# Patient Record
Sex: MALE | Race: WHITE | Employment: OTHER | URBAN - NONMETROPOLITAN AREA
[De-identification: names, ages, dates, MRNs, and addresses within clinical notes are randomized per-mention and may not be internally consistent; named-entity substitution may affect disease eponyms.]

---

## 2019-03-15 ENCOUNTER — HOSPITAL ENCOUNTER (EMERGENCY)
Age: 74
Discharge: HOME | DRG: 280 | End: 2019-03-15
Attending: EMERGENCY MEDICINE
Payer: MEDICARE

## 2019-03-15 ENCOUNTER — APPOINTMENT (OUTPATIENT)
Dept: GENERAL RADIOLOGY | Age: 74
DRG: 280 | End: 2019-03-15
Payer: MEDICARE

## 2019-03-15 VITALS
HEART RATE: 186 BPM | OXYGEN SATURATION: 81 % | TEMPERATURE: 97.9 F | SYSTOLIC BLOOD PRESSURE: 142 MMHG | RESPIRATION RATE: 28 BRPM | DIASTOLIC BLOOD PRESSURE: 94 MMHG

## 2019-03-15 DIAGNOSIS — J10.1 INFLUENZA A: ICD-10-CM

## 2019-03-15 DIAGNOSIS — I48.92 ATRIAL FLUTTER WITH RAPID VENTRICULAR RESPONSE (HCC): ICD-10-CM

## 2019-03-15 DIAGNOSIS — R06.02 SHORTNESS OF BREATH: Primary | ICD-10-CM

## 2019-03-15 DIAGNOSIS — J40 BRONCHITIS: ICD-10-CM

## 2019-03-15 DIAGNOSIS — I21.4 NSTEMI (NON-ST ELEVATED MYOCARDIAL INFARCTION) (HCC): ICD-10-CM

## 2019-03-15 LAB
-: ABNORMAL
ABSOLUTE EOS #: 0 K/UL (ref 0–0.44)
ABSOLUTE IMMATURE GRANULOCYTE: 0 K/UL (ref 0–0.3)
ABSOLUTE LYMPH #: 0.54 K/UL (ref 1.1–3.7)
ABSOLUTE MONO #: 1.73 K/UL (ref 0.1–1.2)
ALBUMIN SERPL-MCNC: 3.9 G/DL (ref 3.5–5.2)
ALBUMIN/GLOBULIN RATIO: 1.1 (ref 1–2.5)
ALLEN TEST: ABNORMAL
ALP BLD-CCNC: 125 U/L (ref 40–129)
ALT SERPL-CCNC: 17 U/L (ref 5–41)
AMORPHOUS: ABNORMAL
AMYLASE: 47 U/L (ref 28–100)
ANION GAP SERPL CALCULATED.3IONS-SCNC: 13 MMOL/L (ref 9–17)
AST SERPL-CCNC: 31 U/L
BACTERIA: ABNORMAL
BASOPHILS # BLD: 1 % (ref 0–2)
BASOPHILS ABSOLUTE: 0.11 K/UL (ref 0–0.2)
BILIRUB SERPL-MCNC: 0.38 MG/DL (ref 0.3–1.2)
BILIRUBIN URINE: NEGATIVE
BNP INTERPRETATION: ABNORMAL
BUN BLDV-MCNC: 13 MG/DL (ref 8–23)
BUN/CREAT BLD: 13 (ref 9–20)
CALCIUM SERPL-MCNC: 9.3 MG/DL (ref 8.6–10.4)
CARBOXYHEMOGLOBIN: ABNORMAL % (ref 0–5)
CASTS UA: ABNORMAL /LPF
CHLORIDE BLD-SCNC: 94 MMOL/L (ref 98–107)
CO2: 27 MMOL/L (ref 20–31)
COLOR: YELLOW
COMMENT UA: ABNORMAL
CREAT SERPL-MCNC: 0.99 MG/DL (ref 0.7–1.2)
CRYSTALS, UA: ABNORMAL /HPF
DIFFERENTIAL TYPE: ABNORMAL
DIRECT EXAM: ABNORMAL
DIRECT EXAM: ABNORMAL
EKG ATRIAL RATE: 264 BPM
EKG P AXIS: -89 DEGREES
EKG Q-T INTERVAL: 302 MS
EKG QRS DURATION: 88 MS
EKG QTC CALCULATION (BAZETT): 447 MS
EKG R AXIS: -141 DEGREES
EKG T AXIS: 168 DEGREES
EKG VENTRICULAR RATE: 132 BPM
EOSINOPHILS RELATIVE PERCENT: 0 % (ref 1–4)
EPITHELIAL CELLS UA: ABNORMAL /HPF (ref 0–5)
FIO2: ABNORMAL
GFR AFRICAN AMERICAN: >60 ML/MIN
GFR NON-AFRICAN AMERICAN: >60 ML/MIN
GFR SERPL CREATININE-BSD FRML MDRD: ABNORMAL ML/MIN/{1.73_M2}
GFR SERPL CREATININE-BSD FRML MDRD: ABNORMAL ML/MIN/{1.73_M2}
GLUCOSE BLD-MCNC: 201 MG/DL (ref 70–99)
GLUCOSE URINE: NEGATIVE
HCO3 VENOUS: 30.1 MMOL/L (ref 24–30)
HCT VFR BLD CALC: 42.6 % (ref 40.7–50.3)
HEMOGLOBIN: 13.4 G/DL (ref 13–17)
IMMATURE GRANULOCYTES: 0 %
INR BLD: 1.2 (ref 0.9–1.2)
KETONES, URINE: NEGATIVE
LACTIC ACID, SEPSIS WHOLE BLOOD: ABNORMAL MMOL/L (ref 0.5–1.9)
LACTIC ACID, SEPSIS WHOLE BLOOD: NORMAL MMOL/L (ref 0.5–1.9)
LACTIC ACID, SEPSIS: 1.5 MMOL/L (ref 0.5–1.9)
LACTIC ACID, SEPSIS: 2.1 MMOL/L (ref 0.5–1.9)
LEUKOCYTE ESTERASE, URINE: NEGATIVE
LIPASE: 47 U/L (ref 13–60)
LYMPHOCYTES # BLD: 5 % (ref 24–43)
Lab: ABNORMAL
MCH RBC QN AUTO: 30.7 PG (ref 25.2–33.5)
MCHC RBC AUTO-ENTMCNC: 31.5 G/DL (ref 28.4–34.8)
MCV RBC AUTO: 97.5 FL (ref 82.6–102.9)
METHEMOGLOBIN: ABNORMAL % (ref 0–1.9)
MODE: ABNORMAL
MONOCYTES # BLD: 16 % (ref 3–12)
MORPHOLOGY: NORMAL
MUCUS: ABNORMAL
NEGATIVE BASE EXCESS, VEN: 2.7 MMOL/L (ref 0–2)
NITRITE, URINE: NEGATIVE
NOTIFICATION TIME: ABNORMAL
NOTIFICATION: ABNORMAL
NRBC AUTOMATED: 0 PER 100 WBC
O2 DEVICE/FLOW/%: ABNORMAL
O2 SAT, VEN: 59.5 % (ref 60–85)
OTHER OBSERVATIONS UA: ABNORMAL
OXYHEMOGLOBIN: ABNORMAL % (ref 95–98)
PARTIAL THROMBOPLASTIN TIME: 37.3 SEC (ref 23.2–34.4)
PATIENT TEMP: 37
PCO2, VEN, TEMP ADJ: ABNORMAL MMHG (ref 39–55)
PCO2, VEN: 57.4 (ref 39–55)
PDW BLD-RTO: 13.2 % (ref 11.8–14.4)
PEEP/CPAP: ABNORMAL
PH UA: 5.5 (ref 5–9)
PH VENOUS: 7.34 (ref 7.32–7.42)
PH, VEN, TEMP ADJ: ABNORMAL (ref 7.32–7.42)
PLATELET # BLD: 186 K/UL (ref 138–453)
PLATELET ESTIMATE: ABNORMAL
PMV BLD AUTO: 9.6 FL (ref 8.1–13.5)
PO2, VEN, TEMP ADJ: ABNORMAL MMHG (ref 30–50)
PO2, VEN: 33.6 (ref 30–50)
POSITIVE BASE EXCESS, VEN: ABNORMAL MMOL/L (ref 0–2)
POTASSIUM SERPL-SCNC: 4.8 MMOL/L (ref 3.7–5.3)
PRO-BNP: 1756 PG/ML
PROTEIN UA: ABNORMAL
PROTHROMBIN TIME: 12 SEC (ref 9.7–12.2)
PSV: ABNORMAL
PT. POSITION: ABNORMAL
RBC # BLD: 4.37 M/UL (ref 4.21–5.77)
RBC # BLD: ABNORMAL 10*6/UL
RBC UA: ABNORMAL /HPF (ref 0–2)
RENAL EPITHELIAL, UA: ABNORMAL /HPF
RESPIRATORY RATE: ABNORMAL
SAMPLE SITE: ABNORMAL
SEG NEUTROPHILS: 78 % (ref 36–65)
SEGMENTED NEUTROPHILS ABSOLUTE COUNT: 8.42 K/UL (ref 1.5–8.1)
SET RATE: ABNORMAL
SODIUM BLD-SCNC: 134 MMOL/L (ref 135–144)
SPECIFIC GRAVITY UA: >1.03 (ref 1.01–1.02)
SPECIMEN DESCRIPTION: ABNORMAL
TEXT FOR RESPIRATORY: ABNORMAL
TOTAL HB: ABNORMAL G/DL (ref 12–16)
TOTAL PROTEIN: 7.6 G/DL (ref 6.4–8.3)
TOTAL RATE: ABNORMAL
TRICHOMONAS: ABNORMAL
TROPONIN INTERP: ABNORMAL
TROPONIN INTERP: ABNORMAL
TROPONIN T: 0.1 NG/ML
TROPONIN T: 0.12 NG/ML
TROPONIN, HIGH SENSITIVITY: ABNORMAL NG/L (ref 0–22)
TROPONIN, HIGH SENSITIVITY: ABNORMAL NG/L (ref 0–22)
TURBIDITY: CLEAR
URINE HGB: NEGATIVE
UROBILINOGEN, URINE: NORMAL
VT: ABNORMAL
WBC # BLD: 10.8 K/UL (ref 3.5–11.3)
WBC # BLD: ABNORMAL 10*3/UL
WBC UA: ABNORMAL /HPF (ref 0–5)
YEAST: ABNORMAL

## 2019-03-15 PROCEDURE — 85610 PROTHROMBIN TIME: CPT

## 2019-03-15 PROCEDURE — 6370000000 HC RX 637 (ALT 250 FOR IP): Performed by: EMERGENCY MEDICINE

## 2019-03-15 PROCEDURE — 83690 ASSAY OF LIPASE: CPT

## 2019-03-15 PROCEDURE — 36415 COLL VENOUS BLD VENIPUNCTURE: CPT

## 2019-03-15 PROCEDURE — 87040 BLOOD CULTURE FOR BACTERIA: CPT

## 2019-03-15 PROCEDURE — 99285 EMERGENCY DEPT VISIT HI MDM: CPT

## 2019-03-15 PROCEDURE — 93005 ELECTROCARDIOGRAM TRACING: CPT

## 2019-03-15 PROCEDURE — 82805 BLOOD GASES W/O2 SATURATION: CPT

## 2019-03-15 PROCEDURE — 85730 THROMBOPLASTIN TIME PARTIAL: CPT

## 2019-03-15 PROCEDURE — 2580000003 HC RX 258: Performed by: EMERGENCY MEDICINE

## 2019-03-15 PROCEDURE — 71045 X-RAY EXAM CHEST 1 VIEW: CPT

## 2019-03-15 PROCEDURE — 83605 ASSAY OF LACTIC ACID: CPT

## 2019-03-15 PROCEDURE — 83880 ASSAY OF NATRIURETIC PEPTIDE: CPT

## 2019-03-15 PROCEDURE — 87804 INFLUENZA ASSAY W/OPTIC: CPT

## 2019-03-15 PROCEDURE — 87086 URINE CULTURE/COLONY COUNT: CPT

## 2019-03-15 PROCEDURE — 84484 ASSAY OF TROPONIN QUANT: CPT

## 2019-03-15 PROCEDURE — 82150 ASSAY OF AMYLASE: CPT

## 2019-03-15 PROCEDURE — 81001 URINALYSIS AUTO W/SCOPE: CPT

## 2019-03-15 PROCEDURE — 85025 COMPLETE CBC W/AUTO DIFF WBC: CPT

## 2019-03-15 PROCEDURE — 2500000003 HC RX 250 WO HCPCS: Performed by: EMERGENCY MEDICINE

## 2019-03-15 PROCEDURE — 80053 COMPREHEN METABOLIC PANEL: CPT

## 2019-03-15 PROCEDURE — 94664 DEMO&/EVAL PT USE INHALER: CPT

## 2019-03-15 PROCEDURE — 96374 THER/PROPH/DIAG INJ IV PUSH: CPT

## 2019-03-15 RX ORDER — 0.9 % SODIUM CHLORIDE 0.9 %
1000 INTRAVENOUS SOLUTION INTRAVENOUS ONCE
Status: COMPLETED | OUTPATIENT
Start: 2019-03-15 | End: 2019-03-15

## 2019-03-15 RX ORDER — IPRATROPIUM BROMIDE AND ALBUTEROL SULFATE 2.5; .5 MG/3ML; MG/3ML
1 SOLUTION RESPIRATORY (INHALATION) ONCE
Status: COMPLETED | OUTPATIENT
Start: 2019-03-15 | End: 2019-03-15

## 2019-03-15 RX ORDER — OSELTAMIVIR PHOSPHATE 75 MG/1
75 CAPSULE ORAL ONCE
Status: COMPLETED | OUTPATIENT
Start: 2019-03-15 | End: 2019-03-15

## 2019-03-15 RX ORDER — METOPROLOL TARTRATE 5 MG/5ML
5 INJECTION INTRAVENOUS ONCE
Status: COMPLETED | OUTPATIENT
Start: 2019-03-15 | End: 2019-03-15

## 2019-03-15 RX ADMIN — OSELTAMIVIR PHOSPHATE 75 MG: 75 CAPSULE ORAL at 07:10

## 2019-03-15 RX ADMIN — SODIUM CHLORIDE 1000 ML: 9 INJECTION, SOLUTION INTRAVENOUS at 06:14

## 2019-03-15 RX ADMIN — IPRATROPIUM BROMIDE AND ALBUTEROL SULFATE 1 AMPULE: .5; 3 SOLUTION RESPIRATORY (INHALATION) at 05:52

## 2019-03-15 RX ADMIN — IPRATROPIUM BROMIDE AND ALBUTEROL SULFATE 1 AMPULE: .5; 3 SOLUTION RESPIRATORY (INHALATION) at 10:27

## 2019-03-15 RX ADMIN — METOPROLOL TARTRATE 5 MG: 1 INJECTION, SOLUTION INTRAVENOUS at 06:06

## 2019-03-15 ASSESSMENT — ENCOUNTER SYMPTOMS
DIARRHEA: 0
RECTAL PAIN: 0
SHORTNESS OF BREATH: 1
STRIDOR: 0
BLOOD IN STOOL: 0
TROUBLE SWALLOWING: 0
EYE REDNESS: 0
BACK PAIN: 0
VOICE CHANGE: 0
WHEEZING: 1
VOMITING: 0
ABDOMINAL PAIN: 0
EYE PAIN: 0
CHEST TIGHTNESS: 1
CONSTIPATION: 0
COUGH: 0
FACIAL SWELLING: 0
SINUS PAIN: 0
RHINORRHEA: 1
NAUSEA: 0
EYE ITCHING: 0
SINUS PRESSURE: 0
ABDOMINAL DISTENTION: 0
PHOTOPHOBIA: 0
SORE THROAT: 0
COLOR CHANGE: 0
EYE DISCHARGE: 0

## 2019-03-15 NOTE — ED NOTES
[place], spoke with Arturo Laureano. She will call both [facility] and [facility] to start to see if there is bed availability and get back with us.       Tabitha Damon  03/15/19 1025

## 2019-03-15 NOTE — ED PROVIDER NOTES
[facility] ED  eMERGENCY dEPARTMENT eNCOUnter      Pt Name: Travis Roberson  MRN: [id number]  [place] 1945  Date of evaluation: 3/15/2019  Provider: Joshua Rodriguez MD    [facility]     Chief Complaint   Patient presents with    Shortness of Breath     onset yesterday         HISTORY OF PRESENT ILLNESS   (Location/Symptom, Timing/Onset, Context/Setting,Quality, Duration, Modifying Factors, Severity)  Note limiting factors. Travis Roberson is a72 y.o. male who presents to the emergency department with dyspnea     HPI  Patient comes into the emergency department complaining of a one day duration of dyspnea. Patient began on 3/14/2019 with rhinorrhea and postnasal drainage with some cough and chest congestion. The chest congestion became progressively worse through the day into the evening into the early hours of 3/15/2019. Patient was not able to sleep well in the evening of 3/14 through 3/15/2/019. Patient did not try anything to help with his symptoms. Patient has not been evaluated prior to coming into the emergency room. Patient denies any chest pain, any fever, pleuritic chest pain, any productive cough, any sinus pain, any ear pain, any sore throat, abdominal pain, any flank pain, any hematuria, any diarrhea, any nausea or vomiting. Nursing Notes were reviewed. REVIEW OF SYSTEMS    (2-9 systems for level 4, 10 or more for level 5)     Review of Systems   Constitutional: Negative for activity change, chills, diaphoresis, fatigue, fever and unexpected weight change. HENT: Positive for postnasal drip and rhinorrhea. Negative for congestion, dental problem, drooling, ear discharge, ear pain, facial swelling, mouth sores, sinus pressure, sinus pain, sore throat, trouble swallowing and voice change. Eyes: Negative for photophobia, pain, discharge, redness, itching and visual disturbance. Respiratory: Positive for chest tightness, shortness of breath and wheezing.  Negative for History:   Procedure Laterality Date    CARDIOVERSION  2007    Successful-Dr. Leggett-My [place]    CARDIOVERSION      CORONARY ANGIOPLASTY           CURRENT MEDICATIONS       Previous Medications    ATORVASTATIN (LIPITOR) 80 MG TABLET    TAKE 1 TABLET DAILY    CITALOPRAM (CELEXA) 10 MG TABLET    TAKE 1 TABLET DAILY    DILTIAZEM (TIAZAC) 120 MG EXTENDED RELEASE CAPSULE    TAKE 1 CAPSULE DAILY    FLUAD 0.5 ML OLIVIA    inject 0.5 milliliter intramuscularly    GLYBURIDE (DIABETA) 5 MG TABLET    TAKE 2 TABLETS TWICE A DAY    METFORMIN (GLUCOPHAGE) 1000 MG TABLET    TAKE 1 TABLET TWICE A DAY    METOPROLOL (LOPRESSOR) 25 MG TABLET    TAKE 1 TABLET TWICE A DAY    PRADAXA 150 MG CAPSULE    TAKE 1 CAPSULE TWICE DAILY    PROPRANOLOL (INDERAL) 10 MG TABLET    1 tablet daily as needed for tremor    SAXAGLIPTIN (ONGLYZA) 5 MG TABS TABLET    Take 5 mg by mouth daily       ALLERGIES     Patient has no known allergies. FAMILY HISTORY     History reviewed. No pertinent family history.        SOCIAL HISTORY       Social History     Socioeconomic History    Marital status:      Spouse name: None    Number of children: None    Years of education: None    Highest education level: None   Occupational History    None   Social Needs    Financial resource strain: None    Food insecurity:     Worry: None     Inability: None    Transportation needs:     Medical: None     Non-medical: None   Tobacco Use    Smoking status: Former Smoker     Packs/day: 1.50     Years: 30.00     Pack years: 45.00     Types: Cigarettes     Last attempt to quit: 1996     Years since quittin.7    Smokeless tobacco: Never Used   Substance and Sexual Activity    Alcohol use: No    Drug use: No    Sexual activity: None   Lifestyle    Physical activity:     Days per week: None     Minutes per session: None    Stress: None   Relationships    Social connections:     Talks on phone: None     Gets together: None Normal range of motion. Lymphadenopathy:     He has no cervical adenopathy. Neurological: He is alert and oriented to person, place, and time. He has normal strength. No cranial nerve deficit or sensory deficit. GCS eye subscore is 4. GCS verbal subscore is 5. GCS motor subscore is 6. Skin: Skin is warm and dry. Capillary refill takes less than 2 seconds. No rash noted. No pallor. Psychiatric: He has a normal mood and affect. His behavior is normal.   Nursing note and vitals reviewed. DIAGNOSTIC RESULTS     EKG: All EKG's are interpreted by the Emergency Department Physician who either signs orCo-signs this chart in the absence of a cardiologist.    EKG Interpretation    Interpreted by emergency department physician on 3/15/2019 at 05:45    Rhythm: atrial flutter  Rate: tachycardia  Axis: right  Ectopy: none  Conduction: Atrial flutter with 2:1 AV conduction  ST Segments: no acute change  T Waves: no acute change  Q Waves: none    Clinical Impression: atrial flutter (new onset) with 2:1 conduction most likely with no previous EKG for comparison    Lena Pitts      RADIOLOGY:   Non-plain film images such as CT, Ultrasound and MRI are read by the radiologist. Plain radiographic images are visualized and preliminarily interpreted by the emergency physician with the below findings:      Interpretation per the Radiologist below, ifavailable at the time of this note:    XR CHEST PORTABLE   Final Result   No acute cardiopulmonary abnormality.                ED BEDSIDE ULTRASOUND:   Performed by ED Physician - none    LABS:  Labs Reviewed   RAPID INFLUENZA A/B ANTIGENS - Abnormal; Notable for the following components:       Result Value    Direct Exam POSITIVE for Influenza A Antigen (*)     All other components within normal limits   CBC WITH AUTO DIFFERENTIAL - Abnormal; Notable for the following components:    Seg Neutrophils 78 (*)     Lymphocytes 5 (*)     Monocytes 16 (*)     Eosinophils % 0 (*) Segs Absolute 8.42 (*)     Absolute Lymph # 0.54 (*)     Absolute Mono # 1.73 (*)     All other components within normal limits   COMPREHENSIVE METABOLIC PANEL W/ REFLEX TO MG FOR LOW K - Abnormal; Notable for the following components:    Glucose 201 (*)     Sodium 134 (*)     Chloride 94 (*)     All other components within normal limits   URINALYSIS - Abnormal; Notable for the following components:    Specific Gravity, UA >1.030 (*)     Protein, UA 1+ (*)     All other components within normal limits   LACTATE, SEPSIS - Abnormal; Notable for the following components:    Lactic Acid, Sepsis 2.1 (*)     All other components within normal limits   APTT - Abnormal; Notable for the following components:    PTT 37.3 (*)     All other components within normal limits   BLOOD GAS, VENOUS - Abnormal; Notable for the following components:    pCO2, Chuy 57.4 (*)     HCO3, Venous 30.1 (*)     Negative Base Excess, Chuy 2.7 (*)     O2 Sat, Chuy 59.5 (*)     All other components within normal limits   BRAIN NATRIURETIC PEPTIDE - Abnormal; Notable for the following components:    Pro-BNP 1,756 (*)     All other components within normal limits   TROPONIN - Abnormal; Notable for the following components:    Troponin T 0.10 (*)     All other components within normal limits   MICROSCOPIC URINALYSIS - Abnormal; Notable for the following components:    Mucus, UA 2+ (*)     All other components within normal limits   URINE CULTURE   CULTURE BLOOD #1   CULTURE BLOOD #2   AMYLASE   PROTIME-INR   LIPASE   LACTATE, SEPSIS       All other labs were within normal range ornot returned as of this dictation.     EMERGENCY DEPARTMENT COURSE and DIFFERENTIAL DIAGNOSIS/MDM:   Vitals:    Vitals:    03/15/19 0615 03/15/19 0631 03/15/19 0647 03/15/19 0701   BP: 97/77 (!) 120/90 117/80 128/86   Pulse: 130 110 102 100   Resp: (!) 42 17 26 9   Temp:       TempSrc:       SpO2: 92% 95% 98% 95%       Re-Evaluation: 06:32  Patient feels much better and his heart rate is down from 130s down to the low 100s to the low 110s on the cardiac monitor. Patient denies any chest pain and states he has not had any chest pain throughout his time in the emergency department or last evening. Re-Evaluation: 06:50  BP:  117/80, Pulse: 90s with atrial fibrillation on the cardiac monitor  Patient is asymptomatic and denies any chest pain, shortness of breath or palpitations    MDM  Patient is a 82-year-old male who comes in emergently plan of a one-day history of dyspnea but denies any chest pain or palpitations. Patient was found to be in atrial flutter with rapid ventricular response in the 130s when he came to the emergency department. Patient evaluation including blood cultures, lab, cardiac monitoring, chest x-ray and influenza swab. Once all came back positive for influenza A and patient had a positive troponin at 0.1 with an elevated proBNP also. Other laboratory was within normal limits otherwise or not clinically relevant. I discussed the case over with cardiology who states if the troponin does not change on repeat, patient may be able to be managed here in the hospital to the hospitalist service and does not need transferred if his no beds available at [facility] in [place], [place] or [facility]. CRITICAL CARE TIME   Total CriticalCare time was 35 minutes, excluding separately reportable procedures. There was a high probability of clinically significant/life threatening deterioration in the patient's condition which required my urgent intervention. CONSULTS:  @07:35, discussed the case with Dr Lynwood Denver, Cardiologist.  Dr Lynwood Denver feels patient can stay and be evaluated and managed at this hospital if the second troponin does not change as he agrees with the NSTEMI type II picture.   Patient does not need anticoagulation with Lovenox or Heparin drip since patient is asymptomatic and on anticoagulation with Pradaxa    @08:00, @08:00, patient discussed with Dr Sajan Da Silva, [facility] emergency department attending. Final disposition will be determined by Dr Sajan Da Silva after evaluation of labs, imaging and re-evaluation of the patient    PROCEDURES:  Unless otherwise noted below, none     Procedures    FINAL IMPRESSION      1. Shortness of breath    2. Influenza A    3. Atrial flutter with rapid ventricular response ([facility] [address]    4. NSTEMI (non-ST elevated myocardial infarction) ([facility] [address]    5. Bronchitis          DISPOSITION/PLAN   DISPOSITION    Pending on Dr Sajan Da Silva evaluation    PATIENT REFERRED TO:  No follow-up provider specified.     DISCHARGE MEDICATIONS:  New Prescriptions    No medications on file              (Please note that portions of this note were completed with a voice recognition program.  Efforts were made to edit the dictations but occasionally words are mis-transcribed.)      Tracy Bach MD (electronically signed)  Attending Emergency Physician          Tracy Bach MD  03/30/19 8909

## 2019-03-15 NOTE — ED NOTES
[facility] called spoke with Noelle Vasquez to initiate transfer process to [facility].  She will page the hospitalist     Juan Diego Mendoza  03/15/19 5393

## 2019-03-15 NOTE — ED NOTES
Patient requesting to speak with Dr. Lali Boyer. Patient refusing transport at this time.       Gibran Contreras RN  03/15/19 5682

## 2019-03-15 NOTE — ED NOTES
Amauri Rangel from [employer], [facility] and [facility] are both at capacity. She spoke with [facility]. They do not have any step down beds but they do have ICU beds. Irais from [facility] requested patient's chart with labs and EKG faxed over.       Marco A Lainez  03/15/19 3692

## 2019-03-15 NOTE — ED NOTES
Naima from [place] [id number] called back. [facility] is still discharge dependent with several waiting in the ER for beds. [facility] is discharge dependent as well with 3 waiting in the ER for beds. She will call [facility] first to attempt to get patient transferred.      [facility]  03/15/19 0936

## 2019-03-15 NOTE — ED NOTES
Dr Criselda Gómez called at office. Angie will have him call us back.      Deepali Varags  03/15/19 0953

## 2019-03-16 LAB
CULTURE: NO GROWTH
Lab: NORMAL
SPECIMEN DESCRIPTION: NORMAL

## 2019-03-18 ENCOUNTER — HOSPITAL ENCOUNTER (INPATIENT)
Age: 74
LOS: 1 days | Discharge: ANOTHER HOSPITAL | DRG: 280 | End: 2019-03-19
Attending: INTERNAL MEDICINE | Admitting: INTERNAL MEDICINE
Payer: MEDICARE

## 2019-03-18 ENCOUNTER — APPOINTMENT (OUTPATIENT)
Dept: GENERAL RADIOLOGY | Age: 74
DRG: 280 | End: 2019-03-18
Attending: INTERNAL MEDICINE
Payer: MEDICARE

## 2019-03-18 PROBLEM — J10.1 INFLUENZA A: Status: ACTIVE | Noted: 2019-03-18

## 2019-03-18 PROBLEM — J10.1 INFLUENZA A WITH RESPIRATORY MANIFESTATIONS: Status: ACTIVE | Noted: 2019-03-18

## 2019-03-18 PROBLEM — J96.01 ACUTE RESPIRATORY FAILURE WITH HYPOXIA (HCC): Status: ACTIVE | Noted: 2019-03-18

## 2019-03-18 PROBLEM — J96.02 ACUTE HYPERCAPNIC RESPIRATORY FAILURE (HCC): Status: ACTIVE | Noted: 2019-03-18

## 2019-03-18 LAB
-: ABNORMAL
ABSOLUTE EOS #: 0.08 K/UL (ref 0–0.44)
ABSOLUTE IMMATURE GRANULOCYTE: 0 K/UL (ref 0–0.3)
ABSOLUTE LYMPH #: 1.08 K/UL (ref 1.1–3.7)
ABSOLUTE MONO #: 0.08 K/UL (ref 0.1–1.2)
ALLEN TEST: ABNORMAL
AMORPHOUS: ABNORMAL
ANION GAP SERPL CALCULATED.3IONS-SCNC: 13 MMOL/L (ref 9–17)
BACTERIA: ABNORMAL
BASOPHILS # BLD: 0 % (ref 0–2)
BASOPHILS ABSOLUTE: 0 K/UL (ref 0–0.2)
BILIRUBIN URINE: NEGATIVE
BUN BLDV-MCNC: 31 MG/DL (ref 8–23)
BUN/CREAT BLD: 25 (ref 9–20)
CALCIUM SERPL-MCNC: 9.2 MG/DL (ref 8.6–10.4)
CARBOXYHEMOGLOBIN: ABNORMAL % (ref 0–5)
CASTS UA: ABNORMAL /LPF
CHLORIDE BLD-SCNC: 92 MMOL/L (ref 98–107)
CHOLESTEROL/HDL RATIO: 2.4
CHOLESTEROL: 114 MG/DL
CO2: 27 MMOL/L (ref 20–31)
COLOR: YELLOW
COMMENT UA: ABNORMAL
CREAT SERPL-MCNC: 1.24 MG/DL (ref 0.7–1.2)
CRYSTALS, UA: ABNORMAL /HPF
DIFFERENTIAL TYPE: ABNORMAL
EKG ATRIAL RATE: 264 BPM
EKG P AXIS: 96 DEGREES
EKG Q-T INTERVAL: 332 MS
EKG QRS DURATION: 84 MS
EKG QTC CALCULATION (BAZETT): 426 MS
EKG R AXIS: 19 DEGREES
EKG T AXIS: 74 DEGREES
EKG VENTRICULAR RATE: 99 BPM
EOSINOPHILS RELATIVE PERCENT: 1 % (ref 1–4)
EPITHELIAL CELLS UA: ABNORMAL /HPF (ref 0–5)
ESTIMATED AVERAGE GLUCOSE: 200 MG/DL
FIO2: 40
GFR AFRICAN AMERICAN: >60 ML/MIN
GFR NON-AFRICAN AMERICAN: 57 ML/MIN
GFR SERPL CREATININE-BSD FRML MDRD: ABNORMAL ML/MIN/{1.73_M2}
GFR SERPL CREATININE-BSD FRML MDRD: ABNORMAL ML/MIN/{1.73_M2}
GLUCOSE BLD-MCNC: 302 MG/DL (ref 74–100)
GLUCOSE BLD-MCNC: 309 MG/DL (ref 74–100)
GLUCOSE BLD-MCNC: 356 MG/DL (ref 74–100)
GLUCOSE BLD-MCNC: 373 MG/DL (ref 70–99)
GLUCOSE URINE: ABNORMAL
HBA1C MFR BLD: 8.6 % (ref 4.8–5.9)
HCO3 ARTERIAL: 25.4 MMOL/L (ref 22–26)
HCT VFR BLD CALC: 40.1 % (ref 40.7–50.3)
HDLC SERPL-MCNC: 47 MG/DL
HEMOGLOBIN: 12.7 G/DL (ref 13–17)
IMMATURE GRANULOCYTES: 0 %
KETONES, URINE: NEGATIVE
LACTIC ACID, WHOLE BLOOD: ABNORMAL MMOL/L (ref 0.7–2.1)
LACTIC ACID, WHOLE BLOOD: NORMAL MMOL/L (ref 0.7–2.1)
LACTIC ACID: 2.2 MMOL/L (ref 0.5–2.2)
LACTIC ACID: 2.6 MMOL/L (ref 0.5–2.2)
LDL CHOLESTEROL: 49 MG/DL (ref 0–130)
LEUKOCYTE ESTERASE, URINE: NEGATIVE
LV EF: 40 %
LVEF MODALITY: NORMAL
LYMPHOCYTES # BLD: 13 % (ref 24–43)
MCH RBC QN AUTO: 30.5 PG (ref 25.2–33.5)
MCHC RBC AUTO-ENTMCNC: 31.7 G/DL (ref 28.4–34.8)
MCV RBC AUTO: 96.4 FL (ref 82.6–102.9)
METHEMOGLOBIN: ABNORMAL % (ref 0–1.9)
MODE: ABNORMAL
MONOCYTES # BLD: 1 % (ref 3–12)
MORPHOLOGY: NORMAL
MUCUS: ABNORMAL
NEGATIVE BASE EXCESS, ART: 1.6 MMOL/L (ref 0–2)
NITRITE, URINE: NEGATIVE
NOTIFICATION TIME: ABNORMAL
NOTIFICATION: ABNORMAL
NRBC AUTOMATED: 0 PER 100 WBC
O2 DEVICE/FLOW/%: ABNORMAL
O2 SAT, ARTERIAL: 91.7 % (ref 95–98)
OTHER OBSERVATIONS UA: ABNORMAL
OXYHEMOGLOBIN: ABNORMAL % (ref 95–98)
PATIENT TEMP: 37
PCO2 ARTERIAL: 51.7 MMHG (ref 35–45)
PCO2, ART, TEMP ADJ: ABNORMAL (ref 35–45)
PDW BLD-RTO: 13.2 % (ref 11.8–14.4)
PEEP/CPAP: ABNORMAL
PH ARTERIAL: 7.31 (ref 7.35–7.45)
PH UA: 5.5 (ref 5–9)
PH, ART, TEMP ADJ: ABNORMAL (ref 7.35–7.45)
PLATELET # BLD: 176 K/UL (ref 138–453)
PLATELET ESTIMATE: ABNORMAL
PMV BLD AUTO: 9.6 FL (ref 8.1–13.5)
PO2 ARTERIAL: 67.8 MMHG (ref 80–100)
PO2, ART, TEMP ADJ: ABNORMAL MMHG (ref 80–100)
POSITIVE BASE EXCESS, ART: ABNORMAL MMOL/L (ref 0–2)
POTASSIUM SERPL-SCNC: 5.5 MMOL/L (ref 3.7–5.3)
PROTEIN UA: NEGATIVE
PSV: ABNORMAL
PT. POSITION: ABNORMAL
RBC # BLD: 4.16 M/UL (ref 4.21–5.77)
RBC # BLD: ABNORMAL 10*6/UL
RBC UA: ABNORMAL /HPF (ref 0–2)
RENAL EPITHELIAL, UA: ABNORMAL /HPF
RESPIRATORY RATE: 36
SAMPLE SITE: ABNORMAL
SEG NEUTROPHILS: 85 % (ref 36–65)
SEGMENTED NEUTROPHILS ABSOLUTE COUNT: 7.06 K/UL (ref 1.5–8.1)
SET RATE: ABNORMAL
SODIUM BLD-SCNC: 132 MMOL/L (ref 135–144)
SPECIFIC GRAVITY UA: 1.01 (ref 1.01–1.02)
TEXT FOR RESPIRATORY: ABNORMAL
TOTAL HB: ABNORMAL G/DL (ref 12–16)
TOTAL RATE: ABNORMAL
TRICHOMONAS: ABNORMAL
TRIGL SERPL-MCNC: 90 MG/DL
TROPONIN INTERP: ABNORMAL
TROPONIN T: 0.58 NG/ML
TROPONIN T: 0.81 NG/ML
TROPONIN T: 0.81 NG/ML
TROPONIN, HIGH SENSITIVITY: ABNORMAL NG/L (ref 0–22)
TURBIDITY: CLEAR
URINE HGB: NEGATIVE
UROBILINOGEN, URINE: NORMAL
VLDLC SERPL CALC-MCNC: NORMAL MG/DL (ref 1–30)
VT: ABNORMAL
WBC # BLD: 8.3 K/UL (ref 3.5–11.3)
WBC # BLD: ABNORMAL 10*3/UL
WBC UA: ABNORMAL /HPF (ref 0–5)
YEAST: ABNORMAL

## 2019-03-18 PROCEDURE — 6370000000 HC RX 637 (ALT 250 FOR IP): Performed by: PHYSICIAN ASSISTANT

## 2019-03-18 PROCEDURE — 80061 LIPID PANEL: CPT

## 2019-03-18 PROCEDURE — 94640 AIRWAY INHALATION TREATMENT: CPT

## 2019-03-18 PROCEDURE — 80048 BASIC METABOLIC PNL TOTAL CA: CPT

## 2019-03-18 PROCEDURE — 84484 ASSAY OF TROPONIN QUANT: CPT

## 2019-03-18 PROCEDURE — 82947 ASSAY GLUCOSE BLOOD QUANT: CPT

## 2019-03-18 PROCEDURE — 6370000000 HC RX 637 (ALT 250 FOR IP): Performed by: INTERNAL MEDICINE

## 2019-03-18 PROCEDURE — 81001 URINALYSIS AUTO W/SCOPE: CPT

## 2019-03-18 PROCEDURE — 2580000003 HC RX 258: Performed by: PHYSICIAN ASSISTANT

## 2019-03-18 PROCEDURE — 93005 ELECTROCARDIOGRAM TRACING: CPT

## 2019-03-18 PROCEDURE — 71045 X-RAY EXAM CHEST 1 VIEW: CPT

## 2019-03-18 PROCEDURE — 82805 BLOOD GASES W/O2 SATURATION: CPT

## 2019-03-18 PROCEDURE — 93306 TTE W/DOPPLER COMPLETE: CPT

## 2019-03-18 PROCEDURE — 85025 COMPLETE CBC W/AUTO DIFF WBC: CPT

## 2019-03-18 PROCEDURE — 83036 HEMOGLOBIN GLYCOSYLATED A1C: CPT

## 2019-03-18 PROCEDURE — 94762 N-INVAS EAR/PLS OXIMTRY CONT: CPT

## 2019-03-18 PROCEDURE — 6360000002 HC RX W HCPCS: Performed by: PHYSICIAN ASSISTANT

## 2019-03-18 PROCEDURE — 2700000000 HC OXYGEN THERAPY PER DAY

## 2019-03-18 PROCEDURE — 36415 COLL VENOUS BLD VENIPUNCTURE: CPT

## 2019-03-18 PROCEDURE — 99222 1ST HOSP IP/OBS MODERATE 55: CPT | Performed by: INTERNAL MEDICINE

## 2019-03-18 PROCEDURE — 87040 BLOOD CULTURE FOR BACTERIA: CPT

## 2019-03-18 PROCEDURE — 83605 ASSAY OF LACTIC ACID: CPT

## 2019-03-18 PROCEDURE — 6360000002 HC RX W HCPCS: Performed by: INTERNAL MEDICINE

## 2019-03-18 PROCEDURE — 71046 X-RAY EXAM CHEST 2 VIEWS: CPT

## 2019-03-18 PROCEDURE — 94664 DEMO&/EVAL PT USE INHALER: CPT

## 2019-03-18 PROCEDURE — 2000000000 HC ICU R&B

## 2019-03-18 RX ORDER — SODIUM CHLORIDE 0.9 % (FLUSH) 0.9 %
10 SYRINGE (ML) INJECTION PRN
Status: DISCONTINUED | OUTPATIENT
Start: 2019-03-18 | End: 2019-03-19 | Stop reason: HOSPADM

## 2019-03-18 RX ORDER — ONDANSETRON 2 MG/ML
4 INJECTION INTRAMUSCULAR; INTRAVENOUS EVERY 6 HOURS PRN
Status: DISCONTINUED | OUTPATIENT
Start: 2019-03-18 | End: 2019-03-19 | Stop reason: HOSPADM

## 2019-03-18 RX ORDER — IPRATROPIUM BROMIDE AND ALBUTEROL SULFATE 2.5; .5 MG/3ML; MG/3ML
1 SOLUTION RESPIRATORY (INHALATION) 4 TIMES DAILY
Status: DISCONTINUED | OUTPATIENT
Start: 2019-03-18 | End: 2019-03-19 | Stop reason: HOSPADM

## 2019-03-18 RX ORDER — INSULIN GLARGINE 100 [IU]/ML
15 INJECTION, SOLUTION SUBCUTANEOUS NIGHTLY
Status: ON HOLD | COMMUNITY
End: 2019-04-10 | Stop reason: SDUPTHER

## 2019-03-18 RX ORDER — DEXTROSE MONOHYDRATE 25 G/50ML
12.5 INJECTION, SOLUTION INTRAVENOUS PRN
Status: DISCONTINUED | OUTPATIENT
Start: 2019-03-18 | End: 2019-03-19 | Stop reason: HOSPADM

## 2019-03-18 RX ORDER — METHYLPREDNISOLONE SODIUM SUCCINATE 125 MG/2ML
80 INJECTION, POWDER, LYOPHILIZED, FOR SOLUTION INTRAMUSCULAR; INTRAVENOUS ONCE
Status: COMPLETED | OUTPATIENT
Start: 2019-03-18 | End: 2019-03-18

## 2019-03-18 RX ORDER — DEXTROSE MONOHYDRATE 50 MG/ML
100 INJECTION, SOLUTION INTRAVENOUS PRN
Status: DISCONTINUED | OUTPATIENT
Start: 2019-03-18 | End: 2019-03-19 | Stop reason: HOSPADM

## 2019-03-18 RX ORDER — IPRATROPIUM BROMIDE AND ALBUTEROL SULFATE 2.5; .5 MG/3ML; MG/3ML
1 SOLUTION RESPIRATORY (INHALATION)
Status: DISCONTINUED | OUTPATIENT
Start: 2019-03-18 | End: 2019-03-18

## 2019-03-18 RX ORDER — CITALOPRAM 10 MG/1
10 TABLET ORAL DAILY
Status: DISCONTINUED | OUTPATIENT
Start: 2019-03-19 | End: 2019-03-19 | Stop reason: HOSPADM

## 2019-03-18 RX ORDER — PREDNISONE 20 MG/1
20 TABLET ORAL 2 TIMES DAILY
Status: DISCONTINUED | OUTPATIENT
Start: 2019-03-18 | End: 2019-03-18

## 2019-03-18 RX ORDER — POTASSIUM CHLORIDE 20 MEQ/1
40 TABLET, EXTENDED RELEASE ORAL PRN
Status: DISCONTINUED | OUTPATIENT
Start: 2019-03-18 | End: 2019-03-19 | Stop reason: HOSPADM

## 2019-03-18 RX ORDER — ACETAMINOPHEN 325 MG/1
650 TABLET ORAL EVERY 4 HOURS PRN
Status: DISCONTINUED | OUTPATIENT
Start: 2019-03-18 | End: 2019-03-19 | Stop reason: HOSPADM

## 2019-03-18 RX ORDER — POTASSIUM CHLORIDE 7.45 MG/ML
10 INJECTION INTRAVENOUS PRN
Status: DISCONTINUED | OUTPATIENT
Start: 2019-03-18 | End: 2019-03-19 | Stop reason: HOSPADM

## 2019-03-18 RX ORDER — DABIGATRAN ETEXILATE 75 MG/1
150 CAPSULE, COATED PELLETS ORAL 2 TIMES DAILY
Status: DISCONTINUED | OUTPATIENT
Start: 2019-03-18 | End: 2019-03-19 | Stop reason: HOSPADM

## 2019-03-18 RX ORDER — MAGNESIUM SULFATE 1 G/100ML
1 INJECTION INTRAVENOUS PRN
Status: DISCONTINUED | OUTPATIENT
Start: 2019-03-18 | End: 2019-03-19 | Stop reason: HOSPADM

## 2019-03-18 RX ORDER — ATORVASTATIN CALCIUM 40 MG/1
80 TABLET, FILM COATED ORAL DAILY
Status: DISCONTINUED | OUTPATIENT
Start: 2019-03-18 | End: 2019-03-19 | Stop reason: HOSPADM

## 2019-03-18 RX ORDER — NICOTINE POLACRILEX 4 MG
15 LOZENGE BUCCAL PRN
Status: DISCONTINUED | OUTPATIENT
Start: 2019-03-18 | End: 2019-03-19 | Stop reason: HOSPADM

## 2019-03-18 RX ORDER — SODIUM CHLORIDE 9 MG/ML
INJECTION, SOLUTION INTRAVENOUS CONTINUOUS
Status: DISCONTINUED | OUTPATIENT
Start: 2019-03-18 | End: 2019-03-18

## 2019-03-18 RX ORDER — 0.9 % SODIUM CHLORIDE 0.9 %
500 INTRAVENOUS SOLUTION INTRAVENOUS ONCE
Status: DISCONTINUED | OUTPATIENT
Start: 2019-03-18 | End: 2019-03-18

## 2019-03-18 RX ORDER — DILTIAZEM HYDROCHLORIDE 120 MG/1
120 CAPSULE, COATED, EXTENDED RELEASE ORAL DAILY
Status: DISCONTINUED | OUTPATIENT
Start: 2019-03-19 | End: 2019-03-19 | Stop reason: HOSPADM

## 2019-03-18 RX ORDER — OSELTAMIVIR PHOSPHATE 75 MG/1
75 CAPSULE ORAL 2 TIMES DAILY
Status: DISCONTINUED | OUTPATIENT
Start: 2019-03-18 | End: 2019-03-19 | Stop reason: HOSPADM

## 2019-03-18 RX ORDER — ALBUTEROL SULFATE 2.5 MG/3ML
2.5 SOLUTION RESPIRATORY (INHALATION) EVERY 4 HOURS PRN
Status: DISCONTINUED | OUTPATIENT
Start: 2019-03-18 | End: 2019-03-19 | Stop reason: HOSPADM

## 2019-03-18 RX ORDER — GLYBURIDE 5 MG/1
10 TABLET ORAL
Status: DISCONTINUED | OUTPATIENT
Start: 2019-03-19 | End: 2019-03-19 | Stop reason: HOSPADM

## 2019-03-18 RX ORDER — SODIUM CHLORIDE 0.9 % (FLUSH) 0.9 %
10 SYRINGE (ML) INJECTION EVERY 12 HOURS SCHEDULED
Status: DISCONTINUED | OUTPATIENT
Start: 2019-03-18 | End: 2019-03-19 | Stop reason: HOSPADM

## 2019-03-18 RX ORDER — PREDNISONE 20 MG/1
20 TABLET ORAL 2 TIMES DAILY
Status: DISCONTINUED | OUTPATIENT
Start: 2019-03-18 | End: 2019-03-19 | Stop reason: HOSPADM

## 2019-03-18 RX ORDER — FUROSEMIDE 10 MG/ML
40 INJECTION INTRAMUSCULAR; INTRAVENOUS ONCE
Status: COMPLETED | OUTPATIENT
Start: 2019-03-18 | End: 2019-03-18

## 2019-03-18 RX ORDER — FAMOTIDINE 20 MG/1
20 TABLET, FILM COATED ORAL 2 TIMES DAILY
Status: DISCONTINUED | OUTPATIENT
Start: 2019-03-18 | End: 2019-03-19 | Stop reason: HOSPADM

## 2019-03-18 RX ADMIN — SODIUM CHLORIDE: 9 INJECTION, SOLUTION INTRAVENOUS at 13:31

## 2019-03-18 RX ADMIN — FAMOTIDINE 20 MG: 20 TABLET ORAL at 22:39

## 2019-03-18 RX ADMIN — INSULIN LISPRO 8 UNITS: 100 INJECTION, SOLUTION INTRAVENOUS; SUBCUTANEOUS at 13:42

## 2019-03-18 RX ADMIN — FAMOTIDINE 20 MG: 20 TABLET ORAL at 13:39

## 2019-03-18 RX ADMIN — FUROSEMIDE 40 MG: 10 INJECTION, SOLUTION INTRAMUSCULAR; INTRAVENOUS at 16:43

## 2019-03-18 RX ADMIN — Medication 10 ML: at 16:44

## 2019-03-18 RX ADMIN — METFORMIN HYDROCHLORIDE 1000 MG: 500 TABLET ORAL at 22:39

## 2019-03-18 RX ADMIN — Medication 10 ML: at 22:43

## 2019-03-18 RX ADMIN — IPRATROPIUM BROMIDE AND ALBUTEROL SULFATE 1 AMPULE: .5; 3 SOLUTION RESPIRATORY (INHALATION) at 19:50

## 2019-03-18 RX ADMIN — OSELTAMIVIR PHOSPHATE 75 MG: 75 CAPSULE ORAL at 22:38

## 2019-03-18 RX ADMIN — PREDNISONE 20 MG: 20 TABLET ORAL at 22:44

## 2019-03-18 RX ADMIN — PREDNISONE 20 MG: 20 TABLET ORAL at 13:39

## 2019-03-18 RX ADMIN — METOPROLOL TARTRATE 25 MG: 25 TABLET ORAL at 22:38

## 2019-03-18 RX ADMIN — INSULIN LISPRO 8 UNITS: 100 INJECTION, SOLUTION INTRAVENOUS; SUBCUTANEOUS at 17:17

## 2019-03-18 RX ADMIN — METHYLPREDNISOLONE SODIUM SUCCINATE 80 MG: 125 INJECTION, POWDER, FOR SOLUTION INTRAMUSCULAR; INTRAVENOUS at 13:39

## 2019-03-18 RX ADMIN — IPRATROPIUM BROMIDE AND ALBUTEROL SULFATE 1 AMPULE: .5; 3 SOLUTION RESPIRATORY (INHALATION) at 14:30

## 2019-03-18 RX ADMIN — OSELTAMIVIR PHOSPHATE 75 MG: 75 CAPSULE ORAL at 13:39

## 2019-03-18 RX ADMIN — DABIGATRAN ETEXILATE MESYLATE 150 MG: 75 CAPSULE ORAL at 22:38

## 2019-03-18 RX ADMIN — INSULIN LISPRO 5 UNITS: 100 INJECTION, SOLUTION INTRAVENOUS; SUBCUTANEOUS at 22:39

## 2019-03-18 RX ADMIN — ATORVASTATIN CALCIUM 80 MG: 40 TABLET, FILM COATED ORAL at 22:38

## 2019-03-18 ASSESSMENT — PAIN SCALES - GENERAL: PAINLEVEL_OUTOF10: 0

## 2019-03-18 NOTE — PROGRESS NOTES
While taking vitals, patient's SPO2 was as low as 76% on room air. Pt started on 2L per N/C at that time.  Pt is 88% on 2L so patient's oxygen turned up to 4L per N/C.

## 2019-03-18 NOTE — PROGRESS NOTES
[facility]  Inpatient/Observation/Outpatient Rehabilitation    Date: 3/18/2019  Patient Name: Xu Vazquez       [x] Inpatient Acute/Observation       []  Outpatient  : 1945     PT eval attempted; per RN, pt not appropriate for therapy at this time. Will attempt evaluation at our earliest opportunity.        Elias Potter, PT, DPT  Date: 3/18/2019

## 2019-03-18 NOTE — PROGRESS NOTES
[]  SOB w/ exertion or RR greater than 24 bpm []  Access- ory muscle use at rest. Abn.  resp. [x]  SOB at rest.   4   Bilateral Breath Sounds (BBS) []  Clear []  Diminish-ed bases  []  Diminish-ed t/o, or rales   []  Sporadic, scattered wheezes or rhonchi [x]  Persistentwheezes and, or absent BBS 4   Cough []  Strong, effective, & non-prod. [x]  Effective & prod. Less than 25 ml (2 TBSP) over past 24 hrs []  Ineffective & non-prod to less than 25 ML over past 24 hrs []  Ineffective and, or greater than 25 ml sputum prod. past 24 hrs. []  Nonspon- taneous; Requires suctioning 1   Pulmonary History  (PULM HX) []  No smoking and no chronic pulmonaryhistory []  Former smoker. Quit over 12 mos. ago []  Current smoker or quit w/ in 12 mos []  Pulm. History and, or 20 pk/yr smoking hx [x]  Admitted w/ acute pulm. dx and, or has been admitted w/ pulm. dx 2 or more times over past 12 mos 4   Surgical History this Admit  (SURG HX) [x]  No surgery []  General surgery []  Lower abdominal []  Thoracic or upper abdominal   []  Thoracic w/ pulm. disease 0   Chest X-Ray (CXR)/CT Scan [x]  Clear or not applicable []  Not available []  Atelect- asis or pleural effusions []  Localized infiltrate or pulm. edema []  Con-solidated Infiltrates, bilateral, or in more than 1 lobe 0   Slow or Forced VC, FEV1 OR PEFR (PULM FXN)  []  80% or greater, or not indicated []  Pt. unable to perform []  FEV1 or PEFR or VC 51-79%.   []  FEV1 or PEFR or VC  30-49%   []  FEV1 or PEFR or VC less than 30%   0   TOTAL ACUITY: 13       CARE PLAN    If Acuity Level is 2, 3, or 4 in any of the following:    [] BILATERAL BREATH SOUNDS (BBS)     [] PULMONARY HISTORY (PULM HX)  [] PULMONARY FUNCTION (PULM FX)    Goal: Improve respiratory functions in patients with airway disease and decrease WOB    [x] AEROSOL PROTOCOL    Total Acuity:   16-32  []  Secondary Assessment in 24 hrs Total Acuity:  9-15  [x]  Secondary Assessment in 24 hrs Total Acuity:  4-8  []  Secondary Assessment in 48 hrs Total Acuity:  0-3  []  Secondary Assessment in 72 hrs   [facility] AEROSOL THERAPY with  [physician-ordered bronchodilator(s)] q 4 & Albuterol PRN q2 hrs. Breath-Actuated Neb if BBS Acuity = 4, and pt. can use MP. Notify physician if condition deteriorates. [facility] AEROSOL THERAPY with  [physician-ordered bronchodilator(s)]  QID and Albuterol PRN q4 hrs. Breath-Actuated Neb if BBS Acuity = 4, and pt. can use MP. Notify physician if condition deteriorates. MDI THERAPY with  2 actuations of [physician-ordered bronchodilator(s)] via spacer TID Albuterol and PRNq4 hrs. If unable to utilize MDI: HHN [physician-ordered bronchodilator(s)] TID and Albuterol PRN q4 hrs. Notify physician if condition deteriorates. MDI THERAPY with  [physician-ordered bronchodilator(s)] via spacer TID PRN. If unable to utilize MDI: HHN [physician-ordered bronchodilator(s)] TID PRN. Notify physician if condition deteriorates. If Acuity Level is 2, 3, or 4 in any of the following:    [] COUGH     [] SURGICAL HISTORY (SURG HX)  [] CHEST XRAY (CXR)    Goal: Improvement in sputum mobilization in patients with ineffective airway clearance. Reverse atelectasis.     [] Bronchopulmonary Hygiene Protocol    Total Acuity:   16-32  []  Secondary Assessment in 24 hrs Total Acuity:  9-15  []  Secondary Assessment in 24 hrs Total Acuity:  4-8  []  Secondary Assessment in 48 hrs Total Acuity:  0-3  []  Secondary Assessment in 72 hrs   METANEB QID with [physician-ordered bronchodilator(s)] if CXR Acuity = 4; otherwise:  PD&P, PEP, or Vest QID & PRN  NT Sxn PRN for ineffective cough  METANEB QID with [physician-ordered bronchodilator(s)] if CXR Acuity = 4; otherwise:  PD&P, PEP, or Vest TID & PRN  NT Sxn PRN for ineffective cough  Instruct patient to self-perform IS q1hr WA   Directed Cough self-performed q1hr WA     If Acuity Level is 2 or above in the following:    [] PULMONARY HISTORY (PULM HX)    Goal: Assist patient in quitting smoking to slow or stop the progression of lung disease.     [] Smoking Cessation Protocol    SMOKING CESSATION EDUCATION provided according to policy [id number]: (vadim with an X)  ____Yes    ____ No     ____ NA    Smoking Cessation Booklet given:  ____Yes  ____No ____Patient Joanna Thornton

## 2019-03-18 NOTE — PLAN OF CARE
[facility] to [facility]. Pt has consult to Dr. Bony Duarte. And he has seen pt  Consult calls made per [facility]. And [facility] Dr. Tanya Ramirez.

## 2019-03-18 NOTE — PROGRESS NOTES
Patient up to [facility] room 331 for admission at this time. Page WATSON notified for orders. Patient instructed to change into a gown and gripper socks.

## 2019-03-18 NOTE — H&P
Hospital Medicine  History and Physical    Patient:  Greg Ohara  MRN: [id number]    Chief Complaint:  SOB, wheezing, recently dx with flu A    History Obtained From:  patient, electronic medical record    PCP: Frances Stock MD    History of Present Illness: The patient is a 69 y.o. male who presents as direct admit from Dr. Murphy Amor office. He was dx with flu A the end prior week and was started on tamiflu. ER visit was 3/15/19. He had aflutter with RVR at the time. H/o cardioversions in the past. He had elevated Troponin of 0.10 and BNP. He was hypoxic as well. Case apparently was discussed with Dr. Doe Ferrell who felt patient had NSTEMI Type II: recommended admission vs transfer if Troponin continued to rise. Troponin did increase to 0. 12. Arrangements were made for transfer to tertiary care. He refused transfer. He was discharged. He continued to worsen despite tamiflu. He developed increased SOB, wheezing, hypoxia, and was in respiratory distress in Dr. Murphy Amor office. NO chest pain. H/o DM, CAD, afib, former smoker. He failed outpatient treatment of flu. H/o angioplasty in 1997 or 1998. Admitted for flu A with respiratory manifestations and acute respiratory failure with hypoxia, recent NSTEMI. Past Medical History:        Diagnosis Date    Abnormal nuclear stress test 03/12/2010    Stress and resting cardiolite images showed inferior wall hypoperfusion suggestive of previous myocardial infarction. Left ventricular wall motion showed inferior wall hypokinesia. EF 58%.  Arrhythmia 2007    A single episode    Atrial fibrillation ([address] 2007    Single Episode    CAD (coronary artery disease) 1996    MI    DM type 2 (diabetes mellitus, type 2) ([address] 2005    Hx of echocardiogram 04/05/2007    EF 62%, Increased left atrial and right ventricular diametes. Increased septal and posterior wall thickness suggest left ventricular hypertrophy.  Valves were normal.        Past Surgical History:        Procedure Laterality Date    CARDIOVERSION  05/18/2007    Successful-Dr. Leggett-My Arroyo    CARDIOVERSION  03/13    CORONARY ANGIOPLASTY  1996       Family History:   No family history on file. Social History:   TOBACCO:   reports that he quit smoking about 22 years ago. His smoking use included cigarettes. He has a 45.00 pack-year smoking history. He has never used smokeless tobacco.  ETOH:   reports that he does not drink alcohol. Allergies:  Patient has no known allergies. Medications Prior to Admission:    Prior to Admission medications    Medication Sig Start Date End Date Taking? Authorizing Provider   insulin glargine (LANTUS) 100 UNIT/ML injection vial Inject 15 Units into the skin nightly   Yes Historical Provider, MD   PRADAXA 150 MG capsule TAKE 1 CAPSULE TWICE DAILY 9/22/17  Yes Lori Arreola MD   saxagliptin (ONGLYZA) 5 MG TABS tablet Take 5 mg by mouth daily   Yes Historical Provider, MD   metFORMIN (GLUCOPHAGE) 1000 MG tablet TAKE 1 TABLET TWICE A DAY 9/15/17  Yes Lori Arreola MD   glyBURIDE (DIABETA) 5 MG tablet TAKE 2 TABLETS TWICE A DAY 8/28/17  Yes Lori Arreola MD   atorvastatin (LIPITOR) 80 MG tablet TAKE 1 TABLET DAILY 8/14/17  Yes Lori Arreola MD   citalopram (CELEXA) 10 MG tablet TAKE 1 TABLET DAILY 8/7/17  Yes Lori Arreola MD   diltiazem [facility]) 120 MG extended release capsule TAKE 1 CAPSULE DAILY 2/28/17  Yes Lori Arreola MD   propranolol (INDERAL) 10 MG tablet 1 tablet daily as needed for tremor 4/25/16  Yes Lori Arreola MD   metoprolol (LOPRESSOR) 25 MG tablet TAKE 1 TABLET TWICE A DAY 3/14/16  Yes Lori Arreola MD   FLUAD 0.5 ML OLIVIA inject 0.5 milliliter intramuscularly 10/11/18   Historical Provider, MD       Review of Systems:  Constitutional:negative  for fevers, and negative for chills.   Eyes: negative for visual disturbance   ENT: negative for sore throat, negative nasal congestion, and negative for earache  Respiratory: positive for shortness of breath, positive for cough, and positive for wheezing  Cardiovascular: negative for chest pain, negative for palpitations, and negative for syncope  Gastrointestinal: negative for abdominal pain, negative for nausea,negative for vomiting, negative for diarrhea, negative for constipation, and negative for hematochezia or melena  Genitourinary: negative for dysuria, negative for urinary urgency, negative for urinary frequency, and negative for hematuria  Skin: negative for skin rash, and negative for skin lesions  Neurological: negative for unilateral weakness, numbness or tingling.     Physical Exam:    Vitals:   Temp: 98 °F (36.7 °C)  BP: 109/67  Resp: 24  Pulse: 88  SpO2: 92 %  24HR INTAKE/OUTPUT:      Intake/Output Summary (Last 24 hours) at 3/18/2019 1413  Last data filed at 3/18/2019 1231  Gross per 24 hour   Intake 120 ml   Output --   Net 120 ml       Exam:  GEN:  alert and oriented to person, place and time, well-developed and well-nourished, mild acute distress  EYES: PERRL  NECK: normal, supple  PULM: distant bilaterally - bilateral wheezes, faint rales, no rhonchi, mild respiratory distress, + use of accessory muscles  COR: irregularly irregular and systolic murmur  ABD:  Obese, soft, non-tender, non-distended, normal bowel sounds, no masses or organomegaly  EXT:   edema: 1+ affecting bilateral foot, bilateral ankle, bilateral calf  NEURO: follows commands, MULLIGAN, no deficits  SKIN:  no rashes or significant lesions  -----------------------------------------------------------------  Diagnostic Data:   Lab Results   Component Value Date    WBC 10.8 03/15/2019    HGB 13.4 03/15/2019     03/15/2019       Lab Results   Component Value Date    BUN 13 03/15/2019    CREATININE 0.99 03/15/2019     (L) 03/15/2019    K 4.8 03/15/2019    CALCIUM 9.3 03/15/2019    CL 94 (L) 03/15/2019    CO2 27 03/15/2019    LABGLOM >60 03/15/2019       Lab Results   Component Value Date    WBCUA 0 TO 2 03/15/2019    RBCUA 0 TO 2 03/15/2019    EPITHUA 0 TO 2 03/15/2019    LEUKOCYTESUR NEGATIVE 03/15/2019    SPECGRAV >1.030 (H) 03/15/2019    GLUCOSEU NEGATIVE 03/15/2019    KETUA NEGATIVE 03/15/2019    PROTEINU 1+ (A) 03/15/2019    HGBUR NEGATIVE 03/15/2019    CASTUA 2 TO 5 HYALINE 03/15/2019    CRYSTUA NOT REPORTED 03/15/2019    BACTERIA NOT REPORTED 03/15/2019    YEAST NOT REPORTED 03/15/2019       Lab Results   Component Value Date    TROPONINT 0.81 ([place]) 03/18/2019    PROBNP 1,756 (H) 03/15/2019       No results found. Assessment:    Principal Problem:    Influenza A with respiratory manifestations  Active Problems:    CAD (coronary artery disease)    Type 2 diabetes mellitus without complication, without long-term current use of insulin (HCC)    Acute respiratory failure with hypoxia (HCC)    Atrial fibrillation ([facility]  Resolved Problems:    * No resolved hospital problems.  *      Patient Active Problem List    Diagnosis Date Noted    Influenza A with respiratory manifestations 03/18/2019    Acute respiratory failure with hypoxia ([facility] 03/18/2019    Influenza A 03/18/2019    Type 2 diabetes mellitus without complication, without long-term current use of insulin ([facility] 09/06/2016    Adult BMI > 30 05/06/2016    CAD (coronary artery disease)     DM type 2 (diabetes mellitus, type 2) ([facility]     Atrial fibrillation ([facility] 01/01/2007       Plan:     · This patient requires inpatient admission because of flu A with respiratory manifestations failed outpt treatment, acute respiratory failure with hypoxia, NSTEMI  · Factors affecting the medical complexity of this patient include DM, afib, CAD, former smoker  · Estimated length of stay is 3 days  · Cardiology consult  · EKG  · ECHO  · Trend troponins  · tamiflu  · CXR  · IVF  · SSI  · Home afib/BP meds  · IVF  · CMP  · CBC  · Steroids  · Lactic acid  · Blood cxs  · U/A  · PT/OT  · May need transfer to tertiary care  · High risk medication monitoring: none    CORE MEASURES  DVT prophylaxis: already on chronic anticoagulation  Decubitus ulcer present on admission: No  CODE STATUS: FULL CODE  Nutrition Status: fair   Physical therapy: Yes   Old [place]arts reviewed: Yes  EKG Reviewed: pending  Advance Directive Addressed:  Yes    Creig Spurling, PA-C  3/18/2019, 2:13 PM

## 2019-03-18 NOTE — PROGRESS NOTES
Spoke with Dr. Sandra Friend. Discussed elevated troponin and ABG. Transferring to ICU. Patient aware.      Jos Moeller PA-C  3/18/2019, 2:17 PM

## 2019-03-18 NOTE — PROGRESS NOTES
[facility]  OCCUPATIONAL THERAPY  No Visit Note    [x] ICU    [] Acute   Patient: Charlotte Hernandez  [id number]      Charlotte Hernandez not seen on 3/18/2019 at 3:43 PM due to patient not appropriate at this time, per nurse. Will attempt evaluation  When appropriate. Signature:  Tamica Suarez, BRAN, OTR/L

## 2019-03-18 NOTE — CONSULTS
resting cardiolite images showed inferior wall hypoperfusion suggestive of previous myocardial infarction. Left ventricular wall motion showed inferior wall hypokinesia. EF 58%.  Arrhythmia     A single episode    Atrial fibrillation ([facility]     Single Episode    CAD (coronary artery disease)     MI    DM type 2 (diabetes mellitus, type 2) ([facility]     Hx of echocardiogram 2007    EF 62%, Increased left atrial and right ventricular diametes. Increased septal and posterior wall thickness suggest left ventricular hypertrophy. Valves were normal.        CURRENT ALLERGIES: Patient has no known allergies. REVIEW OF SYSTEMS: 14 systems were reviewed. Pertinent positives and negatives as above, all else negative.      Past Surgical History:   Procedure Laterality Date    CARDIOVERSION  2007    Successful-Dr. Tera Arroyo    CARDIOVERSION      CORONARY ANGIOPLASTY      Social History:  Social History     Tobacco Use    Smoking status: Former Smoker     Packs/day: 1.50     Years: 30.00     Pack years: 45.00     Types: Cigarettes     Last attempt to quit: 1996     Years since quittin.7    Smokeless tobacco: Never Used   Substance Use Topics    Alcohol use: No    Drug use: No        MEDICATIONS:    sodium chloride flush 0.9 % injection 10 mL 2 times per day   sodium chloride flush 0.9 % injection 10 mL PRN   magnesium hydroxide (MILK OF MAGNESIA) 400 MG/5ML suspension 30 mL Daily PRN   ondansetron (ZOFRAN) injection 4 mg Q6H PRN   atorvastatin (LIPITOR) tablet 80 mg Daily   [START ON 3/19/2019] citalopram (CELEXA) tablet 10 mg Daily   [START ON 3/19/2019] diltiazem (CARDIZEM CD) extended release capsule 120 mg Daily   [START ON 3/19/2019] glyBURIDE (DIABETA) tablet 10 mg Daily with breakfast   metFORMIN (GLUCOPHAGE) tablet 1,000 mg BID   metoprolol tartrate (LOPRESSOR) tablet 25 mg BID   dabigatran (PRADAXA) capsule 150 mg BID   [START ON 3/19/2019] linagliptin (TRADJENTA) tablet 5 mg Daily   0.9 % sodium chloride infusion Continuous   potassium chloride (KLOR-CON M) extended release tablet 40 mEq PRN   Or    potassium bicarb-citric acid (EFFER-K) effervescent tablet 40 mEq PRN   Or    potassium chloride 10 mEq/100 mL IVPB (Peripheral Line) PRN   magnesium sulfate 1 g in dextrose 5% 100 mL IVPB PRN   famotidine (PEPCID) tablet 20 mg BID   acetaminophen (TYLENOL) tablet 650 mg Q4H PRN   insulin lispro (HUMALOG) injection pen 0-12 Units TID WC   insulin lispro (HUMALOG) injection pen 0-6 Units Nightly   glucose (GLUTOSE) 40 % oral gel 15 g PRN   dextrose 50 % solution 12.5 g PRN   glucagon (rDNA) injection 1 mg PRN   dextrose 5 % solution PRN   oseltamivir (TAMIFLU) capsule 75 mg BID   predniSONE (DELTASONE) tablet 20 mg BID   0.9 % sodium chloride bolus Once   ipratropium-albuterol (DUONEB) nebulizer solution 1 ampule 4x daily   albuterol (PROVENTIL) nebulizer solution 2.5 mg Q4H PRN       CURRENT INPATIENT MEDICATIONS:  Prior to Admission medications    Medication Sig Start Date End Date Taking?  Authorizing Provider   insulin glargine (LANTUS) 100 UNIT/ML injection vial Inject 15 Units into the skin nightly   Yes Historical Provider, MD   PRADAXA 150 MG capsule TAKE 1 CAPSULE TWICE DAILY 9/22/17  Yes Gregory Gomez MD   saxagliptin (ONGLYZA) 5 MG TABS tablet Take 5 mg by mouth daily   Yes Historical Provider, MD   metFORMIN (GLUCOPHAGE) 1000 MG tablet TAKE 1 TABLET TWICE A DAY 9/15/17  Yes Gregory Gomez MD   glyBURIDE (DIABETA) 5 MG tablet TAKE 2 TABLETS TWICE A DAY 8/28/17  Yes Gregory Gomez MD   atorvastatin (LIPITOR) 80 MG tablet TAKE 1 TABLET DAILY 8/14/17  Yes Gregory Gomez MD   citalopram (CELEXA) 10 MG tablet TAKE 1 TABLET DAILY 8/7/17  Yes Gregory Gomez MD   diltiazem [facility]) 120 MG extended release capsule TAKE 1 CAPSULE DAILY 2/28/17  Yes Gregory Gomez MD   propranolol (INDERAL) 10 MG tablet 1 tablet daily as needed for tremor 4/25/16  Yes Alysa Drivers, MD   metoprolol (LOPRESSOR) 25 MG tablet TAKE 1 TABLET TWICE A DAY 3/14/16  Yes Alysa Drivers, MD   FLUAD 0.5 ML OLIVIA inject 0.5 milliliter intramuscularly 10/11/18   Historical Provider, MD       FAMILY HISTORY: See HPI. PHYSICAL EXAM:   /84   Pulse 98   Temp 97.6 °F (36.4 °C) (Temporal)   Resp (!) 32   Ht 5' 9\" (1.753 m)   Wt 298 lb 8 oz (135.4 kg)   SpO2 94%   BMI 44.08 kg/m²  Body mass index is 44.08 kg/m². Constitutional: He is oriented to person, place, and time. He appears well-developed and well-nourished. In moderate respiratory distress. HEENT: Normocephalic and atraumatic. No JVD present. Carotid bruit is not present. No mass and no thyromegaly present. No lymphadenopathy present. Cardiovascular: Normal rate, regularly irregular rhythm, normal heart sounds. Exam reveals no gallop and no friction rubs. A I/VI systolic murmur was heard at the base of the heart without significant radiation. Pulmonary/Chest: Moderate respiratory distress. Diffuse wheezing. No crackles. Abdominal: Soft, non-tender. Bowel sounds and aorta are normal. He exhibits no organomegaly, mass or bruit. Extremities: 2+ lower extremity pitting pedal edema. 1/2 way up to the knees bilaterally No cyanosis and no clubbing. Pulses are 2+ radial and carotid pulses. 2+ dorsalis pedis and posterior tibial pulses bilaterally. Neurological: He is alert and oriented to person, place, and time. No evidence of gross cranial nerve deficit. Coordination appeared normal.   Skin: Skin is warm and dry. There is no rash or diaphoresis. Psychiatric: He has a normal mood and affect.  His speech is normal and behavior is normal.      MOST RECENT LABS ON RECORD:   Lab Results   Component Value Date    WBC 10.8 03/15/2019    HGB 13.4 03/15/2019    HCT 42.6 03/15/2019     03/15/2019    CHOL 135 11/26/2018    TRIG 147 11/26/2018    HDL 41.6 11/26/2018    ALT 17 03/15/2019    AST 31 03/15/2019     (L) 03/15/2019    K 4.8 03/15/2019    CL 94 (L) 03/15/2019    CREATININE 0.99 03/15/2019    BUN 13 03/15/2019    CO2 27 03/15/2019    INR 1.2 03/15/2019    LABA1C 8.6 (H) 11/26/2018       ASSESSMENT:  Patient Active Problem List    Diagnosis Date Noted    Influenza A with respiratory manifestations 03/18/2019    Acute respiratory failure with hypoxia ([facility] 03/18/2019    Influenza A 03/18/2019    Type 2 diabetes mellitus without complication, without long-term current use of insulin ([facility] 09/06/2016    Adult BMI > 30 05/06/2016    CAD (coronary artery disease)     DM type 2 (diabetes mellitus, type 2) (HCC)     Atrial fibrillation ([facility] 01/01/2007        PLAN:  Picture of non-ST segment elevation myocardial infarction and heart failure complicating influenza type A infection. Paroxysmal atrial flutter, rate controlled and on anticoagulation with Pradaxa 150 mg twice a day  Discussed with the patient and his wife the need to transfer him to a tertiary care facility. They are now agreeable to transfer. [facility] kindly accepted the transfer. Echo reviewed, ejection fraction is 40% with thinning and hypokinesis of the distal half of the interventricular septum. Discontinue intravenous fluids and give 1 dose of Lasix 40 mg now. Start aspirin 81 mg daily. Continue Lipitor 80 mg daily, recheck lipid profile. Continue diltiazem. Not a great candidate for beta blocker therapy now giving active wheezing. Continue nasal CPAP. Follow-up repeat CBC, BMP, hemoglobin A1c and lipids as above. Follow-up repeat lactate. Transfer via multiple ICU to [address] [place] [address] as above. Patient will need cardiac catheterization after control of his heart failure/influenza pneumonia symptoms. Once again, thank you for allowing me to participate in this patients care. Please do not hesitate to contact me if I could be of any further assistance.      Sincerely,  Haroon Noel MD, F. A.C.C. [facility] Specialist    [address], [place], [address] [place] [address]  Phone: [phone], Fax: [phone]     I believe that the risk of significant morbidity and mortality related to the patient's current medical conditions are: high. The documentation recorded by the scribe, accurately and completely reflects the services I personally performed and the decisions made by me. Jesus Andrew MD, F.A.C.C. 3/18/2019

## 2019-03-19 ENCOUNTER — HOSPITAL ENCOUNTER (INPATIENT)
Age: 74
LOS: 22 days | Discharge: NURSING, REHAB OR LONG-TERM CARE | DRG: 207 | End: 2019-04-10
Attending: INTERNAL MEDICINE | Admitting: INTERNAL MEDICINE
Payer: MEDICARE

## 2019-03-19 VITALS
SYSTOLIC BLOOD PRESSURE: 124 MMHG | RESPIRATION RATE: 25 BRPM | DIASTOLIC BLOOD PRESSURE: 84 MMHG | WEIGHT: 294.7 LBS | OXYGEN SATURATION: 93 % | TEMPERATURE: 96.5 F | BODY MASS INDEX: 43.65 KG/M2 | HEART RATE: 126 BPM | HEIGHT: 69 IN

## 2019-03-19 LAB
ABSOLUTE EOS #: 0.05 K/UL (ref 0–0.44)
ABSOLUTE IMMATURE GRANULOCYTE: 0.1 K/UL (ref 0–0.3)
ABSOLUTE LYMPH #: 0.92 K/UL (ref 1.1–3.7)
ABSOLUTE MONO #: 0.92 K/UL (ref 0.1–1.2)
ALBUMIN SERPL-MCNC: 3.8 G/DL (ref 3.5–5.2)
ALBUMIN/GLOBULIN RATIO: 1.1 (ref 1–2.5)
ALLEN TEST: ABNORMAL
ALLEN TEST: ABNORMAL
ALLEN TEST: POSITIVE
ALP BLD-CCNC: 132 U/L (ref 40–129)
ALT SERPL-CCNC: 40 U/L (ref 5–41)
ANION GAP SERPL CALCULATED.3IONS-SCNC: 11 MMOL/L (ref 9–17)
AST SERPL-CCNC: 37 U/L
BASOPHILS # BLD: 0 % (ref 0–2)
BASOPHILS ABSOLUTE: <0.03 K/UL (ref 0–0.2)
BILIRUB SERPL-MCNC: 0.56 MG/DL (ref 0.3–1.2)
BUN BLDV-MCNC: 31 MG/DL (ref 8–23)
BUN/CREAT BLD: 30 (ref 9–20)
CALCIUM SERPL-MCNC: 9.1 MG/DL (ref 8.6–10.4)
CHLORIDE BLD-SCNC: 94 MMOL/L (ref 98–107)
CO2: 29 MMOL/L (ref 20–31)
CREAT SERPL-MCNC: 1.03 MG/DL (ref 0.7–1.2)
DIFFERENTIAL TYPE: ABNORMAL
EOSINOPHILS RELATIVE PERCENT: 1 % (ref 1–4)
FIO2: 40
FIO2: 70
FIO2: ABNORMAL
GFR AFRICAN AMERICAN: >60 ML/MIN
GFR NON-AFRICAN AMERICAN: >60 ML/MIN
GFR SERPL CREATININE-BSD FRML MDRD: ABNORMAL ML/MIN/{1.73_M2}
GFR SERPL CREATININE-BSD FRML MDRD: ABNORMAL ML/MIN/{1.73_M2}
GLUCOSE BLD-MCNC: 100 MG/DL (ref 75–110)
GLUCOSE BLD-MCNC: 106 MG/DL (ref 75–110)
GLUCOSE BLD-MCNC: 115 MG/DL (ref 75–110)
GLUCOSE BLD-MCNC: 119 MG/DL (ref 75–110)
GLUCOSE BLD-MCNC: 123 MG/DL (ref 75–110)
GLUCOSE BLD-MCNC: 127 MG/DL (ref 75–110)
GLUCOSE BLD-MCNC: 162 MG/DL (ref 75–110)
GLUCOSE BLD-MCNC: 168 MG/DL (ref 75–110)
GLUCOSE BLD-MCNC: 224 MG/DL (ref 75–110)
GLUCOSE BLD-MCNC: 279 MG/DL (ref 75–110)
GLUCOSE BLD-MCNC: 312 MG/DL (ref 75–110)
GLUCOSE BLD-MCNC: 314 MG/DL (ref 70–99)
GLUCOSE BLD-MCNC: 356 MG/DL (ref 75–110)
GLUCOSE BLD-MCNC: 369 MG/DL (ref 75–110)
HCT VFR BLD CALC: 39.2 % (ref 40.7–50.3)
HCT VFR BLD CALC: 41.4 % (ref 40.7–50.3)
HEMOGLOBIN: 12.5 G/DL (ref 13–17)
HEMOGLOBIN: 12.7 G/DL (ref 13–17)
IMMATURE GRANULOCYTES: 1 %
LYMPHOCYTES # BLD: 9 % (ref 24–43)
MCH RBC QN AUTO: 30.8 PG (ref 25.2–33.5)
MCH RBC QN AUTO: 30.8 PG (ref 25.2–33.5)
MCHC RBC AUTO-ENTMCNC: 30.7 G/DL (ref 28.4–34.8)
MCHC RBC AUTO-ENTMCNC: 31.9 G/DL (ref 28.4–34.8)
MCV RBC AUTO: 100.5 FL (ref 82.6–102.9)
MCV RBC AUTO: 96.6 FL (ref 82.6–102.9)
MODE: ABNORMAL
MONOCYTES # BLD: 9 % (ref 3–12)
NEGATIVE BASE EXCESS, ART: ABNORMAL (ref 0–2)
NRBC AUTOMATED: 0 PER 100 WBC
NRBC AUTOMATED: 0 PER 100 WBC
O2 DEVICE/FLOW/%: ABNORMAL
PARTIAL THROMBOPLASTIN TIME: 33.5 SEC (ref 20.5–30.5)
PARTIAL THROMBOPLASTIN TIME: >120 SEC (ref 20.5–30.5)
PATIENT TEMP: ABNORMAL
PDW BLD-RTO: 13.2 % (ref 11.8–14.4)
PDW BLD-RTO: 13.3 % (ref 11.8–14.4)
PLATELET # BLD: 187 K/UL (ref 138–453)
PLATELET # BLD: 206 K/UL (ref 138–453)
PLATELET ESTIMATE: ABNORMAL
PMV BLD AUTO: 9.8 FL (ref 8.1–13.5)
PMV BLD AUTO: 9.9 FL (ref 8.1–13.5)
POC HCO3: 34.3 MMOL/L (ref 21–28)
POC HCO3: 34.3 MMOL/L (ref 21–28)
POC HCO3: 35.8 MMOL/L (ref 21–28)
POC O2 SATURATION: 90 % (ref 94–98)
POC O2 SATURATION: 95 % (ref 94–98)
POC O2 SATURATION: 97 % (ref 94–98)
POC PCO2 TEMP: ABNORMAL MM HG
POC PCO2: 70.9 MM HG (ref 35–48)
POC PCO2: 80.9 MM HG (ref 35–48)
POC PCO2: 97.2 MM HG (ref 35–48)
POC PH TEMP: ABNORMAL
POC PH: 7.16 (ref 7.35–7.45)
POC PH: 7.24 (ref 7.35–7.45)
POC PH: 7.31 (ref 7.35–7.45)
POC PO2 TEMP: ABNORMAL MM HG
POC PO2: 103 MM HG (ref 83–108)
POC PO2: 111.6 MM HG (ref 83–108)
POC PO2: 65.8 MM HG (ref 83–108)
POSITIVE BASE EXCESS, ART: 2 (ref 0–3)
POSITIVE BASE EXCESS, ART: 4 (ref 0–3)
POSITIVE BASE EXCESS, ART: 7 (ref 0–3)
POTASSIUM SERPL-SCNC: 5.3 MMOL/L (ref 3.7–5.3)
RBC # BLD: 4.06 M/UL (ref 4.21–5.77)
RBC # BLD: 4.12 M/UL (ref 4.21–5.77)
RBC # BLD: ABNORMAL 10*6/UL
SAMPLE SITE: ABNORMAL
SEG NEUTROPHILS: 81 % (ref 36–65)
SEGMENTED NEUTROPHILS ABSOLUTE COUNT: 8.66 K/UL (ref 1.5–8.1)
SODIUM BLD-SCNC: 134 MMOL/L (ref 135–144)
TCO2 (CALC), ART: 37 MMOL/L (ref 22–29)
TCO2 (CALC), ART: 37 MMOL/L (ref 22–29)
TCO2 (CALC), ART: 38 MMOL/L (ref 22–29)
TOTAL PROTEIN: 7.4 G/DL (ref 6.4–8.3)
TROPONIN INTERP: ABNORMAL
TROPONIN INTERP: ABNORMAL
TROPONIN T: ABNORMAL NG/ML
TROPONIN T: ABNORMAL NG/ML
TROPONIN, HIGH SENSITIVITY: 437 NG/L (ref 0–22)
TROPONIN, HIGH SENSITIVITY: 463 NG/L (ref 0–22)
WBC # BLD: 10.7 K/UL (ref 3.5–11.3)
WBC # BLD: 12.7 K/UL (ref 3.5–11.3)
WBC # BLD: ABNORMAL 10*3/UL

## 2019-03-19 PROCEDURE — 2000000000 HC ICU R&B

## 2019-03-19 PROCEDURE — 6360000002 HC RX W HCPCS: Performed by: STUDENT IN AN ORGANIZED HEALTH CARE EDUCATION/TRAINING PROGRAM

## 2019-03-19 PROCEDURE — 2580000003 HC RX 258: Performed by: STUDENT IN AN ORGANIZED HEALTH CARE EDUCATION/TRAINING PROGRAM

## 2019-03-19 PROCEDURE — 2500000003 HC RX 250 WO HCPCS: Performed by: STUDENT IN AN ORGANIZED HEALTH CARE EDUCATION/TRAINING PROGRAM

## 2019-03-19 PROCEDURE — 94640 AIRWAY INHALATION TREATMENT: CPT

## 2019-03-19 PROCEDURE — 94660 CPAP INITIATION&MGMT: CPT

## 2019-03-19 PROCEDURE — 6370000000 HC RX 637 (ALT 250 FOR IP): Performed by: INTERNAL MEDICINE

## 2019-03-19 PROCEDURE — 36415 COLL VENOUS BLD VENIPUNCTURE: CPT

## 2019-03-19 PROCEDURE — 36600 WITHDRAWAL OF ARTERIAL BLOOD: CPT

## 2019-03-19 PROCEDURE — 2580000003 HC RX 258: Performed by: EMERGENCY MEDICINE

## 2019-03-19 PROCEDURE — 99291 CRITICAL CARE FIRST HOUR: CPT | Performed by: INTERNAL MEDICINE

## 2019-03-19 PROCEDURE — 94664 DEMO&/EVAL PT USE INHALER: CPT

## 2019-03-19 PROCEDURE — 85027 COMPLETE CBC AUTOMATED: CPT

## 2019-03-19 PROCEDURE — 6360000002 HC RX W HCPCS

## 2019-03-19 PROCEDURE — 82803 BLOOD GASES ANY COMBINATION: CPT

## 2019-03-19 PROCEDURE — 84484 ASSAY OF TROPONIN QUANT: CPT

## 2019-03-19 PROCEDURE — 6360000002 HC RX W HCPCS: Performed by: INTERNAL MEDICINE

## 2019-03-19 PROCEDURE — 94002 VENT MGMT INPAT INIT DAY: CPT

## 2019-03-19 PROCEDURE — 51798 US URINE CAPACITY MEASURE: CPT | Performed by: NURSE PRACTITIONER

## 2019-03-19 PROCEDURE — 80053 COMPREHEN METABOLIC PANEL: CPT

## 2019-03-19 PROCEDURE — 6370000000 HC RX 637 (ALT 250 FOR IP): Performed by: EMERGENCY MEDICINE

## 2019-03-19 PROCEDURE — 85730 THROMBOPLASTIN TIME PARTIAL: CPT

## 2019-03-19 PROCEDURE — 85025 COMPLETE CBC W/AUTO DIFF WBC: CPT

## 2019-03-19 PROCEDURE — 51701 INSERT BLADDER CATHETER: CPT | Performed by: NURSE PRACTITIONER

## 2019-03-19 PROCEDURE — 82947 ASSAY GLUCOSE BLOOD QUANT: CPT

## 2019-03-19 RX ORDER — ALBUTEROL SULFATE 2.5 MG/3ML
2.5 SOLUTION RESPIRATORY (INHALATION)
Status: DISCONTINUED | OUTPATIENT
Start: 2019-03-19 | End: 2019-03-24

## 2019-03-19 RX ORDER — HEPARIN SODIUM 1000 [USP'U]/ML
10000 INJECTION, SOLUTION INTRAVENOUS; SUBCUTANEOUS PRN
Status: DISCONTINUED | OUTPATIENT
Start: 2019-03-19 | End: 2019-03-28 | Stop reason: ALTCHOICE

## 2019-03-19 RX ORDER — LORAZEPAM 2 MG/ML
0.5 INJECTION INTRAMUSCULAR ONCE
Status: COMPLETED | OUTPATIENT
Start: 2019-03-19 | End: 2019-03-19

## 2019-03-19 RX ORDER — LORAZEPAM 2 MG/ML
0.5 INJECTION INTRAMUSCULAR EVERY 6 HOURS
Status: DISCONTINUED | OUTPATIENT
Start: 2019-03-19 | End: 2019-03-20

## 2019-03-19 RX ORDER — ONDANSETRON 2 MG/ML
4 INJECTION INTRAMUSCULAR; INTRAVENOUS EVERY 6 HOURS PRN
Status: DISCONTINUED | OUTPATIENT
Start: 2019-03-19 | End: 2019-04-10 | Stop reason: HOSPADM

## 2019-03-19 RX ORDER — HEPARIN SODIUM 1000 [USP'U]/ML
10000 INJECTION, SOLUTION INTRAVENOUS; SUBCUTANEOUS ONCE
Status: COMPLETED | OUTPATIENT
Start: 2019-03-19 | End: 2019-03-19

## 2019-03-19 RX ORDER — DEXTROSE MONOHYDRATE 50 MG/ML
100 INJECTION, SOLUTION INTRAVENOUS PRN
Status: DISCONTINUED | OUTPATIENT
Start: 2019-03-19 | End: 2019-04-10 | Stop reason: HOSPADM

## 2019-03-19 RX ORDER — SODIUM CHLORIDE 0.9 % (FLUSH) 0.9 %
10 SYRINGE (ML) INJECTION PRN
Status: DISCONTINUED | OUTPATIENT
Start: 2019-03-19 | End: 2019-04-10 | Stop reason: HOSPADM

## 2019-03-19 RX ORDER — SODIUM CHLORIDE 0.9 % (FLUSH) 0.9 %
10 SYRINGE (ML) INJECTION EVERY 12 HOURS SCHEDULED
Status: DISCONTINUED | OUTPATIENT
Start: 2019-03-19 | End: 2019-04-10 | Stop reason: HOSPADM

## 2019-03-19 RX ORDER — METOPROLOL TARTRATE 5 MG/5ML
2.5 INJECTION INTRAVENOUS EVERY 4 HOURS PRN
Status: DISCONTINUED | OUTPATIENT
Start: 2019-03-19 | End: 2019-03-28

## 2019-03-19 RX ORDER — FUROSEMIDE 10 MG/ML
40 INJECTION INTRAMUSCULAR; INTRAVENOUS ONCE
Status: COMPLETED | OUTPATIENT
Start: 2019-03-19 | End: 2019-03-19

## 2019-03-19 RX ORDER — OSELTAMIVIR PHOSPHATE 75 MG/1
75 CAPSULE ORAL 2 TIMES DAILY
Status: DISCONTINUED | OUTPATIENT
Start: 2019-03-19 | End: 2019-03-21

## 2019-03-19 RX ORDER — LORAZEPAM 1 MG/1
0.5 TABLET ORAL ONCE
Status: DISCONTINUED | OUTPATIENT
Start: 2019-03-19 | End: 2019-03-19

## 2019-03-19 RX ORDER — FUROSEMIDE 10 MG/ML
40 INJECTION INTRAMUSCULAR; INTRAVENOUS DAILY
Status: DISCONTINUED | OUTPATIENT
Start: 2019-03-19 | End: 2019-03-21

## 2019-03-19 RX ORDER — INSULIN GLARGINE 100 [IU]/ML
15 INJECTION, SOLUTION SUBCUTANEOUS NIGHTLY
Status: DISCONTINUED | OUTPATIENT
Start: 2019-03-19 | End: 2019-03-19

## 2019-03-19 RX ORDER — NICOTINE POLACRILEX 4 MG
15 LOZENGE BUCCAL PRN
Status: DISCONTINUED | OUTPATIENT
Start: 2019-03-19 | End: 2019-04-10 | Stop reason: HOSPADM

## 2019-03-19 RX ORDER — DEXTROSE MONOHYDRATE 25 G/50ML
12.5 INJECTION, SOLUTION INTRAVENOUS PRN
Status: DISCONTINUED | OUTPATIENT
Start: 2019-03-19 | End: 2019-04-10 | Stop reason: HOSPADM

## 2019-03-19 RX ORDER — IPRATROPIUM BROMIDE AND ALBUTEROL SULFATE 2.5; .5 MG/3ML; MG/3ML
1 SOLUTION RESPIRATORY (INHALATION)
Status: DISCONTINUED | OUTPATIENT
Start: 2019-03-20 | End: 2019-03-24

## 2019-03-19 RX ORDER — HEPARIN SODIUM 1000 [USP'U]/ML
5000 INJECTION, SOLUTION INTRAVENOUS; SUBCUTANEOUS PRN
Status: DISCONTINUED | OUTPATIENT
Start: 2019-03-19 | End: 2019-03-28 | Stop reason: ALTCHOICE

## 2019-03-19 RX ORDER — HEPARIN SODIUM 10000 [USP'U]/100ML
16.59 INJECTION, SOLUTION INTRAVENOUS CONTINUOUS
Status: DISCONTINUED | OUTPATIENT
Start: 2019-03-19 | End: 2019-03-24

## 2019-03-19 RX ORDER — METHYLPREDNISOLONE SODIUM SUCCINATE 40 MG/ML
40 INJECTION, POWDER, LYOPHILIZED, FOR SOLUTION INTRAMUSCULAR; INTRAVENOUS EVERY 8 HOURS
Status: DISCONTINUED | OUTPATIENT
Start: 2019-03-19 | End: 2019-03-20

## 2019-03-19 RX ADMIN — LORAZEPAM 0.5 MG: 2 INJECTION INTRAMUSCULAR; INTRAVENOUS at 18:54

## 2019-03-19 RX ADMIN — DEXTROSE MONOHYDRATE 500 MG: 50 INJECTION, SOLUTION INTRAVENOUS at 09:38

## 2019-03-19 RX ADMIN — FAMOTIDINE 20 MG: 10 INJECTION, SOLUTION INTRAVENOUS at 20:57

## 2019-03-19 RX ADMIN — FUROSEMIDE 40 MG: 10 INJECTION, SOLUTION INTRAMUSCULAR; INTRAVENOUS at 09:38

## 2019-03-19 RX ADMIN — HEPARIN SODIUM 10000 UNITS: 1000 INJECTION INTRAVENOUS; SUBCUTANEOUS at 10:59

## 2019-03-19 RX ADMIN — CEFTRIAXONE SODIUM 1 G: 1 INJECTION, POWDER, FOR SOLUTION INTRAMUSCULAR; INTRAVENOUS at 09:38

## 2019-03-19 RX ADMIN — DILTIAZEM HYDROCHLORIDE 5 MG/HR: 5 INJECTION INTRAVENOUS at 16:06

## 2019-03-19 RX ADMIN — HEPARIN SODIUM AND DEXTROSE 12.59 UNITS/KG/HR: 10000; 5 INJECTION INTRAVENOUS at 22:33

## 2019-03-19 RX ADMIN — Medication 10 ML: at 09:38

## 2019-03-19 RX ADMIN — Medication 10 ML: at 20:58

## 2019-03-19 RX ADMIN — HEPARIN SODIUM AND DEXTROSE 16.59 UNITS/KG/HR: 10000; 5 INJECTION INTRAVENOUS at 10:59

## 2019-03-19 RX ADMIN — FAMOTIDINE 20 MG: 10 INJECTION, SOLUTION INTRAVENOUS at 11:08

## 2019-03-19 RX ADMIN — IPRATROPIUM BROMIDE AND ALBUTEROL SULFATE 1 AMPULE: .5; 3 SOLUTION RESPIRATORY (INHALATION) at 21:45

## 2019-03-19 RX ADMIN — SODIUM CHLORIDE 3.54 UNITS/HR: 9 INJECTION, SOLUTION INTRAVENOUS at 17:09

## 2019-03-19 RX ADMIN — SODIUM CHLORIDE 8.88 UNITS/HR: 9 INJECTION, SOLUTION INTRAVENOUS at 11:28

## 2019-03-19 RX ADMIN — FUROSEMIDE 40 MG: 10 INJECTION, SOLUTION INTRAMUSCULAR; INTRAVENOUS at 20:57

## 2019-03-19 RX ADMIN — IPRATROPIUM BROMIDE AND ALBUTEROL SULFATE 1 AMPULE: .5; 3 SOLUTION RESPIRATORY (INHALATION) at 04:49

## 2019-03-19 RX ADMIN — LORAZEPAM 0.5 MG: 2 INJECTION INTRAMUSCULAR; INTRAVENOUS at 14:20

## 2019-03-19 ASSESSMENT — PULMONARY FUNCTION TESTS
PIF_VALUE: 13
PIF_VALUE: 19
PIF_VALUE: 15
PIF_VALUE: 16
PIF_VALUE: 13

## 2019-03-19 ASSESSMENT — PAIN SCALES - GENERAL
PAINLEVEL_OUTOF10: 4
PAINLEVEL_OUTOF10: 4
PAINLEVEL_OUTOF10: 0

## 2019-03-19 NOTE — CONSULTS
Attestation signed by      Attending Physician Statement:    I have discussed the care of  Kelly Cox , including pertinent history and exam findings, with the Cardiology fellow/resident. I have seen and examined the patient and the key elements of all parts of the encounter have been performed by me. I agree with the assessment, plan and orders as documented by the fellow/resident, after I modified exam findings and plan of treatments, and the final version is my approved version of the assessment. Additional Comments:   Patient admitted from [facility] with acute hypoxic hypercapnic reps failure with severe resp acidosis, requiring NIPPV, is encephalopathic on exam, recent Influenza A infection followed by worsening resp status, noted to be in Atrial flutter with RVR on admission, current -140, BP stable. Echo yesterday showed LVEF 40% but could be underestimated due to tachycardia. Start cardizem drip for rate control. Cont heparin drip. CXR shows diffuse bilateral interstitial infiltrates. Interstitial PNA vs Pulmonary edema (more likely edema). Discussed with Dr Sanjay Gallardo ([address] [place] [address]). Recommend aggressive diuresis with lasix 40 mg bid. On anti-microbial therapy for flu and PNA. Will consider GUSTAVO/CV when resp status stable. Will repeat limited echo once rate better controlled. [facility] Cardiology    Consult / H&P               Today's Date: 3/19/2019  Patient Name: Kelly Cox  Date of admission: 3/19/2019  7:58 AM  Patient's age: 69 y.o., 1945  Ad[facility] Dx: [facility] [J96.02]    Reason for Consult:  Cardiac evaluation    Requesting Physician: Tin Dominique MD    CHIEF COMPLAINT:  Elevated troponins    History Obtained From:  spouse, electronic medical record    HISTORY OF PRESENT ILLNESS:      The patient is a 69 y.o.  male who is admitted to the hospital for acute respiratory failure 2/2 Influenza A.     The patient was saxagliptin (ONGLYZA) 5 MG TABS tablet Take 5 mg by mouth daily    Historical Provider, MD   metFORMIN (GLUCOPHAGE) 1000 MG tablet TAKE 1 TABLET TWICE A DAY 9/15/17   Tamia Chance MD   glyBURIDE (DIABETA) 5 MG tablet TAKE 2 TABLETS TWICE A DAY 8/28/17   Tamia Chance MD   atorvastatin (LIPITOR) 80 MG tablet TAKE 1 TABLET DAILY 8/14/17   Tamia Chance MD   citalopram (CELEXA) 10 MG tablet TAKE 1 TABLET DAILY 8/7/17   Tamia Chance MD   diltiazem GREEN [facility]) 120 MG extended release capsule TAKE 1 CAPSULE DAILY 2/28/17   Tamia Chance MD   propranolol (INDERAL) 10 MG tablet 1 tablet daily as needed for tremor 4/25/16   Tamia Chance MD   metoprolol (LOPRESSOR) 25 MG tablet TAKE 1 TABLET TWICE A DAY 3/14/16   Tamia Chance MD      Current Facility-Administered Medications: sodium chloride flush 0.9 % injection 10 mL, 10 mL, Intravenous, 2 times per day  sodium chloride flush 0.9 % injection 10 mL, 10 mL, Intravenous, PRN  magnesium hydroxide (MILK OF MAGNESIA) 400 MG/5ML suspension 30 mL, 30 mL, Oral, Daily PRN  ondansetron (ZOFRAN) injection 4 mg, 4 mg, Intravenous, Q6H PRN  famotidine (PEPCID) injection 20 mg, 20 mg, Intravenous, BID  heparin (porcine) injection 10,000 Units, 10,000 Units, Intravenous, PRN  heparin (porcine) injection 5,000 Units, 5,000 Units, Intravenous, PRN  heparin 25,000 units in dextrose 5% 250 mL infusion, 16.59 Units/kg/hr, Intravenous, Continuous  cefTRIAXone (ROCEPHIN) 1 g IVPB in 50 mL D5W minibag, 1 g, Intravenous, Q24H  azithromycin (ZITHROMAX) 500 mg in dextrose 5 % 250 mL IVPB, 500 mg, Intravenous, Q24H  insulin regular (HUMULIN R;NOVOLIN R) 100 Units in sodium chloride 0.9 % 100 mL infusion, 0.5 Units/hr, Intravenous, Continuous  glucose (GLUTOSE) 40 % oral gel 15 g, 15 g, Oral, PRN  dextrose 50 % solution 12.5 g, 12.5 g, Intravenous, PRN  glucagon (rDNA) injection 1 mg, 1 mg, Intramuscular, PRN  dextrose 5 % solution, 100 mL/hr, Intravenous, PRN  oseltamivir (TAMIFLU) capsule 75 mg, 75 mg, Oral, BID    Allergies:  Patient has no known allergies. Social History:   reports that he quit smoking about 22 years ago. His smoking use included cigarettes. He has a 45.00 pack-year smoking history. He has never used smokeless tobacco. He reports that he does not drink alcohol or use drugs. Family History: family history is not on file. No h/o sudden cardiac death. No for premature CAD    REVIEW OF SYSTEMS:    · Constitutional: myalgia and cold/flu symptoms x 1 week  · Eyes: No visual changes or diplopia. No scleral icterus. · ENT: No Headaches  · Cardiovascular: SEE HPI  · Respiratory: No previous pulmonary problems, No cough  · Gastrointestinal: No abdominal pain. No change in bowel or bladder habits. · Genitourinary: No dysuria, trouble voiding, or hematuria. · Musculoskeletal:  No gait disturbance, No weakness or joint complaints. · Integumentary: No rash or pruritis. · Neurological: No headache, diplopia, change in muscle strength, numbness or tingling. No change in gait, balance, coordination, mood, affect, memory, mentation, behavior. · Psychiatric: No anxiety, or depression. · Endocrine: No temperature intolerance. No excessive thirst, fluid intake, or urination. No tremor. · Hematologic/Lymphatic: No abnormal bruising or bleeding, blood clots or swollen lymph nodes. · Allergic/Immunologic: No nasal congestion or hives. PHYSICAL EXAM:      /89   Pulse 126   Temp 97.9 °F (36.6 °C) (Oral)   Resp 10   Ht 5' 8\" (1.727 m)   Wt 279 lb 1.6 oz (126.6 kg)   SpO2 90%   BMI 42.44 kg/m²    Constitutional and General Appearance: alert, cooperative, no distress and appears stated age  HEENT: PERRL, no cervical lymphadenopathy. No masses palpable. Normal oral mucosa  Respiratory:  · Normal excursion and expansion without use of accessory muscles  · Resp Auscultation: Good respiratory effort. No for increased work of breathing.  On auscultation: clear to auscultation bilaterally  Cardiovascular:  · The apical impulse is not displaced  · Heart tones are crisp and normal. regular S1 and S2.  · Peripheral pulses are symmetrical and full   Abdomen:   · No masses or tenderness  · Bowel sounds present  Extremities:  ·  No Cyanosis or Clubbing  ·  Lower extremity edema: No  ·  Skin: Warm and dry  Neurological:  · Alert and oriented. · Moves all extremities well  · No abnormalities of mood, affect, memory, mentation, or behavior are noted    DATA:    Diagnostics:          Labs:     CBC:   Recent Labs     03/19/19  0530 03/19/19  0937   WBC 10.7 12.7*   HGB 12.5* 12.7*   HCT 39.2* 41.4    206     BMP:   Recent Labs     03/18/19 2040 03/19/19  0530   * 134*   K 5.5* 5.3   CO2 27 29   BUN 31* 31*   CREATININE 1.24* 1.03   LABGLOM 57* >60   GLUCOSE 373* 314*     BNP: No results for input(s): BNP in the last 72 hours. PT/INR: No results for input(s): PROTIME, INR in the last 72 hours. APTT:  Recent Labs     03/19/19  0937   APTT 33.5*     CARDIAC ENZYMES:No results for input(s): CKTOTAL, CKMB, CKMBINDEX, TROPONINI in the last 72 hours. FASTING LIPID PANEL:  Lab Results   Component Value Date    HDL 47 03/18/2019    LDLCALC 64 11/26/2018    TRIG 90 03/18/2019     LIVER PROFILE:  Recent Labs     03/19/19  0530   AST 37   ALT 40   LABALBU 3.8       IMPRESSION:    Patient Active Problem List   Diagnosis    CAD (coronary artery disease)    DM type 2 (diabetes mellitus, type 2) ([facility]    Adult BMI > 30    Type 2 diabetes mellitus without complication, without long-term current use of insulin (HCC)    Influenza A with respiratory manifestations    Acute respiratory failure with hypoxia (HCC)    Influenza A    Atrial fibrillation (HCC)    Acute hypercapnic respiratory failure (HCC)    NSTEMI (non-ST elevated myocardial infarction) ([facility]       RECOMMENDATIONS:  1. Agree with high dose heparin  2.  Will optimize meds, consider lasix 40 mg IV second dose today if needed, otherwise 40 mg IV from tomorrow is okay  3. Discuss with attending      Discussed with patient and Nurse.     Electronically signed by Cornel Acosta MD on 3/19/2019 at 11:54 [address] [place] [address] [facility]      [phone]

## 2019-03-19 NOTE — PROGRESS NOTES
[x]  Confused;follows directions []  Confused & uncooper-ative []  Obtunded []  Comatose 1   Respiratory Rate  (RR) []  Reg. rate & pattern. 12 - 20 bpm  []  Increased RR. Greater than 20 bpm   []  SOB w/ exertion or RR greater than 24 bpm []  Access- ory muscle use at rest. Abn.  resp. [x]  SOB at rest.   4   Bilateral Breath Sounds (BBS) []  Clear []  Diminish-ed bases  []  Diminish-ed t/o, or rales   [x]  Sporadic, scattered wheezes or rhonchi []  Persistentwheezes and, or absent BBS 3   Cough [x]  Strong, effective, & non-prod. []  Effective & prod. Less than 25 ml (2 TBSP) over past 24 hrs []  Ineffective & non-prod to less than 25 ML over past 24 hrs []  Ineffective and, or greater than 25 ml sputum prod. past 24 hrs. []  Nonspon- taneous; Requires suctioning 0   Pulmonary History  (PULM HX) []  No smoking and no chronic pulmonaryhistory []  Former smoker. Quit over 12 mos. ago []  Current smoker or quit w/ in 12 mos []  Pulm. History and, or 20 pk/yr smoking hx [x]  Admitted w/ acute pulm. dx and, or has been admitted w/ pulm. dx 2 or more times over past 12 mos 4   Surgical History this Admit  (SURG HX) [x]  No surgery []  General surgery []  Lower abdominal []  Thoracic or upper abdominal   []  Thoracic w/ pulm. disease 0   Chest X-Ray (CXR)/CT Scan [x]  Clear or not applicable []  Not available []  Atelect- asis or pleural effusions []  Localized infiltrate or pulm. edema []  Con-solidated Infiltrates, bilateral, or in more than 1 lobe 0   Slow or Forced VC, FEV1 OR PEFR (PULM FXN)  [x]  80% or greater, or not indicated []  Pt. unable to perform []  FEV1 or PEFR or VC 51-79%.   []  FEV1 or PEFR or VC  30-49%   []  FEV1 or PEFR or VC less than 30%   0   TOTAL ACUITY: 12       CARE PLAN    If Acuity Level is 2, 3, or 4 in any of the following:    [x] BILATERAL BREATH SOUNDS (BBS)     [x] PULMONARY HISTORY (PULM HX)  [] PULMONARY FUNCTION (PULM FX)    Goal: Improve respiratory functions in patients with airway disease and decrease WOB    [x] AEROSOL PROTOCOL    Total Acuity:   16-32  []  Secondary Assessment in 24 hrs Total Acuity:  9-15  [x]  Secondary Assessment in 24 hrs Total Acuity:  4-8  []  Secondary Assessment in 48 hrs Total Acuity:  0-3  []  Secondary Assessment in 72 hrs   HHN AEROSOL THERAPY with  [physician-ordered bronchodilator(s)] q 4 & Albuterol PRN q2 hrs. Breath-Actuated Neb if BBS Acuity = 4, and pt. can use MP. Notify physician if condition deteriorates. HHN AEROSOL THERAPY with  [physician-ordered bronchodilator(s)]  QID and Albuterol PRN q4 hrs. Breath-Actuated Neb if BBS Acuity = 4, and pt. can use MP. Notify physician if condition deteriorates. MDI THERAPY with  2 actuations of [physician-ordered bronchodilator(s)] via spacer TID Albuterol and PRNq4 hrs. If unable to utilize MDI: HHN [physician-ordered bronchodilator(s)] TID and Albuterol PRN q4 hrs. Notify physician if condition deteriorates. MDI THERAPY with  [physician-ordered bronchodilator(s)] via spacer TID PRN. If unable to utilize MDI: HHN [physician-ordered bronchodilator(s)] TID PRN. Notify physician if condition deteriorates. If Acuity Level is 2, 3, or 4 in any of the following:    [] COUGH     [] SURGICAL HISTORY (SURG HX)  [] CHEST XRAY (CXR)    Goal: Improvement in sputum mobilization in patients with ineffective airway clearance. Reverse atelectasis.     [] Bronchopulmonary Hygiene Protocol    Total Acuity:   16-32  []  Secondary Assessment in 24 hrs Total Acuity:  9-15  []  Secondary Assessment in 24 hrs Total Acuity:  4-8  []  Secondary Assessment in 48 hrs Total Acuity:  0-3  []  Secondary Assessment in 72 hrs   METANEB QID with [physician-ordered bronchodilator(s)] if CXR Acuity = 4; otherwise:  PD&P, PEP, or Vest QID & PRN  NT Sxn PRN for ineffective cough  METANEB QID with [physician-ordered bronchodilator(s)] if CXR Acuity = 4; otherwise:  PD&P, PEP, or Vest TID & PRN  NT Sxn PRN for ineffective cough Instruct patient to self-perform IS q1hr WA   Directed Cough self-performed q1hr WA     If Acuity Level is 2 or above in the following:    [] PULMONARY HISTORY (PULM HX)    Goal: Assist patient in quitting smoking to slow or stop the progression of lung disease.     [] Smoking Cessation Protocol    SMOKING CESSATION EDUCATION provided according to policy [id number]_871: (vadim with an X)  ____Yes    ____ No     ____ NA    Smoking Cessation Booklet given:  ____Yes  ____No ____Patient Sharie Angelucci

## 2019-03-19 NOTE — PROGRESS NOTES
Smoking Cessation - topics covered   []  Health Risks  []  Benefits of Quitting   []  Smoking Cessation  []  Patient has no history of tobacco use  [x]  Patient is former smoker. Patient quit in 1996. [x]  No need for tobacco cessation education. []  Booklet given  []  Patient verbalizes understanding. []  Patient denies need for tobacco cessation education. []  Unable to meet with patient today. Will follow up as able.   Cody Mckenna  9:36 AM

## 2019-03-19 NOTE — PROGRESS NOTES
Patient constantly pulling monitor, oxygen and gown off. Patient restless and at times confused. Will continue to monitor.

## 2019-03-19 NOTE — PLAN OF CARE
BRONCHOSPASM/BRONCHOCONSTRICTION     [x]         IMPROVE AERATION/BREATH SOUNDS  [x]   ADMINISTER BRONCHODILATOR THERAPY AS APPROPRIATE  [x]   ASSESS BREATH SOUNDS  []   IMPLEMENT AEROSOL/MDI PROTOCOL  [x]   PATIENT EDUCATION AS NEEDED    NONINVASIVE VENTILATION    PROVIDE OPTIMAL VENTILATION/ACCEPTABLE SPO2   IMPLEMENT NONINVASIVE VENTILATION PROTOCOL   MAINTAIN ACCEPTABLE SPO2   ASSESS SKIN INTEGRITY/BREAKDOWN SCORE   PATIENT EDUCATION AS NEEDED   BIPAP AS NEEDED

## 2019-03-19 NOTE — PLAN OF CARE
Problem: Risk for Impaired Skin Integrity  Goal: Tissue integrity - skin and mucous membranes  Description  Structural intactness and normal physiological function of skin and  mucous membranes.   Outcome: Met This Shift     Problem: Confusion - Acute:  Goal: Absence of continued neurological deterioration signs and symptoms  Description  Absence of continued neurological deterioration signs and symptoms  Outcome: Met This Shift     Problem: Injury - Risk of, Physical Injury:  Goal: Absence of physical injury  Description  Absence of physical injury  Outcome: Met This Shift  Goal: Will remain free from falls  Description  Will remain free from falls  Outcome: Met This Shift     Problem: Mood - Altered:  Goal: Absence of abusive behavior  Description  Absence of abusive behavior  Outcome: Met This Shift

## 2019-03-19 NOTE — LETTER
Facsimile Transmission      Cover Sheet    Information contained in this transmission is for the sole use of the intended recipient(s) and may contain confidential and privileged information. Any unauthorized review, use, disclosure or distribution is prohibited. If you are not the intended recipient, please contact the sender and destroy all copies of the original message. To: [facility] Specialist    Sender:  Roland Green [address] [place] [address] [place]            Voice number:  [phone]                  Disclosure made for:     Please see the attached nHpredict Tool for the following patient.      Gus Kange  1945  [address] [place] [address],[address] [place] [id number]      Thank you,    Roland Green, [facility] Coordination

## 2019-03-19 NOTE — H&P
Critical Care - History and Physical Examination    Patient's name:  Muna Parikh  Medical Record Number: [id number]  Patient's account/billing number: [de-identified]  Patient's YOB: 1945  Age: 69 y.o. Date of Admission: 3/19/2019  7:58 AM  Reason of ICU admission:   Date of History and Physical Examination: 3/19/2019      Primary Care Physician: Mary Ridley MD  Attending Physician:    Code Status: Prior    Chief complaint: SOB     Reason for ICU admission: respiratory failure with hypoxia      History Of Present Illness:   History was obtained from chart review and the patient. Muna Parikh is a 69 y.o. presenting after rising troponin in the setting of worsening respiratory status and influenza A infection. Patient presented to ED on 03/15 being treated for influenza in the outpatient setting with tamiflu. He had SOB but refused admission at that time, despite elevation in troponin. He went to his PCP and was found to be in respiratory distress and admitted to [place]. Upon admission, troponin dylan and subsequent transfer to tertiary center was initiated. Has a history of atrial fibrillation with prior cardioversion, rate controlled on diltiazem, metoprolol and anticoagulated with pradaxa. DMII on glyburide, metformin, and lantus. On arrival, patient is saturating 92% on 15L non rebreather. On transport, trial of BiPAP was aborted, as patient was not tolerating it. He received 2.5mg versed with depression of respiratory effort and required support breaths via BVM. He intermittently follows commands, moves all extremities. Past Medical History:        Diagnosis Date    Abnormal nuclear stress test 03/12/2010    Stress and resting cardiolite images showed inferior wall hypoperfusion suggestive of previous myocardial infarction. Left ventricular wall motion showed inferior wall hypokinesia. EF 58%.     Arrhythmia 2007    A single episode    Atrial fibrillation ([facility] 2007    Single Episode    CAD (coronary artery disease) 1996    MI    DM type 2 (diabetes mellitus, type 2) ([facility] 2005    Hx of echocardiogram 04/05/2007    EF 62%, Increased left atrial and right ventricular diametes. Increased septal and posterior wall thickness suggest left ventricular hypertrophy. Valves were normal.        Past Surgical History:        Procedure Laterality Date    CARDIOVERSION  05/18/2007    Successful-Dr. Leggett-[place]    CARDIOVERSION  03/13    CORONARY ANGIOPLASTY  1996       Allergies:    No Known Allergies      Home Medications:   Prior to Admission medications    Medication Sig Start Date End Date Taking? Authorizing Provider   insulin glargine (LANTUS) 100 UNIT/ML injection vial Inject 15 Units into the skin nightly    Historical Provider, MD   FLUAD 0.5 ML OLIVIA inject 0.5 milliliter intramuscularly 10/11/18   Historical Provider, MD   PRADAXA 150 MG capsule TAKE 1 CAPSULE TWICE DAILY 9/22/17   Christine Galarza MD   saxagliptin (ONGLYZA) 5 MG TABS tablet Take 5 mg by mouth daily    Historical Provider, MD   metFORMIN (GLUCOPHAGE) 1000 MG tablet TAKE 1 TABLET TWICE A DAY 9/15/17   Christine Galarza MD   glyBURIDE (DIABETA) 5 MG tablet TAKE 2 TABLETS TWICE A DAY 8/28/17   Christine Galarza MD   atorvastatin (LIPITOR) 80 MG tablet TAKE 1 TABLET DAILY 8/14/17   Christine Galarza MD   citalopram (CELEXA) 10 MG tablet TAKE 1 TABLET DAILY 8/7/17   Christine Galarza MD   diltiazem GREEN [facility]) 120 MG extended release capsule TAKE 1 CAPSULE DAILY 2/28/17   Christine Galarza MD   propranolol (INDERAL) 10 MG tablet 1 tablet daily as needed for tremor 4/25/16   Christine Galarza MD   metoprolol (LOPRESSOR) 25 MG tablet TAKE 1 TABLET TWICE A DAY 3/14/16   Christine Galarza MD       Social History:   TOBACCO:   reports that he quit smoking about 22 years ago. His smoking use included cigarettes. He has a 45.00 pack-year smoking history.  He has never used smokeless tobacco.  ETOH:   reports that he does not drink alcohol. DRUGS:  reports that he does not use drugs. OCCUPATION:      Family History:   No family history on file. REVIEW OF SYSTEMS (ROS):  Unable to obtain due to respiratory status and mental status      Physical Exam:    Vitals: /89   Pulse 126   Temp 97.9 °F (36.6 °C) (Oral)   Resp 10   SpO2 90%     Body weight:   Wt Readings from Last 3 Encounters:   03/19/19 294 lb 11.2 oz (133.7 kg)   03/18/19 300 lb (136.1 kg)   12/05/18 298 lb (135.2 kg)       Body Mass Index : There is no height or weight on file to calculate BMI. PHYSICAL EXAMINATION :  Constitutional: obese, mild respiratory distress  EENT: PERRLA, EOMI, sclera clear, anicteric, oropharynx clear, no lesions, neck supple with midline trachea. Neck: Supple, symmetrical, trachea midline, no adenopathy, thyroid symmetric, no jvd skin normal  Respiratory: rales in bilateral bases. Slight expiratory wheeze in apices. Cardiovascular: tachycardia with normal rhythm, normal S1, S2, no murmur noted and 2+ pulses throughout  Abdomen: soft, nontender, nondistended, no masses or organomegaly  Neurological: intermittent following of commands. Does not answer orientation questions. Moves all extremities, no sensory deficit. Extremities:  peripheral pulses normal, no pedal edema, no clubbing or cyanosis      Laboratory findings:-    CBC:   Recent Labs     03/19/19  0530   WBC 10.7   HGB 12.5*        BMP:    Recent Labs     03/18/19  2040 03/19/19  0530   * 134*   K 5.5* 5.3   CL 92* 94*   CO2 27 29   BUN 31* 31*   CREATININE 1.24* 1.03   GLUCOSE 373* 314*     S. Calcium:  Recent Labs     03/19/19  0530   CALCIUM 9.1     S. Ionized Calcium:No results for input(s): IONCA in the last 72 hours. S. Magnesium:No results for input(s): MG in the last 72 hours. S. Phosphorus:No results for input(s): PHOS in the last 72 hours.   S. Glucose:  Recent Labs     03/18/19  1329 03/18/19  1710 03/18/19  2238   POCGLU 309* 302* (non-ST elevated myocardial infarction) ([facility]         Plan:    NEURO:  - intermittently follows commands  - neuro checks    CV:  - sinus tachycardia  - troponin elevation at OSH, trend troponins  - rales in bases  - EF 40%  - lasix 40mg IV x1    PULM:  - initiate BiPAP  - ABG: pending  - Encourage frequent pulmonary toilet with IS, deep breathing/coughing exercises  - CXR with reticular opacities, atypical infection, no consolidation  - likely influenza pneumonia    GI:  - Diet: NPO for now  - GI Prophylaxis: pepcid    RENAL:  - hold fluid given lasix therapy  - daily BMP    ID/HEME:  - afebrile  - continue tamiflu  - DVT prophylaxis: heparin gtt  - blood cultures x2  - ceftriaxone, azithromycin     Endo  - DMII  - glucose this am 314  - lantus 15 units at home  - start insulin gtt    PT/OT/SLP:  - Evaluation and treatment when capable    DISPO:  - admit ICU      Oz Horne MD  Internal Medicine PGY2   3/19/2019, 8:24 AM

## 2019-03-19 NOTE — PLAN OF CARE
Problem: Falls - Risk of:  Goal: Will remain free from falls  Description  Will remain free from falls  3/19/2019 0123 by Deonte Warner RN  Outcome: Ongoing  Note:   Bed alarm on. Bed in low position and wheels locked. Call light in reach. Falling star posted on door. Yellow bracelet on. Non skid socks on. Side rails up. Will continue to assess. 3/18/2019 1851 by Wilian Yusuf RN  Outcome: Met This Shift  Goal: Absence of physical injury  Description  Absence of physical injury  3/19/2019 0123 by Deonte Warner RN  Outcome: Ongoing  3/18/2019 1851 by Wilian Yusuf RN  Outcome: Met This Shift     Problem: Infection:  Goal: Will remain free from infection  Description  Will remain free from infection  3/19/2019 0123 by Deonte Warner RN  Outcome: Ongoing  Note:   No sign or symptoms of infection. Vitals WNL   3/18/2019 1851 by Wilian Yusuf RN  Outcome: Met This Shift     Problem: Safety:  Goal: Free from accidental physical injury  Description  Free from accidental physical injury  3/19/2019 0123 by Deonte Warner RN  Outcome: Ongoing  3/18/2019 1851 by Wilian Yusuf RN  Outcome: Met This Shift  Goal: Free from intentional harm  Description  Free from intentional harm  3/19/2019 0123 by Deonte Warner RN  Outcome: Ongoing  3/18/2019 1851 by Wilian Yusuf RN  Outcome: Met This Shift     Problem: Daily Care:  Goal: Daily care needs are met  Description  Daily care needs are met  3/19/2019 0123 by Deonte Warner RN  Outcome: Ongoing  3/18/2019 1851 by Wilian Yusuf RN  Outcome: Met This Shift     Problem: Pain:  Goal: Patient's pain/discomfort is manageable  Description  Patient's pain/discomfort is manageable  3/19/2019 0123 by Deonte Warner RN  Outcome: Ongoing  Note:   Assess pain every 4 hours and prn using a 0-10 scale. Teach patient adverse complication of uncontrolled pain. Plan patients day so that aggravating activities coincide with peak of analgesic.  Patients should be medicated before procedures and activities that incite pain to prevent spikes in pain. Provide patient with distractions of choice such as TV, music or reading. Discuss with patient what a tolerable pain rating would be and work towards that and not just eliminating all pain. 3/18/2019 1851 by Beena Roca RN  Outcome: Met This Shift     Problem: Skin Integrity:  Goal: Skin integrity will stabilize  Description  Skin integrity will stabilize  3/19/2019 0123 by Deidra Gomez RN  Outcome: Ongoing  Note:   Skin assessment performed at regular intervals. No redness or open areas noted. Patient reminded to turn and relieve areas known to breakdown. Continue to monitor.    3/18/2019 1851 by Beena Roca RN  Outcome: Met This Shift     Problem: Discharge Planning:  Goal: Patients continuum of care needs are met  Description  Patients continuum of care needs are met  Outcome: Ongoing

## 2019-03-20 LAB
ABSOLUTE EOS #: 0 K/UL (ref 0–0.4)
ABSOLUTE IMMATURE GRANULOCYTE: 0 K/UL (ref 0–0.3)
ABSOLUTE LYMPH #: 2.68 K/UL (ref 1–4.8)
ABSOLUTE MONO #: 0.85 K/UL (ref 0.1–0.8)
ALLEN TEST: POSITIVE
ALLEN TEST: POSITIVE
ANION GAP SERPL CALCULATED.3IONS-SCNC: 11 MMOL/L (ref 9–17)
BASOPHILS # BLD: 0 % (ref 0–2)
BASOPHILS ABSOLUTE: 0 K/UL (ref 0–0.2)
BUN BLDV-MCNC: 40 MG/DL (ref 8–23)
BUN/CREAT BLD: ABNORMAL (ref 9–20)
CALCIUM IONIZED: 1.14 MMOL/L (ref 1.13–1.33)
CALCIUM SERPL-MCNC: 8.8 MG/DL (ref 8.6–10.4)
CHLORIDE BLD-SCNC: 98 MMOL/L (ref 98–107)
CO2: 31 MMOL/L (ref 20–31)
CREAT SERPL-MCNC: 1.12 MG/DL (ref 0.7–1.2)
CULTURE: NORMAL
CULTURE: NORMAL
DIFFERENTIAL TYPE: ABNORMAL
EOSINOPHILS RELATIVE PERCENT: 0 % (ref 1–4)
FIO2: ABNORMAL
FIO2: ABNORMAL
GFR AFRICAN AMERICAN: >60 ML/MIN
GFR NON-AFRICAN AMERICAN: >60 ML/MIN
GFR SERPL CREATININE-BSD FRML MDRD: ABNORMAL ML/MIN/{1.73_M2}
GFR SERPL CREATININE-BSD FRML MDRD: ABNORMAL ML/MIN/{1.73_M2}
GLUCOSE BLD-MCNC: 149 MG/DL (ref 75–110)
GLUCOSE BLD-MCNC: 153 MG/DL (ref 75–110)
GLUCOSE BLD-MCNC: 155 MG/DL (ref 75–110)
GLUCOSE BLD-MCNC: 160 MG/DL (ref 75–110)
GLUCOSE BLD-MCNC: 162 MG/DL (ref 70–99)
GLUCOSE BLD-MCNC: 164 MG/DL (ref 75–110)
GLUCOSE BLD-MCNC: 168 MG/DL (ref 75–110)
GLUCOSE BLD-MCNC: 181 MG/DL (ref 74–100)
GLUCOSE BLD-MCNC: 263 MG/DL (ref 75–110)
GLUCOSE BLD-MCNC: 276 MG/DL (ref 75–110)
GLUCOSE BLD-MCNC: 286 MG/DL (ref 75–110)
HCT VFR BLD CALC: 35.8 % (ref 40.7–50.3)
HEMOGLOBIN: 11.2 G/DL (ref 13–17)
IMMATURE GRANULOCYTES: 0 %
LACTIC ACID, SEPSIS WHOLE BLOOD: 2 MMOL/L (ref 0.5–1.9)
LACTIC ACID, SEPSIS: ABNORMAL MMOL/L (ref 0.5–1.9)
LYMPHOCYTES # BLD: 19 % (ref 24–44)
Lab: NORMAL
Lab: NORMAL
MAGNESIUM: 2.1 MG/DL (ref 1.6–2.6)
MCH RBC QN AUTO: 30.4 PG (ref 25.2–33.5)
MCHC RBC AUTO-ENTMCNC: 31.3 G/DL (ref 28.4–34.8)
MCV RBC AUTO: 97 FL (ref 82.6–102.9)
MODE: ABNORMAL
MODE: ABNORMAL
MONOCYTES # BLD: 6 % (ref 1–7)
MORPHOLOGY: NORMAL
NEGATIVE BASE EXCESS, ART: ABNORMAL (ref 0–2)
NEGATIVE BASE EXCESS, ART: ABNORMAL (ref 0–2)
NRBC AUTOMATED: 0 PER 100 WBC
O2 DEVICE/FLOW/%: ABNORMAL
O2 DEVICE/FLOW/%: ABNORMAL
PARTIAL THROMBOPLASTIN TIME: 46.8 SEC (ref 20.5–30.5)
PARTIAL THROMBOPLASTIN TIME: 47.6 SEC (ref 20.5–30.5)
PARTIAL THROMBOPLASTIN TIME: 85.3 SEC (ref 20.5–30.5)
PARTIAL THROMBOPLASTIN TIME: 90.6 SEC (ref 20.5–30.5)
PATIENT TEMP: 35.7
PATIENT TEMP: ABNORMAL
PDW BLD-RTO: 13.4 % (ref 11.8–14.4)
PHOSPHORUS: 3.2 MG/DL (ref 2.5–4.5)
PLATELET # BLD: 189 K/UL (ref 138–453)
PLATELET ESTIMATE: ABNORMAL
PMV BLD AUTO: 10.2 FL (ref 8.1–13.5)
POC HCO3: 35.6 MMOL/L (ref 21–28)
POC HCO3: 36.7 MMOL/L (ref 21–28)
POC O2 SATURATION: 85 % (ref 94–98)
POC O2 SATURATION: 90 % (ref 94–98)
POC PCO2 TEMP: ABNORMAL MM HG
POC PCO2 TEMP: ABNORMAL MM HG
POC PCO2: 61.8 MM HG (ref 35–48)
POC PCO2: 69.4 MM HG (ref 35–48)
POC PH TEMP: ABNORMAL
POC PH TEMP: ABNORMAL
POC PH: 7.33 (ref 7.35–7.45)
POC PH: 7.37 (ref 7.35–7.45)
POC PO2 TEMP: ABNORMAL MM HG
POC PO2 TEMP: ABNORMAL MM HG
POC PO2: 56.5 MM HG (ref 83–108)
POC PO2: 64 MM HG (ref 83–108)
POSITIVE BASE EXCESS, ART: 8 (ref 0–3)
POSITIVE BASE EXCESS, ART: 8 (ref 0–3)
POTASSIUM SERPL-SCNC: 4.4 MMOL/L (ref 3.7–5.3)
RBC # BLD: 3.69 M/UL (ref 4.21–5.77)
RBC # BLD: ABNORMAL 10*6/UL
SAMPLE SITE: ABNORMAL
SAMPLE SITE: ABNORMAL
SEG NEUTROPHILS: 75 % (ref 36–66)
SEGMENTED NEUTROPHILS ABSOLUTE COUNT: 10.57 K/UL (ref 1.8–7.7)
SODIUM BLD-SCNC: 140 MMOL/L (ref 135–144)
SPECIMEN DESCRIPTION: NORMAL
SPECIMEN DESCRIPTION: NORMAL
TCO2 (CALC), ART: 38 MMOL/L (ref 22–29)
TCO2 (CALC), ART: 39 MMOL/L (ref 22–29)
WBC # BLD: 14.1 K/UL (ref 3.5–11.3)
WBC # BLD: ABNORMAL 10*3/UL

## 2019-03-20 PROCEDURE — 2500000003 HC RX 250 WO HCPCS: Performed by: STUDENT IN AN ORGANIZED HEALTH CARE EDUCATION/TRAINING PROGRAM

## 2019-03-20 PROCEDURE — 6370000000 HC RX 637 (ALT 250 FOR IP): Performed by: EMERGENCY MEDICINE

## 2019-03-20 PROCEDURE — 36600 WITHDRAWAL OF ARTERIAL BLOOD: CPT

## 2019-03-20 PROCEDURE — 6360000002 HC RX W HCPCS: Performed by: INTERNAL MEDICINE

## 2019-03-20 PROCEDURE — 2700000000 HC OXYGEN THERAPY PER DAY

## 2019-03-20 PROCEDURE — 6370000000 HC RX 637 (ALT 250 FOR IP): Performed by: STUDENT IN AN ORGANIZED HEALTH CARE EDUCATION/TRAINING PROGRAM

## 2019-03-20 PROCEDURE — 6360000002 HC RX W HCPCS: Performed by: STUDENT IN AN ORGANIZED HEALTH CARE EDUCATION/TRAINING PROGRAM

## 2019-03-20 PROCEDURE — 6370000000 HC RX 637 (ALT 250 FOR IP): Performed by: INTERNAL MEDICINE

## 2019-03-20 PROCEDURE — 83735 ASSAY OF MAGNESIUM: CPT

## 2019-03-20 PROCEDURE — 80048 BASIC METABOLIC PNL TOTAL CA: CPT

## 2019-03-20 PROCEDURE — 94660 CPAP INITIATION&MGMT: CPT

## 2019-03-20 PROCEDURE — 2000000000 HC ICU R&B

## 2019-03-20 PROCEDURE — 2580000003 HC RX 258: Performed by: STUDENT IN AN ORGANIZED HEALTH CARE EDUCATION/TRAINING PROGRAM

## 2019-03-20 PROCEDURE — 2500000003 HC RX 250 WO HCPCS: Performed by: INTERNAL MEDICINE

## 2019-03-20 PROCEDURE — 36415 COLL VENOUS BLD VENIPUNCTURE: CPT

## 2019-03-20 PROCEDURE — 51798 US URINE CAPACITY MEASURE: CPT

## 2019-03-20 PROCEDURE — 82803 BLOOD GASES ANY COMBINATION: CPT

## 2019-03-20 PROCEDURE — 84100 ASSAY OF PHOSPHORUS: CPT

## 2019-03-20 PROCEDURE — 94640 AIRWAY INHALATION TREATMENT: CPT

## 2019-03-20 PROCEDURE — 82947 ASSAY GLUCOSE BLOOD QUANT: CPT

## 2019-03-20 PROCEDURE — 85025 COMPLETE CBC W/AUTO DIFF WBC: CPT

## 2019-03-20 PROCEDURE — 51701 INSERT BLADDER CATHETER: CPT

## 2019-03-20 PROCEDURE — 82330 ASSAY OF CALCIUM: CPT

## 2019-03-20 PROCEDURE — 94003 VENT MGMT INPAT SUBQ DAY: CPT

## 2019-03-20 PROCEDURE — 85730 THROMBOPLASTIN TIME PARTIAL: CPT

## 2019-03-20 PROCEDURE — 99291 CRITICAL CARE FIRST HOUR: CPT | Performed by: INTERNAL MEDICINE

## 2019-03-20 PROCEDURE — 83605 ASSAY OF LACTIC ACID: CPT

## 2019-03-20 RX ORDER — INSULIN GLARGINE 100 [IU]/ML
10 INJECTION, SOLUTION SUBCUTANEOUS DAILY
Status: DISCONTINUED | OUTPATIENT
Start: 2019-03-20 | End: 2019-03-20

## 2019-03-20 RX ORDER — FUROSEMIDE 10 MG/ML
40 INJECTION INTRAMUSCULAR; INTRAVENOUS ONCE
Status: COMPLETED | OUTPATIENT
Start: 2019-03-20 | End: 2019-03-20

## 2019-03-20 RX ORDER — INSULIN GLARGINE 100 [IU]/ML
20 INJECTION, SOLUTION SUBCUTANEOUS NIGHTLY
Status: DISCONTINUED | OUTPATIENT
Start: 2019-03-20 | End: 2019-03-21

## 2019-03-20 RX ORDER — DIGOXIN 0.25 MG/ML
250 INJECTION INTRAMUSCULAR; INTRAVENOUS ONCE
Status: COMPLETED | OUTPATIENT
Start: 2019-03-20 | End: 2019-03-20

## 2019-03-20 RX ORDER — LORAZEPAM 2 MG/ML
0.5 INJECTION INTRAMUSCULAR EVERY 8 HOURS PRN
Status: DISCONTINUED | OUTPATIENT
Start: 2019-03-20 | End: 2019-04-10 | Stop reason: HOSPADM

## 2019-03-20 RX ORDER — DIGOXIN 0.25 MG/ML
125 INJECTION INTRAMUSCULAR; INTRAVENOUS DAILY
Status: DISCONTINUED | OUTPATIENT
Start: 2019-03-21 | End: 2019-03-21

## 2019-03-20 RX ORDER — METHYLPREDNISOLONE SODIUM SUCCINATE 40 MG/ML
40 INJECTION, POWDER, LYOPHILIZED, FOR SOLUTION INTRAMUSCULAR; INTRAVENOUS EVERY 12 HOURS
Status: DISCONTINUED | OUTPATIENT
Start: 2019-03-21 | End: 2019-03-23

## 2019-03-20 RX ADMIN — METHYLPREDNISOLONE SODIUM SUCCINATE 40 MG: 40 INJECTION, POWDER, FOR SOLUTION INTRAMUSCULAR; INTRAVENOUS at 04:15

## 2019-03-20 RX ADMIN — ALBUTEROL SULFATE 2.5 MG: 2.5 SOLUTION RESPIRATORY (INHALATION) at 17:42

## 2019-03-20 RX ADMIN — FAMOTIDINE 20 MG: 10 INJECTION, SOLUTION INTRAVENOUS at 20:57

## 2019-03-20 RX ADMIN — OSELTAMIVIR PHOSPHATE 75 MG: 75 CAPSULE ORAL at 09:26

## 2019-03-20 RX ADMIN — FUROSEMIDE 40 MG: 10 INJECTION, SOLUTION INTRAMUSCULAR; INTRAVENOUS at 17:12

## 2019-03-20 RX ADMIN — INSULIN LISPRO 9 UNITS: 100 INJECTION, SOLUTION INTRAVENOUS; SUBCUTANEOUS at 16:44

## 2019-03-20 RX ADMIN — DEXTROSE MONOHYDRATE 500 MG: 50 INJECTION, SOLUTION INTRAVENOUS at 10:03

## 2019-03-20 RX ADMIN — INSULIN GLARGINE 20 UNITS: 100 INJECTION, SOLUTION SUBCUTANEOUS at 20:55

## 2019-03-20 RX ADMIN — INSULIN LISPRO 5 UNITS: 100 INJECTION, SOLUTION INTRAVENOUS; SUBCUTANEOUS at 20:55

## 2019-03-20 RX ADMIN — IPRATROPIUM BROMIDE AND ALBUTEROL SULFATE 1 AMPULE: .5; 3 SOLUTION RESPIRATORY (INHALATION) at 08:26

## 2019-03-20 RX ADMIN — IPRATROPIUM BROMIDE AND ALBUTEROL SULFATE 1 AMPULE: .5; 3 SOLUTION RESPIRATORY (INHALATION) at 20:57

## 2019-03-20 RX ADMIN — IPRATROPIUM BROMIDE AND ALBUTEROL SULFATE 1 AMPULE: .5; 3 SOLUTION RESPIRATORY (INHALATION) at 11:09

## 2019-03-20 RX ADMIN — INSULIN LISPRO 2 UNITS: 100 INJECTION, SOLUTION INTRAVENOUS; SUBCUTANEOUS at 09:07

## 2019-03-20 RX ADMIN — OSELTAMIVIR PHOSPHATE 75 MG: 75 CAPSULE ORAL at 21:05

## 2019-03-20 RX ADMIN — CEFTRIAXONE SODIUM 1 G: 1 INJECTION, POWDER, FOR SOLUTION INTRAMUSCULAR; INTRAVENOUS at 09:12

## 2019-03-20 RX ADMIN — LORAZEPAM 0.5 MG: 2 INJECTION INTRAMUSCULAR; INTRAVENOUS at 10:03

## 2019-03-20 RX ADMIN — DIGOXIN 250 MCG: 0.25 INJECTION INTRAMUSCULAR; INTRAVENOUS at 14:23

## 2019-03-20 RX ADMIN — FUROSEMIDE 40 MG: 10 INJECTION, SOLUTION INTRAMUSCULAR; INTRAVENOUS at 09:12

## 2019-03-20 RX ADMIN — Medication 10 ML: at 20:56

## 2019-03-20 RX ADMIN — IPRATROPIUM BROMIDE AND ALBUTEROL SULFATE 1 AMPULE: .5; 3 SOLUTION RESPIRATORY (INHALATION) at 15:30

## 2019-03-20 RX ADMIN — INSULIN LISPRO 6 UNITS: 100 INJECTION, SOLUTION INTRAVENOUS; SUBCUTANEOUS at 12:32

## 2019-03-20 RX ADMIN — FAMOTIDINE 20 MG: 10 INJECTION, SOLUTION INTRAVENOUS at 09:12

## 2019-03-20 RX ADMIN — Medication 10 ML: at 09:13

## 2019-03-20 RX ADMIN — METHYLPREDNISOLONE SODIUM SUCCINATE 40 MG: 40 INJECTION, POWDER, FOR SOLUTION INTRAMUSCULAR; INTRAVENOUS at 09:12

## 2019-03-20 RX ADMIN — METOPROLOL TARTRATE 2.5 MG: 5 INJECTION, SOLUTION INTRAVENOUS at 11:56

## 2019-03-20 RX ADMIN — ALBUTEROL SULFATE 2.5 MG: 2.5 SOLUTION RESPIRATORY (INHALATION) at 00:33

## 2019-03-20 RX ADMIN — HEPARIN SODIUM 5000 UNITS: 1000 INJECTION INTRAVENOUS; SUBCUTANEOUS at 09:07

## 2019-03-20 RX ADMIN — INSULIN GLARGINE 10 UNITS: 100 INJECTION, SOLUTION SUBCUTANEOUS at 09:07

## 2019-03-20 RX ADMIN — HEPARIN SODIUM AND DEXTROSE 10.59 UNITS/KG/HR: 10000; 5 INJECTION INTRAVENOUS at 15:42

## 2019-03-20 RX ADMIN — LORAZEPAM 0.5 MG: 2 INJECTION INTRAMUSCULAR; INTRAVENOUS at 01:08

## 2019-03-20 ASSESSMENT — PULMONARY FUNCTION TESTS
PIF_VALUE: 19
PIF_VALUE: 17
PIF_VALUE: 21
PIF_VALUE: 19
PIF_VALUE: 17
PIF_VALUE: 28

## 2019-03-20 ASSESSMENT — PAIN SCALES - GENERAL
PAINLEVEL_OUTOF10: 0

## 2019-03-20 NOTE — PROGRESS NOTES
[facility]   Progress Note                   Date:   3/20/2019  Patient name: Sandra Raymond  Date of admission:  3/19/2019  7:58 AM  MRN:   [id number]  YOB: 1945  PCP: Yane Elkins MD    Reason for Admission:      Subjective:       cardizem discontinued overnight for hypotension  Current heart rate to the 110-120. Normal hemodynamics  Denies chest pain, dyspnea, orthopnea or palpitations. No ambulating yet. On BIPAP    Medications:   Scheduled Meds:   insulin glargine  10 Units Subcutaneous Daily    insulin lispro  0-12 Units Subcutaneous TID WC    insulin lispro  0-6 Units Subcutaneous Nightly    sodium chloride flush  10 mL Intravenous 2 times per day    famotidine (PEPCID) injection  20 mg Intravenous BID    cefTRIAXone (ROCEPHIN) IV  1 g Intravenous Q24H    azithromycin  500 mg Intravenous Q24H    oseltamivir  75 mg Oral BID    furosemide  40 mg Intravenous Daily    ipratropium-albuterol  1 ampule Inhalation Q4H WA    methylPREDNISolone  40 mg Intravenous Q8H       Continuous Infusions:   heparin (porcine) 12.59 Units/kg/hr (03/20/19 0906)    dextrose      diltiazem (CARDIZEM) 125 mg in dextrose 5% 125 mL infusion Stopped (03/19/19 2347)       CBC:   Recent Labs     03/19/19  0530 03/19/19  0937 03/20/19  0457   WBC 10.7 12.7* 14.1*   HGB 12.5* 12.7* 11.2*    206 189     BMP:    Recent Labs     03/18/19 2040 03/19/19  0530 03/20/19  0457   * 134* 140   K 5.5* 5.3 4.4   CL 92* 94* 98   CO2 27 29 31   BUN 31* 31* 40*   CREATININE 1.24* 1.03 1.12   GLUCOSE 373* 314* 162*     Hepatic:   Recent Labs     03/19/19  0530   AST 37   ALT 40   BILITOT 0.56   ALKPHOS 132*     Troponin: No results for input(s): TROPONINI in the last 72 hours. BNP: No results for input(s): BNP in the last 72 hours. Lipids:   Recent Labs     03/18/19 2040   CHOL 114   HDL 47     INR: No results for input(s): INR in the last 72 hours.     Objective:   Vitals: /74   Pulse 127   Temp 97.7 °F (36.5 °C) (Axillary)   Resp 25   Ht 5' 8\" (1.727 m)   Wt 290 lb 9.1 oz (131.8 kg)   SpO2 96%   BMI 44.18 kg/m²     General appearance: awake, alert, in no apparent respiratory distress   HEENT: Head: Normocephalic, no lesions, without obvious abnormality  Neck: no JVD  Lungs: clear to auscultation bilaterally, no basilar rales, no wheezing   Heart: regular rate and rhythm, S1, S2 normal, no murmur, click, rub or gallop  Abdomen: soft, non-tender; bowel sounds normal  Extremities: No LE edema  Neurologic: Mental status: Alert, oriented. Motor and sensory not done. EKG: 3/18/2019  Atrial flutter with variable A-V block with premature ventricular or aberrantly conducted complexes  Inferior infarct , age undetermined  Abnormal ECG  When compared with ECG of 15-MAR-2019 05:44,  QRS axis Shifted right  Inferior infarct is now Present  ST no longer depressed in Anterior leads  T wave inversion no longer evident in Inferior leads  Confirmed by Micheal Gonzalez MD, Cabrera Freitas ([id number]) on 3/18/2019 11:06:21 PM    Echocardiogram:3/18/2019  Poor image quality, likely due to patient body habitus and/or lung disease. Global left ventricular systolic function appears moderately reduced with an  estimated ejection fraction of 40%. The left ventricular cavity size is mildly enlarged and the left ventricular  wall thickness is mildly increased. Thinning and hypokinesis of the distal half on the interventricular septum. Mild mitral and tricuspid regurgitation. Aortic valve sclerosis with no significant aortic stenosis. Can not rule out  bicuspid aortic valve. Diastolic function cannot be assessed due to atrial fibrillation. Assessment / Acute Cardiac Problems:     Atrial fluter with rapid ventricular response.   [id number] Score ~3   Acute hypercapnic respiratory failure   Community acquired pneumonia of left lower lobe of lung   Metabolic encephalopathy resolving ronary DM tyI (non-ST elevated myocardial infarction) (H   Community acquired pn  Plan of Treatment:     Lopressor 2.5mg Q6 prn  Continue cardizem drip for rate control as BP tolerates   Cont heparin drip  Lasix IV 40mg BID  Will repeat limited echo once rate better controlled. Possible GUSTAVO/CV when resp status stable. Andi Ballard MD PGY 3  Internal Medicine Resident    [facility]   3/20/2019 at 11:34 AM    I performed a history and physical examination of the patient and discussed management with the resident. I reviewed the residents note and agree with the documented findings and plan of care. Any areas of disagreement are noted on the chart. I was personally present for the key portions of any procedures. I have documented in the chart those procedures where I was not present during the key portions. I have personally evaluated this patient and have completed at least one if not all key elements of the E/M (history, physical exam, and MDM). Additional findings are as noted. Rate control strategy, continue IV heparin. GUSTAVO/CV when respiratory status improved. Currently on BIPAP. Trop elevation likely demand ischemia. After GUSTAVO/CV, repeat echo to eval for LVEF to determine further ischemic evaluation plan. IV digoxin 0.25mg x1 followed by 0.125mg IV qday.     Brittani Velazquez MD  ic, no tenderness/mass/nodules  Lungs: clear to ausno tenderness/mass/nodules  Lungs: clear to [employer]

## 2019-03-20 NOTE — PLAN OF CARE
Problem: Psychomotor Activity - Altered:  Goal: Absence of psychomotor disturbance signs and symptoms  Description  Absence of psychomotor disturbance signs and symptoms  3/20/2019 0232 by Richard Garvey RN  Outcome: Ongoing  3/19/2019 1727 by Kristopher Graham RN  Outcome: Ongoing     Problem: Risk for Impaired Skin Integrity  Goal: Tissue integrity - skin and mucous membranes  Description  Structural intactness and normal physiological function of skin and  mucous membranes.   3/20/2019 0232 by Richard Garvey RN  Outcome: Ongoing  3/19/2019 1727 by Kristopher Graham RN  Outcome: Met This Shift     Problem: Confusion - Acute:  Goal: Absence of continued neurological deterioration signs and symptoms  Description  Absence of continued neurological deterioration signs and symptoms  3/20/2019 0232 by Richard Garvey RN  Outcome: Ongoing  3/19/2019 1727 by Kristopher Graham RN  Outcome: Met This Shift  Goal: Mental status will be restored to baseline  Description  Mental status will be restored to baseline  3/20/2019 0232 by Richard Garvey RN  Outcome: Ongoing  3/19/2019 1727 by Kristopher Graham RN  Outcome: Ongoing     Problem: Discharge Planning:  Goal: Ability to perform activities of daily living will improve  Description  Ability to perform activities of daily living will improve  3/20/2019 0232 by Richard Garvey RN  Outcome: Ongoing  3/19/2019 1727 by Kristopher Graham RN  Outcome: Ongoing  Goal: Participates in care planning  Description  Participates in care planning  3/20/2019 0232 by Richard Garvey RN  Outcome: Ongoing  3/19/2019 1727 by Kristopher Graham RN  Outcome: Ongoing     Problem: Injury - Risk of, Physical Injury:  Goal: Absence of physical injury  Description  Absence of physical injury  3/20/2019 0232 by Richard Garvey RN  Outcome: Ongoing  3/19/2019 1727 by Kristopher Graham RN  Outcome: Met This Shift  Goal: Will remain free from falls  Description  Will remain free from falls  3/20/2019 0232 by Richard Garvey RN  Outcome: Ongoing  3/19/2019 1727 by Kathleen Alonso RN  Outcome: Met This Shift     Problem: Mood - Altered:  Goal: Mood stable  Description  Mood stable  3/20/2019 0232 by Sena Alford RN  Outcome: Ongoing  3/19/2019 1727 by Kathleen Alonso RN  Outcome: Ongoing  Goal: Absence of abusive behavior  Description  Absence of abusive behavior  3/20/2019 0232 by Sena Alford RN  Outcome: Ongoing  3/19/2019 1727 by Kathleen Alonso RN  Outcome: Met This Shift  Goal: Verbalizations of feeling emotionally comfortable while being cared for will increase  Description  Verbalizations of feeling emotionally comfortable while being cared for will increase  Outcome: Ongoing     Problem: Psychomotor Activity - Altered:  Goal: Absence of psychomotor disturbance signs and symptoms  Description  Absence of psychomotor disturbance signs and symptoms  3/20/2019 0232 by Sena Alford RN  Outcome: Ongoing  3/19/2019 1727 by Kathleen Alonso RN  Outcome: Ongoing     Problem: Sensory Perception - Impaired:  Goal: Demonstrations of improved sensory functioning will increase  Description  Demonstrations of improved sensory functioning will increase  Outcome: Ongoing  Goal: Decrease in sensory misperception frequency  Description  Decrease in sensory misperception frequency  Outcome: Ongoing  Goal: Able to refrain from responding to false sensory perceptions  Description  Able to refrain from responding to false sensory perceptions  Outcome: Ongoing  Goal: Demonstrates accurate environmental perceptions  Description  Demonstrates accurate environmental perceptions  Outcome: Ongoing  Goal: Able to distinguish between reality-based and nonreality-based thinking  Description  Able to distinguish between reality-based and nonreality-based thinking  Outcome: Ongoing  Goal: Able to interrupt nonreality-based thinking  Description  Able to interrupt nonreality-based thinking  Outcome: Ongoing     Problem: Sleep Pattern Disturbance:  Goal: Appears well-rested  Description  Appears well-rested  Outcome: Ongoing

## 2019-03-20 NOTE — PROGRESS NOTES
Apolonia Carter, RCPPatient Assessment complete. Acute hypercapnic respiratory failure ([facility] [address] [J96.02] . Vitals:    03/19/19 1941   BP:    Pulse: 108   Resp: 23   Temp:    SpO2: 94%     Pt came in with the Flu and respiratory failure with hypoxia. Does not have any charted resp hx, however is a former smoker and has dim bases.     RR 23  Breath Sounds: clear, dim      · Bronchodilator assessment at level  3  · [x]    Bronchodilator Assessment  BRONCHODILATOR ASSESSMENT SCORE  Score 0 1 2 3 4 5   Breath Sounds   []  Patient Baseline []  No Wheeze good aeration []  Faint, scattered wheezing, good aeration [x]  Expiratory Wheezing and or moderately diminished []  Insp/Exp wheeze and/or very diminished []  Insp/Exp and/ or marked distress   Respiratory Rate   []  Patient Baseline []  Less than 20 []  Less than 20 [x]  20-25 []  Greater than 25 []  Greater than 25   Peak flow % of Pred or PB []  NA   []  Greater than 90%  []  81-90% []  71-80% []  Less than or equal to 70%  or unable to perform []  Unable due to Respiratory Distress   Dyspnea re []  Patient Baseline []  No SOB []  No SOB [x]  SOB on exertion []  SOB min activity []  At rest/acute   e FEV% Predicted       []  NA []  Above 69%  []  Unable []  Above 60-69%  []  Unable []  Above 50-59%  []  Unable []  Above 35-49%  []  Unable []  Less than 35%  []  Unable

## 2019-03-20 NOTE — PLAN OF CARE
Problem: Risk for Impaired Skin Integrity  Goal: Tissue integrity - skin and mucous membranes  Description  Structural intactness and normal physiological function of skin and  mucous membranes.   Outcome: Ongoing     Problem: Confusion - Acute:  Goal: Absence of continued neurological deterioration signs and symptoms  Description  Absence of continued neurological deterioration signs and symptoms  Outcome: Ongoing  Goal: Mental status will be restored to baseline  Description  Mental status will be restored to baseline  Outcome: Ongoing     Problem: Injury - Risk of, Physical Injury:  Goal: Absence of physical injury  Description  Absence of physical injury  Outcome: Ongoing  Goal: Will remain free from falls  Description  Will remain free from falls  Outcome: Ongoing     Problem: Sleep Pattern Disturbance:  Goal: Appears well-rested  Description  Appears well-rested  Outcome: Ongoing

## 2019-03-20 NOTE — PROGRESS NOTES
INTENSIVE CARE UNIT  Resident Physician Progress Note    Patient - Kelly Cox  Date of Admission -  3/19/2019  7:58 AM  Date of Evaluation -  3/20/2019  Room and Bed Number -  0105/0105-01   Hospital Day - 1    Chief complaint influenza, pneumonia, A. fib with RVR  SUBJECTIVE:     OVERNIGHT EVENTS:     Patient has been persistently tachycardic with A. fib flutter  Overnight was hypotensive, so Cardizem was discontinued  At present, patient is on high flow oxygen. He did with the noninvasive ventilator throughout the night and in the morning. He gets short of breath with minimal movement. No orthopnea. Cough is there, but no sputum production. No chest pain. Has wheezing      AWAKE & FOLLOWING COMMANDS:  [] No   [x] Yes    SECRETIONS Amount:  [x] Small [] Moderate  [] Large  [] None  Color:     [x] White [] Colored  [] Bloody    SEDATION:  RAAS Score:  [] Propofol gtt  [] Versed gtt  [] Ativan gtt   [x] No Sedation    PARALYZED:  [x] No    [] Yes    VASOPRESSORS:  [x] No    [] Yes  [] Levophed [] Dopamine [] Vasopressin  [] Dobutamine [] Phenylephrine [] Epinephrine    Review of Systems -  General ROS: fatigue, fever   ENT ROS: negative for - headaches, oral lesions or sore throat  Cardiovascular ROS: no chest pain , orthopnea or pnd   Gastrointestinal ROS: no abdominal pain, change in bowel habits, or black or bloody stools  Skin - no rash   Neuro - no blurry vision , no loc .  No focal weakness   msk - no jt tenderness or swelling    Vascular - no claudication , rest completed and negative   Lymphatic - complete and negative   Hematology - oncology - complete and negative   Allergy immunology - complete and negative    no burning or hematuria    OBJECTIVE:     VITAL SIGNS:  BP 90/71   Pulse 105   Temp 96.8 °F (36 °C) (Axillary)   Resp 22   Ht 5' 8\" (1.727 m)   Wt 279 lb 1.6 oz (126.6 kg)   SpO2 95%   BMI 42.44 kg/m²   Tmax over 24 hours:  Temp (24hrs), Av.4 °F (36.3 °C), Min:96.8 °F (36 °C), Max:97.9 °F (36.6 °C)      Patient Vitals for the past 8 hrs:   BP Temp Temp src Pulse Resp SpO2   03/20/19 0814 -- -- -- 105 22 95 %   03/20/19 0630 90/71 -- -- 101 25 96 %   03/20/19 0615 (!) 133/115 -- -- 109 20 97 %   03/20/19 0600 115/72 -- -- 109 -- 95 %   03/20/19 0545 (!) 110/59 -- -- 107 -- 90 %   03/20/19 0530 118/60 -- -- 105 25 95 %   03/20/19 0515 107/62 -- -- 104 -- (!) 84 %   03/20/19 0500 108/72 -- -- 107 -- 98 %   03/20/19 0445 107/60 -- -- 116 23 97 %   03/20/19 0430 (!) 91/44 -- -- 102 21 95 %   03/20/19 0415 (!) 109/48 -- -- 110 27 95 %   03/20/19 0400 125/75 96.8 °F (36 °C) Axillary 109 22 93 %   03/20/19 0345 120/82 -- -- 108 23 92 %   03/20/19 0330 133/69 -- -- 114 29 97 %   03/20/19 0315 115/70 -- -- 112 24 95 %   03/20/19 0300 (!) 115/57 -- -- 125 26 94 %   03/20/19 0245 123/70 -- -- 111 18 92 %   03/20/19 0230 112/61 -- -- 114 22 93 %   03/20/19 0215 128/66 -- -- 115 19 97 %   03/20/19 0200 105/66 -- -- 109 21 (!) 85 %   03/20/19 0145 (!) 96/52 -- -- 101 17 91 %   03/20/19 0130 (!) 105/41 -- -- 109 27 93 %   03/20/19 0115 109/71 -- -- 106 25 92 %   03/20/19 0100 (!) 99/52 -- -- 107 21 94 %         Intake/Output Summary (Last 24 hours) at 3/20/2019 0857  Last data filed at 3/20/2019 0600  Gross per 24 hour   Intake 947.1 ml   Output 2075 ml   Net -1127.9 ml     Date 03/20/19 0000 - 03/20/19 2359   Shift 5523-3558 9517-2984 1375-4127 24 Hour Total   INTAKE   I.V.(mL/kg) 210.5(1.7)   210.5(1.7)   Shift Total(mL/kg) 210.5(1.7)   210.5(1.7)   OUTPUT   Urine(mL/kg/hr) 525(0.5)   525   Shift Total(mL/kg) 525(4.1)   525(4.1)   Weight (kg) 126.6 126.6 126.6 126.6     Wt Readings from Last 3 Encounters:   03/19/19 279 lb 1.6 oz (126.6 kg)   03/19/19 294 lb 11.2 oz (133.7 kg)   03/18/19 300 lb (136.1 kg)     Body mass index is 42.44 kg/m².         PHYSICAL EXAM:  · General appearance: awake, alert, cooperative, and respiratory distress on BiPAP  · HEENT: Atraumatic, normocephalic, neck supple, normal EOM, thyroid normal, no lymphadenopathy   · Lungs: Scattered mild to moderate wheezing bilateral lung fieldsAnd has fine crackles posteriorly infrascapular bilateral   · Heart: Regular heart rate, tachycardic, S1, S2 normal, no murmur   · Abdomen: soft, non-tender; bowel sounds normal; no masses,  no organomegaly  · Extremities: No cyanosis or edema  · Neurological:  Awake, alert, oriented to name, place and time. Cranial nerves II-XII are grossly intact. Reflexes normal and symmetric. Sensation grossly normal  Psych-normal affect       MEDICATIONS:  Scheduled Meds:   insulin glargine  10 Units Subcutaneous Daily    insulin lispro  0-12 Units Subcutaneous TID WC    insulin lispro  0-6 Units Subcutaneous Nightly    sodium chloride flush  10 mL Intravenous 2 times per day    famotidine (PEPCID) injection  20 mg Intravenous BID    cefTRIAXone (ROCEPHIN) IV  1 g Intravenous Q24H    azithromycin  500 mg Intravenous Q24H    oseltamivir  75 mg Oral BID    furosemide  40 mg Intravenous Daily    ipratropium-albuterol  1 ampule Inhalation Q4H WA    methylPREDNISolone  40 mg Intravenous Q8H     Continuous Infusions:   heparin (porcine) 10.59 Units/kg/hr (03/20/19 0049)    dextrose      diltiazem (CARDIZEM) 125 mg in dextrose 5% 125 mL infusion Stopped (03/19/19 6437)     PRN Meds:     LORazepam 0.5 mg Q8H PRN   sodium chloride flush 10 mL PRN   magnesium hydroxide 30 mL Daily PRN   ondansetron 4 mg Q6H PRN   heparin (porcine) 10,000 Units PRN   heparin (porcine) 5,000 Units PRN   glucose 15 g PRN   dextrose 12.5 g PRN   glucagon (rDNA) 1 mg PRN   dextrose 100 mL/hr PRN   albuterol 2.5 mg As Directed RT PRN   metoprolol 2.5 mg Q4H PRN       SUPPORT DEVICES: [] Ventilator [x] BIPAP  [] Nasal Cannula [] Room Air  .   Lab Results   Component Value Date    PHART 7.310 03/18/2019    JDW3OJL 51.7 03/18/2019    PO2ART 67.8 03/18/2019    NAK6AGV 25.4 03/18/2019    OVL2TVR 38 03/20/2019    S1MFGEJN 91.7 03/18/2019    FIO2 titrate up on Lantus    Continue  Lopressor   Continue on digoxin 125 µg daily as per cardiology  Continue on heparin for now  Plan for GUSTAVO cardioversion once breathing is better  Pepcid for GI Px  Keep nothing by mouth for now, as patient is continuously on Hazel Carrion MD  PGY-2, Internal Medicine resident  [facility]. 3/20/2019 8:57 AM   Attending Physician Statement  I have discussed the care of Lino Squires, including pertinent history and exam findings,  with the resident. I have seen and examined the patient and the key elements of all parts of the encounter have been performed by me. I agree with the assessment, plan and orders as documented by the resident with additions . Patient continues to need high FiO2. He was switched to high flow oxygen. When I saw him, but had to be put back on noninvasive ventilator due to increased work of breathing on my exam.  He does have wheezing bilaterally but fine crackles in the posterior infrascapular region. Luci January He continues to have A. fib with RVR. Digoxin has been given  I have increased his Lantus to 20 units from tonight because of hyperglycemia. Decrease his IV steroids  Give 1 dose of Lasix in the evening today and monitor output. .  Continue antibiotics for now along with Tamiflu  Bronchodilators         Total critical care time caring for this patient with life threatening, unstable organ failure, including direct patient contact, management of life support systems, review of data including imaging and labs, discussions with other team members and physicians at least 27   Min so far today, excluding procedures. Treatment plan Discussed with nursing staff in detail , all questions answered . Electronically signed by Maritza Newman MD on   3/20/19 at 5:32 PM    Please note that this chart was generated using voice recognition Dragon dictation software.   Although every effort was made to ensure the accuracy of this automated transcription, some errors in transcription may have occurred.

## 2019-03-21 ENCOUNTER — APPOINTMENT (OUTPATIENT)
Dept: CT IMAGING | Age: 74
DRG: 207 | End: 2019-03-21
Attending: INTERNAL MEDICINE
Payer: MEDICARE

## 2019-03-21 ENCOUNTER — APPOINTMENT (OUTPATIENT)
Dept: GENERAL RADIOLOGY | Age: 74
DRG: 207 | End: 2019-03-21
Attending: INTERNAL MEDICINE
Payer: MEDICARE

## 2019-03-21 LAB
ABSOLUTE EOS #: 0 K/UL (ref 0–0.4)
ABSOLUTE IMMATURE GRANULOCYTE: 0 K/UL (ref 0–0.3)
ABSOLUTE LYMPH #: 1.06 K/UL (ref 1–4.8)
ABSOLUTE MONO #: 0.32 K/UL (ref 0.1–0.8)
ALLEN TEST: POSITIVE
ALLEN TEST: POSITIVE
ANION GAP SERPL CALCULATED.3IONS-SCNC: 12 MMOL/L (ref 9–17)
BASOPHILS # BLD: 0 % (ref 0–2)
BASOPHILS ABSOLUTE: 0 K/UL (ref 0–0.2)
BUN BLDV-MCNC: 45 MG/DL (ref 8–23)
BUN/CREAT BLD: ABNORMAL (ref 9–20)
CALCIUM IONIZED: 1.1 MMOL/L (ref 1.13–1.33)
CALCIUM SERPL-MCNC: 8.7 MG/DL (ref 8.6–10.4)
CHLORIDE BLD-SCNC: 96 MMOL/L (ref 98–107)
CO2: 32 MMOL/L (ref 20–31)
CREAT SERPL-MCNC: 1.07 MG/DL (ref 0.7–1.2)
DIFFERENTIAL TYPE: ABNORMAL
DIRECT EXAM: NORMAL
DIRECT EXAM: NORMAL
EOSINOPHILS RELATIVE PERCENT: 0 % (ref 1–4)
FIO2: 70
FIO2: 80
GFR AFRICAN AMERICAN: >60 ML/MIN
GFR NON-AFRICAN AMERICAN: >60 ML/MIN
GFR SERPL CREATININE-BSD FRML MDRD: ABNORMAL ML/MIN/{1.73_M2}
GFR SERPL CREATININE-BSD FRML MDRD: ABNORMAL ML/MIN/{1.73_M2}
GLUCOSE BLD-MCNC: 137 MG/DL (ref 75–110)
GLUCOSE BLD-MCNC: 144 MG/DL (ref 75–110)
GLUCOSE BLD-MCNC: 150 MG/DL (ref 75–110)
GLUCOSE BLD-MCNC: 158 MG/DL (ref 75–110)
GLUCOSE BLD-MCNC: 161 MG/DL (ref 75–110)
GLUCOSE BLD-MCNC: 181 MG/DL (ref 75–110)
GLUCOSE BLD-MCNC: 203 MG/DL (ref 75–110)
GLUCOSE BLD-MCNC: 224 MG/DL (ref 75–110)
GLUCOSE BLD-MCNC: 288 MG/DL (ref 70–99)
GLUCOSE BLD-MCNC: 307 MG/DL (ref 75–110)
GLUCOSE BLD-MCNC: 307 MG/DL (ref 75–110)
HCT VFR BLD CALC: 34.9 % (ref 40.7–50.3)
HEMOGLOBIN: 11.1 G/DL (ref 13–17)
IMMATURE GRANULOCYTES: 0 %
LYMPHOCYTES # BLD: 10 % (ref 24–44)
Lab: NORMAL
Lab: NORMAL
MAGNESIUM: 2.1 MG/DL (ref 1.6–2.6)
MCH RBC QN AUTO: 30.7 PG (ref 25.2–33.5)
MCHC RBC AUTO-ENTMCNC: 31.8 G/DL (ref 28.4–34.8)
MCV RBC AUTO: 96.7 FL (ref 82.6–102.9)
MODE: ABNORMAL
MODE: ABNORMAL
MONOCYTES # BLD: 3 % (ref 1–7)
MORPHOLOGY: NORMAL
NEGATIVE BASE EXCESS, ART: ABNORMAL (ref 0–2)
NEGATIVE BASE EXCESS, ART: ABNORMAL (ref 0–2)
NRBC AUTOMATED: 0 PER 100 WBC
NUCLEATED RED BLOOD CELLS: 1 PER 100 WBC
O2 DEVICE/FLOW/%: ABNORMAL
O2 DEVICE/FLOW/%: ABNORMAL
PARTIAL THROMBOPLASTIN TIME: 39.4 SEC (ref 20.5–30.5)
PARTIAL THROMBOPLASTIN TIME: 52.9 SEC (ref 20.5–30.5)
PARTIAL THROMBOPLASTIN TIME: 54.2 SEC (ref 20.5–30.5)
PARTIAL THROMBOPLASTIN TIME: 63.7 SEC (ref 20.5–30.5)
PATIENT TEMP: ABNORMAL
PATIENT TEMP: ABNORMAL
PDW BLD-RTO: 13.4 % (ref 11.8–14.4)
PHOSPHORUS: 3.2 MG/DL (ref 2.5–4.5)
PLATELET # BLD: 189 K/UL (ref 138–453)
PLATELET ESTIMATE: ABNORMAL
PMV BLD AUTO: 9.8 FL (ref 8.1–13.5)
POC HCO3: 34.8 MMOL/L (ref 21–28)
POC HCO3: 37.2 MMOL/L (ref 21–28)
POC O2 SATURATION: 95 % (ref 94–98)
POC O2 SATURATION: 98 % (ref 94–98)
POC PCO2 TEMP: ABNORMAL MM HG
POC PCO2 TEMP: ABNORMAL MM HG
POC PCO2: 59.6 MM HG (ref 35–48)
POC PCO2: 74.3 MM HG (ref 35–48)
POC PH TEMP: ABNORMAL
POC PH TEMP: ABNORMAL
POC PH: 7.31 (ref 7.35–7.45)
POC PH: 7.37 (ref 7.35–7.45)
POC PO2 TEMP: ABNORMAL MM HG
POC PO2 TEMP: ABNORMAL MM HG
POC PO2: 115.6 MM HG (ref 83–108)
POC PO2: 78.5 MM HG (ref 83–108)
POSITIVE BASE EXCESS, ART: 8 (ref 0–3)
POSITIVE BASE EXCESS, ART: 8 (ref 0–3)
POTASSIUM SERPL-SCNC: 4.5 MMOL/L (ref 3.7–5.3)
RBC # BLD: 3.61 M/UL (ref 4.21–5.77)
RBC # BLD: ABNORMAL 10*6/UL
SAMPLE SITE: ABNORMAL
SAMPLE SITE: ABNORMAL
SEG NEUTROPHILS: 87 % (ref 36–66)
SEGMENTED NEUTROPHILS ABSOLUTE COUNT: 9.22 K/UL (ref 1.8–7.7)
SODIUM BLD-SCNC: 140 MMOL/L (ref 135–144)
SPECIMEN DESCRIPTION: NORMAL
SPECIMEN DESCRIPTION: NORMAL
TCO2 (CALC), ART: 37 MMOL/L (ref 22–29)
TCO2 (CALC), ART: 40 MMOL/L (ref 22–29)
WBC # BLD: 10.6 K/UL (ref 3.5–11.3)
WBC # BLD: ABNORMAL 10*3/UL

## 2019-03-21 PROCEDURE — 2700000000 HC OXYGEN THERAPY PER DAY

## 2019-03-21 PROCEDURE — 6360000002 HC RX W HCPCS: Performed by: INTERNAL MEDICINE

## 2019-03-21 PROCEDURE — 36415 COLL VENOUS BLD VENIPUNCTURE: CPT

## 2019-03-21 PROCEDURE — 2500000003 HC RX 250 WO HCPCS: Performed by: STUDENT IN AN ORGANIZED HEALTH CARE EDUCATION/TRAINING PROGRAM

## 2019-03-21 PROCEDURE — 87449 NOS EACH ORGANISM AG IA: CPT

## 2019-03-21 PROCEDURE — 2580000003 HC RX 258: Performed by: STUDENT IN AN ORGANIZED HEALTH CARE EDUCATION/TRAINING PROGRAM

## 2019-03-21 PROCEDURE — 86738 MYCOPLASMA ANTIBODY: CPT

## 2019-03-21 PROCEDURE — 6370000000 HC RX 637 (ALT 250 FOR IP): Performed by: STUDENT IN AN ORGANIZED HEALTH CARE EDUCATION/TRAINING PROGRAM

## 2019-03-21 PROCEDURE — 71250 CT THORAX DX C-: CPT

## 2019-03-21 PROCEDURE — 6370000000 HC RX 637 (ALT 250 FOR IP): Performed by: INTERNAL MEDICINE

## 2019-03-21 PROCEDURE — 6360000002 HC RX W HCPCS: Performed by: STUDENT IN AN ORGANIZED HEALTH CARE EDUCATION/TRAINING PROGRAM

## 2019-03-21 PROCEDURE — 80048 BASIC METABOLIC PNL TOTAL CA: CPT

## 2019-03-21 PROCEDURE — 2000000000 HC ICU R&B

## 2019-03-21 PROCEDURE — 94640 AIRWAY INHALATION TREATMENT: CPT

## 2019-03-21 PROCEDURE — 31500 INSERT EMERGENCY AIRWAY: CPT | Performed by: INTERNAL MEDICINE

## 2019-03-21 PROCEDURE — 82947 ASSAY GLUCOSE BLOOD QUANT: CPT

## 2019-03-21 PROCEDURE — 82330 ASSAY OF CALCIUM: CPT

## 2019-03-21 PROCEDURE — 0BH18EZ INSERTION OF ENDOTRACHEAL AIRWAY INTO TRACHEA, VIA NATURAL OR ARTIFICIAL OPENING ENDOSCOPIC: ICD-10-PCS | Performed by: INTERNAL MEDICINE

## 2019-03-21 PROCEDURE — 36600 WITHDRAWAL OF ARTERIAL BLOOD: CPT

## 2019-03-21 PROCEDURE — 83735 ASSAY OF MAGNESIUM: CPT

## 2019-03-21 PROCEDURE — 6360000002 HC RX W HCPCS

## 2019-03-21 PROCEDURE — 85730 THROMBOPLASTIN TIME PARTIAL: CPT

## 2019-03-21 PROCEDURE — 82803 BLOOD GASES ANY COMBINATION: CPT

## 2019-03-21 PROCEDURE — 94660 CPAP INITIATION&MGMT: CPT

## 2019-03-21 PROCEDURE — 71045 X-RAY EXAM CHEST 1 VIEW: CPT

## 2019-03-21 PROCEDURE — 94770 HC ETCO2 MONITOR DAILY: CPT

## 2019-03-21 PROCEDURE — 87899 AGENT NOS ASSAY W/OPTIC: CPT

## 2019-03-21 PROCEDURE — 51798 US URINE CAPACITY MEASURE: CPT

## 2019-03-21 PROCEDURE — 94002 VENT MGMT INPAT INIT DAY: CPT

## 2019-03-21 PROCEDURE — 84100 ASSAY OF PHOSPHORUS: CPT

## 2019-03-21 PROCEDURE — 5A1955Z RESPIRATORY VENTILATION, GREATER THAN 96 CONSECUTIVE HOURS: ICD-10-PCS | Performed by: INTERNAL MEDICINE

## 2019-03-21 PROCEDURE — 99291 CRITICAL CARE FIRST HOUR: CPT | Performed by: INTERNAL MEDICINE

## 2019-03-21 PROCEDURE — 85025 COMPLETE CBC W/AUTO DIFF WBC: CPT

## 2019-03-21 PROCEDURE — 94762 N-INVAS EAR/PLS OXIMTRY CONT: CPT

## 2019-03-21 PROCEDURE — 51701 INSERT BLADDER CATHETER: CPT

## 2019-03-21 RX ORDER — OSELTAMIVIR PHOSPHATE 6 MG/ML
75 FOR SUSPENSION ORAL 2 TIMES DAILY
Status: COMPLETED | OUTPATIENT
Start: 2019-03-21 | End: 2019-03-25

## 2019-03-21 RX ORDER — FUROSEMIDE 10 MG/ML
INJECTION INTRAMUSCULAR; INTRAVENOUS
Status: COMPLETED
Start: 2019-03-21 | End: 2019-03-21

## 2019-03-21 RX ORDER — PROPOFOL 10 MG/ML
10 INJECTION, EMULSION INTRAVENOUS
Status: DISCONTINUED | OUTPATIENT
Start: 2019-03-21 | End: 2019-03-28

## 2019-03-21 RX ORDER — FUROSEMIDE 10 MG/ML
40 INJECTION INTRAMUSCULAR; INTRAVENOUS 2 TIMES DAILY
Status: DISCONTINUED | OUTPATIENT
Start: 2019-03-21 | End: 2019-03-22

## 2019-03-21 RX ORDER — DIGOXIN 0.25 MG/ML
250 INJECTION INTRAMUSCULAR; INTRAVENOUS DAILY
Status: DISCONTINUED | OUTPATIENT
Start: 2019-03-22 | End: 2019-03-23

## 2019-03-21 RX ORDER — FENTANYL CITRATE 50 UG/ML
INJECTION, SOLUTION INTRAMUSCULAR; INTRAVENOUS
Status: COMPLETED
Start: 2019-03-21 | End: 2019-03-21

## 2019-03-21 RX ORDER — DIGOXIN 0.25 MG/ML
125 INJECTION INTRAMUSCULAR; INTRAVENOUS ONCE
Status: COMPLETED | OUTPATIENT
Start: 2019-03-21 | End: 2019-03-21

## 2019-03-21 RX ORDER — NALOXONE HYDROCHLORIDE 0.4 MG/ML
0.4 INJECTION, SOLUTION INTRAMUSCULAR; INTRAVENOUS; SUBCUTANEOUS PRN
Status: DISCONTINUED | OUTPATIENT
Start: 2019-03-21 | End: 2019-03-25

## 2019-03-21 RX ORDER — FENTANYL CITRATE 50 UG/ML
100 INJECTION, SOLUTION INTRAMUSCULAR; INTRAVENOUS ONCE
Status: COMPLETED | OUTPATIENT
Start: 2019-03-21 | End: 2019-03-21

## 2019-03-21 RX ORDER — LORAZEPAM 2 MG/ML
3 INJECTION INTRAMUSCULAR ONCE
Status: COMPLETED | OUTPATIENT
Start: 2019-03-21 | End: 2019-03-21

## 2019-03-21 RX ORDER — LORAZEPAM 2 MG/ML
2 INJECTION INTRAMUSCULAR ONCE
Status: COMPLETED | OUTPATIENT
Start: 2019-03-21 | End: 2019-03-21

## 2019-03-21 RX ADMIN — Medication 100 MCG/HR: at 12:04

## 2019-03-21 RX ADMIN — LORAZEPAM 3 MG: 2 INJECTION INTRAMUSCULAR; INTRAVENOUS at 10:39

## 2019-03-21 RX ADMIN — FAMOTIDINE 20 MG: 10 INJECTION, SOLUTION INTRAVENOUS at 09:54

## 2019-03-21 RX ADMIN — FENTANYL CITRATE 100 MCG: 50 INJECTION, SOLUTION INTRAMUSCULAR; INTRAVENOUS at 10:54

## 2019-03-21 RX ADMIN — IPRATROPIUM BROMIDE AND ALBUTEROL SULFATE 1 AMPULE: .5; 3 SOLUTION RESPIRATORY (INHALATION) at 19:57

## 2019-03-21 RX ADMIN — Medication 10 ML: at 21:20

## 2019-03-21 RX ADMIN — METHYLPREDNISOLONE SODIUM SUCCINATE 40 MG: 40 INJECTION, POWDER, FOR SOLUTION INTRAMUSCULAR; INTRAVENOUS at 02:33

## 2019-03-21 RX ADMIN — FAMOTIDINE 20 MG: 10 INJECTION, SOLUTION INTRAVENOUS at 21:18

## 2019-03-21 RX ADMIN — DIGOXIN 125 MCG: 0.25 INJECTION INTRAMUSCULAR; INTRAVENOUS at 16:24

## 2019-03-21 RX ADMIN — FUROSEMIDE 40 MG: 10 INJECTION, SOLUTION INTRAMUSCULAR; INTRAVENOUS at 16:12

## 2019-03-21 RX ADMIN — FUROSEMIDE 40 MG: 10 INJECTION, SOLUTION INTRAMUSCULAR; INTRAVENOUS at 09:53

## 2019-03-21 RX ADMIN — DEXMEDETOMIDINE HYDROCHLORIDE 0.2 MCG/KG/HR: 100 INJECTION, SOLUTION INTRAVENOUS at 10:42

## 2019-03-21 RX ADMIN — DEXTROSE MONOHYDRATE 500 MG: 50 INJECTION, SOLUTION INTRAVENOUS at 12:08

## 2019-03-21 RX ADMIN — CEFTRIAXONE SODIUM 1 G: 1 INJECTION, POWDER, FOR SOLUTION INTRAMUSCULAR; INTRAVENOUS at 10:10

## 2019-03-21 RX ADMIN — INSULIN LISPRO 12 UNITS: 100 INJECTION, SOLUTION INTRAVENOUS; SUBCUTANEOUS at 09:46

## 2019-03-21 RX ADMIN — METHYLPREDNISOLONE SODIUM SUCCINATE 40 MG: 40 INJECTION, POWDER, FOR SOLUTION INTRAMUSCULAR; INTRAVENOUS at 16:19

## 2019-03-21 RX ADMIN — DIGOXIN 125 MCG: 0.25 INJECTION INTRAMUSCULAR; INTRAVENOUS at 09:49

## 2019-03-21 RX ADMIN — HEPARIN SODIUM AND DEXTROSE 12.59 UNITS/KG/HR: 10000; 5 INJECTION INTRAVENOUS at 09:51

## 2019-03-21 RX ADMIN — PROPOFOL 10 MCG/KG/MIN: 10 INJECTION, EMULSION INTRAVENOUS at 21:13

## 2019-03-21 RX ADMIN — Medication 50 MCG/HR: at 23:48

## 2019-03-21 RX ADMIN — Medication 75 MG: at 21:19

## 2019-03-21 RX ADMIN — HEPARIN SODIUM 5000 UNITS: 1000 INJECTION INTRAVENOUS; SUBCUTANEOUS at 02:05

## 2019-03-21 RX ADMIN — IPRATROPIUM BROMIDE AND ALBUTEROL SULFATE 1 AMPULE: .5; 3 SOLUTION RESPIRATORY (INHALATION) at 11:55

## 2019-03-21 RX ADMIN — LORAZEPAM 0.5 MG: 2 INJECTION INTRAMUSCULAR; INTRAVENOUS at 02:33

## 2019-03-21 RX ADMIN — PROPOFOL 20 MCG/KG/MIN: 10 INJECTION, EMULSION INTRAVENOUS at 12:55

## 2019-03-21 RX ADMIN — SODIUM CHLORIDE 7.41 UNITS/HR: 9 INJECTION, SOLUTION INTRAVENOUS at 10:20

## 2019-03-21 RX ADMIN — IPRATROPIUM BROMIDE AND ALBUTEROL SULFATE 1 AMPULE: .5; 3 SOLUTION RESPIRATORY (INHALATION) at 08:48

## 2019-03-21 RX ADMIN — LORAZEPAM 2 MG: 2 INJECTION INTRAMUSCULAR; INTRAVENOUS at 09:23

## 2019-03-21 ASSESSMENT — PULMONARY FUNCTION TESTS
PIF_VALUE: 27
PIF_VALUE: 26
PIF_VALUE: 27
PIF_VALUE: 4
PIF_VALUE: 19
PIF_VALUE: 21
PIF_VALUE: 26
PIF_VALUE: 2

## 2019-03-21 ASSESSMENT — PAIN SCALES - GENERAL
PAINLEVEL_OUTOF10: 0
PAINLEVEL_OUTOF10: 0
PAINLEVEL_OUTOF10: 8

## 2019-03-21 NOTE — PROGRESS NOTES
hours.    Objective:   Vitals: /78   Pulse 136   Temp 98.8 °F (37.1 °C) (Oral)   Resp 16   Ht 5' 8\" (1.727 m)   Wt 292 lb 5.3 oz (132.6 kg)   SpO2 98%   BMI 44.45 kg/m²   General appearance: intubated and sedated  HEENT: Head: Normocephalic, no lesions, without obvious abnormality. Neck: no JVD  Lungs: Coarse sounds  Heart: tachy s1+s2, no murmurs  Abdomen: soft, non-tender  Extremities: no edema  Neurologic: intubated and sedated        Assessment / Acute Cardiac Problems:   1. Influenza A  2. PNA  3. Acute respiratory failure  4. Atrial flutter with RVR  5. Mild cardiomyopathy  6. Acute systolic CHF  7. Type II MI    Patient Active Problem List:     CAD (coronary artery disease)     DM type 2 (diabetes mellitus, type 2) ([facility])     Adult BMI > 30     Type 2 diabetes mellitus without complication, without long-term current use of insulin ([facility])     Influenza A with respiratory manifestations     Acute respiratory failure with hypoxia ([facility])     Influenza A     Atrial fibrillation with rapid ventricular response ([facility])     Acute hypercapnic respiratory failure ([facility])     NSTEMI (non-ST elevated myocardial infarction) ([facility] [address]     Community acquired pneumonia of left lower lobe of lung ([facility] [address]      Plan of Treatment:   1. Continue IV heparin  2. Increase digoxin to 0.25mg IV qday  3. IV lopressor 2.5mg q6H  4.  Continue IV lasix    Mae Decker [address] Cardiology  [phone]

## 2019-03-21 NOTE — PROGRESS NOTES
INTENSIVE CARE UNIT  Resident Physician Progress Note    Patient - Yamile Leal  Date of Admission -  3/19/2019  7:58 AM  Date of Evaluation -  3/21/2019  Room and Bed Number -  0105/0105-01   Hospital Day - 2    Chief complaint influenza, pneumonia, A. fib with RVR  SUBJECTIVE:     OVERNIGHT EVENTS:     1. Patient seen and examined at bedside, still in A. Fib with heart rate in the 100s  2. Monitoring Gases showing pH 7.37, P CO2 59.6, PO2 78.5, HCO3 34.8, remain on high flow nasal cannula at 30 mL per min. On chest auscultation he has prolonged expiration with mild scattered wheeze bilateral  3. Blood glucose remained in 250s to 270s, Lantus was increased to 20 units nightly with high-dose sliding scale  4. He has urine output of 2 L, he was given Lasix 40 mg twice daily yesterday, total 0.6 L negative since admission  5. Currently on digoxin 125 µg and Lopressor 2.5 mg U4 hours when necessary. He remained on heparin GTT  6. On ceftriaxone and azithromycin and Tamiflu      AWAKE & FOLLOWING COMMANDS:  [] No   [x] Yes    SECRETIONS Amount:  [x] Small [] Moderate  [] Large  [] None  Color:     [x] White [] Colored  [] Bloody    SEDATION:  RAAS Score:  [] Propofol gtt  [] Versed gtt  [] Ativan gtt   [x] No Sedation    PARALYZED:  [x] No    [] Yes    VASOPRESSORS:  [x] No    [] Yes  [] Levophed [] Dopamine [] Vasopressin  [] Dobutamine [] Phenylephrine [] Epinephrine    Review of Systems -  General ROS: fatigue, fever   ENT ROS: negative for - headaches, oral lesions or sore throat  Cardiovascular ROS: no chest pain , orthopnea or pnd   Gastrointestinal ROS: no abdominal pain, change in bowel habits, or black or bloody stools  Skin - no rash   Neuro - no blurry vision , no loc .  No focal weakness   msk - no jt tenderness or swelling    Vascular - no claudication , rest completed and negative   Lymphatic - complete and negative   Hematology - oncology - complete and negative   Allergy immunology - complete and negative    no burning or hematuria    OBJECTIVE:     VITAL SIGNS:  /62   Pulse 96   Temp 98.8 °F (37.1 °C) (Oral)   Resp 30   Ht 5' 8\" (1.727 m)   Wt 292 lb 5.3 oz (132.6 kg)   SpO2 97%   BMI 44.45 kg/m²   Tmax over 24 hours:  Temp (24hrs), Av.7 °F (37.1 °C), Min:97.7 °F (36.5 °C), Max:99 °F (37.2 °C)      Patient Vitals for the past 8 hrs:   BP Temp Temp src Pulse Resp SpO2 Weight   19 0600 103/62 -- -- 96 30 97 % --   19 0500 -- -- -- 102 27 97 % --   19 0406 -- -- -- -- 23 96 % --   19 0400 118/72 98.8 °F (37.1 °C) Oral 108 23 91 % 292 lb 5.3 oz (132.6 kg)   19 0354 -- -- -- 99 28 96 % --   19 0331 -- -- -- -- 19 95 % --   19 0300 129/73 -- -- 95 28 95 % --   19 0200 (!) 109/48 -- -- 94 30 91 % --   19 0100 84/70 -- -- 94 17 97 % --   19 0034 -- -- -- -- 20 95 % --         Intake/Output Summary (Last 24 hours) at 3/21/2019 0830  Last data filed at 3/21/2019 0600  Gross per 24 hour   Intake 963 ml   Output 1995 ml   Net -1032 ml     Date 19 0000 - 19 2359   Shift 5592-6100 1200-3672 8637-6355 24 Hour Total   INTAKE   I.V.(mL/kg) 184(1.4)   184(1.4)   Shift Total(mL/kg) 184(1.4)   184(1.4)   OUTPUT   Urine(mL/kg/hr) 725(0.7)   725   Shift Total(mL/kg) 725(5.5)   725(5.5)   Weight (kg) 132.6 132.6 132.6 132.6     Wt Readings from Last 3 Encounters:   19 292 lb 5.3 oz (132.6 kg)   19 294 lb 11.2 oz (133.7 kg)   19 300 lb (136.1 kg)     Body mass index is 44.45 kg/m².         PHYSICAL EXAM:  · General appearance: awake, alert, cooperative, and respiratory distress on BiPAP  · HEENT: Atraumatic, normocephalic, neck supple, normal EOM, thyroid normal, no lymphadenopathy   · Lungs: Scattered mild to moderate wheezing bilateral lung fieldsAnd has fine crackles posteriorly infrascapular bilateral   · Heart: Regular heart rate, tachycardic, S1, S2 normal, no murmur   · Abdomen: soft, non-tender; bowel sounds normal; no masses,  no organomegaly  · Extremities: No cyanosis or edema  · Neurological:  Awake, alert, oriented to name, place and time. Cranial nerves II-XII are grossly intact. Reflexes normal and symmetric. Sensation grossly normal  Psych-normal affect       MEDICATIONS:  Scheduled Meds:   digoxin  125 mcg Intravenous Daily    insulin lispro  0-18 Units Subcutaneous TID WC    insulin lispro  0-9 Units Subcutaneous Nightly    methylPREDNISolone  40 mg Intravenous Q12H    insulin glargine  20 Units Subcutaneous Nightly    sodium chloride flush  10 mL Intravenous 2 times per day    famotidine (PEPCID) injection  20 mg Intravenous BID    cefTRIAXone (ROCEPHIN) IV  1 g Intravenous Q24H    azithromycin  500 mg Intravenous Q24H    oseltamivir  75 mg Oral BID    furosemide  40 mg Intravenous Daily    ipratropium-albuterol  1 ampule Inhalation Q4H WA     Continuous Infusions:   heparin (porcine) 12.59 Units/kg/hr (03/21/19 0202)    dextrose      diltiazem (CARDIZEM) 125 mg in dextrose 5% 125 mL infusion Stopped (03/19/19 2347)     PRN Meds:     LORazepam 0.5 mg Q8H PRN   sodium chloride flush 10 mL PRN   magnesium hydroxide 30 mL Daily PRN   ondansetron 4 mg Q6H PRN   heparin (porcine) 10,000 Units PRN   heparin (porcine) 5,000 Units PRN   glucose 15 g PRN   dextrose 12.5 g PRN   glucagon (rDNA) 1 mg PRN   dextrose 100 mL/hr PRN   albuterol 2.5 mg As Directed RT PRN   metoprolol 2.5 mg Q4H PRN       SUPPORT DEVICES: [] Ventilator [x] BIPAP  [] Nasal Cannula [] Room Air  .   Lab Results   Component Value Date    PHART 7.310 03/18/2019    PRN5GRI 51.7 03/18/2019    PO2ART 67.8 03/18/2019    KBZ2FJI 25.4 03/18/2019    CZO6QGD 37 03/21/2019    R7KMNGEW 91.7 03/18/2019    FIO2 70.0 03/21/2019         DATA:  Complete Blood Count:   Recent Labs     03/19/19  0937 03/20/19  0457 03/21/19  0805   WBC 12.7* 14.1* 10.6   RBC 4.12* 3.69* 3.61*   HGB 12.7* 11.2* 11.1*   HCT 41.4 35.8* 34.9*   .5 97.0 96.7   MCH 30.8 30.4 30.7   MCHC 30.7 31.3 31.8   RDW 13.3 13.4 13.4    189 189   MPV 9.9 10.2 9.8        Last 3 Blood Glucose:   Recent Labs     03/18/19 2040 03/19/19  0530 03/20/19  0457   GLUCOSE 373* 314* 162*        PT/INR:    Lab Results   Component Value Date    PROTIME 12.0 03/15/2019    INR 1.2 03/15/2019     PTT:    Lab Results   Component Value Date    APTT 39.4 03/21/2019       Comprehensive Metabolic Profile:   Recent Labs     03/18/19 2040 03/19/19  0530 03/20/19  0457   * 134* 140   K 5.5* 5.3 4.4   CL 92* 94* 98   CO2 27 29 31   BUN 31* 31* 40*   CREATININE 1.24* 1.03 1.12   GLUCOSE 373* 314* 162*   CALCIUM 9.2 9.1 8.8   PROT  --  7.4  --    LABALBU  --  3.8  --    BILITOT  --  0.56  --    ALKPHOS  --  132*  --    AST  --  37  --    ALT  --  40  --       Magnesium:   Lab Results   Component Value Date    MG 2.1 03/20/2019     Phosphorus:   Lab Results   Component Value Date    PHOS 3.2 03/20/2019     Ionized Calcium:   Lab Results   Component Value Date    CAION 1.10 03/21/2019    CAION 1.14 03/20/2019        Urinalysis:   Lab Results   Component Value Date    NITRU NEGATIVE 03/18/2019    COLORU YELLOW 03/18/2019    PHUR 5.5 03/18/2019    WBCUA 0 TO 2 03/18/2019    RBCUA None 03/18/2019    MUCUS NOT REPORTED 03/18/2019    TRICHOMONAS NOT REPORTED 03/18/2019    YEAST NOT REPORTED 03/18/2019    BACTERIA NOT REPORTED 03/18/2019    SPECGRAV 1.010 03/18/2019    LEUKOCYTESUR NEGATIVE 03/18/2019    UROBILINOGEN Normal 03/18/2019    BILIRUBINUR NEGATIVE 03/18/2019    GLUCOSEU 2+ 03/18/2019    KETUA NEGATIVE 03/18/2019    AMORPHOUS NOT REPORTED 03/18/2019       HgBA1c:    Lab Results   Component Value Date    LABA1C 8.6 03/18/2019     TSH:  No results found for: TSH  Lactic Acid:   Lab Results   Component Value Date    LACTA 2.2 03/18/2019    LACTA 2.6 03/18/2019      Troponin: No results for input(s): TROPONINI in the last 72 hours.     ASSESSMENT:     Patient Active Problem List    Diagnosis Date Noted    Community acquired pneumonia of left lower lobe of lung ([facility]     Influenza A with respiratory manifestations 03/18/2019    Acute respiratory failure with hypoxia ([facility] 03/18/2019    Influenza A 03/18/2019    Acute hypercapnic respiratory failure ([facility] 03/18/2019    NSTEMI (non-ST elevated myocardial infarction) ([facility])     Type 2 diabetes mellitus without complication, without long-term current use of insulin ([facility] 09/06/2016    Adult BMI > 30 05/06/2016    CAD (coronary artery disease)     DM type 2 (diabetes mellitus, type 2) ([facility])     Atrial fibrillation with rapid ventricular response ([facility] 01/01/2007          PLAN:     WEAN PER PROTOCOL:  [] No   [x] Yes  [] N/A    ICU PROPHYLAXIS:  Stress ulcer:  [] PPI Agent  [x] [id number] [] Sucralfate  [] Other:  VTE:   [] Enoxaparin  [x] he is on heparin GTT  [] EPC Cuffs    NUTRITION:  [x] NPO [] Tube Feeding (Specify: ) [] TPN  [] PO    HOME MEDS RECONCILED: [x] No  [] Yes    CONSULTATION NEEDED:  [x] No  [] Yes    FAMILY UPDATED:    [] No  [x] Yes    TRANSFER OUT OF ICU:   [x] No  [] Yes        Additional Assessment:  Influenza A  Community acquired pneumonia with likely strep pneumoniae  Atrial fibrillation with RVRPersistent   Acute exacerbation of CHF sec due to atrial fibrillationPersistent   Acute  hypoxic hypercapnic respiratory failureDue to pulmonary edema, persistent without any significant change   Diabetes mellitusType II with hyperglycemia   Obstructive sleep apnea    Plan:  Keep on BiPAP vs HFNC, keep oxygen saturation 88-92%  Continue on ceftriaxone and azithromycin IV for now day 2 today   Continue Tamiflu for total 5 days  Continue  solu Medrol to 40 mg IV BID  Breathing treatment with DuoNeb  Repeat chest x-ray today  Continue Lasix 40 mg IV twice a day  Monitor urine output and total intake  Replace electrolytes as appropriate.     Continue  Lantus 20 units subcutaneously with high-dose sliding scale  Monitor glucose and titrate up on Lantus    Continue on digoxin 125 µg daily as per cardiology  Lopressor 2.5 mg PRN q4 hr   heparin gtt for now  Plan for GUSTAVO cardioversion once breathing is better    Pepcid for GI Px  Keep nothing by mouth for now, as patient is continuously on Last Cameron MD  PGY-2, Internal Medicine resident  [facility].    3/21/2019 8:30 AM

## 2019-03-21 NOTE — PROGRESS NOTES
JOE Lopezatient Assessment complete. Acute hypercapnic respiratory failure ([facility] [J96.02] . Vitals:    03/21/19 0854   BP:    Pulse:    Resp: 26   Temp:    SpO2: 95%   . Patients home meds are   Prior to Admission medications    Medication Sig Start Date End Date Taking?  Authorizing Provider   insulin glargine (LANTUS) 100 UNIT/ML injection vial Inject 15 Units into the skin nightly    Historical Provider, MD   FLUAD 0.5 ML OLIVIA inject 0.5 milliliter intramuscularly 10/11/18   Historical Provider, MD   PRADAXA 150 MG capsule TAKE 1 CAPSULE TWICE DAILY 9/22/17   Lori Arreola MD   saxagliptin (ONGLYZA) 5 MG TABS tablet Take 5 mg by mouth daily    Historical Provider, MD   metFORMIN (GLUCOPHAGE) 1000 MG tablet TAKE 1 TABLET TWICE A DAY 9/15/17   Lori Arreola MD   glyBURIDE (DIABETA) 5 MG tablet TAKE 2 TABLETS TWICE A DAY 8/28/17   Lori Arreola MD   atorvastatin (LIPITOR) 80 MG tablet TAKE 1 TABLET DAILY 8/14/17   Lori Arreola MD   citalopram (CELEXA) 10 MG tablet TAKE 1 TABLET DAILY 8/7/17   Lori Arreola MD   diltiazem [facility]) 120 MG extended release capsule TAKE 1 CAPSULE DAILY 2/28/17   Lori Arreola MD   propranolol (INDERAL) 10 MG tablet 1 tablet daily as needed for tremor 4/25/16   Lori Arreola MD   metoprolol (LOPRESSOR) 25 MG tablet TAKE 1 TABLET TWICE A DAY 3/14/16   Lori Arreola MD        RR 22  Breath Sounds: diminished      · Bronchodilator assessment at level  3  · Hyperinflation assessment at level   · Secretion Management assessment at level    ·   · []    Bronchodilator Assessment  BRONCHODILATOR ASSESSMENT SCORE  Score 0 1 2 3 4 5   Breath Sounds   []  Patient Baseline []  No Wheeze good aeration []  Faint, scattered wheezing, good aeration [x]  Expiratory Wheezing and or moderately diminished []  Insp/Exp wheeze and/or very diminished []  Insp/Exp and/ or marked distress   Respiratory Rate   []  Patient Baseline []  Less than 20 []  Less than 296 312 327 342 356 80 335 362 390 [phone]

## 2019-03-21 NOTE — PLAN OF CARE
Problem: Risk for Impaired Skin Integrity  Goal: Tissue integrity - skin and mucous membranes  Description  Structural intactness and normal physiological function of skin and  mucous membranes.   3/20/2019 2023 by Garcia Chamberlain RN  Outcome: Ongoing  3/20/2019 9147 by Jese Cueto RN  Outcome: Ongoing     Problem: Confusion - Acute:  Goal: Absence of continued neurological deterioration signs and symptoms  Description  Absence of continued neurological deterioration signs and symptoms  3/20/2019 5512 by Jese Cueto RN  Outcome: Ongoing  Goal: Mental status will be restored to baseline  Description  Mental status will be restored to baseline  3/20/2019 2023 by Garcia Chamberlain RN  Outcome: Ongoing  3/20/2019 7757 by Jese Cueto RN  Outcome: Ongoing     Problem: Discharge Planning:  Goal: Ability to perform activities of daily living will improve  Description  Ability to perform activities of daily living will improve  Outcome: Ongoing     Problem: Injury - Risk of, Physical Injury:  Goal: Absence of physical injury  Description  Absence of physical injury  3/20/2019 1814 by Jese Cueto RN  Outcome: Ongoing  Goal: Will remain free from falls  Description  Will remain free from falls  3/20/2019 4031 by Jese Cueto RN  Outcome: Ongoing     Problem: Sleep Pattern Disturbance:  Goal: Appears well-rested  Description  Appears well-rested  3/20/2019 2819 by Jese Cueto RN  Outcome: Ongoing

## 2019-03-21 NOTE — DISCHARGE SUMMARY
Discharge Summary    Cahrley Byrd  :  1945  MRN:  [id number]    Admit date:  3/18/2019      Discharge date:  3/19/2019     Admitting Physician:  Lennon Dancer, MD    Discharge Diagnoses:      Principal Problem:    Influenza A with respiratory manifestations  Active Problems:    CAD (coronary artery disease)    Type 2 diabetes mellitus without complication, without long-term current use of insulin (HCC)    Acute respiratory failure with hypoxia (HCC)    Atrial fibrillation with rapid ventricular response (HCC)    NSTEMI (non-ST elevated myocardial infarction) ([facility]  Resolved Problems:    * No resolved hospital problems.  *      Active Hospital Problems    Diagnosis Date Noted    Influenza A with respiratory manifestations [J10.1] 2019    Acute respiratory failure with hypoxia (HCC) [J96.01] 2019    NSTEMI (non-ST elevated myocardial infarction) ([address] [I21.4]     Type 2 diabetes mellitus without complication, without long-term current use of insulin ([facility] [facility] [E11.9] 2016    CAD (coronary artery disease) [I25.10]     Atrial fibrillation with rapid ventricular response ([facility] [address] [I48.91] 2007       Discharge Medications:       Power Bueno   Home Medication Instructions EAK:[id number]    Printed on:19 1044   Medication Information                      atorvastatin (LIPITOR) 80 MG tablet  TAKE 1 TABLET DAILY             citalopram (CELEXA) 10 MG tablet  TAKE 1 TABLET DAILY             diltiazem (TIAZAC) 120 MG extended release capsule  TAKE 1 CAPSULE DAILY             FLUAD 0.5 ML OLIVIA  inject 0.5 milliliter intramuscularly             glyBURIDE (DIABETA) 5 MG tablet  TAKE 2 TABLETS TWICE A DAY             insulin glargine (LANTUS) 100 UNIT/ML injection vial  Inject 15 Units into the skin nightly             metFORMIN (GLUCOPHAGE) 1000 MG tablet  TAKE 1 TABLET TWICE A DAY             metoprolol (LOPRESSOR) 25 MG tablet  TAKE 1 TABLET TWICE A DAY             PRADAXA 150 MG capsule  TAKE 1 CAPSULE TWICE DAILY             propranolol (INDERAL) 10 MG tablet  1 tablet daily as needed for tremor             saxagliptin (ONGLYZA) 5 MG TABS tablet  Take 5 mg by mouth daily                 Consultants:  cardiology     Hospital Course:   Sandra Raymond is a 69 y.o. male admitted with  Influenza and hypoxemia. Found to have acute myocardial injury. Evidence of congestive heart failure. Transferred to tertiary care facility for more definitive treatment. Exam:   Regular. Diminished breath sounds with wheezes. 1+ leg edema.     Disposition:     Transferred to tertiary care facility      Signed:  Yane Elkins  3/21/2019, 10:44 AM

## 2019-03-21 NOTE — PROGRESS NOTES
INTENSIVE CARE UNIT  Resident Physician Progress Note    Patient - Sravanthi Flowers  Date of Admission -  3/19/2019  7:58 AM  Date of Evaluation -  3/21/2019  Room and Bed Number -  0105/0105-01   Hospital Day - 2    Chief complaint influenza, pneumonia, A. fib with RVR  SUBJECTIVE:     OVERNIGHT EVENTS:     1. Patient seen and examined at bedside, still in A. Fib with heart rate in the 100s  2. Monitoring Gases showing pH 7.37, P CO2 59.6, PO2 78.5, HCO3 34.8, remain on high flow nasal cannula at 30 mL per min. On chest auscultation he has prolonged expiration with mild scattered wheeze bilateral  3. Blood glucose remained in 250s to 270s, Lantus was increased to 20 units nightly with high-dose sliding scale  4. He has urine output of 2 L, he was given Lasix 40 mg twice daily yesterday, total 0.6 L negative since admission  5. Currently on digoxin 125 µg and Lopressor 2.5 mg U4 hours when necessary. He remained on heparin GTT  6. On ceftriaxone and azithromycin and Tamiflu      AWAKE & FOLLOWING COMMANDS:  [] No   [x] Yes    SECRETIONS Amount:  [x] Small [] Moderate  [] Large  [] None  Color:     [x] White [] Colored  [] Bloody    SEDATION:  RAAS Score:  [] Propofol gtt  [] Versed gtt  [] Ativan gtt   [x] No Sedation    PARALYZED:  [x] No    [] Yes    VASOPRESSORS:  [x] No    [] Yes  [] Levophed [] Dopamine [] Vasopressin  [] Dobutamine [] Phenylephrine [] Epinephrine    Review of Systems -  General ROS: fatigue, fever   ENT ROS: negative for - headaches, oral lesions or sore throat  Cardiovascular ROS: no chest pain , orthopnea or pnd   Gastrointestinal ROS: no abdominal pain, change in bowel habits, or black or bloody stools  Skin - no rash   Neuro - no blurry vision , no loc .  No focal weakness   msk - no jt tenderness or swelling    Vascular - no claudication , rest completed and negative   Lymphatic - complete and negative   Hematology - oncology - complete and negative   Allergy immunology - complete and negative    no burning or hematuria    OBJECTIVE:     VITAL SIGNS:  /78   Pulse 136   Temp 98.8 °F (37.1 °C) (Oral)   Resp 16   Ht 5' 8\" (1.727 m)   Wt 292 lb 5.3 oz (132.6 kg)   SpO2 98%   BMI 44.45 kg/m²   Tmax over 24 hours:  Temp (24hrs), Av.9 °F (37.2 °C), Min:98.6 °F (37 °C), Max:99 °F (37.2 °C)      Patient Vitals for the past 8 hrs:   BP Pulse Resp SpO2   19 1215 104/78 136 16 --   19 1200 138/71 137 26 --   19 1145 123/67 136 19 --   19 1130 99/63 136 20 --   19 1123 -- 138 22 98 %   19 1115 (!) 79/62 138 28 --   19 1100 128/85 138 29 --   19 1000 114/70 109 28 --   19 0933 -- 123 (!) 32 (!) 82 %   19 0904 -- 117 27 --   19 0900 109/74 114 25 --   19 0854 -- -- 26 95 %   19 0800 117/67 91 28 --   19 0700 119/67 88 29 --   19 0600 103/62 96 30 97 %         Intake/Output Summary (Last 24 hours) at 3/21/2019 1309  Last data filed at 3/21/2019 0600  Gross per 24 hour   Intake 470 ml   Output 1145 ml   Net -675 ml     Date 19 0000 - 19 2359   Shift 1688-4499 4449-4241 7072-8941 24 Hour Total   INTAKE   I.V.(mL/kg) 184(1.4)   184(1.4)   Shift Total(mL/kg) 184(1.4)   184(1.4)   OUTPUT   Urine(mL/kg/hr) 725(0.7)   725   Shift Total(mL/kg) 725(5.5)   725(5.5)   Weight (kg) 132.6 132.6 132.6 132.6     Wt Readings from Last 3 Encounters:   19 292 lb 5.3 oz (132.6 kg)   19 294 lb 11.2 oz (133.7 kg)   19 300 lb (136.1 kg)     Body mass index is 44.45 kg/m².         PHYSICAL EXAM:  · General appearance: awake, alert, cooperative, and respiratory distress on BiPAP  · HEENT: Atraumatic, normocephalic, neck supple, normal EOM, thyroid normal, no lymphadenopathy   · Lungs: Scattered mild to moderate wheezing bilateral lung fieldsAnd has fine crackles posteriorly infrascapular bilateral   · Heart: Regular heart rate, tachycardic, S1, S2 normal, no murmur   · Abdomen: soft, non-tender; bowel sounds normal; no masses,  no organomegaly  · Extremities: No cyanosis or edema  · Neurological:  Awake, alert, oriented to name, place and time. Cranial nerves II-XII are grossly intact. Reflexes normal and symmetric. Sensation grossly normal  Psych-normal affect       MEDICATIONS:  Scheduled Meds:   furosemide  40 mg Intravenous BID    azithromycin  500 mg Intravenous Q24H    [START ON 3/22/2019] digoxin  250 mcg Intravenous Daily    digoxin  125 mcg Intravenous Once    methylPREDNISolone  40 mg Intravenous Q12H    sodium chloride flush  10 mL Intravenous 2 times per day    famotidine (PEPCID) injection  20 mg Intravenous BID    cefTRIAXone (ROCEPHIN) IV  1 g Intravenous Q24H    oseltamivir  75 mg Oral BID    ipratropium-albuterol  1 ampule Inhalation Q4H WA     Continuous Infusions:   insulin (HUMAN R) non-weight based infusion 6.56 Units/hr (03/21/19 1205)    dexmedetomidine (PRECEDEX) IV infusion Stopped (03/21/19 1157)    fentaNYL 100 mcg/hr (03/21/19 1204)    propofol 20 mcg/kg/min (03/21/19 1255)    heparin (porcine) 14.59 Units/kg/hr (03/21/19 1238)    dextrose      diltiazem (CARDIZEM) 125 mg in dextrose 5% 125 mL infusion Stopped (03/19/19 2347)     PRN Meds:     naloxone 0.4 mg PRN   LORazepam 0.5 mg Q8H PRN   sodium chloride flush 10 mL PRN   magnesium hydroxide 30 mL Daily PRN   ondansetron 4 mg Q6H PRN   heparin (porcine) 10,000 Units PRN   heparin (porcine) 5,000 Units PRN   glucose 15 g PRN   dextrose 12.5 g PRN   glucagon (rDNA) 1 mg PRN   dextrose 100 mL/hr PRN   albuterol 2.5 mg As Directed RT PRN   metoprolol 2.5 mg Q4H PRN       SUPPORT DEVICES: [] Ventilator [x] BIPAP  [] Nasal Cannula [] Room Air  .   Lab Results   Component Value Date    PHART 7.310 03/18/2019    TRZ7DXF 51.7 03/18/2019    PO2ART 67.8 03/18/2019    QDP9CUR 25.4 03/18/2019    RPS3VYT 40 03/21/2019    [id number] 91.7 03/18/2019    FIO2 80.0 03/21/2019         DATA:  Complete Blood Count:   Recent Labs 03/19/19 0937 03/20/19 0457 03/21/19  0805   WBC 12.7* 14.1* 10.6   RBC 4.12* 3.69* 3.61*   HGB 12.7* 11.2* 11.1*   HCT 41.4 35.8* 34.9*   .5 97.0 96.7   MCH 30.8 30.4 30.7   MCHC 30.7 31.3 31.8   RDW 13.3 13.4 13.4    189 189   MPV 9.9 10.2 9.8        Last 3 Blood Glucose:   Recent Labs     03/18/19 2040 03/19/19  0530 03/20/19  0457 03/21/19  0805   GLUCOSE 373* 314* 162* 288*        PT/INR:    Lab Results   Component Value Date    PROTIME 12.0 03/15/2019    INR 1.2 03/15/2019     PTT:    Lab Results   Component Value Date    APTT 54.2 03/21/2019       Comprehensive Metabolic Profile:   Recent Labs     03/19/19 0530 03/20/19 0457 03/21/19  0805   * 140 140   K 5.3 4.4 4.5   CL 94* 98 96*   CO2 29 31 32*   BUN 31* 40* 45*   CREATININE 1.03 1.12 1.07   GLUCOSE 314* 162* 288*   CALCIUM 9.1 8.8 8.7   PROT 7.4  --   --    LABALBU 3.8  --   --    BILITOT 0.56  --   --    ALKPHOS 132*  --   --    AST 37  --   --    ALT 40  --   --       Magnesium:   Lab Results   Component Value Date    MG 2.1 03/21/2019    MG 2.1 03/20/2019     Phosphorus:   Lab Results   Component Value Date    PHOS 3.2 03/21/2019    PHOS 3.2 03/20/2019     Ionized Calcium:   Lab Results   Component Value Date    CAION 1.10 03/21/2019    CAION 1.14 03/20/2019        Urinalysis:   Lab Results   Component Value Date    NITRU NEGATIVE 03/18/2019    COLORU YELLOW 03/18/2019    PHUR 5.5 03/18/2019    WBCUA 0 TO 2 03/18/2019    RBCUA None 03/18/2019    MUCUS NOT REPORTED 03/18/2019    TRICHOMONAS NOT REPORTED 03/18/2019    YEAST NOT REPORTED 03/18/2019    BACTERIA NOT REPORTED 03/18/2019    SPECGRAV 1.010 03/18/2019    LEUKOCYTESUR NEGATIVE 03/18/2019    UROBILINOGEN Normal 03/18/2019    BILIRUBINUR NEGATIVE 03/18/2019    GLUCOSEU 2+ 03/18/2019    KETUA NEGATIVE 03/18/2019    AMORPHOUS NOT REPORTED 03/18/2019       HgBA1c:    Lab Results   Component Value Date    LABA1C 8.6 03/18/2019     TSH:  No results found for: TSH  Lactic Acid: Lab Results   Component Value Date    LACTA 2.2 03/18/2019    LACTA 2.6 03/18/2019      Troponin: No results for input(s): TROPONINI in the last 72 hours.     ASSESSMENT:     Patient Active Problem List    Diagnosis Date Noted    Community acquired pneumonia of left lower lobe of lung ([facility]     Influenza A with respiratory manifestations 03/18/2019    [facility] with [facility] 03/18/2019    Influenza A 03/18/2019    Acute hypercapnic respiratory failure ([facility] 03/18/2019    NSTEMI (non-ST elevated myocardial infarction) ([facility]     Type 2 diabetes mellitus without complication, without long-term current use of insulin ([facility] 09/06/2016    Adult BMI > 30 05/06/2016    CAD (coronary artery disease)     DM type [facility]di[facility] 2) [facility]     Atrial fibrillation with rapid ventricular response ([facility] 01/01/2007          PLAN:     WEAN PER PROTOCOL:  [] No   [x] Yes  [] N/A    ICU PROPHYLAXIS:  Stress ulcer:  [] PPI Agent  [x] [id number] [] Sucralfate  [] Other:  VTE:   [] Enoxaparin  [x] he is on heparin GTT  [] EPC Cuffs    NUTRITION:  [x] NPO [] Tube Feeding (Specify: ) [] TPN  [] PO    HOME MEDS RECONCILED: [x] No  [] Yes    CONSULTATION NEEDED:  [x] No  [] Yes    FAMILY UPDATED:    [] No  [x] Yes    TRANSFER OUT OF ICU:   [x] No  [] Yes        Additional Assessment:  Influenza A  Community acquired pneumonia with likely strep pneumoniae  Atrial fibrillation with RVRPersistent   Acute exacerbation of CHF sec due to atrial fibrillationPersistent   Acute  hypoxic hypercapnic respiratory failureDue to pulmonary edema, persistent without any significant change   Diabetes mellitusType II with hyperglycemia   Obstructive sleep apnea    Plan:  Keep on BiPAP vs HFNC, keep oxygen saturation 88-92%  Continue on ceftriaxone and azithromycin IV for now day 2 today   Continue Tamiflu for total 5 days  Continue  solu Medrol to 40 mg IV BID  Breathing treatment with DuoNeb  Repeat chest x-ray today  Continue Lasix 40 mg IV twice a day  Monitor urine output and total intake  Replace electrolytes as appropriate. Continue  Lantus 20 units subcutaneously with high-dose sliding scale  Monitor glucose and titrate up on Lantus    Continue on digoxin 125 µg daily as per cardiology  Lopressor 2.5 mg PRN q4 hr   heparin gtt for now  Plan for GUSTAVO cardioversion once breathing is better    Pepcid for GI Px  Keep nothing by mouth for now, as patient is continuously on Lloyd Mauro MD  PGY-2, Internal Medicine resident  [facility].    3/21/2019 1:09 PM

## 2019-03-21 NOTE — PROGRESS NOTES
Nutrition Assessment    Type and Reason for Visit: Initial    Nutrition Recommendations: Vital AF (semielemental) goal of 50 ml per hour will provide 1440 kcal, 90 g protein with current rate of diprivan. Nutrition Assessment: Pt is now vented. Diprivan at 11.9 ml per hour (314 kcal)    Malnutrition Assessment:  · Malnutrition Status: At risk for malnutrition  · Context: Acute illness or injury  · Findings of the 6 clinical characteristics of malnutrition (Minimum of 2 out of 6 clinical characteristics is required to make the diagnosis of moderate or severe Protein Calorie Malnutrition based on AND/ASPEN Guidelines):  1. Energy Intake-Less than or equal to 50% of estimated energy requirement, Greater than or equal to 5 days    2. Weight Loss-No significant weight loss,    3. Fat Loss-No significant subcutaneous fat loss,    4. Muscle Loss-No significant muscle mass loss,    5. Fluid Accumulation-Moderate to severe fluid accumulation, Extremities  6.  Strength-Not measured    Nutrition Risk Level:  Moderate    Nutrient Needs:  · Estimated Daily Total Kcal: 22-25 kcal/zi=7335-1600 kcal  · Estimated Daily Protein (g): 1.2-1.4 g/kg= g  Nutrition Diagnosis:   · Problem: Inadequate oral intake  · Etiology: related to Impaired respiratory function-inability to consume food     Signs and symptoms:  as evidenced by NPO status due to medical condition    Objective Information:  · Nutrition-Focused Physical Findings: obese  · Current Nutrition Therapies:  · Oral Diet Orders: NPO   · Anthropometric Measures:  · Ht: 5' 8\" (172.7 cm)   · Current Body Wt: 292 lb (132.5 kg)  · Ideal Body Wt: 154 lb (69.9 kg), % Ideal Body    · BMI Classification: BMI > or equal to 40.0 Obese Class III    Nutrition Interventions:   Start Tube Feeding  Education Not Indicated    Nutrition Evaluation:   · Evaluation: Goals set   · Goals: Meet more than 75% of nutrition needs    · Monitoring: Nutrition Progression      Electronically signed by Chema Benitez RD, LIZ on 3/21/19 at 2:37 PM    Contact Number: [phone]

## 2019-03-22 LAB
ABSOLUTE EOS #: <0.03 K/UL (ref 0–0.44)
ABSOLUTE IMMATURE GRANULOCYTE: 0.08 K/UL (ref 0–0.3)
ABSOLUTE LYMPH #: 0.96 K/UL (ref 1.1–3.7)
ABSOLUTE MONO #: 1.16 K/UL (ref 0.1–1.2)
ALLEN TEST: POSITIVE
ANION GAP SERPL CALCULATED.3IONS-SCNC: 20 MMOL/L (ref 9–17)
BASOPHILS # BLD: 0 % (ref 0–2)
BASOPHILS ABSOLUTE: <0.03 K/UL (ref 0–0.2)
BUN BLDV-MCNC: 46 MG/DL (ref 8–23)
BUN/CREAT BLD: ABNORMAL (ref 9–20)
CALCIUM IONIZED: 1.11 MMOL/L (ref 1.13–1.33)
CALCIUM SERPL-MCNC: 8.8 MG/DL (ref 8.6–10.4)
CHLORIDE BLD-SCNC: 99 MMOL/L (ref 98–107)
CO2: 27 MMOL/L (ref 20–31)
CREAT SERPL-MCNC: 1.01 MG/DL (ref 0.7–1.2)
DIFFERENTIAL TYPE: ABNORMAL
EOSINOPHILS RELATIVE PERCENT: 0 % (ref 1–4)
FIO2: 60
GFR AFRICAN AMERICAN: >60 ML/MIN
GFR NON-AFRICAN AMERICAN: >60 ML/MIN
GFR SERPL CREATININE-BSD FRML MDRD: ABNORMAL ML/MIN/{1.73_M2}
GFR SERPL CREATININE-BSD FRML MDRD: ABNORMAL ML/MIN/{1.73_M2}
GLUCOSE BLD-MCNC: 133 MG/DL (ref 75–110)
GLUCOSE BLD-MCNC: 142 MG/DL (ref 75–110)
GLUCOSE BLD-MCNC: 145 MG/DL (ref 75–110)
GLUCOSE BLD-MCNC: 148 MG/DL (ref 70–99)
GLUCOSE BLD-MCNC: 151 MG/DL (ref 75–110)
GLUCOSE BLD-MCNC: 154 MG/DL (ref 75–110)
GLUCOSE BLD-MCNC: 155 MG/DL (ref 75–110)
GLUCOSE BLD-MCNC: 166 MG/DL (ref 75–110)
GLUCOSE BLD-MCNC: 167 MG/DL (ref 75–110)
GLUCOSE BLD-MCNC: 169 MG/DL (ref 75–110)
GLUCOSE BLD-MCNC: 175 MG/DL (ref 75–110)
GLUCOSE BLD-MCNC: 185 MG/DL (ref 75–110)
GLUCOSE BLD-MCNC: 191 MG/DL (ref 75–110)
GLUCOSE BLD-MCNC: 191 MG/DL (ref 75–110)
HCT VFR BLD CALC: 35.7 % (ref 40.7–50.3)
HEMOGLOBIN: 11 G/DL (ref 13–17)
IMMATURE GRANULOCYTES: 1 %
LYMPHOCYTES # BLD: 9 % (ref 24–43)
MAGNESIUM: 2.3 MG/DL (ref 1.6–2.6)
MCH RBC QN AUTO: 30.1 PG (ref 25.2–33.5)
MCHC RBC AUTO-ENTMCNC: 30.8 G/DL (ref 28.4–34.8)
MCV RBC AUTO: 97.8 FL (ref 82.6–102.9)
MODE: ABNORMAL
MONOCYTES # BLD: 10 % (ref 3–12)
MYCOPLASMA PNEUMONIAE IGM: 0.12
NEGATIVE BASE EXCESS, ART: ABNORMAL (ref 0–2)
NRBC AUTOMATED: 0 PER 100 WBC
O2 DEVICE/FLOW/%: ABNORMAL
PARTIAL THROMBOPLASTIN TIME: 62.7 SEC (ref 20.5–30.5)
PARTIAL THROMBOPLASTIN TIME: 66.6 SEC (ref 20.5–30.5)
PATIENT TEMP: ABNORMAL
PDW BLD-RTO: 13.3 % (ref 11.8–14.4)
PHOSPHORUS: 3.2 MG/DL (ref 2.5–4.5)
PLATELET # BLD: 190 K/UL (ref 138–453)
PLATELET ESTIMATE: ABNORMAL
PMV BLD AUTO: 9.8 FL (ref 8.1–13.5)
POC HCO3: 37.7 MMOL/L (ref 21–28)
POC O2 SATURATION: 94 % (ref 94–98)
POC PCO2 TEMP: ABNORMAL MM HG
POC PCO2: 46 MM HG (ref 35–48)
POC PH TEMP: ABNORMAL
POC PH: 7.52 (ref 7.35–7.45)
POC PO2 TEMP: ABNORMAL MM HG
POC PO2: 64.6 MM HG (ref 83–108)
POSITIVE BASE EXCESS, ART: 13 (ref 0–3)
POTASSIUM SERPL-SCNC: 4.1 MMOL/L (ref 3.7–5.3)
PROCALCITONIN: 0.07 NG/ML
RBC # BLD: 3.65 M/UL (ref 4.21–5.77)
RBC # BLD: ABNORMAL 10*6/UL
SAMPLE SITE: ABNORMAL
SEG NEUTROPHILS: 80 % (ref 36–65)
SEGMENTED NEUTROPHILS ABSOLUTE COUNT: 9.09 K/UL (ref 1.5–8.1)
SODIUM BLD-SCNC: 146 MMOL/L (ref 135–144)
TCO2 (CALC), ART: 39 MMOL/L (ref 22–29)
WBC # BLD: 11.3 K/UL (ref 3.5–11.3)
WBC # BLD: ABNORMAL 10*3/UL

## 2019-03-22 PROCEDURE — 84100 ASSAY OF PHOSPHORUS: CPT

## 2019-03-22 PROCEDURE — 94762 N-INVAS EAR/PLS OXIMTRY CONT: CPT

## 2019-03-22 PROCEDURE — 2580000003 HC RX 258: Performed by: STUDENT IN AN ORGANIZED HEALTH CARE EDUCATION/TRAINING PROGRAM

## 2019-03-22 PROCEDURE — 85025 COMPLETE CBC W/AUTO DIFF WBC: CPT

## 2019-03-22 PROCEDURE — 94640 AIRWAY INHALATION TREATMENT: CPT

## 2019-03-22 PROCEDURE — 83735 ASSAY OF MAGNESIUM: CPT

## 2019-03-22 PROCEDURE — 2700000000 HC OXYGEN THERAPY PER DAY

## 2019-03-22 PROCEDURE — 36600 WITHDRAWAL OF ARTERIAL BLOOD: CPT

## 2019-03-22 PROCEDURE — 6370000000 HC RX 637 (ALT 250 FOR IP): Performed by: STUDENT IN AN ORGANIZED HEALTH CARE EDUCATION/TRAINING PROGRAM

## 2019-03-22 PROCEDURE — 80048 BASIC METABOLIC PNL TOTAL CA: CPT

## 2019-03-22 PROCEDURE — 51702 INSERT TEMP BLADDER CATH: CPT

## 2019-03-22 PROCEDURE — 87641 MR-STAPH DNA AMP PROBE: CPT

## 2019-03-22 PROCEDURE — 85730 THROMBOPLASTIN TIME PARTIAL: CPT

## 2019-03-22 PROCEDURE — 84145 PROCALCITONIN (PCT): CPT

## 2019-03-22 PROCEDURE — 94003 VENT MGMT INPAT SUBQ DAY: CPT

## 2019-03-22 PROCEDURE — 6360000002 HC RX W HCPCS: Performed by: STUDENT IN AN ORGANIZED HEALTH CARE EDUCATION/TRAINING PROGRAM

## 2019-03-22 PROCEDURE — 6370000000 HC RX 637 (ALT 250 FOR IP): Performed by: INTERNAL MEDICINE

## 2019-03-22 PROCEDURE — 6360000002 HC RX W HCPCS: Performed by: INTERNAL MEDICINE

## 2019-03-22 PROCEDURE — 2500000003 HC RX 250 WO HCPCS: Performed by: STUDENT IN AN ORGANIZED HEALTH CARE EDUCATION/TRAINING PROGRAM

## 2019-03-22 PROCEDURE — 36415 COLL VENOUS BLD VENIPUNCTURE: CPT

## 2019-03-22 PROCEDURE — 94681 O2 UPTK CO2 OUTP % O2 XTRC: CPT

## 2019-03-22 PROCEDURE — 6360000002 HC RX W HCPCS: Performed by: EMERGENCY MEDICINE

## 2019-03-22 PROCEDURE — 87070 CULTURE OTHR SPECIMN AEROBIC: CPT

## 2019-03-22 PROCEDURE — 82803 BLOOD GASES ANY COMBINATION: CPT

## 2019-03-22 PROCEDURE — 2580000003 HC RX 258: Performed by: EMERGENCY MEDICINE

## 2019-03-22 PROCEDURE — 82947 ASSAY GLUCOSE BLOOD QUANT: CPT

## 2019-03-22 PROCEDURE — 87205 SMEAR GRAM STAIN: CPT

## 2019-03-22 PROCEDURE — 2500000003 HC RX 250 WO HCPCS: Performed by: INTERNAL MEDICINE

## 2019-03-22 PROCEDURE — 2000000000 HC ICU R&B

## 2019-03-22 PROCEDURE — 82330 ASSAY OF CALCIUM: CPT

## 2019-03-22 PROCEDURE — 99291 CRITICAL CARE FIRST HOUR: CPT | Performed by: INTERNAL MEDICINE

## 2019-03-22 RX ORDER — FUROSEMIDE 10 MG/ML
60 INJECTION INTRAMUSCULAR; INTRAVENOUS 2 TIMES DAILY
Status: DISCONTINUED | OUTPATIENT
Start: 2019-03-22 | End: 2019-03-24

## 2019-03-22 RX ADMIN — DEXTROSE MONOHYDRATE 500 MG: 50 INJECTION, SOLUTION INTRAVENOUS at 12:16

## 2019-03-22 RX ADMIN — FAMOTIDINE 20 MG: 10 INJECTION, SOLUTION INTRAVENOUS at 09:22

## 2019-03-22 RX ADMIN — IPRATROPIUM BROMIDE AND ALBUTEROL SULFATE 1 AMPULE: .5; 3 SOLUTION RESPIRATORY (INHALATION) at 20:21

## 2019-03-22 RX ADMIN — IPRATROPIUM BROMIDE AND ALBUTEROL SULFATE 1 AMPULE: .5; 3 SOLUTION RESPIRATORY (INHALATION) at 11:40

## 2019-03-22 RX ADMIN — METHYLPREDNISOLONE SODIUM SUCCINATE 40 MG: 40 INJECTION, POWDER, FOR SOLUTION INTRAMUSCULAR; INTRAVENOUS at 03:50

## 2019-03-22 RX ADMIN — Medication 75 MG: at 12:17

## 2019-03-22 RX ADMIN — Medication 75 MCG/HR: at 12:24

## 2019-03-22 RX ADMIN — METOPROLOL TARTRATE 2.5 MG: 5 INJECTION, SOLUTION INTRAVENOUS at 13:29

## 2019-03-22 RX ADMIN — IPRATROPIUM BROMIDE AND ALBUTEROL SULFATE 1 AMPULE: .5; 3 SOLUTION RESPIRATORY (INHALATION) at 16:47

## 2019-03-22 RX ADMIN — Medication 10 ML: at 12:35

## 2019-03-22 RX ADMIN — HEPARIN SODIUM AND DEXTROSE 14.59 UNITS/KG/HR: 10000; 5 INJECTION INTRAVENOUS at 15:28

## 2019-03-22 RX ADMIN — PROPOFOL 20 MCG/KG/MIN: 10 INJECTION, EMULSION INTRAVENOUS at 03:49

## 2019-03-22 RX ADMIN — METOPROLOL TARTRATE 2.5 MG: 5 INJECTION, SOLUTION INTRAVENOUS at 20:10

## 2019-03-22 RX ADMIN — CEFTRIAXONE SODIUM 1 G: 1 INJECTION, POWDER, FOR SOLUTION INTRAMUSCULAR; INTRAVENOUS at 09:22

## 2019-03-22 RX ADMIN — FUROSEMIDE 40 MG: 10 INJECTION, SOLUTION INTRAMUSCULAR; INTRAVENOUS at 09:22

## 2019-03-22 RX ADMIN — CALCIUM GLUCONATE 1 G: 98 INJECTION, SOLUTION INTRAVENOUS at 06:29

## 2019-03-22 RX ADMIN — IPRATROPIUM BROMIDE AND ALBUTEROL SULFATE 1 AMPULE: .5; 3 SOLUTION RESPIRATORY (INHALATION) at 08:09

## 2019-03-22 RX ADMIN — FAMOTIDINE 20 MG: 10 INJECTION, SOLUTION INTRAVENOUS at 20:14

## 2019-03-22 RX ADMIN — Medication 10 ML: at 20:01

## 2019-03-22 RX ADMIN — Medication 75 MG: at 20:59

## 2019-03-22 RX ADMIN — DIGOXIN 250 MCG: 0.25 INJECTION INTRAMUSCULAR; INTRAVENOUS at 09:22

## 2019-03-22 RX ADMIN — PROPOFOL 20 MCG/KG/MIN: 10 INJECTION, EMULSION INTRAVENOUS at 15:07

## 2019-03-22 RX ADMIN — PROPOFOL 15 MCG/KG/MIN: 10 INJECTION, EMULSION INTRAVENOUS at 21:04

## 2019-03-22 RX ADMIN — MAGNESIUM HYDROXIDE 30 ML: 400 SUSPENSION ORAL at 09:23

## 2019-03-22 RX ADMIN — HEPARIN SODIUM AND DEXTROSE 14.59 UNITS/KG/HR: 10000; 5 INJECTION INTRAVENOUS at 01:46

## 2019-03-22 RX ADMIN — METHYLPREDNISOLONE SODIUM SUCCINATE 40 MG: 40 INJECTION, POWDER, FOR SOLUTION INTRAMUSCULAR; INTRAVENOUS at 14:12

## 2019-03-22 RX ADMIN — FUROSEMIDE 60 MG: 10 INJECTION, SOLUTION INTRAMUSCULAR; INTRAVENOUS at 18:12

## 2019-03-22 ASSESSMENT — PULMONARY FUNCTION TESTS
PIF_VALUE: 26
PIF_VALUE: 30
PIF_VALUE: 25
PIF_VALUE: 26
PIF_VALUE: 30
PIF_VALUE: 25
PIF_VALUE: 22
PIF_VALUE: 25
PIF_VALUE: 18
PIF_VALUE: 29
PIF_VALUE: 27
PIF_VALUE: 24
PIF_VALUE: 29
PIF_VALUE: 25
PIF_VALUE: 27
PIF_VALUE: 20
PIF_VALUE: 26
PIF_VALUE: 24

## 2019-03-22 NOTE — PROGRESS NOTES
-- -- 69 18 95 % --   03/22/19 0600 119/68 -- -- 69 18 95 % --   03/22/19 0500 113/66 -- -- 70 18 95 % --   03/22/19 0400 111/63 98.4 °F (36.9 °C) Oral 80 19 94 % 291 lb 0.1 oz (132 kg)   03/22/19 0306 -- -- -- 70 20 94 % --   03/22/19 0300 110/72 -- -- 70 20 94 % --   03/22/19 0200 112/62 -- -- 70 20 93 % --         Intake/Output Summary (Last 24 hours) at 3/22/2019 0956  Last data filed at 3/22/2019 0600  Gross per 24 hour   Intake 1420 ml   Output 1950 ml   Net -530 ml     Date 03/22/19 0000 - 03/22/19 2359   Shift 0438-0466 2615-8208 8744-9693 24 Hour Total   INTAKE   I.V.(mL/kg) 405(3.1)   405(3.1)   Shift Total(mL/kg) 405(3.1)   405(3.1)   OUTPUT   Urine(mL/kg/hr) 1400(1.3)   1400   Emesis/NG output(mL/kg) 0(0)   0(0)   Shift Total(mL/kg) 1400(10.6)   1400(10.6)   Weight (kg) 132 132 132 132     Wt Readings from Last 3 Encounters:   03/22/19 291 lb 0.1 oz (132 kg)   03/19/19 294 lb 11.2 oz (133.7 kg)   03/18/19 300 lb (136.1 kg)     Body mass index is 44.25 kg/m².         PHYSICAL EXAM:  · General appearance: intubated sedated   · HEENT: Atraumatic, normocephalic, neck supple, normal EOM, thyroid normal, no lymphadenopathy   · Lungs: Scattered mild to moderate wheezing bilateral lung fieldsAnd has fine crackles posteriorly infrascapular bilateral   · Heart: Regular heart rate, tachycardic, S1, S2 normal, no murmur   · Abdomen: soft, non-tender; bowel sounds normal; no masses,  no organomegaly  · Extremities: No cyanosis or edema  · Neurological:  Intubated sedated , opens eyes on pain stimuli, moves all extremities    MEDICATIONS:  Scheduled Meds:   furosemide  40 mg Intravenous BID    azithromycin  500 mg Intravenous Q24H    digoxin  250 mcg Intravenous Daily    oseltamivir 6mg/ml  75 mg Oral BID    methylPREDNISolone  40 mg Intravenous Q12H    sodium chloride flush  10 mL Intravenous 2 times per day    famotidine (PEPCID) injection  20 mg Intravenous BID    cefTRIAXone (ROCEPHIN) IV  1 g Intravenous Q24H    ipratropium-albuterol  1 ampule Inhalation Q4H WA     Continuous Infusions:   insulin (HUMAN R) non-weight based infusion 3.28 Units/hr (03/22/19 0640)    dexmedetomidine (PRECEDEX) IV infusion Stopped (03/21/19 1157)    fentaNYL 75 mcg/hr (03/22/19 0900)    propofol 20 mcg/kg/min (03/22/19 0932)    heparin (porcine) 14.59 Units/kg/hr (03/22/19 0146)    dextrose      diltiazem (CARDIZEM) 125 mg in dextrose 5% 125 mL infusion Stopped (03/19/19 2347)     PRN Meds:     naloxone 0.4 mg PRN   LORazepam 0.5 mg Q8H PRN   sodium chloride flush 10 mL PRN   magnesium hydroxide 30 mL Daily PRN   ondansetron 4 mg Q6H PRN   heparin (porcine) 10,000 Units PRN   heparin (porcine) 5,000 Units PRN   glucose 15 g PRN   dextrose 12.5 g PRN   glucagon (rDNA) 1 mg PRN   dextrose 100 mL/hr PRN   albuterol 2.5 mg As Directed RT PRN   metoprolol 2.5 mg Q4H PRN       SUPPORT DEVICES: [] Ventilator [x] BIPAP  [] Nasal Cannula [] Room Air  .   Lab Results   Component Value Date    PHART 7.310 03/18/2019    VZV9ERZ 51.7 03/18/2019    PO2ART 67.8 03/18/2019    AWZ3NWU 25.4 03/18/2019    JEE4ZVJ 39 03/22/2019    I3QAYLFW 91.7 03/18/2019    FIO2 60.0 03/22/2019         DATA:  Complete Blood Count:   Recent Labs     03/20/19  0457 03/21/19  0805 03/22/19  0459   WBC 14.1* 10.6 11.3   RBC 3.69* 3.61* 3.65*   HGB 11.2* 11.1* 11.0*   HCT 35.8* 34.9* 35.7*   MCV 97.0 96.7 97.8   MCH 30.4 30.7 30.1   MCHC 31.3 31.8 30.8   RDW 13.4 13.4 13.3    189 190   MPV 10.2 9.8 9.8        Last 3 Blood Glucose:   Recent Labs     03/20/19 0457 03/21/19  0805   GLUCOSE 162* 288*        PT/INR:    Lab Results   Component Value Date    PROTIME 12.0 03/15/2019    INR 1.2 03/15/2019     PTT:    Lab Results   Component Value Date    APTT 62.7 03/22/2019       Comprehensive Metabolic Profile:   Recent Labs     03/20/19  0457 03/21/19  0805    140   K 4.4 4.5   CL 98 96*   CO2 31 32*   BUN 40* 45*   CREATININE 1.12 1.07   GLUCOSE 162* 288* CALCIUM 8.8 8.7      Magnesium:   Lab Results   Component Value Date    MG 2.3 03/22/2019    MG 2.1 03/21/2019    MG 2.1 03/20/2019     Phosphorus:   Lab Results   Component Value Date    PHOS 3.2 03/22/2019    PHOS 3.2 03/21/2019    PHOS 3.2 03/20/2019     Ionized Calcium:   Lab Results   Component Value Date    CAION 1.11 03/22/2019    CAION 1.10 03/21/2019    CAION 1.14 03/20/2019        Urinalysis:   Lab Results   Component Value Date    NITRU NEGATIVE 03/18/2019    COLORU YELLOW 03/18/2019    PHUR 5.5 03/18/2019    WBCUA 0 TO 2 03/18/2019    RBCUA None 03/18/2019    MUCUS NOT REPORTED 03/18/2019    TRICHOMONAS NOT REPORTED 03/18/2019    YEAST NOT REPORTED 03/18/2019    BACTERIA NOT REPORTED 03/18/2019    SPECGRAV 1.010 03/18/2019    LEUKOCYTESUR NEGATIVE 03/18/2019    UROBILINOGEN Normal 03/18/2019    BILIRUBINUR NEGATIVE 03/18/2019    GLUCOSEU 2+ 03/18/2019    KETUA NEGATIVE 03/18/2019    AMORPHOUS NOT REPORTED 03/18/2019       HgBA1c:    Lab Results   Component Value Date    LABA1C 8.6 03/18/2019     TSH:  No results found for: TSH  Lactic Acid:   Lab Results   Component Value Date    LACTA 2.2 03/18/2019    LACTA 2.6 03/18/2019      Troponin: No results for input(s): TROPONINI in the last 72 hours.     ASSESSMENT:     Patient Active Problem List    Diagnosis Date Noted    [facility]     Influenza A with respiratory manifestations 03/18/2019    Acute [facility] failure with hypoxia [facility] 03/18/2019    Influenza A 03/18/2019    Acute hypercapnic respiratory failure ([facility] 03/18/2019    NSTEMI (non-ST elevated myocardial infarction) ([facility]     Type 2 diabetes mellitus without complication, without long-term current use of insulin ([facility] 09/06/2016    Adult BMI > 30 05/06/2016    CAD (coronary artery disease)     DM type 2 (diabetes mellitus, type 2) ([facility]     Atrial fibrillation with rapid ventricular response ([address] 01/01/2007          PLAN:     Wade Gilliam PER PROTOCOL:  [] No   [x] Yes  [] N/A    ICU PROPHYLAXIS:  Stress ulcer:  [] PPI Agent  [x] [id number] [] Sucralfate  [] Other:  VTE:   [] Enoxaparin  [x] he is on heparin GTT  [] EPC Cuffs    NUTRITION:  [x] NPO [] Tube Feeding (Specify: ) [] TPN  [] PO    HOME MEDS RECONCILED: [x] No  [] Yes    CONSULTATION NEEDED:  [x] No  [] Yes    FAMILY UPDATED:    [] No  [x] Yes    TRANSFER OUT OF ICU:   [x] No  [] Yes        Additional Assessment:  Influenza A  Community acquired pneumonia with likely strep pneumoniae  Atrial fibrillation with RVRPersistent   Acute exacerbation of CHF sec due to atrial fibrillationPersistent   Acute  hypoxic hypercapnic respiratory failureDue to pulmonary edema, persistent without any significant change   Diabetes mellitusType II with hyperglycemia   Obstructive sleep apnea    Plan:  Continue mechanical ventilation, keep SPO2 more than 92%  Vent  adjustment according to ABGs and repeat after 2 hours  Continue on ceftriaxone and azithromycin IV for now day 3 today  Sputum culture   Continue Tamiflu for total 5 days  Continue  solu Medrol to 40 mg IV BID  Breathing treatment with DuoNeb  Continue Lasix 40 mg IV twice a day  Monitor urine output and total intake  Replace electrolytes as appropriate. Continue  Lantus 20 units subcutaneously with high-dose sliding scale  Monitor glucose and titrate up on Lantus    Continue on digoxin 250 µg daily as per cardiology  Lopressor 2.5 mg PRN q4 hr  heparin gtt for now  Plan for GUSTAVO cardioversion once breathing is better    Pepcid for GI Px  Keep nothing by mouth for now      51 Ash Burgos MD  PGY-2, Internal Medicine resident  [facility].    3/22/2019 9:56 AM

## 2019-03-22 NOTE — PLAN OF CARE
Problem: Risk for Impaired Skin Integrity  Goal: Tissue integrity - skin and mucous membranes  Description  Structural intactness and normal physiological function of skin and  mucous membranes.   Outcome: Ongoing     Problem: Confusion - Acute:  Goal: Absence of continued neurological deterioration signs and symptoms  Description  Absence of continued neurological deterioration signs and symptoms  3/22/2019 1011 by Sidney Hathaway RN  Outcome: Ongoing  3/21/2019 2037 by Jaya Yanez RN  Outcome: Ongoing  Goal: Mental status will be restored to baseline  Description  Mental status will be restored to baseline  Outcome: Ongoing     Problem: Discharge Planning:  Goal: Ability to perform activities of daily living will improve  Description  Ability to perform activities of daily living will improve  Outcome: Ongoing  Goal: Participates in care planning  Description  Participates in care planning  Outcome: Ongoing     Problem: Injury - Risk of, Physical Injury:  Goal: Absence of physical injury  Description  Absence of physical injury  3/22/2019 1011 by Sidney Hathaway RN  Outcome: Ongoing  3/21/2019 2037 by Jaya Yanez RN  Outcome: Ongoing  Goal: Will remain free from falls  Description  Will remain free from falls  Outcome: Ongoing     Problem: Mood - Altered:  Goal: Mood stable  Description  Mood stable  3/22/2019 1011 by Sidney Hathaway RN  Outcome: Ongoing  3/21/2019 2037 by Jaya Yanez RN  Outcome: Ongoing  Goal: Absence of abusive behavior  Description  Absence of abusive behavior  Outcome: Ongoing  Goal: Verbalizations of feeling emotionally comfortable while being cared for will increase  Description  Verbalizations of feeling emotionally comfortable while being cared for will increase  Outcome: Ongoing     Problem: Psychomotor Activity - Altered:  Goal: Absence of psychomotor disturbance signs and symptoms  Description  Absence of psychomotor disturbance signs and symptoms  Outcome: Ongoing Problem: Sensory Perception - Impaired:  Goal: Demonstrations of improved sensory functioning will increase  Description  Demonstrations of improved sensory functioning will increase  Outcome: Ongoing  Goal: Decrease in sensory misperception frequency  Description  Decrease in sensory misperception frequency  Outcome: Ongoing  Goal: Able to refrain from responding to false sensory perceptions  Description  Able to refrain from responding to false sensory perceptions  Outcome: Ongoing  Goal: Demonstrates accurate environmental perceptions  Description  Demonstrates accurate environmental perceptions  Outcome: Ongoing  Goal: Able to distinguish between reality-based and nonreality-based thinking  Description  Able to distinguish between reality-based and nonreality-based thinking  Outcome: Ongoing  Goal: Able to interrupt nonreality-based thinking  Description  Able to interrupt nonreality-based thinking  Outcome: Ongoing     Problem: Sleep Pattern Disturbance:  Goal: Appears well-rested  Description  Appears well-rested  Outcome: Ongoing     Problem: Restraint Use - Nonviolent/Non-Self-Destructive Behavior:  Goal: Absence of restraint-related injury  Description  Absence of restraint-related injury  3/22/2019 1011 by Arthur Benson RN  Outcome: Ongoing  3/21/2019 2037 by Liv Sutherland RN  Outcome: Met This Shift     Problem: Nutrition  Goal: Optimal nutrition therapy  Outcome: Ongoing     Problem: Risk for Impaired Skin Integrity  Goal: Tissue integrity - skin and mucous membranes  Description  Structural intactness and normal physiological function of skin and  mucous membranes.   Outcome: Ongoing     Problem: Restraint Use - Nonviolent/Non-Self-Destructive Behavior:  Goal: Absence of restraint indications  Description  Absence of restraint indications  3/22/2019 1011 by Arthur Benson RN  Outcome: Not Met This Shift  3/21/2019 2037 by Liv Sutherland RN  Outcome: Not Met This Shift

## 2019-03-22 NOTE — PROGRESS NOTES
[facility]   Progress Note                   Date:   3/22/2019  Patient name: Travis Roberson  Date of admission:  3/19/2019  7:58 AM  MRN:   [id number]  YOB: 1945  PCP: Cele Link MD    Reason for Admission: [facility] [J96.02]    Subjective:   Patient intubated due to respiratory distress and non-compliance with bipap. He flips in and out of Aflutter. Currently in NSR and on Heparin infusion. Medications:   Scheduled Meds:   furosemide  40 mg Intravenous BID    azithromycin  500 mg Intravenous Q24H    digoxin  250 mcg Intravenous Daily    oseltamivir 6mg/ml  75 mg Oral BID    methylPREDNISolone  40 mg Intravenous Q12H    sodium chloride flush  10 mL Intravenous 2 times per day    famotidine (PEPCID) injection  20 mg Intravenous BID    cefTRIAXone (ROCEPHIN) IV  1 g Intravenous Q24H    ipratropium-albuterol  1 ampule Inhalation Q4H WA     Continuous Infusions:   insulin (HUMAN R) non-weight based infusion 3.28 Units/hr (03/22/19 0640)    dexmedetomidine (PRECEDEX) IV infusion Stopped (03/21/19 1157)    fentaNYL 75 mcg/hr (03/22/19 0900)    propofol 20 mcg/kg/min (03/22/19 0932)    heparin (porcine) 14.59 Units/kg/hr (03/22/19 0146)    dextrose      diltiazem (CARDIZEM) 125 mg in dextrose 5% 125 mL infusion Stopped (03/19/19 2347)     CBC:   Recent Labs     03/20/19  0457 03/21/19  0805 03/22/19  0459   WBC 14.1* 10.6 11.3   HGB 11.2* 11.1* 11.0*    189 190     BMP:    Recent Labs     03/20/19  0457 03/21/19  0805    140   K 4.4 4.5   CL 98 96*   CO2 31 32*   BUN 40* 45*   CREATININE 1.12 1.07   GLUCOSE 162* 288*     Hepatic:   No results for input(s): AST, ALT, ALB, BILITOT, ALKPHOS in the last 72 hours. Troponin: No results for input(s): TROPONINI in the last 72 hours. BNP: No results for input(s): BNP in the last 72 hours. Lipids:   No results for input(s): CHOL, HDL in the last 72 hours.     Invalid input(s): LDLCALCU  INR: No results for input(s): INR in the last 72 hours. Objective:   Vitals: /68   Pulse 117   Temp 98.4 °F (36.9 °C) (Oral)   Resp 18   Ht 5' 8\" (1.727 m)   Wt 291 lb 0.1 oz (132 kg)   SpO2 97%   BMI 44.25 kg/m²   General appearance: intubated and sedated  HEENT: Head: Normocephalic, no lesions, without obvious abnormality. Neck: no JVD  Lungs: Coarse sounds  Heart: tachy s1+s2, no murmurs  Abdomen: soft, non-tender  Extremities: no edema  Neurologic: intubated and sedated    Assessment / Acute Cardiac Problems:   1. Influenza A  2. PNA  3. Acute respiratory failure, intubated on 3/21/19  4. Atrial flutter with RVR  5. Mild cardiomyopathy  6. Acute systolic CHF  7. Type II MI    Patient Active Problem List:     CAD (coronary artery disease)     DM type 2 (diabetes mellitus, type 2) ([facility])     Adult BMI > 30     Type 2 diabetes mellitus without complication, without long-term current use of insulin ([facility])     Influenza A with respiratory manifestations     Acute respiratory failure with hypoxia ([facility])     Influenza A     Atrial fibrillation with rapid ventricular response ([facility])     Acute hypercapnic respiratory failure ([facility])     NSTEMI (non-ST elevated myocardial infarction) ([facility]     Community acquired pneumonia of left lower lobe of lung ([facility]      Plan of Treatment:   1. Now intubated and sedated due to respiratory distress  2. Continue IV heparin  3. Continue digoxin 0.25mg IV qday, IV PRN lopressor 2.5mg q6H  4. CT chest with pulmonary edema, on IV lasix with inadequate output. Ok to increase dose. Will discuss with rounding attending Dr. Shira Paredes for final recommendations. Kelly Hoffman MD  Cardiology Fellow    I performed a history and physical examination of the patient and discussed management with the resident. I reviewed the residents note and agree with the documented findings and plan of care. Any areas of disagreement are noted on the chart.  I was personally present for the key portions of any procedures. I have documented in the chart those procedures where I was not present during the key portions. I have personally evaluated this patient and have completed at least one if not all key elements of the E/M (history, physical exam, and MDM). Additional findings are as noted.     Karol Wilkinson MD

## 2019-03-22 NOTE — PLAN OF CARE
Problem: Restraint Use - Nonviolent/Non-Self-Destructive Behavior:  Goal: Absence of restraint-related injury  Description  Absence of restraint-related injury  3/21/2019 2037 by Lynne Richards RN  Outcome: Met This Shift  3/21/2019 2007 by Teresa Spicer RN  Outcome: Ongoing     Problem: Risk for Impaired Skin Integrity  Goal: Tissue integrity - skin and mucous membranes  Description  Structural intactness and normal physiological function of skin and  mucous membranes.   3/21/2019 2007 by Teresa Spicer RN  Outcome: Ongoing     Problem: Confusion - Acute:  Goal: Absence of continued neurological deterioration signs and symptoms  Description  Absence of continued neurological deterioration signs and symptoms  3/21/2019 2037 by Lynne Richards RN  Outcome: Ongoing  3/21/2019 2007 by Teresa Spicer RN  Outcome: Ongoing  Goal: Mental status will be restored to baseline  Description  Mental status will be restored to baseline  3/21/2019 2007 by Teresa Spicer RN  Outcome: Ongoing     Problem: Discharge Planning:  Goal: Ability to perform activities of daily living will improve  Description  Ability to perform activities of daily living will improve  3/21/2019 2007 by Teresa Spicer RN  Outcome: Ongoing  Goal: Participates in care planning  Description  Participates in care planning  3/21/2019 2007 by Teresa Spicer RN  Outcome: Ongoing     Problem: Injury - Risk of, Physical Injury:  Goal: Absence of physical injury  Description  Absence of physical injury  3/21/2019 2037 by Lynne Richards RN  Outcome: Ongoing  3/21/2019 2007 by Teresa Spicer RN  Outcome: Ongoing  Goal: Will remain free from falls  Description  Will remain free from falls  3/21/2019 2007 by Teresa Spicer RN  Outcome: Ongoing     Problem: Mood - Altered:  Goal: Mood stable  Description  Mood stable  3/21/2019 2037 by Lynne Richards RN  Outcome: Ongoing  3/21/2019 2007 by Teresa Spicer RN  Outcome: Ongoing  Goal: Absence of abusive behavior  Description  Absence of abusive behavior  3/21/2019 2007 by Andrea Crespo RN  Outcome: Ongoing  Goal: Verbalizations of feeling emotionally comfortable while being cared for will increase  Description  Verbalizations of feeling emotionally comfortable while being cared for will increase  3/21/2019 2007 by Andrea Crespo RN  Outcome: Ongoing     Problem: Psychomotor Activity - Altered:  Goal: Absence of psychomotor disturbance signs and symptoms  Description  Absence of psychomotor disturbance signs and symptoms  3/21/2019 2007 by Andrea Crespo RN  Outcome: Ongoing     Problem: Sensory Perception - Impaired:  Goal: Demonstrations of improved sensory functioning will increase  Description  Demonstrations of improved sensory functioning will increase  3/21/2019 2007 by Andrea Crespo RN  Outcome: Ongoing  Goal: Decrease in sensory misperception frequency  Description  Decrease in sensory misperception frequency  3/21/2019 2007 by Andrea Crespo RN  Outcome: Ongoing  Goal: Able to refrain from responding to false sensory perceptions  Description  Able to refrain from responding to false sensory perceptions  3/21/2019 2007 by Andrea Crespo RN  Outcome: Ongoing  Goal: Demonstrates accurate environmental perceptions  Description  Demonstrates accurate environmental perceptions  3/21/2019 2007 by Andrea Crespo RN  Outcome: Ongoing  Goal: Able to distinguish between reality-based and nonreality-based thinking  Description  Able to distinguish between reality-based and nonreality-based thinking  3/21/2019 2007 by Andrea Crespo RN  Outcome: Ongoing  Goal: Able to interrupt nonreality-based thinking  Description  Able to interrupt nonreality-based thinking  3/21/2019 2007 by Andrea Crespo RN  Outcome: Ongoing     Problem: Sleep Pattern Disturbance:  Goal: Appears well-rested  Description  Appears well-rested  3/21/2019 2007 by Andrea Crespo RN  Outcome: Ongoing     Problem: Nutrition  Goal: Optimal nutrition therapy  3/21/2019 2007 by Soraya Rainey, RN  Outcome: Ongoing  3/21/2019 1442 by Aida Habermann, RD, LD  Note:   Nutrition Problem: Inadequate oral intake  Intervention: Food and/or Nutrient Delivery: Start Tube Feeding  Nutritional Goals: Meet more than 75% of nutrition needs      Problem: Restraint Use - Nonviolent/Non-Self-Destructive Behavior:  Goal: Absence of restraint indications  Description  Absence of restraint indications  3/21/2019 2037 by Garcia Chamberlain RN  Outcome: Not Met This Shift  3/21/2019 2007 by Soraya Rainey RN  Outcome: Ongoing

## 2019-03-22 NOTE — PROGRESS NOTES
Nutrition Assessment (Enteral Nutrition)    Type and Reason for Visit: Reassess, Consult(TF ordering/management )    Nutrition Recommendations: Start Tube Feeding-Suggest Vital 1.2 goal 50 ml/hr while on propofol at current rate (*this formula is also low in carb). Will monitor TF tolerance/adequacy- modify TF as needed. Nutrition Assessment: Pt remains on vent. TF to start today- consulted for management. Labs/meds reviewed. Nutrition Risk Level: High    Nutrition Needs:  · Estimated Daily Total Kcal: 9671-8692 kcal  · Estimated Daily Protein (g):  g    Nutrition Diagnosis:   · Problem: Inadequate oral intake  · Etiology: related to Impaired respiratory function-inability to consume food     Signs and symptoms:  as evidenced by NPO status due to medical condition    Objective Information:  · Current Nutrition Therapies:  · Oral Diet Orders: NPO   · Tube Feeding (TF) Orders: None at present- plan to start TF today  · Additional Calories: Propofol at 15.9 ml/hr= 420 kcal/day  · Anthropometric Measures:  · Ht: 5' 8\" (172.7 cm)   · Current Body Wt: 291 lb 0.1 oz (132 kg)  · Admission Body Wt: 292 lb (132.5 kg)  · Ideal Body Wt: 154 lb (69.9 kg), % Ideal Body 189%  · BMI Classification: BMI > or equal to 40.0 Obese Class III    Nutrition Interventions:   Start Tube Feeding-Suggest Vital 1.2 goal 50 ml/hr while on propofol at current rate.    Continued Inpatient Monitoring, Education Not Indicated    Nutrition Evaluation:   · Evaluation: Progressing toward goals   · Goals: Meet more than 75% of nutrition needs   · Monitoring: TF Intake, TF Tolerance, Weight, Pertinent Labs      Electronically signed by Tammy Pizano RD, LIZ on 3/22/19 at 11:37 AM    Contact Number: [phone]

## 2019-03-23 LAB
ABSOLUTE EOS #: 0 K/UL (ref 0–0.44)
ABSOLUTE IMMATURE GRANULOCYTE: 0.32 K/UL (ref 0–0.3)
ABSOLUTE LYMPH #: 1.11 K/UL (ref 1.1–3.7)
ABSOLUTE MONO #: 1.91 K/UL (ref 0.1–1.2)
ALLEN TEST: POSITIVE
ANION GAP SERPL CALCULATED.3IONS-SCNC: 12 MMOL/L (ref 9–17)
BASOPHILS # BLD: 0 % (ref 0–2)
BASOPHILS ABSOLUTE: 0 K/UL (ref 0–0.2)
BNP INTERPRETATION: ABNORMAL
BUN BLDV-MCNC: 46 MG/DL (ref 8–23)
BUN/CREAT BLD: ABNORMAL (ref 9–20)
CALCIUM IONIZED: 1.08 MMOL/L (ref 1.13–1.33)
CALCIUM SERPL-MCNC: 8.7 MG/DL (ref 8.6–10.4)
CHLORIDE BLD-SCNC: 92 MMOL/L (ref 98–107)
CO2: 34 MMOL/L (ref 20–31)
CREAT SERPL-MCNC: 1.02 MG/DL (ref 0.7–1.2)
CULTURE: ABNORMAL
CULTURE: NORMAL
CULTURE: NORMAL
DIFFERENTIAL TYPE: ABNORMAL
DIRECT EXAM: ABNORMAL
EOSINOPHILS RELATIVE PERCENT: 0 % (ref 1–4)
FIO2: ABNORMAL
GFR AFRICAN AMERICAN: >60 ML/MIN
GFR NON-AFRICAN AMERICAN: >60 ML/MIN
GFR SERPL CREATININE-BSD FRML MDRD: ABNORMAL ML/MIN/{1.73_M2}
GFR SERPL CREATININE-BSD FRML MDRD: ABNORMAL ML/MIN/{1.73_M2}
GLUCOSE BLD-MCNC: 119 MG/DL (ref 75–110)
GLUCOSE BLD-MCNC: 119 MG/DL (ref 75–110)
GLUCOSE BLD-MCNC: 125 MG/DL (ref 75–110)
GLUCOSE BLD-MCNC: 143 MG/DL (ref 75–110)
GLUCOSE BLD-MCNC: 143 MG/DL (ref 75–110)
GLUCOSE BLD-MCNC: 159 MG/DL (ref 75–110)
GLUCOSE BLD-MCNC: 160 MG/DL (ref 75–110)
GLUCOSE BLD-MCNC: 170 MG/DL (ref 75–110)
GLUCOSE BLD-MCNC: 175 MG/DL (ref 75–110)
GLUCOSE BLD-MCNC: 189 MG/DL (ref 75–110)
GLUCOSE BLD-MCNC: 192 MG/DL (ref 75–110)
GLUCOSE BLD-MCNC: 192 MG/DL (ref 75–110)
GLUCOSE BLD-MCNC: 201 MG/DL (ref 75–110)
GLUCOSE BLD-MCNC: 203 MG/DL (ref 70–99)
GLUCOSE BLD-MCNC: 205 MG/DL (ref 75–110)
GLUCOSE BLD-MCNC: 228 MG/DL (ref 75–110)
GLUCOSE BLD-MCNC: 257 MG/DL (ref 75–110)
HCT VFR BLD CALC: 40 % (ref 40.7–50.3)
HEMOGLOBIN: 12.5 G/DL (ref 13–17)
IMMATURE GRANULOCYTES: 2 %
LYMPHOCYTES # BLD: 7 % (ref 24–43)
Lab: ABNORMAL
Lab: NORMAL
Lab: NORMAL
MAGNESIUM: 2.5 MG/DL (ref 1.6–2.6)
MCH RBC QN AUTO: 30.3 PG (ref 25.2–33.5)
MCHC RBC AUTO-ENTMCNC: 31.3 G/DL (ref 28.4–34.8)
MCV RBC AUTO: 97.1 FL (ref 82.6–102.9)
MODE: ABNORMAL
MONOCYTES # BLD: 12 % (ref 3–12)
MORPHOLOGY: NORMAL
MRSA, DNA, NASAL: NORMAL
NEGATIVE BASE EXCESS, ART: ABNORMAL (ref 0–2)
NRBC AUTOMATED: 0.1 PER 100 WBC
O2 DEVICE/FLOW/%: ABNORMAL
PARTIAL THROMBOPLASTIN TIME: 104.4 SEC (ref 20.5–30.5)
PARTIAL THROMBOPLASTIN TIME: 51.5 SEC (ref 20.5–30.5)
PARTIAL THROMBOPLASTIN TIME: 95 SEC (ref 20.5–30.5)
PATIENT TEMP: ABNORMAL
PDW BLD-RTO: 13.4 % (ref 11.8–14.4)
PHOSPHORUS: 4.6 MG/DL (ref 2.5–4.5)
PLATELET # BLD: 205 K/UL (ref 138–453)
PLATELET ESTIMATE: ABNORMAL
PMV BLD AUTO: 10 FL (ref 8.1–13.5)
POC HCO3: 42.9 MMOL/L (ref 21–28)
POC O2 SATURATION: 92 % (ref 94–98)
POC PCO2 TEMP: ABNORMAL MM HG
POC PCO2: 63.7 MM HG (ref 35–48)
POC PH TEMP: ABNORMAL
POC PH: 7.44 (ref 7.35–7.45)
POC PO2 TEMP: ABNORMAL MM HG
POC PO2: 65.1 MM HG (ref 83–108)
POSITIVE BASE EXCESS, ART: 16 (ref 0–3)
POTASSIUM SERPL-SCNC: 4.1 MMOL/L (ref 3.7–5.3)
PRO-BNP: 3527 PG/ML
RBC # BLD: 4.12 M/UL (ref 4.21–5.77)
RBC # BLD: ABNORMAL 10*6/UL
SAMPLE SITE: ABNORMAL
SEG NEUTROPHILS: 79 % (ref 36–65)
SEGMENTED NEUTROPHILS ABSOLUTE COUNT: 12.56 K/UL (ref 1.5–8.1)
SODIUM BLD-SCNC: 138 MMOL/L (ref 135–144)
SPECIMEN DESCRIPTION: ABNORMAL
SPECIMEN DESCRIPTION: NORMAL
TCO2 (CALC), ART: 45 MMOL/L (ref 22–29)
WBC # BLD: 15.9 K/UL (ref 3.5–11.3)
WBC # BLD: ABNORMAL 10*3/UL

## 2019-03-23 PROCEDURE — 84100 ASSAY OF PHOSPHORUS: CPT

## 2019-03-23 PROCEDURE — 85730 THROMBOPLASTIN TIME PARTIAL: CPT

## 2019-03-23 PROCEDURE — 2580000003 HC RX 258: Performed by: STUDENT IN AN ORGANIZED HEALTH CARE EDUCATION/TRAINING PROGRAM

## 2019-03-23 PROCEDURE — 83735 ASSAY OF MAGNESIUM: CPT

## 2019-03-23 PROCEDURE — 6360000002 HC RX W HCPCS: Performed by: STUDENT IN AN ORGANIZED HEALTH CARE EDUCATION/TRAINING PROGRAM

## 2019-03-23 PROCEDURE — 6360000002 HC RX W HCPCS: Performed by: INTERNAL MEDICINE

## 2019-03-23 PROCEDURE — 6370000000 HC RX 637 (ALT 250 FOR IP): Performed by: INTERNAL MEDICINE

## 2019-03-23 PROCEDURE — 2500000003 HC RX 250 WO HCPCS: Performed by: INTERNAL MEDICINE

## 2019-03-23 PROCEDURE — 94003 VENT MGMT INPAT SUBQ DAY: CPT

## 2019-03-23 PROCEDURE — 80048 BASIC METABOLIC PNL TOTAL CA: CPT

## 2019-03-23 PROCEDURE — 82330 ASSAY OF CALCIUM: CPT

## 2019-03-23 PROCEDURE — 6370000000 HC RX 637 (ALT 250 FOR IP): Performed by: STUDENT IN AN ORGANIZED HEALTH CARE EDUCATION/TRAINING PROGRAM

## 2019-03-23 PROCEDURE — 85025 COMPLETE CBC W/AUTO DIFF WBC: CPT

## 2019-03-23 PROCEDURE — 94640 AIRWAY INHALATION TREATMENT: CPT

## 2019-03-23 PROCEDURE — 99291 CRITICAL CARE FIRST HOUR: CPT | Performed by: INTERNAL MEDICINE

## 2019-03-23 PROCEDURE — 2700000000 HC OXYGEN THERAPY PER DAY

## 2019-03-23 PROCEDURE — 2500000003 HC RX 250 WO HCPCS: Performed by: STUDENT IN AN ORGANIZED HEALTH CARE EDUCATION/TRAINING PROGRAM

## 2019-03-23 PROCEDURE — 94770 HC ETCO2 MONITOR DAILY: CPT

## 2019-03-23 PROCEDURE — 83880 ASSAY OF NATRIURETIC PEPTIDE: CPT

## 2019-03-23 PROCEDURE — 2580000003 HC RX 258: Performed by: INTERNAL MEDICINE

## 2019-03-23 PROCEDURE — 36415 COLL VENOUS BLD VENIPUNCTURE: CPT

## 2019-03-23 PROCEDURE — 94762 N-INVAS EAR/PLS OXIMTRY CONT: CPT

## 2019-03-23 PROCEDURE — 36600 WITHDRAWAL OF ARTERIAL BLOOD: CPT

## 2019-03-23 PROCEDURE — 82803 BLOOD GASES ANY COMBINATION: CPT

## 2019-03-23 PROCEDURE — 2000000000 HC ICU R&B

## 2019-03-23 RX ORDER — INSULIN GLARGINE 100 [IU]/ML
15 INJECTION, SOLUTION SUBCUTANEOUS NIGHTLY
Status: DISCONTINUED | OUTPATIENT
Start: 2019-03-23 | End: 2019-03-23

## 2019-03-23 RX ORDER — ACETAZOLAMIDE 500 MG/5ML
250 INJECTION, POWDER, LYOPHILIZED, FOR SOLUTION INTRAVENOUS ONCE
Status: DISCONTINUED | OUTPATIENT
Start: 2019-03-23 | End: 2019-03-23

## 2019-03-23 RX ORDER — PREDNISONE 20 MG/1
40 TABLET ORAL ONCE
Status: COMPLETED | OUTPATIENT
Start: 2019-03-23 | End: 2019-03-23

## 2019-03-23 RX ORDER — INSULIN GLARGINE 100 [IU]/ML
15 INJECTION, SOLUTION SUBCUTANEOUS NIGHTLY
Status: DISCONTINUED | OUTPATIENT
Start: 2019-03-23 | End: 2019-03-24

## 2019-03-23 RX ORDER — ACETAZOLAMIDE 500 MG/5ML
500 INJECTION, POWDER, LYOPHILIZED, FOR SOLUTION INTRAVENOUS ONCE
Status: COMPLETED | OUTPATIENT
Start: 2019-03-23 | End: 2019-03-23

## 2019-03-23 RX ADMIN — PROPOFOL 20 MCG/KG/MIN: 10 INJECTION, EMULSION INTRAVENOUS at 07:11

## 2019-03-23 RX ADMIN — PROPOFOL 20 MCG/KG/MIN: 10 INJECTION, EMULSION INTRAVENOUS at 01:54

## 2019-03-23 RX ADMIN — INSULIN LISPRO 9 UNITS: 100 INJECTION, SOLUTION INTRAVENOUS; SUBCUTANEOUS at 20:34

## 2019-03-23 RX ADMIN — AMIODARONE HYDROCHLORIDE 0.5 MG/MIN: 50 INJECTION, SOLUTION INTRAVENOUS at 21:31

## 2019-03-23 RX ADMIN — METHYLPREDNISOLONE SODIUM SUCCINATE 40 MG: 40 INJECTION, POWDER, FOR SOLUTION INTRAMUSCULAR; INTRAVENOUS at 03:12

## 2019-03-23 RX ADMIN — Medication 75 MCG/HR: at 00:40

## 2019-03-23 RX ADMIN — FUROSEMIDE 60 MG: 10 INJECTION, SOLUTION INTRAMUSCULAR; INTRAVENOUS at 08:59

## 2019-03-23 RX ADMIN — CEFTRIAXONE SODIUM 1 G: 1 INJECTION, POWDER, FOR SOLUTION INTRAMUSCULAR; INTRAVENOUS at 08:59

## 2019-03-23 RX ADMIN — METOPROLOL TARTRATE 25 MG: 25 TABLET ORAL at 20:48

## 2019-03-23 RX ADMIN — Medication 75 MG: at 11:14

## 2019-03-23 RX ADMIN — FAMOTIDINE 20 MG: 10 INJECTION, SOLUTION INTRAVENOUS at 20:48

## 2019-03-23 RX ADMIN — METOPROLOL TARTRATE 2.5 MG: 5 INJECTION, SOLUTION INTRAVENOUS at 04:29

## 2019-03-23 RX ADMIN — PROPOFOL 30 MCG/KG/MIN: 10 INJECTION, EMULSION INTRAVENOUS at 15:05

## 2019-03-23 RX ADMIN — Medication 50 MCG/HR: at 12:19

## 2019-03-23 RX ADMIN — ACETAZOLAMIDE 500 MG: 500 INJECTION, POWDER, LYOPHILIZED, FOR SOLUTION INTRAVENOUS at 09:16

## 2019-03-23 RX ADMIN — PROPOFOL 30 MCG/KG/MIN: 10 INJECTION, EMULSION INTRAVENOUS at 11:47

## 2019-03-23 RX ADMIN — HEPARIN SODIUM 5000 UNITS: 1000 INJECTION INTRAVENOUS; SUBCUTANEOUS at 13:25

## 2019-03-23 RX ADMIN — IPRATROPIUM BROMIDE AND ALBUTEROL SULFATE 1 AMPULE: .5; 3 SOLUTION RESPIRATORY (INHALATION) at 12:10

## 2019-03-23 RX ADMIN — INSULIN GLARGINE 15 UNITS: 100 INJECTION, SOLUTION SUBCUTANEOUS at 14:22

## 2019-03-23 RX ADMIN — AMIODARONE HYDROCHLORIDE 1 MG/MIN: 50 INJECTION, SOLUTION INTRAVENOUS at 10:17

## 2019-03-23 RX ADMIN — AMIODARONE HYDROCHLORIDE 150 MG: 50 INJECTION, SOLUTION INTRAVENOUS at 10:05

## 2019-03-23 RX ADMIN — IPRATROPIUM BROMIDE AND ALBUTEROL SULFATE 1 AMPULE: .5; 3 SOLUTION RESPIRATORY (INHALATION) at 09:20

## 2019-03-23 RX ADMIN — SODIUM CHLORIDE 7.05 UNITS/HR: 9 INJECTION, SOLUTION INTRAVENOUS at 04:44

## 2019-03-23 RX ADMIN — IPRATROPIUM BROMIDE AND ALBUTEROL SULFATE 1 AMPULE: .5; 3 SOLUTION RESPIRATORY (INHALATION) at 16:12

## 2019-03-23 RX ADMIN — METOPROLOL TARTRATE 25 MG: 25 TABLET ORAL at 10:06

## 2019-03-23 RX ADMIN — HEPARIN SODIUM AND DEXTROSE 14.59 UNITS/KG/HR: 10000; 5 INJECTION INTRAVENOUS at 04:44

## 2019-03-23 RX ADMIN — Medication 100 MG: at 14:17

## 2019-03-23 RX ADMIN — IPRATROPIUM BROMIDE AND ALBUTEROL SULFATE 1 AMPULE: .5; 3 SOLUTION RESPIRATORY (INHALATION) at 19:36

## 2019-03-23 RX ADMIN — HEPARIN SODIUM AND DEXTROSE 11.59 UNITS/KG/HR: 10000; 5 INJECTION INTRAVENOUS at 22:06

## 2019-03-23 RX ADMIN — FUROSEMIDE 60 MG: 10 INJECTION, SOLUTION INTRAMUSCULAR; INTRAVENOUS at 18:26

## 2019-03-23 RX ADMIN — FAMOTIDINE 20 MG: 10 INJECTION, SOLUTION INTRAVENOUS at 08:59

## 2019-03-23 RX ADMIN — CALCIUM CHLORIDE 1 G: 100 INJECTION INTRAVENOUS; INTRAVENTRICULAR at 07:01

## 2019-03-23 RX ADMIN — Medication 75 MG: at 21:28

## 2019-03-23 RX ADMIN — PROPOFOL 40 MCG/KG/MIN: 10 INJECTION, EMULSION INTRAVENOUS at 19:16

## 2019-03-23 RX ADMIN — PREDNISONE 40 MG: 20 TABLET ORAL at 14:17

## 2019-03-23 ASSESSMENT — PULMONARY FUNCTION TESTS
PIF_VALUE: 14
PIF_VALUE: 15
PIF_VALUE: 15
PIF_VALUE: 23
PIF_VALUE: 15
PIF_VALUE: 12
PIF_VALUE: 15
PIF_VALUE: 16
PIF_VALUE: 14
PIF_VALUE: 15

## 2019-03-23 NOTE — PROGRESS NOTES
03/23/19 1837   Vent Information   Vent Mode PRVC   Vt Ordered 440 mL   Rate Set 14 bmp   FiO2  40 %   PEEP/CPAP 8       Wean completed.

## 2019-03-23 NOTE — PLAN OF CARE
Problem: MECHANICAL VENTILATION  Goal: Patient will maintain patent airway  Outcome: Ongoing     Problem: MECHANICAL VENTILATION  Goal: Oral health is maintained or improved  Outcome: Ongoing     Problem: MECHANICAL VENTILATION  Goal: ET tube will be managed safely  Outcome: Ongoing     Problem: MECHANICAL VENTILATION  Goal: Ability to express needs and understand communication  Outcome: Ongoing     Problem: MECHANICAL VENTILATION  Goal: Mobility/activity is maintained at optimum level for patient  Outcome: Ongoing     Problem: SKIN INTEGRITY  Goal: Skin integrity is maintained or improved  Outcome: Ongoing   BRONCHOSPASM/BRONCHOCONSTRICTION     ? IMPROVE AERATION/BREATH SOUNDS  ? ADMINISTER BRONCHODILATOR THERAPY AS APPROPRIATE  ? ASSESS BREATH SOUNDS  ?    PATIENT EDUCATION AS NEEDED

## 2019-03-23 NOTE — PROGRESS NOTES
[facility]   Progress Note                   Date:   3/23/2019  Patient name: Tika Santana  Date of admission:  3/19/2019  7:58 AM  MRN:   [id number]  YOB: 1945  PCP: Yessica Reynolds MD    Reason for Admission: Acute hypercapnic respiratory failure ([facility] [J96.02]    Subjective: Intubated, sedated, in aflutter rate 140s. Medications:   Scheduled Meds:   acetaZOLAMIDE  500 mg Intravenous Once    furosemide  60 mg Intravenous BID    digoxin  250 mcg Intravenous Daily    oseltamivir 6mg/ml  75 mg Oral BID    methylPREDNISolone  40 mg Intravenous Q12H    sodium chloride flush  10 mL Intravenous 2 times per day    famotidine (PEPCID) injection  20 mg Intravenous BID    cefTRIAXone (ROCEPHIN) IV  1 g Intravenous Q24H    ipratropium-albuterol  1 ampule Inhalation Q4H WA     Continuous Infusions:   insulin (HUMAN R) non-weight based infusion 2.95 Units/hr (03/23/19 0822)    dexmedetomidine (PRECEDEX) IV infusion Stopped (03/21/19 1157)    fentaNYL 75 mcg/hr (03/23/19 0040)    propofol 30 mcg/kg/min (03/23/19 0827)    heparin (porcine) 11.59 Units/kg/hr (03/23/19 0615)    dextrose      diltiazem (CARDIZEM) 125 mg in dextrose 5% 125 mL infusion Stopped (03/19/19 2347)     CBC:   Recent Labs     03/21/19  0805 03/22/19  0459 03/23/19  0442   WBC 10.6 11.3 15.9*   HGB 11.1* 11.0* 12.5*    190 205     BMP:    Recent Labs     03/21/19  0805 03/22/19  0459 03/23/19  0442    146* 138   K 4.5 4.1 4.1   CL 96* 99 92*   CO2 32* 27 34*   BUN 45* 46* 46*   CREATININE 1.07 1.01 1.02   GLUCOSE 288* 148* 203*     Hepatic:   No results for input(s): AST, ALT, ALB, BILITOT, ALKPHOS in the last 72 hours. Troponin: No results for input(s): TROPONINI in the last 72 hours. BNP: No results for input(s): BNP in the last 72 hours. Lipids:   No results for input(s): CHOL, HDL in the last 72 hours.     Invalid input(s): LDLCALCU  INR: No results for input(s): INR in the last 72

## 2019-03-23 NOTE — PLAN OF CARE
Patient remained free of falls and injury through the shift. Patient still requires bilat soft wrist restraints due to agitation and anxiety. No signs of injury from the restraints.

## 2019-03-23 NOTE — PLAN OF CARE
BRONCHOSPASM/BRONCHOCONSTRICTION     [x]         IMPROVE AERATION/BREATH SOUNDS  [x]   ADMINISTER BRONCHODILATOR THERAPY AS APPROPRIATE  [x]   ASSESS BREATH SOUNDS  [x]   IMPLEMENT AEROSOL/MDI PROTOCOL  [x]   PATIENT EDUCATION AS NEEDED    Problem: OXYGENATION/RESPIRATORY FUNCTION  Goal: Patient will maintain patent airway  Outcome: Ongoing  Goal: Patient will achieve/maintain normal respiratory rate/effort  Respiratory rate and effort will be within normal limits for the patient  Outcome: Ongoing    Problem: MECHANICAL VENTILATION  Goal: Patient will maintain patent airway  Outcome: Ongoing  Goal: Oral health is maintained or improved  Outcome: Ongoing  Goal: ET tube will be managed safely  Outcome: Ongoing  Goal: Ability to express needs and understand communication  Outcome: Ongoing  Goal: Mobility/activity is maintained at optimum level for patient  Outcome: Ongoing    Problem: ASPIRATION PRECAUTIONS  Goal: Patients risk of aspiration is minimized  Outcome: Ongoing    Problem: SKIN INTEGRITY  Goal: Skin integrity is maintained or improved  Outcome: Ongoing

## 2019-03-23 NOTE — PROGRESS NOTES
INTENSIVE CARE UNIT  Resident Physician Progress Note    [place] - Charley Byrd  Date of Admission -  3/19/2019  7:58 AM  Date of Evaluation -  3/23/2019  Room and Bed Number -  0105/0105-01   Hospital Day - 4    Chief complaint influenza, pneumonia, A. fib with RVR  SUBJECTIVE:     OVERNIGHT EVENTS:    1. Patient seen and examined at bedside, patient is intubated and sedated  2. Azithromycin was discontinued, patient still on ceftriaxone, Legionella antigen, strep antigen and Mycoplasma antibodies are negative, respiratory cultures are still pending, direct examination is showing few gram positive cocci, pro calcitonin level 0.07.  3. He is on 40 mg of Lasix twice a day, urine output was 3.8 L last 24 hours,  4. On ABGs showed significant metabolic alkalosis likely due to diuresis with bicarb 42.9, pH 7.4, PaCO2 63.7, PO2 65.1. Current vent settings include FiO2 40%, RR 18, tidal volume 440, PEEP, in the morning he was given 1 dose of acetazolamide for metabolic alkalosis.   5.   6. He remained in atrial fibrillation, digoxin was discontinued this morning and he was started on amiodarone GTT per cardiology and remained on heparin gtt        AWAKE & FOLLOWING COMMANDS:  [] No   [x] Yes, gets agitated    SECRETIONS Amount:  [x] Small [] Moderate  [] Large  [] None  Color:     [x] White [] Colored  [] Bloody    SEDATION:  RAAS Score:  [] Propofol gtt  [] Versed gtt  [] Ativan gtt   [x] No Sedation    PARALYZED:  [x] No    [] Yes    VASOPRESSORS:  [x] No    [] Yes  [] Levophed [] Dopamine [] Vasopressin  [] Dobutamine [] Phenylephrine [] Epinephrine    Review of Systems -  Intubated , sedated  OBJECTIVE:     VITAL SIGNS:  BP (!) 94/48   Pulse 97   Temp 98.8 °F (37.1 °C) (Oral)   Resp 18   Ht 5' 8\" (1.727 m)   Wt 280 lb 13.9 oz (127.4 kg)   SpO2 93%   BMI 42.71 kg/m²   Tmax over 24 hours:  Temp (24hrs), Av.6 °F (37 °C), Min:97.3 °F (36.3 °C), Max:99 °F (37.2 °C)      Patient Vitals for the past 8 hrs:   BP Temp Temp src Pulse Resp SpO2   03/23/19 1300 (!) 94/48 -- -- 97 18 93 %   03/23/19 1203 -- -- -- 92 19 93 %   03/23/19 1200 (!) 93/53 98.8 °F (37.1 °C) Oral 96 20 93 %   03/23/19 1100 (!) 93/59 -- -- 95 18 96 %   03/23/19 1000 110/67 -- -- 123 20 --   03/23/19 0915 -- -- -- 141 26 99 %   03/23/19 0908 -- -- -- 106 15 92 %   03/23/19 0900 105/64 -- -- 97 19 --   03/23/19 0800 122/66 98.8 °F (37.1 °C) Oral 114 20 --         Intake/Output Summary (Last 24 hours) at 3/23/2019 1538  Last data filed at 3/23/2019 1501  Gross per 24 hour   Intake 1617 ml   Output 3017 ml   Net -1400 ml     Date 03/23/19 0000 - 03/23/19 2359   Shift 6399-2297 7065-5572 3018-7073 24 Hour Total   INTAKE   I.V.(mL/kg) 447(3.5) 230(1.8)  677(5.3)   NG/GT(mL/kg) 248(1.9) 411(3.2)  659(5.2)   Shift Total(mL/kg) 695(5.5) 641(5)  1336(10.5)   OUTPUT   Urine(mL/kg/hr) 232(0.2) 1135  1367   Shift Total(mL/kg) 232(1.8) 1135(8.9)  1367(10.7)   Weight (kg) 127.4 127.4 127.4 127.4     Wt Readings from Last 3 Encounters:   03/23/19 280 lb 13.9 oz (127.4 kg)   03/19/19 294 lb 11.2 oz (133.7 kg)   03/18/19 300 lb (136.1 kg)     Body mass index is 42.71 kg/m². PHYSICAL EXAM:  · General appearance: intubated sedated   · HEENT: Atraumatic, normocephalic, neck supple, normal EOM, thyroid normal, no lymphadenopathy   · Lungs: Ventilating all lobes. Prolonged expiration is still wheezing is better.   Posterior basilar rales and infrascapular rales are still present   · Heart: Regular heart rate, tachycardic, S1, S2 normal, no murmur   · Abdomen: soft, non-tender; bowel sounds normal; no masses,  no organomegaly  · Extremities: No cyanosis or edema  · Neurological:  Intubated sedated , opens eyes on pain stimuli, moves all extremities but gets agitated    MEDICATIONS:  Scheduled Meds:   metoprolol tartrate  25 mg Per NG tube BID    [START ON 3/24/2019] predniSONE  30 mg Oral Daily    insulin lispro  0-18 Units Subcutaneous Q6H    docusate  100 mg Oral Daily    insulin glargine  15 Units Subcutaneous Nightly    furosemide  60 mg Intravenous BID    oseltamivir 6mg/ml  75 mg Oral BID    sodium chloride flush  10 mL Intravenous 2 times per day    famotidine (PEPCID) injection  20 mg Intravenous BID    cefTRIAXone (ROCEPHIN) IV  1 g Intravenous Q24H    ipratropium-albuterol  1 ampule Inhalation Q4H WA     Continuous Infusions:   amiodarone 0.5 mg/min (03/23/19 1504)    insulin (HUMAN R) non-weight based infusion 13.2 Units/hr (03/23/19 1501)    fentaNYL 50 mcg/hr (03/23/19 1219)    propofol 30 mcg/kg/min (03/23/19 1505)    heparin (porcine) 13.59 Units/kg/hr (03/23/19 1326)    dextrose       PRN Meds:     magnesium hydroxide 30 mL Daily PRN   naloxone 0.4 mg PRN   LORazepam 0.5 mg Q8H PRN   sodium chloride flush 10 mL PRN   ondansetron 4 mg Q6H PRN   heparin (porcine) 10,000 Units PRN   heparin (porcine) 5,000 Units PRN   glucose 15 g PRN   dextrose 12.5 g PRN   glucagon (rDNA) 1 mg PRN   dextrose 100 mL/hr PRN   albuterol 2.5 mg As Directed RT PRN   metoprolol 2.5 mg Q4H PRN       SUPPORT DEVICES: [] Ventilator [x] BIPAP  [] Nasal Cannula [] Room Air  .   Lab Results   Component Value Date    PHART 7.310 03/18/2019    TXM0ISM 51.7 03/18/2019    PO2ART 67.8 03/18/2019    QLY4NIL 25.4 03/18/2019    TGL1VVR 45 03/23/2019    L4CBOYAT 91.7 03/18/2019    FIO2 NOT REPORTED 03/23/2019         DATA:  Complete Blood Count:   Recent Labs     03/21/19  0805 03/22/19  0459 03/23/19  0442   WBC 10.6 11.3 15.9*   RBC 3.61* 3.65* 4.12*   HGB 11.1* 11.0* 12.5*   HCT 34.9* 35.7* 40.0*   MCV 96.7 97.8 97.1   MCH 30.7 30.1 30.3   MCHC 31.8 30.8 31.3   RDW 13.4 13.3 13.4    190 205   MPV 9.8 9.8 10.0        Last 3 Blood Glucose:   Recent Labs     03/21/19  0805 03/22/19  0459 03/23/19  0442   GLUCOSE 288* 148* 203*        PT/INR:    Lab Results   Component Value Date    PROTIME 12.0 03/15/2019    INR 1.2 03/15/2019     PTT:    Lab Results   Component Value Date    APTT 51.5 03/23/2019       Comprehensive Metabolic Profile:   Recent Labs     03/21/19  0805 03/22/19  0459 03/23/19  0442    146* 138   K 4.5 4.1 4.1   CL 96* 99 92*   CO2 32* 27 34*   BUN 45* 46* 46*   CREATININE 1.07 1.01 1.02   GLUCOSE 288* 148* 203*   CALCIUM 8.7 8.8 8.7      Magnesium:   Lab Results   Component Value Date    MG 2.5 03/23/2019    MG 2.3 03/22/2019    MG 2.1 03/21/2019     Phosphorus:   Lab Results   Component Value Date    PHOS 4.6 03/23/2019    PHOS 3.2 03/22/2019    PHOS 3.2 03/21/2019     Ionized Calcium:   Lab Results   Component Value Date    CAION 1.08 03/23/2019    CAION 1.11 03/22/2019    CAION 1.10 03/21/2019        Urinalysis:   Lab Results   Component Value Date    NITRU NEGATIVE 03/18/2019    COLORU YELLOW 03/18/2019    PHUR 5.5 03/18/2019    WBCUA 0 TO 2 03/18/2019    RBCUA None 03/18/2019    MUCUS NOT REPORTED 03/18/2019    TRICHOMONAS NOT REPORTED 03/18/2019    YEAST NOT REPORTED 03/18/2019    BACTERIA NOT REPORTED 03/18/2019    SPECGRAV 1.010 03/18/2019    LEUKOCYTESUR NEGATIVE 03/18/2019    UROBILINOGEN Normal 03/18/2019    BILIRUBINUR NEGATIVE 03/18/2019    GLUCOSEU 2+ 03/18/2019    KETUA NEGATIVE 03/18/2019    AMORPHOUS NOT REPORTED 03/18/2019       HgBA1c:    Lab Results   Component Value Date    LABA1C 8.6 03/18/2019     TSH:  No results found for: TSH  Lactic Acid:   Lab Results   Component Value Date    LACTA 2.2 03/18/2019    LACTA 2.6 03/18/2019      Troponin: No results for input(s): TROPONINI in the last 72 hours. Results for Deloris Alonso (MRN [id number]) as of 3/23/2019 18:06   Ref.  Range 3/23/2019 04:07   POC pH Latest Ref Range: 7.350 - 7.450  7.437   POC pH Temp Unknown NOT REPORTED   POC pCO2 Latest Ref Range: 35.0 - 48.0 mm Hg 63.7 (H)   POC pCO2 Temp Latest Units: mm Hg NOT REPORTED   POC PO2 Latest Ref Range: 83.0 - 108.0 mm Hg 65.1 (L)   POC pO2 Temp Latest Units: mm Hg NOT REPORTED   POC HCO3 Latest Ref Range: 21.0 - 28.0 mmol/L 42.9 (HH)   POC O2 SAT Latest Ref Range: 94.0 - 98.0 % 92 (L)     ASSESSMENT:     Patient Active Problem List    Diagnosis Date Noted    Community acquired pneumonia of left lower lobe of lung ([facility]     Influenza A with respiratory manifestations 03/18/2019    Acute respiratory failure with hypoxia ([facility] 03/18/2019    Influenza A 03/18/2019    Acute hypercapnic respiratory failure ([facility] 03/18/2019    [facility]STEMI [facility]non-ST elevated [facility] infarction) [facility]     Type 2 diabetes mellitus without complication, without long-term current use of insulin ([facility] 09/06/2016    Adult BMI > 30 05/06/2016    CAD (coronary artery disease)     DM type 2 (diabetes mellitus, type 2) ([facility])     Atrial fibrillation with rapid ventricular response ([facility] 01/01/2007          PLAN:     WEAN PER PROTOCOL:  [] No   [x] Yes  [] N/A    ICU PROPHYLAXIS:  Stress ulcer:  [] PPI Agent  [x] W2Biqxh [] Sucralfate  [] Other:  VTE:   [] Enoxaparin  [x] he is on heparin GTT  [] EPC Cuffs    NUTRITION:  [x] NPO [] Tube Feeding (Specify: ) [] TPN  [] PO    HOME MEDS RECONCILED: [x] No  [] Yes    CONSULTATION NEEDED:  [x] No  [] Yes    FAMILY UPDATED:    [] No  [x] Yes    TRANSFER OUT OF ICU:   [x] No  [] Yes        Additional Assessment:  Influenza A  Community acquired pneumonia with likely strep pneumoniae  Atrial fibrillation with RVRPersistent   Acute exacerbation of CHF sec due to atrial fibrillationPersistent   Acute  hypoxic hypercapnic respiratory failureDue to pulmonary edema, persistent without any significant change   Diabetes mellitusType II with hyperglycemia   Obstructive sleep apnea    Plan:  Continue mechanical ventilation, keep SPO2 more than 92%  Continue on ceftriaxone for 7 days   Follow Sputum culture   Continue Tamiflu for total 5 days  Switch Solu Medrol to prednisone 40 mg today and 30 mg from tomorrow with a rapid taper  Breathing treatment with DuoNeb  Continue Lasix 40 mg IV twice a day  Monitor urine output and total intake  Replace electrolytes as appropriate. Start on Lantus 15 units subcutaneous with high-dose sliding scale and breech with insulin drip for 2 hours and then stop insulin drip  Monitor glucose and titrate up on Lantus    Digoxin discontinued, continue on amiodarone GTT as per cardiology  Lopressor 2.5 mg PRN q4 hr  heparin gtt for now    Pepcid for GI Px  Keep nothing by mouth for now      [facility], MD  PGY-2, Internal Medicine resident  Param 13. 3/23/2019 3:38 PM   Attending Physician Statement  I have discussed the care of Loree Dee, including pertinent history and exam findings,  with the resident. I have seen and examined the patient and the key elements of all parts of the encounter have been performed by me. I agree with the assessment, plan and orders as documented by the resident with additions . Discussed with house staff and nursing staff  Patient's wheezing is improved. We will change to p.o. prednisone. This will help with blood sugar control. .  Start Lantus to bridge off insulin drip  Continue to feeds  . Start Colace and give milk of magnesia since patient has not had a bowel movement  . Decrease fentanyl drip and will attempt to wean off fentanyl drip  Patient continues to have elevated AA gradient and hypercapnia. Continue with mechanical ventilation. Continue diuretics. Total critical care time caring for this patient with life threatening, unstable organ failure, including direct patient contact, management of life support systems, review of data including imaging and labs, discussions with other team members and physicians at least 27   Min so far today, excluding procedures. Treatment plan Discussed with nursing staff in detail , all questions answered . Electronically signed by Amita Knowles MD on   3/23/19 at 6:04 PM    Please note that this chart was generated using voice recognition Dragon dictation software.   Although every effort was

## 2019-03-24 ENCOUNTER — APPOINTMENT (OUTPATIENT)
Dept: GENERAL RADIOLOGY | Age: 74
DRG: 207 | End: 2019-03-24
Attending: INTERNAL MEDICINE
Payer: MEDICARE

## 2019-03-24 LAB
ABSOLUTE EOS #: 0 K/UL (ref 0–0.4)
ABSOLUTE IMMATURE GRANULOCYTE: 0.25 K/UL (ref 0–0.3)
ABSOLUTE LYMPH #: 1.52 K/UL (ref 1–4.8)
ABSOLUTE MONO #: 0.89 K/UL (ref 0.1–0.8)
ACTION: NORMAL
ALLEN TEST: POSITIVE
ANION GAP SERPL CALCULATED.3IONS-SCNC: 11 MMOL/L (ref 9–17)
BASOPHILS # BLD: 0 % (ref 0–2)
BASOPHILS ABSOLUTE: 0 K/UL (ref 0–0.2)
BUN BLDV-MCNC: 52 MG/DL (ref 8–23)
BUN/CREAT BLD: ABNORMAL (ref 9–20)
CALCIUM IONIZED: 1.1 MMOL/L (ref 1.13–1.33)
CALCIUM SERPL-MCNC: 8.5 MG/DL (ref 8.6–10.4)
CHLORIDE BLD-SCNC: 89 MMOL/L (ref 98–107)
CO2: 33 MMOL/L (ref 20–31)
CREAT SERPL-MCNC: 1.23 MG/DL (ref 0.7–1.2)
DATE AND TIME: NORMAL
DIFFERENTIAL TYPE: ABNORMAL
EOSINOPHILS RELATIVE PERCENT: 0 % (ref 1–4)
FIO2: 30
GFR AFRICAN AMERICAN: >60 ML/MIN
GFR NON-AFRICAN AMERICAN: 58 ML/MIN
GFR SERPL CREATININE-BSD FRML MDRD: ABNORMAL ML/MIN/{1.73_M2}
GFR SERPL CREATININE-BSD FRML MDRD: ABNORMAL ML/MIN/{1.73_M2}
GLUCOSE BLD-MCNC: 104 MG/DL (ref 75–110)
GLUCOSE BLD-MCNC: 118 MG/DL (ref 75–110)
GLUCOSE BLD-MCNC: 119 MG/DL (ref 75–110)
GLUCOSE BLD-MCNC: 131 MG/DL (ref 75–110)
GLUCOSE BLD-MCNC: 142 MG/DL (ref 75–110)
GLUCOSE BLD-MCNC: 186 MG/DL (ref 75–110)
GLUCOSE BLD-MCNC: 197 MG/DL (ref 75–110)
GLUCOSE BLD-MCNC: 198 MG/DL (ref 75–110)
GLUCOSE BLD-MCNC: 206 MG/DL (ref 75–110)
GLUCOSE BLD-MCNC: 213 MG/DL (ref 75–110)
GLUCOSE BLD-MCNC: 225 MG/DL (ref 75–110)
GLUCOSE BLD-MCNC: 280 MG/DL (ref 75–110)
GLUCOSE BLD-MCNC: 342 MG/DL (ref 75–110)
GLUCOSE BLD-MCNC: 347 MG/DL (ref 75–110)
GLUCOSE BLD-MCNC: 353 MG/DL (ref 75–110)
GLUCOSE BLD-MCNC: 378 MG/DL (ref 70–99)
GLUCOSE BLD-MCNC: 403 MG/DL (ref 74–100)
HCT VFR BLD CALC: 36.8 % (ref 40.7–50.3)
HEMOGLOBIN: 11.3 G/DL (ref 13–17)
IMMATURE GRANULOCYTES: 2 %
LYMPHOCYTES # BLD: 12 % (ref 24–44)
MAGNESIUM: 2.4 MG/DL (ref 1.6–2.6)
MCH RBC QN AUTO: 30.4 PG (ref 25.2–33.5)
MCHC RBC AUTO-ENTMCNC: 30.7 G/DL (ref 28.4–34.8)
MCV RBC AUTO: 98.9 FL (ref 82.6–102.9)
MODE: ABNORMAL
MONOCYTES # BLD: 7 % (ref 1–7)
MORPHOLOGY: NORMAL
NEGATIVE BASE EXCESS, ART: ABNORMAL (ref 0–2)
NOTIFY: NORMAL
NRBC AUTOMATED: 0.2 PER 100 WBC
O2 DEVICE/FLOW/%: ABNORMAL
PARTIAL THROMBOPLASTIN TIME: 36.2 SEC (ref 20.5–30.5)
PARTIAL THROMBOPLASTIN TIME: 57.1 SEC (ref 20.5–30.5)
PARTIAL THROMBOPLASTIN TIME: 67.7 SEC (ref 20.5–30.5)
PATIENT TEMP: ABNORMAL
PDW BLD-RTO: 13.4 % (ref 11.8–14.4)
PHOSPHORUS: 4.8 MG/DL (ref 2.5–4.5)
PLATELET # BLD: 185 K/UL (ref 138–453)
PLATELET ESTIMATE: ABNORMAL
PMV BLD AUTO: 10.2 FL (ref 8.1–13.5)
POC HCO3: 39.3 MMOL/L (ref 21–28)
POC O2 SATURATION: 90 % (ref 94–98)
POC PCO2 TEMP: ABNORMAL MM HG
POC PCO2: 63.1 MM HG (ref 35–48)
POC PH TEMP: ABNORMAL
POC PH: 7.4 (ref 7.35–7.45)
POC PO2 TEMP: ABNORMAL MM HG
POC PO2: 60.6 MM HG (ref 83–108)
POSITIVE BASE EXCESS, ART: 12 (ref 0–3)
POTASSIUM SERPL-SCNC: 3.9 MMOL/L (ref 3.7–5.3)
RBC # BLD: 3.72 M/UL (ref 4.21–5.77)
RBC # BLD: ABNORMAL 10*6/UL
READ BACK: YES
SAMPLE SITE: ABNORMAL
SEG NEUTROPHILS: 79 % (ref 36–66)
SEGMENTED NEUTROPHILS ABSOLUTE COUNT: 10.04 K/UL (ref 1.8–7.7)
SODIUM BLD-SCNC: 133 MMOL/L (ref 135–144)
TCO2 (CALC), ART: 41 MMOL/L (ref 22–29)
WBC # BLD: 12.7 K/UL (ref 3.5–11.3)
WBC # BLD: ABNORMAL 10*3/UL

## 2019-03-24 PROCEDURE — 6370000000 HC RX 637 (ALT 250 FOR IP): Performed by: STUDENT IN AN ORGANIZED HEALTH CARE EDUCATION/TRAINING PROGRAM

## 2019-03-24 PROCEDURE — 82803 BLOOD GASES ANY COMBINATION: CPT

## 2019-03-24 PROCEDURE — 6370000000 HC RX 637 (ALT 250 FOR IP): Performed by: INTERNAL MEDICINE

## 2019-03-24 PROCEDURE — 2700000000 HC OXYGEN THERAPY PER DAY

## 2019-03-24 PROCEDURE — 84100 ASSAY OF PHOSPHORUS: CPT

## 2019-03-24 PROCEDURE — 82330 ASSAY OF CALCIUM: CPT

## 2019-03-24 PROCEDURE — 6360000002 HC RX W HCPCS: Performed by: INTERNAL MEDICINE

## 2019-03-24 PROCEDURE — 6360000002 HC RX W HCPCS: Performed by: STUDENT IN AN ORGANIZED HEALTH CARE EDUCATION/TRAINING PROGRAM

## 2019-03-24 PROCEDURE — 2580000003 HC RX 258: Performed by: INTERNAL MEDICINE

## 2019-03-24 PROCEDURE — 2500000003 HC RX 250 WO HCPCS: Performed by: STUDENT IN AN ORGANIZED HEALTH CARE EDUCATION/TRAINING PROGRAM

## 2019-03-24 PROCEDURE — 83735 ASSAY OF MAGNESIUM: CPT

## 2019-03-24 PROCEDURE — 94762 N-INVAS EAR/PLS OXIMTRY CONT: CPT

## 2019-03-24 PROCEDURE — 85025 COMPLETE CBC W/AUTO DIFF WBC: CPT

## 2019-03-24 PROCEDURE — 82947 ASSAY GLUCOSE BLOOD QUANT: CPT

## 2019-03-24 PROCEDURE — 94003 VENT MGMT INPAT SUBQ DAY: CPT

## 2019-03-24 PROCEDURE — 94640 AIRWAY INHALATION TREATMENT: CPT

## 2019-03-24 PROCEDURE — 36600 WITHDRAWAL OF ARTERIAL BLOOD: CPT

## 2019-03-24 PROCEDURE — 2580000003 HC RX 258: Performed by: STUDENT IN AN ORGANIZED HEALTH CARE EDUCATION/TRAINING PROGRAM

## 2019-03-24 PROCEDURE — 80048 BASIC METABOLIC PNL TOTAL CA: CPT

## 2019-03-24 PROCEDURE — 71045 X-RAY EXAM CHEST 1 VIEW: CPT

## 2019-03-24 PROCEDURE — 36415 COLL VENOUS BLD VENIPUNCTURE: CPT

## 2019-03-24 PROCEDURE — 94770 HC ETCO2 MONITOR DAILY: CPT

## 2019-03-24 PROCEDURE — 99291 CRITICAL CARE FIRST HOUR: CPT | Performed by: INTERNAL MEDICINE

## 2019-03-24 PROCEDURE — 85730 THROMBOPLASTIN TIME PARTIAL: CPT

## 2019-03-24 PROCEDURE — 2000000000 HC ICU R&B

## 2019-03-24 RX ORDER — INSULIN GLARGINE 100 [IU]/ML
10 INJECTION, SOLUTION SUBCUTANEOUS ONCE
Status: DISCONTINUED | OUTPATIENT
Start: 2019-03-24 | End: 2019-03-24

## 2019-03-24 RX ORDER — PREDNISONE 20 MG/1
20 TABLET ORAL ONCE
Status: COMPLETED | OUTPATIENT
Start: 2019-03-25 | End: 2019-03-25

## 2019-03-24 RX ORDER — PREDNISONE 10 MG/1
10 TABLET ORAL ONCE
Status: COMPLETED | OUTPATIENT
Start: 2019-03-26 | End: 2019-03-26

## 2019-03-24 RX ORDER — INSULIN GLARGINE 100 [IU]/ML
25 INJECTION, SOLUTION SUBCUTANEOUS NIGHTLY
Status: DISCONTINUED | OUTPATIENT
Start: 2019-03-24 | End: 2019-03-24

## 2019-03-24 RX ORDER — METOPROLOL TARTRATE 50 MG/1
50 TABLET, FILM COATED ORAL 2 TIMES DAILY
Status: DISCONTINUED | OUTPATIENT
Start: 2019-03-24 | End: 2019-03-28

## 2019-03-24 RX ORDER — ALBUTEROL SULFATE 2.5 MG/3ML
2.5 SOLUTION RESPIRATORY (INHALATION) EVERY 6 HOURS PRN
Status: DISCONTINUED | OUTPATIENT
Start: 2019-03-24 | End: 2019-04-10 | Stop reason: HOSPADM

## 2019-03-24 RX ORDER — INSULIN GLARGINE 100 [IU]/ML
35 INJECTION, SOLUTION SUBCUTANEOUS NIGHTLY
Status: DISCONTINUED | OUTPATIENT
Start: 2019-03-24 | End: 2019-03-25

## 2019-03-24 RX ORDER — FUROSEMIDE 10 MG/ML
40 INJECTION INTRAMUSCULAR; INTRAVENOUS DAILY
Status: DISCONTINUED | OUTPATIENT
Start: 2019-03-24 | End: 2019-03-26

## 2019-03-24 RX ADMIN — PROPOFOL 25 MCG/KG/MIN: 10 INJECTION, EMULSION INTRAVENOUS at 17:05

## 2019-03-24 RX ADMIN — FAMOTIDINE 20 MG: 10 INJECTION, SOLUTION INTRAVENOUS at 08:20

## 2019-03-24 RX ADMIN — INSULIN LISPRO 12 UNITS: 100 INJECTION, SOLUTION INTRAVENOUS; SUBCUTANEOUS at 02:07

## 2019-03-24 RX ADMIN — PREDNISONE 30 MG: 20 TABLET ORAL at 08:19

## 2019-03-24 RX ADMIN — Medication 75 MG: at 10:15

## 2019-03-24 RX ADMIN — Medication 100 MG: at 08:20

## 2019-03-24 RX ADMIN — PROPOFOL 30 MCG/KG/MIN: 10 INJECTION, EMULSION INTRAVENOUS at 00:03

## 2019-03-24 RX ADMIN — INSULIN GLARGINE 35 UNITS: 100 INJECTION, SOLUTION SUBCUTANEOUS at 20:35

## 2019-03-24 RX ADMIN — HEPARIN SODIUM 18 UNITS/KG/HR: 10000 INJECTION INTRAVENOUS; SUBCUTANEOUS at 13:09

## 2019-03-24 RX ADMIN — AMIODARONE HYDROCHLORIDE 150 MG: 50 INJECTION, SOLUTION INTRAVENOUS at 10:28

## 2019-03-24 RX ADMIN — FAMOTIDINE 20 MG: 10 INJECTION, SOLUTION INTRAVENOUS at 20:31

## 2019-03-24 RX ADMIN — ALBUTEROL SULFATE 2.5 MG: 2.5 SOLUTION RESPIRATORY (INHALATION) at 03:15

## 2019-03-24 RX ADMIN — METOPROLOL TARTRATE 50 MG: 50 TABLET, FILM COATED ORAL at 20:31

## 2019-03-24 RX ADMIN — CEFTRIAXONE SODIUM 1 G: 1 INJECTION, POWDER, FOR SOLUTION INTRAMUSCULAR; INTRAVENOUS at 08:17

## 2019-03-24 RX ADMIN — AMIODARONE HYDROCHLORIDE 0.5 MG/MIN: 50 INJECTION, SOLUTION INTRAVENOUS at 15:30

## 2019-03-24 RX ADMIN — PROPOFOL 30 MCG/KG/MIN: 10 INJECTION, EMULSION INTRAVENOUS at 03:33

## 2019-03-24 RX ADMIN — Medication 75 MG: at 21:25

## 2019-03-24 RX ADMIN — METOPROLOL TARTRATE 25 MG: 25 TABLET ORAL at 08:20

## 2019-03-24 RX ADMIN — HEPARIN SODIUM 5000 UNITS: 1000 INJECTION INTRAVENOUS; SUBCUTANEOUS at 19:28

## 2019-03-24 RX ADMIN — PROPOFOL 30 MCG/KG/MIN: 10 INJECTION, EMULSION INTRAVENOUS at 06:30

## 2019-03-24 RX ADMIN — PROPOFOL 30 MCG/KG/MIN: 10 INJECTION, EMULSION INTRAVENOUS at 11:55

## 2019-03-24 RX ADMIN — SODIUM CHLORIDE 8.79 UNITS/HR: 9 INJECTION, SOLUTION INTRAVENOUS at 06:34

## 2019-03-24 RX ADMIN — MAGNESIUM HYDROXIDE 30 ML: 400 SUSPENSION ORAL at 15:39

## 2019-03-24 RX ADMIN — PROPOFOL 20 MCG/KG/MIN: 10 INJECTION, EMULSION INTRAVENOUS at 23:12

## 2019-03-24 RX ADMIN — FUROSEMIDE 40 MG: 10 INJECTION, SOLUTION INTRAMUSCULAR; INTRAVENOUS at 08:20

## 2019-03-24 RX ADMIN — CALCIUM CHLORIDE 2 G: 100 INJECTION, SOLUTION INTRAVENOUS; INTRAVENTRICULAR at 09:16

## 2019-03-24 RX ADMIN — SODIUM CHLORIDE 3.08 UNITS/HR: 9 INJECTION, SOLUTION INTRAVENOUS at 23:12

## 2019-03-24 RX ADMIN — SODIUM CHLORIDE 9.18 UNITS/HR: 9 INJECTION, SOLUTION INTRAVENOUS at 12:24

## 2019-03-24 RX ADMIN — IPRATROPIUM BROMIDE AND ALBUTEROL SULFATE 1 AMPULE: .5; 3 SOLUTION RESPIRATORY (INHALATION) at 08:02

## 2019-03-24 ASSESSMENT — PULMONARY FUNCTION TESTS
PIF_VALUE: 11
PIF_VALUE: 13
PIF_VALUE: 15
PIF_VALUE: 15
PIF_VALUE: 18
PIF_VALUE: 14
PIF_VALUE: 16
PIF_VALUE: 14
PIF_VALUE: 15
PIF_VALUE: 17
PIF_VALUE: 13
PIF_VALUE: 15

## 2019-03-24 NOTE — PLAN OF CARE
Problem: OXYGENATION/RESPIRATORY FUNCTION  Goal: Patient will maintain patent airway  Outcome: Ongoing  Goal: Patient will achieve/maintain normal respiratory rate/effort  Respiratory rate and effort will be within normal limits for the patient  Outcome: Ongoing    Problem: MECHANICAL VENTILATION  Goal: Patient will maintain patent airway  Outcome: Ongoing  Goal: Oral health is maintained or improved  Outcome: Ongoing  Goal: ET tube will be managed safely  Outcome: Ongoing  Goal: Ability to express needs and understand communication  Outcome: Ongoing  Goal: Mobility/activity is maintained at optimum level for patient  Outcome: Ongoing    Problem: ASPIRATION PRECAUTIONS  Goal: Patients risk of aspiration is minimized  Outcome: Ongoing    Problem: SKIN INTEGRITY  Goal: Skin integrity is maintained or improved  Outcome: Ongoing                    BRONCHOSPASM/BRONCHOCONSTRICTION     [x]         IMPROVE AERATION/BREATH SOUNDS  [x]   ADMINISTER BRONCHODILATOR THERAPY AS APPROPRIATE  [x]   ASSESS BREATH SOUNDS  [x]   IMPLEMENT AEROSOL/MDI PROTOCOL  [x]   PATIENT EDUCATION AS NEEDED

## 2019-03-24 NOTE — PROGRESS NOTES
response. Serum creatinine today: 1.23, up from  1.02 yesterday. Thank you for the consult. Will continue to follow.   Stephanie Babb, Pharm D.  3/24/2019  11:36 AM

## 2019-03-24 NOTE — PLAN OF CARE
Pt remained free from injury through the shift. Pt remained intubated and was repositioned q2h to prevent skin breakdown. Restraints are still indicated due to pt agitation and going for the ETT when restraints are off for turns.

## 2019-03-24 NOTE — PROGRESS NOTES
03/24/19 1815   Vent Information   Vent Mode PRVC   Vt Ordered 440 mL   Rate Set 14 bmp   FiO2  40 %   PEEP/CPAP 5     Wean completed.

## 2019-03-24 NOTE — PROGRESS NOTES
hours.    Invalid input(s): LDLCALCU  INR: No results for input(s): INR in the last 72 hours. Objective:   Vitals: /74   Pulse 75   Temp 98.6 °F (37 °C)   Resp 18   Ht 5' 8\" (1.727 m)   Wt 276 lb 14.4 oz (125.6 kg)   SpO2 98%   BMI 42.10 kg/m²   General appearance: intubated and sedated  HEENT: Head: Normocephalic, no lesions, without obvious abnormality. Neck: no JVD  Lungs: Coarse sounds  Heart: tachy s1+s2, no murmurs  Abdomen: soft, non-tender  Extremities: no edema  Neurologic: intubated and sedated    Assessment / Acute Cardiac Problems:   1. Influenza A  2. PNA  3. Acute respiratory failure, intubated on 3/21/19  4. Atrial flutter with RVR  5. Mild cardiomyopathy  6. Acute systolic CHF  7. Type II MI    Patient Active Problem List:     CAD (coronary artery disease)     DM type 2 (diabetes mellitus, type 2) ([facility])     Adult BMI > 30     Type 2 diabetes mellitus without complication, without long-term current use of insulin ([facility])     Influenza A with respiratory manifestations     Acute respiratory failure with hypoxia ([facility])     Influenza A     Atrial fibrillation with rapid ventricular response ([facility])     Acute hypercapnic respiratory failure ([facility])     NSTEMI (non-ST elevated myocardial infarction) [facility]     Community acquired pneumonia of left lower lobe of lung ([facility]      Plan of Treatment:   1. Will continue with IV Amio. Will bolus him again with 150 mg IV amio. 2. Increase the lopressor to 50 mg Q 6  3. Continue with IV heparin  4. Will recommend K>4, calcium and magnesium in normal range      Attending Cardiologist Addendum: I have reviewed and performed the history, physical, subjective, objective, assessment, and plan with the resident/fellow and agree with the note. I performed the history and physical personally. I have made changes to the note above as needed.     New parox AFib/Flutter- back on fib  - plan to rebolus with IV amio  - increase BB  - continue IV heparin  Remainder of therapy per pulm/cc team    Thank you for allowing me to participate in the care of this patient, please do not hesitate to call if you have any questions. Phylicia Kimbrough DO, [facility]  [facility]  [phone]

## 2019-03-24 NOTE — PROGRESS NOTES
INTENSIVE CARE UNIT  Resident Physician Progress Note    Patient - Chavez Delaney  Date of Admission -  3/19/2019  7:58 AM  Date of Evaluation -  3/24/2019  Room and Bed Number -  0105/0105-01   Hospital Day - 5    Chief complaint influenza, pneumonia, A. fib with RVR  SUBJECTIVE:     OVERNIGHT EVENTS:    1. Patient seen and examined at bedside, patient is intubated and sedated   2. Morning ABG showed pH 7.4, PCO2 63.1, PO2 60.4, bicarb 39,  vent settings are FiO2 35% PEEP 8, tidal volume 440, respiratory rate 14  3. A. fib is rate controlled, currently on amiodarone GT and heparin  4. Remained on Lasix 60 mg twice a day yesterday, urine output was 2.6 L last 24 hours, he is -4.5 L since admission, creatinine not trending up 1.2 from 1 yesterday, bicarb 33.  5.  Insulin gtt discontinued and bridged with Lantus 15 units, the blood sugars remain in 300s, he got 21 units of lispro overnight       AWAKE & FOLLOWING COMMANDS:  [] No   [x] Yes, gets agitated     SECRETIONS Amount:  [x] Small [] Moderate  [] Large  [] None  Color:     [x] White [] Colored  [] Bloody    SEDATION:  RAAS Score:  [x] Propofol gtt  [] Versed gtt  [] Ativan gtt   [] No Sedation    PARALYZED:  [x] No    [] Yes    VASOPRESSORS:  [x] No    [] Yes  [] Levophed [] Dopamine [] Vasopressin  [] Dobutamine [] Phenylephrine [] Epinephrine    Review of Systems -  Intubated , sedated  OBJECTIVE:     VITAL SIGNS:  /67   Pulse 92   Temp 98.6 °F (37 °C)   Resp 20   Ht 5' 8\" (1.727 m)   Wt 276 lb 14.4 oz (125.6 kg)   SpO2 99%   BMI 42.10 kg/m²   Tmax over 24 hours:  Temp (24hrs), Av.8 °F (37.1 °C), Min:98.4 °F (36.9 °C), Max:99.1 °F (37.3 °C)      Patient Vitals for the past 8 hrs:   BP Temp Temp src Pulse Resp SpO2 Weight   19 0801 -- -- -- 92 20 99 % --   19 0752 -- 98.6 °F (37 °C) -- 82 18 97 % --   19 0751 -- -- -- 81 19 97 % --   19 0700 117/67 -- -- 87 19 98 % --   19 0603 115/62 -- -- 83 19 95 % --   19 0600 -- -- -- -- -- -- 276 lb 14.4 oz (125.6 kg)   03/24/19 0500 125/84 -- -- 94 21 97 % --   03/24/19 0400 (!) 97/52 98.4 °F (36.9 °C) Oral 89 19 93 % --   03/24/19 0317 -- -- -- 71 17 96 % --   03/24/19 0300 (!) 94/52 -- -- 76 19 96 % --   03/24/19 0200 98/62 -- -- 71 20 95 % --         Intake/Output Summary (Last 24 hours) at 3/24/2019 0907  Last data filed at 3/24/2019 0750  Gross per 24 hour   Intake 2816 ml   Output 2632 ml   Net 184 ml     Date 03/24/19 0000 - 03/24/19 2359   Shift 7532-5551 2058-6998 3229-4529 24 Hour Total   INTAKE   I.V.(mL/kg) 525(4.2)   525(4.2)   NG/GT(mL/kg) 621(4.9)   621(4.9)   Shift Total(mL/kg) 1146(9.1)   1146(9.1)   OUTPUT   Urine(mL/kg/hr) 527(0.5)   527   Shift Total(mL/kg) 527(4.2)   527(4.2)   Weight (kg) 125.6 125.6 125.6 125.6     Wt Readings from Last 3 Encounters:   03/24/19 276 lb 14.4 oz (125.6 kg)   03/19/19 294 lb 11.2 oz (133.7 kg)   03/18/19 300 lb (136.1 kg)     Body mass index is 42.1 kg/m². PHYSICAL EXAM:  · General appearance: intubated sedated   · HEENT: Atraumatic, normocephalic, neck supple, normal EOM, thyroid normal, no lymphadenopathy   · Lungs: Ventilating all lobes. Prolonged expiration is still wheezing is better.   Posterior basilar rales and infrascapular rales are still present   · Heart: Regular heart rate, tachycardic, S1, S2 normal, no murmur   · Abdomen: soft, non-tender; bowel sounds normal; no masses,  no organomegaly  · Extremities: No cyanosis or edema  · Neurological:  Intubated sedated , opens eyes on pain stimuli, moves all extremities but gets agitated    MEDICATIONS:  Scheduled Meds:   calcium chloride IVPB  2 g Intravenous Once    insulin glargine  25 Units Subcutaneous Nightly    furosemide  40 mg Intravenous Daily    metoprolol tartrate  25 mg Per NG tube BID    predniSONE  30 mg Oral Daily    insulin lispro  0-18 Units Subcutaneous Q6H    docusate  100 mg Oral Daily    oseltamivir 6mg/ml  75 mg Oral BID    sodium chloride flush  10 mL Intravenous 2 times per day    famotidine (PEPCID) injection  20 mg Intravenous BID    cefTRIAXone (ROCEPHIN) IV  1 g Intravenous Q24H    ipratropium-albuterol  1 ampule Inhalation Q4H WA     Continuous Infusions:   insulin (HUMAN R) non-weight based infusion 11.28 Units/hr (03/24/19 0756)    amiodarone 0.5 mg/min (03/23/19 2131)    fentaNYL Stopped (03/24/19 0808)    propofol 20 mcg/kg/min (03/24/19 0755)    heparin (porcine) 11.59 Units/kg/hr (03/23/19 2206)    dextrose       PRN Meds:     magnesium hydroxide 30 mL Daily PRN   naloxone 0.4 mg PRN   LORazepam 0.5 mg Q8H PRN   sodium chloride flush 10 mL PRN   ondansetron 4 mg Q6H PRN   heparin (porcine) 10,000 Units PRN   heparin (porcine) 5,000 Units PRN   glucose 15 g PRN   dextrose 12.5 g PRN   glucagon (rDNA) 1 mg PRN   dextrose 100 mL/hr PRN   albuterol 2.5 mg As Directed RT PRN   metoprolol 2.5 mg Q4H PRN       SUPPORT DEVICES: [] Ventilator [x] BIPAP  [] Nasal Cannula [] Room Air  .   Lab Results   Component Value Date    PHART 7.310 03/18/2019    LKB6ODG 51.7 03/18/2019    PO2ART 67.8 03/18/2019    NTB0JAN 25.4 03/18/2019    FXW7AMC 41 03/24/2019    P0RRKVGS 91.7 03/18/2019    FIO2 30.0 03/24/2019         DATA:  Complete Blood Count:   Recent Labs     03/22/19  0459 03/23/19  0442 03/24/19  0340   WBC 11.3 15.9* 12.7*   RBC 3.65* 4.12* 3.72*   HGB 11.0* 12.5* 11.3*   HCT 35.7* 40.0* 36.8*   MCV 97.8 97.1 98.9   MCH 30.1 30.3 30.4   MCHC 30.8 31.3 30.7   RDW 13.3 13.4 13.4    205 185   MPV 9.8 10.0 10.2        Last 3 Blood Glucose:   Recent Labs     03/22/19  0459 03/23/19  0442 03/24/19  0340   GLUCOSE 148* 203* 378*        PT/INR:    Lab Results   Component Value Date    PROTIME 12.0 03/15/2019    INR 1.2 03/15/2019     PTT:    Lab Results   Component Value Date    APTT 67.7 03/24/2019       Comprehensive Metabolic Profile:   Recent Labs     03/22/19  0459 03/23/19  0442 03/24/19  0340   * 138 133*   K 4.1 4.1 3.9 CL 99 92* 89*   CO2 27 34* 33*   BUN 46* 46* 52*   CREATININE 1.01 1.02 1.23*   GLUCOSE 148* 203* 378*   CALCIUM 8.8 8.7 8.5*      Magnesium:   Lab Results   Component Value Date    MG 2.4 03/24/2019    MG 2.5 03/23/2019    MG 2.3 03/22/2019     Phosphorus:   Lab Results   Component Value Date    PHOS 4.8 03/24/2019    PHOS 4.6 03/23/2019    PHOS 3.2 03/22/2019     Ionized Calcium:   Lab Results   Component Value Date    CAION 1.10 03/24/2019    CAION 1.08 03/23/2019    CAION 1.11 03/22/2019        Urinalysis:   Lab Results   Component Value Date    NITRU NEGATIVE 03/18/2019    COLORU YELLOW 03/18/2019    PHUR 5.5 03/18/2019    WBCUA 0 TO 2 03/18/2019    RBCUA None 03/18/2019    MUCUS NOT REPORTED 03/18/2019    TRICHOMONAS NOT REPORTED 03/18/2019    YEAST NOT REPORTED 03/18/2019    BACTERIA NOT REPORTED 03/18/2019    SPECGRAV 1.010 03/18/2019    LEUKOCYTESUR NEGATIVE 03/18/2019    UROBILINOGEN Normal 03/18/2019    BILIRUBINUR NEGATIVE 03/18/2019    GLUCOSEU 2+ 03/18/2019    KETUA NEGATIVE 03/18/2019    AMORPHOUS NOT REPORTED 03/18/2019       HgBA1c:    Lab Results   Component Value Date    LABA1C 8.6 03/18/2019     TSH:  No results found for: TSH  Lactic Acid:   Lab Results   Component Value Date    LACTA 2.2 03/18/2019    LACTA 2.6 03/18/2019      Troponin: No results for input(s): TROPONINI in the last 72 hours. Results for Brie Chaudhari (MRN [id number]) as of 3/23/2019 18:06   Ref.  Range 3/23/2019 04:07   POC pH Latest Ref Range: 7.350 - 7.450  7.437   POC pH Temp Unknown NOT REPORTED   POC pCO2 Latest Ref Range: 35.0 - 48.0 mm Hg 63.7 (H)   POC pCO2 Temp Latest Units: mm Hg NOT REPORTED   POC PO2 Latest Ref Range: 83.0 - 108.0 mm Hg 65.1 (L)   POC pO2 Temp Latest Units: mm Hg NOT REPORTED   POC HCO3 Latest Ref Range: 21.0 - 28.0 mmol/L 42.9 (HH)   POC O2 SAT Latest Ref Range: 94.0 - 98.0 % 92 (L)     ASSESSMENT:     Patient Active Problem List    Diagnosis Date Noted    Community acquired pneumonia of left lower lobe of lung ([facility]     Influenza A with respiratory manifestations 03/18/2019    Acute respiratory failure with hypoxia ([facility] 03/18/2019    Influenza A 03/18/2019    Acute hypercapnic respiratory failure ([facility] 03/18/2019    NSTEMI (non-ST elevated myocardial infarction) ([facility])     Type 2 diabetes mellitus without complication, without long-term current use of insulin ([facility] 09/06/2016    Adult BMI > 30 05/06/2016    CAD (coronary artery disease)     DM type 2 (diabetes mellitus, type 2) ([facility])     Atrial fibrillation with rapid ventricular response ([facility] 01/01/2007          PLAN:     WEAN PER PROTOCOL:  [] No   [x] Yes  [] N/A    ICU PROPHYLAXIS:  Stress ulcer:  [] PPI Agent  [x] [id number] [] Sucralfate  [] Other:  VTE:   [] Enoxaparin  [x] he is on heparin GTT  [] EPC Cuffs    NUTRITION:  [x] NPO [] Tube Feeding (Specify: ) [] TPN  [] PO    HOME MEDS RECONCILED: [x] No  [] Yes    CONSULTATION NEEDED:  [x] No  [] Yes    FAMILY UPDATED:    [] No  [x] Yes    TRANSFER OUT OF ICU:   [x] No  [] Yes        Additional Assessment:  Influenza A  Community acquired pneumonia with likely strep pneumoniae  Atrial fibrillation with RVRPersistent   Acute exacerbation of CHF sec due to atrial fibrillationPersistent   Acute  hypoxic hypercapnic respiratory failureDue to pulmonary edema, persistent without any significant change   Diabetes mellitusType II with hyperglycemia   Obstructive sleep apnea    Plan:  Continue mechanical ventilation, keep SPO2 more than 92%  On propofol, decrease fentanyl as appropriate  Continue on ceftriaxone for 7 days    Continue Tamiflu for total 5 days  Taper prednisone, today 30 mg , tomorrow 20 , then 10 and then stop   Breathing treatment with DuoNeb  Decrease Lasix to 40 mg once daily, as due to increasing creatinine  Monitor urine output and total intake  Replace electrolytes as appropriate.     We will increase Lantus 35 units now, continue with insulin GTT for now until requirement decreased

## 2019-03-25 ENCOUNTER — APPOINTMENT (OUTPATIENT)
Dept: GENERAL RADIOLOGY | Age: 74
DRG: 207 | End: 2019-03-25
Attending: INTERNAL MEDICINE
Payer: MEDICARE

## 2019-03-25 LAB
ABSOLUTE EOS #: 0.14 K/UL (ref 0–0.4)
ABSOLUTE IMMATURE GRANULOCYTE: 0 K/UL (ref 0–0.3)
ABSOLUTE LYMPH #: 2.16 K/UL (ref 1–4.8)
ABSOLUTE MONO #: 2.03 K/UL (ref 0.1–0.8)
ALLEN TEST: ABNORMAL
ANION GAP SERPL CALCULATED.3IONS-SCNC: 8 MMOL/L (ref 9–17)
BASOPHILS # BLD: 0 % (ref 0–2)
BASOPHILS ABSOLUTE: 0 K/UL (ref 0–0.2)
BUN BLDV-MCNC: 53 MG/DL (ref 8–23)
BUN/CREAT BLD: ABNORMAL (ref 9–20)
CALCIUM IONIZED: 1.25 MMOL/L (ref 1.13–1.33)
CALCIUM SERPL-MCNC: 9.3 MG/DL (ref 8.6–10.4)
CHLORIDE BLD-SCNC: 98 MMOL/L (ref 98–107)
CO2: 35 MMOL/L (ref 20–31)
CREAT SERPL-MCNC: 1.02 MG/DL (ref 0.7–1.2)
DIFFERENTIAL TYPE: ABNORMAL
EOSINOPHILS RELATIVE PERCENT: 1 % (ref 1–4)
FIO2: 30
GFR AFRICAN AMERICAN: >60 ML/MIN
GFR NON-AFRICAN AMERICAN: >60 ML/MIN
GFR SERPL CREATININE-BSD FRML MDRD: ABNORMAL ML/MIN/{1.73_M2}
GFR SERPL CREATININE-BSD FRML MDRD: ABNORMAL ML/MIN/{1.73_M2}
GLUCOSE BLD-MCNC: 124 MG/DL (ref 75–110)
GLUCOSE BLD-MCNC: 125 MG/DL (ref 75–110)
GLUCOSE BLD-MCNC: 130 MG/DL (ref 75–110)
GLUCOSE BLD-MCNC: 131 MG/DL (ref 75–110)
GLUCOSE BLD-MCNC: 135 MG/DL (ref 75–110)
GLUCOSE BLD-MCNC: 135 MG/DL (ref 75–110)
GLUCOSE BLD-MCNC: 151 MG/DL (ref 70–99)
GLUCOSE BLD-MCNC: 154 MG/DL (ref 75–110)
GLUCOSE BLD-MCNC: 155 MG/DL (ref 75–110)
GLUCOSE BLD-MCNC: 170 MG/DL (ref 75–110)
GLUCOSE BLD-MCNC: 173 MG/DL (ref 75–110)
GLUCOSE BLD-MCNC: 238 MG/DL (ref 75–110)
GLUCOSE BLD-MCNC: 253 MG/DL (ref 75–110)
GLUCOSE BLD-MCNC: 289 MG/DL (ref 75–110)
HCT VFR BLD CALC: 35 % (ref 40.7–50.3)
HEMOGLOBIN: 11 G/DL (ref 13–17)
IMMATURE GRANULOCYTES: 0 %
LYMPHOCYTES # BLD: 16 % (ref 24–44)
MAGNESIUM: 2.2 MG/DL (ref 1.6–2.6)
MCH RBC QN AUTO: 30.4 PG (ref 25.2–33.5)
MCHC RBC AUTO-ENTMCNC: 31.4 G/DL (ref 28.4–34.8)
MCV RBC AUTO: 96.7 FL (ref 82.6–102.9)
MODE: ABNORMAL
MONOCYTES # BLD: 15 % (ref 1–7)
MORPHOLOGY: NORMAL
NEGATIVE BASE EXCESS, ART: ABNORMAL (ref 0–2)
NRBC AUTOMATED: 0 PER 100 WBC
O2 DEVICE/FLOW/%: ABNORMAL
PARTIAL THROMBOPLASTIN TIME: 115.8 SEC (ref 20.5–30.5)
PARTIAL THROMBOPLASTIN TIME: 69.9 SEC (ref 20.5–30.5)
PARTIAL THROMBOPLASTIN TIME: 79.8 SEC (ref 20.5–30.5)
PATIENT TEMP: ABNORMAL
PDW BLD-RTO: 13.3 % (ref 11.8–14.4)
PHOSPHORUS: 3.7 MG/DL (ref 2.5–4.5)
PLATELET # BLD: 182 K/UL (ref 138–453)
PLATELET ESTIMATE: ABNORMAL
PMV BLD AUTO: 10.5 FL (ref 8.1–13.5)
POC HCO3: 39.1 MMOL/L (ref 21–28)
POC O2 SATURATION: 90 % (ref 94–98)
POC PCO2 TEMP: ABNORMAL MM HG
POC PCO2: 56.2 MM HG (ref 35–48)
POC PH TEMP: ABNORMAL
POC PH: 7.45 (ref 7.35–7.45)
POC PO2 TEMP: ABNORMAL MM HG
POC PO2: 57.4 MM HG (ref 83–108)
POSITIVE BASE EXCESS, ART: 12 (ref 0–3)
POTASSIUM SERPL-SCNC: 3.8 MMOL/L (ref 3.7–5.3)
RBC # BLD: 3.62 M/UL (ref 4.21–5.77)
RBC # BLD: ABNORMAL 10*6/UL
SAMPLE SITE: ABNORMAL
SEG NEUTROPHILS: 68 % (ref 36–66)
SEGMENTED NEUTROPHILS ABSOLUTE COUNT: 9.17 K/UL (ref 1.8–7.7)
SODIUM BLD-SCNC: 141 MMOL/L (ref 135–144)
TCO2 (CALC), ART: 41 MMOL/L (ref 22–29)
WBC # BLD: 13.5 K/UL (ref 3.5–11.3)
WBC # BLD: ABNORMAL 10*3/UL

## 2019-03-25 PROCEDURE — 6360000002 HC RX W HCPCS: Performed by: STUDENT IN AN ORGANIZED HEALTH CARE EDUCATION/TRAINING PROGRAM

## 2019-03-25 PROCEDURE — 97161 PT EVAL LOW COMPLEX 20 MIN: CPT

## 2019-03-25 PROCEDURE — 2700000000 HC OXYGEN THERAPY PER DAY

## 2019-03-25 PROCEDURE — 6370000000 HC RX 637 (ALT 250 FOR IP): Performed by: STUDENT IN AN ORGANIZED HEALTH CARE EDUCATION/TRAINING PROGRAM

## 2019-03-25 PROCEDURE — 2580000003 HC RX 258: Performed by: INTERNAL MEDICINE

## 2019-03-25 PROCEDURE — 94762 N-INVAS EAR/PLS OXIMTRY CONT: CPT

## 2019-03-25 PROCEDURE — 6360000002 HC RX W HCPCS: Performed by: INTERNAL MEDICINE

## 2019-03-25 PROCEDURE — 2500000003 HC RX 250 WO HCPCS: Performed by: STUDENT IN AN ORGANIZED HEALTH CARE EDUCATION/TRAINING PROGRAM

## 2019-03-25 PROCEDURE — 6370000000 HC RX 637 (ALT 250 FOR IP): Performed by: INTERNAL MEDICINE

## 2019-03-25 PROCEDURE — 82803 BLOOD GASES ANY COMBINATION: CPT

## 2019-03-25 PROCEDURE — 82330 ASSAY OF CALCIUM: CPT

## 2019-03-25 PROCEDURE — 94003 VENT MGMT INPAT SUBQ DAY: CPT

## 2019-03-25 PROCEDURE — 85730 THROMBOPLASTIN TIME PARTIAL: CPT

## 2019-03-25 PROCEDURE — 76937 US GUIDE VASCULAR ACCESS: CPT

## 2019-03-25 PROCEDURE — 36415 COLL VENOUS BLD VENIPUNCTURE: CPT

## 2019-03-25 PROCEDURE — 82947 ASSAY GLUCOSE BLOOD QUANT: CPT

## 2019-03-25 PROCEDURE — 80048 BASIC METABOLIC PNL TOTAL CA: CPT

## 2019-03-25 PROCEDURE — 36600 WITHDRAWAL OF ARTERIAL BLOOD: CPT

## 2019-03-25 PROCEDURE — 2000000000 HC ICU R&B

## 2019-03-25 PROCEDURE — 84100 ASSAY OF PHOSPHORUS: CPT

## 2019-03-25 PROCEDURE — 83735 ASSAY OF MAGNESIUM: CPT

## 2019-03-25 PROCEDURE — 51798 US URINE CAPACITY MEASURE: CPT | Performed by: NURSE PRACTITIONER

## 2019-03-25 PROCEDURE — 99291 CRITICAL CARE FIRST HOUR: CPT | Performed by: INTERNAL MEDICINE

## 2019-03-25 PROCEDURE — 71045 X-RAY EXAM CHEST 1 VIEW: CPT

## 2019-03-25 PROCEDURE — 94770 HC ETCO2 MONITOR DAILY: CPT

## 2019-03-25 PROCEDURE — 97110 THERAPEUTIC EXERCISES: CPT

## 2019-03-25 PROCEDURE — 85025 COMPLETE CBC W/AUTO DIFF WBC: CPT

## 2019-03-25 RX ORDER — INSULIN GLARGINE 100 [IU]/ML
35 INJECTION, SOLUTION SUBCUTANEOUS DAILY
Status: DISCONTINUED | OUTPATIENT
Start: 2019-03-25 | End: 2019-03-28

## 2019-03-25 RX ORDER — FAMOTIDINE 20 MG/1
20 TABLET, FILM COATED ORAL 2 TIMES DAILY
Status: DISCONTINUED | OUTPATIENT
Start: 2019-03-25 | End: 2019-03-28

## 2019-03-25 RX ADMIN — HEPARIN SODIUM 17 UNITS/KG/HR: 10000 INJECTION INTRAVENOUS; SUBCUTANEOUS at 13:39

## 2019-03-25 RX ADMIN — INSULIN LISPRO 6 UNITS: 100 INJECTION, SOLUTION INTRAVENOUS; SUBCUTANEOUS at 23:23

## 2019-03-25 RX ADMIN — PROPOFOL 25 MCG/KG/MIN: 10 INJECTION, EMULSION INTRAVENOUS at 03:03

## 2019-03-25 RX ADMIN — PROPOFOL 25 MCG/KG/MIN: 10 INJECTION, EMULSION INTRAVENOUS at 08:13

## 2019-03-25 RX ADMIN — FAMOTIDINE 20 MG: 20 TABLET, FILM COATED ORAL at 21:15

## 2019-03-25 RX ADMIN — METOPROLOL TARTRATE 50 MG: 50 TABLET, FILM COATED ORAL at 21:15

## 2019-03-25 RX ADMIN — AMIODARONE HYDROCHLORIDE 0.5 MG/MIN: 50 INJECTION, SOLUTION INTRAVENOUS at 08:12

## 2019-03-25 RX ADMIN — CEFTRIAXONE SODIUM 1 G: 1 INJECTION, POWDER, FOR SOLUTION INTRAMUSCULAR; INTRAVENOUS at 08:16

## 2019-03-25 RX ADMIN — FUROSEMIDE 40 MG: 10 INJECTION, SOLUTION INTRAMUSCULAR; INTRAVENOUS at 08:18

## 2019-03-25 RX ADMIN — PROPOFOL 20 MCG/KG/MIN: 10 INJECTION, EMULSION INTRAVENOUS at 09:42

## 2019-03-25 RX ADMIN — INSULIN LISPRO 9 UNITS: 100 INJECTION, SOLUTION INTRAVENOUS; SUBCUTANEOUS at 11:51

## 2019-03-25 RX ADMIN — METOPROLOL TARTRATE 50 MG: 50 TABLET, FILM COATED ORAL at 08:22

## 2019-03-25 RX ADMIN — PREDNISONE 20 MG: 20 TABLET ORAL at 08:42

## 2019-03-25 RX ADMIN — FAMOTIDINE 20 MG: 10 INJECTION, SOLUTION INTRAVENOUS at 08:18

## 2019-03-25 RX ADMIN — Medication 100 MG: at 08:22

## 2019-03-25 RX ADMIN — AMIODARONE HYDROCHLORIDE 0.5 MG/MIN: 50 INJECTION, SOLUTION INTRAVENOUS at 20:00

## 2019-03-25 RX ADMIN — PROPOFOL 25 MCG/KG/MIN: 10 INJECTION, EMULSION INTRAVENOUS at 15:14

## 2019-03-25 RX ADMIN — MAGNESIUM HYDROXIDE 30 ML: 400 SUSPENSION ORAL at 08:41

## 2019-03-25 RX ADMIN — HEPARIN SODIUM 20.14 UNITS/KG/HR: 10000 INJECTION INTRAVENOUS; SUBCUTANEOUS at 01:01

## 2019-03-25 RX ADMIN — INSULIN LISPRO 9 UNITS: 100 INJECTION, SOLUTION INTRAVENOUS; SUBCUTANEOUS at 16:35

## 2019-03-25 RX ADMIN — PROPOFOL 20 MCG/KG/MIN: 10 INJECTION, EMULSION INTRAVENOUS at 21:15

## 2019-03-25 RX ADMIN — INSULIN GLARGINE 35 UNITS: 100 INJECTION, SOLUTION SUBCUTANEOUS at 09:39

## 2019-03-25 RX ADMIN — Medication 75 MG: at 08:53

## 2019-03-25 ASSESSMENT — PULMONARY FUNCTION TESTS
PIF_VALUE: 21
PIF_VALUE: 21
PIF_VALUE: 19
PIF_VALUE: 22
PIF_VALUE: 20
PIF_VALUE: 17
PIF_VALUE: 19
PIF_VALUE: 16
PIF_VALUE: 18
PIF_VALUE: 12
PIF_VALUE: 22
PIF_VALUE: 19
PIF_VALUE: 12
PIF_VALUE: 17
PIF_VALUE: 11
PIF_VALUE: 18
PIF_VALUE: 18
PIF_VALUE: 22
PIF_VALUE: 19

## 2019-03-25 NOTE — PROGRESS NOTES
Nutrition Assessment (Enteral Nutrition)    Type and Reason for Visit: Reassess    Nutrition Recommendations: Recommend continue Vital AF (semielemental) tube feed at 50 ml per hour (1440 kcal, 90 g protein) which adequately meets nutrition needs with present rate of diprivan (1860 kcal)    Nutrition Assessment: Tolerating tube feed at goal    Malnutrition Assessment:  · Malnutrition Status: At risk for malnutrition  · Context: Acute illness or injury  · Findings of the 6 clinical characteristics of malnutrition (Minimum of 2 out of 6 clinical characteristics is required to make the diagnosis of moderate or severe Protein Calorie Malnutrition based on AND/ASPEN Guidelines):  1. Energy Intake-Less than or equal to 50% of estimated energy requirement, Greater than or equal to 5 days    2. Weight Loss-No significant weight loss,    3. Fat Loss-No significant subcutaneous fat loss,    4. Muscle Loss-No significant muscle mass loss,    5. Fluid Accumulation-Moderate to severe fluid accumulation, Extremities  6.  Strength-Not measured    Nutrition Risk Level:  Moderate    Nutrition Needs:  · Estimated Daily Total Kcal: 22-25 kcal/hu=5603-1414 kcal  · Estimated Daily Protein (g): 1.2-1.4 g/kg= g    Nutrition Diagnosis:   · Problem: Inadequate oral intake  · Etiology: related to Impaired respiratory function-inability to consume food     Signs and symptoms:  as evidenced by Nutrition support - EN    Objective Information:  · Nutrition-Focused Physical Findings: Last documented BM 3/18  · Current Nutrition Therapies:  · Oral Diet Orders: NPO   · Tube Feeding (TF) Orders:   · Formula: Semi-elemental  · Rate (ml/hr):50 ml per hour    · Volume (ml/day): 1200 ml  · Duration: Continuous  · Goal TF & Flush Orders Provides: 1440 kcal, 90 g protein  · Additional Calories: Propofol at 15.9 ml/hr= 420 kcal/day  · Anthropometric Measures:  · Ht: 5' 8\" (172.7 cm)   · Current Body Wt: 281 lb (127.5 kg)  · Admission Body Wt: 292 lb (132.5 kg)  · Ideal Body Wt: 154 lb (69.9 kg), % Ideal Body 189%  · BMI Classification: BMI > or equal to 40.0 Obese Class III    Nutrition Interventions:   Continue current Tube Feeding  Continued Inpatient Monitoring, Education Not Indicated    Nutrition Evaluation:   · Evaluation: Goal achieved   · Goals: Meet more than 75% of nutrition needs   · Monitoring: TF Intake, TF Tolerance, Monitor Bowel Function      Electronically signed by Mayte Chamberlain RD, LIZ on 3/25/19 at 1:45 PM    Contact Number: [phone]

## 2019-03-25 NOTE — PROGRESS NOTES
03/25/19 1050   Vent Information   Vent Type Servo i   Vent Mode PRVC   Vt Ordered 550 mL   Rate Set 15 bmp   FiO2  40 %   Sensitivity 5   PEEP/CPAP 5   I Time/ I Time % 0.9 s   Pt with RR 40's, pt back to [facility] to rest.

## 2019-03-25 NOTE — PLAN OF CARE
Pt remained free of injury during shift. Restraints still needed due to patient's reaction to go for ETT tube when restraints are taken off for repositioning. Tube feeds are tolerated and at goal. Pt repositioned q2h to prevent skin breakdown.

## 2019-03-25 NOTE — PROGRESS NOTES
INTENSIVE CARE UNIT  Resident Physician Progress Note    Patient - Katelynn Ramirez  Date of Admission -  3/19/2019  7:58 AM  Date of Evaluation -  3/25/2019  Room and Bed Number -  0105/0105-01   Hospital Day - 6    Chief complaint influenza, pneumonia, A. fib with RVR  SUBJECTIVE:     OVERNIGHT EVENTS:    1. Patient seen and examined at bedside, patient is intubated and sedated   2. Morning ABG showed pH 7.4, PCO2 56., PO2 57.4, bicarb 39,  vent settings are FiO2 35% PEEP 8, tidal volume 440, respiratory rate 14, he was started on weaning trail , on PS 8 /5, he was tachypnea with RR 29 and inhaling Tidal volume 300's  3.   given once dose of 150 mg bolus of amiodarone yesterday , HR is controlled well but still in Afib   4.  CXR is looking better yesterday, lasix dose was decreased to 40 once daily , UOP 1.8 L , creatinine is better now   Given 35 units lantus yesterday he remained on lantus over night , currently  on  Insuline gtt 1 unts/hr, Lat     AWAKE & FOLLOWING COMMANDS:  [] No   [x] Yes, gets agitated     SECRETIONS Amount:  [x] Small [] Moderate  [] Large  [] None  Color:     [x] White [] Colored  [x] Bloody    SEDATION:  RAAS Score:  [x] Propofol gtt  [] Versed gtt  [] Ativan gtt   [] No Sedation    PARALYZED:  [x] No    [] Yes    VASOPRESSORS:  [x] No    [] Yes  [] Levophed [] Dopamine [] Vasopressin  [] Dobutamine [] Phenylephrine [] Epinephrine    Review of Systems -  Intubated , sedated  OBJECTIVE:     VITAL SIGNS:  BP (!) 106/47   Pulse 70   Temp 98.8 °F (37.1 °C) (Oral)   Resp 23   Ht 5' 8\" (1.727 m)   Wt 281 lb 4.9 oz (127.6 kg)   SpO2 94%   BMI 42.77 kg/m²   Tmax over 24 hours:  Temp (24hrs), Av.9 °F (37.2 °C), Min:98.4 °F (36.9 °C), Max:99.1 °F (37.3 °C)      Patient Vitals for the past 8 hrs:   BP Temp Temp src Pulse Resp SpO2 Weight   19 1100 (!) 106/47 -- -- 70 23 94 % --   19 1000 112/61 -- -- 66 29 93 % --   19 0940 -- -- -- 67 27 93 % --   19 0938 -- -- -- 70 24 93 % --   03/25/19 0932 -- -- -- 68 20 94 % --   03/25/19 0900 (!) 115/56 -- -- 72 23 91 % --   03/25/19 0800 (!) 96/49 98.8 °F (37.1 °C) Oral 66 23 91 % --   03/25/19 0700 (!) 101/56 -- -- 73 20 95 % --   03/25/19 0600 (!) 91/53 -- -- 68 20 94 % 281 lb 4.9 oz (127.6 kg)   03/25/19 0500 98/63 -- -- 68 20 94 % --   03/25/19 0400 (!) 104/53 99.1 °F (37.3 °C) Oral 66 19 93 % --   03/25/19 0331 -- -- -- 67 20 95 % --         Intake/Output Summary (Last 24 hours) at 3/25/2019 1129  Last data filed at 3/25/2019 0855  Gross per 24 hour   Intake 2515 ml   Output 1381 ml   Net 1134 ml     Date 03/25/19 0000 - 03/25/19 2359   Shift 3188-2152 9638-9227 4890-9560 24 Hour Total   INTAKE   I.V.(mL/kg) 562(4.4) 78(0.6)  640(5)   NG/GT(mL/kg) 436(3.4) 167(1.3)  603(4.7)   Shift Total(mL/kg) 998(7.8) 245(1.9)  1243(9.7)   OUTPUT   Urine(mL/kg/hr) 202(0.2) 260  462   Shift Total(mL/kg) 202(1.6) 260(2)  462(3.6)   Weight (kg) 127.6 127.6 127.6 127.6     Wt Readings from Last 3 Encounters:   03/25/19 281 lb 4.9 oz (127.6 kg)   03/19/19 294 lb 11.2 oz (133.7 kg)   03/18/19 300 lb (136.1 kg)     Body mass index is 42.77 kg/m². PHYSICAL EXAM:  · General appearance: intubated sedated   · HEENT: Atraumatic, normocephalic, neck supple, normal EOM, thyroid normal, no lymphadenopathy   · Lungs: Ventilating all lobes. Prolonged expiration is still wheezing is better.   Posterior basilar rales and infrascapular rales are still present   · Heart: Regular heart rate, tachycardic, S1, S2 normal, no murmur   · Abdomen: soft, non-tender; bowel sounds normal; no masses,  no organomegaly  · Extremities: No cyanosis or edema  · Neurological:  Intubated sedated , opens eyes on pain stimuli, moves all extremities but gets agitated    MEDICATIONS:  Scheduled Meds:   insulin glargine  35 Units Subcutaneous Daily    famotidine  20 mg Per NG tube BID    insulin lispro  0-18 Units Subcutaneous Q6H    furosemide  40 mg Intravenous Daily    metoprolol tartrate  50 mg Per NG tube BID    [START ON 3/26/2019] predniSONE  10 mg Oral Once    docusate  100 mg Oral Daily    sodium chloride flush  10 mL Intravenous 2 times per day     Continuous Infusions:   heparin 25,000 units in 0.9% sodium chloride 250 mL infusion 17 Units/kg/hr (03/25/19 0308)    amiodarone 0.5 mg/min (03/25/19 0812)    propofol 20 mcg/kg/min (03/25/19 0942)    dextrose       PRN Meds:     magnesium hydroxide 30 mL Daily PRN   albuterol 2.5 mg Q6H PRN   magnesium hydroxide 30 mL Daily PRN   naloxone 0.4 mg PRN   LORazepam 0.5 mg Q8H PRN   sodium chloride flush 10 mL PRN   ondansetron 4 mg Q6H PRN   heparin (porcine) 10,000 Units PRN   heparin (porcine) 5,000 Units PRN   glucose 15 g PRN   dextrose 12.5 g PRN   glucagon (rDNA) 1 mg PRN   dextrose 100 mL/hr PRN   metoprolol 2.5 mg Q4H PRN       SUPPORT DEVICES: [] Ventilator [x] BIPAP  [] Nasal Cannula [] Room Air  .   Lab Results   Component Value Date    PHART 7.310 03/18/2019    IYM0OKR 51.7 03/18/2019    PO2ART 67.8 03/18/2019    SKE5LJR 25.4 03/18/2019    CIZ8MTQ 41 03/25/2019    D2WKTVUV 91.7 03/18/2019    FIO2 30.0 03/25/2019         DATA:  Complete Blood Count:   Recent Labs     03/23/19 0442 03/24/19 0340 03/25/19  0421   WBC 15.9* 12.7* 13.5*   RBC 4.12* 3.72* 3.62*   HGB 12.5* 11.3* 11.0*   HCT 40.0* 36.8* 35.0*   MCV 97.1 98.9 96.7   MCH 30.3 30.4 30.4   MCHC 31.3 30.7 31.4   RDW 13.4 13.4 13.3    185 182   MPV 10.0 10.2 10.5        Last 3 Blood Glucose:   Recent Labs     03/23/19  0442 03/24/19  0340 03/25/19  0421   GLUCOSE 203* 378* 151*        PT/INR:    Lab Results   Component Value Date    PROTIME 12.0 03/15/2019    INR 1.2 03/15/2019     PTT:    Lab Results   Component Value Date    APTT 79.8 03/25/2019       Comprehensive Metabolic Profile:   Recent Labs     03/23/19  0442 03/24/19  0340 03/25/19  0421    133* 141   K 4.1 3.9 3.8   CL 92* 89* 98   CO2 34* 33* 35*   BUN 46* 52* 53*   CREATININE 1.02 1.23* 1.02   GLUCOSE 203* 378* 151*   CALCIUM 8.7 8.5* 9.3      Magnesium:   Lab Results   Component Value Date    MG 2.2 03/25/2019    MG 2.4 03/24/2019    MG 2.5 03/23/2019     Phosphorus:   Lab Results   Component Value Date    PHOS 3.7 03/25/2019    PHOS 4.8 03/24/2019    PHOS 4.6 03/23/2019     Ionized Calcium:   Lab Results   Component Value Date    CAION 1.25 03/25/2019    CAION 1.10 03/24/2019    CAION 1.08 03/23/2019        Urinalysis:   Lab Results   Component Value Date    NITRU NEGATIVE 03/18/2019    COLORU YELLOW 03/18/2019    PHUR 5.5 03/18/2019    WBCUA 0 TO 2 03/18/2019    RBCUA None 03/18/2019    MUCUS NOT REPORTED 03/18/2019    TRICHOMONAS NOT REPORTED 03/18/2019    YEAST NOT REPORTED 03/18/2019    BACTERIA NOT REPORTED 03/18/2019    SPECGRAV 1.010 03/18/2019    LEUKOCYTESUR NEGATIVE 03/18/2019    UROBILINOGEN Normal 03/18/2019    BILIRUBINUR NEGATIVE 03/18/2019    GLUCOSEU 2+ 03/18/2019    KETUA NEGATIVE 03/18/2019    AMORPHOUS NOT REPORTED 03/18/2019       HgBA1c:    Lab Results   Component Value Date    LABA1C 8.6 03/18/2019     TSH:  No results found for: TSH  Lactic Acid:   Lab Results   Component Value Date    LACTA 2.2 03/18/2019    LACTA 2.6 03/18/2019      Troponin: No results for input(s): TROPONINI in the last 72 hours. Results for Lalitha Coles (MRN [id number]) as of 3/23/2019 18:06   Ref.  Range 3/23/2019 04:07   POC pH Latest Ref Range: 7.350 - 7.450  7.437   POC pH Temp Unknown NOT REPORTED   POC pCO2 Latest Ref Range: 35.0 - 48.0 mm Hg 63.7 (H)   POC pCO2 Temp Latest Units: mm Hg NOT REPORTED   POC PO2 Latest Ref Range: 83.0 - 108.0 mm Hg 65.1 (L)   POC pO2 Temp Latest Units: mm Hg NOT REPORTED   POC HCO3 Latest Ref Range: 21.0 - 28.0 mmol/L 42.9 (HH)   POC O2 SAT Latest Ref Range: 94.0 - 98.0 % 92 (L)     ASSESSMENT:     Patient Active Problem List    Diagnosis Date Noted    Community acquired pneumonia of left lower lobe of lung (HCC)     Influenza A with respiratory manifestations 03/18/2019    Acute respiratory failure with hypoxia (HCC) 03/18/2019    Influenza A 03/18/2019    Acute hypercapnic respiratory failure ([facility] 03/18/2019    NSTEMI (non-ST elevated [facility] infarction) [facility]     Type 2 diabetes mellitus without complication, without long-term current use of insulin ([facility] 09/06/2016    Adult BMI > 30 05/06/2016    CAD (coronary artery disease)     DM type 2 (diabetes mellitus, type 2) ([facility])     Atrial fibrillation with rapid ventricular response ([facility] 01/01/2007          PLAN:     WEAN PER PROTOCOL:  [] No   [x] Yes  [] N/A    ICU PROPHYLAXIS:  Stress ulcer:  [] PPI Agent  [x] [id number] [] Sucralfate  [] Other:  VTE:   [] Enoxaparin  [x] he is on heparin GTT  [] EPC Cuffs    NUTRITION:  [x] NPO [] Tube Feeding (Specify: ) [] TPN  [] PO    HOME MEDS RECONCILED: [x] No  [] Yes    CONSULTATION NEEDED:  [x] No  [] Yes    FAMILY UPDATED:    [] No  [x] Yes    TRANSFER OUT OF ICU:   [x] No  [] Yes        Additional Assessment:  Influenza A  Community acquired pneumonia with likely strep pneumoniae  Atrial fibrillation with RVRPersistent   Acute exacerbation of CHF sec due to atrial fibrillationPersistent   Acute  hypoxic hypercapnic respiratory failureDue to pulmonary edema, persistent without any significant change   Diabetes mellitusType II with hyperglycemia   Obstructive sleep apnea    Plan:  Continue mechanical ventilation, keep SPO2 more than 92%  Weaning daily  CXR tomorrow   On propofol, decrease fentanyl as appropriate  Continue on ceftriaxone for 7 days    Continue Tamiflu for total 5 days  Taper prednisone, 20 mg today , then 10 and then stop   Breathing treatment with DuoNeb  Continue  Lasix to 40 mg daily , will give second dose in evening after reassessment   Monitor urine output and total intake  Replace electrolytes as appropriate.     Continue  Lantus 35 units now, D/C insuline gtt and start on ISS, still getting amiodaron in DW     Continue on amiodarone GTT as per

## 2019-03-25 NOTE — PROGRESS NOTES
Physical Therapy    Facility/Department: [address] [facility]  Initial Assessment    NAME: Micah Gosselin  : 1945  MRN: [id number]    History was obtained from chart review and the patient.       Micah Gosselin is a 69 y.o. presenting after rising troponin in the setting of worsening respiratory status and influenza A infection. Patient presented to ED on 03/15 being treated for influenza in the outpatient setting with tamiflu. He had SOB but refused admission at that time, despite elevation in troponin. He went to his PCP and was found to be in respiratory distress and admitted to [facility]. Upon admission, troponin dylan and subsequent transfer to tertiary center was initiated.     Has a history of atrial fibrillation with prior cardioversion, rate controlled on diltiazem, metoprolol and anticoagulated with pradaxa. DMII on glyburide, metformin, and lantus.      On arrival, patient is saturating 92% on 15L non rebreather. On transport, trial of BiPAP was aborted, as patient was not tolerating it. He received 2.5mg versed with depression of respiratory effort and required support breaths via BVM. He intermittently follows commands, moves all extremities. Date of Service: 3/25/2019    Discharge Recommendations:  Further therapy recommended at discharge. Assessment   Body structures, Functions, Activity limitations: Decreased functional mobility ; Decreased endurance;Decreased ROM; Decreased strength  Prognosis: Good  Decision Making: Low Complexity  Activity Tolerance  Activity Tolerance: Other  Activity Tolerance: Pt intubated and sedated. Patient Diagnosis(es): There were no encounter diagnoses. has a past medical history of Abnormal nuclear stress test, Arrhythmia, Atrial fibrillation ([facility] [address] CAD (coronary artery disease), DM type 2 (diabetes mellitus, type 2) ([facility] [address] and Hx of echocardiogram.   has a past surgical history that includes Coronary angioplasty ();  Cardioversion (2007); and Cardioversion Climbing T-Scale Score : 23.59  Mobility Inpatient CMS 0-100% Score: 100  Mobility Inpatient without Stair CMS G-Code Modifier : CN       Goals  Short term goals  Time Frame for Short term goals: 15  Short term goal 1: Prevent contractures through ROM and stretching. Short term goal 2: Pt able to tolerate 30 min activity  Short term goal 3: Pt able to complete AROM of extrimites in all planes. Short term goal 4: Progress with mobility as appropriate.         Therapy Time   Individual Concurrent Group Co-treatment   Time In 9969         Time Out 1510         Minutes 22         Timed Code Treatment Minutes: FABRIZIO Blanco  The above documentation was reviewed and accepted by Marcos Acevedo, Physical Therapist.

## 2019-03-25 NOTE — PROGRESS NOTES
Attending Physician Statement  I have discussed the care of Micah Gosselin, including pertinent history and exam findings with the resident. I have reviewed the key elements of all parts of the encounter with the resident. I have seen and examined the patient with the resident. I agree with the assessment and plan and status of the problem list as documented. I have seen the events since admission, I have reviewed the labs, chest x-rays and arterial blood gases seen in ventilator setting reviewed. He was initially on BiPAP and later was intubated because of worsening respiratory status increasing PCO2 agitation and not able to tolerate BiPAP, chest x-ray was consistent with pulmonary edema yesterday he had received Lasix 40 mg his urine output is 2 L his renal function is improving and BUN is 53 and creatinine is 1.02. He was having wheezing also and that's why he was started on steroids taper initially and currently on steroids taper. Next and he was also on insulin drip which is currently low dose and he was started on Lantus. He is currently on Tamiflu and Rocephin. He was on propofol when I examined him he was not arousable to follow commands. Continue with amiodarone drip. Continue with Lasix daily and monitor intake and output. We will get chest x-ray tomorrow. Follow-up renal function BUN and creatinine. Continue with tube feeding. Discontinue insulin drip and start insulin sliding scale. Continue with Lantus and will adjust Lantus. Continue daily spontaneous breathing trial, when I saw him he was on CPAP/pressure support and he was breathing 30s with low lung volumes. Bronchodilators. On the steroids taper. Finished Tamiflu the course. Continue with Rocephin. Discussed with nursing staff, treatment and plan discussed.     Total critical care time caring for this patient with life threatening, unstable organ failure, including direct patient contact, management of life support systems, review of data including imaging and labs, discussions with other team members and physicians at least 27  Min so far today, excluding procedures. Jacinto Oconnell MD  3/25/2019 10:12 AM    Please note that this chart was generated using voice recognition Dragon dictation software. Although every effort was made to ensure the accuracy of this automated transcription, some errors in transcription may have occurred.

## 2019-03-25 NOTE — PROGRESS NOTES
A  2. PNA  3. Acute respiratory failure, intubated on 3/21/19  4. Atrial flutter with RVR  5. Mild cardiomyopathy  6. Acute systolic CHF  7. Type II MI  8. DIABETES MELLITUS 2    Patient Active Problem List:     CAD (coronary artery disease)     DM type 2 (diabetes mellitus, type 2) ([facility] [address]     Adult BMI > 30     Type 2 diabetes mellitus without complication, without long-term current use of insulin (HCC)     Influenza A with respiratory manifestations     Acute respiratory failure with hypoxia (HCC)     Influenza A     Atrial fibrillation with rapid ventricular response (HCC)     Acute hypercapnic respiratory failure (HCC)     NSTEMI (non-ST elevated myocardial infarction) ([facility] [address]     Community acquired pneumonia of left lower lobe of lung ([facility]      Plan of Treatment:   1. Will continue with IV Amio. S/p second bolus with 150 mg IV amio. 2. on lopressor to 50 mg bid  3. Continue with IV heparin  4. Will recommend K>4, calcium and magnesium in normal range      Sam Aparicio MD  IM PGY-1  [id number] [place] [address]  3/25/2019,10:17 AM     Attending Physician Statement  I have discussed the case of Shasha Logan including pertinent history and exam findings with the resident. I have seen and examined the patient and the key elements of the encounter have been performed by me. I agree with the assessment, plan and orders as documented by the resident With changes made to the note.      Electronically signed by Larissa Subramanian MD on 3/25/2019 at 2:50 PM.    [facility]      [phone]

## 2019-03-26 ENCOUNTER — APPOINTMENT (OUTPATIENT)
Dept: GENERAL RADIOLOGY | Age: 74
DRG: 207 | End: 2019-03-26
Attending: INTERNAL MEDICINE
Payer: MEDICARE

## 2019-03-26 LAB
ABSOLUTE EOS #: 0.14 K/UL (ref 0–0.4)
ABSOLUTE IMMATURE GRANULOCYTE: 0.14 K/UL (ref 0–0.3)
ABSOLUTE LYMPH #: 2.66 K/UL (ref 1–4.8)
ABSOLUTE MONO #: 0.98 K/UL (ref 0.1–0.8)
ALLEN TEST: POSITIVE
ANION GAP SERPL CALCULATED.3IONS-SCNC: 8 MMOL/L (ref 9–17)
BASOPHILS # BLD: 0 % (ref 0–2)
BASOPHILS ABSOLUTE: 0 K/UL (ref 0–0.2)
BUN BLDV-MCNC: 59 MG/DL (ref 8–23)
BUN/CREAT BLD: ABNORMAL (ref 9–20)
CALCIUM IONIZED: 1.24 MMOL/L (ref 1.13–1.33)
CALCIUM SERPL-MCNC: 8.9 MG/DL (ref 8.6–10.4)
CHLORIDE BLD-SCNC: 95 MMOL/L (ref 98–107)
CO2: 33 MMOL/L (ref 20–31)
CREAT SERPL-MCNC: 1.03 MG/DL (ref 0.7–1.2)
DIFFERENTIAL TYPE: ABNORMAL
EOSINOPHILS RELATIVE PERCENT: 1 % (ref 1–4)
FIO2: 45
GFR AFRICAN AMERICAN: >60 ML/MIN
GFR NON-AFRICAN AMERICAN: >60 ML/MIN
GFR SERPL CREATININE-BSD FRML MDRD: ABNORMAL ML/MIN/{1.73_M2}
GFR SERPL CREATININE-BSD FRML MDRD: ABNORMAL ML/MIN/{1.73_M2}
GLUCOSE BLD-MCNC: 218 MG/DL (ref 75–110)
GLUCOSE BLD-MCNC: 226 MG/DL (ref 75–110)
GLUCOSE BLD-MCNC: 236 MG/DL (ref 70–99)
GLUCOSE BLD-MCNC: 246 MG/DL (ref 75–110)
GLUCOSE BLD-MCNC: 257 MG/DL (ref 74–100)
HCT VFR BLD CALC: 34.4 % (ref 40.7–50.3)
HEMOGLOBIN: 10.6 G/DL (ref 13–17)
IMMATURE GRANULOCYTES: 1 %
LYMPHOCYTES # BLD: 19 % (ref 24–44)
MAGNESIUM: 2.3 MG/DL (ref 1.6–2.6)
MCH RBC QN AUTO: 30.2 PG (ref 25.2–33.5)
MCHC RBC AUTO-ENTMCNC: 30.8 G/DL (ref 28.4–34.8)
MCV RBC AUTO: 98 FL (ref 82.6–102.9)
MODE: ABNORMAL
MONOCYTES # BLD: 7 % (ref 1–7)
MORPHOLOGY: NORMAL
NEGATIVE BASE EXCESS, ART: ABNORMAL (ref 0–2)
NRBC AUTOMATED: 0 PER 100 WBC
O2 DEVICE/FLOW/%: ABNORMAL
PARTIAL THROMBOPLASTIN TIME: 60.8 SEC (ref 20.5–30.5)
PATIENT TEMP: ABNORMAL
PDW BLD-RTO: 13.6 % (ref 11.8–14.4)
PHOSPHORUS: 3.3 MG/DL (ref 2.5–4.5)
PLATELET # BLD: 169 K/UL (ref 138–453)
PLATELET ESTIMATE: ABNORMAL
PMV BLD AUTO: 10.4 FL (ref 8.1–13.5)
POC HCO3: 40.4 MMOL/L (ref 21–28)
POC O2 SATURATION: 88 % (ref 94–98)
POC PCO2 TEMP: ABNORMAL MM HG
POC PCO2: 63.4 MM HG (ref 35–48)
POC PH TEMP: ABNORMAL
POC PH: 7.41 (ref 7.35–7.45)
POC PO2 TEMP: ABNORMAL MM HG
POC PO2: 57.5 MM HG (ref 83–108)
POSITIVE BASE EXCESS, ART: 13 (ref 0–3)
POTASSIUM SERPL-SCNC: 3.5 MMOL/L (ref 3.7–5.3)
RBC # BLD: 3.51 M/UL (ref 4.21–5.77)
RBC # BLD: ABNORMAL 10*6/UL
SAMPLE SITE: ABNORMAL
SEG NEUTROPHILS: 72 % (ref 36–66)
SEGMENTED NEUTROPHILS ABSOLUTE COUNT: 10.08 K/UL (ref 1.8–7.7)
SODIUM BLD-SCNC: 136 MMOL/L (ref 135–144)
TCO2 (CALC), ART: 42 MMOL/L (ref 22–29)
WBC # BLD: 14 K/UL (ref 3.5–11.3)
WBC # BLD: ABNORMAL 10*3/UL

## 2019-03-26 PROCEDURE — 51798 US URINE CAPACITY MEASURE: CPT

## 2019-03-26 PROCEDURE — 2580000003 HC RX 258: Performed by: STUDENT IN AN ORGANIZED HEALTH CARE EDUCATION/TRAINING PROGRAM

## 2019-03-26 PROCEDURE — 94003 VENT MGMT INPAT SUBQ DAY: CPT

## 2019-03-26 PROCEDURE — 80048 BASIC METABOLIC PNL TOTAL CA: CPT

## 2019-03-26 PROCEDURE — 82947 ASSAY GLUCOSE BLOOD QUANT: CPT

## 2019-03-26 PROCEDURE — 2000000000 HC ICU R&B

## 2019-03-26 PROCEDURE — 2580000003 HC RX 258: Performed by: INTERNAL MEDICINE

## 2019-03-26 PROCEDURE — 82330 ASSAY OF CALCIUM: CPT

## 2019-03-26 PROCEDURE — 6360000002 HC RX W HCPCS: Performed by: INTERNAL MEDICINE

## 2019-03-26 PROCEDURE — 6370000000 HC RX 637 (ALT 250 FOR IP): Performed by: INTERNAL MEDICINE

## 2019-03-26 PROCEDURE — 6370000000 HC RX 637 (ALT 250 FOR IP): Performed by: STUDENT IN AN ORGANIZED HEALTH CARE EDUCATION/TRAINING PROGRAM

## 2019-03-26 PROCEDURE — 6360000002 HC RX W HCPCS: Performed by: STUDENT IN AN ORGANIZED HEALTH CARE EDUCATION/TRAINING PROGRAM

## 2019-03-26 PROCEDURE — 82803 BLOOD GASES ANY COMBINATION: CPT

## 2019-03-26 PROCEDURE — 94762 N-INVAS EAR/PLS OXIMTRY CONT: CPT

## 2019-03-26 PROCEDURE — 85025 COMPLETE CBC W/AUTO DIFF WBC: CPT

## 2019-03-26 PROCEDURE — 51701 INSERT BLADDER CATHETER: CPT

## 2019-03-26 PROCEDURE — 36415 COLL VENOUS BLD VENIPUNCTURE: CPT

## 2019-03-26 PROCEDURE — 51702 INSERT TEMP BLADDER CATH: CPT

## 2019-03-26 PROCEDURE — 36600 WITHDRAWAL OF ARTERIAL BLOOD: CPT

## 2019-03-26 PROCEDURE — 99291 CRITICAL CARE FIRST HOUR: CPT | Performed by: INTERNAL MEDICINE

## 2019-03-26 PROCEDURE — 85730 THROMBOPLASTIN TIME PARTIAL: CPT

## 2019-03-26 PROCEDURE — 2700000000 HC OXYGEN THERAPY PER DAY

## 2019-03-26 PROCEDURE — 94770 HC ETCO2 MONITOR DAILY: CPT

## 2019-03-26 PROCEDURE — 71045 X-RAY EXAM CHEST 1 VIEW: CPT

## 2019-03-26 PROCEDURE — 84100 ASSAY OF PHOSPHORUS: CPT

## 2019-03-26 PROCEDURE — 83735 ASSAY OF MAGNESIUM: CPT

## 2019-03-26 RX ORDER — FUROSEMIDE 10 MG/ML
40 INJECTION INTRAMUSCULAR; INTRAVENOUS 2 TIMES DAILY
Status: DISCONTINUED | OUTPATIENT
Start: 2019-03-26 | End: 2019-03-26

## 2019-03-26 RX ORDER — AMIODARONE HYDROCHLORIDE 200 MG/1
200 TABLET ORAL 2 TIMES DAILY
Status: DISCONTINUED | OUTPATIENT
Start: 2019-03-26 | End: 2019-03-28

## 2019-03-26 RX ORDER — FUROSEMIDE 10 MG/ML
40 INJECTION INTRAMUSCULAR; INTRAVENOUS 2 TIMES DAILY
Status: DISCONTINUED | OUTPATIENT
Start: 2019-03-26 | End: 2019-03-28

## 2019-03-26 RX ORDER — AMIODARONE HYDROCHLORIDE 200 MG/1
200 TABLET ORAL DAILY
Status: DISCONTINUED | OUTPATIENT
Start: 2019-03-26 | End: 2019-03-26

## 2019-03-26 RX ADMIN — Medication 10 ML: at 20:07

## 2019-03-26 RX ADMIN — AMIODARONE HYDROCHLORIDE 200 MG: 200 TABLET ORAL at 20:04

## 2019-03-26 RX ADMIN — Medication 10 ML: at 09:07

## 2019-03-26 RX ADMIN — FUROSEMIDE 40 MG: 10 INJECTION, SOLUTION INTRAMUSCULAR; INTRAVENOUS at 18:35

## 2019-03-26 RX ADMIN — INSULIN GLARGINE 35 UNITS: 100 INJECTION, SOLUTION SUBCUTANEOUS at 09:08

## 2019-03-26 RX ADMIN — PREDNISONE 10 MG: 10 TABLET ORAL at 09:07

## 2019-03-26 RX ADMIN — PROPOFOL 15 MCG/KG/MIN: 10 INJECTION, EMULSION INTRAVENOUS at 22:31

## 2019-03-26 RX ADMIN — INSULIN LISPRO 6 UNITS: 100 INJECTION, SOLUTION INTRAVENOUS; SUBCUTANEOUS at 13:46

## 2019-03-26 RX ADMIN — FUROSEMIDE 40 MG: 10 INJECTION, SOLUTION INTRAMUSCULAR; INTRAVENOUS at 09:07

## 2019-03-26 RX ADMIN — AMIODARONE HYDROCHLORIDE 200 MG: 200 TABLET ORAL at 11:12

## 2019-03-26 RX ADMIN — PROPOFOL 20 MCG/KG/MIN: 10 INJECTION, EMULSION INTRAVENOUS at 08:50

## 2019-03-26 RX ADMIN — FAMOTIDINE 20 MG: 20 TABLET, FILM COATED ORAL at 09:07

## 2019-03-26 RX ADMIN — INSULIN LISPRO 6 UNITS: 100 INJECTION, SOLUTION INTRAVENOUS; SUBCUTANEOUS at 06:36

## 2019-03-26 RX ADMIN — FAMOTIDINE 20 MG: 20 TABLET, FILM COATED ORAL at 20:04

## 2019-03-26 RX ADMIN — Medication 100 MG: at 09:07

## 2019-03-26 RX ADMIN — INSULIN LISPRO 6 UNITS: 100 INJECTION, SOLUTION INTRAVENOUS; SUBCUTANEOUS at 18:39

## 2019-03-26 RX ADMIN — HEPARIN SODIUM 17 UNITS/KG/HR: 10000 INJECTION INTRAVENOUS; SUBCUTANEOUS at 14:37

## 2019-03-26 ASSESSMENT — PULMONARY FUNCTION TESTS
PIF_VALUE: 20
PIF_VALUE: 22
PIF_VALUE: 20
PIF_VALUE: 22
PIF_VALUE: 25
PIF_VALUE: 20
PIF_VALUE: 23
PIF_VALUE: 22
PIF_VALUE: 21
PIF_VALUE: 20
PIF_VALUE: 18
PIF_VALUE: 20
PIF_VALUE: 11
PIF_VALUE: 24
PIF_VALUE: 21
PIF_VALUE: 20
PIF_VALUE: 24

## 2019-03-26 NOTE — PROGRESS NOTES
Occupational Therapy    Occupational Therapy Not Seen Note    DATE: 3/26/2019  Name: Soraya Almaguer  : 1945  MRN: [id number]    Patient not available for Occupational Therapy due to:    Sedation: Int/Sed    Next Scheduled Treatment: Attempt on 3/28 as appropriate.     Electronically signed by Sofía Campos OT on 3/26/2019 at 3:21 PM

## 2019-03-26 NOTE — PLAN OF CARE
increase  Outcome: Ongoing  Goal: Decrease in sensory misperception frequency  Description  Decrease in sensory misperception frequency  Outcome: Ongoing  Goal: Able to refrain from responding to false sensory perceptions  Description  Able to refrain from responding to false sensory perceptions  Outcome: Ongoing  Goal: Demonstrates accurate environmental perceptions  Description  Demonstrates accurate environmental perceptions  Outcome: Ongoing  Goal: Able to distinguish between reality-based and nonreality-based thinking  Description  Able to distinguish between reality-based and nonreality-based thinking  Outcome: Ongoing  Goal: Able to interrupt nonreality-based thinking  Description  Able to interrupt nonreality-based thinking  Outcome: Ongoing     Problem: Sleep Pattern Disturbance:  Goal: Appears well-rested  Description  Appears well-rested  Outcome: Ongoing     Problem: Restraint Use - Nonviolent/Non-Self-Destructive Behavior:  Goal: Absence of restraint-related injury  Description  Absence of restraint-related injury  3/26/2019 0432 by Miguel Taveras RN  Outcome: Not met this shift  3/25/2019 1817 by Cyn Nash RN  Outcome: Met This Shift     Problem: Nutrition  Goal: Optimal nutrition therapy  Outcome: Ongoing     Problem: OXYGENATION/RESPIRATORY FUNCTION  Goal: Patient will maintain patent airway  Outcome: Ongoing  Goal: Patient will achieve/maintain normal respiratory rate/effort  Description  Respiratory rate and effort will be within normal limits for the patient  Outcome: Ongoing     Problem: MECHANICAL VENTILATION  Goal: Patient will maintain patent airway  Outcome: Ongoing  Goal: Oral health is maintained or improved  Outcome: Ongoing  Goal: ET tube will be managed safely  Outcome: Ongoing  Goal: Ability to express needs and understand communication  Outcome: Ongoing  Goal: Mobility/activity is maintained at optimum level for patient  Outcome: Ongoing     Problem: SKIN INTEGRITY  Goal:

## 2019-03-26 NOTE — PROGRESS NOTES
[facility]   Progress Note                   Date:   3/26/2019  Patient name: Sonido Eng  Date of admission:  3/19/2019  7:58 AM  MRN:   [id number]  YOB: 1945  PCP: Irais Henry MD    Reason for Admission: acute resp failure      Subjective: There were no acute events overnight, remained hemodynamically stable, intubated and sedated. Daily weaning trials. Still in a-fib on amio and lopressor. Rate well controlled. Medications:   Scheduled Meds:   furosemide  40 mg Intravenous BID    insulin glargine  35 Units Subcutaneous Daily    famotidine  20 mg Per NG tube BID    insulin lispro  0-18 Units Subcutaneous Q6H    metoprolol tartrate  50 mg Per NG tube BID    docusate  100 mg Oral Daily    sodium chloride flush  10 mL Intravenous 2 times per day       Continuous Infusions:   heparin 25,000 units in 0.9% sodium chloride 250 mL infusion 17 Units/kg/hr (03/25/19 1339)    amiodarone 0.5 mg/min (03/25/19 2000)    propofol 20 mcg/kg/min (03/26/19 0850)    dextrose         CBC:   Recent Labs     03/24/19 0340 03/25/19 0421 03/26/19 0427   WBC 12.7* 13.5* 14.0*   HGB 11.3* 11.0* 10.6*    182 169     BMP:    Recent Labs     03/24/19 0340 03/25/19  0421 03/26/19 0427   * 141 136   K 3.9 3.8 3.5*   CL 89* 98 95*   CO2 33* 35* 33*   BUN 52* 53* 59*   CREATININE 1.23* 1.02 1.03   GLUCOSE 378* 151* 236*     Hepatic: No results for input(s): AST, ALT, ALB, BILITOT, ALKPHOS in the last 72 hours. Troponin: No results for input(s): TROPONINI in the last 72 hours. BNP: No results for input(s): BNP in the last 72 hours. Lipids: No results for input(s): CHOL, HDL in the last 72 hours. Invalid input(s): LDLCALCU  INR: No results for input(s): INR in the last 72 hours.     Objective:   Vitals: /73   Pulse 73   Temp 99 °F (37.2 °C) (Oral)   Resp 21   Ht 5' 8\" (1.727 m)   Wt 282 lb 6.6 oz (128.1 kg)   SpO2 97%   BMI 42.94 kg/m²     General appearance: intubated and sedated  HEENT: Head: Normocephalic, no lesions, without obvious abnormality  Neck: no JVD  Lungs: clear to auscultation bilaterally, no basilar rales, no wheezing   Heart: atrial fibrillation, S1, S2 normal, no murmur, click, rub or gallop  Abdomen: soft, non-tender; bowel sounds normal  Extremities: No LE edema  Neurologic: Intubated/sedated          Assessment:     1. Influenza A with PNA  2. Acute respiratory failure, intubated on 3/21/19  3. Atrial flutter/fib with RVR  4. Mild cardiomyopathy  5. Acute systolic CHF  6. Type II MI  7. DIABETES MELLITUS 2    Patient Active Problem List:     CAD (coronary artery disease)     DM type 2 (diabetes mellitus, type 2) ([facility] [address]     Adult BMI > 30     Type 2 diabetes mellitus without complication, without long-term current use of insulin (HCC)     Influenza A with respiratory manifestations     Acute respiratory failure with hypoxia (HCC)     Influenza A     Atrial fibrillation with rapid ventricular response (HCC)     Acute hypercapnic respiratory failure (HCC)     NSTEMI (non-ST elevated myocardial infarction) ([facility] [address]     Community acquired pneumonia of left lower lobe of lung ([facility] [address]      Treatment Plan:   1. Afib  2. Continue with lopressor 50 mg BID  3. Amiodarone IV switch to oral  4. Continue with heparin angela Anderson MD  [address] [facility]   [address] [facility]       Attending Physician Statement  I have discussed the care of Kimberly Jacobo, including pertinent history and exam findings,  with the cardiology fellow/resident. I have seen and examined the patient and the key elements of all parts of the encounter have been performed by me.   I agree with the assessment, plan and orders as documented by the resident with additional recommendations as below:     Patient remains intubated and sedated, in atrial fibrillation with controlled vent response, HR well controlled, will switch IV amio to po 200 mg bid. Cont lopressor and heparin. IV Lasix 40 mg bid.  Will consider GUSTAVO/CV prior to discharge.      Heminder meet Vikki Valladares [facility]  [address] [facility],  [address]   [phone]

## 2019-03-26 NOTE — PROGRESS NOTES
INTENSIVE CARE UNIT  Resident Physician Progress Note    Patient - Khadijah Avelar  Date of Admission -  3/19/2019  7:58 AM  Date of Evaluation -  3/26/2019  Room and Bed Number -  0105/0105-01   Hospital Day - 7    Chief complaint influenza, pneumonia, A. fib with RVR  SUBJECTIVE:     OVERNIGHT EVENTS:    1. Patient seen and examined at bedside, patient is intubated and sedated   2. Morning ABG showed pH 7.4, PCO2 63., PO2 57.4, bicarb 40,  vent settings are FiO2 45% PEEP 5, tidal volume 440, respiratory rate 14, breathing 24,   3. Hemodynamically stable , HR controlled well , still on amio gtt  4. Creatinine normal , UOP remained 800 in last 24 hourse , with 40 lasix yesterday  5.  CXR today looks better  With more aeration as compare to last 2 days back   6. completed Abx and tamiflue, last dose of steroids 10 mg today  7. on 35 units lantus, received 30 units of lispro over ngiht, BG in 200's    AWAKE & FOLLOWING COMMANDS:  [] No   [x] Yes, gets agitated     SECRETIONS Amount:  [x] Small [] Moderate  [] Large  [] None  Color:     [x] White [] Colored  [x] Bloody    SEDATION:  RAAS Score:  [x] Propofol gtt  [] Versed gtt  [] Ativan gtt   [] No Sedation    PARALYZED:  [x] No    [] Yes    VASOPRESSORS:  [x] No    [] Yes  [] Levophed [] Dopamine [] Vasopressin  [] Dobutamine [] Phenylephrine [] Epinephrine    Review of Systems -  Intubated , sedated  OBJECTIVE:     VITAL SIGNS:  BP (!) 108/58   Pulse 71   Temp 99 °F (37.2 °C) (Oral)   Resp 23   Ht 5' 8\" (1.727 m)   Wt 282 lb 6.6 oz (128.1 kg)   SpO2 96%   BMI 42.94 kg/m²   Tmax over 24 hours:  Temp (24hrs), Av.9 °F (37.2 °C), Min:98.6 °F (37 °C), Max:99.3 °F (37.4 °C)      Patient Vitals for the past 8 hrs:   BP Temp Temp src Pulse Resp SpO2 Weight   19 0600 (!) 108/58 -- -- 71 23 96 % --   19 0500 (!) 113/54 -- -- 70 18 98 % --   19 0400 (!) 101/51 99 °F (37.2 °C) Oral 64 23 92 % 282 lb 6.6 oz (128.1 kg)   19 0300 (!) 99/52 -- -- 61 22 94 % --   03/26/19 0200 (!) 107/54 -- -- 61 24 96 % --   03/26/19 0100 104/60 -- -- 58 21 95 % --   03/26/19 0000 (!) 91/48 -- -- 60 24 92 % --         Intake/Output Summary (Last 24 hours) at 3/26/2019 0756  Last data filed at 3/26/2019 0400  Gross per 24 hour   Intake 2850 ml   Output 820 ml   Net 2030 ml     Date 03/26/19 0000 - 03/26/19 2359   Shift 1256-3621 3499-8226 5857-4080 24 Hour Total   INTAKE   I.V.(mL/kg) 473(3.7)   473(3.7)   NG/GT(mL/kg) 463(3.6)   463(3.6)   Shift Total(mL/kg) 936(7.3)   936(7.3)   OUTPUT   Urine(mL/kg/hr) 485   485   Shift Total(mL/kg) 485(3.8)   485(3.8)   Weight (kg) 128.1 128.1 128.1 128. 1     Wt Readings from Last 3 Encounters:   03/26/19 282 lb 6.6 oz (128.1 kg)   03/19/19 294 lb 11.2 oz (133.7 kg)   03/18/19 300 lb (136.1 kg)     Body mass index is 42.94 kg/m². PHYSICAL EXAM:  · General appearance: intubated sedated   · HEENT: Atraumatic, normocephalic, neck supple, normal EOM, thyroid normal, no lymphadenopathy   · Lungs: Ventilating all lobes. Prolonged expiration is still wheezing is better.   Posterior basilar rales and infrascapular rales are still present   · Heart: Regular heart rate, tachycardic, S1, S2 normal, no murmur   · Abdomen: soft, non-tender; bowel sounds normal; no masses,  no organomegaly  · Extremities: No cyanosis or edema  · Neurological:  Intubated sedated , opens eyes on pain stimuli, moves all extremities but gets agitated    MEDICATIONS:  Scheduled Meds:   furosemide  40 mg Intravenous BID    insulin glargine  35 Units Subcutaneous Daily    famotidine  20 mg Per NG tube BID    insulin lispro  0-18 Units Subcutaneous Q6H    metoprolol tartrate  50 mg Per NG tube BID    predniSONE  10 mg Oral Once    docusate  100 mg Oral Daily    sodium chloride flush  10 mL Intravenous 2 times per day     Continuous Infusions:   heparin 25,000 units in 0.9% sodium chloride 250 mL infusion 17 Units/kg/hr (03/25/19 1481)    amiodarone 0.5 mg/min (03/25/19 2000)    propofol 20 mcg/kg/min (03/25/19 2115)    dextrose       PRN Meds:     magnesium hydroxide 30 mL Daily PRN   albuterol 2.5 mg Q6H PRN   magnesium hydroxide 30 mL Daily PRN   LORazepam 0.5 mg Q8H PRN   sodium chloride flush 10 mL PRN   ondansetron 4 mg Q6H PRN   heparin (porcine) 10,000 Units PRN   heparin (porcine) 5,000 Units PRN   glucose 15 g PRN   dextrose 12.5 g PRN   glucagon (rDNA) 1 mg PRN   dextrose 100 mL/hr PRN   metoprolol 2.5 mg Q4H PRN       SUPPORT DEVICES: [] Ventilator [x] BIPAP  [] Nasal Cannula [] Room Air  .   Lab Results   Component Value Date    PHART 7.310 03/18/2019    WIC8SOZ 51.7 03/18/2019    PO2ART 67.8 03/18/2019    GGG2JQG 25.4 03/18/2019    MIT7VZG 42 03/26/2019    K5MFNDFW 91.7 03/18/2019    FIO2 45.0 03/26/2019         DATA:  Complete Blood Count:   Recent Labs     03/24/19 0340 03/25/19 0421 03/26/19 0427   WBC 12.7* 13.5* 14.0*   RBC 3.72* 3.62* 3.51*   HGB 11.3* 11.0* 10.6*   HCT 36.8* 35.0* 34.4*   MCV 98.9 96.7 98.0   MCH 30.4 30.4 30.2   MCHC 30.7 31.4 30.8   RDW 13.4 13.3 13.6    182 169   MPV 10.2 10.5 10.4        Last 3 Blood Glucose:   Recent Labs     03/24/19 0340 03/25/19 0421 03/26/19 0427   GLUCOSE 378* 151* 236*        PT/INR:    Lab Results   Component Value Date    PROTIME 12.0 03/15/2019    INR 1.2 03/15/2019     PTT:    Lab Results   Component Value Date    APTT 60.8 03/26/2019       Comprehensive Metabolic Profile:   Recent Labs     03/24/19 0340 03/25/19 0421 03/26/19 0427   * 141 136   K 3.9 3.8 3.5*   CL 89* 98 95*   CO2 33* 35* 33*   BUN 52* 53* 59*   CREATININE 1.23* 1.02 1.03   GLUCOSE 378* 151* 236*   CALCIUM 8.5* 9.3 8.9      Magnesium:   Lab Results   Component Value Date    MG 2.3 03/26/2019    MG 2.2 03/25/2019    MG 2.4 03/24/2019     Phosphorus:   Lab Results   Component Value Date    PHOS 3.3 03/26/2019    PHOS 3.7 03/25/2019    PHOS 4.8 03/24/2019     Ionized Calcium:   Lab Results   Component Value Date CAION 1.24 03/26/2019    CAION 1.25 03/25/2019    CAION 1.10 03/24/2019        Urinalysis:   Lab Results   Component Value Date    NITRU NEGATIVE 03/18/2019    COLORU YELLOW 03/18/2019    PHUR 5.5 03/18/2019    WBCUA 0 TO 2 03/18/2019    RBCUA None 03/18/2019    MUCUS NOT REPORTED 03/18/2019    TRICHOMONAS NOT REPORTED 03/18/2019    YEAST NOT REPORTED 03/18/2019    BACTERIA NOT REPORTED 03/18/2019    SPECGRAV 1.010 03/18/2019    LEUKOCYTESUR NEGATIVE 03/18/2019    UROBILINOGEN Normal 03/18/2019    BILIRUBINUR NEGATIVE 03/18/2019    GLUCOSEU 2+ 03/18/2019    KETUA NEGATIVE 03/18/2019    AMORPHOUS NOT REPORTED 03/18/2019       HgBA1c:    Lab Results   Component Value Date    LABA1C 8.6 03/18/2019     TSH:  No results found for: TSH  Lactic Acid:   Lab Results   Component Value Date    LACTA 2.2 03/18/2019    LACTA 2.6 03/18/2019      Troponin: No results for input(s): TROPONINI in the last 72 hours. Results for Jackson Schumacher (MRN [id number]) as of 3/23/2019 18:06   Ref.  Range 3/23/2019 04:07   POC pH Latest Ref Range: 7.350 - 7.450  7.437   POC pH Temp Unknown NOT REPORTED   POC pCO2 Latest Ref Range: 35.0 - 48.0 mm Hg 63.7 (H)   POC pCO2 Temp Latest Units: mm Hg NOT REPORTED   POC PO2 Latest Ref Range: 83.0 - 108.0 mm Hg 65.1 (L)   POC pO2 Temp Latest Units: mm Hg NOT REPORTED   POC HCO3 Latest Ref Range: 21.0 - 28.0 mmol/L 42.9 (HH)   POC O2 SAT Latest Ref Range: 94.0 - 98.0 % 92 (L)     ASSESSMENT:     Patient Active Problem List    Diagnosis Date Noted    Community acquired pneumonia of left lower lobe of lung ([facility]     Influenza A with respiratory manifestations 03/18/2019    Acute respiratory failure with hypoxia (HCC) 03/18/2019    Influenza A 03/18/2019    Acute hypercapnic respiratory failure (HCC) 03/18/2019    NSTEMI (non-ST elevated myocardial infarction) (HCC)     Type 2 diabetes mellitus without complication, without long-term current use of insulin ([facility] [address] 09/06/2016    Adult BMI > 30 05/06/2016    CAD (coronary artery disease)     DM type 2 (diabetes mellitus, type 2) [facility] [address]     Atrial fibrillation with rapid ventricular response ([facility] [address] 01/01/2007          PLAN:     WEAN PER PROTOCOL:  [] No   [x] Yes  [] N/A    ICU PROPHYLAXIS:  Stress ulcer:  [] PPI Agent  [x] [id number] [] Sucralfate  [] Other:  VTE:   [] Enoxaparin  [x] he is on heparin GTT  [] EPC Cuffs    NUTRITION:  [x] NPO [] Tube Feeding (Specify: ) [] TPN  [] PO    HOME MEDS RECONCILED: [x] No  [] Yes    CONSULTATION NEEDED:  [x] No  [] Yes    FAMILY UPDATED:    [] No  [x] Yes    TRANSFER OUT OF ICU:   [x] No  [] Yes        Additional Assessment:  Influenza A  Community acquired pneumonia with likely strep pneumoniae  Atrial fibrillation with RVRPersistent   Acute exacerbation of CHF sec due to atrial fibrillationPersistent   Acute  hypoxic hypercapnic respiratory failureDue to pulmonary edema, persistent without any significant change   Diabetes mellitusType II with hyperglycemia   Obstructive sleep apnea    Plan:  Continue mechanical ventilation, keep SPO2 more than 92%  Weaning trial  daily  On propofol , daily sedation holiday   Prednisone 10 mg today then stop  Completed ceftriaxone and tamiflue  Breathing treatment with DuoNeb  Increase Lasix to 40 mg BID daily   CXR today   Monitor urine output and total intake  Replace electrolytes as appropriate. Continue  Lantus 35 units now, continue on HSS, off amio gtt in DW now  Monitor BG q6 hr  Continue on TF       Amiodarone switched to oral 200 mg daily as per cardiology  Lopressor 2.5 mg PRN q4 hr  heparin gtt for now    Pepcid for GI Px  Milk of mag and Colace as a bowel regimen       Mesfin Gusman MD  PGY-2, Internal Medicine resident   [facility]. 3/26/2019 7:56 AM       Attending Physician Statement  I have discussed the care of Honora Lombard, including pertinent history and exam findings with the resident.  I have reviewed the key elements of all parts of the encounter with the resident. I have seen and examined the patient with the resident. I agree with the assessment and plan and status of the problem list as documented. I have seen the patient during my round today, I have reviewed the labs, events noted and chart seen. He remained on ventilator with before meals/PRVC/14/440/5/45%  Reviewed the labs shows BUN is 59 and creatinine is 1.03 and potassium is 3.5 yesterday urine output was low but today his urine output is better after he had received 40 mg of IV Lasix. WBC count is stable hemoglobin is stable, blood sugar remains in 200. His chest x-ray today shows better aeration of bilateral lower lungs  He is finishing up taper dose of steroids. He had a spontaneous breathing trial today and he tolerated for almost an hour and then after that he had increasing and End tidal CO2 and increased work of breathing and was placed back on ventilator. He is currently on heparin drip, amiodarone drip was switched to oral amiodarone. We will continue with Lasix and follow-up urine output, BUN and creatinine. Continue daily spontaneous breathing trial.  Continue with Lantus and monitor blood sugar and may need to increase Lantus once he is off his steroid will see if his blood sugar remains high. Continue with heparin drip. Discussed with nursing staff. Discussed with respiratory therapist.    Total critical care time caring for this patient with life threatening, unstable organ failure, including direct patient contact, management of life support systems, review of data including imaging and labs, discussions with other team members and physicians at least 27  Min so far today, excluding procedures. Jessee Ashford MD  3/26/2019 2:57 PM     Please note that this chart was generated using voice recognition Dragon dictation software.  Although every effort was made to ensure the accuracy of this automated transcription, some errors in transcription may have occurred.

## 2019-03-26 NOTE — PLAN OF CARE
Problem: OXYGENATION/RESPIRATORY FUNCTION  Goal: Patient will maintain patent airway  3/26/2019 1816 by Jass Santos RCP  Outcome: Ongoing  3/26/2019 0432 by Fernanda Carrion RN  Outcome: Ongoing     Problem: OXYGENATION/RESPIRATORY FUNCTION  Goal: Patient will achieve/maintain normal respiratory rate/effort  Description  Respiratory rate and effort will be within normal limits for the patient  3/26/2019 1816 by Jass Santos RCP  Outcome: Ongoing  3/26/2019 0432 by Fernanda Carrion RN  Outcome: Ongoing     Problem: MECHANICAL VENTILATION  Goal: Patient will maintain patent airway  3/26/2019 1816 by Jass Santos RCP  Outcome: Ongoing  3/26/2019 0432 by Fernanda Carrion RN  Outcome: Ongoing     Problem: MECHANICAL VENTILATION  Goal: Oral health is maintained or improved  3/26/2019 1816 by Jass Santos RCP  Outcome: Ongoing  3/26/2019 0432 by Fernanda Carrion RN  Outcome: Ongoing     Problem: MECHANICAL VENTILATION  Goal: ET tube will be managed safely  3/26/2019 1816 by Jass Santos RCP  Outcome: Ongoing  3/26/2019 0432 by Fernanda Carrion RN  Outcome: Ongoing     Problem: MECHANICAL VENTILATION  Goal: Ability to express needs and understand communication  3/26/2019 1816 by Jass Santos RCP  Outcome: Ongoing  3/26/2019 0432 by Fernanda Carrion RN  Outcome: Ongoing     Problem: MECHANICAL VENTILATION  Goal: Mobility/activity is maintained at optimum level for patient  3/26/2019 1816 by Jass Santos RCP  Outcome: Ongoing  3/26/2019 0432 by Fernanda Carrion RN  Outcome: Ongoing

## 2019-03-26 NOTE — PLAN OF CARE
Problem: Restraint Use - Nonviolent/Non-Self-Destructive Behavior:  Goal: Absence of restraint-related injury  Description  Absence of restraint-related injury  3/26/2019 1701 by Mckayla Gomez RN  Outcome: Met This Shift    Problem: Restraint Use - Nonviolent/Non-Self-Destructive Behavior:  Goal: Absence of restraint indications  Description  Absence of restraint indications  3/26/2019 1701 by Mckayla Gomez RN  Outcome: Not Met This Shift    Problem: Mood - Altered:  Goal: Verbalizations of feeling emotionally comfortable while being cared for will increase  Description  Verbalizations of feeling emotionally comfortable while being cared for will increase  3/26/2019 0432 by Garcia Graham, RN  Outcome: Ongoing

## 2019-03-26 NOTE — PROGRESS NOTES
8\" (1.727 m)   Wt 282 lb 6.6 oz (128.1 kg)   SpO2 97%   BMI 42.94 kg/m²   General appearance: intubated and sedated  HEENT: Head: Normocephalic, no lesions, without obvious abnormality. Neck: no JVD  Lungs: Coarse sounds  Heart: tachy s1+s2, no murmurs  Abdomen: soft, non-tender  Extremities: no edema  Neurologic: intubated and sedated        Assessment / Acute Cardiac Problems:   1. Influenza A  2. PNA  3. Acute respiratory failure, intubated on 03/21/2019  4. Aflutter/Afib with RVR  5. Mild cardiomyopathy  6. Acute systolic CHF  7. DM II  8. Type II MI    Patient Active Problem List:     CAD (coronary artery disease)     DM type 2 (diabetes mellitus, type 2) (HCC)     Adult BMI > 30     Type 2 diabetes mellitus without complication, without long-term current use of insulin (HCC)     Influenza A with respiratory manifestations     Acute respiratory failure with hypoxia (HCC)     Influenza A     Atrial fibrillation with rapid ventricular response (HCC)     Acute hypercapnic respiratory failure (HCC)     NSTEMI (non-ST elevated myocardial infarction) ([facility] [address]     Community acquired pneumonia of left lower lobe of lung ([facility] [address]      Plan of Treatment:   1. Will continue with IV Amiodarone  2. Lopressor 50mg IV BID PRN  3. Continue IV heparin  4. Recommend K>4, calcium and magnesium in normal range    Will discuss with rounding attending  for final recommendations.     Rafaela Dugan MS4

## 2019-03-27 ENCOUNTER — APPOINTMENT (OUTPATIENT)
Dept: GENERAL RADIOLOGY | Age: 74
DRG: 207 | End: 2019-03-27
Attending: INTERNAL MEDICINE
Payer: MEDICARE

## 2019-03-27 LAB
ABSOLUTE EOS #: 0 K/UL (ref 0–0.4)
ABSOLUTE IMMATURE GRANULOCYTE: 0.4 K/UL (ref 0–0.3)
ABSOLUTE LYMPH #: 2.81 K/UL (ref 1–4.8)
ABSOLUTE MONO #: 1.61 K/UL (ref 0.1–0.8)
ALLEN TEST: POSITIVE
ANION GAP SERPL CALCULATED.3IONS-SCNC: 10 MMOL/L (ref 9–17)
BASOPHILS # BLD: 0 % (ref 0–2)
BASOPHILS ABSOLUTE: 0 K/UL (ref 0–0.2)
BUN BLDV-MCNC: 50 MG/DL (ref 8–23)
BUN/CREAT BLD: ABNORMAL (ref 9–20)
CALCIUM IONIZED: 1.2 MMOL/L (ref 1.13–1.33)
CALCIUM SERPL-MCNC: 8.8 MG/DL (ref 8.6–10.4)
CHLORIDE BLD-SCNC: 97 MMOL/L (ref 98–107)
CO2: 34 MMOL/L (ref 20–31)
CREAT SERPL-MCNC: 0.83 MG/DL (ref 0.7–1.2)
DIFFERENTIAL TYPE: ABNORMAL
EOSINOPHILS RELATIVE PERCENT: 0 % (ref 1–4)
FIO2: 45
GFR AFRICAN AMERICAN: >60 ML/MIN
GFR NON-AFRICAN AMERICAN: >60 ML/MIN
GFR SERPL CREATININE-BSD FRML MDRD: ABNORMAL ML/MIN/{1.73_M2}
GFR SERPL CREATININE-BSD FRML MDRD: ABNORMAL ML/MIN/{1.73_M2}
GLUCOSE BLD-MCNC: 197 MG/DL (ref 75–110)
GLUCOSE BLD-MCNC: 250 MG/DL (ref 75–110)
GLUCOSE BLD-MCNC: 251 MG/DL (ref 75–110)
GLUCOSE BLD-MCNC: 259 MG/DL (ref 75–110)
GLUCOSE BLD-MCNC: 264 MG/DL (ref 75–110)
GLUCOSE BLD-MCNC: 270 MG/DL (ref 70–99)
GLUCOSE BLD-MCNC: 298 MG/DL (ref 75–110)
HCT VFR BLD CALC: 31 % (ref 40.7–50.3)
HEMOGLOBIN: 10 G/DL (ref 13–17)
IMMATURE GRANULOCYTES: 2 %
LYMPHOCYTES # BLD: 14 % (ref 24–44)
MAGNESIUM: 2 MG/DL (ref 1.6–2.6)
MCH RBC QN AUTO: 30.9 PG (ref 25.2–33.5)
MCHC RBC AUTO-ENTMCNC: 32.3 G/DL (ref 28.4–34.8)
MCV RBC AUTO: 95.7 FL (ref 82.6–102.9)
MODE: ABNORMAL
MONOCYTES # BLD: 8 % (ref 1–7)
MORPHOLOGY: NORMAL
NEGATIVE BASE EXCESS, ART: ABNORMAL (ref 0–2)
NRBC AUTOMATED: 0 PER 100 WBC
O2 DEVICE/FLOW/%: ABNORMAL
PARTIAL THROMBOPLASTIN TIME: 64.2 SEC (ref 20.5–30.5)
PATIENT TEMP: ABNORMAL
PDW BLD-RTO: 13.4 % (ref 11.8–14.4)
PHOSPHORUS: 3.2 MG/DL (ref 2.5–4.5)
PLATELET # BLD: 194 K/UL (ref 138–453)
PLATELET ESTIMATE: ABNORMAL
PMV BLD AUTO: 10.7 FL (ref 8.1–13.5)
POC HCO3: 41.4 MMOL/L (ref 21–28)
POC O2 SATURATION: 94 % (ref 94–98)
POC PCO2 TEMP: ABNORMAL MM HG
POC PCO2: 58.3 MM HG (ref 35–48)
POC PH TEMP: ABNORMAL
POC PH: 7.46 (ref 7.35–7.45)
POC PO2 TEMP: ABNORMAL MM HG
POC PO2: 69.5 MM HG (ref 83–108)
POSITIVE BASE EXCESS, ART: 15 (ref 0–3)
POTASSIUM SERPL-SCNC: 3.7 MMOL/L (ref 3.7–5.3)
RBC # BLD: 3.24 M/UL (ref 4.21–5.77)
RBC # BLD: ABNORMAL 10*6/UL
SAMPLE SITE: ABNORMAL
SEG NEUTROPHILS: 76 % (ref 36–66)
SEGMENTED NEUTROPHILS ABSOLUTE COUNT: 15.28 K/UL (ref 1.8–7.7)
SODIUM BLD-SCNC: 141 MMOL/L (ref 135–144)
TCO2 (CALC), ART: 43 MMOL/L (ref 22–29)
TRIGL SERPL-MCNC: 95 MG/DL
WBC # BLD: 20.1 K/UL (ref 3.5–11.3)
WBC # BLD: ABNORMAL 10*3/UL

## 2019-03-27 PROCEDURE — 82330 ASSAY OF CALCIUM: CPT

## 2019-03-27 PROCEDURE — 83735 ASSAY OF MAGNESIUM: CPT

## 2019-03-27 PROCEDURE — 6370000000 HC RX 637 (ALT 250 FOR IP): Performed by: STUDENT IN AN ORGANIZED HEALTH CARE EDUCATION/TRAINING PROGRAM

## 2019-03-27 PROCEDURE — 6360000002 HC RX W HCPCS: Performed by: INTERNAL MEDICINE

## 2019-03-27 PROCEDURE — 6370000000 HC RX 637 (ALT 250 FOR IP)

## 2019-03-27 PROCEDURE — 36600 WITHDRAWAL OF ARTERIAL BLOOD: CPT

## 2019-03-27 PROCEDURE — 82803 BLOOD GASES ANY COMBINATION: CPT

## 2019-03-27 PROCEDURE — 94003 VENT MGMT INPAT SUBQ DAY: CPT

## 2019-03-27 PROCEDURE — 2580000003 HC RX 258: Performed by: INTERNAL MEDICINE

## 2019-03-27 PROCEDURE — 2700000000 HC OXYGEN THERAPY PER DAY

## 2019-03-27 PROCEDURE — 80048 BASIC METABOLIC PNL TOTAL CA: CPT

## 2019-03-27 PROCEDURE — 6360000002 HC RX W HCPCS: Performed by: STUDENT IN AN ORGANIZED HEALTH CARE EDUCATION/TRAINING PROGRAM

## 2019-03-27 PROCEDURE — 6370000000 HC RX 637 (ALT 250 FOR IP): Performed by: INTERNAL MEDICINE

## 2019-03-27 PROCEDURE — 85730 THROMBOPLASTIN TIME PARTIAL: CPT

## 2019-03-27 PROCEDURE — 2580000003 HC RX 258: Performed by: STUDENT IN AN ORGANIZED HEALTH CARE EDUCATION/TRAINING PROGRAM

## 2019-03-27 PROCEDURE — 82947 ASSAY GLUCOSE BLOOD QUANT: CPT

## 2019-03-27 PROCEDURE — 94762 N-INVAS EAR/PLS OXIMTRY CONT: CPT

## 2019-03-27 PROCEDURE — 99291 CRITICAL CARE FIRST HOUR: CPT | Performed by: INTERNAL MEDICINE

## 2019-03-27 PROCEDURE — 85025 COMPLETE CBC W/AUTO DIFF WBC: CPT

## 2019-03-27 PROCEDURE — 97110 THERAPEUTIC EXERCISES: CPT

## 2019-03-27 PROCEDURE — 71045 X-RAY EXAM CHEST 1 VIEW: CPT

## 2019-03-27 PROCEDURE — 84100 ASSAY OF PHOSPHORUS: CPT

## 2019-03-27 PROCEDURE — 2000000000 HC ICU R&B

## 2019-03-27 PROCEDURE — 36415 COLL VENOUS BLD VENIPUNCTURE: CPT

## 2019-03-27 PROCEDURE — 94770 HC ETCO2 MONITOR DAILY: CPT

## 2019-03-27 PROCEDURE — 84478 ASSAY OF TRIGLYCERIDES: CPT

## 2019-03-27 RX ORDER — SODIUM CHLORIDE 9 MG/ML
INJECTION, SOLUTION INTRAVENOUS CONTINUOUS
Status: DISCONTINUED | OUTPATIENT
Start: 2019-03-27 | End: 2019-04-10

## 2019-03-27 RX ORDER — ACETAMINOPHEN 325 MG/1
TABLET ORAL
Status: COMPLETED
Start: 2019-03-27 | End: 2019-03-27

## 2019-03-27 RX ORDER — OXYCODONE HYDROCHLORIDE 5 MG/1
5 TABLET ORAL EVERY 4 HOURS PRN
Status: DISCONTINUED | OUTPATIENT
Start: 2019-03-27 | End: 2019-04-05

## 2019-03-27 RX ORDER — ACETAMINOPHEN 325 MG/1
650 TABLET ORAL EVERY 4 HOURS PRN
Status: DISCONTINUED | OUTPATIENT
Start: 2019-03-27 | End: 2019-04-10 | Stop reason: HOSPADM

## 2019-03-27 RX ADMIN — FAMOTIDINE 20 MG: 20 TABLET, FILM COATED ORAL at 20:17

## 2019-03-27 RX ADMIN — HEPARIN SODIUM 17 UNITS/KG/HR: 10000 INJECTION INTRAVENOUS; SUBCUTANEOUS at 16:28

## 2019-03-27 RX ADMIN — INSULIN LISPRO 9 UNITS: 100 INJECTION, SOLUTION INTRAVENOUS; SUBCUTANEOUS at 05:35

## 2019-03-27 RX ADMIN — OXYCODONE HYDROCHLORIDE 5 MG: 5 TABLET ORAL at 14:44

## 2019-03-27 RX ADMIN — SODIUM CHLORIDE: 9 INJECTION, SOLUTION INTRAVENOUS at 20:02

## 2019-03-27 RX ADMIN — Medication 1 MG/HR: at 21:46

## 2019-03-27 RX ADMIN — OXYCODONE HYDROCHLORIDE 5 MG: 5 TABLET ORAL at 09:00

## 2019-03-27 RX ADMIN — Medication 10 ML: at 08:57

## 2019-03-27 RX ADMIN — INSULIN LISPRO 3 UNITS: 100 INJECTION, SOLUTION INTRAVENOUS; SUBCUTANEOUS at 00:03

## 2019-03-27 RX ADMIN — PROPOFOL 25 MCG/KG/MIN: 10 INJECTION, EMULSION INTRAVENOUS at 06:48

## 2019-03-27 RX ADMIN — PROPOFOL 20 MCG/KG/MIN: 10 INJECTION, EMULSION INTRAVENOUS at 19:59

## 2019-03-27 RX ADMIN — FAMOTIDINE 20 MG: 20 TABLET, FILM COATED ORAL at 08:36

## 2019-03-27 RX ADMIN — AMIODARONE HYDROCHLORIDE 200 MG: 200 TABLET ORAL at 08:36

## 2019-03-27 RX ADMIN — PROPOFOL 15 MCG/KG/MIN: 10 INJECTION, EMULSION INTRAVENOUS at 12:45

## 2019-03-27 RX ADMIN — Medication 100 MG: at 08:36

## 2019-03-27 RX ADMIN — HEPARIN SODIUM 17 UNITS/KG/HR: 10000 INJECTION INTRAVENOUS; SUBCUTANEOUS at 04:41

## 2019-03-27 RX ADMIN — ACETAMINOPHEN 650 MG: 325 TABLET ORAL at 20:26

## 2019-03-27 RX ADMIN — Medication 10 ML: at 20:17

## 2019-03-27 RX ADMIN — INSULIN LISPRO 9 UNITS: 100 INJECTION, SOLUTION INTRAVENOUS; SUBCUTANEOUS at 23:45

## 2019-03-27 RX ADMIN — FUROSEMIDE 40 MG: 10 INJECTION, SOLUTION INTRAMUSCULAR; INTRAVENOUS at 08:36

## 2019-03-27 RX ADMIN — INSULIN LISPRO 9 UNITS: 100 INJECTION, SOLUTION INTRAVENOUS; SUBCUTANEOUS at 18:23

## 2019-03-27 RX ADMIN — AMIODARONE HYDROCHLORIDE 200 MG: 200 TABLET ORAL at 20:17

## 2019-03-27 RX ADMIN — OXYCODONE HYDROCHLORIDE 5 MG: 5 TABLET ORAL at 20:26

## 2019-03-27 RX ADMIN — INSULIN LISPRO 9 UNITS: 100 INJECTION, SOLUTION INTRAVENOUS; SUBCUTANEOUS at 12:15

## 2019-03-27 RX ADMIN — INSULIN GLARGINE 35 UNITS: 100 INJECTION, SOLUTION SUBCUTANEOUS at 08:37

## 2019-03-27 ASSESSMENT — PAIN SCALES - GENERAL: PAINLEVEL_OUTOF10: 4

## 2019-03-27 ASSESSMENT — PULMONARY FUNCTION TESTS
PIF_VALUE: 19
PIF_VALUE: 15
PIF_VALUE: 18
PIF_VALUE: 19
PIF_VALUE: 16
PIF_VALUE: 22
PIF_VALUE: 19
PIF_VALUE: 18
PIF_VALUE: 19
PIF_VALUE: 19
PIF_VALUE: 16
PIF_VALUE: 18
PIF_VALUE: 16
PIF_VALUE: 15
PIF_VALUE: 16

## 2019-03-27 NOTE — PLAN OF CARE
PROVIDE ADEQUATE OXYGENATION WITH ACCEPTABLE SP02/ABG'S    ? IDENTIFY APPROPRIATE OXYGEN THERAPY  ? MONITOR SP02/ABG'S AS NEEDED   ? PATIENT EDUCATION AS NEEDED   MECHANICAL VENTILATION     ? PROVIDE OPTIMAL VENTILATION  ? ASSESS FOR EXTUBATION READINESS  ? ASSESS FOR WEANING READINESS    ? IMPLEMENT ADULT MECHANICAL VENTILATION PROTOCOL  ? MAINTAIN ADEQUATE OXYGENATION  ? PERFORM SPONTANEOUS WEANING TRIAL AS TOLERATED       Ventilator Bronchodilator assessment    Breath sounds: rhonchi/decreased bases  Inspiratory Pressure: 19  Plateau Pressure: 17    Patient assessed at level 1          ?x    Bronchodilator Assessment    BRONCHODILATOR ASSESSMENT SCORE  Score 0 (Home) 1 2 3 4   Breath Sounds   ? Chronic Ventilator: Patient at baseline ? Mild Wheezes/ Clear ? Intermittent wheezes with good air entry ?x  Bilateral/unilateral wheezing with diminished air entry ? Insp/Exp wheeze and/or poor aeration   Ventilator Pressures   ? Chronic Ventilator ?x  Insp. Pressure less than 25 cm H20 ? Insp. Pressure less than 25 cm H20 ? Insp. Pressure exceeds 25 cm H20 ? Insp. Pressure exceeds 30 cm H20   Plateau Pressure ? NA   ?x  Plateau Pressure less than 4  ? Plateau Pressure less than or equal to 5 ? Plateau Pressure greater than or equal to 6 ?   Plateau Pressure greater than or equal to 8       arcelia horan  9:31 AM

## 2019-03-27 NOTE — PLAN OF CARE
Problem: Risk for Impaired Skin Integrity  Goal: Tissue integrity - skin and mucous membranes  Description  Structural intactness and normal physiological function of skin and  mucous membranes.   3/27/2019 1845 by Chel Garay RN  Outcome: Ongoing     Problem: Discharge Planning:  Goal: Ability to perform activities of daily living will improve  Description  Ability to perform activities of daily living will improve  3/27/2019 1845 by Chel Garay RN  Outcome: Not Met This Shift     Problem: Discharge Planning:  Goal: Participates in care planning  Description  Participates in care planning  3/27/2019 1845 by Chel Garay RN  Outcome: Not Met This Shift     Problem: Injury - Risk of, Physical Injury:  Goal: Absence of physical injury  Description  Absence of physical injury  3/27/2019 1845 by Chel Garay RN  Outcome: Met This Shift     Problem: Injury - Risk of, Physical Injury:  Goal: Will remain free from falls  Description  Will remain free from falls  3/27/2019 1845 by Chel Garay RN  Outcome: Met This Shift     Problem: Mood - Altered:  Goal: Mood stable  Description  Mood stable  3/27/2019 1845 by Chel Garay RN  Outcome: Ongoing     Problem: Mood - Altered:  Goal: Absence of abusive behavior  Description  Absence of abusive behavior  3/27/2019 1845 by Chel Garay RN  Outcome: Ongoing     Problem: Mood - Altered:  Goal: Verbalizations of feeling emotionally comfortable while being cared for will increase  Description  Verbalizations of feeling emotionally comfortable while being cared for will increase  3/27/2019 1845 by Chle Garay RN  Outcome: Ongoing     Problem: Psychomotor Activity - Altered:  Goal: Absence of psychomotor disturbance signs and symptoms  Description  Absence of psychomotor disturbance signs and symptoms  3/27/2019 1845 by Chel Garay RN  Outcome: Ongoing     Problem: Sensory Perception - Impaired:  Goal: Demonstrations of improved sensory functioning will increase  Description  Demonstrations of improved sensory functioning will increase  3/27/2019 1845 by Aruna Eason RN  Outcome: Ongoing     Problem: Sensory Perception - Impaired:  Goal: Decrease in sensory misperception frequency  Description  Decrease in sensory misperception frequency  3/27/2019 1845 by Aruna Eason RN  Outcome: Ongoing     Problem: Sensory Perception - Impaired:  Goal: Able to refrain from responding to false sensory perceptions  Description  Able to refrain from responding to false sensory perceptions  3/27/2019 1845 by Aruna Eason RN  Outcome: Ongoing     Problem: Sensory Perception - Impaired:  Goal: Demonstrates accurate environmental perceptions  Description  Demonstrates accurate environmental perceptions  3/27/2019 1845 by Aruna Eason RN  Outcome: Ongoing     Problem: Sensory Perception - Impaired:  Goal: Able to distinguish between reality-based and nonreality-based thinking  Description  Able to distinguish between reality-based and nonreality-based thinking  3/27/2019 1845 by Aruna Eason RN  Outcome: Ongoing     Problem: Sensory Perception - Impaired:  Goal: Able to interrupt nonreality-based thinking  Description  Able to interrupt nonreality-based thinking  3/27/2019 1845 by Aruna Eason RN  Outcome: Ongoing     Problem: Sleep Pattern Disturbance:  Goal: Appears well-rested  Description  Appears well-rested  3/27/2019 1845 by Aruna Eason RN  Outcome: Ongoing     Problem: Restraint Use - Nonviolent/Non-Self-Destructive Behavior:  Goal: Absence of restraint indications  Description  Absence of restraint indications  3/27/2019 1845 by Aruna Eason RN  Outcome: Not Met This Shift     Problem: Restraint Use - Nonviolent/Non-Self-Destructive Behavior:  Goal: Absence of restraint-related injury  Description  Absence of restraint-related injury  3/27/2019 1845 by Aruna Eason RN  Outcome: Met This Shift     Continue with bilateral soft wrist restraints due to patient immediately reaches up towards airway when restraints released. Will continue to monitor.

## 2019-03-27 NOTE — ED PROVIDER NOTES
Sign out receiving  ADDENDUM  This patient was signed out to me at shift change  The pending studies are: some further lab work to see if he was ok for admission to this hospital given it's more modest resources in cardiology intervention. I have reviewed the ED record and personally evaluated this patient  BP (!) 142/94   Pulse 186   Temp 97.9 °F (36.6 °C) (Tympanic)   Resp 28   SpO2 (!) 81%   ED course: Pt noted to have flu and a fib and a + increasing trop. I attempted to admit him to the hospital but it was more appropriate for him to go to a bigger hospital where NSTEMI could best be dealt with if it became a stemi, so I made every possible offer of accommodation to get the patient admitted wherever he wanted but he wanted to go home and left AMA in spite of my telling him that he could certainly die or experience extreme morbidity from his choice. FINAL IMPRESSION   1. Shortness of breath    2. Influenza A    3. Atrial flutter with rapid ventricular response ([address]    4. NSTEMI (non-ST elevated myocardial infarction) ([address]    5.  Bronchitis                    Rosalina Harvey MD  03/27/19 3904

## 2019-03-27 NOTE — PROGRESS NOTES
Occupational Therapy Not Seen Note    DATE: 3/27/2019  Name: Fiona Vidales  : 1945  MRN: [id number]    Patient not available for Occupational Therapy due to:    Sedation: pt intubated and sedated at this time     Next Scheduled Treatment: check back 3/29/18    Electronically signed by CHANTAL Welch on 3/27/2019 at 11:13 AM

## 2019-03-27 NOTE — PLAN OF CARE
Problem: Risk for Impaired Skin Integrity  Goal: Tissue integrity - skin and mucous membranes  Description  Structural intactness and normal physiological function of skin and  mucous membranes.   Outcome: Ongoing     Problem: Confusion - Acute:  Goal: Absence of continued neurological deterioration signs and symptoms  Description  Absence of continued neurological deterioration signs and symptoms  Outcome: Ongoing  Goal: Mental status will be restored to baseline  Description  Mental status will be restored to baseline  Outcome: Ongoing     Problem: Discharge Planning:  Goal: Ability to perform activities of daily living will improve  Description  Ability to perform activities of daily living will improve  Outcome: Ongoing  Goal: Participates in care planning  Description  Participates in care planning  Outcome: Ongoing     Problem: Injury - Risk of, Physical Injury:  Goal: Absence of physical injury  Description  Absence of physical injury  Outcome: Ongoing  Goal: Will remain free from falls  Description  Will remain free from falls  Outcome: Ongoing     Problem: Mood - Altered:  Goal: Mood stable  Description  Mood stable  Outcome: Ongoing  Goal: Absence of abusive behavior  Description  Absence of abusive behavior  Outcome: Ongoing  Goal: Verbalizations of feeling emotionally comfortable while being cared for will increase  Description  Verbalizations of feeling emotionally comfortable while being cared for will increase  Outcome: Ongoing     Problem: Psychomotor Activity - Altered:  Goal: Absence of psychomotor disturbance signs and symptoms  Description  Absence of psychomotor disturbance signs and symptoms  Outcome: Ongoing     Problem: Sensory Perception - Impaired:  Goal: Demonstrations of improved sensory functioning will increase  Description  Demonstrations of improved sensory functioning will increase  Outcome: Ongoing  Goal: Decrease in sensory misperception frequency  Description  Decrease in sensory Ongoing  3/26/2019 1816 by Ashley Velasco RCP  Outcome: Ongoing  Goal: ET tube will be managed safely  3/27/2019 0604 by Michelle Spicer RN  Outcome: Ongoing  3/26/2019 1816 by Ashley Velasco RCP  Outcome: Ongoing  Goal: Ability to express needs and understand communication  3/27/2019 0604 by Michelle Spicer RN  Outcome: Ongoing  3/26/2019 1816 by Ashley Velasco RCP  Outcome: Ongoing  Goal: Mobility/activity is maintained at optimum level for patient  3/27/2019 0604 by Michelle Spicer RN  Outcome: Ongoing  3/26/2019 1816 by Ashley Velasco RCP  Outcome: Ongoing     Problem: SKIN INTEGRITY  Goal: Skin integrity is maintained or improved  3/27/2019 0604 by Michelle Spicer RN  Outcome: Ongoing  3/26/2019 1816 by Ashley Velasco RCP  Outcome: Ongoing     Problem: Musculor/Skeletal Functional Status  Goal: Highest potential functional level  Outcome: Ongoing

## 2019-03-27 NOTE — PROGRESS NOTES
[facility]   Progress Note                   Date:   3/27/2019  Patient name: Loree Dee  Date of admission:  3/19/2019  7:58 AM  MRN:   [id number]  YOB: 1945  PCP: Arianne Mchugh MD    Reason for Admission: acute resp failure      Subjective: There were no acute events overnight, remained hemodynamically stable, intubated and sedated. Daily weaning trials. Still in a-fib on amio and lopressor. Rate well controlled.     Medications:   Scheduled Meds:   furosemide  40 mg Intravenous BID    amiodarone  200 mg Oral BID    insulin glargine  35 Units Subcutaneous Daily    famotidine  20 mg Per NG tube BID    insulin lispro  0-18 Units Subcutaneous Q6H    metoprolol tartrate  50 mg Per NG tube BID    docusate  100 mg Oral Daily    sodium chloride flush  10 mL Intravenous 2 times per day       Continuous Infusions:   heparin 25,000 units in 0.9% sodium chloride 250 mL infusion 17 Units/kg/hr (03/27/19 0900)    propofol 15 mcg/kg/min (03/27/19 0831)    dextrose         CBC:   Recent Labs     03/25/19 0421 03/26/19 0427 03/27/19 0447   WBC 13.5* 14.0* 20.1*   HGB 11.0* 10.6* 10.0*    169 194     BMP:    Recent Labs     03/25/19 0421 03/26/19 0427 03/27/19 0447    136 141   K 3.8 3.5* 3.7   CL 98 95* 97*   CO2 35* 33* 34*   BUN 53* 59* 50*   CREATININE 1.02 1.03 0.83   GLUCOSE 151* 236* 270*       Objective:   Vitals: /64   Pulse 99   Temp 100 °F (37.8 °C) (Oral)   Resp 24   Ht 5' 8\" (1.727 m)   Wt 280 lb 10.3 oz (127.3 kg)   SpO2 96%   BMI 42.67 kg/m²     General appearance: intubated and sedated  HEENT: Head: Normocephalic, no lesions, without obvious abnormality  Neck: no JVD  Lungs: clear to auscultation bilaterally, no basilar rales, no wheezing   Heart: atrial fibrillation, S1, S2 normal, no murmur, click, rub or gallop  Abdomen: soft, non-tender; bowel sounds normal  Extremities: No LE edema  Neurologic: Intubated/sedated    Patient Active Problem List:     CAD (coronary artery disease)     DM type 2 (diabetes mellitus, type 2) (HCC)     Adult BMI > 30     Type 2 diabetes mellitus without complication, without long-term current use of insulin (HCC)     Influenza A with respiratory manifestations     Acute respiratory failure with hypoxia (HCC)     Influenza A     Atrial fibrillation with rapid ventricular response (HCC)     Acute hypercapnic respiratory failure (HCC)     NSTEMI (non-ST elevated myocardial infarction) ([facility]     Community acquired pneumonia of left lower lobe of lung ([facility]    Assessment:     1. Influenza A with PNA  2. Acute respiratory failure, intubated on 3/21/19  3. Atrial flutter/fib with RVR  4. Mild cardiomyopathy  5. Acute systolic CHF  6. Type II MI      Treatment Plan:   1. Afib- on PO Amio   2. Continue with lopressor 50 mg BID  3. Continue with heparin drip  4. Continue to diurese.    5. Will consider GUSTAVO/CV prior to DC      Electronically signed by Wilfrido Argueta MD on 3/27/2019 at [facility]  [phone]

## 2019-03-27 NOTE — PROGRESS NOTES
INTENSIVE CARE UNIT  Resident Physician Progress Note    Patient - Shasha Logan  Date of Admission -  3/19/2019  7:58 AM  Date of Evaluation -  3/27/2019  Room and Bed Number -  0105/0105-01   Hospital Day - 8    Chief complaint influenza, pneumonia, A. fib with RVR  SUBJECTIVE:     OVERNIGHT EVENTS:    1. Patient seen and examined at bedside, patient is intubated and sedated   2. Transitioned to PO amio  3. Repeat CXR this AM looks unchanged, cont IV lasix 40mg BID  4. completed Abx and tamiflue and steroid taper  5. lantus 35u.  this AM  6.  Tolerating TF    AWAKE & FOLLOWING COMMANDS:  [] No   [x] Yes, gets agitated     SECRETIONS Amount:  [x] Small [] Moderate  [] Large  [] None  Color:     [x] White [] Colored  [x] Bloody    SEDATION:  RAAS Score:  [x] Propofol gtt  [] Versed gtt  [] Ativan gtt   [] No Sedation    PARALYZED:  [x] No    [] Yes    VASOPRESSORS:  [x] No    [] Yes  [] Levophed [] Dopamine [] Vasopressin  [] Dobutamine [] Phenylephrine [] Epinephrine    Review of Systems -  Intubated , sedated  OBJECTIVE:     VITAL SIGNS:  /61   Pulse 89   Temp 99 °F (37.2 °C) (Oral)   Resp 26   Ht 5' 8\" (1.727 m)   Wt 280 lb 10.3 oz (127.3 kg)   SpO2 95%   BMI 42.67 kg/m²   Tmax over 24 hours:  Temp (24hrs), Av °F (37.2 °C), Min:97.7 °F (36.5 °C), Max:99.7 °F (37.6 °C)      Patient Vitals for the past 8 hrs:   BP Temp Temp src Pulse Resp SpO2 Weight   19 0700 -- -- -- 89 26 95 % --   19 0600 111/61 -- -- 92 20 98 % 280 lb 10.3 oz (127.3 kg)   19 0500 (!) 140/85 -- -- 94 21 98 % --   19 0400 122/62 99 °F (37.2 °C) Oral 82 18 96 % --   19 0334 -- -- -- 92 29 97 % --   19 0300 124/61 -- -- 88 13 97 % --   19 0200 117/85 -- -- 94 26 97 % --   19 0100 110/85 -- -- 92 20 97 % --         Intake/Output Summary (Last 24 hours) at 3/27/2019 0835  Last data filed at 3/27/2019 0500  Gross per 24 hour   Intake 1987 ml   Output 2560 ml   Net -573 ml     Date 03/27/19 0000 - 03/27/19 2359   Shift 2668-6222 2033-4504 2309-5805 24 Hour Total   INTAKE   I.V.(mL/kg) 331(2.6)   331(2.6)   NG/GT(mL/kg) 447(3.5)   447(3.5)   Shift Total(mL/kg) 778(6.1)   778(6.1)   OUTPUT   Urine(mL/kg/hr) 280(0.3)   280   Shift Total(mL/kg) 280(2.2)   280(2.2)   Weight (kg) 127.3 127.3 127.3 127. 3     Wt Readings from Last 3 Encounters:   03/27/19 280 lb 10.3 oz (127.3 kg)   03/19/19 294 lb 11.2 oz (133.7 kg)   03/18/19 300 lb (136.1 kg)     Body mass index is 42.67 kg/m². PHYSICAL EXAM:  · General appearance: intubated sedated   · HEENT: Atraumatic, normocephalic, neck supple, normal EOM, thyroid normal, no lymphadenopathy   · Lungs: Ventilating all lobes. Prolonged expiration is still wheezing is better.   Posterior basilar rales and infrascapular rales are still present   · Heart: Regular heart rate, tachycardic, S1, S2 normal, no murmur   · Abdomen: soft, non-tender; bowel sounds normal; no masses,  no organomegaly  · Extremities: No cyanosis or edema  · Neurological:  Intubated sedated , opens eyes on pain stimuli, moves all extremities but gets agitated    MEDICATIONS:  Scheduled Meds:   furosemide  40 mg Intravenous BID    amiodarone  200 mg Oral BID    insulin glargine  35 Units Subcutaneous Daily    famotidine  20 mg Per NG tube BID    insulin lispro  0-18 Units Subcutaneous Q6H    metoprolol tartrate  50 mg Per NG tube BID    docusate  100 mg Oral Daily    sodium chloride flush  10 mL Intravenous 2 times per day     Continuous Infusions:   heparin 25,000 units in 0.9% sodium chloride 250 mL infusion 17 Units/kg/hr (03/27/19 0371)    propofol 15 mcg/kg/min (03/27/19 0831)    dextrose       PRN Meds:     magnesium hydroxide 30 mL Daily PRN   albuterol 2.5 mg Q6H PRN   magnesium hydroxide 30 mL Daily PRN   LORazepam 0.5 mg Q8H PRN   sodium chloride flush 10 mL PRN   ondansetron 4 mg Q6H PRN   heparin (porcine) 10,000 Units PRN   heparin (porcine) 5,000 Units PRN glucose 15 g PRN   dextrose 12.5 g PRN   glucagon (rDNA) 1 mg PRN   dextrose 100 mL/hr PRN   metoprolol 2.5 mg Q4H PRN       SUPPORT DEVICES: [] Ventilator [x] BIPAP  [] Nasal Cannula [] Room Air  .   Lab Results   Component Value Date    PHART 7.310 03/18/2019    NHW3NKX 51.7 03/18/2019    PO2ART 67.8 03/18/2019    RDI6VQP 25.4 03/18/2019    KQI9PSG 43 03/27/2019    Y5RLTXQP 91.7 03/18/2019    FIO2 45.0 03/27/2019         DATA:  Complete Blood Count:   Recent Labs     03/25/19 0421 03/26/19 0427 03/27/19 0447   WBC 13.5* 14.0* 20.1*   RBC 3.62* 3.51* 3.24*   HGB 11.0* 10.6* 10.0*   HCT 35.0* 34.4* 31.0*   MCV 96.7 98.0 95.7   MCH 30.4 30.2 30.9   MCHC 31.4 30.8 32.3   RDW 13.3 13.6 13.4    169 194   MPV 10.5 10.4 10.7        Last 3 Blood Glucose:   Recent Labs     03/25/19 0421 03/26/19 0427 03/27/19  0447   GLUCOSE 151* 236* 270*        PT/INR:    Lab Results   Component Value Date    PROTIME 12.0 03/15/2019    INR 1.2 03/15/2019     PTT:    Lab Results   Component Value Date    APTT 64.2 03/27/2019       Comprehensive Metabolic Profile:   Recent Labs     03/25/19 0421 03/26/19 0427 03/27/19 0447    136 141   K 3.8 3.5* 3.7   CL 98 95* 97*   CO2 35* 33* 34*   BUN 53* 59* 50*   CREATININE 1.02 1.03 0.83   GLUCOSE 151* 236* 270*   CALCIUM 9.3 8.9 8.8      Magnesium:   Lab Results   Component Value Date    MG 2.0 03/27/2019    MG 2.3 03/26/2019    MG 2.2 03/25/2019     Phosphorus:   Lab Results   Component Value Date    PHOS 3.2 03/27/2019    PHOS 3.3 03/26/2019    PHOS 3.7 03/25/2019     Ionized Calcium:   Lab Results   Component Value Date    CAION 1.20 03/27/2019    CAION 1.24 03/26/2019    CAION 1.25 03/25/2019        Urinalysis:   Lab Results   Component Value Date    NITRU NEGATIVE 03/18/2019    COLORU YELLOW 03/18/2019    PHUR 5.5 03/18/2019    WBCUA 0 TO 2 03/18/2019    RBCUA None 03/18/2019    MUCUS NOT REPORTED 03/18/2019    TRICHOMONAS NOT REPORTED 03/18/2019    YEAST NOT REPORTED 03/18/2019    BACTERIA NOT REPORTED 03/18/2019    SPECGRAV 1.010 03/18/2019    LEUKOCYTESUR NEGATIVE 03/18/2019    UROBILINOGEN Normal 03/18/2019    BILIRUBINUR NEGATIVE 03/18/2019    GLUCOSEU 2+ 03/18/2019    KETUA NEGATIVE 03/18/2019    AMORPHOUS NOT REPORTED 03/18/2019       HgBA1c:    Lab Results   Component Value Date    LABA1C 8.6 03/18/2019     TSH:  No results found for: TSH  Lactic Acid:   Lab Results   Component Value Date    LACTA 2.2 03/18/2019    LACTA 2.6 03/18/2019      Troponin: No results for input(s): TROPONINI in the last 72 hours. Results for Judith Belcher (MRN [id number]) as of 3/23/2019 18:06   Ref.  Range 3/23/2019 04:07   POC pH Latest Ref Range: 7.350 - 7.450  7.437   POC pH Temp Unknown NOT REPORTED   POC pCO2 Latest Ref Range: 35.0 - 48.0 mm Hg 63.7 (H)   POC pCO2 Temp Latest Units: mm Hg NOT REPORTED   POC PO2 Latest Ref Range: 83.0 - 108.0 mm Hg 65.1 (L)   POC pO2 Temp Latest Units: mm Hg NOT REPORTED   POC HCO3 Latest Ref Range: 21.0 - 28.0 mmol/L 42.9 (HH)   POC O2 SAT Latest Ref Range: 94.0 - 98.0 % 92 (L)     ASSESSMENT:     Patient Active Problem List    Diagnosis Date Noted    [facility]     Influenza A with respiratory manifestations 03/18/2019    Acute respiratory failure with hypoxia (HCC) 03/18/2019    Influenza A 03/18/2019    Acute hypercapnic respiratory failure (HCC) 03/18/2019    [facility]STEM[facility]non-ST elevated [facility] infarction) ([facility])     Type 2 diabetes mellitus without complication, without long-term current use of insulin ([facility] [address] 09/06/2016    Adult BMI > 30 05/06/2016    CAD (coronary artery disease)     DM type 2 (diabetes mellitus, type 2) ([facility])     Atrial fibrillation with rapid ventricular response ([facility] [address] 01/01/2007          PLAN:     WEAN PER PROTOCOL:  [] No   [x] Yes  [] N/A    ICU PROPHYLAXIS:  Stress ulcer:  [] PPI Agent  [x] [id number] [] Sucralfate  [] Other:  VTE:   [] Enoxaparin  [x] he is on heparin GTT  [] EPC Cuffs    NUTRITION:  [x] NPO [] Tube Feeding (Specify: ) [] TPN  [] PO    HOME MEDS RECONCILED: [x] No  [] Yes    CONSULTATION NEEDED:  [x] No  [] Yes    FAMILY UPDATED:    [] No  [x] Yes    TRANSFER OUT OF ICU:   [x] No  [] Yes        Additional Assessment:  Influenza A  Community acquired pneumonia with likely strep pneumoniae  Atrial fibrillation with RVRPersistent   Acute exacerbation of CHF sec due to atrial fibrillationPersistent   Acute  hypoxic hypercapnic respiratory failureDue to pulmonary edema, persistent without any significant change   Diabetes mellitusType II with hyperglycemia   Obstructive sleep apnea    Plan:  Continue mechanical ventilation, keep SPO2 more than 92%  Weaning trial  daily  On propofol , daily sedation holiday   Breathing treatment with DuoNeb  Lasix 40mg BID  CXR today   Monitor urine output and total intake  Replace electrolytes as appropriate. Continue on TF  heparin gtt for now    Pepcid for GI Px  Milk of mag and Colace as a bowel regimen       Robb Calixto MD  PGY-2, Internal Medicine resident   [facility]. 3/27/2019 8:35 AM         .Attending Physician Statement  I have discussed the care of Valente Garsia, including pertinent history and exam findings with the resident. I have reviewed the key elements of all parts of the encounter with the resident. I have seen and examined the patient with the resident. I agree with the assessment and plan and status of the problem list as documented. I seen the patient during my round this morning, I have reviewed the events, lab seen medications reviewed, discussed with respiratory therapist and nursing staff. His blood sugar is still over 200 and he is on 35 units of Lantus monitor he was just taken off prednisone yesterday and will wait for 24 more hours before increasing the dose of Lantus.   She continued to be on ventilator he was tried on a spontaneous breathing trial this morning and his volumes were too low on pressure support of 6 and then he was placed on pressure support of 10, on pressure support of 10 his tidal volumes are around 300 respiratory rate is still 30 and so far did not tolerate spontaneous breathing trial.  We'll continue with Lasix. Continue daily spontaneous breathing trial.  Continue with bronchodilators. We will continue to monitor BUN currently 50 and creatinine currently 0.83 on diuretics. There is increasing WBC count from yesterday and WBC count is 20 today Will monitor he is not febrile. Discussed with family and updated. Discussed with nursing staff, treatment and plan discussed. Discussed with the respiratory therapist.    Total critical care time caring for this patient with life threatening, unstable organ failure, including direct patient contact, management of life support systems, review of data including imaging and labs, discussions with other team members and physicians at least 27  Min so far today, excluding procedures. Galen Carlos MD  3/27/2019 12:06 PM    Please note that this chart was generated using voice recognition Dragon dictation software. Although every effort was made to ensure the accuracy of this automated transcription, some errors in transcription may have occurred.

## 2019-03-28 ENCOUNTER — APPOINTMENT (OUTPATIENT)
Dept: GENERAL RADIOLOGY | Age: 74
DRG: 207 | End: 2019-03-28
Attending: INTERNAL MEDICINE
Payer: MEDICARE

## 2019-03-28 ENCOUNTER — APPOINTMENT (OUTPATIENT)
Dept: CT IMAGING | Age: 74
DRG: 207 | End: 2019-03-28
Attending: INTERNAL MEDICINE
Payer: MEDICARE

## 2019-03-28 LAB
-: ABNORMAL
ABSOLUTE EOS #: 0.27 K/UL (ref 0–0.4)
ABSOLUTE IMMATURE GRANULOCYTE: 0.81 K/UL (ref 0–0.3)
ABSOLUTE LYMPH #: 2.44 K/UL (ref 1–4.8)
ABSOLUTE MONO #: 2.17 K/UL (ref 0.1–0.8)
ALBUMIN SERPL-MCNC: 2.9 G/DL (ref 3.5–5.2)
ALBUMIN/GLOBULIN RATIO: 1.2 (ref 1–2.5)
ALLEN TEST: ABNORMAL
ALLEN TEST: POSITIVE
ALLEN TEST: POSITIVE
ALP BLD-CCNC: 68 U/L (ref 40–129)
ALT SERPL-CCNC: 60 U/L (ref 5–41)
AMORPHOUS: ABNORMAL
ANION GAP SERPL CALCULATED.3IONS-SCNC: 11 MMOL/L (ref 9–17)
ANION GAP SERPL CALCULATED.3IONS-SCNC: 18 MMOL/L (ref 9–17)
AST SERPL-CCNC: 63 U/L
BACTERIA: ABNORMAL
BASOPHILS # BLD: 0 % (ref 0–2)
BASOPHILS ABSOLUTE: 0 K/UL (ref 0–0.2)
BILIRUB SERPL-MCNC: 0.46 MG/DL (ref 0.3–1.2)
BILIRUBIN DIRECT: 0.21 MG/DL
BILIRUBIN URINE: ABNORMAL
BILIRUBIN, INDIRECT: 0.25 MG/DL (ref 0–1)
BLOOD BANK SPECIMEN: NORMAL
BUN BLDV-MCNC: 68 MG/DL (ref 8–23)
BUN BLDV-MCNC: 84 MG/DL (ref 8–23)
BUN/CREAT BLD: ABNORMAL (ref 9–20)
BUN/CREAT BLD: ABNORMAL (ref 9–20)
CALCIUM IONIZED: 1.15 MMOL/L (ref 1.13–1.33)
CALCIUM SERPL-MCNC: 7.7 MG/DL (ref 8.6–10.4)
CALCIUM SERPL-MCNC: 8.7 MG/DL (ref 8.6–10.4)
CASTS UA: ABNORMAL /LPF (ref 0–8)
CHLORIDE BLD-SCNC: 94 MMOL/L (ref 98–107)
CHLORIDE BLD-SCNC: 96 MMOL/L (ref 98–107)
CO2: 21 MMOL/L (ref 20–31)
CO2: 33 MMOL/L (ref 20–31)
COLOR: ABNORMAL
CREAT SERPL-MCNC: 1.83 MG/DL (ref 0.7–1.2)
CREAT SERPL-MCNC: 3.21 MG/DL (ref 0.7–1.2)
CRYSTALS, UA: ABNORMAL /HPF
DIFFERENTIAL TYPE: ABNORMAL
EOSINOPHILS RELATIVE PERCENT: 1 % (ref 1–4)
EPITHELIAL CELLS UA: ABNORMAL /HPF (ref 0–5)
FIO2: 35
FIO2: 35
FIO2: ABNORMAL
GFR AFRICAN AMERICAN: 23 ML/MIN
GFR AFRICAN AMERICAN: 44 ML/MIN
GFR NON-AFRICAN AMERICAN: 19 ML/MIN
GFR NON-AFRICAN AMERICAN: 37 ML/MIN
GFR SERPL CREATININE-BSD FRML MDRD: ABNORMAL ML/MIN/{1.73_M2}
GLOBULIN: ABNORMAL G/DL (ref 1.5–3.8)
GLUCOSE BLD-MCNC: 238 MG/DL (ref 75–110)
GLUCOSE BLD-MCNC: 307 MG/DL (ref 75–110)
GLUCOSE BLD-MCNC: 320 MG/DL (ref 70–99)
GLUCOSE BLD-MCNC: 335 MG/DL (ref 75–110)
GLUCOSE BLD-MCNC: 383 MG/DL (ref 70–99)
GLUCOSE BLD-MCNC: 393 MG/DL (ref 75–110)
GLUCOSE BLD-MCNC: 437 MG/DL (ref 75–110)
GLUCOSE BLD-MCNC: 459 MG/DL (ref 75–110)
GLUCOSE BLD-MCNC: 462 MG/DL (ref 75–110)
GLUCOSE BLD-MCNC: 479 MG/DL (ref 75–110)
GLUCOSE URINE: ABNORMAL
HCT VFR BLD CALC: 23.9 % (ref 40.7–50.3)
HCT VFR BLD CALC: 25.6 % (ref 40.7–50.3)
HCT VFR BLD CALC: 27.2 % (ref 40.7–50.3)
HCT VFR BLD CALC: 27.2 % (ref 40.7–50.3)
HEMOGLOBIN: 7.6 G/DL (ref 13–17)
HEMOGLOBIN: 9 G/DL (ref 13–17)
HEMOGLOBIN: 9 G/DL (ref 13–17)
HEMOGLOBIN: 9.1 G/DL (ref 13–17)
IMMATURE GRANULOCYTES: 3 %
INR BLD: 1.2
KETONES, URINE: ABNORMAL
LACTIC ACID, WHOLE BLOOD: 5.5 MMOL/L (ref 0.7–2.1)
LACTIC ACID, WHOLE BLOOD: 7.4 MMOL/L (ref 0.7–2.1)
LACTIC ACID, WHOLE BLOOD: 9.9 MMOL/L (ref 0.7–2.1)
LEUKOCYTE ESTERASE, URINE: ABNORMAL
LYMPHOCYTES # BLD: 9 % (ref 24–44)
MAGNESIUM: 2.5 MG/DL (ref 1.6–2.6)
MCH RBC QN AUTO: 30.9 PG (ref 25.2–33.5)
MCHC RBC AUTO-ENTMCNC: 31.8 G/DL (ref 28.4–34.8)
MCV RBC AUTO: 97.2 FL (ref 82.6–102.9)
MODE: ABNORMAL
MODE: ABNORMAL
MODE: NORMAL
MONOCYTES # BLD: 8 % (ref 1–7)
MORPHOLOGY: NORMAL
MUCUS: ABNORMAL
NEGATIVE BASE EXCESS, ART: 4 (ref 0–2)
NEGATIVE BASE EXCESS, ART: ABNORMAL (ref 0–2)
NEGATIVE BASE EXCESS, ART: NORMAL (ref 0–2)
NITRITE, URINE: NEGATIVE
NRBC AUTOMATED: 0.4 PER 100 WBC
O2 DEVICE/FLOW/%: ABNORMAL
O2 DEVICE/FLOW/%: ABNORMAL
O2 DEVICE/FLOW/%: NORMAL
OTHER OBSERVATIONS UA: ABNORMAL
PARTIAL THROMBOPLASTIN TIME: 106.9 SEC (ref 20.5–30.5)
PARTIAL THROMBOPLASTIN TIME: 39.4 SEC (ref 20.5–30.5)
PATIENT TEMP: 38
PATIENT TEMP: 38.4
PATIENT TEMP: ABNORMAL
PDW BLD-RTO: 13.8 % (ref 11.8–14.4)
PH UA: 5 (ref 5–8)
PHOSPHORUS: 5.1 MG/DL (ref 2.5–4.5)
PLATELET # BLD: 228 K/UL (ref 138–453)
PLATELET # BLD: 274 K/UL (ref 138–453)
PLATELET ESTIMATE: ABNORMAL
PMV BLD AUTO: 10.9 FL (ref 8.1–13.5)
POC HCO3: 20.9 MMOL/L (ref 21–28)
POC HCO3: 24.8 MMOL/L (ref 21–28)
POC HCO3: 35.9 MMOL/L (ref 21–28)
POC O2 SATURATION: 94 % (ref 94–98)
POC O2 SATURATION: 96 % (ref 94–98)
POC O2 SATURATION: 98 % (ref 94–98)
POC PCO2 TEMP: 37 MM HG
POC PCO2 TEMP: 41 MM HG
POC PCO2 TEMP: ABNORMAL MM HG
POC PCO2: 34.9 MM HG (ref 35–48)
POC PCO2: 38.9 MM HG (ref 35–48)
POC PCO2: 46.1 MM HG (ref 35–48)
POC PH TEMP: 7.36
POC PH TEMP: 7.4
POC PH TEMP: ABNORMAL
POC PH: 7.38 (ref 7.35–7.45)
POC PH: 7.41 (ref 7.35–7.45)
POC PH: 7.5 (ref 7.35–7.45)
POC PO2 TEMP: 101 MM HG
POC PO2 TEMP: 91 MM HG
POC PO2 TEMP: ABNORMAL MM HG
POC PO2: 64.6 MM HG (ref 83–108)
POC PO2: 83.1 MM HG (ref 83–108)
POC PO2: 94.9 MM HG (ref 83–108)
POSITIVE BASE EXCESS, ART: 0 (ref 0–3)
POSITIVE BASE EXCESS, ART: 12 (ref 0–3)
POSITIVE BASE EXCESS, ART: ABNORMAL (ref 0–3)
POTASSIUM SERPL-SCNC: 4.4 MMOL/L (ref 3.7–5.3)
POTASSIUM SERPL-SCNC: 4.8 MMOL/L (ref 3.7–5.3)
PROTEIN UA: ABNORMAL
PROTHROMBIN TIME: 12.7 SEC (ref 9–12)
RBC # BLD: 2.46 M/UL (ref 4.21–5.77)
RBC # BLD: ABNORMAL 10*6/UL
RBC UA: ABNORMAL /HPF (ref 0–4)
RENAL EPITHELIAL, UA: ABNORMAL /HPF
SAMPLE SITE: ABNORMAL
SAMPLE SITE: ABNORMAL
SAMPLE SITE: NORMAL
SEG NEUTROPHILS: 79 % (ref 36–66)
SEGMENTED NEUTROPHILS ABSOLUTE COUNT: 21.41 K/UL (ref 1.8–7.7)
SODIUM BLD-SCNC: 135 MMOL/L (ref 135–144)
SODIUM BLD-SCNC: 138 MMOL/L (ref 135–144)
SPECIFIC GRAVITY UA: 1.02 (ref 1–1.03)
TCO2 (CALC), ART: 22 MMOL/L (ref 22–29)
TCO2 (CALC), ART: 26 MMOL/L (ref 22–29)
TCO2 (CALC), ART: 37 MMOL/L (ref 22–29)
TOTAL PROTEIN: 5.3 G/DL (ref 6.4–8.3)
TRICHOMONAS: ABNORMAL
TURBIDITY: ABNORMAL
URINE HGB: ABNORMAL
UROBILINOGEN, URINE: NORMAL
WBC # BLD: 27.1 K/UL (ref 3.5–11.3)
WBC # BLD: ABNORMAL 10*3/UL
WBC UA: ABNORMAL /HPF (ref 0–5)
YEAST: ABNORMAL

## 2019-03-28 PROCEDURE — 36415 COLL VENOUS BLD VENIPUNCTURE: CPT

## 2019-03-28 PROCEDURE — 2500000003 HC RX 250 WO HCPCS: Performed by: EMERGENCY MEDICINE

## 2019-03-28 PROCEDURE — 2000000000 HC ICU R&B

## 2019-03-28 PROCEDURE — 6360000002 HC RX W HCPCS: Performed by: STUDENT IN AN ORGANIZED HEALTH CARE EDUCATION/TRAINING PROGRAM

## 2019-03-28 PROCEDURE — 85014 HEMATOCRIT: CPT

## 2019-03-28 PROCEDURE — 86927 PLASMA FRESH FROZEN: CPT

## 2019-03-28 PROCEDURE — 2500000003 HC RX 250 WO HCPCS: Performed by: INTERNAL MEDICINE

## 2019-03-28 PROCEDURE — 6370000000 HC RX 637 (ALT 250 FOR IP): Performed by: INTERNAL MEDICINE

## 2019-03-28 PROCEDURE — 6370000000 HC RX 637 (ALT 250 FOR IP): Performed by: STUDENT IN AN ORGANIZED HEALTH CARE EDUCATION/TRAINING PROGRAM

## 2019-03-28 PROCEDURE — 2580000003 HC RX 258: Performed by: EMERGENCY MEDICINE

## 2019-03-28 PROCEDURE — 36556 INSERT NON-TUNNEL CV CATH: CPT | Performed by: INTERNAL MEDICINE

## 2019-03-28 PROCEDURE — 85025 COMPLETE CBC W/AUTO DIFF WBC: CPT

## 2019-03-28 PROCEDURE — 85018 HEMOGLOBIN: CPT

## 2019-03-28 PROCEDURE — 82947 ASSAY GLUCOSE BLOOD QUANT: CPT

## 2019-03-28 PROCEDURE — 36600 WITHDRAWAL OF ARTERIAL BLOOD: CPT

## 2019-03-28 PROCEDURE — 82803 BLOOD GASES ANY COMBINATION: CPT

## 2019-03-28 PROCEDURE — 2580000003 HC RX 258: Performed by: STUDENT IN AN ORGANIZED HEALTH CARE EDUCATION/TRAINING PROGRAM

## 2019-03-28 PROCEDURE — 86920 COMPATIBILITY TEST SPIN: CPT

## 2019-03-28 PROCEDURE — 2500000003 HC RX 250 WO HCPCS: Performed by: STUDENT IN AN ORGANIZED HEALTH CARE EDUCATION/TRAINING PROGRAM

## 2019-03-28 PROCEDURE — 2580000003 HC RX 258: Performed by: INTERNAL MEDICINE

## 2019-03-28 PROCEDURE — 99291 CRITICAL CARE FIRST HOUR: CPT | Performed by: INTERNAL MEDICINE

## 2019-03-28 PROCEDURE — 85049 AUTOMATED PLATELET COUNT: CPT

## 2019-03-28 PROCEDURE — 87205 SMEAR GRAM STAIN: CPT

## 2019-03-28 PROCEDURE — 94762 N-INVAS EAR/PLS OXIMTRY CONT: CPT

## 2019-03-28 PROCEDURE — 86900 BLOOD TYPING SEROLOGIC ABO: CPT

## 2019-03-28 PROCEDURE — 83605 ASSAY OF LACTIC ACID: CPT

## 2019-03-28 PROCEDURE — 87040 BLOOD CULTURE FOR BACTERIA: CPT

## 2019-03-28 PROCEDURE — 99292 CRITICAL CARE ADDL 30 MIN: CPT | Performed by: INTERNAL MEDICINE

## 2019-03-28 PROCEDURE — 83735 ASSAY OF MAGNESIUM: CPT

## 2019-03-28 PROCEDURE — 87070 CULTURE OTHR SPECIMN AEROBIC: CPT

## 2019-03-28 PROCEDURE — 86901 BLOOD TYPING SEROLOGIC RH(D): CPT

## 2019-03-28 PROCEDURE — 81001 URINALYSIS AUTO W/SCOPE: CPT

## 2019-03-28 PROCEDURE — C9113 INJ PANTOPRAZOLE SODIUM, VIA: HCPCS | Performed by: EMERGENCY MEDICINE

## 2019-03-28 PROCEDURE — 86850 RBC ANTIBODY SCREEN: CPT

## 2019-03-28 PROCEDURE — P9017 PLASMA 1 DONOR FRZ W/IN 8 HR: HCPCS

## 2019-03-28 PROCEDURE — 71045 X-RAY EXAM CHEST 1 VIEW: CPT

## 2019-03-28 PROCEDURE — 02HV33Z INSERTION OF INFUSION DEVICE INTO SUPERIOR VENA CAVA, PERCUTANEOUS APPROACH: ICD-10-PCS | Performed by: INTERNAL MEDICINE

## 2019-03-28 PROCEDURE — 36620 INSERTION CATHETER ARTERY: CPT

## 2019-03-28 PROCEDURE — 94770 HC ETCO2 MONITOR DAILY: CPT

## 2019-03-28 PROCEDURE — 6360000002 HC RX W HCPCS: Performed by: EMERGENCY MEDICINE

## 2019-03-28 PROCEDURE — 82330 ASSAY OF CALCIUM: CPT

## 2019-03-28 PROCEDURE — 2700000000 HC OXYGEN THERAPY PER DAY

## 2019-03-28 PROCEDURE — 80076 HEPATIC FUNCTION PANEL: CPT

## 2019-03-28 PROCEDURE — 93005 ELECTROCARDIOGRAM TRACING: CPT

## 2019-03-28 PROCEDURE — 85610 PROTHROMBIN TIME: CPT

## 2019-03-28 PROCEDURE — 6360000002 HC RX W HCPCS: Performed by: INTERNAL MEDICINE

## 2019-03-28 PROCEDURE — 6370000000 HC RX 637 (ALT 250 FOR IP): Performed by: EMERGENCY MEDICINE

## 2019-03-28 PROCEDURE — 74176 CT ABD & PELVIS W/O CONTRAST: CPT

## 2019-03-28 PROCEDURE — 94003 VENT MGMT INPAT SUBQ DAY: CPT

## 2019-03-28 PROCEDURE — 84100 ASSAY OF PHOSPHORUS: CPT

## 2019-03-28 PROCEDURE — C9113 INJ PANTOPRAZOLE SODIUM, VIA: HCPCS | Performed by: STUDENT IN AN ORGANIZED HEALTH CARE EDUCATION/TRAINING PROGRAM

## 2019-03-28 PROCEDURE — P9016 RBC LEUKOCYTES REDUCED: HCPCS

## 2019-03-28 PROCEDURE — 6360000002 HC RX W HCPCS

## 2019-03-28 PROCEDURE — 87086 URINE CULTURE/COLONY COUNT: CPT

## 2019-03-28 PROCEDURE — P9040 RBC LEUKOREDUCED IRRADIATED: HCPCS

## 2019-03-28 PROCEDURE — 80048 BASIC METABOLIC PNL TOTAL CA: CPT

## 2019-03-28 PROCEDURE — 85730 THROMBOPLASTIN TIME PARTIAL: CPT

## 2019-03-28 RX ORDER — SODIUM CHLORIDE, SODIUM LACTATE, POTASSIUM CHLORIDE, AND CALCIUM CHLORIDE .6; .31; .03; .02 G/100ML; G/100ML; G/100ML; G/100ML
1000 INJECTION, SOLUTION INTRAVENOUS ONCE
Status: DISCONTINUED | OUTPATIENT
Start: 2019-03-28 | End: 2019-03-28

## 2019-03-28 RX ORDER — SODIUM CHLORIDE, SODIUM LACTATE, POTASSIUM CHLORIDE, AND CALCIUM CHLORIDE .6; .31; .03; .02 G/100ML; G/100ML; G/100ML; G/100ML
1000 INJECTION, SOLUTION INTRAVENOUS ONCE
Status: COMPLETED | OUTPATIENT
Start: 2019-03-28 | End: 2019-03-28

## 2019-03-28 RX ORDER — 0.9 % SODIUM CHLORIDE 0.9 %
10 VIAL (ML) INJECTION DAILY
Status: DISCONTINUED | OUTPATIENT
Start: 2019-03-28 | End: 2019-03-31

## 2019-03-28 RX ORDER — 0.9 % SODIUM CHLORIDE 0.9 %
250 INTRAVENOUS SOLUTION INTRAVENOUS ONCE
Status: COMPLETED | OUTPATIENT
Start: 2019-03-28 | End: 2019-03-29

## 2019-03-28 RX ORDER — 0.9 % SODIUM CHLORIDE 0.9 %
500 INTRAVENOUS SOLUTION INTRAVENOUS ONCE
Status: COMPLETED | OUTPATIENT
Start: 2019-03-28 | End: 2019-03-28

## 2019-03-28 RX ORDER — PANTOPRAZOLE SODIUM 40 MG/10ML
40 INJECTION, POWDER, LYOPHILIZED, FOR SOLUTION INTRAVENOUS DAILY
Status: DISCONTINUED | OUTPATIENT
Start: 2019-03-28 | End: 2019-03-31

## 2019-03-28 RX ORDER — 0.9 % SODIUM CHLORIDE 0.9 %
1000 INTRAVENOUS SOLUTION INTRAVENOUS ONCE
Status: COMPLETED | OUTPATIENT
Start: 2019-03-28 | End: 2019-03-28

## 2019-03-28 RX ORDER — 0.9 % SODIUM CHLORIDE 0.9 %
250 INTRAVENOUS SOLUTION INTRAVENOUS ONCE
Status: COMPLETED | OUTPATIENT
Start: 2019-03-28 | End: 2019-03-28

## 2019-03-28 RX ADMIN — SODIUM CHLORIDE 19.25 UNITS/HR: 9 INJECTION, SOLUTION INTRAVENOUS at 20:00

## 2019-03-28 RX ADMIN — VANCOMYCIN HYDROCHLORIDE 1750 MG: 100 INJECTION, POWDER, LYOPHILIZED, FOR SOLUTION INTRAVENOUS at 12:43

## 2019-03-28 RX ADMIN — SODIUM CHLORIDE 250 ML: 9 INJECTION, SOLUTION INTRAVENOUS at 17:15

## 2019-03-28 RX ADMIN — SODIUM CHLORIDE 250 ML: 9 INJECTION, SOLUTION INTRAVENOUS at 08:08

## 2019-03-28 RX ADMIN — INSULIN LISPRO 18 UNITS: 100 INJECTION, SOLUTION INTRAVENOUS; SUBCUTANEOUS at 12:52

## 2019-03-28 RX ADMIN — SODIUM CHLORIDE 500 ML: 9 INJECTION, SOLUTION INTRAVENOUS at 02:27

## 2019-03-28 RX ADMIN — SODIUM CHLORIDE 1000 ML: 9 INJECTION, SOLUTION INTRAVENOUS at 10:05

## 2019-03-28 RX ADMIN — AMIODARONE HYDROCHLORIDE 200 MG: 200 TABLET ORAL at 08:05

## 2019-03-28 RX ADMIN — PHENYLEPHRINE HYDROCHLORIDE 300 MCG/MIN: 10 INJECTION INTRAVENOUS at 23:59

## 2019-03-28 RX ADMIN — INSULIN GLARGINE 35 UNITS: 100 INJECTION, SOLUTION SUBCUTANEOUS at 08:09

## 2019-03-28 RX ADMIN — Medication 30 MCG/MIN: at 21:14

## 2019-03-28 RX ADMIN — LORAZEPAM 0.5 MG: 2 INJECTION INTRAMUSCULAR; INTRAVENOUS at 01:28

## 2019-03-28 RX ADMIN — SODIUM CHLORIDE, POTASSIUM CHLORIDE, SODIUM LACTATE AND CALCIUM CHLORIDE 1000 ML: 600; 310; 30; 20 INJECTION, SOLUTION INTRAVENOUS at 17:53

## 2019-03-28 RX ADMIN — HYDROCORTISONE SODIUM SUCCINATE 100 MG: 100 INJECTION, POWDER, FOR SOLUTION INTRAMUSCULAR; INTRAVENOUS at 15:06

## 2019-03-28 RX ADMIN — Medication 10 ML: at 19:55

## 2019-03-28 RX ADMIN — PHENYLEPHRINE HYDROCHLORIDE 225 MCG/MIN: 10 INJECTION INTRAVENOUS at 18:28

## 2019-03-28 RX ADMIN — PIPERACILLIN AND TAZOBACTAM 3.38 G: 3; .375 INJECTION, POWDER, LYOPHILIZED, FOR SOLUTION INTRAVENOUS; PARENTERAL at 12:28

## 2019-03-28 RX ADMIN — METOPROLOL TARTRATE 2.5 MG: 5 INJECTION, SOLUTION INTRAVENOUS at 01:14

## 2019-03-28 RX ADMIN — SODIUM CHLORIDE 12.06 UNITS/HR: 9 INJECTION, SOLUTION INTRAVENOUS at 14:24

## 2019-03-28 RX ADMIN — VASOPRESSIN 0.02 UNITS/MIN: 20 INJECTION INTRAVENOUS at 10:04

## 2019-03-28 RX ADMIN — PHENYLEPHRINE HYDROCHLORIDE 150 MCG/MIN: 10 INJECTION INTRAVENOUS at 15:09

## 2019-03-28 RX ADMIN — Medication 100 MG: at 08:05

## 2019-03-28 RX ADMIN — ACETAMINOPHEN 650 MG: 325 TABLET ORAL at 22:32

## 2019-03-28 RX ADMIN — INSULIN LISPRO 15 UNITS: 100 INJECTION, SOLUTION INTRAVENOUS; SUBCUTANEOUS at 07:36

## 2019-03-28 RX ADMIN — PIPERACILLIN AND TAZOBACTAM 3.38 G: 3; .375 INJECTION, POWDER, LYOPHILIZED, FOR SOLUTION INTRAVENOUS; PARENTERAL at 19:55

## 2019-03-28 RX ADMIN — SODIUM CHLORIDE: 9 INJECTION, SOLUTION INTRAVENOUS at 12:43

## 2019-03-28 RX ADMIN — SODIUM CHLORIDE, POTASSIUM CHLORIDE, SODIUM LACTATE AND CALCIUM CHLORIDE 1000 ML: 600; 310; 30; 20 INJECTION, SOLUTION INTRAVENOUS at 23:09

## 2019-03-28 RX ADMIN — PANTOPRAZOLE SODIUM 8 MG/HR: 40 INJECTION, POWDER, FOR SOLUTION INTRAVENOUS at 10:47

## 2019-03-28 RX ADMIN — SODIUM CHLORIDE 250 ML: 9 INJECTION, SOLUTION INTRAVENOUS at 17:38

## 2019-03-28 RX ADMIN — PHENYLEPHRINE HYDROCHLORIDE 200 MCG/MIN: 10 INJECTION INTRAVENOUS at 19:00

## 2019-03-28 RX ADMIN — Medication 3 MG/HR: at 12:36

## 2019-03-28 RX ADMIN — Medication 30 MCG/MIN: at 12:00

## 2019-03-28 RX ADMIN — OXYCODONE HYDROCHLORIDE 5 MG: 5 TABLET ORAL at 20:35

## 2019-03-28 RX ADMIN — EPINEPHRINE 1 MCG/MIN: 1 INJECTION INTRAMUSCULAR; INTRAVENOUS; SUBCUTANEOUS at 22:30

## 2019-03-28 RX ADMIN — PANTOPRAZOLE SODIUM 80 MG: 40 INJECTION, POWDER, FOR SOLUTION INTRAVENOUS at 10:15

## 2019-03-28 RX ADMIN — PANTOPRAZOLE SODIUM 40 MG: 40 INJECTION, POWDER, FOR SOLUTION INTRAVENOUS at 22:04

## 2019-03-28 RX ADMIN — VASOPRESSIN 0.04 UNITS/MIN: 20 INJECTION INTRAVENOUS at 18:37

## 2019-03-28 RX ADMIN — Medication 2 MCG/MIN: at 06:02

## 2019-03-28 RX ADMIN — OXYCODONE HYDROCHLORIDE 5 MG: 5 TABLET ORAL at 01:18

## 2019-03-28 RX ADMIN — EPINEPHRINE 4 MCG/MIN: 1 INJECTION INTRAMUSCULAR; INTRAVENOUS; SUBCUTANEOUS at 12:28

## 2019-03-28 RX ADMIN — FAMOTIDINE 20 MG: 20 TABLET, FILM COATED ORAL at 08:05

## 2019-03-28 RX ADMIN — DEXTROSE MONOHYDRATE 150 MG: 50 INJECTION, SOLUTION INTRAVENOUS at 16:30

## 2019-03-28 RX ADMIN — DEXTROSE 1 MG/MIN: 5 SOLUTION INTRAVENOUS at 16:46

## 2019-03-28 RX ADMIN — ACETAMINOPHEN 650 MG: 325 TABLET ORAL at 17:53

## 2019-03-28 RX ADMIN — Medication 10 ML: at 21:00

## 2019-03-28 RX ADMIN — EPINEPHRINE 2 MCG/MIN: 1 INJECTION INTRAMUSCULAR; INTRAVENOUS; SUBCUTANEOUS at 10:46

## 2019-03-28 RX ADMIN — HEPARIN SODIUM 17 UNITS/KG/HR: 10000 INJECTION INTRAVENOUS; SUBCUTANEOUS at 06:02

## 2019-03-28 RX ADMIN — Medication 10 ML: at 08:05

## 2019-03-28 ASSESSMENT — PULMONARY FUNCTION TESTS
PIF_VALUE: 32
PIF_VALUE: 21
PIF_VALUE: 29
PIF_VALUE: 20
PIF_VALUE: 21
PIF_VALUE: 19
PIF_VALUE: 26
PIF_VALUE: 29
PIF_VALUE: 27
PIF_VALUE: 21
PIF_VALUE: 23
PIF_VALUE: 27
PIF_VALUE: 27
PIF_VALUE: 29
PIF_VALUE: 23
PIF_VALUE: 20
PIF_VALUE: 29
PIF_VALUE: 27
PIF_VALUE: 30
PIF_VALUE: 26
PIF_VALUE: 29
PIF_VALUE: 21
PIF_VALUE: 25
PIF_VALUE: 25
PIF_VALUE: 27
PIF_VALUE: 27
PIF_VALUE: 28
PIF_VALUE: 28
PIF_VALUE: 21

## 2019-03-28 ASSESSMENT — PAIN SCALES - GENERAL
PAINLEVEL_OUTOF10: 4
PAINLEVEL_OUTOF10: 5
PAINLEVEL_OUTOF10: 0

## 2019-03-28 NOTE — PROGRESS NOTES
Physical Therapy  DATE: 3/28/2019    NAME: Sandra Raymond  MRN: [id number]   : 1945    Patient not seen this date for Physical Therapy due to:  [] Blood transfusion in progress  [] Hemodialysis  []  Patient Declined  [] Spine Precautions   [] Strict Bedrest  [] Surgery/ Procedure  [] Testing      [x] Other (per RN, pt not medically appropriate for PT at this time. Will check back tomorrow)       [] PT being discontinued at this time. Patient independent. No further needs. [] PT being discontinued at this time as the patient has been transferred to palliative care. No further needs.     Bevely Rise, PTA

## 2019-03-28 NOTE — PLAN OF CARE
Michael Yang RN  Outcome: Ongoing  Goal: Verbalizations of feeling emotionally comfortable while being cared for will increase  Description  Verbalizations of feeling emotionally comfortable while being cared for will increase  3/28/2019 0014 by Mary Morales RN  Outcome: Ongoing  3/27/2019 1845 by Artemio Sanchez RN  Outcome: Ongoing     Problem: Psychomotor Activity - Altered:  Goal: Absence of psychomotor disturbance signs and symptoms  Description  Absence of psychomotor disturbance signs and symptoms  3/28/2019 0014 by Mary Morales RN  Outcome: Ongoing  3/27/2019 1845 by Artemio Sanchez RN  Outcome: Ongoing     Problem: Sensory Perception - Impaired:  Goal: Demonstrations of improved sensory functioning will increase  Description  Demonstrations of improved sensory functioning will increase  3/28/2019 0014 by Mary Morales RN  Outcome: Ongoing  3/27/2019 1845 by Artemio Sanchez RN  Outcome: Ongoing  Goal: Decrease in sensory misperception frequency  Description  Decrease in sensory misperception frequency  3/28/2019 0014 by Mary Morales RN  Outcome: Ongoing  3/27/2019 1845 by Artemio Sanchez RN  Outcome: Ongoing  Goal: Able to refrain from responding to false sensory perceptions  Description  Able to refrain from responding to false sensory perceptions  3/28/2019 0014 by Mary Morales RN  Outcome: Ongoing  3/27/2019 1845 by Artemio Sanchez RN  Outcome: Ongoing  Goal: Demonstrates accurate environmental perceptions  Description  Demonstrates accurate environmental perceptions  3/28/2019 0014 by Mary Morales RN  Outcome: Ongoing  3/27/2019 1845 by Artemio Sanchez RN  Outcome: Ongoing  Goal: Able to distinguish between reality-based and nonreality-based thinking  Description  Able to distinguish between reality-based and nonreality-based thinking  3/28/2019 0014 by Mary Morales RN  Outcome: Ongoing  3/27/2019 1845 by Artemio Sanchez RN  Outcome: Ongoing  Goal: Able to interrupt nonreality-based thinking  Description  Able to interrupt nonreality-based thinking  3/28/2019 0014 by Joslyn Yuan RN  Outcome: Ongoing  3/27/2019 1845 by Paty Contreras RN  Outcome: Ongoing     Problem: Sleep Pattern Disturbance:  Goal: Appears well-rested  Description  Appears well-rested  3/28/2019 0014 by Joslyn Yuan RN  Outcome: Ongoing  3/27/2019 1845 by Paty Contreras RN  Outcome: Ongoing     Problem: Nutrition  Goal: Optimal nutrition therapy  Outcome: Ongoing     Problem: OXYGENATION/RESPIRATORY FUNCTION  Goal: Patient will maintain patent airway  Outcome: Ongoing  Goal: Patient will achieve/maintain normal respiratory rate/effort  Description  Respiratory rate and effort will be within normal limits for the patient  Outcome: Ongoing     Problem: MECHANICAL VENTILATION  Goal: Patient will maintain patent airway  Outcome: Ongoing  Goal: Oral health is maintained or improved  Outcome: Ongoing  Goal: ET tube will be managed safely  Outcome: Ongoing  Goal: Ability to express needs and understand communication  Outcome: Ongoing  Goal: Mobility/activity is maintained at optimum level for patient  Outcome: Ongoing     Problem: SKIN INTEGRITY  Goal: Skin integrity is maintained or improved  Outcome: Ongoing     Problem: Musculor/Skeletal Functional Status  Goal: Highest potential functional level  Outcome: Ongoing     Problem: Restraint Use - Nonviolent/Non-Self-Destructive Behavior:  Goal: Absence of restraint indications  Description  Absence of restraint indications  3/28/2019 0014 by Joslyn Yuan RN  Outcome: Not Met This Shift  3/27/2019 1845 by Paty Contreras RN  Outcome: Not Met This Shift  Goal: Absence of restraint-related injury  Description  Absence of restraint-related injury  3/28/2019 0014 by Joslyn Yuan RN  Outcome: Not Met This Shift  3/27/2019 1845 by Paty Contreras RN  Outcome: Met This Shift

## 2019-03-28 NOTE — PROGRESS NOTES
Patient responding well to blood products. Receiving 3rd unit PRBC, also received 1u FFP. MAP in mid 70s. Maxed on levophed at 30, phenylephrine at 275, vasopressin 0.04. Epinephrine weaning off. Glucose continues to be elevated, on multiple gtt based in D5, also started hydrocortisone. Insulin gtt running. Will switch drips to NS as able. Last ABG without acidosis. LA this AM 7, will repeat ABG and lactic acid now. Hb recheck at 2000, will add on BMP for that time. Trend LA q4h. Maintain MAP near 65. Hb appears stable at 4h, repeat 9.0. Repeat blood cultures pending. Urine culture to be collected.     Fidel Bar MD

## 2019-03-28 NOTE — PLAN OF CARE
Problem: OXYGENATION/RESPIRATORY FUNCTION  Goal: Patient will maintain patent airway  3/28/2019 1030 by Jennifer Plasencia RCP  Outcome: Ongoing  3/28/2019 0014 by Marisol Lopez RN  Outcome: Ongoing     Problem: OXYGENATION/RESPIRATORY FUNCTION  Goal: Patient will achieve/maintain normal respiratory rate/effort  Description  Respiratory rate and effort will be within normal limits for the patient  3/28/2019 1030 by Jennifer Plasencia RCP  Outcome: Ongoing  3/28/2019 0014 by Marisol Lopez RN  Outcome: Ongoing     Problem: MECHANICAL VENTILATION  Goal: Patient will maintain patent airway  3/28/2019 1030 by Jennifer Plasencia RCP  Outcome: Ongoing  3/28/2019 0014 by Marisol Lopez RN  Outcome: Ongoing     Problem: MECHANICAL VENTILATION  Goal: Oral health is maintained or improved  3/28/2019 1030 by Jennifer Plasencia RCP  Outcome: Ongoing  3/28/2019 0014 by Marisol Lopez RN  Outcome: Ongoing     Problem: MECHANICAL VENTILATION  Goal: ET tube will be managed safely  3/28/2019 1030 by Jennifer Plasencia RCP  Outcome: Ongoing  3/28/2019 0014 by Marisol Lopez RN  Outcome: Ongoing     Problem: MECHANICAL VENTILATION  Goal: Ability to express needs and understand communication  3/28/2019 1030 by Jennifer Plasencia RCP  Outcome: Ongoing  3/28/2019 0014 by Marisol Lopez RN  Outcome: Ongoing     Problem: MECHANICAL VENTILATION  Goal: Mobility/activity is maintained at optimum level for patient  3/28/2019 1030 by Jennifer Plasencia RCP  Outcome: Ongoing  3/28/2019 0014 by Marisol Lopez RN  Outcome: Ongoing     Problem: SKIN INTEGRITY  Goal: Skin integrity is maintained or improved  3/28/2019 1030 by Jennifer Plasencia RCP  Outcome: Ongoing  3/28/2019 0014 by Marisol Lopez RN  Outcome: Ongoing

## 2019-03-28 NOTE — PROGRESS NOTES
Nutrition Assessment (Enteral Nutrition)    Type and Reason for Visit: Reassess    Nutrition Recommendations: Continue current TF at this time (this formula is low in carb). Will continue to monitor TF tolerance/adequacy and labs- modify TF as needed. Nutrition Assessment: Pt remains on vent. Semi-elemental TF at goal rate of 50 ml/hr and tolerating well per RN. No BM noted since admission- discussed with RN. Pt is on scheduled Colace. Nutrition Risk Level: High    Nutrition Needs:  · Estimated Daily Total Kcal: 8638-1813 kcal  · Estimated Daily Protein (g):  g    Nutrition Diagnosis:   · Problem: Inadequate oral intake  · Etiology: related to Impaired respiratory function-inability to consume food     Signs and symptoms:  as evidenced by NPO status due to medical condition, Nutrition support - EN    Objective Information:  · Nutrition-Focused Physical Findings: Last documented BM 3/18  · Current Nutrition Therapies:  · Oral Diet Orders: NPO   · Tube Feeding (TF) Orders:   · Formula: Semi-elemental  · Rate (ml/hr):50 mL/hr     · Volume (ml/day): 1200 ml/day  · Current TF & Flush Orders Provides: 1440 kcal, 90 g pro/day  · Additional Calories: none  · Anthropometric Measures:  · Ht: 5' 8\" (172.7 cm)   · Current Body Wt: 280 lb 3.3 oz (127.1 kg)  · Admission Body Wt: 292 lb (132.5 kg)  · Ideal Body Wt: 154 lb (69.9 kg), % Ideal Body 182%  · BMI Classification: BMI > or equal to 40.0 Obese Class III    Nutrition Interventions:   Continue current TF at this time (this formula is low in carb). Will continue to monitor TF tolerance/adequacy and labs- modify TF as needed.    Continued Inpatient Monitoring, Education Not Indicated    Nutrition Evaluation:   · Evaluation: Goal achieved   · Goals: Meet more than 75% of nutrition needs   · Monitoring: TF Intake, TF Tolerance, Constipation, Weight, Pertinent Labs      Electronically signed by Monica Reina RD, LD on 3/28/19 at 11:30 AM    Contact Number: [phone]

## 2019-03-28 NOTE — PROGRESS NOTES
Pharmacy Note  Vancomycin Consult    Xu Vazquez is a 69 y.o. male started on Vancomycin for sepsis; consult received from Dr. Nikia Trinidad to manage therapy. Also receiving the following antibiotics: Zosyn. Patient Active Problem List   Diagnosis    CAD (coronary artery disease)    DM type 2 (diabetes mellitus, type 2) ([facility])    Adult BMI > 30    Type 2 diabetes mellitus without complication, without long-term current use of insulin ([facility])    Influenza A with respiratory manifestations    Acute respiratory failure with hypoxia ([facility])    Influenza A    Atrial fibrillation with rapid ventricular response ([facility])    Acute hypercapnic respiratory failure ([facility])    NSTEMI (non-ST elevated myocardial infarction) ([facility] [address]    Community acquired pneumonia of left lower lobe of lung ([facility]       Allergies:  Patient has no known allergies. Temp max: 100.4 F    Recent Labs     03/27/19  0447 03/28/19  0432   BUN 50* 68*       Recent Labs     03/27/19  0447 03/28/19  0432   CREATININE 0.83 1.83*       Recent Labs     03/27/19  0447 03/28/19  0432   WBC 20.1* 27.1*         Intake/Output Summary (Last 24 hours) at 3/28/2019 1007  Last data filed at 3/28/2019 0900  Gross per 24 hour   Intake 2312.15 ml   Output 470 ml   Net 1842.15 ml       Culture Date      Source                       Results  3/22/19                  Sputum                   Gram + cocci in clusters  3/28/19                  Blood x 2                 Sent  3/28/19                  Urine                        Sent   3/28/19                  Sputum                    Sent    Ht Readings from Last 1 Encounters:   03/28/19 5' 8\" (1.727 m)        Wt Readings from Last 1 Encounters:   03/28/19 280 lb 3.3 oz (127.1 kg)         Body mass index is 42.6 kg/m². Estimated Creatinine Clearance: 47 mL/min (A) (based on SCr of 1.83 mg/dL (H)).     Goal Trough Level: 15-20 mcg/mL    Assessment/Plan:  Due to patients ERNESTO, Will initiate vancomycin 1750 mg as a one time dose and obtain a random level tomorrow with AM labs. Thank you for the consult. Will continue to follow.     Jere Diaz, PharmD   PGY-1 Pharmacy Resident  3/28/2019 10:13 AM

## 2019-03-28 NOTE — PROGRESS NOTES
INTENSIVE CARE UNIT  Resident Physician Progress Note    Patient - Christine Bahena  Date of Admission -  3/19/2019  7:58 AM  Date of Evaluation -  3/28/2019  Room and Bed Number -  0105/0105-01   Hospital Day - 9    Chief complaint influenza, pneumonia, A. fib with RVR  SUBJECTIVE:     OVERNIGHT EVENTS: Patient became hypotensive overnight requiring pressors. Patient also spiked fever 100.4 which improved with Tylenol. Patient with low urine output as well.      AWAKE & FOLLOWING COMMANDS:  [] No   [x] Yes, gets agitated     SECRETIONS Amount:  [x] Small [] Moderate  [] Large  [] None  Color:     [x] White [] Colored  [x] Bloody    SEDATION:  RAAS Score:  [] Propofol gtt  [x] Versed gtt  [] Ativan gtt   [] No Sedation    PARALYZED:  [x] No    [] Yes    VASOPRESSORS:  [] No    [x] Yes  [x] Levophed [] Dopamine [] Vasopressin  [] Dobutamine [] Phenylephrine [] Epinephrine    Review of Systems -  Intubated , sedated  OBJECTIVE:     VITAL SIGNS:  BP 82/64   Pulse 123   Temp 99 °F (37.2 °C) (Oral)   Resp (!) 31   Ht 5' 8\" (1.727 m)   Wt 280 lb 3.3 oz (127.1 kg)   SpO2 99%   BMI 42.60 kg/m²   Tmax over 24 hours:  Temp (24hrs), Av.7 °F (37.6 °C), Min:99 °F (37.2 °C), Max:100.4 °F (38 °C)      Patient Vitals for the past 8 hrs:   BP Temp Temp src Pulse Resp SpO2 Height Weight   19 0900 -- -- -- 123 (!) 31 99 % -- --   19 0815 -- -- -- 121 25 -- -- --   19 0804 82/64 -- -- 109 -- -- -- --   19 0800 82/64 99 °F (37.2 °C) Oral 118 (!) 31 99 % -- --   19 0759 -- -- -- 116 19 100 % -- --   19 0745 (!) 81/59 -- -- 122 30 99 % -- --   19 0730 (!) 78/44 -- -- 127 24 -- -- --   19 0715 (!) 92/57 -- -- 123 24 -- -- --   19 0700 (!) 101/57 -- -- 117 11 99 % -- --   19 0645 (!) 84/55 -- -- 112 24 -- -- --   19 0600 (!) 77/62 -- -- 114 28 99 % -- --   19 0500 (!) 59/49 -- -- 111 25 97 % -- --   19 0400 (!) 89/59 99.9 °F (37.7 °C) Oral 112 13 96 % 5' 8\"  sodium chloride 10 mL/hr at 03/27/19 2002    midazolam Stopped (03/28/19 0730)    heparin 25,000 units in 0.9% sodium chloride 250 mL infusion 14 Units/kg/hr (03/28/19 0825)    propofol Stopped (03/28/19 0004)    dextrose       PRN Meds:     oxyCODONE 5 mg Q4H PRN   acetaminophen 650 mg Q4H PRN   magnesium hydroxide 30 mL Daily PRN   albuterol 2.5 mg Q6H PRN   LORazepam 0.5 mg Q8H PRN   sodium chloride flush 10 mL PRN   ondansetron 4 mg Q6H PRN   heparin (porcine) 10,000 Units PRN   heparin (porcine) 5,000 Units PRN   glucose 15 g PRN   dextrose 12.5 g PRN   glucagon (rDNA) 1 mg PRN   dextrose 100 mL/hr PRN   metoprolol 2.5 mg Q4H PRN       SUPPORT DEVICES: [x] Ventilator [] BIPAP  [] Nasal Cannula [] Room Air  .   Lab Results   Component Value Date    PHART 7.310 03/18/2019    YJF9XBW 51.7 03/18/2019    PO2ART 67.8 03/18/2019    IVJ5RWL 25.4 03/18/2019    RUH6GNC 37 03/28/2019    L6QAVIWA 91.7 03/18/2019    FIO2 NOT REPORTED 03/28/2019     DATA:  Complete Blood Count:   Recent Labs     03/26/19 0427 03/27/19 0447 03/28/19 0432   WBC 14.0* 20.1* 27.1*   RBC 3.51* 3.24* 2.46*   HGB 10.6* 10.0* 7.6*   HCT 34.4* 31.0* 23.9*   MCV 98.0 95.7 97.2   MCH 30.2 30.9 30.9   MCHC 30.8 32.3 31.8   RDW 13.6 13.4 13.8    194 274   MPV 10.4 10.7 10.9       Last 3 Blood Glucose:   Recent Labs     03/26/19 0427 03/27/19 0447 03/28/19 0432   GLUCOSE 236* 270* 383*        PT/INR:    Lab Results   Component Value Date    PROTIME 12.0 03/15/2019    INR 1.2 03/15/2019     PTT:    Lab Results   Component Value Date    APTT 106.9 03/28/2019       Comprehensive Metabolic Profile:   Recent Labs     03/26/19  0427 03/27/19  0447 03/28/19  0432    141 138   K 3.5* 3.7 4.8   CL 95* 97* 94*   CO2 33* 34* 33*   BUN 59* 50* 68*   CREATININE 1.03 0.83 1.83*   GLUCOSE 236* 270* 383*   CALCIUM 8.9 8.8 8.7      Magnesium:   Lab Results   Component Value Date    MG 2.5 03/28/2019    MG 2.0 03/27/2019    MG 2.3 03/26/2019 Phosphorus:   Lab Results   Component Value Date    PHOS 5.1 03/28/2019    PHOS 3.2 03/27/2019    PHOS 3.3 03/26/2019     Ionized Calcium:   Lab Results   Component Value Date    CAION 1.15 03/28/2019    CAION 1.20 03/27/2019    CAION 1.24 03/26/2019        Urinalysis:   Lab Results   Component Value Date    NITRU NEGATIVE 03/18/2019    COLORU YELLOW 03/18/2019    PHUR 5.5 03/18/2019    WBCUA 0 TO 2 03/18/2019    RBCUA None 03/18/2019    MUCUS NOT REPORTED 03/18/2019    TRICHOMONAS NOT REPORTED 03/18/2019    YEAST NOT REPORTED 03/18/2019    BACTERIA NOT REPORTED 03/18/2019    SPECGRAV 1.010 03/18/2019    LEUKOCYTESUR NEGATIVE 03/18/2019    UROBILINOGEN Normal 03/18/2019    BILIRUBINUR NEGATIVE 03/18/2019    GLUCOSEU 2+ 03/18/2019    KETUA NEGATIVE 03/18/2019    AMORPHOUS NOT REPORTED 03/18/2019       HgBA1c:    Lab Results   Component Value Date    LABA1C 8.6 03/18/2019     TSH:  No results found for: TSH  Lactic Acid:   Lab Results   Component Value Date    LACTA 2.2 03/18/2019    LACTA 2.6 03/18/2019      Troponin: No results for input(s): TROPONINI in the last 72 hours. Results for Tika Eckert (MRN [id number]) as of 3/23/2019 18:06   Ref.  Range 3/23/2019 04:07   POC pH Latest Ref Range: 7.350 - 7.450  7.437   POC pH Temp Unknown NOT REPORTED   POC pCO2 Latest Ref Range: 35.0 - 48.0 mm Hg 63.7 (H)   POC pCO2 Temp Latest Units: mm Hg NOT REPORTED   POC PO2 Latest Ref Range: 83.0 - 108.0 mm Hg 65.1 (L)   POC pO2 Temp Latest Units: mm Hg NOT REPORTED   POC HCO3 Latest Ref Range: 21.0 - 28.0 mmol/L 42.9 (HH)   POC O2 SAT Latest Ref Range: 94.0 - 98.0 % 92 (L)     ASSESSMENT:     Patient Active Problem List    Diagnosis Date Noted    Community acquired pneumonia of left lower lobe of lung ([facility] [address]     Influenza A with respiratory manifestations 03/18/2019    Acute respiratory failure with hypoxia (HCC) 03/18/2019    Influenza A 03/18/2019    Acute hypercapnic respiratory failure ([facility] 03/18/2019    NSTEMI (non-ST elevated myocardial infarction) ([facility]     Type 2 diabetes mellitus without complication, without long-term current use of insulin ([facility] 09/06/2016    Adult BMI > 30 05/06/2016    CAD (coronary artery disease)     DM type 2 (diabetes mellitus, type 2) (HCC)     Atrial fibrillation with rapid ventricular response ([facility] 01/01/2007        PLAN:     WEAN PER PROTOCOL:  [] No   [x] Yes  [] N/A    ICU PROPHYLAXIS:  Stress ulcer:  [] PPI Agent  [x] [id number] [] Sucralfate  [] Other:  VTE:   [] Enoxaparin  [x] he is on heparin GTT  [] EPC Cuffs    NUTRITION:  [] NPO [x] Tube Feeding (Specify: ) [] TPN  [] PO    HOME MEDS RECONCILED: [x] No  [] Yes    CONSULTATION NEEDED:  [x] No  [] Yes    FAMILY UPDATED:    [] No  [x] Yes    TRANSFER OUT OF ICU:   [x] No  [] Yes    Additional Assessment:  Influenza A  Community acquired pneumonia with likely strep pneumoniae  Atrial fibrillation with RVRPersistent   Acute exacerbation of CHF sec due to atrial fibrillationPersistent   Acute  hypoxic hypercapnic respiratory failureDue to pulmonary edema, persistent without any significant change   Diabetes mellitusType II with hyperglycemia   Obstructive sleep apnea    Plan:  Continue mechanical ventilation, keep SPO2 more than 92%  Weaning trial daily  On Versed, daily sedation holiday   Breathing treatment with DuoNeb  Lasix 40mg BID  CXR today   Monitor urine output and total intake  Replace electrolytes as appropriate. Continue on TF  heparin gtt for now  Pepcid for GI Px  Milk of mag and Colace as a bowel regimen       - Repeat cultures for elevated temp, hypotension  - Levophed required overnight  - Will place central line for pressors  - Consider increasing ernst Feliz DO  PGY-3, Medicine resident   [facility]. 3/28/2019 9:08 AM       Attending Physician Statement  I have discussed the care of Liam Dobbins, including pertinent history and exam findings with the resident.  I have reviewed note that this chart was generated using voice recognition Dragon dictation software. Although every effort was made to ensure the accuracy of this automated transcription, some errors in transcription may have occurred.

## 2019-03-28 NOTE — PROCEDURES
Central Line Placement Procedure Note    Indication: hypovolemia and severe bleeding    Consent: Unable to be obtained due to patient's condition. Procedure: The patient was positioned appropriately and the skin over the right internal jugular vein was prepped with betadine and draped in a sterile fashion. Local anesthesia was obtained by infiltration using 1% Lidocaine without epinephrine. A large bore needle was used to identify the vein. A guide wire was then inserted into the vein through the needle. A triple lumen catheter was then inserted into the vessel over the guide wire using the Seldinger technique. All ports showed good, free flowing blood return and were flushed with saline solution. The catheter was then securely fastened to the skin with sutures and covered with a sterile dressing. A post procedure X-ray was ordered and showed good line position. The patient tolerated the procedure well. Complications: None      I was present and supervise the procedure. No immediate complications.     Migel Escobar MD3/28/20196:01 PM

## 2019-03-28 NOTE — PROGRESS NOTES
Insert Arterial Line  Date/Time:  03/28/19, 0845  Performed by: Stephanie Hayward    Patient identity confirmed: arm band and provided demographic data   Time out: Immediately prior to procedure a \"time out\" was called to verify the correct patient, procedure, equipment, support staff. Preparation: Patient was prepped and draped in the usual sterile fashion.     Location:left radial    Trent's test normal: yes  Needle gauge: 20     Number of attempts: 2  Post-procedure: transparent dressing applied and line secured    Patient tolerance: well

## 2019-03-28 NOTE — PROGRESS NOTES
[facility] Radiology informed me of the patients retroperitoneal bleed. Vascular surgery consulted. Hemoglobin 9.1 after transfused 2 units. Continue weaning pressors as tolerated.     Claudio Nayana, DO

## 2019-03-28 NOTE — CONSULTS
[address]      Patient's Name/ Date of Birth/ Gender: Christine Bahena / 1945 (69 y.o.) / male     Referring Physician: ePdro Luis Coburn MD    Consulting Physician: Dr. Citlaly Benitez MD    History of present Illness: Pt is a 69 y.o. male who was admitted 9 days ago for chest pain and cardiac issues, subsequently underwent intubation for respiratory failure, was found to have left sided spontaneous retroperitoneal hematoma. Patient was relatively stable the last several days, but over night started to become tachycardic with a sudden drop in his stable hemoglobin from 10.6--> 7 this morning. Patient was started on vasopressor support this morning and after two units of pRBC, underwent a dry CT scan abdomen/pelvis, and was found to have large retroperitoneal hematoma. Patient progressively became more tachycardic throughout the day, and vascular surgery was consulted. On evaluation, patient was in atrial fibrillation with RVR, hypotensive requiring 4 vasopressors. He had a distended abdomen with Peak pressures within normal limits and minimal urine output since last night. Past Medical History:  has a past medical history of Abnormal nuclear stress test, Arrhythmia, Atrial fibrillation ([address] CAD (coronary artery disease), DM type 2 (diabetes mellitus, type 2) ([facility] [address] and Hx of echocardiogram.    Past Surgical History:   Past Surgical History:   Procedure Laterality Date    CARDIOVERSION  05/18/2007    Successful-Dr. Tiffanie Arroyo    CARDIOVERSION  03/13    CORONARY ANGIOPLASTY  1996       Social History:  reports that he quit smoking about 22 years ago. His smoking use included cigarettes. He has a 45.00 pack-year smoking history. He has never used smokeless tobacco. He reports that he does not drink alcohol or use drugs. Family History: family history is not on file.     Review of Systems:   General: + fevers  HEENT: Denies sore throat, sinus problems, MD Shahid, Last Rate: 12 mL/hr at 03/28/19 1837, 0.04 Units/min at 03/28/19 1837    amiodarone (CORDARONE) 450 mg in sodium chloride 0.9 % 250 mL infusion, 0.5 mg/min, Intravenous, Continuous, Stacey Peterson MD, Stopped at 03/28/19 1826    [START ON 3/29/2019] pantoprazole (PROTONIX) 40 mg in sodium chloride 0.9 % 50 mL bolus, 40 mg, Intravenous, Daily, Jermain Mendoza MD    oxyCODONE (ROXICODONE) immediate release tablet 5 mg, 5 mg, Oral, Q4H PRN, Conrado Pepe MD, 5 mg at 03/28/19 0118    0.9 % sodium chloride infusion, , Intravenous, Continuous, Conrado Pepe MD, Last Rate: 10 mL/hr at 03/28/19 1243    acetaminophen (TYLENOL) tablet 650 mg, 650 mg, Oral, Q4H PRN, Conrado Pepe MD, 650 mg at 03/28/19 1753    midazolam (VERSED) 100 mg in dextrose 5% 100 mL infusion, 1 mg/hr, Intravenous, Continuous, Brendan Calzada MD, Last Rate: 6 mL/hr at 03/28/19 1550, 6 mg/hr at 03/28/19 1550    magnesium hydroxide (MILK OF MAGNESIA) 400 MG/5ML suspension 30 mL, 30 mL, Per G Tube, Daily PRN, Brendan Calzada MD, 30 mL at 03/24/19 1539    albuterol (PROVENTIL) nebulizer solution 2.5 mg, 2.5 mg, Nebulization, Q6H PRN, José Richardson MD    docusate (COLACE) 50 MG/5ML liquid 100 mg, 100 mg, Oral, Daily, Brendan Calzada MD, 100 mg at 03/28/19 0805    LORazepam (ATIVAN) injection 0.5 mg, 0.5 mg, Intravenous, Q8H PRN, Brendan Calzada MD, 0.5 mg at 03/28/19 0128    sodium chloride flush 0.9 % injection 10 mL, 10 mL, Intravenous, 2 times per day, Satcey Peterson MD, 10 mL at 03/28/19 0805    sodium chloride flush 0.9 % injection 10 mL, 10 mL, Intravenous, PRN, Stacey Peterson MD    ondansetron [facility]) injection 4 mg, 4 mg, Intravenous, Q6H PRN, Stacey Peterson MD    glucose (GLUTOSE) 40 % oral gel 15 g, 15 g, Oral, PRN, Vivek Bejarano DO    dextrose 50 % solution 12.5 g, 12.5 g, Intravenous, PRN, Vivek Bejarano DO    glucagon (rDNA) injection 1 mg, 1 mg, Intramuscular, PRN, Nadia Cesar, DO    dextrose 5 % solution, 100 mL/hr, Intravenous, PRN, Nadia Cesar, DO    Vital Signs:  Vitals:    03/28/19 1915   BP:    Pulse: 114   Resp: 16   Temp:    SpO2:        Physical Exam:  Vital signs and Nurse's note reviewed  Gen: Intubated and sedated  HEENT: PERRLA, EOMI, no scleral icterus, oral mucosa moist  Neck: Supple  Chest: Symmetric rise with inhalation  CVS: Tachycardic and irregularly, irregular. Resp: Good bilateral air entry, normal peak pressures, on mechanical ventilation. Clear to ausculation. Abd:Soft, distended, non tender to palpation. No guarding. Ext: No clubbing, cyanosis, edema,  CNS: Sedated  Skin: No erythema or ulcerations     Labs:   Lab Results   Component Value Date    WBC 27.1 03/28/2019    HGB 9.0 03/28/2019    HCT 27.2 03/28/2019    MCV 97.2 03/28/2019     03/28/2019     Lab Results   Component Value Date     03/28/2019    K 4.8 03/28/2019    CL 94 03/28/2019    CO2 33 03/28/2019    BUN 68 03/28/2019    CREATININE 1.83 03/28/2019    GLUCOSE 383 03/28/2019    GLUCOSE 104 11/26/2018    CALCIUM 8.7 03/28/2019     Lab Results   Component Value Date    INR 1.2 03/28/2019       Imaging:  CT abdomen/pelvis  FINDINGS:   Lower Chest: Bilateral pleural effusions with dependent lower lobe   atelectasis.  The heart is enlarged.  Small pericardial effusion.       Organs: The unenhanced liver, spleen, pancreas and adrenal glands are   unremarkable.  No acute biliary findings.  There is anterior displacement of   the left kidney by the retroperitoneal hematoma.  Punctate nonobstructive   lower pole right renal calculus.  No ureteral calculi or significant   hydronephrosis.       GI/Bowel: Scattered colonic gas and stool with no pericolonic inflammatory   changes.  Diverticulosis with no acute features.  No small bowel distension.    The stomach is decompressed with enteric catheter in the gastric body.  The   duodenal C-loop is unremarkable.       Pelvis: There is diffuse infiltration of the fat planes throughout the   pelvis.  No well-formed pelvic hematoma or free pelvic fluid.  The bladder is   decompressed with a Pitt catheter in place.  The prostate is not enlarged.       Peritoneum/Retroperitoneum: Large retroperitoneal hematoma along the left   psoas muscle with hematocrit effect.  The hematoma measures 9.2 x 9.3 x 14 cm   in length.  There is hemorrhage tracking to the left hemidiaphragm   posteriorly.  There is displacement of the left kidney anteriorly.  There is   some hemorrhagic stranding extending in the retroperitoneum around the right   kidney.  Small quantity of upper abdominal ascites.  No free intraperitoneal   air.  There is diffuse ectasia of the abdominal aorta with severe   atherosclerotic plaque.  There is internal calcification within the aortic   lumen suggesting a dissection.       Bones/Soft Tissues: No acute osseous or soft tissue abnormality.  Fat   containing inguinal hernias bilaterally.           Impression   1. Large left retroperitoneal hematoma extending from the posterior   hemidiaphragm to the upper pelvis.  Hematocrit effect with a hematoma   measuring maximally 9.3 cm in diameter and about 14 cm in length.  There is   also a small amount of upper abdominal ascites.  No free air. 2. Bilateral pleural effusions with dependent lower lobe atelectasis. 3. No evidence of bowel obstruction.  Diverticulosis with no acute features. 4. Diffuse ectasia of the abdominal aorta with severe atherosclerotic plaque. No follow-up imaging based on comparison of the aneurysmal segment to the   nonaneurysmal segment.  Intraluminal calcifications suggesting a chronic   dissection.    Findings were discussed with Kristel Yung at 2:13 pm on 3/28/2019.       RECOMMENDATIONS:   Managing Abdominal Aortic Aneurysms       2.6-2.9 cm: Every 5 years*       *For abdominal aortas with maximum diameter of 2.6-2.9 cm meeting criteria   for AAA (>50% of proximal normal segment).       Reference:       J Vasc Surg. 2009 Oct;50(4 Suppl):S2-49           Impression:  Patient Active Problem List   Diagnosis    CAD (coronary artery disease)    DM type 2 (diabetes mellitus, type 2) [facility]    Adult BMI > 30    Type 2 diabetes mellitus without complication, without long-term current use [facility] insulin [facility]    Influenza A with respiratory manifestations    Acute respiratory failure with hypoxia (HCC)    Influenza A    Atrial fibrillation with rapid ventricular response (HCC)    [facility]    NSTEMI (non-ST elevated myocardial infarction) [facility]    Community acquired pneumonia of left lower lobe of lung (HCC)    IVIS (obstructive sleep apnea)    Leukocytosis    Hemorrhagic shock (HCC)    Retroperitoneal hematoma    Lactic acidosis    Hyperglycemia    Acute blood loss anemia       Recommendation:    1. Recommend transfusing patient and trending hemoglobin. So far, hemoglobin has responded appropriately to transfusion. 2. Recommend rehydration with increasing lactic acid and ABG with normalizing bicarb (when patient's baseline bicarb in 30-40s). 3. Will follow up H/h. If patient continues to need transfusions, would recommend IR consult for angiogram and possible embolization  4. Medical management per primary team      Waleska Jameson D.O.   Surgery Department  3/28/2019

## 2019-03-28 NOTE — PROGRESS NOTES
management of life support systems, review of data including imaging and labs, discussions with other team members and physicians at least 28  Min so far today, excluding procedures.

## 2019-03-29 ENCOUNTER — APPOINTMENT (OUTPATIENT)
Dept: GENERAL RADIOLOGY | Age: 74
DRG: 207 | End: 2019-03-29
Attending: INTERNAL MEDICINE
Payer: MEDICARE

## 2019-03-29 LAB
ABSOLUTE EOS #: 0 K/UL (ref 0–0.4)
ABSOLUTE IMMATURE GRANULOCYTE: 2.27 K/UL (ref 0–0.3)
ABSOLUTE LYMPH #: 9.97 K/UL (ref 1–4.8)
ABSOLUTE MONO #: 1.36 K/UL (ref 0.1–0.8)
ALBUMIN SERPL-MCNC: 2.8 G/DL (ref 3.5–5.2)
ALBUMIN/GLOBULIN RATIO: 1.3 (ref 1–2.5)
ALLEN TEST: ABNORMAL
ALP BLD-CCNC: 62 U/L (ref 40–129)
ALT SERPL-CCNC: 563 U/L (ref 5–41)
ANION GAP SERPL CALCULATED.3IONS-SCNC: 16 MMOL/L (ref 9–17)
ANION GAP SERPL CALCULATED.3IONS-SCNC: 18 MMOL/L (ref 9–17)
ANION GAP SERPL CALCULATED.3IONS-SCNC: 18 MMOL/L (ref 9–17)
AST SERPL-CCNC: 627 U/L
BASOPHILS # BLD: 0 % (ref 0–2)
BASOPHILS ABSOLUTE: 0 K/UL (ref 0–0.2)
BILIRUB SERPL-MCNC: 0.65 MG/DL (ref 0.3–1.2)
BILIRUBIN DIRECT: 0.35 MG/DL
BILIRUBIN, INDIRECT: 0.3 MG/DL (ref 0–1)
BLD PROD TYP BPU: NORMAL
BUN BLDV-MCNC: 87 MG/DL (ref 8–23)
BUN/CREAT BLD: ABNORMAL (ref 9–20)
CALCIUM IONIZED: 1 MMOL/L (ref 1.13–1.33)
CALCIUM SERPL-MCNC: 7.5 MG/DL (ref 8.6–10.4)
CHLORIDE BLD-SCNC: 94 MMOL/L (ref 98–107)
CHLORIDE BLD-SCNC: 97 MMOL/L (ref 98–107)
CHLORIDE BLD-SCNC: 98 MMOL/L (ref 98–107)
CO2: 22 MMOL/L (ref 20–31)
CO2: 23 MMOL/L (ref 20–31)
CO2: 23 MMOL/L (ref 20–31)
CREAT SERPL-MCNC: 3.74 MG/DL (ref 0.7–1.2)
CREATININE URINE: 111.9 MG/DL (ref 39–259)
CULTURE: NO GROWTH
CULTURE: NORMAL
DIFFERENTIAL TYPE: ABNORMAL
DIRECT EXAM: NORMAL
DISPENSE STATUS BLOOD BANK: NORMAL
EKG ATRIAL RATE: 123 BPM
EKG ATRIAL RATE: 234 BPM
EKG P AXIS: 55 DEGREES
EKG P-R INTERVAL: 180 MS
EKG Q-T INTERVAL: 292 MS
EKG Q-T INTERVAL: 296 MS
EKG QRS DURATION: 126 MS
EKG QRS DURATION: 92 MS
EKG QTC CALCULATION (BAZETT): 418 MS
EKG QTC CALCULATION (BAZETT): 466 MS
EKG R AXIS: -26 DEGREES
EKG R AXIS: -64 DEGREES
EKG T AXIS: 71 DEGREES
EKG T AXIS: 85 DEGREES
EKG VENTRICULAR RATE: 123 BPM
EKG VENTRICULAR RATE: 149 BPM
EOSINOPHIL,URINE: NORMAL
EOSINOPHILS RELATIVE PERCENT: 0 % (ref 1–4)
FIO2: 35
FREE KAPPA/LAMBDA RATIO: 1.97 (ref 0.26–1.65)
GFR AFRICAN AMERICAN: 19 ML/MIN
GFR NON-AFRICAN AMERICAN: 16 ML/MIN
GFR SERPL CREATININE-BSD FRML MDRD: ABNORMAL ML/MIN/{1.73_M2}
GFR SERPL CREATININE-BSD FRML MDRD: ABNORMAL ML/MIN/{1.73_M2}
GLOBULIN: ABNORMAL G/DL (ref 1.5–3.8)
GLUCOSE BLD-MCNC: 108 MG/DL (ref 75–110)
GLUCOSE BLD-MCNC: 115 MG/DL (ref 75–110)
GLUCOSE BLD-MCNC: 118 MG/DL (ref 75–110)
GLUCOSE BLD-MCNC: 118 MG/DL (ref 75–110)
GLUCOSE BLD-MCNC: 119 MG/DL (ref 75–110)
GLUCOSE BLD-MCNC: 129 MG/DL (ref 75–110)
GLUCOSE BLD-MCNC: 133 MG/DL (ref 75–110)
GLUCOSE BLD-MCNC: 136 MG/DL (ref 75–110)
GLUCOSE BLD-MCNC: 138 MG/DL (ref 75–110)
GLUCOSE BLD-MCNC: 146 MG/DL (ref 70–99)
GLUCOSE BLD-MCNC: 152 MG/DL (ref 75–110)
GLUCOSE BLD-MCNC: 153 MG/DL (ref 75–110)
GLUCOSE BLD-MCNC: 155 MG/DL (ref 75–110)
GLUCOSE BLD-MCNC: 167 MG/DL (ref 75–110)
GLUCOSE BLD-MCNC: 169 MG/DL (ref 75–110)
GLUCOSE BLD-MCNC: 169 MG/DL (ref 75–110)
GLUCOSE BLD-MCNC: 186 MG/DL (ref 75–110)
GLUCOSE BLD-MCNC: 188 MG/DL (ref 75–110)
GLUCOSE BLD-MCNC: 193 MG/DL (ref 75–110)
GLUCOSE BLD-MCNC: 198 MG/DL (ref 75–110)
GLUCOSE BLD-MCNC: 198 MG/DL (ref 75–110)
GLUCOSE BLD-MCNC: 213 MG/DL (ref 75–110)
GLUCOSE BLD-MCNC: 215 MG/DL (ref 75–110)
GLUCOSE BLD-MCNC: 219 MG/DL (ref 75–110)
HCT VFR BLD CALC: 21.1 % (ref 40.7–50.3)
HCT VFR BLD CALC: 22 % (ref 40.7–50.3)
HCT VFR BLD CALC: 23.7 % (ref 40.7–50.3)
HCT VFR BLD CALC: 23.7 % (ref 40.7–50.3)
HCT VFR BLD CALC: 24.1 % (ref 40.7–50.3)
HCT VFR BLD CALC: 24.7 % (ref 40.7–50.3)
HEMOGLOBIN: 7.4 G/DL (ref 13–17)
HEMOGLOBIN: 7.6 G/DL (ref 13–17)
HEMOGLOBIN: 8.1 G/DL (ref 13–17)
HEMOGLOBIN: 8.2 G/DL (ref 13–17)
HEMOGLOBIN: 8.4 G/DL (ref 13–17)
HEMOGLOBIN: 8.6 G/DL (ref 13–17)
IMMATURE GRANULOCYTES: 5 %
INR BLD: 1.2
KAPPA FREE LIGHT CHAINS QNT: 3.73 MG/DL (ref 0.37–1.94)
LACTIC ACID, WHOLE BLOOD: 2.4 MMOL/L (ref 0.7–2.1)
LACTIC ACID, WHOLE BLOOD: 2.4 MMOL/L (ref 0.7–2.1)
LACTIC ACID, WHOLE BLOOD: 2.6 MMOL/L (ref 0.7–2.1)
LACTIC ACID, WHOLE BLOOD: 2.9 MMOL/L (ref 0.7–2.1)
LACTIC ACID, WHOLE BLOOD: 4.1 MMOL/L (ref 0.7–2.1)
LAMBDA FREE LIGHT CHAINS QNT: 1.89 MG/DL (ref 0.57–2.63)
LYMPHOCYTES # BLD: 22 % (ref 24–44)
Lab: NORMAL
Lab: NORMAL
MAGNESIUM: 2.8 MG/DL (ref 1.6–2.6)
MCH RBC QN AUTO: 30.8 PG (ref 25.2–33.5)
MCHC RBC AUTO-ENTMCNC: 34.6 G/DL (ref 28.4–34.8)
MCV RBC AUTO: 89.1 FL (ref 82.6–102.9)
MODE: ABNORMAL
MONOCYTES # BLD: 3 % (ref 1–7)
MORPHOLOGY: ABNORMAL
MORPHOLOGY: ABNORMAL
NEGATIVE BASE EXCESS, ART: ABNORMAL (ref 0–2)
NRBC AUTOMATED: 4.6 PER 100 WBC
NUCLEATED RED BLOOD CELLS: 4 PER 100 WBC
O2 DEVICE/FLOW/%: ABNORMAL
PATIENT TEMP: ABNORMAL
PDW BLD-RTO: 14.7 % (ref 11.8–14.4)
PHOSPHORUS: 5.4 MG/DL (ref 2.5–4.5)
PLATELET # BLD: 217 K/UL (ref 138–453)
PLATELET ESTIMATE: ABNORMAL
PMV BLD AUTO: 11.3 FL (ref 8.1–13.5)
POC HCO3: 27.2 MMOL/L (ref 21–28)
POC O2 SATURATION: 95 % (ref 94–98)
POC PCO2 TEMP: ABNORMAL MM HG
POC PCO2: 37.2 MM HG (ref 35–48)
POC PH TEMP: ABNORMAL
POC PH: 7.47 (ref 7.35–7.45)
POC PO2 TEMP: ABNORMAL MM HG
POC PO2: 71.9 MM HG (ref 83–108)
POSITIVE BASE EXCESS, ART: 3 (ref 0–3)
POTASSIUM SERPL-SCNC: 5 MMOL/L (ref 3.7–5.3)
POTASSIUM SERPL-SCNC: 5.2 MMOL/L (ref 3.7–5.3)
POTASSIUM SERPL-SCNC: 5.2 MMOL/L (ref 3.7–5.3)
PROTHROMBIN TIME: 12.5 SEC (ref 9–12)
RBC # BLD: 2.66 M/UL (ref 4.21–5.77)
RBC # BLD: ABNORMAL 10*6/UL
SAMPLE SITE: ABNORMAL
SEG NEUTROPHILS: 70 % (ref 36–66)
SEGMENTED NEUTROPHILS ABSOLUTE COUNT: 31.7 K/UL (ref 1.8–7.7)
SODIUM BLD-SCNC: 135 MMOL/L (ref 135–144)
SODIUM BLD-SCNC: 136 MMOL/L (ref 135–144)
SODIUM BLD-SCNC: 138 MMOL/L (ref 135–144)
SODIUM,UR: 23 MMOL/L
SPECIMEN DESCRIPTION: NORMAL
SPECIMEN DESCRIPTION: NORMAL
TCO2 (CALC), ART: 28 MMOL/L (ref 22–29)
TOTAL PROTEIN: 5 G/DL (ref 6.4–8.3)
TRANSFUSION STATUS: NORMAL
UNIT DIVISION: 0
UNIT NUMBER: NORMAL
VANCOMYCIN RANDOM DATE LAST DOSE: NORMAL
VANCOMYCIN RANDOM DOSE AMOUNT: NORMAL
VANCOMYCIN RANDOM TIME LAST DOSE: NORMAL
VANCOMYCIN RANDOM: 18.5 UG/ML
WBC # BLD: 45.3 K/UL (ref 3.5–11.3)
WBC # BLD: ABNORMAL 10*3/UL

## 2019-03-29 PROCEDURE — 6360000002 HC RX W HCPCS: Performed by: EMERGENCY MEDICINE

## 2019-03-29 PROCEDURE — 84165 PROTEIN E-PHORESIS SERUM: CPT

## 2019-03-29 PROCEDURE — 2000000000 HC ICU R&B

## 2019-03-29 PROCEDURE — 94003 VENT MGMT INPAT SUBQ DAY: CPT

## 2019-03-29 PROCEDURE — 99291 CRITICAL CARE FIRST HOUR: CPT | Performed by: INTERNAL MEDICINE

## 2019-03-29 PROCEDURE — C9113 INJ PANTOPRAZOLE SODIUM, VIA: HCPCS | Performed by: STUDENT IN AN ORGANIZED HEALTH CARE EDUCATION/TRAINING PROGRAM

## 2019-03-29 PROCEDURE — 80051 ELECTROLYTE PANEL: CPT

## 2019-03-29 PROCEDURE — 82947 ASSAY GLUCOSE BLOOD QUANT: CPT

## 2019-03-29 PROCEDURE — 82570 ASSAY OF URINE CREATININE: CPT

## 2019-03-29 PROCEDURE — 93005 ELECTROCARDIOGRAM TRACING: CPT

## 2019-03-29 PROCEDURE — 71045 X-RAY EXAM CHEST 1 VIEW: CPT

## 2019-03-29 PROCEDURE — 2580000003 HC RX 258: Performed by: STUDENT IN AN ORGANIZED HEALTH CARE EDUCATION/TRAINING PROGRAM

## 2019-03-29 PROCEDURE — 2700000000 HC OXYGEN THERAPY PER DAY

## 2019-03-29 PROCEDURE — 84300 ASSAY OF URINE SODIUM: CPT

## 2019-03-29 PROCEDURE — 80048 BASIC METABOLIC PNL TOTAL CA: CPT

## 2019-03-29 PROCEDURE — 2500000003 HC RX 250 WO HCPCS: Performed by: STUDENT IN AN ORGANIZED HEALTH CARE EDUCATION/TRAINING PROGRAM

## 2019-03-29 PROCEDURE — 6370000000 HC RX 637 (ALT 250 FOR IP): Performed by: EMERGENCY MEDICINE

## 2019-03-29 PROCEDURE — 83605 ASSAY OF LACTIC ACID: CPT

## 2019-03-29 PROCEDURE — 94770 HC ETCO2 MONITOR DAILY: CPT

## 2019-03-29 PROCEDURE — 99222 1ST HOSP IP/OBS MODERATE 55: CPT | Performed by: INTERNAL MEDICINE

## 2019-03-29 PROCEDURE — 76937 US GUIDE VASCULAR ACCESS: CPT

## 2019-03-29 PROCEDURE — 85014 HEMATOCRIT: CPT

## 2019-03-29 PROCEDURE — 6370000000 HC RX 637 (ALT 250 FOR IP): Performed by: STUDENT IN AN ORGANIZED HEALTH CARE EDUCATION/TRAINING PROGRAM

## 2019-03-29 PROCEDURE — 2580000003 HC RX 258: Performed by: EMERGENCY MEDICINE

## 2019-03-29 PROCEDURE — 83883 ASSAY NEPHELOMETRY NOT SPEC: CPT

## 2019-03-29 PROCEDURE — 87205 SMEAR GRAM STAIN: CPT

## 2019-03-29 PROCEDURE — 83735 ASSAY OF MAGNESIUM: CPT

## 2019-03-29 PROCEDURE — 85018 HEMOGLOBIN: CPT

## 2019-03-29 PROCEDURE — 6360000002 HC RX W HCPCS: Performed by: STUDENT IN AN ORGANIZED HEALTH CARE EDUCATION/TRAINING PROGRAM

## 2019-03-29 PROCEDURE — 2580000003 HC RX 258: Performed by: INTERNAL MEDICINE

## 2019-03-29 PROCEDURE — 80202 ASSAY OF VANCOMYCIN: CPT

## 2019-03-29 PROCEDURE — 84155 ASSAY OF PROTEIN SERUM: CPT

## 2019-03-29 PROCEDURE — 36620 INSERTION CATHETER ARTERY: CPT

## 2019-03-29 PROCEDURE — 82803 BLOOD GASES ANY COMBINATION: CPT

## 2019-03-29 PROCEDURE — 84100 ASSAY OF PHOSPHORUS: CPT

## 2019-03-29 PROCEDURE — 82330 ASSAY OF CALCIUM: CPT

## 2019-03-29 PROCEDURE — 85025 COMPLETE CBC W/AUTO DIFF WBC: CPT

## 2019-03-29 PROCEDURE — 85610 PROTHROMBIN TIME: CPT

## 2019-03-29 PROCEDURE — 94762 N-INVAS EAR/PLS OXIMTRY CONT: CPT

## 2019-03-29 PROCEDURE — 6360000002 HC RX W HCPCS: Performed by: INTERNAL MEDICINE

## 2019-03-29 PROCEDURE — 80076 HEPATIC FUNCTION PANEL: CPT

## 2019-03-29 RX ORDER — CALCIUM GLUCONATE 94 MG/ML
2 INJECTION, SOLUTION INTRAVENOUS ONCE
Status: DISCONTINUED | OUTPATIENT
Start: 2019-03-29 | End: 2019-03-29

## 2019-03-29 RX ADMIN — Medication 10 ML: at 08:28

## 2019-03-29 RX ADMIN — PIPERACILLIN AND TAZOBACTAM 3.38 G: 3; .375 INJECTION, POWDER, LYOPHILIZED, FOR SOLUTION INTRAVENOUS; PARENTERAL at 05:00

## 2019-03-29 RX ADMIN — SODIUM CHLORIDE 3.54 UNITS/HR: 9 INJECTION, SOLUTION INTRAVENOUS at 04:24

## 2019-03-29 RX ADMIN — HYDROCORTISONE SODIUM SUCCINATE 100 MG: 100 INJECTION, POWDER, FOR SOLUTION INTRAMUSCULAR; INTRAVENOUS at 08:14

## 2019-03-29 RX ADMIN — VASOPRESSIN 0.04 UNITS/MIN: 20 INJECTION INTRAVENOUS at 13:27

## 2019-03-29 RX ADMIN — ACETAMINOPHEN 650 MG: 325 TABLET ORAL at 04:04

## 2019-03-29 RX ADMIN — PIPERACILLIN AND TAZOBACTAM 3.38 G: 3; .375 INJECTION, POWDER, LYOPHILIZED, FOR SOLUTION INTRAVENOUS; PARENTERAL at 12:17

## 2019-03-29 RX ADMIN — HYDROCORTISONE SODIUM SUCCINATE 100 MG: 100 INJECTION, POWDER, FOR SOLUTION INTRAMUSCULAR; INTRAVENOUS at 16:15

## 2019-03-29 RX ADMIN — PHENYLEPHRINE HYDROCHLORIDE 50 MCG/MIN: 10 INJECTION INTRAVENOUS at 10:05

## 2019-03-29 RX ADMIN — Medication 4 MG/HR: at 04:05

## 2019-03-29 RX ADMIN — PHENYLEPHRINE HYDROCHLORIDE 200 MCG/MIN: 10 INJECTION INTRAVENOUS at 02:23

## 2019-03-29 RX ADMIN — HYDROCORTISONE SODIUM SUCCINATE 100 MG: 100 INJECTION, POWDER, FOR SOLUTION INTRAMUSCULAR; INTRAVENOUS at 00:03

## 2019-03-29 RX ADMIN — Medication 30 MCG/MIN: at 08:00

## 2019-03-29 RX ADMIN — VASOPRESSIN 0.04 UNITS/MIN: 20 INJECTION INTRAVENOUS at 01:05

## 2019-03-29 RX ADMIN — CEFTRIAXONE SODIUM 2 G: 2 INJECTION, POWDER, FOR SOLUTION INTRAMUSCULAR; INTRAVENOUS at 20:15

## 2019-03-29 RX ADMIN — PHENYLEPHRINE HYDROCHLORIDE 250 MCG/MIN: 10 INJECTION INTRAVENOUS at 20:17

## 2019-03-29 RX ADMIN — AMIODARONE HYDROCHLORIDE 0.5 MG/MIN: 50 INJECTION, SOLUTION INTRAVENOUS at 12:23

## 2019-03-29 RX ADMIN — Medication 10 ML: at 20:15

## 2019-03-29 RX ADMIN — CALCIUM GLUCONATE 2 G: 98 INJECTION, SOLUTION INTRAVENOUS at 07:04

## 2019-03-29 RX ADMIN — Medication 30 MCG/MIN: at 18:33

## 2019-03-29 RX ADMIN — Medication 100 MG: at 08:50

## 2019-03-29 RX ADMIN — PANTOPRAZOLE SODIUM 40 MG: 40 INJECTION, POWDER, FOR SOLUTION INTRAVENOUS at 08:27

## 2019-03-29 ASSESSMENT — PULMONARY FUNCTION TESTS
PIF_VALUE: 27
PIF_VALUE: 28
PIF_VALUE: 26
PIF_VALUE: 26
PIF_VALUE: 30
PIF_VALUE: 23
PIF_VALUE: 30
PIF_VALUE: 30
PIF_VALUE: 29
PIF_VALUE: 27
PIF_VALUE: 26
PIF_VALUE: 28
PIF_VALUE: 29
PIF_VALUE: 25
PIF_VALUE: 29
PIF_VALUE: 30
PIF_VALUE: 29
PIF_VALUE: 30
PIF_VALUE: 26
PIF_VALUE: 27
PIF_VALUE: 29
PIF_VALUE: 26
PIF_VALUE: 29
PIF_VALUE: 0
PIF_VALUE: 25
PIF_VALUE: 27
PIF_VALUE: 29
PIF_VALUE: 27

## 2019-03-29 ASSESSMENT — PAIN SCALES - GENERAL
PAINLEVEL_OUTOF10: 7
PAINLEVEL_OUTOF10: 0
PAINLEVEL_OUTOF10: 0

## 2019-03-29 NOTE — CONSULTS
Unique-flu as an out patient. He developed worsening sob and hypoxia leading to his admission at the outlying facility on 3/18/19 where he was started on Ceftriaxone, Azithromycin and Tamiflu. He was transferred to [facility] on 3/19/19 because of hypoxic respiratory distress and elevated troponin. He was admitted to ICU and started on BiPAP. CXR on 3-19-19 showed reticular infiltrate and LLL pneumonia. Respiratory culture was obtained on 3/22/19 and showed few gram positive cocci in clusters. Legionella antigen, Strep pneumoniae anitgen and Mycoplasma Ab negative. MRSA swab negative    The patient was intubated on 3/21/19 because of deteriorating respiratory status. The patient completed a course of Unique-flu on 3-25-19. He completed a course of abx and steroids on 3-26-19. The patient spiked a fever (t max 100.4) and became hypotensive requiring pressure support on 3-29-19. He was started on Vancomycin and Zosyn empirically. Blood, urine and sputum cultures were obtained and currently pending. The patient additionally was found to have a Lt sided spontaneous retroperitoneal bleed on 3-29-19 and was hypovolemic requiring multiple transfusions. H&H has been stable today at 9.0. The patient continues to require pressor support and is tachycardic (120). Presently the patient is intubated, sedated and on 3 pressors. He has been febrile (t max 101.5), tachycardic (110-120), tachypneic (30-40's). Lactic acid was elevated at 7.4 yesterday. WBC count has been elevated at 45.3 today. Hgb stable. His kidney function is worsening Cr 3.74    He is on zosyn. Vancomycin held because trough level 18.5 (3/29/19)  Cultures negative so far.     Labs Reviewed: 3/29/2019  WBC: 20.1 > 27.1 > 45.3  Hgb: 9.0 > 8.2 > 8.6  Lactic acid: 7.4 > 5.5 > 2.6 > 2.4    BUN: 68 > 84 > 87  Cr: 1.83 > 3.21 > 3.74      Cultures:  Urine:   · Negative so far 3/28/19  · Negative 3/15/19  Blood: Negative so far 3/28/19    Sputum :  · Negative so far 3/28/19  · Gram positive cocci in clusters 3/22/19. Normal mansoor    Rapid flu positive for influenza A (3/15/19)    Discussed with patient, RN. I have personally reviewed the past medical history, past surgical history, medications, social history, and family history, and I have updated the database accordingly. Past Medical History:     Past Medical History:   Diagnosis Date    Abnormal nuclear stress test 03/12/2010    Stress and resting cardiolite images showed inferior wall hypoperfusion suggestive of previous myocardial infarction. Left ventricular wall motion showed inferior wall hypokinesia. EF 58%.  Arrhythmia 2007    A single episode    Atrial fibrillation ([facility] [address] 2007    Single Episode    CAD (coronary artery disease) 1996    MI    DM type 2 (diabetes mellitus, type 2) ([facility] [address] 2005    Hx of echocardiogram 04/05/2007    EF 62%, Increased left atrial and right ventricular diametes. Increased septal and posterior wall thickness suggest left ventricular hypertrophy.  Valves were normal.        Past Surgical  History:     Past Surgical History:   Procedure Laterality Date    CARDIOVERSION  05/18/2007    Successful-Dr. Kika Canofin    CARDIOVERSION  03/13    CORONARY ANGIOPLASTY  1996       Medications:      piperacillin-tazobactam  3.375 g Intravenous Q8H    hydrocortisone sodium succinate PF  100 mg Intravenous Q8H    pantoprazole  40 mg Intravenous Daily    And    sodium chloride (PF)  10 mL Intravenous Daily    docusate  100 mg Oral Daily    sodium chloride flush  10 mL Intravenous 2 times per day       Social History:     Social History     Socioeconomic History    Marital status:      Spouse name: Not on file    Number of children: Not on file    Years of education: Not on file    Highest education level: Not on file   Occupational History    Not on file   Social Needs    Financial resource strain: Not on file    Food insecurity:     Worry: Not on --   03/29/19 0730 -- -- -- 107 11 97 % -- --   03/29/19 0715 -- -- -- 109 (!) 4 97 % -- --   03/29/19 0700 114/87 -- -- 108 12 96 % -- --   03/29/19 0600 (!) 112/58 -- -- 106 27 96 % 5' 8\" (1.727 m) 299 lb 13.2 oz (136 kg)   03/29/19 0500 117/70 -- -- 115 -- 100 % -- --   03/29/19 0400 (!) 79/27 100.9 °F (38.3 °C) CORE 110 22 98 % -- --     General Appearance: Intubated and sedated. Head:  Normocephalic, no trauma  ENT: Intubated. Mouth/throat: mucosa pink and moist.  Neck: Supple, without lymphadenopathy. Pulmonary/Chest: Coarse sounds  Cardiovascular: Irregular rate and rhythm without murmurs, rubs, or gallops. Abdomen: Soft, distended. Bowel sounds normal.  All four Extremities: No cyanosis, clubbing, edema, or effusions. Neurologic: Sedated  Skin: Warm and dry with good turgor. No signs of peripheral arterial or venous insufficiency. No ulcerations. No open wounds. Medical Decision Making -Laboratory:   I have independently reviewed/ordered the following labs:    CBC with Differential:   Recent Labs     03/28/19 0432 03/28/19 2029 03/29/19 0612 03/29/19  0922   WBC 27.1*  --   --   --  45.3*  --    HGB 7.6*   < > 9.0*   < > 8.2* 8.6*   HCT 23.9*   < > 25.6*   < > 23.7* 24.7*     --  228  --  217  --    LYMPHOPCT 9*  --   --   --  22*  --    MONOPCT 8*  --   --   --  3  --     < > = values in this interval not displayed. BMP:   Recent Labs     03/28/19 0432 03/28/19 2029 03/29/19 0612    135 136   K 4.8 4.4 5.0   CL 94* 96* 97*   CO2 33* 21 23   BUN 68* 84* 87*   CREATININE 1.83* 3.21* 3.74*   MG 2.5  --  2.8*     Hepatic Function Panel:   Recent Labs     03/28/19  1025   PROT 5.3*   LABALBU 2.9*   BILIDIR 0.21   IBILI 0.25   BILITOT 0.46   ALKPHOS 68   ALT 60*   AST 63*     No results for input(s): RPR in the last 72 hours. No results for input(s): HIV in the last 72 hours. No results for input(s): BC in the last 72 hours.   Lab Results   Component Value Date    MUCUS NOT REPORTED 03/28/2019    RBC 2.66 03/29/2019    TRICHOMONAS NOT REPORTED 03/28/2019    WBC 45.3 03/29/2019    YEAST NOT REPORTED 03/28/2019    TURBIDITY TURBID 03/28/2019     Lab Results   Component Value Date    CREATININE 3.74 03/29/2019    GLUCOSE 146 03/29/2019    GLUCOSE 104 11/26/2018       Medical Decision Making-Imaging:     Narrative   EXAMINATION:   SINGLE XRAY VIEW OF THE CHEST       3/29/2019 5:54 am       COMPARISON:   March 28, 2019, March 27, 2019       HISTORY:   ORDERING SYSTEM PROVIDED HISTORY: intubated   TECHNOLOGIST PROVIDED HISTORY:   intubated       FINDINGS:   Endotracheal tube terminates at the level of the clavicular heads.  The   enteric tube courses off the field of view in the upper abdomen.  The right   internal jugular line terminates at the distal SVC.  Shallow inflation. Cardiac silhouette enlargement again noted.  Perihilar and basilar opacities   are again noted without significant change.  No new airspace disease or   pneumothorax identified.           Impression   Unchanged appearance of support tubes and lines.       Perihilar and basilar opacities are without appreciable change which may   represent subsegmental atelectasis versus developing consolidation.             Medical Decision Making-Other: Thank you for allowing us to participate in the care of this patient. Please call with questions. Julee Perez    ATTESTATION:    I have discussed the case, including pertinent history and exam findings with the residents. I have seen and examined the patient and the key elements of the encounter have been performed by me. I have reviewed the laboratory data, other diagnostic studies and discussed them with the residents. I have updated the medical record where necessary. I agree with the assessment, plan and orders as documented by the resident.     Bianca Gage MD.

## 2019-03-29 NOTE — PLAN OF CARE
Problem: Risk for Impaired Skin Integrity  Goal: Tissue integrity - skin and mucous membranes  Description  Structural intactness and normal physiological function of skin and  mucous membranes.   Outcome: Ongoing     Problem: Confusion - Acute:  Goal: Absence of continued neurological deterioration signs and symptoms  Description  Absence of continued neurological deterioration signs and symptoms  Outcome: Ongoing  Goal: Mental status will be restored to baseline  Description  Mental status will be restored to baseline  Outcome: Ongoing     Problem: Discharge Planning:  Goal: Ability to perform activities of daily living will improve  Description  Ability to perform activities of daily living will improve  Outcome: Ongoing  Goal: Participates in care planning  Description  Participates in care planning  Outcome: Ongoing     Problem: Injury - Risk of, Physical Injury:  Goal: Absence of physical injury  Description  Absence of physical injury  Outcome: Ongoing  Goal: Will remain free from falls  Description  Will remain free from falls  Outcome: Ongoing     Problem: Mood - Altered:  Goal: Mood stable  Description  Mood stable  Outcome: Ongoing  Goal: Absence of abusive behavior  Description  Absence of abusive behavior  Outcome: Ongoing  Goal: Verbalizations of feeling emotionally comfortable while being cared for will increase  Description  Verbalizations of feeling emotionally comfortable while being cared for will increase  Outcome: Ongoing     Problem: Psychomotor Activity - Altered:  Goal: Absence of psychomotor disturbance signs and symptoms  Description  Absence of psychomotor disturbance signs and symptoms  Outcome: Ongoing     Problem: Sensory Perception - Impaired:  Goal: Demonstrations of improved sensory functioning will increase  Description  Demonstrations of improved sensory functioning will increase  Outcome: Ongoing  Goal: Decrease in sensory misperception frequency  Description  Decrease in sensory misperception frequency  Outcome: Ongoing  Goal: Able to refrain from responding to false sensory perceptions  Description  Able to refrain from responding to false sensory perceptions  Outcome: Ongoing  Goal: Demonstrates accurate environmental perceptions  Description  Demonstrates accurate environmental perceptions  Outcome: Ongoing  Goal: Able to distinguish between reality-based and nonreality-based thinking  Description  Able to distinguish between reality-based and nonreality-based thinking  Outcome: Ongoing  Goal: Able to interrupt nonreality-based thinking  Description  Able to interrupt nonreality-based thinking  Outcome: Ongoing     Problem: Sleep Pattern Disturbance:  Goal: Appears well-rested  Description  Appears well-rested  Outcome: Ongoing     Problem: Nutrition  Goal: Optimal nutrition therapy  Outcome: Ongoing     Problem: OXYGENATION/RESPIRATORY FUNCTION  Goal: Patient will maintain patent airway  Outcome: Ongoing  Goal: Patient will achieve/maintain normal respiratory rate/effort  Description  Respiratory rate and effort will be within normal limits for the patient  Outcome: Ongoing     Problem: MECHANICAL VENTILATION  Goal: Patient will maintain patent airway  Outcome: Ongoing  Goal: Oral health is maintained or improved  Outcome: Ongoing  Goal: ET tube will be managed safely  Outcome: Ongoing  Goal: Ability to express needs and understand communication  Outcome: Ongoing  Goal: Mobility/activity is maintained at optimum level for patient  Outcome: Ongoing     Problem: SKIN INTEGRITY  Goal: Skin integrity is maintained or improved  Outcome: Ongoing     Problem: Musculor/Skeletal Functional Status  Goal: Highest potential functional level  Outcome: Ongoing     Problem: Restraint Use - Nonviolent/Non-Self-Destructive Behavior:  Goal: Absence of restraint indications  Description  Absence of restraint indications  Outcome: Not Met This Shift  Goal: Absence of restraint-related injury  Description  Absence of restraint-related injury  Outcome: Not Met This Shift

## 2019-03-29 NOTE — PROGRESS NOTES
Physical Therapy  DATE: 3/29/2019  NAME: Sravanthi Flowers  MRN: [id number]   : 1945      Discharge Recommendations       Subjective: RN agreeable to PROM  Pain: MERLY  Patient follows: No commands  Is patient on ventilator: Yes  Is patient on sedation: Yes  Precautions: Droplet. Therapeutic exercises:  PROM all planes x 15 reps BUE and BLE. Bilateral gastrocnemius stretching 30' x3  Doff FDS prior to treatment and donned after treatment. Goals  Short Term Goals  Short term goal 1: Prevent contractures through ROM and stretching. Short term goal 2: Pt able to tolerate 30 min activity  Short term goal 3: Pt able to complete AROM of extrimites in all planes. Short term goal 4: Progress with mobility as appropriate. Plan: Progress functional mobility as medically appropriate.    Time In:1:03  me Out:1:15  Time Coded Minutes (treatment minutes): 12mins  Rehab Potential: fair  Treatments/week: 2-3wk    Makr Mccoy, PTA

## 2019-03-29 NOTE — PROGRESS NOTES
Repeat H+H stable at 9.0. Patient is now requiring 6 of epinephrine. No UOP, Cr on PM labs was increased to 3.2. Tachycardia to 120's, likely 2/2 hypovolemia and iatrogenic adrenergics. Bedside echocardiogram with inspiratory variation in IVC. Provided 2nd 1L bolus of LR. Lactic acid downtrended from 9 to 5.5. Continue q4h checks. Nephrology consulted for oliguria and Cr elevation. K= 4.4, Bicarb 21, BUN 84.      Anya Carson MD

## 2019-03-29 NOTE — PROGRESS NOTES
[facility]   Progress Note                   Date:   3/29/2019  Patient name: Katelynn Ramirez  Date of admission:  3/19/2019  7:58 AM  MRN:   [id number]  YOB: 1945  PCP: Gab Benavides MD    Reason for Admission: acute resp failure      Subjective:   Had a retroperitoneal bleed causing hemorrhagic shock on 3/28/19. Now has been relatively stabilized. Hb has plateaued. On 3 pressors for BP support. In rate controlled Afib on Amio infusion. Medications:   Scheduled Meds:   piperacillin-tazobactam  3.375 g Intravenous Q8H    hydrocortisone sodium succinate PF  100 mg Intravenous Q8H    pantoprazole  40 mg Intravenous Daily    And    sodium chloride (PF)  10 mL Intravenous Daily    docusate  100 mg Oral Daily    sodium chloride flush  10 mL Intravenous 2 times per day       Continuous Infusions:   norepinephrine 30 mcg/min (03/29/19 0800)    insulin (HUMAN R) non-weight based infusion 2.14 Units/hr (03/29/19 1001)    EPINEPHrine infusion Stopped (03/29/19 0005)    phenylephrine (KHADRA-SYNEPHRINE) 50mg/250mL infusion 50 mcg/min (03/29/19 1005)    vasopressin (Septic Shock) infusion 0.04 Units/min (03/29/19 0105)    amiodarone 450mg/250ml D5W infusion Stopped (03/28/19 1826)    sodium chloride 10 mL/hr at 03/28/19 1243    midazolam 3 mg/hr (03/29/19 1016)    dextrose         CBC:   Recent Labs     03/27/19  0447 03/28/19  0432  03/28/19 2029 03/29/19  0416 03/29/19  0612 03/29/19  0922   WBC 20.1* 27.1*  --   --   --   --  45.3*  --    HGB 10.0* 7.6*   < > 9.0*   < > 8.4* 8.2* 8.6*    274  --  228  --   --  217  --     < > = values in this interval not displayed.      BMP:    Recent Labs     03/28/19  0432 03/28/19 2029 03/29/19  0612    135 136   K 4.8 4.4 5.0   CL 94* 96* 97*   CO2 33* 21 23   BUN 68* 84* 87*   CREATININE 1.83* 3.21* 3.74*   GLUCOSE 383* 320* 146*       Objective:   Vitals: /69   Pulse 104   Temp 99.9 °F (37.7 °C) (Core)   Resp 22   Ht 5' 8\" (1.727 m)   Wt 299 lb 13.2 oz (136 kg)   SpO2 93%   BMI 45.59 kg/m²     General appearance: intubated and sedated  HEENT: Head: Normocephalic, no lesions, without obvious abnormality  Neck: no JVD  Lungs: clear to auscultation bilaterally, no basilar rales, no wheezing   Heart: atrial fibrillation, S1, S2 normal, no murmur, click, rub or gallop  Abdomen: soft, non-tender; bowel sounds normal  Extremities: No LE edema  Neurologic: Intubated/sedated    ECHO (3/18/19):  Poor image quality, likely due to patient body habitus and/or lung disease. Global left ventricular systolic function appears moderately reduced with an  estimated ejection fraction of 40%. The left ventricular cavity size is mildly enlarged and the left ventricular  wall thickness is mildly increased. Thinning and hypokinesis of the distal half on the interventricular septum. Mild mitral and tricuspid regurgitation. Aortic valve sclerosis with no significant aortic stenosis. Can not rule out  bicuspid aortic valve. Diastolic function cannot be assessed due to atrial fibrillation. Assessment:   1. Influenza A with PNA  2. Retroperitoneal bleed on 3/28/19  3. Hemorrhagic shock requiring multiple pressors  4. Acute respiratory failure, intubated on 3/21/19  5. Atrial flutter/fib with RVR  6. ARF likely secondary to ATN   7. Mild systolic cardiomyopathy (LVEF ~ 40%)  8. Acute systolic CHF  9. Type II MI    Treatment Plan:   1. Patient is NPO secondary to retroperitoneal bleed  2. Hb is now stable post transfusion  3. Requiring pressors (Norepi, Vasopressin and Epi)  4. Now in rate controlled Afib, on Amio infusion  5. Will consider GUSTAVO/CV prior to DC    Will discuss with rounding attending Dr. Kathy Kathleen for final recommendations. Sana Aden MD  Cardiology Fellow    Attending Physician Statement  I have discussed the case of Gabo Pore including pertinent history and exam findings with the resident.  I have seen and examined

## 2019-03-29 NOTE — PROGRESS NOTES
Nutrition Assessment (Enteral Nutrition)    Type and Reason for Visit: Reassess    Nutrition Recommendations: Restart TF as able. Will continue to follow/monitor plans. Nutrition Assessment: Pt remains on vent. Noted retroperitoneal hematoma. TF currently on hold. Nutrition Risk Level: High    Nutrition Needs:  · Estimated Daily Total Kcal: 5281-5486 kcal/day  · Estimated Daily Protein (g):  g/day    Nutrition Diagnosis:   · Problem: Inadequate oral intake  · Etiology: related to Impaired respiratory function-inability to consume food     Signs and symptoms:  as evidenced by NPO status due to medical condition, Nutrition support - EN    Objective Information:  · Nutrition-Focused Physical Findings: Last documented BM 3/18  · Wound Type: None  · Current Nutrition Therapies:  · Oral Diet Orders: NPO   · Tube Feeding (TF) Orders:   · Formula: Semi-elemental  · Rate (ml/hr):Goal 50 ml/hr ---currently on hold     · Volume (ml/day): 1200 ml/day  · Duration: Continuous  · Current TF & Flush Orders Provides: 0  · Goal TF & Flush Orders Provides: 1440 kcal, 90 g protein  · Additional Calories: none  · Anthropometric Measures:  · Ht: 5' 8\" (172.7 cm)   · Current Body Wt: 299 lb 13.2 oz (136 kg)  · Admission Body Wt: 292 lb (132.5 kg)  · Ideal Body Wt: 154 lb (69.9 kg), % Ideal Body 190% (adm/ideal)  · BMI Classification: BMI > or equal to 40.0 Obese Class III    Nutrition Interventions:   Restart TF as able.    Continued Inpatient Monitoring, Education Not Indicated    Nutrition Evaluation:   · Evaluation: Progress towards goals declining   · Goals: Meet more than 75% of nutrition needs   · Monitoring: Weight, Pertinent Labs, for restart of TF      Electronically signed by Melissa Lee RD, LD on 3/29/19 at 4:16 PM    Contact Number: [phone]

## 2019-03-29 NOTE — PROGRESS NOTES
Vascular Surgery Progress Note            PATIENT NAME: Charley Byrd     TODAY'S DATE: 3/29/2019, 7:27 AM    SUBJECTIVE:    Pt seen and examined. Remains intubated/sedated. Still on pressor support with Levophed, benjamín, and vasopressin. Hgb 8.2 (9.0)    OBJECTIVE:   Vitals:  /87   Pulse 108   Temp 100.9 °F (38.3 °C) (Core)   Resp 12   Ht 5' 8\" (1.727 m)   Wt 299 lb 13.2 oz (136 kg)   SpO2 96%   BMI 45.59 kg/m²        General: intubated, sedated, NAD  Lungs: mechanically vented, no respiratory distress, no audible wheezing  Heart: +S1/S2  Abdomen: Softly distended, no grimacing to abdominal exam; no external signs of hemorrhage to abdominal wall or flanks  Extremity: moves all extremities x4, No edema    Data:  CBC:   Lab Results   Component Value Date    WBC 45.3 03/29/2019    RBC 2.66 03/29/2019    HGB 8.2 03/29/2019    HCT 23.7 03/29/2019    MCV 89.1 03/29/2019    MCH 30.8 03/29/2019    MCHC 34.6 03/29/2019    RDW 14.7 03/29/2019     03/29/2019    MPV 11.3 03/29/2019     BMP:    Lab Results   Component Value Date     03/29/2019    K 5.0 03/29/2019    CL 97 03/29/2019    CO2 23 03/29/2019    BUN 87 03/29/2019    LABALBU 2.9 03/28/2019    CREATININE 3.74 03/29/2019    CALCIUM 7.5 03/29/2019    GFRAA 19 03/29/2019    LABGLOM 16 03/29/2019    GLUCOSE 146 03/29/2019    GLUCOSE 104 11/26/2018         ASSESSMENT     69year old male presents with large retroperitoneal hematoma ([id number] along left retroperitoneum)    PLAN    1. Transfuse pt as needed and trend Hgb. At this time pt responded appropriately to transfusions. Decrease trending frequency to 6-8 hrs given past several stable. Continue to monitor closely. Would transfuse in a 1:1:1 ratio in terms of RBC/FFP/Plt. 2. Trend lactate which is improving; however remains on pressor support  3. If continued need for transfusion develops, would recommend IR for attempted embolization if possible  4.  Medical management per Critical Care team.        Electronically signed by Rafael Terrazas DO  on 3/29/2019 at 7:27 AM

## 2019-03-29 NOTE — PLAN OF CARE
Problem: Risk for Impaired Skin Integrity  Goal: Tissue integrity - skin and mucous membranes  Description  Structural intactness and normal physiological function of skin and  mucous membranes.   Outcome: Ongoing     Problem: Confusion - Acute:  Goal: Absence of continued neurological deterioration signs and symptoms  Description  Absence of continued neurological deterioration signs and symptoms  Outcome: Ongoing  Goal: Mental status will be restored to baseline  Description  Mental status will be restored to baseline  Outcome: Ongoing     Problem: Discharge Planning:  Goal: Ability to perform activities of daily living will improve  Description  Ability to perform activities of daily living will improve  Outcome: Ongoing  Goal: Participates in care planning  Description  Participates in care planning  Outcome: Ongoing     Problem: Injury - Risk of, Physical Injury:  Goal: Absence of physical injury  Description  Absence of physical injury  Outcome: Ongoing  Goal: Will remain free from falls  Description  Will remain free from falls  Outcome: Ongoing     Problem: Mood - Altered:  Goal: Mood stable  Description  Mood stable  Outcome: Ongoing  Goal: Absence of abusive behavior  Description  Absence of abusive behavior  Outcome: Ongoing  Goal: Verbalizations of feeling emotionally comfortable while being cared for will increase  Description  Verbalizations of feeling emotionally comfortable while being cared for will increase  Outcome: Ongoing     Problem: Psychomotor Activity - Altered:  Goal: Absence of psychomotor disturbance signs and symptoms  Description  Absence of psychomotor disturbance signs and symptoms  Outcome: Ongoing     Problem: Sensory Perception - Impaired:  Goal: Demonstrations of improved sensory functioning will increase  Description  Demonstrations of improved sensory functioning will increase  Outcome: Ongoing  Goal: Decrease in sensory misperception frequency  Description  Decrease in sensory misperception frequency  Outcome: Ongoing  Goal: Able to refrain from responding to false sensory perceptions  Description  Able to refrain from responding to false sensory perceptions  Outcome: Ongoing  Goal: Demonstrates accurate environmental perceptions  Description  Demonstrates accurate environmental perceptions  Outcome: Ongoing  Goal: Able to distinguish between reality-based and nonreality-based thinking  Description  Able to distinguish between reality-based and nonreality-based thinking  Outcome: Ongoing  Goal: Able to interrupt nonreality-based thinking  Description  Able to interrupt nonreality-based thinking  Outcome: Ongoing     Problem: Sleep Pattern Disturbance:  Goal: Appears well-rested  Description  Appears well-rested  Outcome: Ongoing     Problem: Restraint Use - Nonviolent/Non-Self-Destructive Behavior:  Goal: Absence of restraint indications  Description  Absence of restraint indications  Outcome: Ongoing  Goal: Absence of restraint-related injury  Description  Absence of restraint-related injury  Outcome: Ongoing     Problem: Nutrition  Goal: Optimal nutrition therapy  Outcome: Ongoing     Problem: OXYGENATION/RESPIRATORY FUNCTION  Goal: Patient will maintain patent airway  3/29/2019 1604 by Grecia Osborne RN  Outcome: Ongoing  3/29/2019 1101 by Esther Haider RCP  Outcome: Ongoing  Goal: Patient will achieve/maintain normal respiratory rate/effort  Description  Respiratory rate and effort will be within normal limits for the patient  3/29/2019 1604 by Grecia Osborne RN  Outcome: Ongoing  3/29/2019 1101 by Esther Haider RCP  Outcome: Ongoing     Problem: MECHANICAL VENTILATION  Goal: Patient will maintain patent airway  3/29/2019 1604 by Grecia Osborne RN  Outcome: Ongoing  3/29/2019 1101 by Esther Haider RCP  Outcome: Ongoing  Goal: Oral health is maintained or improved  3/29/2019 1604 by Grecia Osborne RN  Outcome: Ongoing  3/29/2019 1101 by Esther Haider KENTONP  Outcome: Ongoing  Goal: ET tube will be managed safely  3/29/2019 1604 by Chris Ulloa RN  Outcome: Ongoing  3/29/2019 1101 by Coreen Yu RCP  Outcome: Ongoing  Goal: Ability to express needs and understand communication  3/29/2019 1604 by Chris Ulloa RN  Outcome: Ongoing  3/29/2019 1101 by Coreen Yu RCP  Outcome: Ongoing  Goal: Mobility/activity is maintained at optimum level for patient  3/29/2019 1604 by Chris Ulloa RN  Outcome: Ongoing  3/29/2019 1101 by Coreen Yu RCP  Outcome: Ongoing     Problem: SKIN INTEGRITY  Goal: Skin integrity is maintained or improved  3/29/2019 1604 by Chris Ulloa RN  Outcome: Ongoing  3/29/2019 1101 by Coreen Yu RCP  Outcome: Ongoing     Problem: Musculor/Skeletal Functional Status  Goal: Highest potential functional level  Outcome: Ongoing

## 2019-03-29 NOTE — PLAN OF CARE
Problem: OXYGENATION/RESPIRATORY FUNCTION  Goal: Patient will maintain patent airway  Outcome: Ongoing  Goal: Patient will achieve/maintain normal respiratory rate/effort  Respiratory rate and effort will be within normal limits for the patient  Outcome: Ongoing    Problem: MECHANICAL VENTILATION  Goal: Patient will maintain patent airway  Outcome: Ongoing  Goal: Oral health is maintained or improved  Outcome: Ongoing  Goal: ET tube will be managed safely  Outcome: Ongoing  Goal: Ability to express needs and understand communication  Outcome: Ongoing  Goal: Mobility/activity is maintained at optimum level for patient  Outcome: Ongoing    Problem: ASPIRATION PRECAUTIONS  Goal: Patient?s risk of aspiration is minimized  Outcome: Ongoing    Problem: SKIN INTEGRITY  Goal: Skin integrity is maintained or improved  Outcome: Ongoing

## 2019-03-29 NOTE — PROGRESS NOTES
Occupational Therapy Not Seen Note    DATE: 3/29/2019  Name: Charlotte Hernandez  : 1945  MRN: [id number]    Patient not available for Occupational Therapy due to:    Sedation: pt intubated and sedated at this time     Next Scheduled Treatment: check back 19    Electronically signed by CHANTAL Abdalla on 3/29/2019 at 11:00 AM

## 2019-03-29 NOTE — PROGRESS NOTES
Pt seen and examined with Dr. Iglesia Stone, vascular attending earlier. Appears Hgb checks have been stable. Recommended to D/C the q4h labs. Would just consider q12h checks for 24 hrs and then just resume daily CBC. If patient ends up requiring multiple transfusions again recommend IR intervention to shoot an angiogram and attempt embolization but suspect this will not be necessary. Medical workup per critical care. Vascular surgery to sign off at this time. We are available if needed, please call. Thank you.       Electronically signed by Shahriar Zepeda DO on 3/29/2019 at 5:24 PM

## 2019-03-30 ENCOUNTER — APPOINTMENT (OUTPATIENT)
Dept: GENERAL RADIOLOGY | Age: 74
DRG: 207 | End: 2019-03-30
Attending: INTERNAL MEDICINE
Payer: MEDICARE

## 2019-03-30 LAB
ABSOLUTE EOS #: 0 K/UL (ref 0–0.4)
ABSOLUTE IMMATURE GRANULOCYTE: 1.21 K/UL (ref 0–0.3)
ABSOLUTE LYMPH #: 2.02 K/UL (ref 1–4.8)
ABSOLUTE MONO #: 4.03 K/UL (ref 0.1–0.8)
ALBUMIN SERPL-MCNC: 2.8 G/DL (ref 3.5–5.2)
ALBUMIN/GLOBULIN RATIO: 1.2 (ref 1–2.5)
ALLEN TEST: ABNORMAL
ALP BLD-CCNC: 75 U/L (ref 40–129)
ALT SERPL-CCNC: 571 U/L (ref 5–41)
ANION GAP SERPL CALCULATED.3IONS-SCNC: 17 MMOL/L (ref 9–17)
ANION GAP SERPL CALCULATED.3IONS-SCNC: 19 MMOL/L (ref 9–17)
AST SERPL-CCNC: 318 U/L
BASOPHILS # BLD: 0 % (ref 0–2)
BASOPHILS ABSOLUTE: 0 K/UL (ref 0–0.2)
BILIRUB SERPL-MCNC: 0.67 MG/DL (ref 0.3–1.2)
BILIRUBIN DIRECT: 0.44 MG/DL
BILIRUBIN, INDIRECT: 0.23 MG/DL (ref 0–1)
BUN BLDV-MCNC: 106 MG/DL (ref 8–23)
BUN/CREAT BLD: ABNORMAL (ref 9–20)
CALCIUM IONIZED: 0.98 MMOL/L (ref 1.13–1.33)
CALCIUM SERPL-MCNC: 7.8 MG/DL (ref 8.6–10.4)
CHLORIDE BLD-SCNC: 94 MMOL/L (ref 98–107)
CHLORIDE BLD-SCNC: 97 MMOL/L (ref 98–107)
CO2: 22 MMOL/L (ref 20–31)
CO2: 23 MMOL/L (ref 20–31)
CREAT SERPL-MCNC: 4.58 MG/DL (ref 0.7–1.2)
DIFFERENTIAL TYPE: ABNORMAL
EKG ATRIAL RATE: 141 BPM
EKG Q-T INTERVAL: 286 MS
EKG QRS DURATION: 90 MS
EKG QTC CALCULATION (BAZETT): 379 MS
EKG R AXIS: -5 DEGREES
EKG T AXIS: 64 DEGREES
EKG VENTRICULAR RATE: 106 BPM
EOSINOPHILS RELATIVE PERCENT: 0 % (ref 1–4)
FIO2: 65
GFR AFRICAN AMERICAN: 15 ML/MIN
GFR NON-AFRICAN AMERICAN: 13 ML/MIN
GFR SERPL CREATININE-BSD FRML MDRD: ABNORMAL ML/MIN/{1.73_M2}
GFR SERPL CREATININE-BSD FRML MDRD: ABNORMAL ML/MIN/{1.73_M2}
GLOBULIN: ABNORMAL G/DL (ref 1.5–3.8)
GLUCOSE BLD-MCNC: 136 MG/DL (ref 75–110)
GLUCOSE BLD-MCNC: 143 MG/DL (ref 75–110)
GLUCOSE BLD-MCNC: 143 MG/DL (ref 75–110)
GLUCOSE BLD-MCNC: 144 MG/DL (ref 75–110)
GLUCOSE BLD-MCNC: 144 MG/DL (ref 75–110)
GLUCOSE BLD-MCNC: 147 MG/DL (ref 75–110)
GLUCOSE BLD-MCNC: 147 MG/DL (ref 75–110)
GLUCOSE BLD-MCNC: 154 MG/DL (ref 75–110)
GLUCOSE BLD-MCNC: 155 MG/DL (ref 75–110)
GLUCOSE BLD-MCNC: 156 MG/DL (ref 75–110)
GLUCOSE BLD-MCNC: 157 MG/DL (ref 75–110)
GLUCOSE BLD-MCNC: 161 MG/DL (ref 75–110)
GLUCOSE BLD-MCNC: 162 MG/DL (ref 75–110)
GLUCOSE BLD-MCNC: 164 MG/DL (ref 75–110)
GLUCOSE BLD-MCNC: 164 MG/DL (ref 75–110)
GLUCOSE BLD-MCNC: 167 MG/DL (ref 75–110)
GLUCOSE BLD-MCNC: 170 MG/DL (ref 75–110)
GLUCOSE BLD-MCNC: 171 MG/DL (ref 75–110)
GLUCOSE BLD-MCNC: 172 MG/DL (ref 75–110)
GLUCOSE BLD-MCNC: 179 MG/DL (ref 70–99)
HCT VFR BLD CALC: 20 % (ref 40.7–50.3)
HCT VFR BLD CALC: 21.3 % (ref 40.7–50.3)
HCT VFR BLD CALC: 22.3 % (ref 40.7–50.3)
HEMOGLOBIN: 6.6 G/DL (ref 13–17)
HEMOGLOBIN: 7.3 G/DL (ref 13–17)
HEMOGLOBIN: 7.6 G/DL (ref 13–17)
IMMATURE GRANULOCYTES: 3 %
LACTIC ACID, WHOLE BLOOD: 2.4 MMOL/L (ref 0.7–2.1)
LYMPHOCYTES # BLD: 5 % (ref 24–44)
MAGNESIUM: 2.7 MG/DL (ref 1.6–2.6)
MCH RBC QN AUTO: 32 PG (ref 25.2–33.5)
MCHC RBC AUTO-ENTMCNC: 34.3 G/DL (ref 28.4–34.8)
MCV RBC AUTO: 93.4 FL (ref 82.6–102.9)
MODE: ABNORMAL
MONOCYTES # BLD: 10 % (ref 1–7)
MORPHOLOGY: ABNORMAL
MORPHOLOGY: ABNORMAL
NEGATIVE BASE EXCESS, ART: ABNORMAL (ref 0–2)
NRBC AUTOMATED: 3.6 PER 100 WBC
NUCLEATED RED BLOOD CELLS: 6 PER 100 WBC
O2 DEVICE/FLOW/%: ABNORMAL
PATIENT TEMP: ABNORMAL
PDW BLD-RTO: 15 % (ref 11.8–14.4)
PHOSPHORUS: 6.4 MG/DL (ref 2.5–4.5)
PLATELET # BLD: 201 K/UL (ref 138–453)
PLATELET ESTIMATE: ABNORMAL
PMV BLD AUTO: 11.1 FL (ref 8.1–13.5)
POC HCO3: 27.1 MMOL/L (ref 21–28)
POC O2 SATURATION: 98 % (ref 94–98)
POC PCO2 TEMP: ABNORMAL MM HG
POC PCO2: 34.4 MM HG (ref 35–48)
POC PH TEMP: ABNORMAL
POC PH: 7.5 (ref 7.35–7.45)
POC PO2 TEMP: ABNORMAL MM HG
POC PO2: 90.2 MM HG (ref 83–108)
POSITIVE BASE EXCESS, ART: 4 (ref 0–3)
POTASSIUM SERPL-SCNC: 4.7 MMOL/L (ref 3.7–5.3)
POTASSIUM SERPL-SCNC: 4.9 MMOL/L (ref 3.7–5.3)
RBC # BLD: 2.28 M/UL (ref 4.21–5.77)
RBC # BLD: ABNORMAL 10*6/UL
SAMPLE SITE: ABNORMAL
SEG NEUTROPHILS: 82 % (ref 36–66)
SEGMENTED NEUTROPHILS ABSOLUTE COUNT: 33.04 K/UL (ref 1.8–7.7)
SODIUM BLD-SCNC: 135 MMOL/L (ref 135–144)
SODIUM BLD-SCNC: 137 MMOL/L (ref 135–144)
TCO2 (CALC), ART: 28 MMOL/L (ref 22–29)
TOTAL PROTEIN: 5.1 G/DL (ref 6.4–8.3)
WBC # BLD: 40.3 K/UL (ref 3.5–11.3)
WBC # BLD: ABNORMAL 10*3/UL

## 2019-03-30 PROCEDURE — 82330 ASSAY OF CALCIUM: CPT

## 2019-03-30 PROCEDURE — 6360000002 HC RX W HCPCS: Performed by: STUDENT IN AN ORGANIZED HEALTH CARE EDUCATION/TRAINING PROGRAM

## 2019-03-30 PROCEDURE — 80051 ELECTROLYTE PANEL: CPT

## 2019-03-30 PROCEDURE — 80048 BASIC METABOLIC PNL TOTAL CA: CPT

## 2019-03-30 PROCEDURE — 6360000002 HC RX W HCPCS: Performed by: INTERNAL MEDICINE

## 2019-03-30 PROCEDURE — 82803 BLOOD GASES ANY COMBINATION: CPT

## 2019-03-30 PROCEDURE — 80076 HEPATIC FUNCTION PANEL: CPT

## 2019-03-30 PROCEDURE — 71045 X-RAY EXAM CHEST 1 VIEW: CPT

## 2019-03-30 PROCEDURE — 99233 SBSQ HOSP IP/OBS HIGH 50: CPT | Performed by: INTERNAL MEDICINE

## 2019-03-30 PROCEDURE — 6370000000 HC RX 637 (ALT 250 FOR IP): Performed by: EMERGENCY MEDICINE

## 2019-03-30 PROCEDURE — 99291 CRITICAL CARE FIRST HOUR: CPT | Performed by: INTERNAL MEDICINE

## 2019-03-30 PROCEDURE — 86900 BLOOD TYPING SEROLOGIC ABO: CPT

## 2019-03-30 PROCEDURE — 93970 EXTREMITY STUDY: CPT

## 2019-03-30 PROCEDURE — 2580000003 HC RX 258: Performed by: STUDENT IN AN ORGANIZED HEALTH CARE EDUCATION/TRAINING PROGRAM

## 2019-03-30 PROCEDURE — P9016 RBC LEUKOCYTES REDUCED: HCPCS

## 2019-03-30 PROCEDURE — 2580000003 HC RX 258: Performed by: INTERNAL MEDICINE

## 2019-03-30 PROCEDURE — 84100 ASSAY OF PHOSPHORUS: CPT

## 2019-03-30 PROCEDURE — 2700000000 HC OXYGEN THERAPY PER DAY

## 2019-03-30 PROCEDURE — 2500000003 HC RX 250 WO HCPCS: Performed by: STUDENT IN AN ORGANIZED HEALTH CARE EDUCATION/TRAINING PROGRAM

## 2019-03-30 PROCEDURE — 2000000000 HC ICU R&B

## 2019-03-30 PROCEDURE — C9113 INJ PANTOPRAZOLE SODIUM, VIA: HCPCS | Performed by: STUDENT IN AN ORGANIZED HEALTH CARE EDUCATION/TRAINING PROGRAM

## 2019-03-30 PROCEDURE — 85018 HEMOGLOBIN: CPT

## 2019-03-30 PROCEDURE — 94770 HC ETCO2 MONITOR DAILY: CPT

## 2019-03-30 PROCEDURE — 94003 VENT MGMT INPAT SUBQ DAY: CPT

## 2019-03-30 PROCEDURE — 83735 ASSAY OF MAGNESIUM: CPT

## 2019-03-30 PROCEDURE — 85014 HEMATOCRIT: CPT

## 2019-03-30 PROCEDURE — 6370000000 HC RX 637 (ALT 250 FOR IP): Performed by: STUDENT IN AN ORGANIZED HEALTH CARE EDUCATION/TRAINING PROGRAM

## 2019-03-30 PROCEDURE — 82947 ASSAY GLUCOSE BLOOD QUANT: CPT

## 2019-03-30 PROCEDURE — 83605 ASSAY OF LACTIC ACID: CPT

## 2019-03-30 PROCEDURE — 2580000003 HC RX 258: Performed by: EMERGENCY MEDICINE

## 2019-03-30 PROCEDURE — 6360000002 HC RX W HCPCS: Performed by: EMERGENCY MEDICINE

## 2019-03-30 PROCEDURE — 36430 TRANSFUSION BLD/BLD COMPNT: CPT

## 2019-03-30 PROCEDURE — 85025 COMPLETE CBC W/AUTO DIFF WBC: CPT

## 2019-03-30 RX ORDER — FUROSEMIDE 10 MG/ML
60 INJECTION INTRAMUSCULAR; INTRAVENOUS ONCE
Status: COMPLETED | OUTPATIENT
Start: 2019-03-30 | End: 2019-03-30

## 2019-03-30 RX ORDER — 0.9 % SODIUM CHLORIDE 0.9 %
250 INTRAVENOUS SOLUTION INTRAVENOUS ONCE
Status: COMPLETED | OUTPATIENT
Start: 2019-03-30 | End: 2019-03-31

## 2019-03-30 RX ADMIN — HYDROCORTISONE SODIUM SUCCINATE 100 MG: 100 INJECTION, POWDER, FOR SOLUTION INTRAMUSCULAR; INTRAVENOUS at 15:59

## 2019-03-30 RX ADMIN — CALCIUM GLUCONATE 2 G: 98 INJECTION, SOLUTION INTRAVENOUS at 08:22

## 2019-03-30 RX ADMIN — Medication 4 MG/HR: at 05:08

## 2019-03-30 RX ADMIN — PANTOPRAZOLE SODIUM 40 MG: 40 INJECTION, POWDER, FOR SOLUTION INTRAVENOUS at 08:23

## 2019-03-30 RX ADMIN — Medication 100 MG: at 08:22

## 2019-03-30 RX ADMIN — Medication 10 ML: at 08:54

## 2019-03-30 RX ADMIN — VASOPRESSIN 0.04 UNITS/MIN: 20 INJECTION INTRAVENOUS at 00:04

## 2019-03-30 RX ADMIN — HYDROCORTISONE SODIUM SUCCINATE 100 MG: 100 INJECTION, POWDER, FOR SOLUTION INTRAMUSCULAR; INTRAVENOUS at 23:51

## 2019-03-30 RX ADMIN — HYDROCORTISONE SODIUM SUCCINATE 100 MG: 100 INJECTION, POWDER, FOR SOLUTION INTRAMUSCULAR; INTRAVENOUS at 08:22

## 2019-03-30 RX ADMIN — AMIODARONE HYDROCHLORIDE 0.5 MG/MIN: 50 INJECTION, SOLUTION INTRAVENOUS at 02:57

## 2019-03-30 RX ADMIN — CEFTRIAXONE SODIUM 2 G: 2 INJECTION, POWDER, FOR SOLUTION INTRAMUSCULAR; INTRAVENOUS at 19:52

## 2019-03-30 RX ADMIN — Medication 10 ML: at 08:23

## 2019-03-30 RX ADMIN — HYDROCORTISONE SODIUM SUCCINATE 100 MG: 100 INJECTION, POWDER, FOR SOLUTION INTRAMUSCULAR; INTRAVENOUS at 00:13

## 2019-03-30 RX ADMIN — SODIUM CHLORIDE: 9 INJECTION, SOLUTION INTRAVENOUS at 11:00

## 2019-03-30 RX ADMIN — OXYCODONE HYDROCHLORIDE 5 MG: 5 TABLET ORAL at 12:34

## 2019-03-30 RX ADMIN — SODIUM CHLORIDE 250 ML: 9 INJECTION, SOLUTION INTRAVENOUS at 17:53

## 2019-03-30 RX ADMIN — AMIODARONE HYDROCHLORIDE 0.5 MG/MIN: 50 INJECTION, SOLUTION INTRAVENOUS at 17:48

## 2019-03-30 RX ADMIN — Medication 20 MCG/MIN: at 16:06

## 2019-03-30 RX ADMIN — SODIUM CHLORIDE: 9 INJECTION, SOLUTION INTRAVENOUS at 11:03

## 2019-03-30 RX ADMIN — FUROSEMIDE 60 MG: 10 INJECTION, SOLUTION INTRAMUSCULAR; INTRAVENOUS at 16:47

## 2019-03-30 RX ADMIN — Medication 30 MCG/MIN: at 05:12

## 2019-03-30 RX ADMIN — SODIUM CHLORIDE 5.05 UNITS/HR: 9 INJECTION, SOLUTION INTRAVENOUS at 05:07

## 2019-03-30 ASSESSMENT — PULMONARY FUNCTION TESTS
PIF_VALUE: 29
PIF_VALUE: 32
PIF_VALUE: 32
PIF_VALUE: 30
PIF_VALUE: 28
PIF_VALUE: 33
PIF_VALUE: 29
PIF_VALUE: 26
PIF_VALUE: 29
PIF_VALUE: 27
PIF_VALUE: 26
PIF_VALUE: 29
PIF_VALUE: 28
PIF_VALUE: 33
PIF_VALUE: 31
PIF_VALUE: 29
PIF_VALUE: 26
PIF_VALUE: 27
PIF_VALUE: 32
PIF_VALUE: 26
PIF_VALUE: 33
PIF_VALUE: 29
PIF_VALUE: 28
PIF_VALUE: 26
PIF_VALUE: 29
PIF_VALUE: 33
PIF_VALUE: 32
PIF_VALUE: 28
PIF_VALUE: 32
PIF_VALUE: 29
PIF_VALUE: 27
PIF_VALUE: 32
PIF_VALUE: 29
PIF_VALUE: 26
PIF_VALUE: 26
PIF_VALUE: 28
PIF_VALUE: 29
PIF_VALUE: 32
PIF_VALUE: 31
PIF_VALUE: 28
PIF_VALUE: 28
PIF_VALUE: 33
PIF_VALUE: 32
PIF_VALUE: 27
PIF_VALUE: 27
PIF_VALUE: 32
PIF_VALUE: 24
PIF_VALUE: 28
PIF_VALUE: 26
PIF_VALUE: 29
PIF_VALUE: 28
PIF_VALUE: 28
PIF_VALUE: 29
PIF_VALUE: 29
PIF_VALUE: 32
PIF_VALUE: 33
PIF_VALUE: 32
PIF_VALUE: 24
PIF_VALUE: 32
PIF_VALUE: 29
PIF_VALUE: 26
PIF_VALUE: 28
PIF_VALUE: 32
PIF_VALUE: 28
PIF_VALUE: 29
PIF_VALUE: 29
PIF_VALUE: 30
PIF_VALUE: 33
PIF_VALUE: 29
PIF_VALUE: 29
PIF_VALUE: 28
PIF_VALUE: 29
PIF_VALUE: 29
PIF_VALUE: 30
PIF_VALUE: 28
PIF_VALUE: 28
PIF_VALUE: 32
PIF_VALUE: 27
PIF_VALUE: 29
PIF_VALUE: 28
PIF_VALUE: 33
PIF_VALUE: 32
PIF_VALUE: 28

## 2019-03-30 ASSESSMENT — PAIN SCALES - GENERAL
PAINLEVEL_OUTOF10: 8
PAINLEVEL_OUTOF10: 0

## 2019-03-30 NOTE — PROGRESS NOTES
CO2 21 23   < > 22 22 23   BUN 84* 87*  --   --   --  106*   CREATININE 3.21* 3.74*  --   --   --  4.58*   GLUCOSE 320* 146*  --   --   --  179*    < > = values in this interval not displayed. Hepatic:   Recent Labs     03/28/19  1025 03/29/19  0612 03/30/19  0603   AST 63* 627* 318*   ALT 60* 563* 571*   BILITOT 0.46 0.65 0.67   ALKPHOS 68 62 75     Troponin: No results for input(s): TROPONINI in the last 72 hours. BNP: No results for input(s): BNP in the last 72 hours. Lipids: No results for input(s): CHOL, HDL in the last 72 hours. Invalid input(s): LDLCALCU  INR:   Recent Labs     03/28/19  1025 03/29/19  1049   INR 1.2 1.2       Objective:   Vitals: /67   Pulse 97   Temp 99 °F (37.2 °C) (Oral)   Resp 30   Ht 5' 8\" (1.727 m)   Wt (!) 304 lb 0.2 oz (137.9 kg)   SpO2 100%   BMI 46.23 kg/m²     General appearance: intubated and sedared  HEENT: Head: Normocephalic, no lesions, without obvious abnormality  Neck: no JVD  Lungs: clear to auscultation bilaterally, no basilar rales, no wheezing   Heart: atrial fibrillation S1, S2 normal, no murmur, click, rub or gallop  Abdomen: soft, non-tender; bowel sounds normal  Extremities: No LE edema  Neurologic: Mental status: Alert, oriented. Motor and sensory not done. ECHO (3/18/19):  Poor image quality, likely due to patient body habitus and/or lung disease. Global left ventricular systolic function appears moderately reduced with an  estimated ejection fraction of 40%. The left ventricular cavity size is mildly enlarged and the left ventricular  wall thickness is mildly increased. Thinning and hypokinesis of the distal half on the interventricular septum. Mild mitral and tricuspid regurgitation. Aortic valve sclerosis with no significant aortic stenosis. Can not rule out  bicuspid aortic valve. Diastolic function cannot be assessed due to atrial fibrillation. Assessment & Plan   1. Afib- increase amio back to 1mg/min.  May need GUSTAVO/CV prior to discharge  2. Influenza A with PNA  3. Retroperitoneal bleed on 3/28/19  4. Hemorrhagic shock requiring multiple pressors  5. Acute respiratory failure, intubated on 3/21/19  6. Atrial flutter/fib with RVR  7. ARF likely secondary to ATN   8. Mild systolic cardiomyopathy (LVEF ~ 40%)  9. Acute systolic CHF  10. Type II MI      Discussed with patient and nursing. [address] [place] [address] [facility]   [address] [place] [address]       Attending Cardiologist Addendum: I have reviewed and performed the history, physical, subjective, objective, assessment, and plan with the resident/fellow and agree with the note. I performed the history and physical personally. I have made changes to the note above as needed. Thank you for allowing me to participate in the care of this patient, please do not hesitate to call if you have any questions. Elvin Roland, [id number] [facility]  [facility]  [phone] declines

## 2019-03-30 NOTE — PLAN OF CARE
Problem: Risk for Impaired Skin Integrity  Goal: Tissue integrity - skin and mucous membranes  Description  Structural intactness and normal physiological function of skin and  mucous membranes.   Outcome: Met This Shift     Problem: Injury - Risk of, Physical Injury:  Goal: Absence of physical injury  Description  Absence of physical injury  Outcome: Met This Shift  Goal: Will remain free from falls  Description  Will remain free from falls  Outcome: Met This Shift     Problem: Mood - Altered:  Goal: Mood stable  Description  Mood stable  Outcome: Met This Shift  Goal: Absence of abusive behavior  Description  Absence of abusive behavior  Outcome: Met This Shift     Problem: Psychomotor Activity - Altered:  Goal: Absence of psychomotor disturbance signs and symptoms  Description  Absence of psychomotor disturbance signs and symptoms  Outcome: Met This Shift     Problem: Sleep Pattern Disturbance:  Goal: Appears well-rested  Description  Appears well-rested  Outcome: Met This Shift     Problem: Restraint Use - Nonviolent/Non-Self-Destructive Behavior:  Goal: Absence of restraint indications  Description  Absence of restraint indications  Outcome: Met This Shift  Goal: Absence of restraint-related injury  Description  Absence of restraint-related injury  Outcome: Met This Shift     Problem: MECHANICAL VENTILATION  Goal: Oral health is maintained or improved  Outcome: Met This Shift  Goal: ET tube will be managed safely  Outcome: Met This Shift

## 2019-03-30 NOTE — PROGRESS NOTES
[facility] - Initial Consult Note  Today's Date and Time: 3/30/2019, 3:28 PM    Impression :   1. Acute Hypoxic Respiratory failure  2. Influenza A  3. Possible secondary pneumonia with normal mansoor  4. Atrial Fibrillation  5. DM II  6. CHF  7. ERNESTO  8. Leukocytosis secondary to hemorrhage, steroids  9. Retroperitoneal hematoma  10. Hemorrhagic shock on vasopressors. Recommendations:   · Continue  IV Ceftriaxone 2 gm IV q 24 hr  · F/U cultures    Medical Decision Making/Summary/Discussion:   · Patient with Hx chronic smoking  · Admitted with acute hypoxic respiratory failure associated with Influenza   · Has pulmonary vascular congestion and pleural effusions  · Has CHF and renal insufficiency  · Has completed oseltamivir  · Will continue treatment for possible secondary pneumonia with ceftriaxone  Infection Control Recommendations     · Droplet Isolation    Antimicrobial Stewardship Recommendations     · Targeted therapy  · PK dosing  Coordination of Outpatient Care:   · Estimated Length of IV antimicrobials:TBD  · Patient will need Midline Catheter Insertionno:   · Patient will need PICC line Insertion:no   · Patient will need: Home IV , [place],  SNF,  LTAC: TBD  · Patient will need outpatient wound care:No    Chief complaint/reason for consultation:   · Leukocytosis      History of Present Illness:   Honora Lombard is a 69y.o.-year-old  male who was initially admitted on 3/19/2019. Patient seen at the request of Jess Perkins. INITIAL HISTORY:    ID has been consulted for evaluation of leukocytosis. The patient was transferred to [facility] on 3/19/19 from an outlying facility for worsening respiratory status, Influenza A and elevated troponin. PMH significant for CAD,DM 2, A fib and chronic smoking. The patient presented to [facility] on 3/15 for sob where his Rapid flu was positive for Influenza A.  He refused admission and was treated with Unique-flu as an out patient. He developed worsening sob and hypoxia leading to his admission at the outlying facility on 3/18/19 where he was started on Ceftriaxone, Azithromycin and Tamiflu. He was transferred to [facility] on 3/19/19 because of hypoxic respiratory distress and elevated troponin. He was admitted to ICU and started on BiPAP. CXR on 3-19-19 showed reticular infiltrate and LLL pneumonia. Respiratory culture was obtained on 3/22/19 and showed few gram positive cocci in clusters. Legionella antigen, Strep pneumoniae anitgen and Mycoplasma Ab negative. MRSA swab negative    The patient was intubated on 3/21/19 because of deteriorating respiratory status. The patient completed a course of Unique-flu on 3-25-19. He completed a course of abx and steroids on 3-26-19. The patient spiked a fever (t max 100.4) and became hypotensive requiring pressure support on 3-29-19. He was started on Vancomycin and Zosyn empirically. Blood, urine and sputum cultures were obtained and currently pending. The patient additionally was found to have a Lt sided spontaneous retroperitoneal bleed on 3-29-19 and was hypovolemic requiring multiple transfusions. H&H has been stable today at 9.0. The patient continues to require pressor support and is tachycardic (120). Presently the patient is intubated, sedated and on 3 pressors. He has been febrile (t max 101.5), tachycardic (110-120), tachypneic (30-40's). Lactic acid was elevated at 7.4 yesterday. WBC count has been elevated at 45.3 today. Hgb stable. His kidney function is worsening Cr 3.74    Current evaluation : 3/30/19    Patient intubated ( PRVC 55 %/12/20/480) and sedated on versed  Afebrile with T max 96.8  BP with systolic's in 969'D and diastolic's in 17'H ( MAP> 65), on levophed vasopressor support  On amio infusion for A. Fib.      ABG 7.505/34.4/90%/27      Repeat tracheal aspirate grew normal mansoor, scant growth  Kidney function is worsening   ML over 24 hours  Hb stable    We will continue rocephin      Labs Reviewed: 3/30/2019  WBC: 20.1 > 27.1 > 45.3 - 40.3  Hgb: 9.0 > 8.2 > 8.6- 7.3  Lactic acid: 7.4 > 5.5 > 2.6 > 2.4- 2.9--2.4    BUN: 68 > 84 > 87  Cr: 1.83 > 3.21 > 3.74-- 4.58    AST 60--563--571  ALT- 63--627--318    Cultures:  Urine:   · Negative 3/28/19  · Negative 3/15/19  Blood: Negative so far 3/28/19    Sputum :  ·  3/28/19- normal respiratory mansoor  · Gram positive cocci in clusters 3/22/19. Normal mansoor    Rapid flu positive for influenza A (3/15/19)    Discussed with patient, RN. I have personally reviewed the past medical history, past surgical history, medications, social history, and family history, and I have updated the database accordingly. Past Medical History:     Past Medical History:   Diagnosis Date    Abnormal nuclear stress test 03/12/2010    Stress and resting cardiolite images showed inferior wall hypoperfusion suggestive of previous myocardial infarction. Left ventricular wall motion showed inferior wall hypokinesia. EF 58%.  Arrhythmia 2007    A single episode    Atrial fibrillation ([facility] [address] 2007    Single Episode    CAD (coronary artery disease) 1996    MI    DM type 2 (diabetes mellitus, type 2) ([facility] [address] 2005    Hx of echocardiogram 04/05/2007    EF 62%, Increased left atrial and right ventricular diametes. Increased septal and posterior wall thickness suggest left ventricular hypertrophy.  Valves were normal.        Past Surgical  History:     Past Surgical History:   Procedure Laterality Date    CARDIOVERSION  05/18/2007    Successful-Dr. Leggett-[place]    CARDIOVERSION  03/13    CORONARY ANGIOPLASTY  1996       Medications:      cefTRIAXone (ROCEPHIN) IV  2 g Intravenous Q24H    hydrocortisone sodium succinate PF  100 mg Intravenous Q8H    pantoprazole  40 mg Intravenous Daily    And    sodium chloride (PF)  10 mL Intravenous Daily    docusate  100 mg Oral Daily    sodium chloride flush  10 mL Intravenous 2 times per day       Social History:     Social History     Socioeconomic History    Marital status:      Spouse name: Not on file    Number of children: Not on file    Years of education: Not on file    Highest education level: Not on file   Occupational History    Not on file   Social Needs    Financial resource strain: Not on file    Food insecurity:     Worry: Not on file     Inability: Not on file    Transportation needs:     Medical: Not on file     Non-medical: Not on file   Tobacco Use    Smoking status: Former Smoker     Packs/day: 1.50     Years: 30.00     Pack years: 45.00     Types: Cigarettes     Last attempt to quit: 1996     Years since quittin.8    Smokeless tobacco: Never Used   Substance and Sexual Activity    Alcohol use: No    Drug use: No    Sexual activity: Not on file   Lifestyle    Physical activity:     Days per week: Not on file     Minutes per session: Not on file    Stress: Not on file   Relationships    Social connections:     Talks on phone: Not on file     Gets together: Not on file     Attends Faith service: Not on file     Active member of club or organization: Not on file     Attends meetings of clubs or organizations: Not on file     Relationship status: Not on file    Intimate partner violence:     Fear of current or ex partner: Not on file     Emotionally abused: Not on file     Physically abused: Not on file     Forced sexual activity: Not on file   Other Topics Concern    Not on file   Social History Narrative    Not on file       Family History:   No family history on file. Allergies:   Patient has no known allergies. Review of Systems:   Unable to perform ROS. Pt intubated and sedated.     Physical Examination :     Patient Vitals for the past 8 hrs:   BP Temp Temp src Pulse Resp SpO2   19 1500 (!) 99/52 -- -- 87 29 98 %   19 1445 -- -- -- 87 28 98 %   19 1430 -- -- -- 90 30 98 %   19 1415 -- -- -- 89 30 98 8.2*   < > 7.4* 7.3*   HCT 23.7*   < > 21.1* 21.3*     --   --  201   LYMPHOPCT 22*  --   --  5*   MONOPCT 3  --   --  10*    < > = values in this interval not displayed. BMP:   Recent Labs     03/29/19  0612  03/30/19  0007 03/30/19  0603      < > 135 137   K 5.0   < > 4.7 4.9   CL 97*   < > 94* 97*   CO2 23   < > 22 23   BUN 87*  --   --  106*   CREATININE 3.74*  --   --  4.58*   MG 2.8*  --   --  2.7*    < > = values in this interval not displayed. Hepatic Function Panel:   Recent Labs     03/29/19  0612 03/29/19  1308 03/30/19  0603   PROT 5.0* 4.6* 5.1*   LABALBU 2.8*  --  2.8*   BILIDIR 0.35*  --  0.44*   IBILI 0.30  --  0.23   BILITOT 0.65  --  0.67   ALKPHOS 62  --  75   *  --  571*   *  --  318*     No results for input(s): RPR in the last 72 hours. No results for input(s): HIV in the last 72 hours. No results for input(s): BC in the last 72 hours. Lab Results   Component Value Date    MUCUS NOT REPORTED 03/28/2019    RBC 2.28 03/30/2019    TRICHOMONAS NOT REPORTED 03/28/2019    WBC 40.3 03/30/2019    YEAST NOT REPORTED 03/28/2019    TURBIDITY TURBID 03/28/2019     Lab Results   Component Value Date    CREATININE 4.58 03/30/2019    GLUCOSE 179 03/30/2019    GLUCOSE 104 11/26/2018       Medical Decision Making-Imaging:     Narrative   EXAMINATION:   SINGLE XRAY VIEW OF THE CHEST       3/29/2019 5:54 am       COMPARISON:   March 28, 2019, March 27, 2019       HISTORY:   ORDERING SYSTEM PROVIDED HISTORY: intubated   TECHNOLOGIST PROVIDED HISTORY:   intubated       FINDINGS:   Endotracheal tube terminates at the level of the clavicular heads.  The   enteric tube courses off the field of view in the upper abdomen.  The right   internal jugular line terminates at the distal SVC.  Shallow inflation. Cardiac silhouette enlargement again noted.  Perihilar and basilar opacities   are again noted without significant change.  No new airspace disease or   pneumothorax identified.         Impression   Unchanged appearance of support tubes and lines.       Perihilar and basilar opacities are without appreciable change which may   represent subsegmental atelectasis versus developing consolidation.             Medical Decision Making-Other: Thank you for allowing us to participate in the care of this patient. Please call with questions. Lori Kahn MD    ATTESTATION:    I have discussed the case, including pertinent history and exam findings with the residents. I have seen and examined the patient and the key elements of the encounter have been performed by me. I have reviewed the laboratory data, other diagnostic studies and discussed them with the residents. I have updated the medical record where necessary. I agree with the assessment, plan and orders as documented by the resident.     Maame Manning MD.

## 2019-03-30 NOTE — PROGRESS NOTES
Insert Arterial Line  Date/Time:  03/29/19, 8:43 PM  Performed by: Rula Villasenor    Patient identity confirmed: arm band and provided demographic data   Time out: Immediately prior to procedure a \"time out\" was called to verify the correct patient, procedure, equipment, support staff. Preparation: Patient was prepped and draped in the usual sterile fashion.     Location:right radial    Trent's test normal: yes  Needle gauge: 20     Number of attempts: 2  Post-procedure: transparent dressing applied and line secured    Patient tolerance: well

## 2019-03-30 NOTE — PLAN OF CARE
Problem: Risk for Impaired Skin Integrity  Goal: Tissue integrity - skin and mucous membranes  Description  Structural intactness and normal physiological function of skin and  mucous membranes.   3/30/2019 0121 by Antonio Crystal RN  Outcome: Ongoing  3/29/2019 1604 by Jcarlos Harper RN  Outcome: Ongoing     Problem: Confusion - Acute:  Goal: Absence of continued neurological deterioration signs and symptoms  Description  Absence of continued neurological deterioration signs and symptoms  3/30/2019 0121 by Antonio Crystal RN  Outcome: Ongoing  3/29/2019 1604 by Jcarlos Harper RN  Outcome: Ongoing  Goal: Mental status will be restored to baseline  Description  Mental status will be restored to baseline  3/30/2019 0121 by Antonio Crystal RN  Outcome: Ongoing  3/29/2019 1604 by Jcarlos Harper RN  Outcome: Ongoing     Problem: Discharge Planning:  Goal: Ability to perform activities of daily living will improve  Description  Ability to perform activities of daily living will improve  3/30/2019 0121 by Antonio Crystal RN  Outcome: Ongoing  3/29/2019 1604 by Jcarlos Harper RN  Outcome: Ongoing  Goal: Participates in care planning  Description  Participates in care planning  3/30/2019 0121 by Antonio Crystal RN  Outcome: Ongoing  3/29/2019 1604 by Jcarlos Harper RN  Outcome: Ongoing     Problem: Injury - Risk of, Physical Injury:  Goal: Absence of physical injury  Description  Absence of physical injury  3/30/2019 0121 by Antonio Crystal RN  Outcome: Ongoing  3/29/2019 1604 by Jcarlos Harper RN  Outcome: Ongoing  Goal: Will remain free from falls  Description  Will remain free from falls  3/30/2019 0121 by Antonio Crystal RN  Outcome: Ongoing  3/29/2019 1604 by Jcarlos Harper RN  Outcome: Ongoing     Problem: Mood - Altered:  Goal: Mood stable  Description  Mood stable  3/30/2019 0121 by Antonio Crystal RN  Outcome: Ongoing  3/29/2019 1604 by Jcarlos Harper RN  Outcome: Ongoing  Goal: Absence of abusive behavior  Description  Absence of abusive behavior  3/30/2019 0121 by Narinder Joseph RN  Outcome: Ongoing  3/29/2019 1604 by Carmella Gómez RN  Outcome: Ongoing  Goal: Verbalizations of feeling emotionally comfortable while being cared for will increase  Description  Verbalizations of feeling emotionally comfortable while being cared for will increase  3/30/2019 0121 by Narinder Joseph RN  Outcome: Ongoing  3/29/2019 1604 by Carmella Gómez RN  Outcome: Ongoing     Problem: Psychomotor Activity - Altered:  Goal: Absence of psychomotor disturbance signs and symptoms  Description  Absence of psychomotor disturbance signs and symptoms  3/30/2019 0121 by Narinder Joseph RN  Outcome: Ongoing  3/29/2019 1604 by Carmella Gómez RN  Outcome: Ongoing     Problem: Sensory Perception - Impaired:  Goal: Demonstrations of improved sensory functioning will increase  Description  Demonstrations of improved sensory functioning will increase  3/30/2019 0121 by Narinder Joseph RN  Outcome: Ongoing  3/29/2019 1604 by Carmella Gómez RN  Outcome: Ongoing  Goal: Decrease in sensory misperception frequency  Description  Decrease in sensory misperception frequency  3/30/2019 0121 by Narinder Joseph RN  Outcome: Ongoing  3/29/2019 1604 by Carmella Gómez RN  Outcome: Ongoing  Goal: Able to refrain from responding to false sensory perceptions  Description  Able to refrain from responding to false sensory perceptions  3/30/2019 0121 by Narinder Joseph RN  Outcome: Ongoing  3/29/2019 1604 by Carmella Góemz RN  Outcome: Ongoing  Goal: Demonstrates accurate environmental perceptions  Description  Demonstrates accurate environmental perceptions  3/30/2019 0121 by Narinder Joseph RN  Outcome: Ongoing  3/29/2019 1604 by Carmella Gómez RN  Outcome: Ongoing  Goal: Able to distinguish between reality-based and nonreality-based thinking  Description  Able to distinguish between reality-based and nonreality-based thinking  3/30/2019 0121 by Lane Arce RN  Outcome: Ongoing  3/29/2019 1604 by Katie Wright RN  Outcome: Ongoing  Goal: Able to interrupt nonreality-based thinking  Description  Able to interrupt nonreality-based thinking  3/30/2019 0121 by Lane Arce RN  Outcome: Ongoing  3/29/2019 1604 by Katie Wright RN  Outcome: Ongoing     Problem: Sleep Pattern Disturbance:  Goal: Appears well-rested  Description  Appears well-rested  3/30/2019 0121 by Lane Arce RN  Outcome: Ongoing  3/29/2019 1604 by Katie Wright RN  Outcome: Ongoing     Problem: Nutrition  Goal: Optimal nutrition therapy  3/30/2019 0121 by Lane Arce RN  Outcome: Ongoing  3/29/2019 1604 by Katie Wright RN  Outcome: Ongoing     Problem: OXYGENATION/RESPIRATORY FUNCTION  Goal: Patient will maintain patent airway  3/30/2019 0121 by Lane Arce RN  Outcome: Ongoing  3/29/2019 1604 by Katie Wright RN  Outcome: Ongoing  Goal: Patient will achieve/maintain normal respiratory rate/effort  Description  Respiratory rate and effort will be within normal limits for the patient  3/30/2019 0121 by Lane Arce RN  Outcome: Ongoing  3/29/2019 1604 by Katie Wright RN  Outcome: Ongoing     Problem: MECHANICAL VENTILATION  Goal: Patient will maintain patent airway  3/30/2019 0121 by Lane Arce RN  Outcome: Ongoing  3/29/2019 1604 by Katie Wright RN  Outcome: Ongoing  Goal: Oral health is maintained or improved  3/30/2019 0121 by Lane Arce RN  Outcome: Ongoing  3/29/2019 1604 by Katie Wright RN  Outcome: Ongoing  Goal: ET tube will be managed safely  3/30/2019 0121 by Lane Arce RN  Outcome: Ongoing  3/29/2019 1604 by Katie Wright RN  Outcome: Ongoing  Goal: Ability to express needs and understand communication  3/30/2019 0121 by Lane Arce RN  Outcome: Ongoing  3/29/2019 1604 by Katie Wright RN  Outcome: Ongoing  Goal: Mobility/activity is maintained at optimum level for patient  3/30/2019 0121 by Lázaro Lerner RN  Outcome: Ongoing  3/29/2019 1604 by Zeinab Sellers RN  Outcome: Ongoing     Problem: SKIN INTEGRITY  Goal: Skin integrity is maintained or improved  3/30/2019 0121 by Lázaro Lerner RN  Outcome: Ongoing  3/29/2019 1604 by Zeinab Sellers RN  Outcome: Ongoing     Problem: Musculor/Skeletal Functional Status  Goal: Highest potential functional level  3/30/2019 0121 by Lázaro Lerner RN  Outcome: Ongoing  3/29/2019 1604 by Zeinab Sellers RN  Outcome: Ongoing     Problem: Restraint Use - Nonviolent/Non-Self-Destructive Behavior:  Goal: Absence of restraint indications  Description  Absence of restraint indications  3/30/2019 0121 by Lázaro Lerner RN  Outcome: Not Met This Shift  3/29/2019 1604 by Zeinab Sellers RN  Outcome: Ongoing  Goal: Absence of restraint-related injury  Description  Absence of restraint-related injury  3/30/2019 0121 by Lázaro Lerner RN  Outcome: Not Met This Shift  3/29/2019 1604 by Zeinab Sellers RN  Outcome: Ongoing

## 2019-03-30 NOTE — PROGRESS NOTES
Nephrology Progress Note      Subjective:  Patient was seen and examined. Following for ERNESTO  Cr bit worse, 4.58 mg/dl today, BUN increased to 106  K 4.9 and Bicarb 23. Much less pressor support only on levophed, a-fib on amiodarone  U/o better not on diuretics 802 cc's last 24 hours. Currently making 50-75 cc/hr    Objective:  CURRENT TEMPERATURE:  Temp: 99 °F (37.2 °C)  MAXIMUM TEMPERATURE OVER 24HRS:  Temp (24hrs), Av.1 °F (37.3 °C), Min:98.8 °F (37.1 °C), Max:99.9 °F (37.7 °C)    CURRENT RESPIRATORY RATE:  Resp: 30  CURRENT PULSE:  Pulse: 94  CURRENT BLOOD PRESSURE:  BP: (!) 106/58  24HR BLOOD PRESSURE RANGE:  Systolic (56JAQ), PUO:135 , Min:101 , GSP:809   ; Diastolic (25DYB), NJH:00, Min:42, Max:75    24HR INTAKE/OUTPUT:      Intake/Output Summary (Last 24 hours) at 3/30/2019 0933  Last data filed at 3/30/2019 0800  Gross per 24 hour   Intake 2296.22 ml   Output 802 ml   Net 1494.22 ml     Patient Vitals for the past 96 hrs (Last 3 readings):   Weight   19 0400 (!) 304 lb 0.2 oz (137.9 kg)   19 0600 299 lb 13.2 oz (136 kg)   19 0400 280 lb 3.3 oz (127.1 kg)     Physical Exam:    General appearance: sedated on vent  Skin: warm and dry, no rash or erythema, no jaundice  Eyes: conjunctivae pale   ENT: : ETT in place    Neck: supple  Pulmonary: no wheezing or rhonchi. No rales heard.   Cardiovascular: Normal S1 & S2, irregular No S3 or  S4, no Pericardial Rub no Murmur   Abdomen: bowel sounds not heard , somewhat tense, no fluid wave  Extremities:no cyanosis, clubbing trace edema    Labs:   CBC:  Recent Labs     19  0432  19  2029  19  0612  19  1549 19  2208 19  0603   WBC 27.1*  --   --   --  45.3*  --   --   --  40.3*   RBC 2.46*  --   --   --  2.66*  --   --   --  2.28*   HGB 7.6*   < > 9.0*   < > 8.2*   < > 7.6* 7.4* 7.3*   HCT 23.9*   < > 25.6*   < > 23.7*   < > 22.0* 21.1* 21.3*   MCV 97.2  --   --   --  89.1  --   --   --  93.4   MCH 30.9  -- --   --  30.8  --   --   --  32.0   MCHC 31.8  --   --   --  34.6  --   --   --  34.3   RDW 13.8  --   --   --  14.7*  --   --   --  15.0*     --  228  --  217  --   --   --  201   MPV 10.9  --   --   --  11.3  --   --   --  11.1    < > = values in this interval not displayed. BMP:   Recent Labs     03/28/19 2029 03/29/19 0612 03/29/19  1841 03/30/19  0007 03/30/19  0603    136   < > 138 135 137   K 4.4 5.0   < > 5.2 4.7 4.9   CL 96* 97*   < > 98 94* 97*   CO2 21 23   < > 22 22 23   BUN 84* 87*  --   --   --  106*   CREATININE 3.21* 3.74*  --   --   --  4.58*   GLUCOSE 320* 146*  --   --   --  179*   CALCIUM 7.7* 7.5*  --   --   --  7.8*    < > = values in this interval not displayed.         Phosphorus:    Recent Labs     03/28/19  0432 03/29/19  0612 03/30/19  0603   PHOS 5.1* 5.4* 6.4*     Magnesium:   Recent Labs     03/28/19  0432 03/29/19  0612 03/30/19  0603   MG 2.5 2.8* 2.7*     Albumin:   Recent Labs     03/28/19  1025 03/29/19  0612 03/30/19  0603   LABALBU 2.9* 2.8* 2.8*       SPEP:   Lab Results   Component Value Date    PROT 5.1 03/30/2019    ALBCAL PENDING 03/29/2019    ALBPCT PENDING 03/29/2019    LABALPH PENDING 03/29/2019    LABALPH PENDING 03/29/2019    A1PCT PENDING 03/29/2019    A2PCT PENDING 03/29/2019    LABBETA PENDING 03/29/2019    BETAPCT PENDING 03/29/2019    GAMGLOB PENDING 03/29/2019    GGPCT PENDING 03/29/2019    PATH PENDING 03/29/2019     Urine Creatinine:    Lab Results   Component Value Date    LABCREA 111.9 03/29/2019       Urinalysis:  U/A:   Lab Results   Component Value Date    NITRU NEGATIVE 03/28/2019    COLORU DARK YELLOW 03/28/2019    PHUR 5.0 03/28/2019    WBCUA 5 TO 10 03/28/2019    RBCUA 5 TO 10 03/28/2019    MUCUS NOT REPORTED 03/28/2019    TRICHOMONAS NOT REPORTED 03/28/2019    YEAST NOT REPORTED 03/28/2019    BACTERIA NOT REPORTED 03/28/2019    SPECGRAV 1.024 03/28/2019    LEUKOCYTESUR TRACE 03/28/2019    UROBILINOGEN Normal 03/28/2019 BILIRUBINUR NEGATIVE  Verified by ictotest. 03/28/2019    GLUCOSEU TRACE 03/28/2019    KETUA TRACE 03/28/2019    AMORPHOUS NOT REPORTED 03/28/2019     CAT SCAN ;     1. Large left retroperitoneal hematoma extending from the posterior   hemidiaphragm to the upper pelvis.  Hematocrit effect with a hematoma   measuring maximally 9.3 cm in diameter and about 14 cm in length.  There is   also a small amount of upper abdominal ascites.  No free air. 2. Bilateral pleural effusions with dependent lower lobe atelectasis. 3. No evidence of bowel obstruction.  Diverticulosis with no acute features       Assessment:  1. ERNESTO secondary to ischemic ATN in face of hemorrhagic shock secondary to retroperitoneal bleed. Hgb stable, Cr worse (evolving) K 4.9, U/O improving not on diuretics. 2. Recent influenza A infection with PNA  3. Hx of hypertension. Currently on only one pressor. 4. Chronic AFib   5. VDRF. 6. Cardiology considering GUSTAVO/CV prior to discharge  7. Azotemia +ve steroids. Plan:  1. No need for CRRT at this point, suspect if remains hemodynamically stable, renal function will improve from this point, along with improved urine outpoint. 2. F/u pending urine studies  3. Wean off pressors. 4. Risk (including SCD) and benefits of dialysis were discussed with pts family in detail yesterday. They were agreeable if need be. 5. Follow up lytes q6 for another day. 6. Lasix 60mg IV x 1.   7. Following   Thank you for the consultation. Please do not hesitate to call with questions. Electronically signed by DENNY Morrison on 3/30/2019 at 9:33 AM       Attending Physician Statement  I have discussed the care of Kimberly Jacobo, including pertinent history and exam findings,  with the Fellow/Resident/CNP. I have reviewed the key elements of all parts of the encounter with the Fellow/Resident/CNP. I agree with the assessment, plan and orders as documented by the Fellow/Resident/CNP. Deangelo Mooney,

## 2019-03-30 NOTE — PROGRESS NOTES
INTENSIVE CARE UNIT  Resident Physician Progress Note    Patient - Zev Holman  Date of Admission -  3/19/2019  7:58 AM  Date of Evaluation -  3/30/2019  Room and Bed Number -  0105/0105-01   Hospital Day - 6    Chief complaint influenza, pneumonia, A. fib with RVR  SUBJECTIVE:     OVERNIGHT EVENTS: Patient hemoglobin 7.3 today. Did not need any transfusions overnight. On amiodarone drip. Off Masoud and Vaso. Only on Levophed. WBC trending down. Vancomycin and Zosyn were discontinued yesterday and started on Rocephin. Continues to be intubated. On insulin drip. Vascular surgery signed off.     AWAKE & FOLLOWING COMMANDS:  [] No   [x] Yes, gets agitated     SECRETIONS Amount:  [x] Small [] Moderate  [] Large  [] None  Color:     [x] White [] Colored  [x] Bloody    SEDATION:  RAAS Score:  [] Propofol gtt  [x] Versed gtt  [] Ativan gtt   [] No Sedation    PARALYZED:  [x] No    [] Yes    VASOPRESSORS:  [] No    [x] Yes  [x] Levophed [] Dopamine [] Vasopressin  [] Dobutamine [] Phenylephrine [] Epinephrine    Review of Systems -  Intubated , sedated  OBJECTIVE:     VITAL SIGNS:  BP (!) 99/52   Pulse 87   Temp 99 °F (37.2 °C) (Oral)   Resp 29   Ht 5' 8\" (1.727 m)   Wt (!) 304 lb 0.2 oz (137.9 kg)   SpO2 98%   BMI 46.23 kg/m²   Tmax over 24 hours:  Temp (24hrs), Av.1 °F (37.3 °C), Min:98.8 °F (37.1 °C), Max:99.9 °F (37.7 °C)      Patient Vitals for the past 8 hrs:   BP Temp Temp src Pulse Resp SpO2   19 1500 (!) 99/52 -- -- 87 29 98 %   19 1445 -- -- -- 87 28 98 %   19 1430 -- -- -- 90 30 98 %   19 1415 -- -- -- 89 30 98 %   19 1400 (!) 108/53 -- -- 87 29 99 %   19 1345 -- -- -- 86 28 98 %   19 1330 -- -- -- 87 28 98 %   19 1315 -- -- -- 91 28 98 %   19 1300 (!) 104/52 -- -- 91 29 98 %   19 1245 -- -- -- 95 (!) 31 97 %   19 1230 -- -- -- 97 30 97 %   19 1215 -- -- -- 94 30 99 %   19 1200 (!) 100/55 99 °F (37.2 °C) Oral 93 29 99 % no murmur   · Abdomen: soft, non-tender; bowel sounds normal; no masses,  no organomegaly  · Extremities: No cyanosis or edema  · Neurological:  Intubated sedated , opens eyes on pain stimuli, moves all extremities but gets agitated    MEDICATIONS:  Scheduled Meds:   cefTRIAXone (ROCEPHIN) IV  2 g Intravenous Q24H    hydrocortisone sodium succinate PF  100 mg Intravenous Q8H    pantoprazole  40 mg Intravenous Daily    And    sodium chloride (PF)  10 mL Intravenous Daily    docusate  100 mg Oral Daily    sodium chloride flush  10 mL Intravenous 2 times per day     Continuous Infusions:   norepinephrine 20 mcg/min (03/30/19 1500)    insulin (HUMAN R) non-weight based infusion 4.15 Units/hr (03/30/19 1515)    EPINEPHrine infusion Stopped (03/29/19 0005)    phenylephrine (KHADRA-SYNEPHRINE) 50mg/250mL infusion Stopped (03/30/19 0159)    vasopressin (Septic Shock) infusion Stopped (03/30/19 0603)    amiodarone 450mg/250ml D5W infusion 0.5 mg/min (03/30/19 0257)    sodium chloride 10 mL/hr at 03/30/19 1103    midazolam 2 mg/hr (03/30/19 0927)    dextrose       PRN Meds:     oxyCODONE 5 mg Q4H PRN   acetaminophen 650 mg Q4H PRN   magnesium hydroxide 30 mL Daily PRN   albuterol 2.5 mg Q6H PRN   LORazepam 0.5 mg Q8H PRN   sodium chloride flush 10 mL PRN   ondansetron 4 mg Q6H PRN   glucose 15 g PRN   dextrose 12.5 g PRN   glucagon (rDNA) 1 mg PRN   dextrose 100 mL/hr PRN       SUPPORT DEVICES: [x] Ventilator [] BIPAP  [] Nasal Cannula [] Room Air  .   Lab Results   Component Value Date    PHART 7.310 03/18/2019    XXF6RPM 51.7 03/18/2019    PO2ART 67.8 03/18/2019    UOT3RTI 25.4 03/18/2019    OWS3RIZ 28 03/30/2019    P8IIYPLN 91.7 03/18/2019    FIO2 65.0 03/30/2019     DATA:  Complete Blood Count:   Recent Labs     03/28/19  0432  03/28/19  2029  03/29/19  0612  03/29/19  1549 03/29/19 2208 03/30/19  0603   WBC 27.1*  --   --   --  45.3*  --   --   --  40.3*   RBC 2.46*  --   --   --  2.66*  --   --   --  2.28* HGB 7.6*   < > 9.0*   < > 8.2*   < > 7.6* 7.4* 7.3*   HCT 23.9*   < > 25.6*   < > 23.7*   < > 22.0* 21.1* 21.3*   MCV 97.2  --   --   --  89.1  --   --   --  93.4   MCH 30.9  --   --   --  30.8  --   --   --  32.0   MCHC 31.8  --   --   --  34.6  --   --   --  34.3   RDW 13.8  --   --   --  14.7*  --   --   --  15.0*     --  228  --  217  --   --   --  201   MPV 10.9  --   --   --  11.3  --   --   --  11.1    < > = values in this interval not displayed. Last 3 Blood Glucose:   Recent Labs     03/28/19  0432 03/28/19 2029 03/29/19  0612 03/30/19  0603   GLUCOSE 383* 320* 146* 179*        PT/INR:    Lab Results   Component Value Date    PROTIME 12.5 03/29/2019    INR 1.2 03/29/2019     PTT:    Lab Results   Component Value Date    APTT 39.4 03/28/2019       Comprehensive Metabolic Profile:   Recent Labs     03/28/19  1025 03/28/19 2029 03/29/19  0612  03/29/19  1308 03/29/19  1841 03/30/19  0007 03/30/19  0603   NA  --  135 136   < >  --  138 135 137   K  --  4.4 5.0   < >  --  5.2 4.7 4.9   CL  --  96* 97*   < >  --  98 94* 97*   CO2  --  21 23   < >  --  22 22 23   BUN  --  84* 87*  --   --   --   --  106*   CREATININE  --  3.21* 3.74*  --   --   --   --  4.58*   GLUCOSE  --  320* 146*  --   --   --   --  179*   CALCIUM  --  7.7* 7.5*  --   --   --   --  7.8*   PROT 5.3*  --  5.0*  --  4.6*  --   --  5.1*   LABALBU 2.9*  --  2.8*  --   --   --   --  2.8*   BILITOT 0.46  --  0.65  --   --   --   --  0.67   ALKPHOS 68  --  62  --   --   --   --  75   AST 63*  --  627*  --   --   --   --  318*   ALT 60*  --  563*  --   --   --   --  571*    < > = values in this interval not displayed.       Magnesium:   Lab Results   Component Value Date    MG 2.7 03/30/2019    MG 2.8 03/29/2019    MG 2.5 03/28/2019     Phosphorus:   Lab Results   Component Value Date    PHOS 6.4 03/30/2019    PHOS 5.4 03/29/2019    PHOS 5.1 03/28/2019     Ionized Calcium:   Lab Results   Component Value Date    CAION 0.98 03/30/2019 03/18/2019    NSTEMI (non-ST elevated myocardial infarction) ([facility] [place] [facility]     Type 2 diabetes mellitus without complication, without long-term current use of insulin ([facility] [place] [facility] 09/06/2016    Adult BMI > 30 05/06/2016    CAD (coronary artery disease)     DM type 2 (diabetes mellitus, type 2) (HCC)     Atrial fibrillation with rapid ventricular response ([facility] [place] [facility] 01/01/2007        PLAN:     WEAN PER PROTOCOL:  [] No   [x] Yes  [] N/A    ICU PROPHYLAXIS:  Stress ulcer:  [] PPI Agent  [x] [id number] [] Sucralfate  [] Other:  VTE:   [] Enoxaparin  [x] he is on heparin GTT  [] EPC Cuffs    NUTRITION:  [] NPO [x] Tube Feeding (Specify: ) [] TPN  [] PO    HOME MEDS RECONCILED: [x] No  [] Yes    CONSULTATION NEEDED:  [x] No  [] Yes    FAMILY UPDATED:    [] No  [x] Yes    TRANSFER OUT OF ICU:   [x] No  [] Yes    Additional Assessment:  Influenza A  Community acquired pneumonia with likely strep pneumoniae  Atrial fibrillation with RVRPersistent   Acute exacerbation of CHF sec due to atrial fibrillationPersistent   Acute  hypoxic hypercapnic respiratory failureDue to pulmonary edema, persistent without any significant change   Diabetes mellitusType II with hyperglycemia   Obstructive sleep apnea    Plan:  Continue mechanical ventilation, keep SPO2 more than 92%  Weaning trial daily  On Versed, daily sedation holiday   Breathing treatment with DuoNeb  Monitor urine output and total intake  Replace electrolytes as appropriate. Continue on TF  Pepcid for GI Px  Milk of mag and Colace as a bowel regimen   CT abdomen showed retroperitoneal bleed. Vascular signed off. Currently no active intervention recommended. Monitor H&H. .  - Temp 99  - LFT trending down  - Infectious disease consulted. Started on Rocephin. Respiratory culture showed gram-positive cocci in clusters. - WBC trending down. - On insulin drip. - Continue amiodarone drip. - Trend lactic acid for another 2 occurrences.   - Hydrocortisone 100 MG every 8.  - Nephrology on board. Appreciate recommendations. On I's and O's. Chemo Rai MD      Department of Internal Medicine  [facility]         3/30/2019, 3:26 PM       Attending Physician Statement  I have discussed the care of Fiona Vidales, including pertinent history and exam findings with the resident. I have reviewed the key elements of all parts of the encounter with the resident. I have seen and examined the patient with the resident. I agree with the assessment and plan and status of the problem list as documented. I have seen the patient during round today, I have reviewed the chart, medications seen he was from yesterday seen and arterial blood gases and ventilator setting reviewed. Although his creatinine is elevated but his urine output is better especially since this morning he is putting out 30-60 mL an hour. Creatinine is 4.58 today and BUNs 106  He is off vasopressin and Masoud-Synephrine and on Levophed and blood pressure is improving. He is continued on amiodarone drip. WBC count remains elevated at 40, hemoglobin is stable at 7.3, abdomen is soft positive distention but less distended now. ALT is is stable as compared to yesterday although AST is improving. On ventilator SPO2/FiO2 ratio is worsening and he required more PEEP his PEEP on ventilator today was 12 and FiO2 was 65%, his chest x-ray did not show much change although it is of edema and infiltrates/atelectasis seen. He was started on Rocephin by infectious disease yesterday, initially when he was admitted he finished a course of Rocephin for community acquired pneumonia. He is on hydrocortisone at this time which could be contributing to leukocytosis. Will continue on hydrocortisone from tomorrow. Continue with insulin drip. We will decrease lactic acid frequency and will recheck lactic acid later today. Continue with amiodarone drip.   Continue with ventilator setting and will try to wean FiO2 and PEEP.  We'll check venous Dopplers of the legs. Not a candidate for anticoagulation at this time. Total critical care time caring for this patient with life threatening, unstable organ failure, including direct patient contact, management of life support systems, review of data including imaging and labs, discussions with other team members and physicians at least 27  Min so far today, excluding procedures. Amy Shen MD  3/30/2019 4:29 PM    Please note that this chart was generated using voice recognition Dragon dictation software. Although every effort was made to ensure the accuracy of this automated transcription, some errors in transcription may have occurred.

## 2019-03-31 ENCOUNTER — APPOINTMENT (OUTPATIENT)
Dept: GENERAL RADIOLOGY | Age: 74
DRG: 207 | End: 2019-03-31
Attending: INTERNAL MEDICINE
Payer: MEDICARE

## 2019-03-31 ENCOUNTER — APPOINTMENT (OUTPATIENT)
Dept: ULTRASOUND IMAGING | Age: 74
DRG: 207 | End: 2019-03-31
Attending: INTERNAL MEDICINE
Payer: MEDICARE

## 2019-03-31 LAB
ABSOLUTE EOS #: 0 K/UL (ref 0–0.4)
ABSOLUTE IMMATURE GRANULOCYTE: 0.65 K/UL (ref 0–0.3)
ABSOLUTE LYMPH #: 2.28 K/UL (ref 1–4.8)
ABSOLUTE MONO #: 2.93 K/UL (ref 0.1–0.8)
ALBUMIN SERPL-MCNC: 2.6 G/DL (ref 3.5–5.2)
ALBUMIN/GLOBULIN RATIO: 1.1 (ref 1–2.5)
ALLEN TEST: ABNORMAL
ALP BLD-CCNC: 81 U/L (ref 40–129)
ALT SERPL-CCNC: 511 U/L (ref 5–41)
ANION GAP SERPL CALCULATED.3IONS-SCNC: 17 MMOL/L (ref 9–17)
AST SERPL-CCNC: 199 U/L
BASOPHILS # BLD: 0 % (ref 0–2)
BASOPHILS ABSOLUTE: 0 K/UL (ref 0–0.2)
BILIRUB SERPL-MCNC: 0.73 MG/DL (ref 0.3–1.2)
BILIRUBIN DIRECT: 0.48 MG/DL
BILIRUBIN, INDIRECT: 0.25 MG/DL (ref 0–1)
BUN BLDV-MCNC: 125 MG/DL (ref 8–23)
BUN/CREAT BLD: ABNORMAL (ref 9–20)
CALCIUM IONIZED: 1 MMOL/L (ref 1.13–1.33)
CALCIUM SERPL-MCNC: 7.6 MG/DL (ref 8.6–10.4)
CHLORIDE BLD-SCNC: 96 MMOL/L (ref 98–107)
CO2: 22 MMOL/L (ref 20–31)
CREAT SERPL-MCNC: 4.53 MG/DL (ref 0.7–1.2)
DIFFERENTIAL TYPE: ABNORMAL
EOSINOPHILS RELATIVE PERCENT: 0 % (ref 1–4)
FIO2: 40
GFR AFRICAN AMERICAN: 16 ML/MIN
GFR NON-AFRICAN AMERICAN: 13 ML/MIN
GFR SERPL CREATININE-BSD FRML MDRD: ABNORMAL ML/MIN/{1.73_M2}
GFR SERPL CREATININE-BSD FRML MDRD: ABNORMAL ML/MIN/{1.73_M2}
GLOBULIN: ABNORMAL G/DL (ref 1.5–3.8)
GLUCOSE BLD-MCNC: 125 MG/DL (ref 75–110)
GLUCOSE BLD-MCNC: 131 MG/DL (ref 75–110)
GLUCOSE BLD-MCNC: 138 MG/DL (ref 75–110)
GLUCOSE BLD-MCNC: 157 MG/DL (ref 75–110)
GLUCOSE BLD-MCNC: 158 MG/DL (ref 75–110)
GLUCOSE BLD-MCNC: 163 MG/DL (ref 70–99)
GLUCOSE BLD-MCNC: 164 MG/DL (ref 74–100)
GLUCOSE BLD-MCNC: 164 MG/DL (ref 75–110)
GLUCOSE BLD-MCNC: 166 MG/DL (ref 75–110)
GLUCOSE BLD-MCNC: 166 MG/DL (ref 75–110)
GLUCOSE BLD-MCNC: 178 MG/DL (ref 75–110)
GLUCOSE BLD-MCNC: 182 MG/DL (ref 75–110)
GLUCOSE BLD-MCNC: 188 MG/DL (ref 75–110)
GLUCOSE BLD-MCNC: 197 MG/DL (ref 75–110)
HCT VFR BLD CALC: 20.9 % (ref 40.7–50.3)
HCT VFR BLD CALC: 21.1 % (ref 40.7–50.3)
HCT VFR BLD CALC: 21.7 % (ref 40.7–50.3)
HCT VFR BLD CALC: 21.9 % (ref 40.7–50.3)
HEMOGLOBIN: 7 G/DL (ref 13–17)
HEMOGLOBIN: 7.1 G/DL (ref 13–17)
HEMOGLOBIN: 7.2 G/DL (ref 13–17)
HEMOGLOBIN: 7.5 G/DL (ref 13–17)
IMMATURE GRANULOCYTES: 2 %
LYMPHOCYTES # BLD: 7 % (ref 24–44)
MAGNESIUM: 2.9 MG/DL (ref 1.6–2.6)
MCH RBC QN AUTO: 30.6 PG (ref 25.2–33.5)
MCHC RBC AUTO-ENTMCNC: 32.7 G/DL (ref 28.4–34.8)
MCV RBC AUTO: 93.5 FL (ref 82.6–102.9)
MODE: ABNORMAL
MONOCYTES # BLD: 9 % (ref 1–7)
MORPHOLOGY: ABNORMAL
MORPHOLOGY: ABNORMAL
NEGATIVE BASE EXCESS, ART: ABNORMAL (ref 0–2)
NRBC AUTOMATED: 2.7 PER 100 WBC
NUCLEATED RED BLOOD CELLS: 4 PER 100 WBC
O2 DEVICE/FLOW/%: ABNORMAL
PATIENT TEMP: ABNORMAL
PDW BLD-RTO: 14.6 % (ref 11.8–14.4)
PHOSPHORUS: 6.9 MG/DL (ref 2.5–4.5)
PLATELET # BLD: 146 K/UL (ref 138–453)
PLATELET ESTIMATE: ABNORMAL
PMV BLD AUTO: 11 FL (ref 8.1–13.5)
POC HCO3: 24.7 MMOL/L (ref 21–28)
POC O2 SATURATION: 93 % (ref 94–98)
POC PCO2 TEMP: ABNORMAL MM HG
POC PCO2: 33 MM HG (ref 35–48)
POC PH TEMP: ABNORMAL
POC PH: 7.48 (ref 7.35–7.45)
POC PO2 TEMP: ABNORMAL MM HG
POC PO2: 60.8 MM HG (ref 83–108)
POSITIVE BASE EXCESS, ART: 1 (ref 0–3)
POTASSIUM SERPL-SCNC: 4.4 MMOL/L (ref 3.7–5.3)
RBC # BLD: 2.32 M/UL (ref 4.21–5.77)
RBC # BLD: ABNORMAL 10*6/UL
SAMPLE SITE: ABNORMAL
SEG NEUTROPHILS: 82 % (ref 36–66)
SEGMENTED NEUTROPHILS ABSOLUTE COUNT: 26.74 K/UL (ref 1.8–7.7)
SODIUM BLD-SCNC: 135 MMOL/L (ref 135–144)
TCO2 (CALC), ART: 26 MMOL/L (ref 22–29)
TOTAL PROTEIN: 4.9 G/DL (ref 6.4–8.3)
WBC # BLD: 32.6 K/UL (ref 3.5–11.3)
WBC # BLD: ABNORMAL 10*3/UL

## 2019-03-31 PROCEDURE — 6360000002 HC RX W HCPCS: Performed by: STUDENT IN AN ORGANIZED HEALTH CARE EDUCATION/TRAINING PROGRAM

## 2019-03-31 PROCEDURE — 6360000002 HC RX W HCPCS: Performed by: INTERNAL MEDICINE

## 2019-03-31 PROCEDURE — 76705 ECHO EXAM OF ABDOMEN: CPT

## 2019-03-31 PROCEDURE — 85025 COMPLETE CBC W/AUTO DIFF WBC: CPT

## 2019-03-31 PROCEDURE — 2580000003 HC RX 258: Performed by: STUDENT IN AN ORGANIZED HEALTH CARE EDUCATION/TRAINING PROGRAM

## 2019-03-31 PROCEDURE — 2580000003 HC RX 258: Performed by: EMERGENCY MEDICINE

## 2019-03-31 PROCEDURE — 99233 SBSQ HOSP IP/OBS HIGH 50: CPT | Performed by: NURSE PRACTITIONER

## 2019-03-31 PROCEDURE — 6370000000 HC RX 637 (ALT 250 FOR IP): Performed by: STUDENT IN AN ORGANIZED HEALTH CARE EDUCATION/TRAINING PROGRAM

## 2019-03-31 PROCEDURE — 6370000000 HC RX 637 (ALT 250 FOR IP): Performed by: EMERGENCY MEDICINE

## 2019-03-31 PROCEDURE — 2000000000 HC ICU R&B

## 2019-03-31 PROCEDURE — 94762 N-INVAS EAR/PLS OXIMTRY CONT: CPT

## 2019-03-31 PROCEDURE — 85018 HEMOGLOBIN: CPT

## 2019-03-31 PROCEDURE — 2700000000 HC OXYGEN THERAPY PER DAY

## 2019-03-31 PROCEDURE — 83735 ASSAY OF MAGNESIUM: CPT

## 2019-03-31 PROCEDURE — 99291 CRITICAL CARE FIRST HOUR: CPT | Performed by: INTERNAL MEDICINE

## 2019-03-31 PROCEDURE — 82803 BLOOD GASES ANY COMBINATION: CPT

## 2019-03-31 PROCEDURE — C9113 INJ PANTOPRAZOLE SODIUM, VIA: HCPCS | Performed by: STUDENT IN AN ORGANIZED HEALTH CARE EDUCATION/TRAINING PROGRAM

## 2019-03-31 PROCEDURE — 82330 ASSAY OF CALCIUM: CPT

## 2019-03-31 PROCEDURE — 94770 HC ETCO2 MONITOR DAILY: CPT

## 2019-03-31 PROCEDURE — 71045 X-RAY EXAM CHEST 1 VIEW: CPT

## 2019-03-31 PROCEDURE — 80048 BASIC METABOLIC PNL TOTAL CA: CPT

## 2019-03-31 PROCEDURE — 80076 HEPATIC FUNCTION PANEL: CPT

## 2019-03-31 PROCEDURE — 85014 HEMATOCRIT: CPT

## 2019-03-31 PROCEDURE — 2580000003 HC RX 258: Performed by: INTERNAL MEDICINE

## 2019-03-31 PROCEDURE — 84100 ASSAY OF PHOSPHORUS: CPT

## 2019-03-31 PROCEDURE — 94003 VENT MGMT INPAT SUBQ DAY: CPT

## 2019-03-31 PROCEDURE — 6360000002 HC RX W HCPCS: Performed by: NURSE PRACTITIONER

## 2019-03-31 RX ORDER — FENTANYL CITRATE 50 UG/ML
25 INJECTION, SOLUTION INTRAMUSCULAR; INTRAVENOUS
Status: DISCONTINUED | OUTPATIENT
Start: 2019-03-31 | End: 2019-04-02

## 2019-03-31 RX ORDER — FUROSEMIDE 10 MG/ML
40 INJECTION INTRAMUSCULAR; INTRAVENOUS ONCE
Status: COMPLETED | OUTPATIENT
Start: 2019-03-31 | End: 2019-03-31

## 2019-03-31 RX ORDER — FUROSEMIDE 10 MG/ML
40 INJECTION INTRAMUSCULAR; INTRAVENOUS DAILY
Status: DISCONTINUED | OUTPATIENT
Start: 2019-03-31 | End: 2019-04-04

## 2019-03-31 RX ORDER — PANTOPRAZOLE SODIUM 40 MG/10ML
40 INJECTION, POWDER, LYOPHILIZED, FOR SOLUTION INTRAVENOUS 2 TIMES DAILY
Status: DISCONTINUED | OUTPATIENT
Start: 2019-03-31 | End: 2019-04-02

## 2019-03-31 RX ORDER — 0.9 % SODIUM CHLORIDE 0.9 %
10 VIAL (ML) INJECTION DAILY
Status: DISCONTINUED | OUTPATIENT
Start: 2019-04-01 | End: 2019-04-02

## 2019-03-31 RX ORDER — FENTANYL CITRATE 50 UG/ML
50 INJECTION, SOLUTION INTRAMUSCULAR; INTRAVENOUS
Status: DISCONTINUED | OUTPATIENT
Start: 2019-03-31 | End: 2019-04-02

## 2019-03-31 RX ORDER — FENTANYL CITRATE 50 UG/ML
50 INJECTION, SOLUTION INTRAMUSCULAR; INTRAVENOUS ONCE
Status: COMPLETED | OUTPATIENT
Start: 2019-03-31 | End: 2019-03-31

## 2019-03-31 RX ADMIN — FUROSEMIDE 40 MG: 10 INJECTION, SOLUTION INTRAMUSCULAR; INTRAVENOUS at 22:39

## 2019-03-31 RX ADMIN — FENTANYL CITRATE 50 MCG: 50 INJECTION, SOLUTION INTRAMUSCULAR; INTRAVENOUS at 22:45

## 2019-03-31 RX ADMIN — SODIUM CHLORIDE 1.94 UNITS/HR: 9 INJECTION, SOLUTION INTRAVENOUS at 03:11

## 2019-03-31 RX ADMIN — Medication 10 ML: at 08:15

## 2019-03-31 RX ADMIN — Medication 10 ML: at 22:40

## 2019-03-31 RX ADMIN — AMIODARONE HYDROCHLORIDE 1 MG/MIN: 50 INJECTION, SOLUTION INTRAVENOUS at 09:08

## 2019-03-31 RX ADMIN — PANTOPRAZOLE SODIUM 40 MG: 40 INJECTION, POWDER, FOR SOLUTION INTRAVENOUS at 22:38

## 2019-03-31 RX ADMIN — FUROSEMIDE 40 MG: 10 INJECTION, SOLUTION INTRAMUSCULAR; INTRAVENOUS at 11:06

## 2019-03-31 RX ADMIN — AMIODARONE HYDROCHLORIDE 1 MG/MIN: 50 INJECTION, SOLUTION INTRAVENOUS at 18:29

## 2019-03-31 RX ADMIN — FENTANYL CITRATE 50 MCG: 50 INJECTION, SOLUTION INTRAMUSCULAR; INTRAVENOUS at 05:43

## 2019-03-31 RX ADMIN — Medication 10 ML: at 08:14

## 2019-03-31 RX ADMIN — PANTOPRAZOLE SODIUM 40 MG: 40 INJECTION, POWDER, FOR SOLUTION INTRAVENOUS at 08:14

## 2019-03-31 RX ADMIN — HYDROCORTISONE SODIUM SUCCINATE 100 MG: 100 INJECTION, POWDER, FOR SOLUTION INTRAMUSCULAR; INTRAVENOUS at 20:33

## 2019-03-31 RX ADMIN — CALCIUM GLUCONATE 2 G: 98 INJECTION, SOLUTION INTRAVENOUS at 06:49

## 2019-03-31 RX ADMIN — CEFTRIAXONE SODIUM 2 G: 2 INJECTION, POWDER, FOR SOLUTION INTRAMUSCULAR; INTRAVENOUS at 20:35

## 2019-03-31 RX ADMIN — HYDROCORTISONE SODIUM SUCCINATE 100 MG: 100 INJECTION, POWDER, FOR SOLUTION INTRAMUSCULAR; INTRAVENOUS at 08:13

## 2019-03-31 RX ADMIN — Medication 100 MG: at 08:14

## 2019-03-31 RX ADMIN — FENTANYL CITRATE 50 MCG: 50 INJECTION, SOLUTION INTRAMUSCULAR; INTRAVENOUS at 20:30

## 2019-03-31 ASSESSMENT — PULMONARY FUNCTION TESTS
PIF_VALUE: 22
PIF_VALUE: 27
PIF_VALUE: 22
PIF_VALUE: 26
PIF_VALUE: 22
PIF_VALUE: 24
PIF_VALUE: 22
PIF_VALUE: 23
PIF_VALUE: 24
PIF_VALUE: 23
PIF_VALUE: 26
PIF_VALUE: 23
PIF_VALUE: 24
PIF_VALUE: 25
PIF_VALUE: 23
PIF_VALUE: 23

## 2019-03-31 NOTE — PLAN OF CARE
Problem: OXYGENATION/RESPIRATORY FUNCTION  Goal: Patient will maintain patent airway  3/31/2019 1100 by Tanika Keith RCP  Outcome: Ongoing  3/31/2019 0701 by Lizzie Niño RN  Problem: MECHANICAL VENTILATION  Goal: Patient will maintain patent airway  3/31/2019 1100 by Tanika Keith RCP  Outcome: Ongoing     Problem: MECHANICAL VENTILATION  Goal: Oral health is maintained or improved  3/31/2019 1100 by Tanika Keith RCP  Outcome: Ongoing     Problem: MECHANICAL VENTILATION  Goal: ET tube will be managed safely  3/31/2019 1100 by Tanika Keith RCP  Outcome: Ongoing     Problem: MECHANICAL VENTILATION  Goal: Ability to express needs and understand communication  3/31/2019 1100 by Tanika Keith RCP  Outcome: Ongoing     Problem: MECHANICAL VENTILATION  Goal: Mobility/activity is maintained at optimum level for patient  3/31/2019 1100 by Tanika Keith RCP  Outcome: Ongoing     Problem: SKIN INTEGRITY  Goal: Skin integrity is maintained or improved  3/31/2019 1100 by Tanika Keith RCP  Outcome: Ongoing

## 2019-03-31 NOTE — PROGRESS NOTES
Nephrology Progress Note      Subjective:  Patient was seen and examined. Following for ERNESTO  Cr palteau, 4.53 mg/dl today, BUN increased to 125  K 4.4 and Bicarb 22.  on levophed, a-fib on amiodarone, rate controlled  U/o better after Lasix 60mg IV x one yesterday  out last 24 hours. Currently making 50cc/hr.   + 9.2L since admission    Objective:  CURRENT TEMPERATURE:  Temp: 98.1 °F (36.7 °C)  MAXIMUM TEMPERATURE OVER 24HRS:  Temp (24hrs), Av.9 °F (36.6 °C), Min:97.3 °F (36.3 °C), Max:99 °F (37.2 °C)    CURRENT RESPIRATORY RATE:  Resp: 26  CURRENT PULSE:  Pulse: 81  CURRENT BLOOD PRESSURE:  BP: (!) 112/50  24HR BLOOD PRESSURE RANGE:  Systolic (68MWU), GDK:602 , Min:99 , MFK:993   ; Diastolic (17FUL), UCA:11, Min:46, Max:64    24HR INTAKE/OUTPUT:      Intake/Output Summary (Last 24 hours) at 3/31/2019 1030  Last data filed at 3/31/2019 1000  Gross per 24 hour   Intake 1513.18 ml   Output 2230 ml   Net -716.82 ml     Patient Vitals for the past 96 hrs (Last 3 readings):   Weight   19 0400 (!) 304 lb 0.2 oz (137.9 kg)   19 0600 299 lb 13.2 oz (136 kg)   19 0400 280 lb 3.3 oz (127.1 kg)     Physical Exam:    General appearance: sedated on vent  Skin: warm and dry, no rash or erythema, no jaundice  Eyes: conjunctivae pale   ENT: : ETT in place    Neck: supple  Pulmonary: no wheezing or rhonchi. No rales heard.   Cardiovascular: Normal S1 & S2, irregular No S3 or  S4, no Pericardial Rub no Murmur   Abdomen: bowel sounds not heard , somewhat tense, no fluid wave  Extremities:no cyanosis, clubbing trace edema    Labs:   CBC:  Recent Labs     19  0612  19  0603  19  0007 19  0411 19  0836   WBC 45.3*  --  40.3*  --   --  32.6*  --    RBC 2.66*  --  2.28*  --   --  2.32*  --    HGB 8.2*   < > 7.3*   < > 7.5* 7.1* 7.2*   HCT 23.7*   < > 21.3*   < > 21.9* 21.7* 20.9*   MCV 89.1  --  93.4  --   --  93.5  --    MCH 30.8  --  32.0  --   --  30.6  --    MCHC 34.6  -- CAT SCAN ;     1. Large left retroperitoneal hematoma extending from the posterior   hemidiaphragm to the upper pelvis.  Hematocrit effect with a hematoma   measuring maximally 9.3 cm in diameter and about 14 cm in length.  There is   also a small amount of upper abdominal ascites.  No free air. 2. Bilateral pleural effusions with dependent lower lobe atelectasis. 3. No evidence of bowel obstruction.  Diverticulosis with no acute features       Assessment:  1. ERNESTO secondary to ischemic ATN in face of hemorrhagic shock secondary to retroperitoneal bleed. Hgb stable, Cr plateau K 4.4, U/O improving on intermittent diuretics. 2. Recent influenza A infection with PNA  3. Hx of hypertension. Currently on only one pressor. 4. Chronic AFib   5. VDRF. 6. Cardiology considering GUSTAVO/CV prior to discharge  7. Azotemia +ve steroids. Plan:  1. No need for CRRT at this point, suspect if remains hemodynamically stable, renal function will improve from this point, along with improved urine outpoint. 2. F/u pending urine studies  3. Wean off pressors. 4. Risk (including SCD) and benefits of dialysis were discussed with pts family in detail Friday. They were agreeable if need be. 5. Follow up lytes q12  6. Lasix 40mg IV x 1.   7. Following   Thank you for the consultation. Please do not hesitate to call with questions. Will discuss with Dr. Marissa Diaz.      Electronically signed by DENNY Panda on 3/31/2019 at 10:30 AM

## 2019-03-31 NOTE — PROGRESS NOTES
No Sedation    PARALYZED:  [x] No    [] Yes    VASOPRESSORS:  [] No    [x] Yes  [x] Levophed [] Dopamine [] Vasopressin  [] Dobutamine [] Phenylephrine [] Epinephrine    Review of Systems -  Intubated , sedated  OBJECTIVE:     VITAL SIGNS:  BP (!) 105/55   Pulse 86   Temp 98.1 °F (36.7 °C)   Resp 30   Ht 5' 8\" (1.727 m)   Wt (!) 304 lb 0.2 oz (137.9 kg)   SpO2 93%   BMI 46.23 kg/m²   Tmax over 24 hours:  Temp (24hrs), Av.8 °F (36.6 °C), Min:97.3 °F (36.3 °C), Max:98.2 °F (36.8 °C)      Patient Vitals for the past 8 hrs:   BP Temp Pulse Resp SpO2   19 1400 (!) 105/55 -- 86 30 93 %   19 1300 (!) 103/50 -- 83 (!) 31 93 %   19 1200 (!) 113/58 -- 86 30 93 %   19 1127 -- -- 88 30 93 %   19 0830 -- -- 81 26 95 %   19 0815 -- 98.1 °F (36.7 °C) -- -- --   19 0700 (!) 112/50 -- 86 29 95 %   19 0645 -- -- 85 29 94 %   19 0630 -- -- 81 28 95 %         Intake/Output Summary (Last 24 hours) at 3/31/2019 1427  Last data filed at 3/31/2019 1300  Gross per 24 hour   Intake 1886.18 ml   Output 2360 ml   Net -473.82 ml     Date 19 0000 - 19 2359   Shift 6984-5533 0081-6502 8226-9623 24 Hour Total   INTAKE   I.V.(mL/kg) 458(3.3) 618(4.5)  1076(7.8)   NG/GT(mL/kg)  30(0.2)  30(0.2)   Shift Total(mL/kg) 458(3.3) 648(4.7)  1106(8)   OUTPUT   Urine(mL/kg/hr) 585(0.5) 720  1305   Emesis/NG output(mL/kg) 90(0.7)   90(0.7)   Shift Total(mL/kg) 675(4.9) 720(5.2)  1395(10.1)   Weight (kg) 137.9 137.9 137.9 137.9     Wt Readings from Last 3 Encounters:   19 (!) 304 lb 0.2 oz (137.9 kg)   19 294 lb 11.2 oz (133.7 kg)   19 300 lb (136.1 kg)     Body mass index is 46.23 kg/m². PHYSICAL EXAM:  · General appearance: intubated sedated   · HEENT: Atraumatic, normocephalic, neck supple, normal EOM, thyroid normal, no lymphadenopathy   · Lungs: Ventilating all lobes. Prolonged expiration is still wheezing is better.   Posterior basilar rales and infrascapular rales are still present   · Heart: Regular heart rate, tachycardic, S1, S2 normal, no murmur   · Abdomen: soft, non-tender; bowel sounds normal; no masses,  no organomegaly  · Extremities: No cyanosis or edema  · Neurological:  Intubated sedated , opens eyes on pain stimuli, moves all extremities but gets agitated    MEDICATIONS:  Scheduled Meds:   pantoprazole  40 mg Intravenous BID    And    [START ON 4/1/2019] sodium chloride (PF)  10 mL Intravenous Daily    cefTRIAXone (ROCEPHIN) IV  2 g Intravenous Q24H    hydrocortisone sodium succinate PF  100 mg Intravenous Q8H    docusate  100 mg Oral Daily    sodium chloride flush  10 mL Intravenous 2 times per day     Continuous Infusions:   norepinephrine 4 mcg/min (03/31/19 1244)    insulin (HUMAN R) non-weight based infusion 2.36 Units/hr (03/31/19 1200)    EPINEPHrine infusion Stopped (03/29/19 0005)    phenylephrine (KHADRA-SYNEPHRINE) 50mg/250mL infusion Stopped (03/30/19 0159)    vasopressin (Septic Shock) infusion Stopped (03/30/19 0603)    amiodarone 450mg/250ml D5W infusion 1 mg/min (03/31/19 0908)    sodium chloride 10 mL/hr at 03/30/19 1103    midazolam Stopped (03/30/19 1938)    dextrose       PRN Meds:     fentanNYL 25 mcg Q1H PRN   Or     fentanNYL 50 mcg Q1H PRN   oxyCODONE 5 mg Q4H PRN   acetaminophen 650 mg Q4H PRN   magnesium hydroxide 30 mL Daily PRN   albuterol 2.5 mg Q6H PRN   LORazepam 0.5 mg Q8H PRN   sodium chloride flush 10 mL PRN   ondansetron 4 mg Q6H PRN   glucose 15 g PRN   dextrose 12.5 g PRN   glucagon (rDNA) 1 mg PRN   dextrose 100 mL/hr PRN       SUPPORT DEVICES: [x] Ventilator [] BIPAP  [] Nasal Cannula [] Room Air  .   Lab Results   Component Value Date    PHART 7.310 03/18/2019    LBI3CZG 51.7 03/18/2019    PO2ART 67.8 03/18/2019    TRW2NKB 25.4 03/18/2019    FUG6LYM 26 03/31/2019    W7UFLINK 91.7 03/18/2019    FIO2 40.0 03/31/2019     DATA:  Complete Blood Count:   Recent Labs     03/29/19  0612 03/30/19  0603 03/31/19 0007 03/31/19  0411 03/31/19  0836   WBC 45.3*  --  40.3*  --   --  32.6*  --    RBC 2.66*  --  2.28*  --   --  2.32*  --    HGB 8.2*   < > 7.3*   < > 7.5* 7.1* 7.2*   HCT 23.7*   < > 21.3*   < > 21.9* 21.7* 20.9*   MCV 89.1  --  93.4  --   --  93.5  --    MCH 30.8  --  32.0  --   --  30.6  --    MCHC 34.6  --  34.3  --   --  32.7  --    RDW 14.7*  --  15.0*  --   --  14.6*  --      --  201  --   --  146  --    MPV 11.3  --  11.1  --   --  11.0  --     < > = values in this interval not displayed. Last 3 Blood Glucose:   Recent Labs     03/28/19 2029 03/29/19 0612 03/30/19  0603 03/31/19 0411   GLUCOSE 320* 146* 179* 163*        PT/INR:    Lab Results   Component Value Date    PROTIME 12.5 03/29/2019    INR 1.2 03/29/2019     PTT:    Lab Results   Component Value Date    APTT 39.4 03/28/2019       Comprehensive Metabolic Profile:   Recent Labs     03/29/19 0612 03/29/19  1308  03/30/19 0007 03/30/19 0603 03/31/19 0411      < >  --    < > 135 137 135   K 5.0   < >  --    < > 4.7 4.9 4.4   CL 97*   < >  --    < > 94* 97* 96*   CO2 23   < >  --    < > 22 23 22   BUN 87*  --   --   --   --  106* 125*   CREATININE 3.74*  --   --   --   --  4.58* 4.53*   GLUCOSE 146*  --   --   --   --  179* 163*   CALCIUM 7.5*  --   --   --   --  7.8* 7.6*   PROT 5.0*  --  4.6*  --   --  5.1* 4.9*   LABALBU 2.8*  --   --   --   --  2.8* 2.6*   BILITOT 0.65  --   --   --   --  0.67 0.73   ALKPHOS 62  --   --   --   --  75 81   *  --   --   --   --  318* 199*   *  --   --   --   --  571* 511*    < > = values in this interval not displayed.       Magnesium:   Lab Results   Component Value Date    MG 2.9 03/31/2019    MG 2.7 03/30/2019    MG 2.8 03/29/2019     Phosphorus:   Lab Results   Component Value Date    PHOS 6.9 03/31/2019    PHOS 6.4 03/30/2019    PHOS 5.4 03/29/2019     Ionized Calcium:   Lab Results   Component Value Date    CAION 1.00 03/31/2019    CAION 0.98 Acute hypercapnic respiratory failure ([facility] 03/18/2019    NSTEMI (non-ST elevated myocardial infarction) ([facility]     Type 2 diabetes mellitus without complication, without long-term current use of insulin ([facility] 09/06/2016    Adult BMI > 30 05/06/2016    CAD (coronary artery disease)     DM type 2 (diabetes mellitus, type 2) ([facility])     Atrial fibrillation with rapid ventricular response ([facility] 01/01/2007        PLAN:     WEAN PER PROTOCOL:  [] No   [x] Yes  [] N/A    ICU PROPHYLAXIS:  Stress ulcer:  [] PPI Agent  [x] P1Sprea [] Sucralfate  [] Other:  VTE:   [] Enoxaparin  [x] he is on heparin GTT  [] EPC Cuffs    NUTRITION:  [] NPO [x] Tube Feeding (Specify: ) [] TPN  [] PO    HOME MEDS RECONCILED: [x] No  [] Yes    CONSULTATION NEEDED:  [x] No  [] Yes    FAMILY UPDATED:    [] No  [x] Yes    TRANSFER OUT OF ICU:   [x] No  [] Yes    Additional Assessment:  Influenza A  Community acquired pneumonia with likely strep pneumoniae  Atrial fibrillation with RVRPersistent   Acute exacerbation of CHF sec due to atrial fibrillationPersistent   Acute  hypoxic hypercapnic respiratory failureDue to pulmonary edema, persistent without any significant change   Diabetes mellitusType II with hyperglycemia   Obstructive sleep apnea    Plan:    Hemorrhagic shock 2/2 retroperitoneal bleed. Switch Pepcid to Protonix 40 twice a day. CT abdomen showed retroperitoneal bleed. Vascular signed off. Currently no active intervention recommended. Monitor H&H Q6 for now. Will switch to Q8 if remains stable. Hydrocortisone 100 MG every 8. Once ,Levophed is off, will start tapering the solu-cortef off. Respiratory failure 2/2 Inf A and PNA. Intubated. Continue mechanical ventilation, keep SPO2 more than 92%. Weaning trial daily. Versed dced, Fentanyl 25-50 Q1 prn. Versed turned off for evaluation of mentation. If need be would start on propofol than Versed given old age and buildup.   Breathing treatment with DuoNeb  Monitor urine output and total intake. UO improving. Replace electrolytes as appropriate. Continue on TF. Tfs stopped overnight, reason not known. Will restart. Continue insulin gtt. Recent Inf A infection with superimposed PNA. Cont on Rocephin. Respiratory culture showed gram-positive cocci in clusters. Infectious disease consulted. Appreciate recs. Chronic A. fib with RVR, controlled. Continue IV amiodarone. Cardiology following. Appreciate recommendations. ERNESTO 2/2 ischemic ATN. Nephrology on board. Appreciate recommendations. On I's and O's. UO improved. Marshal Halsted, MD  PGY-2, Internal Medicine  [address] [facility]      Attending Physician Statement  I have discussed the care of Tika Santana, including pertinent history and exam findings with the resident. I have reviewed the key elements of all parts of the encounter with the resident. I have seen and examined the patient with the resident. I agree with the assessment and plan and status of the problem list as documented. I have seen the patient during round today, I have reviewed the labs, medication seen, chart reviewed and chest x-ray seen   Overnight he is somnolent and was taken off Versed drip this morning he was not arousable did not follow commands or deep pain. He has blinking of eyes and withdrawal but did not follow commands, abdominal is soft and not much distended bowel sounds are positive. His urine output is 1.9 L in last 24 hours his creatinine is 4.53 and BUN is 125 creatinine is stable and hopefully we will started coming down he had received 60 mg of IV Lasix yesterday per nephrology, WBC count is improving and it is 32,000 today, arterial blood gas shows a pH of 7.48 PCO2 33 and bicarbonate is 24 with a PO2 of 61. His PO2/FiO2 ratio is better and he is on 35% FiO2 and PEEP was weaned to 5 today, chest x-ray did not show much infiltrate and mild changes of areas of atelectasis with low lung volumes.   His hemoglobin has dropped to 6.6 but no signs of active bleeding and last hemoglobin was 7.8 and he had received 1 unit of packed RBCs overnight  Continue to be off heparin as noted candidate for heparin drip  Continue to monitor intake and output renal function and BUN. Elevated BUN likely secondary to retroperitoneal bleed along with steroids. We'll decrease hydrocortisone and will continue for next few days to finish 5 days of treatment. Continue with ventilator support. We will monitor neuro status and mentation and will continue to keep off Versed drip    Discussed with nursing staff, treatment and plan discussed. Discussed with respiratory therapist.    Total critical care time caring for this patient with life threatening, unstable organ failure, including direct patient contact, management of life support systems, review of data including imaging and labs, discussions with other team members and physicians at least 27  Min so far today, excluding procedures. Amy Shen MD  3/31/2019 2:47 PM    Please note that this chart was generated using voice recognition Dragon dictation software. Although every effort was made to ensure the accuracy of this automated transcription, some errors in transcription may have occurred.

## 2019-03-31 NOTE — PROGRESS NOTES
[facility] - Initial Consult Note  Today's Date and Time: 3/30/2019, 9:13 PM    Impression :   1. Acute Hypoxic Respiratory failure  2. Influenza A  3. Possible secondary pneumonia with normal mansoor  4. Atrial Fibrillation  5. DM II  6. CHF  7. ERNESTO  8. Leukocytosis secondary to hemorrhage, steroids  9. Retroperitoneal hematoma  10. transaminitis  11. Hemorrhagic shock on vasopressors. Recommendations:   · Continue  IV Ceftriaxone 2 gm IV q 24 hr  · Still on levophed  · still critical yet improving  · Repeat blood cx pend 3/28  · Watch leukocytosis - might be related to steroids as well  · Get GB US due to elevated Liver enz, might also be related to amiodarone  · Disc w RN on the bedside    Medical Decision Making/Summary/Discussion:   · Patient with Hx chronic smoking  · Admitted with acute hypoxic respiratory failure associated with Influenza   · Has pulmonary vascular congestion and pleural effusions  · Has CHF and renal insufficiency  · Has completed oseltamivir  · Will continue treatment for possible secondary pneumonia with ceftriaxone  Infection Control Recommendations     · Droplet Isolation    Antimicrobial Stewardship Recommendations     · Targeted therapy  · PK dosing  Coordination of Outpatient Care:   · Estimated Length of IV antimicrobials:TBD  · Patient will need Midline Catheter Insertionno:   · Patient will need PICC line Insertion:no   · Patient will need: Home IV , [place],  SNF,  LTAC: TBD  · Patient will need outpatient wound care:No    Chief complaint/reason for consultation:   · Leukocytosis      History of Present Illness:   Yamile Leal is a 69y.o.-year-old  male who was initially admitted on 3/19/2019. Patient seen at the request of Roddy Bergman. INITIAL HISTORY:    ID has been consulted for evaluation of leukocytosis.     The patient was transferred to [facility] on 3/19/19 from an outlying facility for worsening respiratory status, Influenza A and elevated troponin. PMH significant for CAD,DM 2, A fib and chronic smoking. The patient presented to [facility] on 3/15 for sob where his Rapid flu was positive for Influenza A. He refused admission and was treated with Unique-flu as an out patient. He developed worsening sob and hypoxia leading to his admission at the outlying facility on 3/18/19 where he was started on Ceftriaxone, Azithromycin and Tamiflu. He was transferred to [facility] on 3/19/19 because of hypoxic respiratory distress and elevated troponin. He was admitted to ICU and started on BiPAP. CXR on 3-19-19 showed reticular infiltrate and LLL pneumonia. Respiratory culture was obtained on 3/22/19 and showed few gram positive cocci in clusters. Legionella antigen, Strep pneumoniae anitgen and Mycoplasma Ab negative. MRSA swab negative    The patient was intubated on 3/21/19 because of deteriorating respiratory status. The patient completed a course of Unique-flu on 3-25-19. He completed a course of abx and steroids on 3-26-19. The patient spiked a fever (t max 100.4) and became hypotensive requiring pressure support on 3-29-19. He was started on Vancomycin and Zosyn empirically. Blood, urine and sputum cultures were obtained and currently pending. The patient additionally was found to have a Lt sided spontaneous retroperitoneal bleed on 3-29-19 and was hypovolemic requiring multiple transfusions. H&H has been stable today at 9.0. The patient continues to require pressor support and is tachycardic (120). Presently the patient is intubated, sedated and on 3 pressors. He has been febrile (t max 101.5), tachycardic (110-120), tachypneic (30-40's). Lactic acid was elevated at 7.4 yesterday. WBC count has been elevated at 45.3 today. Hgb stable.   His kidney function is worsening Cr 3.74    Current evaluation :03/30/19     Patient intubated ( PRVC 55 %/12/20/480) and sedated on versed and 12 of levophed - on FIO2 of hydrocortisone sodium succinate PF  100 mg Intravenous Q8H    pantoprazole  40 mg Intravenous Daily    And    sodium chloride (PF)  10 mL Intravenous Daily    docusate  100 mg Oral Daily    sodium chloride flush  10 mL Intravenous 2 times per day       Social History:     Social History     Socioeconomic History    Marital status:      Spouse name: Not on file    Number of children: Not on file    Years of education: Not on file    Highest education level: Not on file   Occupational History    Not on file   Social Needs    Financial resource strain: Not on file    Food insecurity:     Worry: Not on file     Inability: Not on file    Transportation needs:     Medical: Not on file     Non-medical: Not on file   Tobacco Use    Smoking status: Former Smoker     Packs/day: 1.50     Years: 30.00     Pack years: 45.00     Types: Cigarettes     Last attempt to quit: 1996     Years since quittin.8    Smokeless tobacco: Never Used   Substance and Sexual Activity    Alcohol use: No    Drug use: No    Sexual activity: Not on file   Lifestyle    Physical activity:     Days per week: Not on file     Minutes per session: Not on file    Stress: Not on file   Relationships    Social connections:     Talks on phone: Not on file     Gets together: Not on file     Attends Tenriism service: Not on file     Active member of club or organization: Not on file     Attends meetings of clubs or organizations: Not on file     Relationship status: Not on file    Intimate partner violence:     Fear of current or ex partner: Not on file     Emotionally abused: Not on file     Physically abused: Not on file     Forced sexual activity: Not on file   Other Topics Concern    Not on file   Social History Narrative    Not on file       Family History:   No family history on file. Allergies:   Patient has no known allergies. Review of Systems:   Unable to perform ROS. Pt intubated and sedated.     Physical Examination :     Patient Vitals for the past 8 hrs:   BP Temp Temp src Pulse Resp SpO2   03/30/19 2100 (!) 111/58 -- -- 75 27 96 %   03/30/19 2045 -- -- -- 77 27 96 %   03/30/19 2030 -- -- -- 79 28 95 %   03/30/19 2015 -- -- -- 85 29 94 %   03/30/19 2000 120/63 97.5 °F (36.4 °C) CORE 85 30 96 %   03/30/19 1945 -- -- -- 83 28 95 %   03/30/19 1930 -- -- -- 81 27 99 %   03/30/19 1925 -- -- -- 78 28 95 %   03/30/19 1915 -- -- -- 80 29 95 %   03/30/19 1900 126/64 97.7 °F (36.5 °C) CORE 78 29 95 %   03/30/19 1855 121/60 97.7 °F (36.5 °C) CORE 79 29 95 %   03/30/19 1845 -- -- -- 79 29 95 %   03/30/19 1830 -- -- -- 83 30 95 %   03/30/19 1825 (!) 115/58 97.7 °F (36.5 °C) CORE 85 29 95 %   03/30/19 1820 -- -- -- 88 29 95 %   03/30/19 1815 -- -- -- 87 26 96 %   03/30/19 1810 (!) 115/55 97.7 °F (36.5 °C) CORE 81 28 95 %   03/30/19 1805 (!) 114/56 97.7 °F (36.5 °C) CORE 84 28 95 %   03/30/19 1800 (!) 105/53 97.7 °F (36.5 °C) CORE 84 27 95 %   03/30/19 1755 (!) 111/54 97.9 °F (36.6 °C) CORE 82 27 95 %   03/30/19 1752 (!) 112/54 97.9 °F (36.6 °C) CORE 84 28 95 %   03/30/19 1745 -- -- -- 84 28 95 %   03/30/19 1730 -- -- -- 82 28 95 %   03/30/19 1715 -- -- -- 82 28 95 %   03/30/19 1700 (!) 111/53 -- -- 83 28 95 %   03/30/19 1645 -- -- -- 83 28 95 %   03/30/19 1630 -- -- -- 91 26 95 %   03/30/19 1615 -- -- -- 83 29 95 %   03/30/19 1600 (!) 121/59 98.2 °F (36.8 °C) Oral 88 29 94 %   03/30/19 1545 -- -- -- 86 (!) 35 98 %   03/30/19 1541 -- -- -- 85 28 98 %   03/30/19 1530 -- -- -- 86 28 98 %   03/30/19 1515 -- -- -- 88 28 98 %   03/30/19 1500 (!) 99/52 -- -- 87 29 98 %   03/30/19 1445 -- -- -- 87 28 98 %   03/30/19 1430 -- -- -- 90 30 98 %   03/30/19 1415 -- -- -- 89 30 98 %   03/30/19 1400 (!) 108/53 98.4 °F (36.9 °C) CORE 87 29 99 %   03/30/19 1345 -- -- -- 86 28 98 %   03/30/19 1330 -- -- -- 87 28 98 %   03/30/19 1315 -- -- -- 91 28 98 %     General Appearance: Intubated and sedated.   Head:  Normocephalic, no trauma  ENT: Intubated. Mouth/throat: mucosa pink and moist.  Neck: Supple, without lymphadenopathy. Pulmonary/Chest: Coarse sounds  Cardiovascular: Irregular rate and rhythm without murmurs, rubs, or gallops. Abdomen: Soft, distended. Bowel sounds normal.  All four Extremities: No cyanosis, clubbing, edema, or effusions. Neurologic: Sedated  Skin: Warm and dry with good turgor. No signs of peripheral arterial or venous insufficiency. No ulcerations. No open wounds. Medical Decision Making -Laboratory:   I have independently reviewed/ordered the following labs:    CBC with Differential:   Recent Labs     03/29/19  0612  03/30/19  0603 03/30/19  1653 03/30/19  2104   WBC 45.3*  --  40.3*  --   --    HGB 8.2*   < > 7.3* 6.6* 7.6*   HCT 23.7*   < > 21.3* 20.0* 22.3*     --  201  --   --    LYMPHOPCT 22*  --  5*  --   --    MONOPCT 3  --  10*  --   --     < > = values in this interval not displayed. BMP:   Recent Labs     03/29/19  0612  03/30/19  0007 03/30/19  0603      < > 135 137   K 5.0   < > 4.7 4.9   CL 97*   < > 94* 97*   CO2 23   < > 22 23   BUN 87*  --   --  106*   CREATININE 3.74*  --   --  4.58*   MG 2.8*  --   --  2.7*    < > = values in this interval not displayed. Hepatic Function Panel:   Recent Labs     03/29/19  0612 03/29/19  1308 03/30/19  0603   PROT 5.0* 4.6* 5.1*   LABALBU 2.8*  --  2.8*   BILIDIR 0.35*  --  0.44*   IBILI 0.30  --  0.23   BILITOT 0.65  --  0.67   ALKPHOS 62  --  75   *  --  571*   *  --  318*     No results for input(s): RPR in the last 72 hours. No results for input(s): HIV in the last 72 hours. No results for input(s): BC in the last 72 hours.   Lab Results   Component Value Date    MUCUS NOT REPORTED 03/28/2019    RBC 2.28 03/30/2019    TRICHOMONAS NOT REPORTED 03/28/2019    WBC 40.3 03/30/2019    YEAST NOT REPORTED 03/28/2019    TURBIDITY TURBID 03/28/2019     Lab Results   Component Value Date    CREATININE 4.58 03/30/2019    GLUCOSE 179 03/30/2019    GLUCOSE 104 11/26/2018       Medical Decision Making-Imaging:     Narrative   EXAMINATION:   SINGLE XRAY VIEW OF THE CHEST       3/29/2019 5:54 am       COMPARISON:   March 28, 2019, March 27, 2019       HISTORY:   ORDERING SYSTEM PROVIDED HISTORY: intubated   TECHNOLOGIST PROVIDED HISTORY:   intubated       FINDINGS:   Endotracheal tube terminates at the level of the clavicular heads.  The   enteric tube courses off the field of view in the upper abdomen.  The right   internal jugular line terminates at the distal SVC.  Shallow inflation. Cardiac silhouette enlargement again noted.  Perihilar and basilar opacities   are again noted without significant change.  No new airspace disease or   pneumothorax identified.           Impression   Unchanged appearance of support tubes and lines.       Perihilar and basilar opacities are without appreciable change which may   represent subsegmental atelectasis versus developing consolidation.             Medical Decision Making-Other: Thank you for allowing us to participate in the care of this patient. Please call with questions.     Daphnie Strong MD

## 2019-03-31 NOTE — PLAN OF CARE
Patient currently intubated. Responds to pain. Does not open eyes. Hourly rounding and turning to prevent skin breakdown. Restraints currently maintained.

## 2019-03-31 NOTE — PLAN OF CARE
Problem: Risk for Impaired Skin Integrity  Goal: Tissue integrity - skin and mucous membranes  3/31/2019 0701 by Yumiko Raygoza RN  Outcome: Ongoing  3/30/2019 1821 by Kwan Seaman RN  Outcome: Met This Shift     Problem: Confusion - Acute:  Goal: Absence of continued neurological deterioration signs and symptoms  3/31/2019 0701 by Yumiko Raygoza RN  Outcome: Ongoing  3/30/2019 1821 by Kwan Seaman RN  Outcome: Ongoing  Goal: Mental status will be restored to baseline  3/31/2019 0701 by Yumiko Raygoza RN  Outcome: Ongoing  3/30/2019 1821 by Kwan Seaman RN  Outcome: Ongoing     Problem: Discharge Planning:  Goal: Ability to perform activities of daily living will improve  3/31/2019 0701 by Yumiko Raygoza RN  Outcome: Ongoing  3/30/2019 1821 by Kwan Seaman RN  Outcome: Ongoing  Goal: Participates in care planning  3/31/2019 0701 by Yumiko Raygoza RN  Outcome: Ongoing  3/30/2019 1821 by Kwan Seaman RN  Outcome: Ongoing     Problem: Injury - Risk of, Physical Injury:  Goal: Absence of physical injury  3/31/2019 0701 by Yumiko Raygoza RN  Outcome: Ongoing  3/30/2019 1821 by Kwan Seaman RN  Outcome: Met This Shift  Goal: Will remain free from falls  3/31/2019 0701 by Yumiko Raygoza RN  Outcome: Ongoing  3/30/2019 1821 by Kwan Seaman RN  Outcome: Met This Shift     Problem: Mood - Altered:  Goal: Mood stable  3/31/2019 0701 by Yumiko Raygoza RN  Outcome: Ongoing  3/30/2019 1821 by Kwan Seaman RN  Outcome: Met This Shift  Goal: Absence of abusive behavior  3/31/2019 0701 by Yumiko Raygoza RN  Outcome: Ongoing  3/30/2019 1821 by Kwan Seaman RN  Outcome: Met This Shift  Goal: Verbalizations of feeling emotionally comfortable while being cared for will increase  3/31/2019 0701 by Yumiko Raygoza RN  Outcome: Ongoing  3/30/2019 1821 by Kwan Seaman RN  Outcome: Ongoing     Problem: Psychomotor Activity - Altered:  Goal: Absence of psychomotor disturbance signs and symptoms  3/31/2019 0701 by Walter Grimes RN  Outcome: Ongoing  3/30/2019 1821 by Jamey Alexander RN  Outcome: Met This Shift     Problem: Sensory Perception - Impaired:  Goal: Demonstrations of improved sensory functioning will increase  3/31/2019 0701 by Walter Grimes RN  Outcome: Ongoing  3/30/2019 1821 by Jamey Alexander RN  Outcome: Ongoing  Goal: Decrease in sensory misperception frequency  3/31/2019 0701 by Walter Grimes RN  Outcome: Ongoing  3/30/2019 1821 by Jamey Alexander RN  Outcome: Ongoing  Goal: Able to refrain from responding to false sensory perceptions  3/31/2019 0701 by Walter Grimes RN  Outcome: Ongoing  3/30/2019 1821 by Jamey Alexander RN  Outcome: Ongoing  Goal: Demonstrates accurate environmental perceptions  3/31/2019 0701 by Walter Grimes RN  Outcome: Ongoing  3/30/2019 1821 by Jamey Alexander RN  Outcome: Ongoing  Goal: Able to distinguish between reality-based and nonreality-based thinking  3/31/2019 0701 by Walter Grimes RN  Outcome: Ongoing  3/30/2019 1821 by Jamey Alexander RN  Outcome: Ongoing  Goal: Able to interrupt nonreality-based thinking  3/31/2019 0701 by Walter Grimes RN  Outcome: Ongoing  3/30/2019 1821 by Jamey Alexander RN  Outcome: Ongoing     Problem: Sleep Pattern Disturbance:  Goal: Appears well-rested  3/31/2019 0701 by Walter Grimes RN  Outcome: Ongoing  3/30/2019 1821 by Jamye Alexander RN  Outcome: Met This Shift     Problem: Restraint Use - Nonviolent/Non-Self-Destructive Behavior:  Goal: Absence of restraint indications  3/31/2019 0701 by Walter Grimes RN  Outcome: Not Met This Shift  3/30/2019 1821 by Jamey Alexander RN  Outcome: Met This Shift  Goal: Absence of restraint-related injury  3/31/2019 0701 by Walter Grimes RN  Outcome: Met This Shift  3/30/2019 1821 by Jamey Alexander RN  Outcome: Met This Shift     Problem: Musculor/Skeletal Functional Status  Goal: Highest potential functional level  3/31/2019 0701 by Walter Grimes RN  Outcome: Ongoing  3/30/2019 1821 by Jonathan Medley RN  Outcome: Ongoing     Problem: OXYGENATION/RESPIRATORY FUNCTION  Goal: Patient will maintain patent airway  3/31/2019 0701 by Helena Roque RN  Outcome: Ongoing  3/30/2019 1821 by Jonathan Medley RN  Outcome: Ongoing  Goal: Patient will achieve/maintain normal respiratory rate/effort  3/31/2019 0701 by Helena Roque RN  Outcome: Ongoing  3/30/2019 1821 by Jonathan Medley RN  Outcome: Ongoing     Problem: MECHANICAL VENTILATION  Goal: Patient will maintain patent airway  3/31/2019 0701 by Helena Roque RN  Outcome: Ongoing  3/30/2019 1821 by Jonathan Medley RN  Outcome: Ongoing  Goal: Oral health is maintained or improved  3/31/2019 0701 by Helena Roque RN  Outcome: Ongoing  3/30/2019 1821 by Jonathan Medley RN  Outcome: Met This Shift  Goal: ET tube will be managed safely  3/31/2019 0701 by Helena Roque RN  Outcome: Ongoing  3/30/2019 1821 by Jonathan Medley RN  Outcome: Met This Shift  Goal: Ability to express needs and understand communication  3/31/2019 0701 by Helena Roque RN  Outcome: Ongoing  3/30/2019 1821 by Jonathan Medley RN  Outcome: Ongoing  Goal: Mobility/activity is maintained at optimum level for patient  3/31/2019 0701 by Helena Roque RN  Outcome: Ongoing  3/30/2019 1821 by Jonathan Medley RN  Outcome: Ongoing     Problem: SKIN INTEGRITY  Goal: Skin integrity is maintained or improved  3/31/2019 0701 by Helena Roque RN  Outcome: Ongoing  3/30/2019 1821 by Jonathan Medley RN  Outcome: Ongoing

## 2019-03-31 NOTE — PROGRESS NOTES
[facility] -   Infectious diseases evaluation  admission date 3/19/2019    reason for consultation:   Leukocytosis    Impression :   Current:  1. Acute Hypoxic Respiratory failure  2. Influenza A  3. Possible secondary pneumonia with normal mansoor  4. Atrial Fibrillation  5. DM II  6. CHF  7. ERNESTO  8. Leukocytosis secondary to hemorrhage, steroids  9. Retroperitoneal hematoma  10. transaminitis  11. Hemorrhagic shock on vasopressors    Other:  ·   Discussion / summary of stay / plan of care   · Patient with Hx chronic smoking  · Admitted with acute hypoxic respiratory failure associated with Influenza   · Has pulmonary vascular congestion and pleural effusions  · Has CHF and renal insufficiency  · Has completed oseltamivir  · Will continue treatment for possible secondary pneumonia with ceftriaxone  · US gallbladder completed-Negative. Recommendations   · Continue  IV Ceftriaxone 2 gm IV q 24 hr  · Remains on Levophed  · Wait for final sensitivites from [facility]. Target therapy. · Watch leukocytosis - might be related to steroids as wel  · Disc w RN, wife, daughter. Infection Control Recommendations     · Droplet Isolation    Antimicrobial Stewardship Recommendations     · Targeted therapy  ·   · Per Kg dosing    Coordination ofOutpatient Care:   · Estimated Length of IV antimicrobials:  · Patient will need Midline / picc Catheter Insertion:   · Patient will need SNF:  · Patient will need outpatient wound care:     History of Present Illness:   Initial history:  Celestina Ba is a 69y.o.-year-old  male who was initially admitted on 3/19/2019. Patient seen at the request of .     INITIAL HISTORY:     ID has been consulted for evaluation of leukocytosis.     The patient was transferred to [facility] on 3/19/19 from an outlying facility for worsening respiratory status, Influenza A and elevated troponin.  PMH significant for CAD,DM 2, A fib and chronic smoking.     The patient presented to [facility] on 3/15 for sob where his Rapid flu was positive for Influenza A. He refused admission and was treated with Unique-flu as an out patient. He developed worsening sob and hypoxia leading to his admission at the outlying facility on 3/18/19 where he was started on Ceftriaxone, Azithromycin and Tamiflu. He was transferred to [facility] on 3/19/19 because of hypoxic respiratory distress and elevated troponin. He was admitted to ICU and started on BiPAP. CXR on 3-19-19 showed reticular infiltrate and LLL pneumonia.     Respiratory culture was obtained on 3/22/19 and showed few gram positive cocci in clusters. Legionella antigen, Strep pneumoniae anitgen and Mycoplasma Ab negative. MRSA swab negative     The patient was intubated on 3/21/19 because of deteriorating respiratory status. The patient completed a course of Unique-flu on 3-25-19. He completed a course of abx and steroids on 3-26-19. The patient spiked a fever (t max 100.4) and became hypotensive requiring pressure support on 3-29-19. He was started on Vancomycin and Zosyn empirically. Blood, urine and sputum cultures were obtained and currently pending.      The patient additionally was found to have a Lt sided spontaneous retroperitoneal bleed on 3-29-19 and was hypovolemic requiring multiple transfusions. H&H has been stable today at 9.0. The patient continues to require pressor support and is tachycardic (120).    Presently the patient is intubated, sedated and on 3 pressors. He has been febrile (t max 101.5), tachycardic (110-120), tachypneic (30-40's). Lactic acid was elevated at 7.4 yesterday. WBC count has been elevated at 45.3 today. Hgb stable. His kidney function is worsening Cr 3.74    Interval changes  3/31/2019   Afebrile. Remains on levophed at 6 mcg/min. Off sedation. Does not follow commands. Amiodarone, Insulin gtts cont. Leukocytosis improving. Renal function cont to stall. R IJ TLC.  Site hydrocortisone sodium succinate PF  100 mg Intravenous Q8H    docusate  100 mg Oral Daily    sodium chloride flush  10 mL Intravenous 2 times per day       Social History:     Social History     Socioeconomic History    Marital status:      Spouse name: Not on file    Number of children: Not on file    Years of education: Not on file    Highest education level: Not on file   Occupational History    Not on file   Social Needs    Financial resource strain: Not on file    Food insecurity:     Worry: Not on file     Inability: Not on file    Transportation needs:     Medical: Not on file     Non-medical: Not on file   Tobacco Use    Smoking status: Former Smoker     Packs/day: 1.50     Years: 30.00     Pack years: 45.00     Types: Cigarettes     Last attempt to quit: 1996     Years since quittin.8    Smokeless tobacco: Never Used   Substance and Sexual Activity    Alcohol use: No    Drug use: No    Sexual activity: Not on file   Lifestyle    Physical activity:     Days per week: Not on file     Minutes per session: Not on file    Stress: Not on file   Relationships    Social connections:     Talks on phone: Not on file     Gets together: Not on file     Attends Muslim service: Not on file     Active member of club or organization: Not on file     Attends meetings of clubs or organizations: Not on file     Relationship status: Not on file    Intimate partner violence:     Fear of current or ex partner: Not on file     Emotionally abused: Not on file     Physically abused: Not on file     Forced sexual activity: Not on file   Other Topics Concern    Not on file   Social History Narrative    Not on file       Family History:   No family history on file. Allergies:   Patient has no known allergies. Review of Systems:     Review of Systems   Reason unable to perform ROS: Intubated.        Physical Examination :     Patient Vitals for the past 8 hrs:   BP Temp Temp src Pulse Resp SpO2   03/31/19 0830 -- -- -- 81 26 95 %   03/31/19 0815 -- 98.1 °F (36.7 °C) -- -- -- --   03/31/19 0700 (!) 112/50 -- -- 86 29 95 %   03/31/19 0645 -- -- -- 85 29 94 %   03/31/19 0630 -- -- -- 81 28 95 %   03/31/19 0615 -- -- -- 77 26 94 %   03/31/19 0600 (!) 100/46 -- -- 79 26 94 %   03/31/19 0545 -- -- -- 82 (!) 31 94 %   03/31/19 0530 -- -- -- 82 28 94 %   03/31/19 0515 -- -- -- 84 30 93 %   03/31/19 0500 (!) 101/50 -- -- 81 (!) 31 92 %   03/31/19 0445 -- -- -- 83 28 93 %   03/31/19 0430 -- -- -- 84 28 94 %   03/31/19 0415 -- -- -- 85 28 93 %   03/31/19 0400 (!) 104/51 97.7 °F (36.5 °C) CORE 83 30 94 %   03/31/19 0345 -- -- -- 83 27 94 %   03/31/19 0338 -- -- -- 84 28 93 %   03/31/19 0330 -- -- -- 86 28 93 %   03/31/19 0315 -- -- -- 82 28 93 %   03/31/19 0300 (!) 103/52 -- -- 81 29 94 %   03/31/19 0245 -- -- -- 81 28 94 %   03/31/19 0230 -- -- -- 79 28 93 %       Physical Exam   Constitutional: He appears well-developed and well-nourished. No distress. HENT:   Head: Normocephalic. Mouth/Throat: No oropharyngeal exudate. Tongue dry   Eyes: Conjunctivae are normal. Right eye exhibits no discharge. Left eye exhibits no discharge. Scleral edema bilat. Neck: Neck supple. No JVD present. No tracheal deviation present. Cardiovascular: Normal rate, normal heart sounds and intact distal pulses. Pulmonary/Chest:   Tachypneic on vent. Lungs decreased to RLL. Abdominal: Soft. He exhibits distension. There is no tenderness. BS distant. Genitourinary:   Genitourinary Comments: Yoandy. Musculoskeletal: He exhibits edema. He exhibits no tenderness or deformity. 2+ generalized edema. Neurological:   Unresponsive. Does not follow commands. Skin: Skin is warm and dry. Capillary refill takes less than 2 seconds. No rash noted. Psychiatric:   MERLY   Nursing note and vitals reviewed.         Medical Decision Making:   I have independently reviewed/ordered the following labs:    CBC with Differential:   Recent Labs     03/30/19  0603  03/31/19  0411 03/31/19  0836   WBC 40.3*  --  32.6*  --    HGB 7.3*   < > 7.1* 7.2*   HCT 21.3*   < > 21.7* 20.9*     --  146  --    LYMPHOPCT 5*  --  7*  --    MONOPCT 10*  --  9*  --     < > = values in this interval not displayed. BMP:  Recent Labs     03/30/19  0603 03/31/19 0411    135   K 4.9 4.4   CL 97* 96*   CO2 23 22   * 125*   CREATININE 4.58* 4.53*   MG 2.7* 2.9*     Hepatic Function Panel:   Recent Labs     03/30/19 0603 03/31/19 0411   PROT 5.1* 4.9*   LABALBU 2.8* 2.6*   BILIDIR 0.44* 0.48*   IBILI 0.23 0.25   BILITOT 0.67 0.73   ALKPHOS 75 81   * 511*   * 199*     No results for input(s): RPR in the last 72 hours. No results for input(s): HIV in the last 72 hours. No results for input(s): BC in the last 72 hours. Lab Results   Component Value Date    CREATININE 4.53 03/31/2019    GLUCOSE 163 03/31/2019    GLUCOSE 104 11/26/2018       Detailed results: Thank you for allowing us to participate in the care of this patient. Please call with questions. This note is created with the assistance of a speech recognition program.  While intending to generate adocument that actually reflects the content of the visit, the document can still have some errors including those of syntax and sound a like substitutions which may escape proof reading. It such instances, actual meaningcan be extrapolated by contextual diversion.     MATT Joe - CNP  Office: [phone]

## 2019-03-31 NOTE — PROGRESS NOTES
[facility]   Progress Note                   Date:   3/31/2019  Patient name: Shasha Logan  Date of admission:  3/19/2019  7:58 AM  MRN:   [id number]  YOB: 1945  PCP: Tamia Chance MD    Reason for Admission:      Subjective: There were no acute events overnight, remained hemodynamically stable on levophed and intubated/sedated. Rate controlled on amiodarone. Case discussed with nursing staff. Medications:   Scheduled Meds:   calcium gluconate IVPB  2 g Intravenous Once    cefTRIAXone (ROCEPHIN) IV  2 g Intravenous Q24H    hydrocortisone sodium succinate PF  100 mg Intravenous Q8H    pantoprazole  40 mg Intravenous Daily    And    sodium chloride (PF)  10 mL Intravenous Daily    docusate  100 mg Oral Daily    sodium chloride flush  10 mL Intravenous 2 times per day       Continuous Infusions:   norepinephrine 6 mcg/min (03/31/19 0515)    insulin (HUMAN R) non-weight based infusion 2.12 Units/hr (03/31/19 0651)    EPINEPHrine infusion Stopped (03/29/19 0005)    phenylephrine (KHADRA-SYNEPHRINE) 50mg/250mL infusion Stopped (03/30/19 0159)    vasopressin (Septic Shock) infusion Stopped (03/30/19 0603)    amiodarone 450mg/250ml D5W infusion 0.5 mg/min (03/30/19 1748)    sodium chloride 10 mL/hr at 03/30/19 1103    midazolam Stopped (03/30/19 1938)    dextrose         CBC:   Recent Labs     03/29/19  0612  03/30/19  0603  03/30/19  2104 03/31/19  0007 03/31/19 0411   WBC 45.3*  --  40.3*  --   --   --  32.6*   HGB 8.2*   < > 7.3*   < > 7.6* 7.5* 7.1*     --  201  --   --   --  146    < > = values in this interval not displayed.      BMP:    Recent Labs     03/29/19  0612 03/30/19  0007 03/30/19 0603 03/31/19 0411      < > 135 137 135   K 5.0   < > 4.7 4.9 4.4   CL 97*   < > 94* 97* 96*   CO2 23   < > 22 23 22   BUN 87*  --   --  106* 125*   CREATININE 3.74*  --   --  4.58* 4.53*   GLUCOSE 146*  --   --  179* 163*    < > = values in this interval 65  5. Acute respiratory failure, intubated on 3/21/19  6. ARF likely secondary to ATN   7. Mild systolic cardiomyopathy (LVEF ~ 40%)  8. Acute systolic CHF  9. Type II MI      Discussed with patient and nursing. Will discuss with attending, Dr. Hutchins Rm. 710 [address] [facility]   [facility]       Attending Cardiologist Addendum: I have reviewed and performed the history, physical, subjective, objective, assessment, and plan with the resident/fellow and agree with the note. I performed the history and physical personally. I have made changes to the note above as needed. Thank you for allowing me to participate in the care of this patient, please do not hesitate to call if you have any questions. Armando Mann, [id number] [facility]  [facility]  [phone]

## 2019-03-31 NOTE — PROGRESS NOTES
[facility]  Occupational Therapy Not Seen Note    DATE: 3/31/2019  Name: Greg Ohara  : 1945  MRN: [id number]    Patient not available for Occupational Therapy due to:    [] Testing:    [] Hemodialysis    [] Blood Transfusion in Progress    []Refusal by Patient:    [] Surgery/Procedure:    [] Strict Bedrest    [x] Sedation    [] Spine Precautions     [] Pt being transferred to palliative care at this time. Spoke with pt/family and OT services to be defered. [] Pt independent with functional mobility and functional tasks.  Pt with no OT acute care needs at this time, will defer OT eval.    [] Other    Next Scheduled Treatment: Check back     Signature: Josefina Vargas OTBROOK/GREGORY

## 2019-04-01 ENCOUNTER — APPOINTMENT (OUTPATIENT)
Dept: GENERAL RADIOLOGY | Age: 74
DRG: 207 | End: 2019-04-01
Attending: INTERNAL MEDICINE
Payer: MEDICARE

## 2019-04-01 LAB
ABO/RH: NORMAL
ABSOLUTE EOS #: 0 K/UL (ref 0–0.4)
ABSOLUTE IMMATURE GRANULOCYTE: 0.46 K/UL (ref 0–0.3)
ABSOLUTE LYMPH #: 1.16 K/UL (ref 1–4.8)
ABSOLUTE MONO #: 2.55 K/UL (ref 0.1–0.8)
ALBUMIN (CALCULATED): 2.6 G/DL (ref 3.2–5.2)
ALBUMIN PERCENT: 56 % (ref 45–65)
ALBUMIN SERPL-MCNC: 2.7 G/DL (ref 3.5–5.2)
ALBUMIN/GLOBULIN RATIO: 1.2 (ref 1–2.5)
ALLEN TEST: ABNORMAL
ALP BLD-CCNC: 92 U/L (ref 40–129)
ALPHA 1 PERCENT: 4 % (ref 3–6)
ALPHA 2 PERCENT: 17 % (ref 6–13)
ALPHA-1-GLOBULIN: 0.2 G/DL (ref 0.1–0.4)
ALPHA-2-GLOBULIN: 0.8 G/DL (ref 0.5–0.9)
ALT SERPL-CCNC: 412 U/L (ref 5–41)
ANION GAP SERPL CALCULATED.3IONS-SCNC: 16 MMOL/L (ref 9–17)
ANTIBODY SCREEN: NEGATIVE
ARM BAND NUMBER: NORMAL
AST SERPL-CCNC: 110 U/L
BASOPHILS # BLD: 0 % (ref 0–2)
BASOPHILS ABSOLUTE: 0 K/UL (ref 0–0.2)
BETA GLOBULIN: 0.5 G/DL (ref 0.5–1.1)
BETA PERCENT: 11 % (ref 11–19)
BILIRUB SERPL-MCNC: 0.62 MG/DL (ref 0.3–1.2)
BILIRUBIN DIRECT: 0.33 MG/DL
BILIRUBIN, INDIRECT: 0.29 MG/DL (ref 0–1)
BLD PROD TYP BPU: NORMAL
BUN BLDV-MCNC: 140 MG/DL (ref 8–23)
BUN/CREAT BLD: ABNORMAL (ref 9–20)
CALCIUM IONIZED: 1.04 MMOL/L (ref 1.13–1.33)
CALCIUM IONIZED: 1.05 MMOL/L (ref 1.13–1.33)
CALCIUM SERPL-MCNC: 7.7 MG/DL (ref 8.6–10.4)
CHLORIDE BLD-SCNC: 101 MMOL/L (ref 98–107)
CO2: 23 MMOL/L (ref 20–31)
CREAT SERPL-MCNC: 4.43 MG/DL (ref 0.7–1.2)
CROSSMATCH RESULT: NORMAL
DIFFERENTIAL TYPE: ABNORMAL
DISPENSE STATUS BLOOD BANK: NORMAL
EOSINOPHILS RELATIVE PERCENT: 0 % (ref 1–4)
EXPIRATION DATE: NORMAL
FIO2: ABNORMAL
GAMMA GLOBULIN %: 12 % (ref 9–20)
GAMMA GLOBULIN: 0.6 G/DL (ref 0.5–1.5)
GFR AFRICAN AMERICAN: 16 ML/MIN
GFR NON-AFRICAN AMERICAN: 13 ML/MIN
GFR SERPL CREATININE-BSD FRML MDRD: ABNORMAL ML/MIN/{1.73_M2}
GFR SERPL CREATININE-BSD FRML MDRD: ABNORMAL ML/MIN/{1.73_M2}
GLOBULIN: ABNORMAL G/DL (ref 1.5–3.8)
GLUCOSE BLD-MCNC: 154 MG/DL (ref 75–110)
GLUCOSE BLD-MCNC: 155 MG/DL (ref 75–110)
GLUCOSE BLD-MCNC: 157 MG/DL (ref 75–110)
GLUCOSE BLD-MCNC: 161 MG/DL (ref 75–110)
GLUCOSE BLD-MCNC: 162 MG/DL (ref 75–110)
GLUCOSE BLD-MCNC: 163 MG/DL (ref 75–110)
GLUCOSE BLD-MCNC: 165 MG/DL (ref 75–110)
GLUCOSE BLD-MCNC: 165 MG/DL (ref 75–110)
GLUCOSE BLD-MCNC: 167 MG/DL (ref 75–110)
GLUCOSE BLD-MCNC: 168 MG/DL (ref 75–110)
GLUCOSE BLD-MCNC: 176 MG/DL (ref 75–110)
GLUCOSE BLD-MCNC: 177 MG/DL (ref 70–99)
HCT VFR BLD CALC: 21.1 % (ref 40.7–50.3)
HCT VFR BLD CALC: 22.5 % (ref 40.7–50.3)
HCT VFR BLD CALC: 23.4 % (ref 40.7–50.3)
HEMOGLOBIN: 6.9 G/DL (ref 13–17)
HEMOGLOBIN: 7.5 G/DL (ref 13–17)
HEMOGLOBIN: 7.9 G/DL (ref 13–17)
IMMATURE GRANULOCYTES: 2 %
LYMPHOCYTES # BLD: 5 % (ref 24–44)
MAGNESIUM: 2.9 MG/DL (ref 1.6–2.6)
MCH RBC QN AUTO: 30.9 PG (ref 25.2–33.5)
MCHC RBC AUTO-ENTMCNC: 33.3 G/DL (ref 28.4–34.8)
MCV RBC AUTO: 92.6 FL (ref 82.6–102.9)
MODE: ABNORMAL
MONOCYTES # BLD: 11 % (ref 1–7)
MORPHOLOGY: ABNORMAL
NEGATIVE BASE EXCESS, ART: ABNORMAL (ref 0–2)
NRBC AUTOMATED: 4.1 PER 100 WBC
NUCLEATED RED BLOOD CELLS: 5 PER 100 WBC
O2 DEVICE/FLOW/%: ABNORMAL
PATHOLOGIST: ABNORMAL
PATIENT TEMP: ABNORMAL
PDW BLD-RTO: 14.4 % (ref 11.8–14.4)
PHOSPHORUS: 6 MG/DL (ref 2.5–4.5)
PLATELET # BLD: 100 K/UL (ref 138–453)
PLATELET ESTIMATE: ABNORMAL
PMV BLD AUTO: 11 FL (ref 8.1–13.5)
POC HCO3: 26.5 MMOL/L (ref 21–28)
POC O2 SATURATION: 92 % (ref 94–98)
POC PCO2 TEMP: ABNORMAL MM HG
POC PCO2: 33.6 MM HG (ref 35–48)
POC PH TEMP: ABNORMAL
POC PH: 7.5 (ref 7.35–7.45)
POC PO2 TEMP: ABNORMAL MM HG
POC PO2: 58.1 MM HG (ref 83–108)
POSITIVE BASE EXCESS, ART: 3 (ref 0–3)
POTASSIUM SERPL-SCNC: 3.7 MMOL/L (ref 3.7–5.3)
PROTEIN ELECTROPHORESIS, SERUM: ABNORMAL
RBC # BLD: 2.43 M/UL (ref 4.21–5.77)
RBC # BLD: ABNORMAL 10*6/UL
SAMPLE SITE: ABNORMAL
SEG NEUTROPHILS: 82 % (ref 36–66)
SEGMENTED NEUTROPHILS ABSOLUTE COUNT: 19.03 K/UL (ref 1.8–7.7)
SODIUM BLD-SCNC: 140 MMOL/L (ref 135–144)
TCO2 (CALC), ART: 28 MMOL/L (ref 22–29)
TOTAL PROT. SUM,%: 100 % (ref 98–102)
TOTAL PROT. SUM: 4.7 G/DL (ref 6.3–8.2)
TOTAL PROTEIN: 4.6 G/DL (ref 6.4–8.3)
TOTAL PROTEIN: 5 G/DL (ref 6.4–8.3)
TRANSFUSION STATUS: NORMAL
UNIT DIVISION: 0
UNIT NUMBER: NORMAL
WBC # BLD: 23.2 K/UL (ref 3.5–11.3)
WBC # BLD: ABNORMAL 10*3/UL

## 2019-04-01 PROCEDURE — 82947 ASSAY GLUCOSE BLOOD QUANT: CPT

## 2019-04-01 PROCEDURE — 2700000000 HC OXYGEN THERAPY PER DAY

## 2019-04-01 PROCEDURE — 2580000003 HC RX 258: Performed by: STUDENT IN AN ORGANIZED HEALTH CARE EDUCATION/TRAINING PROGRAM

## 2019-04-01 PROCEDURE — 71045 X-RAY EXAM CHEST 1 VIEW: CPT

## 2019-04-01 PROCEDURE — 6370000000 HC RX 637 (ALT 250 FOR IP): Performed by: STUDENT IN AN ORGANIZED HEALTH CARE EDUCATION/TRAINING PROGRAM

## 2019-04-01 PROCEDURE — 2000000000 HC ICU R&B

## 2019-04-01 PROCEDURE — P9016 RBC LEUKOCYTES REDUCED: HCPCS

## 2019-04-01 PROCEDURE — 82803 BLOOD GASES ANY COMBINATION: CPT

## 2019-04-01 PROCEDURE — 6360000002 HC RX W HCPCS: Performed by: EMERGENCY MEDICINE

## 2019-04-01 PROCEDURE — 85018 HEMOGLOBIN: CPT

## 2019-04-01 PROCEDURE — 85025 COMPLETE CBC W/AUTO DIFF WBC: CPT

## 2019-04-01 PROCEDURE — 86900 BLOOD TYPING SEROLOGIC ABO: CPT

## 2019-04-01 PROCEDURE — C9113 INJ PANTOPRAZOLE SODIUM, VIA: HCPCS | Performed by: STUDENT IN AN ORGANIZED HEALTH CARE EDUCATION/TRAINING PROGRAM

## 2019-04-01 PROCEDURE — 99291 CRITICAL CARE FIRST HOUR: CPT | Performed by: INTERNAL MEDICINE

## 2019-04-01 PROCEDURE — 36430 TRANSFUSION BLD/BLD COMPNT: CPT

## 2019-04-01 PROCEDURE — 6360000002 HC RX W HCPCS: Performed by: STUDENT IN AN ORGANIZED HEALTH CARE EDUCATION/TRAINING PROGRAM

## 2019-04-01 PROCEDURE — 86920 COMPATIBILITY TEST SPIN: CPT

## 2019-04-01 PROCEDURE — 86850 RBC ANTIBODY SCREEN: CPT

## 2019-04-01 PROCEDURE — 86901 BLOOD TYPING SEROLOGIC RH(D): CPT

## 2019-04-01 PROCEDURE — 99233 SBSQ HOSP IP/OBS HIGH 50: CPT | Performed by: INTERNAL MEDICINE

## 2019-04-01 PROCEDURE — 94003 VENT MGMT INPAT SUBQ DAY: CPT

## 2019-04-01 PROCEDURE — 80048 BASIC METABOLIC PNL TOTAL CA: CPT

## 2019-04-01 PROCEDURE — 94770 HC ETCO2 MONITOR DAILY: CPT

## 2019-04-01 PROCEDURE — 85014 HEMATOCRIT: CPT

## 2019-04-01 PROCEDURE — 2580000003 HC RX 258: Performed by: INTERNAL MEDICINE

## 2019-04-01 PROCEDURE — 83735 ASSAY OF MAGNESIUM: CPT

## 2019-04-01 PROCEDURE — 6360000002 HC RX W HCPCS: Performed by: INTERNAL MEDICINE

## 2019-04-01 PROCEDURE — 2580000003 HC RX 258: Performed by: EMERGENCY MEDICINE

## 2019-04-01 PROCEDURE — 84100 ASSAY OF PHOSPHORUS: CPT

## 2019-04-01 PROCEDURE — 36415 COLL VENOUS BLD VENIPUNCTURE: CPT

## 2019-04-01 PROCEDURE — 6370000000 HC RX 637 (ALT 250 FOR IP): Performed by: EMERGENCY MEDICINE

## 2019-04-01 PROCEDURE — 82330 ASSAY OF CALCIUM: CPT

## 2019-04-01 PROCEDURE — 80076 HEPATIC FUNCTION PANEL: CPT

## 2019-04-01 PROCEDURE — 97110 THERAPEUTIC EXERCISES: CPT

## 2019-04-01 PROCEDURE — 94762 N-INVAS EAR/PLS OXIMTRY CONT: CPT

## 2019-04-01 RX ORDER — 0.9 % SODIUM CHLORIDE 0.9 %
250 INTRAVENOUS SOLUTION INTRAVENOUS ONCE
Status: DISCONTINUED | OUTPATIENT
Start: 2019-04-01 | End: 2019-04-02

## 2019-04-01 RX ORDER — CITALOPRAM 10 MG/1
10 TABLET ORAL DAILY
Status: DISCONTINUED | OUTPATIENT
Start: 2019-04-01 | End: 2019-04-10 | Stop reason: HOSPADM

## 2019-04-01 RX ADMIN — PANTOPRAZOLE SODIUM 40 MG: 40 INJECTION, POWDER, FOR SOLUTION INTRAVENOUS at 09:02

## 2019-04-01 RX ADMIN — AMIODARONE HYDROCHLORIDE 1 MG/MIN: 50 INJECTION, SOLUTION INTRAVENOUS at 10:35

## 2019-04-01 RX ADMIN — CEFTRIAXONE SODIUM 2 G: 2 INJECTION, POWDER, FOR SOLUTION INTRAMUSCULAR; INTRAVENOUS at 20:36

## 2019-04-01 RX ADMIN — CITALOPRAM 10 MG: 10 TABLET, FILM COATED ORAL at 10:36

## 2019-04-01 RX ADMIN — HYDROCORTISONE SODIUM SUCCINATE 50 MG: 100 INJECTION, POWDER, FOR SOLUTION INTRAMUSCULAR; INTRAVENOUS at 20:30

## 2019-04-01 RX ADMIN — OXYCODONE HYDROCHLORIDE 5 MG: 5 TABLET ORAL at 04:51

## 2019-04-01 RX ADMIN — Medication 10 ML: at 09:12

## 2019-04-01 RX ADMIN — OXYCODONE HYDROCHLORIDE 5 MG: 5 TABLET ORAL at 10:40

## 2019-04-01 RX ADMIN — AMIODARONE HYDROCHLORIDE 1 MG/MIN: 50 INJECTION, SOLUTION INTRAVENOUS at 02:54

## 2019-04-01 RX ADMIN — HYDROCORTISONE SODIUM SUCCINATE 50 MG: 100 INJECTION, POWDER, FOR SOLUTION INTRAMUSCULAR; INTRAVENOUS at 09:13

## 2019-04-01 RX ADMIN — AMIODARONE HYDROCHLORIDE 1 MG/MIN: 50 INJECTION, SOLUTION INTRAVENOUS at 18:40

## 2019-04-01 RX ADMIN — SODIUM CHLORIDE: 9 INJECTION, SOLUTION INTRAVENOUS at 02:00

## 2019-04-01 RX ADMIN — Medication 100 MG: at 09:02

## 2019-04-01 RX ADMIN — FENTANYL CITRATE 50 MCG: 50 INJECTION, SOLUTION INTRAMUSCULAR; INTRAVENOUS at 22:17

## 2019-04-01 RX ADMIN — Medication 10 ML: at 09:03

## 2019-04-01 RX ADMIN — FUROSEMIDE 40 MG: 10 INJECTION, SOLUTION INTRAMUSCULAR; INTRAVENOUS at 09:02

## 2019-04-01 RX ADMIN — SODIUM CHLORIDE 5.05 UNITS/HR: 9 INJECTION, SOLUTION INTRAVENOUS at 07:03

## 2019-04-01 RX ADMIN — CALCIUM GLUCONATE 2 G: 98 INJECTION, SOLUTION INTRAVENOUS at 22:53

## 2019-04-01 RX ADMIN — Medication 10 ML: at 20:29

## 2019-04-01 RX ADMIN — OXYCODONE HYDROCHLORIDE 5 MG: 5 TABLET ORAL at 18:34

## 2019-04-01 RX ADMIN — PANTOPRAZOLE SODIUM 40 MG: 40 INJECTION, POWDER, FOR SOLUTION INTRAVENOUS at 20:29

## 2019-04-01 ASSESSMENT — PULMONARY FUNCTION TESTS
PIF_VALUE: 19
PIF_VALUE: 22
PIF_VALUE: 25
PIF_VALUE: 22
PIF_VALUE: 20
PIF_VALUE: 20
PIF_VALUE: 21
PIF_VALUE: 23
PIF_VALUE: 15

## 2019-04-01 NOTE — PROGRESS NOTES
Physical Therapy    DATE: 2019  NAME: Gayathri John  MRN: [id number]   : 1945      Discharge Recommendations   Further therapy recommended at discharge. Subjective: Per RN, patient okay for PT. Family member visiting. Pain: MERLY - Vitals stable throughout  Patient follows: No commands - at end of treatment, patient opened eyes, tracked writer, squeezed hands, wiggled toes; notified RN as patient was previously not following commands  Is patient on ventilator: Yes  Is patient on sedation: No  Precautions: Droplet    Therapeutic exercises:  PROM to B UE/LEs x12 reps  Bilateral gastrocnemius stretching 3x15\"    Goals  Short Term Goals  Short term goal 1: Prevent contractures through ROM and stretching. Short term goal 2: Pt able to tolerate 30 min activity  Short term goal 3: Pt able to complete AROM of extrimites in all planes. Short term goal 4: Progress with mobility as appropriate. Plan: Progress functional mobility as medically appropriate.    Time In: 925  Time Out: 937  Time Coded Minutes (treatment minutes): 19  Rehab Potential: Fair  Treatments/week: 2-3x/wk    Kelly Cee, PTA

## 2019-04-01 NOTE — PLAN OF CARE
Problem: OXYGENATION/RESPIRATORY FUNCTION  Goal: Patient will maintain patent airway  Outcome: Ongoing  Goal: Patient will achieve/maintain normal respiratory rate/effort  Description  Respiratory rate and effort will be within normal limits for the patient  Outcome: Ongoing     Problem: MECHANICAL VENTILATION  Goal: Patient will maintain patent airway  Outcome: Ongoing  Goal: Oral health is maintained or improved  Outcome: Ongoing  Goal: ET tube will be managed safely  Outcome: Ongoing  Goal: Ability to express needs and understand communication  Outcome: Ongoing  Goal: Mobility/activity is maintained at optimum level for patient  Outcome: Ongoing     Problem: SKIN INTEGRITY  Goal: Skin integrity is maintained or improved  Outcome: Ongoing

## 2019-04-01 NOTE — PROGRESS NOTES
[facility] [address],  [address] -Progress Note    Today's Date and Time: 4/1/2019, 11:28 AM    Impression :   1. Acute Hypoxic Respiratory failure  2. Influenza A  3. Possible secondary pneumonia with normal mansoor  4. Atrial Fibrillation  5. DM II  6. CHF  7. Renal insufficiency  8. Leukocytosis secondary to hemorrhage, steroids    Recommendations:     · Ceftriaxone 2 gm IV q 24 hr.Stop date 4-3-19  · F/U cultures    Medical Decision Making/Summary/Discussion:   · Patient with Hx chronic smoking  · Admitted with acute hypoxic respiratory failure associated with Influenza   · Has pulmonary vascular congestion and pleural effusions  · Has CHF and renal insufficiency  · Has completed oseltamivir  · Will continue treatment for possible secondary pneumonia with ceftriaxone  Infection Control Recommendations     · Droplet Isolation    Antimicrobial Stewardship Recommendations     · Targeted therapy  · PK dosing  Coordination of Outpatient Care:   · Estimated Length of IV antimicrobials:TBD  · Patient will need Midline Catheter Insertionno:   · Patient will need PICC line Insertion:no   · Patient will need: Home IV , [place],  SNF,  LTAC: TBD  · Patient will need outpatient wound care:No    Chief complaint/reason for consultation:   · Leukocytosis      History of Present Illness:   Micah Gosselin is a 69y.o.-year-old  male who was initially admitted on 3/19/2019. Patient seen at the request of Yossi Graham. INITIAL HISTORY:    ID has been consulted for evaluation of leukocytosis. The patient was transferred to [facility] on 3/19/19 from an outlying facility for worsening respiratory status, Influenza A and elevated troponin. PMH significant for CAD,DM 2, A fib and chronic smoking. The patient presented to [facility] on 3/15 for sob where his Rapid flu was positive for Influenza A. He refused admission and was treated with Unique-flu as an out patient.    He developed worsening sob and hypoxia leading to his admission at the [facility] facility on 3/18/19 where he was started on Ceftriaxone, Azithromycin and Tamiflu. He was transferred to [facility] on 3/19/19 because of hypoxic respiratory distress and elevated troponin. He was admitted to ICU and started on BiPAP. CXR on 3-19-19 showed reticular infiltrate and LLL pneumonia. Respiratory culture was obtained on 3/22/19 and showed few gram positive cocci in clusters. Legionella antigen, Strep pneumoniae anitgen and Mycoplasma Ab negative. MRSA swab negative    The patient was intubated on 3/21/19 because of deteriorating respiratory status. The patient completed a course of Unique-flu on 3-25-19. He completed a course of abx and steroids on 3-26-19. The patient spiked a fever (t max 100.4) and became hypotensive requiring pressure support on 3-29-19. He was started on Vancomycin and Zosyn empirically. Blood, urine and sputum cultures were obtained and currently pending. The patient additionally was found to have a Lt sided spontaneous retroperitoneal bleed on 3-29-19 and was hypovolemic requiring multiple transfusions. H&H has been stable today at 9.0. The patient continues to require pressor support and is tachycardic (120). Presently the patient is intubated, sedated and on 3 pressors. He has been febrile (t max 101.5), tachycardic (110-120), tachypneic (30-40's). Lactic acid was elevated at 7.4 yesterday. WBC count has been elevated at 45.3 today. Hgb stable. His kidney function is worsening Cr 3.74    He is on zosyn. Vancomycin held because trough level 18.5 (3/29/19)  Cultures negative so far.     CURRENT EVALUATION : 4/1/2019    Afebrile  VS stable  Remains on ventilator support  Off levophed  On amiodarone drip    Off sedation   Moves all extremities on his own  Opens eyes    ERNESTO stabilizing      Labs Reviewed: 4/1/2019  WBC: 20.1 > 27.1 > 45.3-->23.2  Hgb: 9.0 > 8.2 > 8.6-->7.5  Lactic acid: 7.4 > 5.5 > 2.6 > 2.4    BUN: 68 > 84 Not on file    Number of children: Not on file    Years of education: Not on file    Highest education level: Not on file   Occupational History    Not on file   Social Needs    Financial resource strain: Not on file    Food insecurity:     Worry: Not on file     Inability: Not on file    Transportation needs:     Medical: Not on file     Non-medical: Not on file   Tobacco Use    Smoking status: Former Smoker     Packs/day: 1.50     Years: 30.00     Pack years: 45.00     Types: Cigarettes     Last attempt to quit: 1996     Years since quittin.8    Smokeless tobacco: Never Used   Substance and Sexual Activity    Alcohol use: No    Drug use: No    Sexual activity: Not on file   Lifestyle    Physical activity:     Days per week: Not on file     Minutes per session: Not on file    Stress: Not on file   Relationships    Social connections:     Talks on phone: Not on file     Gets together: Not on file     Attends Judaism service: Not on file     Active member of club or organization: Not on file     Attends meetings of clubs or organizations: Not on file     Relationship status: Not on file    Intimate partner violence:     Fear of current or ex partner: Not on file     Emotionally abused: Not on file     Physically abused: Not on file     Forced sexual activity: Not on file   Other Topics Concern    Not on file   Social History Narrative    Not on file       Family History:   No family history on file. Allergies:   Patient has no known allergies. Review of Systems:   Unable to perform ROS. Pt intubated and sedated.     Physical Examination :     Patient Vitals for the past 8 hrs:   BP Temp Temp src Pulse Resp SpO2   19 1100 (!) 106/54 -- -- 88 27 92 %   19 1001 -- -- -- 83 30 94 %   19 1000 (!) 111/50 -- -- 85 28 94 %   19 0900 (!) 116/59 -- -- 92 30 94 %   19 0800 117/61 98.1 °F (36.7 °C) CORE 85 28 94 %   19 0757 -- -- -- 84 (!) 31 93 % results for input(s): RPR in the last 72 hours. No results for input(s): HIV in the last 72 hours. No results for input(s): BC in the last 72 hours. Lab Results   Component Value Date    MUCUS NOT REPORTED 03/28/2019    RBC 2.43 04/01/2019    TRICHOMONAS NOT REPORTED 03/28/2019    WBC 23.2 04/01/2019    YEAST NOT REPORTED 03/28/2019    TURBIDITY TURBID 03/28/2019     Lab Results   Component Value Date    CREATININE 4.43 04/01/2019    GLUCOSE 177 04/01/2019    GLUCOSE 104 11/26/2018       Medical Decision Making-Imaging:     Narrative   EXAMINATION:   SINGLE XRAY VIEW OF THE CHEST       3/29/2019 5:54 am       COMPARISON:   March 28, 2019, March 27, 2019       HISTORY:   ORDERING SYSTEM PROVIDED HISTORY: intubated   TECHNOLOGIST PROVIDED HISTORY:   intubated       FINDINGS:   Endotracheal tube terminates at the level of the clavicular heads.  The   enteric tube courses off the field of view in the upper abdomen.  The right   internal jugular line terminates at the distal SVC.  Shallow inflation. Cardiac silhouette enlargement again noted.  Perihilar and basilar opacities   are again noted without significant change.  No new airspace disease or   pneumothorax identified.           Impression   Unchanged appearance of support tubes and lines.       Perihilar and basilar opacities are without appreciable change which may   represent subsegmental atelectasis versus developing consolidation.             Medical Decision Making-Other: Thank you for allowing us to participate in the care of this patient. Please call with questions.     Petra Menendez MD

## 2019-04-01 NOTE — PROGRESS NOTES
INTENSIVE CARE UNIT  Resident Physician Progress Note    Patient - Charley Byrd  Date of Admission -  3/19/2019  7:58 AM  Date of Evaluation -  4/1/2019  Room and Bed Number -  0105/0105-01   Hospital Day - 15    Chief complaint influenza, pneumonia, A. fib with RVR  SUBJECTIVE:     OVERNIGHT EVENTS:   Patient seen and examined bedside. Charts reviewed. Continues to be intubated. Off sedation for past 36 hours now. Got fentanyl and oxycodone. Moving all 4 extremities spontaneously, opening eyes, not following commands. Tolerating tube feeds. Passing gas. Continues on insulin drip. Hemoglobin dropped last night to 6.9, received 1 unit of PRBC. Hb 7.5 this am.  Platelet count dropped to 100 from 146 yesterday. VSS. Hemodynamically stable. Pressure is 122/75 on Art line. Respiratory rate in early 20s, heart rate 80s. Will switch Solu-Cortef 100 every 12 to 50 every 12. Off of all pressors. Nephrology started on Lasix IV 40 daily per nephrology. Total output 3 L. Creatinine improving, 4.43 this am.  BUN elevated to 140. LFTs improving. ABGs improved this a.m., pH 7.5, PCO2 33.6, PO2 58, bicarb bicarb 26.5. Tmax 99. WBC 23.2 this a.m. from 32.6 yesterday. I/O last 3 completed shifts: In: 2406.9 [I.V.:1545.5; Blood:358.3; NG/GT:503]  Out: 3010 [Urine:3010]  No intake/output data recorded. Results for Jigna Daily (MRN [id number]) as of 4/1/2019 08:22   Ref. Range 4/1/2019 04:15   POC pH Latest Ref Range: 7.350 - 7.450  7.504 (H)   POC pH Temp Unknown NOT REPORTED   POC pCO2 Latest Ref Range: 35.0 - 48.0 mm Hg 33.6 (L)   POC pCO2 Temp Latest Units: mm Hg NOT REPORTED   POC PO2 Latest Ref Range: 83.0 - 108.0 mm Hg 58.1 (L)   POC pO2 Temp Latest Units: mm Hg NOT REPORTED   POC HCO3 Latest Ref Range: 21.0 - 28.0 mmol/L 26.5   POC O2 SAT Latest Ref Range: 94.0 - 98.0 % 92 (L)     CXR 4/1/19     Bibasilar opacities with improvement on the left side.  Perihilar opacity on   the left has improved.  Multiple tubes and lines as above. US RUQ 3/31/19  Impression   No evidence for acute cholecystitis, cholelithiasis or biliary dilatation.       Small volume perihepatic ascites.           AWAKE & FOLLOWING COMMANDS:  [] No   [x] Yes, not following commands.      SECRETIONS Amount:  [x] Small [] Moderate  [] Large  [] None  Color:     [x] White [] Colored  [x] Bloody    SEDATION:  RAAS Score:  [] Propofol gtt  [] Versed gtt  [] Ativan gtt   [x] No Sedation    PARALYZED:  [x] No    [] Yes    VASOPRESSORS:  [x] No    [] Yes  [] Levophed [] Dopamine [] Vasopressin  [] Dobutamine [] Phenylephrine [] Epinephrine    Review of Systems -  Intubated , not sedated   OBJECTIVE:     VITAL SIGNS:  BP (!) 113/50   Pulse 84   Temp 99 °F (37.2 °C) (Core)   Resp (!) 31   Ht 5' 8\" (1.727 m)   Wt (!) 304 lb 0.2 oz (137.9 kg)   SpO2 93%   BMI 46.23 kg/m²   Tmax over 24 hours:  Temp (24hrs), Av.9 °F (37.2 °C), Min:98.8 °F (37.1 °C), Max:99 °F (37.2 °C)      Patient Vitals for the past 8 hrs:   BP Temp Temp src Pulse Resp SpO2   19 0757 -- -- -- 84 (!) 31 93 %   19 0700 (!) 113/50 -- -- 82 29 93 %   19 0645 -- -- -- 86 30 93 %   19 0630 -- -- -- 86 24 95 %   19 0615 -- -- -- 84 28 94 %   19 0600 114/61 -- -- 76 27 95 %   19 0545 -- -- -- 73 24 94 %   19 0530 -- -- -- 75 26 94 %   19 0515 -- -- -- -- -- 92 %   19 0500 (!) 118/57 -- -- 86 (!) 31 93 %   19 0445 -- -- -- 79 (!) 32 93 %   19 0430 -- -- -- 84 (!) 32 94 %   19 0415 -- -- -- 83 (!) 31 93 %   19 0400 117/61 99 °F (37.2 °C) CORE 81 (!) 32 93 %   19 0345 -- -- -- 84 (!) 31 94 %   19 0330 -- -- -- 81 (!) 32 94 %   19 0317 -- -- -- 81 (!) 32 94 %   19 0315 -- -- -- 82 (!) 31 94 %   19 0300 114/64 -- -- 85 (!) 31 94 %   19 0245 -- -- -- 82 (!) 31 94 %   19 0230 -- -- -- 78 27 93 %   19 0215 -- -- -- 79 (!) 31 94 %   19 0200 (!) POC pH Latest Ref Range: 7.350 - 7.450  7.437   POC pH Temp Unknown NOT REPORTED   POC pCO2 Latest Ref Range: 35.0 - 48.0 mm Hg 63.7 (H)   POC pCO2 Temp Latest Units: mm Hg NOT REPORTED   POC PO2 Latest Ref Range: 83.0 - 108.0 mm Hg 65.1 (L)   POC pO2 Temp Latest Units: mm Hg NOT REPORTED   POC HCO3 Latest Ref Range: 21.0 - 28.0 mmol/L 42.9 (HH)   POC O2 SAT Latest Ref Range: 94.0 - 98.0 % 92 (L)     ASSESSMENT:     Patient Active Problem List    Diagnosis Date Noted    ERNESTO (acute kidney injury) ([facility])     Transaminitis     Ischemic hepatitis     IVIS (obstructive sleep apnea)     Leukocytosis     Hemorrhagic shock ([facility])     Retroperitoneal hematoma     Lactic acidosis     Hyperglycemia     Acute blood loss anemia     Community acquired pneumonia of left lower lobe of lung ([facility]     Influenza A with respiratory manifestations 03/18/2019    Acute respiratory failure with hypoxia ([facility]) 03/18/2019    Influenza A 03/18/2019    Acute hypercapnic respiratory failure ([facility]) 03/18/2019    NSTEMI (non-ST elevated myocardial infarction) ([facility])     Type 2 diabetes mellitus without complication, without long-term current use of insulin ([facility] 09/06/2016    Adult BMI > 30 05/06/2016    CAD (coronary artery disease)     DM type 2 (diabetes mellitus, type 2) ([facility])     Atrial fibrillation with rapid ventricular response ([facility] 01/01/2007        PLAN:     WEAN PER PROTOCOL:  [] No   [x] Yes  [] N/A    ICU PROPHYLAXIS:  Stress ulcer:  [] PPI Agent  [x] [id number] [] Sucralfate  [] Other:  VTE:   [] Enoxaparin  [x] he is on heparin GTT  [] EPC Cuffs    NUTRITION:  [] NPO [x] Tube Feeding (Specify: ) [] TPN  [] PO    HOME MEDS RECONCILED: [x] No  [] Yes    CONSULTATION NEEDED:  [x] No  [] Yes    FAMILY UPDATED:    [] No  [x] Yes    TRANSFER OUT OF ICU:   [x] No  [] Yes    Additional Assessment:  Influenza A  Community acquired pneumonia with likely strep pneumoniae  Atrial fibrillation with RVRPersistent   Acute exacerbation of CHF sec due to atrial fibrillationPersistent   Acute  hypoxic hypercapnic respiratory failureDue to pulmonary edema, persistent without any significant change   Diabetes mellitusType II with hyperglycemia   Obstructive sleep apnea    Plan:    Hemorrhagic shock 2/2 retroperitoneal bleed. Switch Pepcid to Protonix 40 twice a day. CT abdomen showed retroperitoneal bleed. Vascular signed off. Currently no active intervention recommended. H and H changed to Q12. Will D/C checks once Hb stabilizes. Hydrocortisone 100 MG Q12 changed to 50 Q12. Levophed is off    Respiratory failure 2/2 Inf A and PNA. Intubated. Continue mechanical ventilation, keep SPO2 more than 92%. Weaning trial daily. Versed dced, Fentanyl 25-50 Q1 prn. Versed turned off for evaluation of mentation. If need be would start on propofol than Versed given old age and buildup. Breathing treatment with DuoNeb  Replace electrolytes as appropriate. Continue on TF. Tolerating well. Continue insulin gtt. Recent Inf A infection with superimposed PNA. Cont on Rocephin. Respiratory culture showed gram-positive cocci in clusters. Infectious disease consulted. Appreciate recs. Chronic A. fib with RVR, controlled. Continue IV amiodarone. Cardiology following. Appreciate recommendations. ERNESTO 2/2 ischemic ATN. Nephrology on board. Appreciate recommendations. Started on IV Lasix 40 daily. Urine output improving. On I's and O's. UO improved. Meli Rice MD  PGY-2, Internal Medicine  [address] [facility]      Attending Physician Statement  I have discussed the care of Zev Holman, including pertinent history and exam findings with the resident. I have reviewed the key elements of all parts of the encounter with the resident. I have seen and examined the patient with the resident. I agree with the assessment and plan and status of the problem list as documented.     I seen the patient during round today, I have reviewed the labs, chart seen and medications reviewed. He is opening his eyes and sometimes seems to respond to name, he and 1 he follow commands sometime with wiggling his toes did not have a hand squeeze and moving spontaneously, his been no sedation and he get intermittent fentanyl. She continued to be tachypneic with a respiratory rate of 30 on ventilator and not able to tolerate spontaneous breathing trial.  His heart rate is much better controlled and he is continued on IV amiodarone. His BUN increased to 140 although creatinine is improving slightly and urine output is good he had about 3 L of urine output since yesterday and he is on Lasix 40 mg which is going to get today. His WBC count is improved to 23 today. He did have a drop of hemoglobin of 6.9 yesterday and had received 1 unit of packed RBC his hemoglobin is 7.5 today. His platelet has been dropping and it is 100 which could be related to all acute event and consumption thrombocytopenia from retroperitoneal bleeding and multiple transfusion    Will continue with amiodarone drip. Continue with current ventilator setting. Continue with Lasix daily. Continue to monitor BUN and creatinine intake and output. We will continue to monitor hemoglobin although we will decrease the frequency of hemoglobin monitoring. May need a repeat CT scan of the abdomen and pelvis for follow-up of retroperitoneal hematoma if continued to have low drop in hemoglobin. Continue Rocephin. If he continued to be tachypneic and unable to tolerate this point his breathing trial when other issues get better we will talk to the family about long-term goal and if there will continue with current care and likely tracheostomy     Discussed with family and updated. Discussed with nursing staff.     Total critical care time caring for this patient with life threatening, unstable organ failure, including direct patient contact, management of life support systems, review of data including imaging and labs, discussions with other team members and physicians at least 27  Min so far today, excluding procedures. Darin Galicia MD  4/1/2019 12:47 PM    Please note that this chart was generated using voice recognition Dragon dictation software. Although every effort was made to ensure the accuracy of this automated transcription, some errors in transcription may have occurred.

## 2019-04-01 NOTE — PROGRESS NOTES
Nutrition Assessment (Enteral Nutrition)    Type and Reason for Visit: Reassess    Nutrition Recommendations: Recommend increase tube feed to 65 ml per hour (1872 kcal, 117 g protein)    Nutrition Assessment: Pt tolerating tube feed at 50 ml per hour. No CRRT today. Malnutrition Assessment:  · Malnutrition Status: At risk for malnutrition  · Context: Acute illness or injury  · Findings of the 6 clinical characteristics of malnutrition (Minimum of 2 out of 6 clinical characteristics is required to make the diagnosis of moderate or severe Protein Calorie Malnutrition based on AND/ASPEN Guidelines):  1. Energy Intake-Less than or equal to 50% of estimated energy requirement, Greater than or equal to 5 days    2. Weight Loss-No significant weight loss,    3. Fat Loss-No significant subcutaneous fat loss,    4. Muscle Loss-No significant muscle mass loss,    5. Fluid Accumulation-Moderate to severe fluid accumulation, Extremities  6.   Strength-Not measured    Nutrition Risk Level: High    Nutrition Needs:  · Estimated Daily Total Kcal: 22-25 kcal/cq=4808-1871 kcal  · Estimated Daily Protein (g): 1.2-1.4 g/kg= g  Nutrition Diagnosis:   · Problem: Inadequate oral intake  · Etiology: related to Impaired respiratory function-inability to consume food     Signs and symptoms:  as evidenced by NPO status due to medical condition, Nutrition support - EN    Objective Information:  · Nutrition-Focused Physical Findings: Last documented BM 3/18  · Wound Type: None  · Current Nutrition Therapies:  · Oral Diet Orders: NPO   · Tube Feeding (TF) Orders:   · Formula: Semi-elemental  · Rate (ml/hr):50 ml per hour    · Volume (ml/day): 1200 ml/day  · Duration: Continuous  · Current TF & Flush Orders Provides: 1440 kcal, 90 g protein  · Goal TF & Flush Orders Provides: at 65ml per hour 1872 kcal, 117 g protein  · Anthropometric Measures:  · Ht: 5' 8\" (172.7 cm)   · Current Body Wt: 394 lb (178.7 kg)  · Admission Body

## 2019-04-01 NOTE — PROGRESS NOTES
34.3  --  32.7  --   --    RDW 14.7*  --  15.0*  --  14.6*  --   --      --  201  --  146  --   --    MPV 11.3  --  11.1  --  11.0  --   --     < > = values in this interval not displayed. BMP:   Recent Labs     03/29/19  0612 03/30/19  0007 03/30/19 0603 03/31/19  0411      < > 135 137 135   K 5.0   < > 4.7 4.9 4.4   CL 97*   < > 94* 97* 96*   CO2 23   < > 22 23 22   BUN 87*  --   --  106* 125*   CREATININE 3.74*  --   --  4.58* 4.53*   GLUCOSE 146*  --   --  179* 163*   CALCIUM 7.5*  --   --  7.8* 7.6*    < > = values in this interval not displayed.         Phosphorus:    Recent Labs     03/29/19 0612 03/30/19  0603 03/31/19  0411   PHOS 5.4* 6.4* 6.9*     Magnesium:   Recent Labs     03/29/19  0612 03/30/19  0603 03/31/19  0411   MG 2.8* 2.7* 2.9*     Albumin:   Recent Labs     03/29/19 0612 03/30/19  0603 03/31/19  0411   LABALBU 2.8* 2.8* 2.6*       SPEP:   Lab Results   Component Value Date    PROT 4.9 03/31/2019    ALBCAL PENDING 03/29/2019    ALBPCT PENDING 03/29/2019    LABALPH PENDING 03/29/2019    LABALPH PENDING 03/29/2019    A1PCT PENDING 03/29/2019    A2PCT PENDING 03/29/2019    LABBETA PENDING 03/29/2019    BETAPCT PENDING 03/29/2019    GAMGLOB PENDING 03/29/2019    GGPCT PENDING 03/29/2019    PATH PENDING 03/29/2019     Urine Creatinine:    Lab Results   Component Value Date    LABCREA 111.9 03/29/2019       Urinalysis:  U/A:   Lab Results   Component Value Date    NITRU NEGATIVE 03/28/2019    COLORU DARK YELLOW 03/28/2019    PHUR 5.0 03/28/2019    WBCUA 5 TO 10 03/28/2019    RBCUA 5 TO 10 03/28/2019    MUCUS NOT REPORTED 03/28/2019    TRICHOMONAS NOT REPORTED 03/28/2019    YEAST NOT REPORTED 03/28/2019    BACTERIA NOT REPORTED 03/28/2019    SPECGRAV 1.024 03/28/2019    LEUKOCYTESUR TRACE 03/28/2019    UROBILINOGEN Normal 03/28/2019    BILIRUBINUR NEGATIVE  Verified by ictotest. 03/28/2019    GLUCOSEU TRACE 03/28/2019    KETUA TRACE 03/28/2019    AMORPHOUS NOT REPORTED 03/28/2019 CAT SCAN ;     1. Large left retroperitoneal hematoma extending from the posterior   hemidiaphragm to the upper pelvis.  Hematocrit effect with a hematoma   measuring maximally 9.3 cm in diameter and about 14 cm in length.  There is   also a small amount of upper abdominal ascites.  No free air. 2. Bilateral pleural effusions with dependent lower lobe atelectasis. 3. No evidence of bowel obstruction.  Diverticulosis with no acute features       Assessment:  1. ERNESTO secondary to ischemic ATN in face of hemorrhagic shock secondary to retroperitoneal bleed. Hgb stable, Cr plateau K 4.4, U/O improving on intermittent diuretics. 2. Recent influenza A infection with PNA  3. Hx of hypertension. Currently on only one pressor. 4. Chronic AFib   5. VDRF. 6. Cardiology considering GUSTAVO/CV prior to discharge  7. Azotemia +ve steroids. Plan:  1. No need for CRRT today, electrolytes stable and uop good. 2. F/u pending urine studies  3. Wean off pressors. 4. Risk (including SCD) and benefits of dialysis were discussed with pts family in detail Friday. They were agreeable if need be. 5. Follow up lytes q12  6. Lasix 40mg IV  7. Following       Thank you for the consultation. Please do not hesitate to call with questions.     Electronically signed by Terrie Graham MD on 3/31/2019 at 11:16 PM

## 2019-04-01 NOTE — PROGRESS NOTES
Nephrology Progress Note      Subjective:  Patient was seen and examined. Following for ERNESTO  Cr plateau, 7.15 mg/dl today,   K 3.7 and Bicarb 23. Off pressors , a-fib on amiodarone, rate controlled  Had 3 liters urine output last 24 hrs.     + fluid balance since admission    Objective:  CURRENT TEMPERATURE:  Temp: 98.1 °F (36.7 °C)  MAXIMUM TEMPERATURE OVER 24HRS:  Temp (24hrs), Av.6 °F (37 °C), Min:98.1 °F (36.7 °C), Max:99 °F (37.2 °C)    CURRENT RESPIRATORY RATE:  Resp: 27  CURRENT PULSE:  Pulse: 88  CURRENT BLOOD PRESSURE:  BP: (!) 111/50  24HR BLOOD PRESSURE RANGE:  Systolic (74UFC), MCF:512 , Min:87 , TFE:757   ; Diastolic (37FCN), JQD:87, Min:41, Max:64    24HR INTAKE/OUTPUT:      Intake/Output Summary (Last 24 hours) at 2019 1103  Last data filed at 2019 0600  Gross per 24 hour   Intake 2406.86 ml   Output 2630 ml   Net -223.14 ml     Patient Vitals for the past 96 hrs (Last 3 readings):   Weight   19 0400 (!) 304 lb 0.2 oz (137.9 kg)   19 0600 299 lb 13.2 oz (136 kg)     Physical Exam:    General appearance: sedated on vent  Skin: warm and dry, no rash or erythema, no jaundice  Eyes: conjunctivae pale   ENT: : ETT in place    Neck: supple  Pulmonary: no wheezing or rhonchi. No rales heard.   Cardiovascular: Normal S1 & S2, irregular No S3 or  S4, no Pericardial Rub no Murmur   Abdomen: bowel sounds hypoactive  , somewhat tense, no fluid wave  Extremities:no cyanosis, clubbing , + thigh edema    Labs:   CBC:  Recent Labs     19  0603  19  0411  19  1546 19  0048 19  0429   WBC 40.3*  --  32.6*  --   --   --  23.2*   RBC 2.28*  --  2.32*  --   --   --  2.43*   HGB 7.3*   < > 7.1*   < > 7.0* 6.9* 7.5*   HCT 21.3*   < > 21.7*   < > 21.1* 21.1* 22.5*   MCV 93.4  --  93.5  --   --   --  92.6   MCH 32.0  --  30.6  --   --   --  30.9   MCHC 34.3  --  32.7  --   --   --  33.3   RDW 15.0*  --  14.6*  --   --   --  14.4     --  146  --   --   --  100* MPV 11.1  --  11.0  --   --   --  11.0    < > = values in this interval not displayed.       BMP:   Recent Labs     03/30/19 0603 03/31/19 0411 04/01/19 0429    135 140   K 4.9 4.4 3.7   CL 97* 96* 101   CO2 23 22 23   * 125* 140*   CREATININE 4.58* 4.53* 4.43*   GLUCOSE 179* 163* 177*   CALCIUM 7.8* 7.6* 7.7*        Phosphorus:    Recent Labs     03/30/19 0603 03/31/19 0411 04/01/19 0429   PHOS 6.4* 6.9* 6.0*     Magnesium:   Recent Labs     03/30/19 0603 03/31/19 0411 04/01/19 0429   MG 2.7* 2.9* 2.9*     Albumin:   Recent Labs     03/30/19 0603 03/31/19 0411 04/01/19 0429   LABALBU 2.8* 2.6* 2.7*       SPEP:   Lab Results   Component Value Date    PROT 5.0 04/01/2019    ALBCAL PENDING 03/29/2019    ALBPCT PENDING 03/29/2019    LABALPH PENDING 03/29/2019    LABALPH PENDING 03/29/2019    A1PCT PENDING 03/29/2019    A2PCT PENDING 03/29/2019    LABBETA PENDING 03/29/2019    BETAPCT PENDING 03/29/2019    GAMGLOB PENDING 03/29/2019    GGPCT PENDING 03/29/2019    PATH PENDING 03/29/2019     Urine Creatinine:    Lab Results   Component Value Date    LABCREA 111.9 03/29/2019       Urinalysis:  U/A:   Lab Results   Component Value Date    NITRU NEGATIVE 03/28/2019    COLORU DARK YELLOW 03/28/2019    PHUR 5.0 03/28/2019    WBCUA 5 TO 10 03/28/2019    RBCUA 5 TO 10 03/28/2019    MUCUS NOT REPORTED 03/28/2019    TRICHOMONAS NOT REPORTED 03/28/2019    YEAST NOT REPORTED 03/28/2019    BACTERIA NOT REPORTED 03/28/2019    SPECGRAV 1.024 03/28/2019    LEUKOCYTESUR TRACE 03/28/2019    UROBILINOGEN Normal 03/28/2019    BILIRUBINUR NEGATIVE  Verified by ictotest. 03/28/2019    GLUCOSEU TRACE 03/28/2019    KETUA TRACE 03/28/2019    AMORPHOUS NOT REPORTED 03/28/2019     CAT SCAN ;     1. Large left retroperitoneal hematoma extending from the posterior   hemidiaphragm to the upper pelvis.  Hematocrit effect with a hematoma   measuring maximally 9.3 cm in diameter and about 14 cm in length.  There is   also a small amount of upper abdominal ascites.  No free air. 2. Bilateral pleural effusions with dependent lower lobe atelectasis. 3. No evidence of bowel obstruction.  Diverticulosis with no acute features       Assessment:  1. ERNESTO secondary to ischemic ATN in face of hemorrhagic shock secondary to retroperitoneal bleed. Hgb stable, Cr plateau K 4.4, U/O improving on intermittent diuretics. 2. Recent influenza A infection with PNA  3. Hx of hypertension. Currently on only one pressor. 4. Chronic AFib   5. VDRF. 6. Cardiology considering GUSTAVO/CV prior to discharge  7. Azotemia +ve steroids. Plan:  1. holding dialysis  today, electrolytes stable and uop good. I am concerned regarding his azotemia. 2. Wean steroids. 3. Will need another dose of lasix today    4. Risk (including SCD) and benefits of dialysis were discussed with pts family in detail Friday. They were agreeable if need be. 5. Following closely       Thank you for the consultation. Please do not hesitate to call with questions.     Electronically signed by Chandra Calderón MD on 4/1/2019 at 11:03 AM

## 2019-04-01 NOTE — PLAN OF CARE
perceptions  Outcome: Ongoing  Goal: Demonstrates accurate environmental perceptions  Description  Demonstrates accurate environmental perceptions  Outcome: Ongoing  Goal: Able to distinguish between reality-based and nonreality-based thinking  Description  Able to distinguish between reality-based and nonreality-based thinking  Outcome: Ongoing  Goal: Able to interrupt nonreality-based thinking  Description  Able to interrupt nonreality-based thinking  Outcome: Ongoing     Problem: Restraint Use - Nonviolent/Non-Self-Destructive Behavior:  Goal: Absence of restraint indications  Description  Absence of restraint indications  Outcome: Not Met This Shift  Goal: Absence of restraint-related injury  Description  Absence of restraint-related injury  Outcome: Met This Shift     Problem: SKIN INTEGRITY  Goal: Skin integrity is maintained or improved  4/1/2019 1932 by Stephane Moore RN  Outcome: Ongoing  4/1/2019 1031 by Maryjo Colin RCP  Outcome: Ongoing

## 2019-04-02 ENCOUNTER — APPOINTMENT (OUTPATIENT)
Dept: GENERAL RADIOLOGY | Age: 74
DRG: 207 | End: 2019-04-02
Attending: INTERNAL MEDICINE
Payer: MEDICARE

## 2019-04-02 LAB
ABO/RH: NORMAL
ABSOLUTE EOS #: 0 K/UL (ref 0–0.4)
ABSOLUTE IMMATURE GRANULOCYTE: 0.44 K/UL (ref 0–0.3)
ABSOLUTE LYMPH #: 1.33 K/UL (ref 1–4.8)
ABSOLUTE MONO #: 2.21 K/UL (ref 0.1–0.8)
ALBUMIN SERPL-MCNC: 2.9 G/DL (ref 3.5–5.2)
ALBUMIN/GLOBULIN RATIO: 1.1 (ref 1–2.5)
ALLEN TEST: POSITIVE
ALP BLD-CCNC: 102 U/L (ref 40–129)
ALT SERPL-CCNC: 301 U/L (ref 5–41)
ANION GAP SERPL CALCULATED.3IONS-SCNC: 19 MMOL/L (ref 9–17)
ANTIBODY SCREEN: NEGATIVE
ARM BAND NUMBER: NORMAL
AST SERPL-CCNC: 59 U/L
BASOPHILS # BLD: 0 % (ref 0–2)
BASOPHILS ABSOLUTE: 0 K/UL (ref 0–0.2)
BILIRUB SERPL-MCNC: 0.52 MG/DL (ref 0.3–1.2)
BILIRUBIN DIRECT: 0.28 MG/DL
BILIRUBIN, INDIRECT: 0.24 MG/DL (ref 0–1)
BLD PROD TYP BPU: NORMAL
BUN BLDV-MCNC: 148 MG/DL (ref 8–23)
BUN/CREAT BLD: ABNORMAL (ref 9–20)
CALCIUM IONIZED: 1.09 MMOL/L (ref 1.13–1.33)
CALCIUM SERPL-MCNC: 7.9 MG/DL (ref 8.6–10.4)
CHLORIDE BLD-SCNC: 103 MMOL/L (ref 98–107)
CO2: 22 MMOL/L (ref 20–31)
CREAT SERPL-MCNC: 4.4 MG/DL (ref 0.7–1.2)
CROSSMATCH RESULT: NORMAL
DIFFERENTIAL TYPE: ABNORMAL
DISPENSE STATUS BLOOD BANK: NORMAL
EOSINOPHILS RELATIVE PERCENT: 0 % (ref 1–4)
EXPIRATION DATE: NORMAL
FIO2: 50
GFR AFRICAN AMERICAN: 16 ML/MIN
GFR NON-AFRICAN AMERICAN: 13 ML/MIN
GFR SERPL CREATININE-BSD FRML MDRD: ABNORMAL ML/MIN/{1.73_M2}
GFR SERPL CREATININE-BSD FRML MDRD: ABNORMAL ML/MIN/{1.73_M2}
GLOBULIN: ABNORMAL G/DL (ref 1.5–3.8)
GLUCOSE BLD-MCNC: 127 MG/DL (ref 75–110)
GLUCOSE BLD-MCNC: 132 MG/DL (ref 75–110)
GLUCOSE BLD-MCNC: 136 MG/DL (ref 75–110)
GLUCOSE BLD-MCNC: 140 MG/DL (ref 75–110)
GLUCOSE BLD-MCNC: 147 MG/DL (ref 75–110)
GLUCOSE BLD-MCNC: 147 MG/DL (ref 75–110)
GLUCOSE BLD-MCNC: 148 MG/DL (ref 75–110)
GLUCOSE BLD-MCNC: 152 MG/DL (ref 75–110)
GLUCOSE BLD-MCNC: 154 MG/DL (ref 75–110)
GLUCOSE BLD-MCNC: 157 MG/DL (ref 75–110)
GLUCOSE BLD-MCNC: 165 MG/DL (ref 75–110)
GLUCOSE BLD-MCNC: 168 MG/DL (ref 70–99)
GLUCOSE BLD-MCNC: 192 MG/DL (ref 75–110)
GLUCOSE BLD-MCNC: 195 MG/DL (ref 75–110)
HCT VFR BLD CALC: 24.6 % (ref 40.7–50.3)
HCT VFR BLD CALC: 25.1 % (ref 40.7–50.3)
HCT VFR BLD CALC: 25.5 % (ref 40.7–50.3)
HEMOGLOBIN: 7.9 G/DL (ref 13–17)
HEMOGLOBIN: 8 G/DL (ref 13–17)
HEMOGLOBIN: 8 G/DL (ref 13–17)
IMMATURE GRANULOCYTES: 2 %
LYMPHOCYTES # BLD: 6 % (ref 24–44)
MAGNESIUM: 2.9 MG/DL (ref 1.6–2.6)
MCH RBC QN AUTO: 31.1 PG (ref 25.2–33.5)
MCHC RBC AUTO-ENTMCNC: 31 G/DL (ref 28.4–34.8)
MCV RBC AUTO: 100.4 FL (ref 82.6–102.9)
MODE: ABNORMAL
MONOCYTES # BLD: 10 % (ref 1–7)
MORPHOLOGY: ABNORMAL
MORPHOLOGY: ABNORMAL
NEGATIVE BASE EXCESS, ART: ABNORMAL (ref 0–2)
NRBC AUTOMATED: 3.1 PER 100 WBC
NUCLEATED RED BLOOD CELLS: 3 PER 100 WBC
O2 DEVICE/FLOW/%: ABNORMAL
PATIENT TEMP: 37.9
PDW BLD-RTO: 15.9 % (ref 11.8–14.4)
PHOSPHORUS: 6 MG/DL (ref 2.5–4.5)
PLATELET # BLD: ABNORMAL K/UL (ref 138–453)
PLATELET ESTIMATE: ABNORMAL
PLATELET, FLUORESCENCE: 84 K/UL (ref 138–453)
PLATELET, IMMATURE FRACTION: 4.1 % (ref 1.1–10.3)
PMV BLD AUTO: ABNORMAL FL (ref 8.1–13.5)
POC HCO3: 27.4 MMOL/L (ref 21–28)
POC O2 SATURATION: 88 % (ref 94–98)
POC PCO2 TEMP: 43 MM HG
POC PCO2: 41.1 MM HG (ref 35–48)
POC PH TEMP: 7.42
POC PH: 7.43 (ref 7.35–7.45)
POC PO2 TEMP: 58 MM HG
POC PO2: 54.1 MM HG (ref 83–108)
POSITIVE BASE EXCESS, ART: 3 (ref 0–3)
POTASSIUM SERPL-SCNC: 3.6 MMOL/L (ref 3.7–5.3)
RBC # BLD: 2.54 M/UL (ref 4.21–5.77)
RBC # BLD: ABNORMAL 10*6/UL
SAMPLE SITE: ABNORMAL
SEG NEUTROPHILS: 82 % (ref 36–66)
SEGMENTED NEUTROPHILS ABSOLUTE COUNT: 18.12 K/UL (ref 1.8–7.7)
SODIUM BLD-SCNC: 144 MMOL/L (ref 135–144)
TCO2 (CALC), ART: 29 MMOL/L (ref 22–29)
TOTAL PROTEIN: 5.5 G/DL (ref 6.4–8.3)
TRANSFUSION STATUS: NORMAL
UNIT DIVISION: 0
UNIT NUMBER: NORMAL
WBC # BLD: 22.1 K/UL (ref 3.5–11.3)
WBC # BLD: ABNORMAL 10*3/UL

## 2019-04-02 PROCEDURE — 6360000002 HC RX W HCPCS: Performed by: STUDENT IN AN ORGANIZED HEALTH CARE EDUCATION/TRAINING PROGRAM

## 2019-04-02 PROCEDURE — 89220 SPUTUM SPECIMEN COLLECTION: CPT

## 2019-04-02 PROCEDURE — 85025 COMPLETE CBC W/AUTO DIFF WBC: CPT

## 2019-04-02 PROCEDURE — 2000000000 HC ICU R&B

## 2019-04-02 PROCEDURE — 94762 N-INVAS EAR/PLS OXIMTRY CONT: CPT

## 2019-04-02 PROCEDURE — 6370000000 HC RX 637 (ALT 250 FOR IP): Performed by: STUDENT IN AN ORGANIZED HEALTH CARE EDUCATION/TRAINING PROGRAM

## 2019-04-02 PROCEDURE — 85014 HEMATOCRIT: CPT

## 2019-04-02 PROCEDURE — 6360000002 HC RX W HCPCS: Performed by: INTERNAL MEDICINE

## 2019-04-02 PROCEDURE — 2580000003 HC RX 258: Performed by: STUDENT IN AN ORGANIZED HEALTH CARE EDUCATION/TRAINING PROGRAM

## 2019-04-02 PROCEDURE — 2500000003 HC RX 250 WO HCPCS: Performed by: INTERNAL MEDICINE

## 2019-04-02 PROCEDURE — 99233 SBSQ HOSP IP/OBS HIGH 50: CPT | Performed by: INTERNAL MEDICINE

## 2019-04-02 PROCEDURE — 94770 HC ETCO2 MONITOR DAILY: CPT

## 2019-04-02 PROCEDURE — 86022 PLATELET ANTIBODIES: CPT

## 2019-04-02 PROCEDURE — 99291 CRITICAL CARE FIRST HOUR: CPT | Performed by: INTERNAL MEDICINE

## 2019-04-02 PROCEDURE — 6370000000 HC RX 637 (ALT 250 FOR IP): Performed by: EMERGENCY MEDICINE

## 2019-04-02 PROCEDURE — 85018 HEMOGLOBIN: CPT

## 2019-04-02 PROCEDURE — 82947 ASSAY GLUCOSE BLOOD QUANT: CPT

## 2019-04-02 PROCEDURE — 36416 COLLJ CAPILLARY BLOOD SPEC: CPT

## 2019-04-02 PROCEDURE — 2700000000 HC OXYGEN THERAPY PER DAY

## 2019-04-02 PROCEDURE — 82803 BLOOD GASES ANY COMBINATION: CPT

## 2019-04-02 PROCEDURE — 2580000003 HC RX 258: Performed by: INTERNAL MEDICINE

## 2019-04-02 PROCEDURE — 6370000000 HC RX 637 (ALT 250 FOR IP): Performed by: INTERNAL MEDICINE

## 2019-04-02 PROCEDURE — 85055 RETICULATED PLATELET ASSAY: CPT

## 2019-04-02 PROCEDURE — 82330 ASSAY OF CALCIUM: CPT

## 2019-04-02 PROCEDURE — 83735 ASSAY OF MAGNESIUM: CPT

## 2019-04-02 PROCEDURE — C9113 INJ PANTOPRAZOLE SODIUM, VIA: HCPCS | Performed by: STUDENT IN AN ORGANIZED HEALTH CARE EDUCATION/TRAINING PROGRAM

## 2019-04-02 PROCEDURE — 80048 BASIC METABOLIC PNL TOTAL CA: CPT

## 2019-04-02 PROCEDURE — 2580000003 HC RX 258: Performed by: EMERGENCY MEDICINE

## 2019-04-02 PROCEDURE — 94640 AIRWAY INHALATION TREATMENT: CPT

## 2019-04-02 PROCEDURE — 87205 SMEAR GRAM STAIN: CPT

## 2019-04-02 PROCEDURE — 5A1D70Z PERFORMANCE OF URINARY FILTRATION, INTERMITTENT, LESS THAN 6 HOURS PER DAY: ICD-10-PCS | Performed by: INTERNAL MEDICINE

## 2019-04-02 PROCEDURE — 36415 COLL VENOUS BLD VENIPUNCTURE: CPT

## 2019-04-02 PROCEDURE — 80076 HEPATIC FUNCTION PANEL: CPT

## 2019-04-02 PROCEDURE — 90935 HEMODIALYSIS ONE EVALUATION: CPT

## 2019-04-02 PROCEDURE — 84100 ASSAY OF PHOSPHORUS: CPT

## 2019-04-02 PROCEDURE — 94003 VENT MGMT INPAT SUBQ DAY: CPT

## 2019-04-02 PROCEDURE — 6360000002 HC RX W HCPCS

## 2019-04-02 PROCEDURE — 71045 X-RAY EXAM CHEST 1 VIEW: CPT

## 2019-04-02 PROCEDURE — 87070 CULTURE OTHR SPECIMN AEROBIC: CPT

## 2019-04-02 RX ORDER — HEPARIN SODIUM 5000 [USP'U]/ML
1.5 INJECTION, SOLUTION INTRAVENOUS; SUBCUTANEOUS PRN
Status: DISCONTINUED | OUTPATIENT
Start: 2019-04-02 | End: 2019-04-02

## 2019-04-02 RX ORDER — HEPARIN SODIUM 5000 [USP'U]/ML
INJECTION, SOLUTION INTRAVENOUS; SUBCUTANEOUS
Status: COMPLETED
Start: 2019-04-02 | End: 2019-04-02

## 2019-04-02 RX ORDER — HEPARIN SODIUM 5000 [USP'U]/ML
1.6 INJECTION, SOLUTION INTRAVENOUS; SUBCUTANEOUS PRN
Status: DISCONTINUED | OUTPATIENT
Start: 2019-04-02 | End: 2019-04-02

## 2019-04-02 RX ORDER — FENTANYL CITRATE 50 UG/ML
100 INJECTION, SOLUTION INTRAMUSCULAR; INTRAVENOUS
Status: DISCONTINUED | OUTPATIENT
Start: 2019-04-02 | End: 2019-04-10 | Stop reason: HOSPADM

## 2019-04-02 RX ORDER — HEPARIN SODIUM 1000 [USP'U]/ML
1600 INJECTION, SOLUTION INTRAVENOUS; SUBCUTANEOUS PRN
Status: DISCONTINUED | OUTPATIENT
Start: 2019-04-02 | End: 2019-04-02

## 2019-04-02 RX ORDER — AMIODARONE HYDROCHLORIDE 200 MG/1
200 TABLET ORAL 2 TIMES DAILY
Status: DISCONTINUED | OUTPATIENT
Start: 2019-04-02 | End: 2019-04-10 | Stop reason: HOSPADM

## 2019-04-02 RX ORDER — MIDAZOLAM HYDROCHLORIDE 1 MG/ML
4 INJECTION INTRAMUSCULAR; INTRAVENOUS ONCE
Status: COMPLETED | OUTPATIENT
Start: 2019-04-02 | End: 2019-04-02

## 2019-04-02 RX ORDER — HEPARIN SODIUM 1000 [USP'U]/ML
1500 INJECTION, SOLUTION INTRAVENOUS; SUBCUTANEOUS PRN
Status: DISCONTINUED | OUTPATIENT
Start: 2019-04-02 | End: 2019-04-02

## 2019-04-02 RX ORDER — FENTANYL CITRATE 50 UG/ML
50 INJECTION, SOLUTION INTRAMUSCULAR; INTRAVENOUS
Status: DISCONTINUED | OUTPATIENT
Start: 2019-04-02 | End: 2019-04-10 | Stop reason: HOSPADM

## 2019-04-02 RX ORDER — INSULIN GLARGINE 100 [IU]/ML
20 INJECTION, SOLUTION SUBCUTANEOUS NIGHTLY
Status: DISCONTINUED | OUTPATIENT
Start: 2019-04-02 | End: 2019-04-03

## 2019-04-02 RX ADMIN — OXYCODONE HYDROCHLORIDE 5 MG: 5 TABLET ORAL at 21:20

## 2019-04-02 RX ADMIN — FENTANYL CITRATE 50 MCG: 50 INJECTION, SOLUTION INTRAMUSCULAR; INTRAVENOUS at 07:59

## 2019-04-02 RX ADMIN — HEPARIN SODIUM 8000 UNITS: 5000 INJECTION, SOLUTION INTRAVENOUS; SUBCUTANEOUS at 12:30

## 2019-04-02 RX ADMIN — Medication 1.6 ML: at 16:15

## 2019-04-02 RX ADMIN — HEPARIN SODIUM 7500 UNITS: 5000 INJECTION, SOLUTION INTRAVENOUS; SUBCUTANEOUS at 12:30

## 2019-04-02 RX ADMIN — Medication 1.5 ML: at 16:15

## 2019-04-02 RX ADMIN — Medication 10 ML: at 08:58

## 2019-04-02 RX ADMIN — SODIUM CHLORIDE 5.8 UNITS/HR: 9 INJECTION, SOLUTION INTRAVENOUS at 00:26

## 2019-04-02 RX ADMIN — METOPROLOL TARTRATE 25 MG: 25 TABLET ORAL at 19:57

## 2019-04-02 RX ADMIN — CALCIUM GLUCONATE 1 G: 98 INJECTION, SOLUTION INTRAVENOUS at 08:52

## 2019-04-02 RX ADMIN — AMIODARONE HYDROCHLORIDE 1 MG/MIN: 50 INJECTION, SOLUTION INTRAVENOUS at 08:58

## 2019-04-02 RX ADMIN — FENTANYL CITRATE 100 MCG: 50 INJECTION, SOLUTION INTRAMUSCULAR; INTRAVENOUS at 21:35

## 2019-04-02 RX ADMIN — PANTOPRAZOLE SODIUM 40 MG: 40 INJECTION, POWDER, FOR SOLUTION INTRAVENOUS at 08:54

## 2019-04-02 RX ADMIN — Medication 100 MG: at 08:54

## 2019-04-02 RX ADMIN — FUROSEMIDE 40 MG: 10 INJECTION, SOLUTION INTRAMUSCULAR; INTRAVENOUS at 08:55

## 2019-04-02 RX ADMIN — HYDROCORTISONE SODIUM SUCCINATE 50 MG: 100 INJECTION, POWDER, FOR SOLUTION INTRAMUSCULAR; INTRAVENOUS at 08:54

## 2019-04-02 RX ADMIN — AMIODARONE HYDROCHLORIDE 1 MG/MIN: 50 INJECTION, SOLUTION INTRAVENOUS at 02:40

## 2019-04-02 RX ADMIN — MIDAZOLAM HYDROCHLORIDE 4 MG: 1 INJECTION, SOLUTION INTRAMUSCULAR; INTRAVENOUS at 12:08

## 2019-04-02 RX ADMIN — ALBUTEROL SULFATE 2.5 MG: 2.5 SOLUTION RESPIRATORY (INHALATION) at 14:12

## 2019-04-02 RX ADMIN — FENTANYL CITRATE 50 MCG: 50 INJECTION, SOLUTION INTRAMUSCULAR; INTRAVENOUS at 02:39

## 2019-04-02 RX ADMIN — Medication 10 ML: at 21:36

## 2019-04-02 RX ADMIN — FENTANYL CITRATE 100 MCG: 50 INJECTION, SOLUTION INTRAMUSCULAR; INTRAVENOUS at 10:39

## 2019-04-02 RX ADMIN — METOPROLOL TARTRATE 25 MG: 25 TABLET ORAL at 12:37

## 2019-04-02 RX ADMIN — Medication 10 ML: at 08:54

## 2019-04-02 RX ADMIN — INSULIN GLARGINE 20 UNITS: 100 INJECTION, SOLUTION SUBCUTANEOUS at 18:24

## 2019-04-02 RX ADMIN — FENTANYL CITRATE 100 MCG: 50 INJECTION, SOLUTION INTRAMUSCULAR; INTRAVENOUS at 12:08

## 2019-04-02 RX ADMIN — AMIODARONE HYDROCHLORIDE 200 MG: 200 TABLET ORAL at 12:37

## 2019-04-02 RX ADMIN — CEFTRIAXONE SODIUM 2 G: 2 INJECTION, POWDER, FOR SOLUTION INTRAMUSCULAR; INTRAVENOUS at 19:58

## 2019-04-02 RX ADMIN — AMIODARONE HYDROCHLORIDE 200 MG: 200 TABLET ORAL at 19:57

## 2019-04-02 RX ADMIN — FENTANYL CITRATE 100 MCG: 50 INJECTION, SOLUTION INTRAMUSCULAR; INTRAVENOUS at 19:42

## 2019-04-02 RX ADMIN — CITALOPRAM 10 MG: 10 TABLET, FILM COATED ORAL at 08:58

## 2019-04-02 RX ADMIN — FENTANYL CITRATE 50 MCG: 50 INJECTION, SOLUTION INTRAMUSCULAR; INTRAVENOUS at 04:57

## 2019-04-02 ASSESSMENT — PULMONARY FUNCTION TESTS
PIF_VALUE: 23
PIF_VALUE: 22
PIF_VALUE: 17
PIF_VALUE: 24
PIF_VALUE: 22
PIF_VALUE: 11
PIF_VALUE: 17
PIF_VALUE: 20
PIF_VALUE: 22
PIF_VALUE: 22
PIF_VALUE: 24
PIF_VALUE: 21
PIF_VALUE: 22
PIF_VALUE: 24
PIF_VALUE: 23

## 2019-04-02 ASSESSMENT — PAIN SCALES - GENERAL
PAINLEVEL_OUTOF10: 5
PAINLEVEL_OUTOF10: 4
PAINLEVEL_OUTOF10: 1

## 2019-04-02 NOTE — PROGRESS NOTES
leading to his admission at the [facility] facility on 3/18/19 where he was started on Ceftriaxone, Azithromycin and Tamiflu. He was transferred to [facility] on 3/19/19 because of hypoxic respiratory distress and elevated troponin. He was admitted to ICU and started on BiPAP. CXR on 3-19-19 showed reticular infiltrate and LLL pneumonia. Respiratory culture was obtained on 3/22/19 and showed few gram positive cocci in clusters. Legionella antigen, Strep pneumoniae anitgen and Mycoplasma Ab negative. MRSA swab negative    The patient was intubated on 3/21/19 because of deteriorating respiratory status. The patient completed a course of Unique-flu on 3-25-19. He completed a course of abx and steroids on 3-26-19. The patient spiked a fever (t max 100.4) and became hypotensive requiring pressure support on 3-29-19. He was started on Vancomycin and Zosyn empirically. Blood, urine and sputum cultures were obtained and currently pending. The patient additionally was found to have a Lt sided spontaneous retroperitoneal bleed on 3-29-19 and was hypovolemic requiring multiple transfusions. H&H has been stable today at 9.0. The patient continues to require pressor support and is tachycardic (120). Presently the patient is intubated, sedated and on 3 pressors. He has been febrile (t max 101.5), tachycardic (110-120), tachypneic (30-40's). Lactic acid was elevated at 7.4 yesterday. WBC count has been elevated at 45.3 today. Hgb stable. His kidney function is worsening Cr 3.74    He is on zosyn. Vancomycin held because trough level 18.5 (3/29/19)  Cultures negative so far. CURRENT EVALUATION : 4/2/2019    Afebrile to low grade fevers  VS stable  Remains on ventilator support  Off levophed  On amiodarone drip    Off sedation   Moves all extremities on his own  Opens eyes    ERNESTO worse  Pankaj to be placed for HD.       Labs Reviewed: 4/2/2019  WBC: 20.1 > 27.1 > 45.3-->22.1  Hgb: 9.0 > 8.2 > 8.6-->7.9  Lactic acid: 7.4 > 5.5 > 2.6 > 2.4    BUN: 68 > 84 > 87-->148  Cr: 1.83 > 3.21 > 3.74-->4.4      Cultures:  Urine:   · 3/28/19: No growth  · 3/15/19: No growth    Blood:   · 3/28/19: No growth    Sputum :  ·  3/28/19: No growth  · Gram positive cocci in clusters 3/22/19. Normal mansoor    Rapid flu positive for influenza A (3/15/19)    Discussed with patient, RN. I have personally reviewed the past medical history, past surgical history, medications, social history, and family history, and I have updated the database accordingly. Past Medical History:     Past Medical History:   Diagnosis Date    Abnormal nuclear stress test 03/12/2010    Stress and resting cardiolite images showed inferior wall hypoperfusion suggestive of previous myocardial infarction. Left ventricular wall motion showed inferior wall hypokinesia. EF 58%.  Arrhythmia 2007    A single episode    Atrial fibrillation ([facility] [address] 2007    Single Episode    CAD (coronary artery disease) 1996    MI    DM type 2 (diabetes mellitus, type 2) ([facility] [address] 2005    Hx of echocardiogram 04/05/2007    EF 62%, Increased left atrial and right ventricular diametes. Increased septal and posterior wall thickness suggest left ventricular hypertrophy.  Valves were normal.        Past Surgical  History:     Past Surgical History:   Procedure Laterality Date    CARDIOVERSION  05/18/2007    Successful-Dr. Leggett-[facility] [place]    CARDIOVERSION  03/13    CORONARY ANGIOPLASTY  1996       Medications:      amiodarone  200 mg Oral BID    metoprolol tartrate  25 mg Oral BID    midazolam  4 mg Intravenous Once    sodium chloride  250 mL Intravenous Once    citalopram  10 mg Oral Daily    pantoprazole  40 mg Intravenous BID    And    sodium chloride (PF)  10 mL Intravenous Daily    furosemide  40 mg Intravenous Daily    cefTRIAXone (ROCEPHIN) IV  2 g Intravenous Q24H    docusate  100 mg Oral Daily    sodium chloride flush  10 mL Intravenous 2 times per day       Social History: Social History     Socioeconomic History    Marital status:      Spouse name: Not on file    Number of children: Not on file    Years of education: Not on file    Highest education level: Not on file   Occupational History    Not on file   Social Needs    Financial resource strain: Not on file    Food insecurity:     Worry: Not on file     Inability: Not on file    Transportation needs:     Medical: Not on file     Non-medical: Not on file   Tobacco Use    Smoking status: Former Smoker     Packs/day: 1.50     Years: 30.00     Pack years: 45.00     Types: Cigarettes     Last attempt to quit: 1996     Years since quittin.8    Smokeless tobacco: Never Used   Substance and Sexual Activity    Alcohol use: No    Drug use: No    Sexual activity: Not on file   Lifestyle    Physical activity:     Days per week: Not on file     Minutes per session: Not on file    Stress: Not on file   Relationships    Social connections:     Talks on phone: Not on file     Gets together: Not on file     Attends Sikhism service: Not on file     Active member of club or organization: Not on file     Attends meetings of clubs or organizations: Not on file     Relationship status: Not on file    Intimate partner violence:     Fear of current or ex partner: Not on file     Emotionally abused: Not on file     Physically abused: Not on file     Forced sexual activity: Not on file   Other Topics Concern    Not on file   Social History Narrative    Not on file       Family History:   No family history on file. Allergies:   Patient has no known allergies. Review of Systems:   Unable to perform ROS. Pt intubated and sedated.     Physical Examination :     Patient Vitals for the past 8 hrs:   BP Temp Temp src Pulse Resp SpO2 Weight   19 1100 129/64 -- -- 89 26 90 % --   19 1012 -- -- -- 88 30 93 % --   19 1000 135/64 -- -- 91 (!) 31 (!) 89 % --   19 0900 129/70 -- -- 92 30 94 % -- 04/02/19 0832 134/65 -- -- 92 28 92 % --   04/02/19 0800 (!) 130/56 99.5 °F (37.5 °C) CORE 93 24 91 % --   04/02/19 0738 -- -- -- 90 30 93 % --   04/02/19 0700 130/67 -- -- 96 23 91 % --   04/02/19 0600 120/60 -- -- 95 (!) 31 92 % (!) 301 lb 9.4 oz (136.8 kg)   04/02/19 0530 (!) 116/55 -- -- 90 26 91 % --   04/02/19 0500 (!) 117/59 -- -- 91 23 91 % --   04/02/19 0400 (!) 127/56 100.4 °F (38 °C) CORE 94 30 91 % --     General Appearance: Intubated and sedated. Head:  Normocephalic, no trauma  ENT: Intubated. Mouth/throat: mucosa pink and moist.  Neck: Supple, without lymphadenopathy. Pulmonary/Chest: Coarse sounds  Cardiovascular: Irregular rate and rhythm without murmurs, rubs, or gallops. Abdomen: Soft, distended. Bowel sounds normal.  All four Extremities: No cyanosis, clubbing, edema, or effusions. Neurologic: Sedated  Skin: Warm and dry with good turgor. No signs of peripheral arterial or venous insufficiency. No ulcerations. No open wounds. Medical Decision Making -Laboratory:   I have independently reviewed/ordered the following labs:    CBC with Differential:   Recent Labs     04/01/19 0429 04/02/19 0447 04/02/19  1049   WBC 23.2*  --  22.1*  --    HGB 7.5*   < > 7.9* 8.0*   HCT 22.5*   < > 25.5* 25.1*   *  --  See Reflexed IPF Result  --    LYMPHOPCT 5*  --  6*  --    MONOPCT 11*  --  10*  --     < > = values in this interval not displayed. BMP:   Recent Labs     04/01/19 0429 04/02/19  0447    144   K 3.7 3.6*    103   CO2 23 22   * 148*   CREATININE 4.43* 4.40*   MG 2.9* 2.9*     Hepatic Function Panel:   Recent Labs     04/01/19  0429 04/02/19  0447   PROT 5.0* 5.5*   LABALBU 2.7* 2.9*   BILIDIR 0.33* 0.28   IBILI 0.29 0.24   BILITOT 0.62 0.52   ALKPHOS 92 102   * 301*   * 59*     No results for input(s): RPR in the last 72 hours. No results for input(s): HIV in the last 72 hours. No results for input(s): BC in the last 72 hours.   Lab Results

## 2019-04-02 NOTE — PROGRESS NOTES
INTENSIVE CARE UNIT  Resident Physician Progress Note    Patient - Desire Pelayo  Date of Admission -  3/19/2019  7:58 AM  Date of Evaluation -  4/2/2019  Room and Bed Number -  0105/0105-01   Hospital Day - 15    Chief complaint influenza, pneumonia, A. fib with RVR  SUBJECTIVE:     OVERNIGHT EVENTS:   No acute events overnight  Patient opening eyes on calling, wiggling toes off sedation  SBP ranging between 120 and 130  Tmax 100.4  Still having thick and secretions from ET tube, on 50% FiO2, desaturated to 83% once this morning  Tachypneic, chest x-ray showing worsening edema  Had 3 L urine output last 24 hours  BUN worsening 148, creatinine stable 4.40, thrombocytopenia stable 84, leukocytosis 22.1  AST, ALT improving      I/O last 3 completed shifts: In: 2935.2 [I.V.:1331.2; NG/GT:1604]  Out: 9469 [EQBPU:5872]  I/O this shift: In: 465 [I.V.:186; NG/GT:279]  Out: 390 [Urine:390]      Results for Maribel MAST (MRN [id number]) as of 4/1/2019 08:22   Ref. Range 4/1/2019 04:15   POC pH Latest Ref Range: 7.350 - 7.450  7.504 (H)   POC pH Temp Unknown NOT REPORTED   POC pCO2 Latest Ref Range: 35.0 - 48.0 mm Hg 33.6 (L)   POC pCO2 Temp Latest Units: mm Hg NOT REPORTED   POC PO2 Latest Ref Range: 83.0 - 108.0 mm Hg 58.1 (L)   POC pO2 Temp Latest Units: mm Hg NOT REPORTED   POC HCO3 Latest Ref Range: 21.0 - 28.0 mmol/L 26.5   POC O2 SAT Latest Ref Range: 94.0 - 98.0 % 92 (L)     CXR 4/1/19     Bibasilar opacities with improvement on the left side.  Perihilar opacity on   the left has improved.  Multiple tubes and lines as above.          US RUQ 3/31/19  Impression   No evidence for acute cholecystitis, cholelithiasis or biliary dilatation.       Small volume perihepatic ascites.           AWAKE & FOLLOWING COMMANDS:  [] No   [x] Yes     SECRETIONS Amount:  [x] Small [] Moderate  [] Large  [] None  Color:     [x] White [] Colored  [x] Bloody    SEDATION:  RAAS Score:  [] Propofol gtt  [] Versed gtt  [] Ativan gtt   [x] No Sedation    PARALYZED:  [x] No    [] Yes    VASOPRESSORS:  [x] No    [] Yes  [] Levophed [] Dopamine [] Vasopressin  [] Dobutamine [] Phenylephrine [] Epinephrine    Review of Systems -  Intubated , not sedated     OBJECTIVE:     VITAL SIGNS:  /67   Pulse 85   Temp 99.5 °F (37.5 °C) (Core)   Resp 28   Ht 5' 8\" (1.727 m)   Wt (!) 301 lb 9.4 oz (136.8 kg)   SpO2 94%   BMI 45.86 kg/m²   Tmax over 24 hours:  Temp (24hrs), Av.8 °F (37.7 °C), Min:99.5 °F (37.5 °C), Max:100.4 °F (38 °C)      Patient Vitals for the past 8 hrs:   BP Temp Temp src Pulse Resp SpO2 Weight   19 1200 115/67 -- -- 85 28 94 % --   19 1100 129/64 -- -- 89 26 90 % --   19 1012 -- -- -- 88 30 93 % --   19 1000 135/64 -- -- 91 (!) 31 (!) 89 % --   19 0900 129/70 -- -- 92 30 94 % --   19 0832 134/65 -- -- 92 28 92 % --   19 0800 (!) 130/56 99.5 °F (37.5 °C) CORE 93 24 91 % --   19 0738 -- -- -- 90 30 93 % --   19 0700 130/67 -- -- 96 23 91 % --   19 0600 120/60 -- -- 95 (!) 31 92 % (!) 301 lb 9.4 oz (136.8 kg)   19 0530 (!) 116/55 -- -- 90 26 91 % --   19 0500 (!) 117/59 -- -- 91 23 91 % --         Intake/Output Summary (Last 24 hours) at 2019 1236  Last data filed at 2019 0900  Gross per 24 hour   Intake 2584.21 ml   Output 2755 ml   Net -170.79 ml     Date 19 0000 - 19 2359   Shift [id number] 6174-0010 7302-2359 24 Hour Total   INTAKE   P.O.(mL/kg/hr) 0(0)   0   I. V.(mL/kg) 565.2(4.1) 186(1.4)  751.2(5.5)   NG/GT(mL/kg) 583(4.3) 279(2)  862(6.3)   Shift Total(mL/kg) 1148.2(8.4) 465(3.4)  1613.2(11.8)   OUTPUT   Urine(mL/kg/hr) 1205(1.1) 230  1435   Shift Total(mL/kg) 1205(8.8) 230(1.7)  1435(10.5)   Weight (kg) 136.8 136.8 136.8 136.8     Wt Readings from Last 3 Encounters:   19 (!) 301 lb 9.4 oz (136.8 kg)   19 294 lb 11.2 oz (133.7 kg)   19 300 lb (136.1 kg)     Body mass index is 45.86 kg/m².         PHYSICAL EXAM:  · General appearance: intubated sedated   · HEENT: Atraumatic, normocephalic, neck supple, normal EOM, thyroid normal, no lymphadenopathy   · Lungs: Ventilating all lobes. Prolonged expiration is still wheezing is better.   Posterior basilar rales and infrascapular rales are still present   · Heart: Regular heart rate, tachycardic, S1, S2 normal, no murmur   · Abdomen: soft, non-tender; bowel sounds normal; no masses,  no organomegaly  · Extremities: No cyanosis or edema  · Neurological:  Intubated sedated , opens eyes on pain stimuli, moves all extremities but gets agitated    MEDICATIONS:  Scheduled Meds:   heparin (porcine)        heparin (porcine)        amiodarone  200 mg Oral BID    metoprolol tartrate  25 mg Oral BID    midazolam  4 mg Intravenous Once    sodium chloride  250 mL Intravenous Once    citalopram  10 mg Oral Daily    pantoprazole  40 mg Intravenous BID    And    sodium chloride (PF)  10 mL Intravenous Daily    furosemide  40 mg Intravenous Daily    cefTRIAXone (ROCEPHIN) IV  2 g Intravenous Q24H    docusate  100 mg Oral Daily    sodium chloride flush  10 mL Intravenous 2 times per day     Continuous Infusions:   norepinephrine Stopped (04/01/19 0018)    insulin (HUMAN R) non-weight based infusion 4.8 Units/hr (04/02/19 1232)    EPINEPHrine infusion Stopped (03/29/19 0005)    phenylephrine (KHADRA-SYNEPHRINE) 50mg/250mL infusion Stopped (03/30/19 0159)    vasopressin (Septic Shock) infusion Stopped (03/30/19 0603)    sodium chloride 10 mL/hr at 04/01/19 0200    dextrose       PRN Meds:     fentanNYL 50 mcg Q1H PRN   Or     fentanNYL 100 mcg Q1H PRN   heparin (porcine) 1.5 mL PRN   heparin (porcine) 1.6 mL PRN   oxyCODONE 5 mg Q4H PRN   acetaminophen 650 mg Q4H PRN   magnesium hydroxide 30 mL Daily PRN   albuterol 2.5 mg Q6H PRN   LORazepam 0.5 mg Q8H PRN   sodium chloride flush 10 mL PRN   ondansetron 4 mg Q6H PRN   glucose 15 g PRN   dextrose 12.5 g PRN   glucagon (rDNA) 1 mg PRN dextrose 100 mL/hr PRN       SUPPORT DEVICES: [x] Ventilator [] BIPAP  [] Nasal Cannula [] Room Air  . Lab Results   Component Value Date    PHART 7.310 03/18/2019    RNL1YNT 51.7 03/18/2019    PO2ART 67.8 03/18/2019    JCM1ZJA 25.4 03/18/2019    VKD6FGG 29 04/02/2019    D3YIMTGL 91.7 03/18/2019    FIO2 50.0 04/02/2019     DATA:  Complete Blood Count:   Recent Labs     03/31/19 0411 04/01/19 0429 04/01/19 2052 04/02/19 0447 04/02/19  1049   WBC 32.6*  --  23.2*  --  22.1*  --    RBC 2.32*  --  2.43*  --  2.54*  --    HGB 7.1*   < > 7.5* 7.9* 7.9* 8.0*   HCT 21.7*   < > 22.5* 23.4* 25.5* 25.1*   MCV 93.5  --  92.6  --  100.4  --    MCH 30.6  --  30.9  --  31.1  --    MCHC 32.7  --  33.3  --  31.0  --    RDW 14.6*  --  14.4  --  15.9*  --      --  100*  --  See Reflexed IPF Result  --    MPV 11.0  --  11.0  --  NOT REPORTED  --     < > = values in this interval not displayed.        Last 3 Blood Glucose:   Recent Labs     03/31/19 0411 04/01/19 0429 04/02/19 0447   GLUCOSE 163* 177* 168*        PT/INR:    Lab Results   Component Value Date    PROTIME 12.5 03/29/2019    INR 1.2 03/29/2019     PTT:    Lab Results   Component Value Date    APTT 39.4 03/28/2019       Comprehensive Metabolic Profile:   Recent Labs     03/31/19 0411 04/01/19 0429 04/02/19 0447    140 144   K 4.4 3.7 3.6*   CL 96* 101 103   CO2 22 23 22   * 140* 148*   CREATININE 4.53* 4.43* 4.40*   GLUCOSE 163* 177* 168*   CALCIUM 7.6* 7.7* 7.9*   PROT 4.9* 5.0* 5.5*   LABALBU 2.6* 2.7* 2.9*   BILITOT 0.73 0.62 0.52   ALKPHOS 81 92 102   * 110* 59*   * 412* 301*      Magnesium:   Lab Results   Component Value Date    MG 2.9 04/02/2019    MG 2.9 04/01/2019    MG 2.9 03/31/2019     Phosphorus:   Lab Results   Component Value Date    PHOS 6.0 04/02/2019    PHOS 6.0 04/01/2019    PHOS 6.9 03/31/2019     Ionized Calcium:   Lab Results   Component Value Date    CAION 1.09 04/02/2019    CAION 1.05 04/01/2019    LANON 1.04 04/01/2019        Urinalysis:   Lab Results   Component Value Date    NITRU NEGATIVE 03/28/2019    COLORU DARK YELLOW 03/28/2019    PHUR 5.0 03/28/2019    WBCUA 5 TO 10 03/28/2019    RBCUA 5 TO 10 03/28/2019    MUCUS NOT REPORTED 03/28/2019    TRICHOMONAS NOT REPORTED 03/28/2019    YEAST NOT REPORTED 03/28/2019    BACTERIA NOT REPORTED 03/28/2019    SPECGRAV 1.024 03/28/2019    LEUKOCYTESUR TRACE 03/28/2019    UROBILINOGEN Normal 03/28/2019    BILIRUBINUR NEGATIVE  Verified by ictotest. 03/28/2019    GLUCOSEU TRACE 03/28/2019    KETUA TRACE 03/28/2019    AMORPHOUS NOT REPORTED 03/28/2019       HgBA1c:    Lab Results   Component Value Date    LABA1C 8.6 03/18/2019     TSH:  No results found for: TSH  Lactic Acid:   Lab Results   Component Value Date    LACTA 2.2 03/18/2019    LACTA 2.6 03/18/2019      Troponin: No results for input(s): TROPONINI in the last 72 hours. Results for Rosalie Dixon (MRN [id number]) as of 3/23/2019 18:06   Ref.  Range 3/23/2019 04:07   POC pH Latest Ref Range: 7.350 - 7.450  7.437   POC pH Temp Unknown NOT REPORTED   POC pCO2 Latest Ref Range: 35.0 - 48.0 mm Hg 63.7 (H)   POC pCO2 Temp Latest Units: mm Hg NOT REPORTED   POC PO2 Latest Ref Range: 83.0 - 108.0 mm Hg 65.1 (L)   POC pO2 Temp Latest Units: mm Hg NOT REPORTED   POC HCO3 Latest Ref Range: 21.0 - 28.0 mmol/L 42.9 (HH)   POC O2 SAT Latest Ref Range: 94.0 - 98.0 % 92 (L)     ASSESSMENT:     Patient Active Problem List    Diagnosis Date Noted    ERNESTO (acute kidney injury) (HCC)     Transaminitis     Ischemic hepatitis     IVIS (obstructive sleep apnea)     Leukocytosis     Hemorrhagic shock (HCC)     Retroperitoneal hematoma     Lactic acidosis     Hyperglycemia     Acute blood loss anemia     Community acquired pneumonia of left lower lobe of lung ([facility] [address]     Influenza A with respiratory manifestations 03/18/2019    Acute respiratory failure with hypoxia (HCC) 03/18/2019    Influenza A 03/18/2019    Acute hypercapnic 8.8 l since admision    Chronic A. fib with RVR, controlled. Amiodarone switched to by mouth 200 mg twice a day, started on Lopressor 25 mg twice a day. Cardiology following    ERNESTO 2/2 ischemic ATN. Plan for hemodialysis today, right femoral Pankaj catheter placed, Nephrology on board. Appreciate recommendations. UO 3090 ml in last 24 hrs. Positive 8.8 l since admission    Type 2 diabetes mellitus - glucose ranging between 120 and 140, patient is on insulin drip since 6 days, will switch to lantus 20 units and sliding scale     Thrombocytopenia - 84 today from 100 yesterday. Continue to monitor. Follow up HIT antibody. Avoid heparin flushes for now     EPC cuffs for DVT prophylaxis   protonix for GI prophylaxis         [address] [facility]  PGY2 Internal Medicine  [facility]    Attending Physician Statement  I have discussed the care of Sravanthi Flowers, including pertinent history and exam findings with the resident. I have reviewed the key elements of all parts of the encounter with the resident. I have seen and examined the patient with the resident. I agree with the assessment and plan and status of the problem list as documented. I seen the patient during my round today, I have reviewed the labs, medication seen and chart reviewed. He is not on pressors he does have low-grade fever temperature of 398.7, systolic blood pressure between 120 to 130s and his heart rate is controlled with amiodarone.    He is more hypoxic today and his PO2/FiO2 ratio is worsening, his chest x-ray shows evidence of pulmonary edema although it is asymmetrical and more infiltrate on the right side as compared to the left side but it is bilateral and also perihilar but more diffuse on the right side, his WBC count is slightly better 22 today and hemoglobin is is stable, he does have large thick yellow secretion on sectioning per respiratory therapist.    Although his urine output is good with Lasix but he had worsening BUN and creatinine is currently is 4.40 and BUN is 148 today. His AST and ALT is continuing to improve, hemoglobin is stable at 8.0 and did not require any further transfusions  Continued to be slightly restless, not in any sedation did not follow any commands and remains lethargic and somnolent   He is currently on Rocephin per infectious disease. Will repeat sputum culture. Follow-up WBC count. Hemodialysis today and hemodialysis catheter was placed follow-up mentation and neuro status since hemodialysis started. May need to broaden the coverage as he has worsening infiltrate, increased FiO2 requirement, and increased endotracheal secretion, although pulmonary edema is possible we will see the response to dialysis will repeat chest x-ray tomorrow after    Discussed with respiratory therapist.  Discussed with nursing staff, treatment plan discussed. Discussed with the wife and updated about current condition   Will discuss with family tomorrow about tracheostomy and PEG. Total critical care time caring for this patient with life threatening, unstable organ failure, including direct patient contact, management of life support systems, review of data including imaging and labs, discussions with other team members and physicians at least 27  Min so far today, excluding procedures. Vivek Desouza MD  4/2/2019 6:22 PM    Please note that this chart was generated using voice recognition Dragon dictation software. Although every effort was made to ensure the accuracy of this automated transcription, some errors in transcription may have occurred.

## 2019-04-02 NOTE — PROGRESS NOTES
Nephrology Progress Note      Subjective:  Patient was seen and examined on HD. Tolerating the procedure well. Requiring HD today due to renal dysfunction, azotemia and fluid overload with respiratory distress. Mechanical vent, desatted increasing oxygen requirement FiO2 increased to 60%. Cr plateau, 9.97 mg/dl today,   K 3.6 and Bicarb 22.   Off pressors, a-fib on amiodarone, rate controlled  Had 3 liters urine output last 24 hrs.     + fluid balance since admission    Objective:  CURRENT TEMPERATURE:  Temp: 99.5 °F (37.5 °C)  MAXIMUM TEMPERATURE OVER 24HRS:  Temp (24hrs), Av.8 °F (37.7 °C), Min:99.5 °F (37.5 °C), Max:100.4 °F (38 °C)    CURRENT RESPIRATORY RATE:  Resp: 28  CURRENT PULSE:  Pulse: 85  CURRENT BLOOD PRESSURE:  BP: 115/67  24HR BLOOD PRESSURE RANGE:  Systolic (24XDW), XID:653 , Min:103 , HDT:285   ; Diastolic (76GWQ), GO, Min:44, Max:73    24HR INTAKE/OUTPUT:      Intake/Output Summary (Last 24 hours) at 2019 1231  Last data filed at 2019 0900  Gross per 24 hour   Intake 2584.21 ml   Output 2755 ml   Net -170.79 ml     Patient Vitals for the past 96 hrs (Last 3 readings):   Weight   19 0600 (!) 301 lb 9.4 oz (136.8 kg)   19 0400 (!) 304 lb 0.2 oz (137.9 kg)     Physical Exam:    General appearance: sedated on vent  Skin: warm and dry, no rash or erythema, no jaundice  Eyes: conjunctivae pale   ENT: : ETT in place    Neck: supple  Pulmonary: no wheezing or rhonchi. +ve crackles  Cardiovascular: Normal S1 & S2, irregular No S3 or  S4, no Pericardial Rub no Murmur   Abdomen: bowel sounds hypoactive  , somewhat tense, no fluid wave  Extremities:no cyanosis, clubbing , + thigh edema    Labs:   CBC:  Recent Labs     19  0411  19  0429 19  2052 19  0447 19  1049   WBC 32.6*  --  23.2*  --  22.1*  --    RBC 2.32*  --  2.43*  --  2.54*  --    HGB 7.1*   < > 7.5* 7.9* 7.9* 8.0*   HCT 21.7*   < > 22.5* 23.4* 25.5* 25.1*   MCV 93.5  --  92.6  -- 100.4  --    MCH 30.6  --  30.9  --  31.1  --    MCHC 32.7  --  33.3  --  31.0  --    RDW 14.6*  --  14.4  --  15.9*  --      --  100*  --  See Reflexed IPF Result  --    MPV 11.0  --  11.0  --  NOT REPORTED  --     < > = values in this interval not displayed. BMP:   Recent Labs     03/31/19 0411 04/01/19 0429 04/02/19 0447    140 144   K 4.4 3.7 3.6*   CL 96* 101 103   CO2 22 23 22   * 140* 148*   CREATININE 4.53* 4.43* 4.40*   GLUCOSE 163* 177* 168*   CALCIUM 7.6* 7.7* 7.9*        Phosphorus:    Recent Labs     03/31/19 0411 04/01/19 0429 04/02/19 0447   PHOS 6.9* 6.0* 6.0*     Magnesium:   Recent Labs     03/31/19 0411 04/01/19 0429 04/02/19 0447   MG 2.9* 2.9* 2.9*     Albumin:   Recent Labs     03/31/19 0411 04/01/19 0429 04/02/19  0447   LABALBU 2.6* 2.7* 2.9*       SPEP:   Lab Results   Component Value Date    PROT 5.5 04/02/2019    ALBCAL 2.6 03/29/2019    ALBPCT 56 03/29/2019    LABALPH 0.2 03/29/2019    LABALPH 0.8 03/29/2019    A1PCT 4 03/29/2019    A2PCT 17 03/29/2019    LABBETA 0.5 03/29/2019    BETAPCT 11 03/29/2019    GAMGLOB 0.6 03/29/2019    GGPCT 12 03/29/2019    PATH ELECTRONICALLY SIGNED.  Rufina Mcfarland M.D. 03/29/2019     Urine Creatinine:    Lab Results   Component Value Date    LABCREA 111.9 03/29/2019       Urinalysis:  U/A:   Lab Results   Component Value Date    NITRU NEGATIVE 03/28/2019    COLORU DARK YELLOW 03/28/2019    PHUR 5.0 03/28/2019    WBCUA 5 TO 10 03/28/2019    RBCUA 5 TO 10 03/28/2019    MUCUS NOT REPORTED 03/28/2019    TRICHOMONAS NOT REPORTED 03/28/2019    YEAST NOT REPORTED 03/28/2019    BACTERIA NOT REPORTED 03/28/2019    SPECGRAV 1.024 03/28/2019    LEUKOCYTESUR TRACE 03/28/2019    UROBILINOGEN Normal 03/28/2019    BILIRUBINUR NEGATIVE  Verified by ictotest. 03/28/2019    GLUCOSEU TRACE 03/28/2019    KETUA TRACE 03/28/2019    AMORPHOUS NOT REPORTED 03/28/2019     CAT SCAN ;     1. Large left retroperitoneal hematoma extending from the posterior   hemidiaphragm to the upper pelvis.  Hematocrit effect with a hematoma   measuring maximally 9.3 cm in diameter and about 14 cm in length.  There is   also a small amount of upper abdominal ascites.  No free air. 2. Bilateral pleural effusions with dependent lower lobe atelectasis. 3. No evidence of bowel obstruction.  Diverticulosis with no acute features       Assessment:  1. ERNESTO secondary to ischemic ATN in face of hemorrhagic shock secondary to retroperitoneal bleed. Hgb stable, Cr plateau K 4.4, U/O ok but HD dependnent due to for clearance and fluid overload. 2. Recent influenza A infection with PNA  3. Hx of hypertension. Currently on only one pressor. 4. Chronic AFib   5. VDRF. 6. Cardiology considering GUSTAVO/CV prior to discharge  7. Azotemia +ve steroids. Plan:  1. Patient was seen on HD at bedside. Orders were confirmed with the HD nurse. Will run him as day # 1, 250 blood flow rate, 160 dialyzer, run for 2 hours and 1-2 off as tolerated. 2. Wean steroids. 3. Risk (including SCD) and benefits of dialysis were discussed with pts wife and family in detail. They were agreeable with the procedure. 4. Ceftriaxone 2 gm IV q 24 hr.Stop date 4-3-19 per ID  5. Will follow. Thank you for the consultation. Please do not hesitate to call with questions. Electronically signed by Mariama Dennis MD on 4/2/2019 at 12:31 PM         This note is created with the assistance of a speech-recognition program. While intending to generate a document that actually reflects the content of the visit, no guarantees can be provided that every mistake has been identified and corrected by editing.

## 2019-04-02 NOTE — PROGRESS NOTES
Date: 4/2/2019    PROCEDURE:  Right Femoral Pankaj Catheter    INDICATION: Vascular Access     CONTRAINDICATIONS: None    PROCEDURE :  Dr. Norris Nayana: Implied due to emergent nature of the procedure. A time-out was completed verifying correct patient, procedure, site, positioning, and equipment. PROCEDURE SUMMARY:  The patient was prepped and draped in the usual sterile fashion using chlorhexidine scrub. 1% lidocaine was administered in a field block to left groin. A Double Lumen (20 cm) was then flushed with saline and prepped for insertion. The introducer needle was used to locate the right Femoral Vein medial to the palpable right femoral artery. The guide wire was advanced using the modified Sadinger Technique and the Femoral Vein was successfully cannulated. A stab incision was made at the skin surface adjacent to the wire-needle assembly. The needle was then removed while securing guide wire in place with direct pressure. A vessel dilator was then advanced over guide wire and passed through vessel. After appropriate dilation, the dilator was exchanged over the wire for the pre-flushed Double-Lumen catheter. The wire was removed and the catheter was sutured in place. The femoral line insertion site was cleaned and a sterile occlusive dressing was applied. The patient tolerated the procedure well without any immediate complications. At time of procedure completion, all ports were aspirated and flushed properly. The procedure was declared to be completed successfully. COMPLICATIONS: NONE    ESTIMATED BLOOD LOSS: 50 cc      Kell Barrett MD  Internal Medicine Resident, PGY-1  [facility];  [place], [place]  4/2/2019, 12:58 PM

## 2019-04-02 NOTE — PROGRESS NOTES
Ventilator Bronchodilator assessment    Breath sounds: clear diminishe  Inspiratory Pressure: 18  Plateau Pressure: 18    Patient assessed at level 1          [x]    Bronchodilator Assessment    BRONCHODILATOR ASSESSMENT SCORE  Score 0 (Home) 1 2 3 4   Breath Sounds   []  Chronic Ventilator: Patient at baseline [x]  Mild Wheezes/ Clear []  Intermittent wheezes with good air entry []  Bilateral/unilateral wheezing with diminished air entry []  Insp/Exp wheeze and/or poor aeration   Ventilator Pressures   []  Chronic Ventilator [x]  Insp. Pressure less than 25 cm H20 []  Insp. Pressure less than 25 cm H20 []  Insp. Pressure exceeds 25 cm H20 []  Insp.  Pressure exceeds 30 cm H20   Plateau Pressure []  NA   [x]  Plateau Pressure less than 4  []  Plateau Pressure less than or equal to 5 []  Plateau Pressure greater than or equal to 6 []  Plateau Pressure greater than or equal to 8       LEON PEREZ  4:23 PM

## 2019-04-02 NOTE — PROGRESS NOTES
Dialysis Post Treatment Note  Patient tolerated treatment well. Blood pressure dropped at end of tx, increased with return of blood. No other complications noted.     Vitals:    04/02/19 1600   BP: (!) 111/55   Pulse: 79   Resp: 29   Temp:    SpO2:      Pre-Weight = 136.8kg  Post-weight = Weight: 296 lb 1.2 oz (134.3 kg)  Total Liters Processed = Total Liters Processed (l/min): 28.3 l/min  Rinseback Volume (mL) = Rinseback Volume (ml): 370 ml  Net Removal (mL) = 1900 mL  Length of treatment=2 hours

## 2019-04-02 NOTE — PROGRESS NOTES
[facility]   Progress Note                   Date:   4/2/2019  Patient name: Charlotte Hernandez  Date of admission:  3/19/2019  7:58 AM  MRN:   [id number]  YOB: 1945  PCP: Noreen Varela MD    Reason for Admission:  resp failure     Subjective:       Patient remains intubated and sedated, off sedation following commands, hemodynamically stable and off all pressors. He remains in atrial fibrillation with heart rate 80-90. Renal functions however are worsening and is possibly going to require dialysis today    Medications:   Scheduled Meds:   amiodarone  200 mg Oral BID    metoprolol tartrate  25 mg Oral BID    sodium chloride  250 mL Intravenous Once    citalopram  10 mg Oral Daily    pantoprazole  40 mg Intravenous BID    And    sodium chloride (PF)  10 mL Intravenous Daily    furosemide  40 mg Intravenous Daily    cefTRIAXone (ROCEPHIN) IV  2 g Intravenous Q24H    docusate  100 mg Oral Daily    sodium chloride flush  10 mL Intravenous 2 times per day       Continuous Infusions:   norepinephrine Stopped (04/01/19 0018)    insulin (HUMAN R) non-weight based infusion 4.8 Units/hr (04/02/19 1232)    EPINEPHrine infusion Stopped (03/29/19 0005)    phenylephrine (KHADRA-SYNEPHRINE) 50mg/250mL infusion Stopped (03/30/19 0159)    vasopressin (Septic Shock) infusion Stopped (03/30/19 0603)    sodium chloride 10 mL/hr at 04/01/19 0200    dextrose         CBC:   Recent Labs     03/31/19 0411 04/01/19 0429 04/01/19 2052 04/02/19 0447 04/02/19  1049   WBC 32.6*  --  23.2*  --  22.1*  --    HGB 7.1*   < > 7.5* 7.9* 7.9* 8.0*     --  100*  --  See Reflexed IPF Result  --     < > = values in this interval not displayed.      BMP:    Recent Labs     03/31/19 0411 04/01/19 0429 04/02/19 0447    140 144   K 4.4 3.7 3.6*   CL 96* 101 103   CO2 22 23 22   * 140* 148*   CREATININE 4.53* 4.43* 4.40*   GLUCOSE 163* 177* 168*     Hepatic:   Recent Labs Discussed with patient's family.        Chito Barber MD  [id number]

## 2019-04-03 LAB
-: NORMAL
ABSOLUTE EOS #: 0 K/UL (ref 0–0.4)
ABSOLUTE IMMATURE GRANULOCYTE: 0 K/UL (ref 0–0.3)
ABSOLUTE LYMPH #: 1.18 K/UL (ref 1–4.8)
ABSOLUTE MONO #: 1.38 K/UL (ref 0.1–0.8)
ALBUMIN SERPL-MCNC: 3 G/DL (ref 3.5–5.2)
ALBUMIN/GLOBULIN RATIO: 1.1 (ref 1–2.5)
ALLEN TEST: POSITIVE
ALP BLD-CCNC: 100 U/L (ref 40–129)
ALT SERPL-CCNC: 208 U/L (ref 5–41)
ANION GAP SERPL CALCULATED.3IONS-SCNC: 19 MMOL/L (ref 9–17)
AST SERPL-CCNC: 43 U/L
BASOPHILS # BLD: 0 % (ref 0–2)
BASOPHILS ABSOLUTE: 0 K/UL (ref 0–0.2)
BILIRUB SERPL-MCNC: 0.54 MG/DL (ref 0.3–1.2)
BILIRUBIN DIRECT: 0.25 MG/DL
BILIRUBIN, INDIRECT: 0.29 MG/DL (ref 0–1)
BUN BLDV-MCNC: 124 MG/DL (ref 8–23)
BUN/CREAT BLD: ABNORMAL (ref 9–20)
CALCIUM IONIZED: 1.08 MMOL/L (ref 1.13–1.33)
CALCIUM SERPL-MCNC: 7.9 MG/DL (ref 8.6–10.4)
CHLORIDE BLD-SCNC: 102 MMOL/L (ref 98–107)
CO2: 24 MMOL/L (ref 20–31)
CREAT SERPL-MCNC: 3.06 MG/DL (ref 0.7–1.2)
CULTURE: NORMAL
DIFFERENTIAL TYPE: ABNORMAL
DIRECT EXAM: NORMAL
EOSINOPHILS RELATIVE PERCENT: 0 % (ref 1–4)
FIO2: 60
GFR AFRICAN AMERICAN: 24 ML/MIN
GFR NON-AFRICAN AMERICAN: 20 ML/MIN
GFR SERPL CREATININE-BSD FRML MDRD: ABNORMAL ML/MIN/{1.73_M2}
GFR SERPL CREATININE-BSD FRML MDRD: ABNORMAL ML/MIN/{1.73_M2}
GLOBULIN: ABNORMAL G/DL (ref 1.5–3.8)
GLUCOSE BLD-MCNC: 170 MG/DL (ref 75–110)
GLUCOSE BLD-MCNC: 191 MG/DL (ref 75–110)
GLUCOSE BLD-MCNC: 194 MG/DL (ref 75–110)
GLUCOSE BLD-MCNC: 238 MG/DL (ref 75–110)
GLUCOSE BLD-MCNC: 248 MG/DL (ref 75–110)
GLUCOSE BLD-MCNC: 259 MG/DL (ref 70–99)
GLUCOSE BLD-MCNC: 305 MG/DL (ref 75–110)
GLUCOSE BLD-MCNC: 314 MG/DL (ref 75–110)
GLUCOSE BLD-MCNC: 366 MG/DL (ref 75–110)
HCT VFR BLD CALC: 25.5 % (ref 40.7–50.3)
HEMOGLOBIN: 8 G/DL (ref 13–17)
HEPARIN INDUCED PLATELET ANTIBODY: 0.25 O.D. (ref 0–0.4)
IMMATURE GRANULOCYTES: 0 %
LYMPHOCYTES # BLD: 6 % (ref 24–44)
Lab: NORMAL
MAGNESIUM: 2.7 MG/DL (ref 1.6–2.6)
MCH RBC QN AUTO: 31.7 PG (ref 25.2–33.5)
MCHC RBC AUTO-ENTMCNC: 31.4 G/DL (ref 28.4–34.8)
MCV RBC AUTO: 101.2 FL (ref 82.6–102.9)
MODE: ABNORMAL
MONOCYTES # BLD: 7 % (ref 1–7)
MORPHOLOGY: ABNORMAL
MORPHOLOGY: ABNORMAL
NEGATIVE BASE EXCESS, ART: ABNORMAL (ref 0–2)
NRBC AUTOMATED: 1.1 PER 100 WBC
O2 DEVICE/FLOW/%: ABNORMAL
PATIENT TEMP: ABNORMAL
PDW BLD-RTO: 18.5 % (ref 11.8–14.4)
PHOSPHORUS: 5.7 MG/DL (ref 2.5–4.5)
PLATELET # BLD: ABNORMAL K/UL (ref 138–453)
PLATELET ESTIMATE: ABNORMAL
PLATELET, FLUORESCENCE: NORMAL K/UL (ref 138–453)
PMV BLD AUTO: ABNORMAL FL (ref 8.1–13.5)
POC HCO3: 29.8 MMOL/L (ref 21–28)
POC O2 SATURATION: 86 % (ref 94–98)
POC PCO2 TEMP: ABNORMAL MM HG
POC PCO2: 58.8 MM HG (ref 35–48)
POC PH TEMP: ABNORMAL
POC PH: 7.31 (ref 7.35–7.45)
POC PO2 TEMP: ABNORMAL MM HG
POC PO2: 56.8 MM HG (ref 83–108)
POSITIVE BASE EXCESS, ART: 3 (ref 0–3)
POTASSIUM SERPL-SCNC: 3.7 MMOL/L (ref 3.7–5.3)
RBC # BLD: 2.52 M/UL (ref 4.21–5.77)
RBC # BLD: ABNORMAL 10*6/UL
REASON FOR REJECTION: NORMAL
SAMPLE SITE: ABNORMAL
SEG NEUTROPHILS: 87 % (ref 36–66)
SEGMENTED NEUTROPHILS ABSOLUTE COUNT: 17.14 K/UL (ref 1.8–7.7)
SODIUM BLD-SCNC: 145 MMOL/L (ref 135–144)
SPECIMEN DESCRIPTION: NORMAL
TCO2 (CALC), ART: 32 MMOL/L (ref 22–29)
TOTAL PROTEIN: 5.7 G/DL (ref 6.4–8.3)
WBC # BLD: 19.7 K/UL (ref 3.5–11.3)
WBC # BLD: ABNORMAL 10*3/UL
ZZ NTE CLEAN UP: ORDERED TEST: NORMAL
ZZ NTE WITH NAME CLEAN UP: SPECIMEN SOURCE: NORMAL

## 2019-04-03 PROCEDURE — 6370000000 HC RX 637 (ALT 250 FOR IP): Performed by: INTERNAL MEDICINE

## 2019-04-03 PROCEDURE — 94770 HC ETCO2 MONITOR DAILY: CPT

## 2019-04-03 PROCEDURE — 2000000000 HC ICU R&B

## 2019-04-03 PROCEDURE — 82330 ASSAY OF CALCIUM: CPT

## 2019-04-03 PROCEDURE — 2580000003 HC RX 258: Performed by: INTERNAL MEDICINE

## 2019-04-03 PROCEDURE — 83735 ASSAY OF MAGNESIUM: CPT

## 2019-04-03 PROCEDURE — 2580000003 HC RX 258: Performed by: STUDENT IN AN ORGANIZED HEALTH CARE EDUCATION/TRAINING PROGRAM

## 2019-04-03 PROCEDURE — 6360000002 HC RX W HCPCS: Performed by: INTERNAL MEDICINE

## 2019-04-03 PROCEDURE — 84100 ASSAY OF PHOSPHORUS: CPT

## 2019-04-03 PROCEDURE — 2500000003 HC RX 250 WO HCPCS: Performed by: INTERNAL MEDICINE

## 2019-04-03 PROCEDURE — 36415 COLL VENOUS BLD VENIPUNCTURE: CPT

## 2019-04-03 PROCEDURE — P9046 ALBUMIN (HUMAN), 25%, 20 ML: HCPCS | Performed by: INTERNAL MEDICINE

## 2019-04-03 PROCEDURE — 94003 VENT MGMT INPAT SUBQ DAY: CPT

## 2019-04-03 PROCEDURE — 6370000000 HC RX 637 (ALT 250 FOR IP): Performed by: STUDENT IN AN ORGANIZED HEALTH CARE EDUCATION/TRAINING PROGRAM

## 2019-04-03 PROCEDURE — 85025 COMPLETE CBC W/AUTO DIFF WBC: CPT

## 2019-04-03 PROCEDURE — 80048 BASIC METABOLIC PNL TOTAL CA: CPT

## 2019-04-03 PROCEDURE — 82947 ASSAY GLUCOSE BLOOD QUANT: CPT

## 2019-04-03 PROCEDURE — 82803 BLOOD GASES ANY COMBINATION: CPT

## 2019-04-03 PROCEDURE — 85055 RETICULATED PLATELET ASSAY: CPT

## 2019-04-03 PROCEDURE — 6360000002 HC RX W HCPCS: Performed by: STUDENT IN AN ORGANIZED HEALTH CARE EDUCATION/TRAINING PROGRAM

## 2019-04-03 PROCEDURE — 99291 CRITICAL CARE FIRST HOUR: CPT | Performed by: INTERNAL MEDICINE

## 2019-04-03 PROCEDURE — 99233 SBSQ HOSP IP/OBS HIGH 50: CPT | Performed by: INTERNAL MEDICINE

## 2019-04-03 PROCEDURE — 2700000000 HC OXYGEN THERAPY PER DAY

## 2019-04-03 PROCEDURE — 80076 HEPATIC FUNCTION PANEL: CPT

## 2019-04-03 PROCEDURE — 94762 N-INVAS EAR/PLS OXIMTRY CONT: CPT

## 2019-04-03 RX ORDER — MIDODRINE HYDROCHLORIDE 5 MG/1
10 TABLET ORAL PRN
Status: DISPENSED | OUTPATIENT
Start: 2019-04-03 | End: 2019-04-03

## 2019-04-03 RX ORDER — HEPARIN SODIUM 1000 [USP'U]/ML
1600 INJECTION, SOLUTION INTRAVENOUS; SUBCUTANEOUS PRN
Status: DISCONTINUED | OUTPATIENT
Start: 2019-04-03 | End: 2019-04-03

## 2019-04-03 RX ORDER — INSULIN GLARGINE 100 [IU]/ML
30 INJECTION, SOLUTION SUBCUTANEOUS 2 TIMES DAILY
Status: DISCONTINUED | OUTPATIENT
Start: 2019-04-03 | End: 2019-04-06

## 2019-04-03 RX ORDER — ALBUMIN (HUMAN) 12.5 G/50ML
25 SOLUTION INTRAVENOUS ONCE
Status: COMPLETED | OUTPATIENT
Start: 2019-04-03 | End: 2019-04-03

## 2019-04-03 RX ADMIN — Medication 1.6 ML: at 17:12

## 2019-04-03 RX ADMIN — INSULIN LISPRO 8 UNITS: 100 INJECTION, SOLUTION INTRAVENOUS; SUBCUTANEOUS at 00:05

## 2019-04-03 RX ADMIN — Medication 100 MG: at 09:19

## 2019-04-03 RX ADMIN — FENTANYL CITRATE 100 MCG: 50 INJECTION, SOLUTION INTRAMUSCULAR; INTRAVENOUS at 23:19

## 2019-04-03 RX ADMIN — INSULIN LISPRO 2 UNITS: 100 INJECTION, SOLUTION INTRAVENOUS; SUBCUTANEOUS at 13:24

## 2019-04-03 RX ADMIN — INSULIN GLARGINE 30 UNITS: 100 INJECTION, SOLUTION SUBCUTANEOUS at 22:43

## 2019-04-03 RX ADMIN — INSULIN LISPRO 4 UNITS: 100 INJECTION, SOLUTION INTRAVENOUS; SUBCUTANEOUS at 09:20

## 2019-04-03 RX ADMIN — FENTANYL CITRATE 100 MCG: 50 INJECTION, SOLUTION INTRAMUSCULAR; INTRAVENOUS at 00:06

## 2019-04-03 RX ADMIN — FENTANYL CITRATE 100 MCG: 50 INJECTION, SOLUTION INTRAMUSCULAR; INTRAVENOUS at 06:40

## 2019-04-03 RX ADMIN — INSULIN LISPRO 2 UNITS: 100 INJECTION, SOLUTION INTRAVENOUS; SUBCUTANEOUS at 17:13

## 2019-04-03 RX ADMIN — INSULIN LISPRO 4 UNITS: 100 INJECTION, SOLUTION INTRAVENOUS; SUBCUTANEOUS at 21:44

## 2019-04-03 RX ADMIN — Medication 10 ML: at 09:45

## 2019-04-03 RX ADMIN — Medication 10 ML: at 21:48

## 2019-04-03 RX ADMIN — FUROSEMIDE 40 MG: 10 INJECTION, SOLUTION INTRAMUSCULAR; INTRAVENOUS at 17:05

## 2019-04-03 RX ADMIN — LANSOPRAZOLE 30 MG: 30 TABLET, ORALLY DISINTEGRATING, DELAYED RELEASE ORAL at 06:42

## 2019-04-03 RX ADMIN — MIDODRINE HYDROCHLORIDE 10 MG: 5 TABLET ORAL at 13:36

## 2019-04-03 RX ADMIN — AMIODARONE HYDROCHLORIDE 200 MG: 200 TABLET ORAL at 20:07

## 2019-04-03 RX ADMIN — INSULIN LISPRO 10 UNITS: 100 INJECTION, SOLUTION INTRAVENOUS; SUBCUTANEOUS at 05:05

## 2019-04-03 RX ADMIN — CEFTRIAXONE SODIUM 2 G: 2 INJECTION, POWDER, FOR SOLUTION INTRAMUSCULAR; INTRAVENOUS at 20:06

## 2019-04-03 RX ADMIN — ALBUMIN (HUMAN) 25 G: 25 SOLUTION INTRAVENOUS at 14:04

## 2019-04-03 RX ADMIN — FENTANYL CITRATE 100 MCG: 50 INJECTION, SOLUTION INTRAMUSCULAR; INTRAVENOUS at 02:59

## 2019-04-03 RX ADMIN — AMIODARONE HYDROCHLORIDE 200 MG: 200 TABLET ORAL at 09:18

## 2019-04-03 RX ADMIN — CITALOPRAM 10 MG: 10 TABLET, FILM COATED ORAL at 09:18

## 2019-04-03 RX ADMIN — Medication 1.6 ML: at 17:11

## 2019-04-03 RX ADMIN — CALCIUM GLUCONATE 2 G: 98 INJECTION, SOLUTION INTRAVENOUS at 11:09

## 2019-04-03 RX ADMIN — INSULIN GLARGINE 30 UNITS: 100 INJECTION, SOLUTION SUBCUTANEOUS at 10:07

## 2019-04-03 RX ADMIN — METOPROLOL TARTRATE 25 MG: 25 TABLET ORAL at 23:30

## 2019-04-03 RX ADMIN — FENTANYL CITRATE 100 MCG: 50 INJECTION, SOLUTION INTRAMUSCULAR; INTRAVENOUS at 21:47

## 2019-04-03 ASSESSMENT — PULMONARY FUNCTION TESTS
PIF_VALUE: 25
PIF_VALUE: 23
PIF_VALUE: 14
PIF_VALUE: 22
PIF_VALUE: 29
PIF_VALUE: 13
PIF_VALUE: 12
PIF_VALUE: 26
PIF_VALUE: 26
PIF_VALUE: 13
PIF_VALUE: 18
PIF_VALUE: 25
PIF_VALUE: 28
PIF_VALUE: 13
PIF_VALUE: 16
PIF_VALUE: 15
PIF_VALUE: 15
PIF_VALUE: 64
PIF_VALUE: 15

## 2019-04-03 ASSESSMENT — PAIN SCALES - GENERAL
PAINLEVEL_OUTOF10: 4
PAINLEVEL_OUTOF10: 1
PAINLEVEL_OUTOF10: 4
PAINLEVEL_OUTOF10: 4
PAINLEVEL_OUTOF10: 1

## 2019-04-03 NOTE — PROGRESS NOTES
Dialysis Post Treatment Note  Patient tolerated treatment well. Treatment done at bedside d/t pt being intubated. Shakes head no when asked if he is in pain or has any complaints. Vitals:    04/03/19 1811   BP:    Pulse: 78   Resp: 24   Temp:    SpO2: 100%     Pre-Weight = 131.8 kg; Bedscale  Post-weight = Weight: 286 lb 2.5 oz (129.8 kg)  Total Liters Processed = Total Liters Processed (l/min): 54.1 l/min  Rinseback Volume (mL) = Rinseback Volume (ml): 470 ml  Net Removal (mL) = 2050  Length of treatment= 180 minutes      This was the pt's 2nd treatment. BP stable during tx after receiving Midodrine and Albumin at beginning of tx. Unable to use Heparin for catheter lumen dwells. Use Sodium Citrate. Family questions answered.

## 2019-04-03 NOTE — PROGRESS NOTES
[facility]   Progress Note                   Date:   4/3/2019  Patient name: Chavez Delaney  Date of admission:  3/19/2019  7:58 AM  MRN:   [id number]  YOB: 1945  PCP: Ignacia Qureshi MD    Reason for Admission:  resp failure     Subjective: Intubated, sedated, in AF, rate controlled. Medications:   Scheduled Meds:   calcium gluconate IVPB  2 g Intravenous Once    insulin glargine  30 Units Subcutaneous BID    insulin lispro  0-12 Units Subcutaneous Q4H    amiodarone  200 mg Oral BID    metoprolol tartrate  25 mg Oral BID    lansoprazole  30 mg Per NG tube QAM AC    citalopram  10 mg Oral Daily    furosemide  40 mg Intravenous Daily    cefTRIAXone (ROCEPHIN) IV  2 g Intravenous Q24H    docusate  100 mg Oral Daily    sodium chloride flush  10 mL Intravenous 2 times per day       Continuous Infusions:   sodium chloride 10 mL/hr at 04/01/19 0200    dextrose         CBC:   Recent Labs     04/01/19 0429 04/02/19 0447 04/02/19  1049 04/02/19  2141 04/03/19  0802   WBC 23.2*  --  22.1*  --   --  19.7*   HGB 7.5*   < > 7.9* 8.0* 8.0* 8.0*   *  --  See Reflexed IPF Result  --   --  See Reflexed IPF Result    < > = values in this interval not displayed.      BMP:    Recent Labs     04/01/19 0429 04/02/19 0447 04/03/19  0802    144 145*   K 3.7 3.6* 3.7    103 102   CO2 23 22 24   * 148* 124*   CREATININE 4.43* 4.40* 3.06*   GLUCOSE 177* 168* 259*     Hepatic:   Recent Labs     04/01/19 0429 04/02/19  0447 04/03/19  0802   * 59* 43*   * 301* 208*   BILITOT 0.62 0.52 0.54   ALKPHOS 92 102 100         Objective:   Vitals: /74   Pulse 85   Temp 98.8 °F (37.1 °C) (Core)   Resp 22   Ht 5' 8\" (1.727 m)   Wt (!) 304 lb 10.8 oz (138.2 kg)   SpO2 99%   BMI 46.33 kg/m²     General appearance: intubated and sedared  HEENT: Head: Normocephalic, no lesions, without obvious abnormality  Neck: no JVD  Lungs: Diminished air entry at

## 2019-04-03 NOTE — PROGRESS NOTES
Occupational Therapy Not Seen Note    DATE: 4/3/2019  Name: Muna Parikh  : 1945  MRN: [id number]    Patient not available for Occupational Therapy due to:    Hemodialysis    Next Scheduled Treatment: check back 19    Electronically signed by CHANTAL Mccoy on 4/3/2019 at 12:43 PM

## 2019-04-03 NOTE — PROGRESS NOTES
Nephrology Progress Note      Subjective:  Patient was seen and examined . Hemodialysis started yesterday for worsening azotemia and volume overload . Mechanical vent, desatted increasing oxygen requirement FiO2 increased to 60%.     Off pressors, a-fib on amiodarone, rate controlled  Had 2 liters urine output last 24 hrs.     + fluid balance since admission    Objective:  CURRENT TEMPERATURE:  Temp: 98.8 °F (37.1 °C)  MAXIMUM TEMPERATURE OVER 24HRS:  Temp (24hrs), Av.9 °F (37.2 °C), Min:98.6 °F (37 °C), Max:99.1 °F (37.3 °C)    CURRENT RESPIRATORY RATE:  Resp: 22  CURRENT PULSE:  Pulse: 85  CURRENT BLOOD PRESSURE:  BP: 120/74  24HR BLOOD PRESSURE RANGE:  Systolic (84BRE), PYI:708 , Min:86 , XAO:666   ; Diastolic (39SBG), YLZ:55, Min:41, Max:76    24HR INTAKE/OUTPUT:      Intake/Output Summary (Last 24 hours) at 4/3/2019 0942  Last data filed at 4/3/2019 0500  Gross per 24 hour   Intake 2019.4 ml   Output 1680 ml   Net 339.4 ml     Patient Vitals for the past 96 hrs (Last 3 readings):   Weight   19 0300 (!) 304 lb 10.8 oz (138.2 kg)   19 1522 296 lb 1.2 oz (134.3 kg)   19 1300 (!) 301 lb 9.4 oz (136.8 kg)     Physical Exam:    General appearance: awake on vent  Skin: warm and dry, no rash or erythema, no jaundice  Eyes: conjunctivae pale   ENT: : ETT in place    Neck: supple  Pulmonary: no wheezing or rhonchi. +ve crackles  Cardiovascular: Normal S1 & S2, irregular No S3 or  S4, no Pericardial Rub no Murmur   Abdomen: bowel sounds hypoactive  , somewhat tense, no fluid wave  Extremities:no cyanosis, clubbing , + thigh edema    Labs:   CBC:  Recent Labs     19  0429  19  0447 19  1049 19  2141 19  0802   WBC 23.2*  --  22.1*  --   --  19.7*   RBC 2.43*  --  2.54*  --   --  2.52*   HGB 7.5*   < > 7.9* 8.0* 8.0* 8.0*   HCT 22.5*   < > 25.5* 25.1* 24.6* 25.5*   MCV 92.6  --  100.4  --   --  101.2   MCH 30.9  --  31.1  --   --  31.7   MCHC 33.3  --  31.0  --   --  Hematocrit effect with a hematoma   measuring maximally 9.3 cm in diameter and about 14 cm in length.  There is   also a small amount of upper abdominal ascites.  No free air. 2. Bilateral pleural effusions with dependent lower lobe atelectasis. 3. No evidence of bowel obstruction.  Diverticulosis with no acute features       Assessment:  1. ERNESTO secondary to ischemic ATN in face of hemorrhagic shock secondary to retroperitoneal bleed. Hgb stable, Cr plateau K 4.4, U/O ok but HD dependnent due to for clearance and fluid overload. 2. Recent influenza A infection with PNA  3. Hx of hypertension. Currently on only one pressor. 4. Chronic AFib   5. VDRF. 6. Cardiology considering GUSTAVO/CV prior to discharge  7. Azotemia +ve steroids. Plan: 1.HD at bedside today . Orders were reviewed with the HD nurse. 2. Wean steroids. 3. Risk (including SCD) and benefits of dialysis were discussed with pts wife and family in detail. They were agreeable with the procedure. 4. Ceftriaxone 2 gm IV q 24 hr.Stop date 4-3-19 per ID  5. Will follow. Thank you for the consultation. Please do not hesitate to call with questions. Electronically signed by Bibiana Alford MD on 4/3/2019 at 9:42 AM         This note is created with the assistance of a speech-recognition program. While intending to generate a document that actually reflects the content of the visit, no guarantees can be provided that every mistake has been identified and corrected by editing.

## 2019-04-03 NOTE — PLAN OF CARE
Problem: Risk for Impaired Skin Integrity  Goal: Tissue integrity - skin and mucous membranes  Description  Structural intactness and normal physiological function of skin and  mucous membranes.   Outcome: Ongoing     Problem: Confusion - Acute:  Goal: Absence of continued neurological deterioration signs and symptoms  Description  Absence of continued neurological deterioration signs and symptoms  Outcome: Ongoing  Goal: Mental status will be restored to baseline  Description  Mental status will be restored to baseline  Outcome: Ongoing     Problem: Discharge Planning:  Goal: Ability to perform activities of daily living will improve  Description  Ability to perform activities of daily living will improve  Outcome: Ongoing  Goal: Participates in care planning  Description  Participates in care planning  Outcome: Ongoing     Problem: Injury - Risk of, Physical Injury:  Goal: Absence of physical injury  Description  Absence of physical injury  Outcome: Ongoing  Goal: Will remain free from falls  Description  Will remain free from falls  Outcome: Ongoing     Problem: Mood - Altered:  Goal: Mood stable  Description  Mood stable  Outcome: Ongoing  Goal: Absence of abusive behavior  Description  Absence of abusive behavior  Outcome: Ongoing  Goal: Verbalizations of feeling emotionally comfortable while being cared for will increase  Description  Verbalizations of feeling emotionally comfortable while being cared for will increase  Outcome: Ongoing     Problem: Psychomotor Activity - Altered:  Goal: Absence of psychomotor disturbance signs and symptoms  Description  Absence of psychomotor disturbance signs and symptoms  Outcome: Ongoing     Problem: Sensory Perception - Impaired:  Goal: Demonstrations of improved sensory functioning will increase  Description  Demonstrations of improved sensory functioning will increase  Outcome: Ongoing  Goal: Decrease in sensory misperception frequency  Description  Decrease in sensory potential functional level  Outcome: Ongoing     Problem: Airway Clearance - Ineffective:  Goal: Ability to maintain a clear airway will improve  Description  Ability to maintain a clear airway will improve  Outcome: Ongoing     Problem: Aspiration:  Goal: Absence of aspiration  Description  Absence of aspiration  Outcome: Ongoing     Problem: Serum Glucose Level - Abnormal:  Goal: Ability to maintain appropriate glucose levels will improve to within specified parameters  Description  Ability to maintain appropriate glucose levels will improve to within specified parameters  Outcome: Ongoing     Problem: Pain:  Goal: Pain level will decrease  Description  Pain level will decrease  Outcome: Ongoing  Goal: Control of acute pain  Description  Control of acute pain  Outcome: Ongoing  Goal: Control of chronic pain  Description  Control of chronic pain  Outcome: Ongoing

## 2019-04-03 NOTE — PROGRESS NOTES
lb (132.5 kg)  · Ideal Body Wt: 154 lb (69.9 kg), % Ideal Body 190% (adm/ideal)  · BMI Classification: BMI > or equal to 40.0 Obese Class III    Nutrition Interventions:   Continue current Tube Feeding  Continued Inpatient Monitoring, Education Not Indicated    Nutrition Evaluation:   · Evaluation: Goal achieved   · Goals: Meet more than 75% of nutrition needs   · Monitoring: TF Intake, TF Tolerance, Weight, Pertinent Labs, I&O      Electronically signed by Misty Hillman RD, LD on 4/3/19 at 11:34 AM    Contact Number: [phone]

## 2019-04-03 NOTE — PROGRESS NOTES
[facility] -Progress Note    Today's Date and Time: 4/3/2019, 10:31 AM    Impression :   1. Acute Hypoxic Respiratory failure  2. Influenza A  3. Possible secondary pneumonia with normal mansoor  4. Atrial Fibrillation  5. DM II  6. CHF  7. Renal insufficiency  8. Leukocytosis secondary to hemorrhage, steroids    Recommendations:     · Ceftriaxone 2 gm IV q 24 hr.Stop date 4-3-19  · F/U cultures  · Continue respiratory toilet    Medical Decision Making/Summary/Discussion:   · Patient with Hx chronic smoking  · Admitted with acute hypoxic respiratory failure associated with Influenza   · Has pulmonary vascular congestion and pleural effusions  · Has CHF and renal insufficiency  · Has completed oseltamivir  · Will discontinue treatment for possible secondary pneumonia with ceftriaxone as of 4-3-19  Infection Control Recommendations     · Droplet Isolation    Antimicrobial Stewardship Recommendations     · Targeted therapy  · PK dosing  Coordination of Outpatient Care:   · Estimated Length of IV antimicrobials:TBD  · Patient will need Midline Catheter Insertionno:   · Patient will need PICC line Insertion:no   · Patient will need: Home IV , [place],  SNF,  LTAC: TBD  · Patient will need outpatient wound care:No    Chief complaint/reason for consultation:   · Leukocytosis      History of Present Illness:   Kimberly Jacobo is a 69y.o.-year-old  male who was initially admitted on 3/19/2019. Patient seen at the request of Anum Chapa. INITIAL HISTORY:    ID has been consulted for evaluation of leukocytosis. The patient was transferred to [address] [place] [address] on 3/19/19 from an outlying facility for worsening respiratory status, Influenza A and elevated troponin. PMH significant for CAD,DM 2, A fib and chronic smoking. The patient presented to [facility] on 3/15 for sob where his Rapid flu was positive for Influenza A.  He refused admission and was treated with Unique-flu as an out patient. He developed worsening sob and hypoxia leading to his admission at the outlying facility on 3/18/19 where he was started on Ceftriaxone, Azithromycin and Tamiflu. He was transferred to [facility] on 3/19/19 because of hypoxic respiratory distress and elevated troponin. He was admitted to ICU and started on BiPAP. CXR on 3-19-19 showed reticular infiltrate and LLL pneumonia. Respiratory culture was obtained on 3/22/19 and showed few gram positive cocci in clusters. Legionella antigen, Strep pneumoniae anitgen and Mycoplasma Ab negative. MRSA swab negative    The patient was intubated on 3/21/19 because of deteriorating respiratory status. The patient completed a course of Unique-flu on 3-25-19. He completed a course of abx and steroids on 3-26-19. The patient spiked a fever (t max 100.4) and became hypotensive requiring pressure support on 3-29-19. He was started on Vancomycin and Zosyn empirically. Blood, urine and sputum cultures were obtained and currently pending. The patient additionally was found to have a Lt sided spontaneous retroperitoneal bleed on 3-29-19 and was hypovolemic requiring multiple transfusions. H&H has been stable today at 9.0. The patient continues to require pressor support and is tachycardic (120). Presently the patient is intubated, sedated and on 3 pressors. He has been febrile (t max 101.5), tachycardic (110-120), tachypneic (30-40's). Lactic acid was elevated at 7.4 yesterday. WBC count has been elevated at 45.3 today. Hgb stable. His kidney function is worsening Cr 3.74    He is on zosyn. Vancomycin held because trough level 18.5 (3/29/19)  Cultures negative so far. CURRENT EVALUATION : 4/3/2019    Afebrile to low grade fevers  VS stable  Remains on ventilator support  Off levophed  On amiodarone drip    Off sedation   Moves all extremities on his own  Opens eyes. Communicates with family    ERNESTO worse  [facility].  Had HD on 4-2-19 and will have HD today as well. .      Labs Reviewed: 4/3/2019  WBC: 20.1 > 27.1 > 45.3-->19.7  Hgb: 9.0 > 8.2 > 8.6-->8.0  Lactic acid: 7.4 > 5.5 > 2.6 > 2.4    BUN: 68 > 84 > 87-->148-->124  Cr: 1.83 > 3.21 > 3.74-->4.4-->3.06      Cultures:  Urine:   · 3/28/19: No growth  · 3/15/19: No growth    Blood:   · 3/28/19: No growth    Sputum :  ·  3/28/19: No growth  · Gram positive cocci in clusters 3/22/19. Normal mansoor    Rapid flu positive for influenza A (3/15/19)    Discussed with patient, RN. I have personally reviewed the past medical history, past surgical history, medications, social history, and family history, and I have updated the database accordingly. Past Medical History:     Past Medical History:   Diagnosis Date    Abnormal nuclear stress test 03/12/2010    Stress and resting cardiolite images showed inferior wall hypoperfusion suggestive of previous myocardial infarction. Left ventricular wall motion showed inferior wall hypokinesia. EF 58%.  Arrhythmia 2007    A single episode    Atrial fibrillation ([facility] [address] 2007    Single Episode    CAD (coronary artery disease) 1996    MI    DM type 2 (diabetes mellitus, type 2) ([facility] [address] 2005    Hx of echocardiogram 04/05/2007    EF 62%, Increased left atrial and right ventricular diametes. Increased septal and posterior wall thickness suggest left ventricular hypertrophy.  Valves were normal.        Past Surgical  History:     Past Surgical History:   Procedure Laterality Date    CARDIOVERSION  05/18/2007    Successful-Dr. Leggett-My [place]    CARDIOVERSION  03/13    CORONARY ANGIOPLASTY  1996       Medications:      calcium gluconate IVPB  2 g Intravenous Once    insulin glargine  30 Units Subcutaneous BID    insulin lispro  0-12 Units Subcutaneous Q4H    amiodarone  200 mg Oral BID    metoprolol tartrate  25 mg Oral BID    lansoprazole  30 mg Per NG tube QAM AC    citalopram  10 mg Oral Daily    furosemide  40 mg Intravenous Daily    cefTRIAXone (ROCEPHIN) IV 04/03/2019    YEAST NOT REPORTED 03/28/2019    TURBIDITY TURBID 03/28/2019     Lab Results   Component Value Date    CREATININE 3.06 04/03/2019    GLUCOSE 259 04/03/2019    GLUCOSE 104 11/26/2018       Medical Decision Making-Imaging:     Narrative   EXAMINATION:   SINGLE XRAY VIEW OF THE CHEST       3/29/2019 5:54 am       COMPARISON:   March 28, 2019, March 27, 2019       HISTORY:   ORDERING SYSTEM PROVIDED HISTORY: intubated   TECHNOLOGIST PROVIDED HISTORY:   intubated       FINDINGS:   Endotracheal tube terminates at the level of the clavicular heads.  The   enteric tube courses off the field of view in the upper abdomen.  The right   internal jugular line terminates at the distal SVC.  Shallow inflation. Cardiac silhouette enlargement again noted.  Perihilar and basilar opacities   are again noted without significant change.  No new airspace disease or   pneumothorax identified.           Impression   Unchanged appearance of support tubes and lines.       Perihilar and basilar opacities are without appreciable change which may   represent subsegmental atelectasis versus developing consolidation.             Medical Decision Making-Other: Thank you for allowing us to participate in the care of this patient. Please call with questions.     Torrey Carlson MD

## 2019-04-03 NOTE — PLAN OF CARE
misperception frequency  Outcome: Ongoing  Goal: Able to refrain from responding to false sensory perceptions  Description  Able to refrain from responding to false sensory perceptions  Outcome: Ongoing  Goal: Demonstrates accurate environmental perceptions  Description  Demonstrates accurate environmental perceptions  Outcome: Ongoing  Goal: Able to distinguish between reality-based and nonreality-based thinking  Description  Able to distinguish between reality-based and nonreality-based thinking  Outcome: Ongoing  Goal: Able to interrupt nonreality-based thinking  Description  Able to interrupt nonreality-based thinking  Outcome: Ongoing     Problem: Sleep Pattern Disturbance:  Goal: Appears well-rested  Description  Appears well-rested  Outcome: Ongoing     Problem: Restraint Use - Nonviolent/Non-Self-Destructive Behavior:  Goal: Absence of restraint indications  Description  Absence of restraint indications  Outcome: Ongoing  Goal: Absence of restraint-related injury  Description  Absence of restraint-related injury  Outcome: Ongoing     Problem: Nutrition  Goal: Optimal nutrition therapy  Outcome: Ongoing     Problem: OXYGENATION/RESPIRATORY FUNCTION  Goal: Patient will maintain patent airway  Outcome: Ongoing  Goal: Patient will achieve/maintain normal respiratory rate/effort  Description  Respiratory rate and effort will be within normal limits for the patient  Outcome: Ongoing     Problem: MECHANICAL VENTILATION  Goal: Patient will maintain patent airway  4/2/2019 2106 by Nas Montiel RN  Outcome: Ongoing  4/2/2019 0735 by Beverley Jason RCP  Outcome: Ongoing  Goal: Oral health is maintained or improved  4/2/2019 2106 by Nas Montiel RN  Outcome: Ongoing  4/2/2019 0735 by Beverley Jason RCP  Outcome: Ongoing  Goal: ET tube will be managed safely  4/2/2019 2106 by Nas Montiel RN  Outcome: Ongoing  4/2/2019 0735 by Bevelrey Jason RCP  Outcome: Ongoing  Goal: Ability to express needs and understand communication  4/2/2019 2106 by Alessandra Al RN  Outcome: Ongoing  4/2/2019 0735 by Minoo Hinkle RCP  Outcome: Ongoing  Goal: Mobility/activity is maintained at optimum level for patient  4/2/2019 2106 by Alessandra Al RN  Outcome: Ongoing  4/2/2019 0735 by Minoo Hinkle RCP  Outcome: Ongoing     Problem: SKIN INTEGRITY  Goal: Skin integrity is maintained or improved  4/2/2019 2106 by Alessandra Al RN  Outcome: Ongoing  4/2/2019 0735 by Minoo Hinkle RCP  Outcome: Ongoing     Problem: Musculor/Skeletal Functional Status  Goal: Highest potential functional level  Outcome: Ongoing     Problem: Airway Clearance - Ineffective:  Goal: Ability to maintain a clear airway will improve  Description  Ability to maintain a clear airway will improve  4/2/2019 2106 by Alessandra Al RN  Outcome: Ongoing  4/2/2019 0735 by Minoo Hinkle RCP  Outcome: Ongoing     Problem: Aspiration:  Goal: Absence of aspiration  Description  Absence of aspiration  4/2/2019 2106 by Alessandra Al RN  Outcome: Ongoing  4/2/2019 0735 by Minoo Hinkle RCP  Outcome: Ongoing     Problem: Serum Glucose Level - Abnormal:  Goal: Ability to maintain appropriate glucose levels will improve to within specified parameters  Description  Ability to maintain appropriate glucose levels will improve to within specified parameters  Outcome: Ongoing

## 2019-04-03 NOTE — PROGRESS NOTES
INTENSIVE CARE UNIT  Resident Physician Progress Note    Patient - Xu Vazquez  Date of Admission -  3/19/2019  7:58 AM  Date of Evaluation -  4/3/2019  Room and Bed Number -  0105/0105-01   Hospital Day - 13    Chief complaint influenza, pneumonia, A. fib with RVR  SUBJECTIVE:     OVERNIGHT EVENTS:   No acute events overnight  Off sedation, patient was following commands  Hemodynamically stable with BP ranging between high , patient was hypotensive at times overnight in high 80-90  T-max 99  As per respiratory therapist, minimal secretions from ET tube  Tachypneic  Had 2 L urine output last 24 hours  Kidney function improving, platelet count decreased but no number reported, leukocytosis trending down 19.1, hemoglobin stable  Heparin-induced antibody negative        I/O last 3 completed shifts: In: 1919.4 [I.V.:397.4; NG/GT:1522]  Out: 9975 [Urine:1115]  No intake/output data recorded. Results for Kaitlyn Jade (MRN [id number]) as of 4/1/2019 08:22   Ref. Range 4/1/2019 04:15   POC pH Latest Ref Range: 7.350 - 7.450  7.504 (H)   POC pH Temp Unknown NOT REPORTED   POC pCO2 Latest Ref Range: 35.0 - 48.0 mm Hg 33.6 (L)   POC pCO2 Temp Latest Units: mm Hg NOT REPORTED   POC PO2 Latest Ref Range: 83.0 - 108.0 mm Hg 58.1 (L)   POC pO2 Temp Latest Units: mm Hg NOT REPORTED   POC HCO3 Latest Ref Range: 21.0 - 28.0 mmol/L 26.5   POC O2 SAT Latest Ref Range: 94.0 - 98.0 % 92 (L)     CXR 4/1/19     Bibasilar opacities with improvement on the left side.  Perihilar opacity on   the left has improved.  Multiple tubes and lines as above.          US RUQ 3/31/19  Impression   No evidence for acute cholecystitis, cholelithiasis or biliary dilatation.       Small volume perihepatic ascites.           AWAKE & FOLLOWING COMMANDS:  [] No   [x] Yes     SECRETIONS Amount:  [x] Small [] Moderate  [] Large  [] None  Color:     [x] White [] Colored  [x] Bloody    SEDATION:  RAAS Score:  [] Propofol gtt  [] Versed gtt  [] Ativan gtt PHYSICAL EXAM:  · General appearance: intubated sedated   · HEENT: Atraumatic, normocephalic, neck supple, normal EOM, thyroid normal, no lymphadenopathy   · Lungs: Wheezing improved, clear to auscultation  · Heart: Regular heart rate, tachycardic, S1, S2 normal, no murmur   · Abdomen: soft, non-tender; bowel sounds normal; no masses,  no organomegaly  · Extremities: No cyanosis or edema  Neurological:  Intubated, not sedated , following commands        MEDICATIONS:  Scheduled Meds:   insulin glargine  30 Units Subcutaneous BID    insulin lispro  0-12 Units Subcutaneous Q4H    amiodarone  200 mg Oral BID    metoprolol tartrate  25 mg Oral BID    lansoprazole  30 mg Per NG tube QAM AC    citalopram  10 mg Oral Daily    furosemide  40 mg Intravenous Daily    cefTRIAXone (ROCEPHIN) IV  2 g Intravenous Q24H    docusate  100 mg Oral Daily    sodium chloride flush  10 mL Intravenous 2 times per day     Continuous Infusions:   sodium chloride 10 mL/hr at 04/01/19 0200    dextrose       PRN Meds:     midodrine 10 mg PRN   fentanNYL 50 mcg Q1H PRN   Or     fentanNYL 100 mcg Q1H PRN   oxyCODONE 5 mg Q4H PRN   acetaminophen 650 mg Q4H PRN   magnesium hydroxide 30 mL Daily PRN   albuterol 2.5 mg Q6H PRN   LORazepam 0.5 mg Q8H PRN   sodium chloride flush 10 mL PRN   ondansetron 4 mg Q6H PRN   glucose 15 g PRN   dextrose 12.5 g PRN   glucagon (rDNA) 1 mg PRN   dextrose 100 mL/hr PRN       SUPPORT DEVICES: [x] Ventilator [] BIPAP  [] Nasal Cannula [] Room Air  .   Lab Results   Component Value Date    PHART 7.310 03/18/2019    BMB8LHU 51.7 03/18/2019    PO2ART 67.8 03/18/2019    UFL9YSL 25.4 03/18/2019    NRN0HFD 32 04/03/2019    J6WMNZIF 91.7 03/18/2019    FIO2 60.0 04/03/2019     DATA:  Complete Blood Count:   Recent Labs     04/01/19  0429  04/02/19  0447 04/02/19  1049 04/02/19  2141 04/03/19  0802   WBC 23.2*  --  22.1*  --   --  19.7*   RBC 2.43*  --  2.54*  --   --  2.52*   HGB 7.5*   < > 7.9* 8.0* 8.0* 8.0*   HCT 22.5*   < > 25.5* 25.1* 24.6* 25.5*   MCV 92.6  --  100.4  --   --  101.2   MCH 30.9  --  31.1  --   --  31.7   MCHC 33.3  --  31.0  --   --  31.4   RDW 14.4  --  15.9*  --   --  18.5*   *  --  See Reflexed IPF Result  --   --  See Reflexed IPF Result   MPV 11.0  --  NOT REPORTED  --   --  NOT REPORTED    < > = values in this interval not displayed.        Last 3 Blood Glucose:   Recent Labs     04/01/19 0429 04/02/19 0447 04/03/19  0802   GLUCOSE 177* 168* 259*        PT/INR:    Lab Results   Component Value Date    PROTIME 12.5 03/29/2019    INR 1.2 03/29/2019     PTT:    Lab Results   Component Value Date    APTT 39.4 03/28/2019       Comprehensive Metabolic Profile:   Recent Labs     04/01/19 0429 04/02/19 0447 04/03/19  0802    144 145*   K 3.7 3.6* 3.7    103 102   CO2 23 22 24   * 148* 124*   CREATININE 4.43* 4.40* 3.06*   GLUCOSE 177* 168* 259*   CALCIUM 7.7* 7.9* 7.9*   PROT 5.0* 5.5* 5.7*   LABALBU 2.7* 2.9* 3.0*   BILITOT 0.62 0.52 0.54   ALKPHOS 92 102 100   * 59* 43*   * 301* 208*      Magnesium:   Lab Results   Component Value Date    MG 2.7 04/03/2019    MG 2.9 04/02/2019    MG 2.9 04/01/2019     Phosphorus:   Lab Results   Component Value Date    PHOS 5.7 04/03/2019    PHOS 6.0 04/02/2019    PHOS 6.0 04/01/2019     Ionized Calcium:   Lab Results   Component Value Date    CAION 1.08 04/03/2019    CAION 1.09 04/02/2019    CAION 1.05 04/01/2019        Urinalysis:   Lab Results   Component Value Date    NITRU NEGATIVE 03/28/2019    COLORU DARK YELLOW 03/28/2019    PHUR 5.0 03/28/2019    WBCUA 5 TO 10 03/28/2019    RBCUA 5 TO 10 03/28/2019    MUCUS NOT REPORTED 03/28/2019    TRICHOMONAS NOT REPORTED 03/28/2019    YEAST NOT REPORTED 03/28/2019    BACTERIA NOT REPORTED 03/28/2019    SPECGRAV 1.024 03/28/2019    LEUKOCYTESUR TRACE 03/28/2019    UROBILINOGEN Normal 03/28/2019    BILIRUBINUR NEGATIVE  Verified by ictotest. 03/28/2019    GLUCOSEU TRACE 03/28/2019    KETUA TRACE 03/28/2019    AMORPHOUS NOT REPORTED 03/28/2019       HgBA1c:    Lab Results   Component Value Date    LABA1C 8.6 03/18/2019     TSH:  No results found for: TSH  Lactic Acid:   Lab Results   Component Value Date    LACTA 2.2 03/18/2019    LACTA 2.6 03/18/2019      Troponin: No results for input(s): TROPONINI in the last 72 hours. Results for Rosalie Dixon (MRN [id number]) as of 3/23/2019 18:06   Ref.  Range 3/23/2019 04:07   POC pH Latest Ref Range: 7.350 - 7.450  7.437   POC pH Temp Unknown NOT REPORTED   POC pCO2 Latest Ref Range: 35.0 - 48.0 mm Hg 63.7 (H)   POC pCO2 Temp Latest Units: mm Hg NOT REPORTED   POC PO2 Latest Ref Range: 83.0 - 108.0 mm Hg 65.1 (L)   POC pO2 Temp Latest Units: mm Hg NOT REPORTED   POC HCO3 Latest Ref Range: 21.0 - 28.0 mmol/L 42.9 (HH)   POC O2 SAT Latest Ref Range: 94.0 - 98.0 % 92 (L)     ASSESSMENT:     Patient Active Problem List    Diagnosis Date Noted    ERNESTO (acute kidney injury) (HCC)     Transaminitis     Ischemic hepatitis     IVIS (obstructive sleep apnea)     Leukocytosis     Hemorrhagic shock (HCC)     Retroperitoneal hematoma     Lactic acidosis     Hyperglycemia     Acute blood loss anemia     Community acquired pneumonia of left lower lobe of lung ([facility] [address]     Influenza A with respiratory manifestations 03/18/2019    Acute respiratory failure with hypoxia (HCC) 03/18/2019    Influenza A 03/18/2019    Acute hypercapnic respiratory failure (HCC) 03/18/2019    NSTEMI (non-ST elevated myocardial infarction) ([facility])     Type 2 diabetes mellitus without complication, without long-term current use of insulin ([facility] [address] 09/06/2016    Adult BMI > 30 05/06/2016    CAD (coronary artery disease)     DM type 2 (diabetes mellitus, type 2) ([facility])     Atrial fibrillation with rapid ventricular response ([facility] [address] 01/01/2007        PLAN:     WEAN PER PROTOCOL:  [] No   [x] Yes  [] N/A    ICU PROPHYLAXIS:  Stress ulcer:  [] PPI Agent  [x] [id number] [] but no numbers reported, hit antibody negative. Continue to monitor. EPC cuffs for DVT prophylaxis   Prilosec for GI prophylaxis         [address]  PGY2 Internal Medicine  Alessandro Pink    Attending Physician Statement  I have discussed the care of Archie Londono, including pertinent history and exam findings with the resident. I have reviewed the key elements of all parts of the encounter with the resident. I have seen and examined the patient with the resident. I agree with the assessment and plan and status of the problem list as documented. I seen the patient during my round today, I have reviewed the labs, arterial blood gases seen in ventilator setting reviewed. He is more alert today and able to follow commands and tracking eyes and looked much more comfortable than before. He remained on 50% FiO2 and PEEP of 10 and his blood gases shows a pH of 7.31 PCO2 59 and PO2 of 57 with a bicarb of 30. His BUN is 124 and creatinine is 3.06 sodium is 145. Hemoglobin is is stable at 8 and no evidence of further bleeding, his WBC continued to improve and it is 19.7. His endotracheal secretion is better today and less after hemodialysis was started yesterday his mentation is improved and his endotracheal secretion is better. We will get him out of bed to chair with ventilator and mobilize. He's been off sedation and will try to keep him off sedation. We will continue with ventilator support and try to wean FiO2 and PEEP via  We will get chest x-ray tomorrow. Continue to monitor urine output and hemodialysis today and on Lasix daily. We'll follow WBC count and temperature  He is off anticoagulation and his hemoglobin is stable. Continue to monitor Lantus and blood sugar. Discussed with respiratory therapist.  Discussed with nursing staff, treatment plan discussed.   Discussed with wife and updated and will continue with weaning FiO2 and possibly started spontaneous breathing trial if he remains awake and continued to improve mentation    Total critical care time caring for this patient with life threatening, unstable organ failure, including direct patient contact, management of life support systems, review of data including imaging and labs, discussions with other team members and physicians at least 28  Min so far today, excluding procedures. Amy Shen MD  4/3/2019 7:46 PM    Please note that this chart was generated using voice recognition Dragon dictation software. Although every effort was made to ensure the accuracy of this automated transcription, some errors in transcription may have occurred.

## 2019-04-03 NOTE — PLAN OF CARE
Problem: OXYGENATION/RESPIRATORY FUNCTION  Goal: Patient will maintain patent airway  Outcome: Ongoing  Goal: Patient will achieve/maintain normal respiratory rate/effort  Respiratory rate and effort will be within normal limits for the patient  Outcome: Ongoing    Problem: MECHANICAL VENTILATION  Goal: Patient will maintain patent airway  Outcome: Ongoing  Goal: Oral health is maintained or improved  Outcome: Ongoing  Goal: ET tube will be managed safely  Outcome: Ongoing  Goal: Ability to express needs and understand communication  Outcome: Ongoing  Goal: Mobility/activity is maintained at optimum level for patient  Outcome: Ongoing    Problem: ASPIRATION PRECAUTIONS  Goal: Patients risk of aspiration is minimized  Outcome: Ongoing    Problem: SKIN INTEGRITY  Goal: Skin integrity is maintained or improved  Outcome: Ongoing                    Ventilator Bronchodilator assessment    Breath sounds: diminished with mild rhonchi, cleared with suctioning  Inspiratory Pressure: 24  Plateau Pressure: 23    Patient assessed at level 1          []    Bronchodilator Assessment    BRONCHODILATOR ASSESSMENT SCORE  Score 0 (Home) 1 2 3 4   Breath Sounds   []  Chronic Ventilator: Patient at baseline [x]  Mild Wheezes/ Clear []  Intermittent wheezes with good air entry []  Bilateral/unilateral wheezing with diminished air entry []  Insp/Exp wheeze and/or poor aeration   Ventilator Pressures   []  Chronic Ventilator [x]  Insp. Pressure less than 25 cm H20 []  Insp. Pressure less than 25 cm H20 []  Insp. Pressure exceeds 25 cm H20 []  Insp.  Pressure exceeds 30 cm H20   Plateau Pressure []  NA   [x]  Plateau Pressure less than 4  []  Plateau Pressure less than or equal to 5 []  Plateau Pressure greater than or equal to 6 []  Plateau Pressure greater than or equal to [place]  9:09 AM

## 2019-04-04 ENCOUNTER — APPOINTMENT (OUTPATIENT)
Dept: GENERAL RADIOLOGY | Age: 74
DRG: 207 | End: 2019-04-04
Attending: INTERNAL MEDICINE
Payer: MEDICARE

## 2019-04-04 LAB
ABSOLUTE EOS #: 0.5 K/UL (ref 0–0.4)
ABSOLUTE IMMATURE GRANULOCYTE: 0 K/UL (ref 0–0.3)
ABSOLUTE LYMPH #: 0.5 K/UL (ref 1–4.8)
ABSOLUTE MONO #: 0.17 K/UL (ref 0.1–0.8)
ALBUMIN SERPL-MCNC: 3.3 G/DL (ref 3.5–5.2)
ALBUMIN/GLOBULIN RATIO: 1.4 (ref 1–2.5)
ALLEN TEST: ABNORMAL
ALP BLD-CCNC: 95 U/L (ref 40–129)
ALT SERPL-CCNC: 155 U/L (ref 5–41)
ANION GAP SERPL CALCULATED.3IONS-SCNC: 14 MMOL/L (ref 9–17)
AST SERPL-CCNC: 31 U/L
BASOPHILS # BLD: 0 % (ref 0–2)
BASOPHILS ABSOLUTE: 0 K/UL (ref 0–0.2)
BILIRUB SERPL-MCNC: 0.76 MG/DL (ref 0.3–1.2)
BILIRUBIN DIRECT: 0.33 MG/DL
BILIRUBIN, INDIRECT: 0.43 MG/DL (ref 0–1)
BUN BLDV-MCNC: 80 MG/DL (ref 8–23)
BUN/CREAT BLD: ABNORMAL (ref 9–20)
CALCIUM IONIZED: 1.1 MMOL/L (ref 1.13–1.33)
CALCIUM SERPL-MCNC: 8.1 MG/DL (ref 8.6–10.4)
CHLORIDE BLD-SCNC: 104 MMOL/L (ref 98–107)
CO2: 23 MMOL/L (ref 20–31)
CREAT SERPL-MCNC: 2.25 MG/DL (ref 0.7–1.2)
DIFFERENTIAL TYPE: ABNORMAL
EOSINOPHILS RELATIVE PERCENT: 3 % (ref 1–4)
FIO2: 50
GFR AFRICAN AMERICAN: 35 ML/MIN
GFR NON-AFRICAN AMERICAN: 29 ML/MIN
GFR SERPL CREATININE-BSD FRML MDRD: ABNORMAL ML/MIN/{1.73_M2}
GFR SERPL CREATININE-BSD FRML MDRD: ABNORMAL ML/MIN/{1.73_M2}
GLOBULIN: ABNORMAL G/DL (ref 1.5–3.8)
GLUCOSE BLD-MCNC: 125 MG/DL (ref 75–110)
GLUCOSE BLD-MCNC: 136 MG/DL (ref 75–110)
GLUCOSE BLD-MCNC: 163 MG/DL (ref 75–110)
GLUCOSE BLD-MCNC: 207 MG/DL (ref 75–110)
GLUCOSE BLD-MCNC: 208 MG/DL (ref 75–110)
GLUCOSE BLD-MCNC: 256 MG/DL (ref 70–99)
GLUCOSE BLD-MCNC: 265 MG/DL (ref 75–110)
HCT VFR BLD CALC: 26.1 % (ref 40.7–50.3)
HEMOGLOBIN: 7.9 G/DL (ref 13–17)
IMMATURE GRANULOCYTES: 0 %
LYMPHOCYTES # BLD: 3 % (ref 24–44)
MAGNESIUM: 2.2 MG/DL (ref 1.6–2.6)
MCH RBC QN AUTO: 31.2 PG (ref 25.2–33.5)
MCHC RBC AUTO-ENTMCNC: 30.3 G/DL (ref 28.4–34.8)
MCV RBC AUTO: 103.2 FL (ref 82.6–102.9)
MODE: ABNORMAL
MONOCYTES # BLD: 1 % (ref 1–7)
MORPHOLOGY: ABNORMAL
NEGATIVE BASE EXCESS, ART: ABNORMAL (ref 0–2)
NRBC AUTOMATED: 0.7 PER 100 WBC
O2 DEVICE/FLOW/%: ABNORMAL
PATIENT TEMP: ABNORMAL
PDW BLD-RTO: 18.7 % (ref 11.8–14.4)
PHOSPHORUS: 3.2 MG/DL (ref 2.5–4.5)
PLATELET # BLD: 84 K/UL (ref 138–453)
PLATELET ESTIMATE: ABNORMAL
PMV BLD AUTO: 11.3 FL (ref 8.1–13.5)
POC HCO3: 25.8 MMOL/L (ref 21–28)
POC O2 SATURATION: 99 % (ref 94–98)
POC PCO2 TEMP: ABNORMAL MM HG
POC PCO2: 42.4 MM HG (ref 35–48)
POC PH TEMP: ABNORMAL
POC PH: 7.39 (ref 7.35–7.45)
POC PO2 TEMP: ABNORMAL MM HG
POC PO2: 124.5 MM HG (ref 83–108)
POSITIVE BASE EXCESS, ART: 1 (ref 0–3)
POTASSIUM SERPL-SCNC: 3.5 MMOL/L (ref 3.7–5.3)
RBC # BLD: 2.53 M/UL (ref 4.21–5.77)
RBC # BLD: ABNORMAL 10*6/UL
SAMPLE SITE: ABNORMAL
SEG NEUTROPHILS: 93 % (ref 36–66)
SEGMENTED NEUTROPHILS ABSOLUTE COUNT: 15.53 K/UL (ref 1.8–7.7)
SODIUM BLD-SCNC: 141 MMOL/L (ref 135–144)
TCO2 (CALC), ART: 27 MMOL/L (ref 22–29)
TOTAL PROTEIN: 5.7 G/DL (ref 6.4–8.3)
WBC # BLD: 16.7 K/UL (ref 3.5–11.3)
WBC # BLD: ABNORMAL 10*3/UL

## 2019-04-04 PROCEDURE — 36415 COLL VENOUS BLD VENIPUNCTURE: CPT

## 2019-04-04 PROCEDURE — 36600 WITHDRAWAL OF ARTERIAL BLOOD: CPT

## 2019-04-04 PROCEDURE — 6370000000 HC RX 637 (ALT 250 FOR IP): Performed by: STUDENT IN AN ORGANIZED HEALTH CARE EDUCATION/TRAINING PROGRAM

## 2019-04-04 PROCEDURE — 94762 N-INVAS EAR/PLS OXIMTRY CONT: CPT

## 2019-04-04 PROCEDURE — 82330 ASSAY OF CALCIUM: CPT

## 2019-04-04 PROCEDURE — 97110 THERAPEUTIC EXERCISES: CPT

## 2019-04-04 PROCEDURE — 99233 SBSQ HOSP IP/OBS HIGH 50: CPT | Performed by: INTERNAL MEDICINE

## 2019-04-04 PROCEDURE — 6360000002 HC RX W HCPCS: Performed by: STUDENT IN AN ORGANIZED HEALTH CARE EDUCATION/TRAINING PROGRAM

## 2019-04-04 PROCEDURE — 99291 CRITICAL CARE FIRST HOUR: CPT | Performed by: INTERNAL MEDICINE

## 2019-04-04 PROCEDURE — 80048 BASIC METABOLIC PNL TOTAL CA: CPT

## 2019-04-04 PROCEDURE — 82947 ASSAY GLUCOSE BLOOD QUANT: CPT

## 2019-04-04 PROCEDURE — 2580000003 HC RX 258: Performed by: STUDENT IN AN ORGANIZED HEALTH CARE EDUCATION/TRAINING PROGRAM

## 2019-04-04 PROCEDURE — 2700000000 HC OXYGEN THERAPY PER DAY

## 2019-04-04 PROCEDURE — 6370000000 HC RX 637 (ALT 250 FOR IP): Performed by: INTERNAL MEDICINE

## 2019-04-04 PROCEDURE — 94770 HC ETCO2 MONITOR DAILY: CPT

## 2019-04-04 PROCEDURE — 2580000003 HC RX 258: Performed by: EMERGENCY MEDICINE

## 2019-04-04 PROCEDURE — 90935 HEMODIALYSIS ONE EVALUATION: CPT

## 2019-04-04 PROCEDURE — 83735 ASSAY OF MAGNESIUM: CPT

## 2019-04-04 PROCEDURE — 82803 BLOOD GASES ANY COMBINATION: CPT

## 2019-04-04 PROCEDURE — 71045 X-RAY EXAM CHEST 1 VIEW: CPT

## 2019-04-04 PROCEDURE — 84100 ASSAY OF PHOSPHORUS: CPT

## 2019-04-04 PROCEDURE — 94003 VENT MGMT INPAT SUBQ DAY: CPT

## 2019-04-04 PROCEDURE — 80076 HEPATIC FUNCTION PANEL: CPT

## 2019-04-04 PROCEDURE — 85025 COMPLETE CBC W/AUTO DIFF WBC: CPT

## 2019-04-04 PROCEDURE — 2000000000 HC ICU R&B

## 2019-04-04 RX ORDER — MIDAZOLAM HYDROCHLORIDE 1 MG/ML
2 INJECTION INTRAMUSCULAR; INTRAVENOUS ONCE
Status: COMPLETED | OUTPATIENT
Start: 2019-04-04 | End: 2019-04-04

## 2019-04-04 RX ORDER — SODIUM CHLORIDE 0.9 % (FLUSH) 0.9 %
10 SYRINGE (ML) INJECTION PRN
Status: DISCONTINUED | OUTPATIENT
Start: 2019-04-04 | End: 2019-04-10 | Stop reason: HOSPADM

## 2019-04-04 RX ORDER — MIDAZOLAM HYDROCHLORIDE 1 MG/ML
INJECTION INTRAMUSCULAR; INTRAVENOUS
Status: DISPENSED
Start: 2019-04-04 | End: 2019-04-04

## 2019-04-04 RX ORDER — 0.9 % SODIUM CHLORIDE 0.9 %
150 INTRAVENOUS SOLUTION INTRAVENOUS PRN
Status: DISCONTINUED | OUTPATIENT
Start: 2019-04-04 | End: 2019-04-10 | Stop reason: HOSPADM

## 2019-04-04 RX ORDER — 0.9 % SODIUM CHLORIDE 0.9 %
250 INTRAVENOUS SOLUTION INTRAVENOUS PRN
Status: DISCONTINUED | OUTPATIENT
Start: 2019-04-04 | End: 2019-04-10 | Stop reason: HOSPADM

## 2019-04-04 RX ORDER — FUROSEMIDE 40 MG/1
80 TABLET ORAL DAILY
Status: COMPLETED | OUTPATIENT
Start: 2019-04-04 | End: 2019-04-04

## 2019-04-04 RX ORDER — SODIUM CHLORIDE 0.9 % (FLUSH) 0.9 %
10 SYRINGE (ML) INJECTION EVERY 12 HOURS SCHEDULED
Status: DISCONTINUED | OUTPATIENT
Start: 2019-04-04 | End: 2019-04-10 | Stop reason: HOSPADM

## 2019-04-04 RX ORDER — HEPARIN SODIUM 5000 [USP'U]/ML
5000 INJECTION, SOLUTION INTRAVENOUS; SUBCUTANEOUS EVERY 8 HOURS SCHEDULED
Status: DISCONTINUED | OUTPATIENT
Start: 2019-04-04 | End: 2019-04-08

## 2019-04-04 RX ADMIN — METOPROLOL TARTRATE 25 MG: 25 TABLET ORAL at 21:07

## 2019-04-04 RX ADMIN — FENTANYL CITRATE 100 MCG: 50 INJECTION, SOLUTION INTRAMUSCULAR; INTRAVENOUS at 05:30

## 2019-04-04 RX ADMIN — INSULIN LISPRO 4 UNITS: 100 INJECTION, SOLUTION INTRAVENOUS; SUBCUTANEOUS at 05:39

## 2019-04-04 RX ADMIN — INSULIN GLARGINE 30 UNITS: 100 INJECTION, SOLUTION SUBCUTANEOUS at 08:13

## 2019-04-04 RX ADMIN — Medication 10 ML: at 10:42

## 2019-04-04 RX ADMIN — AMIODARONE HYDROCHLORIDE 200 MG: 200 TABLET ORAL at 19:47

## 2019-04-04 RX ADMIN — INSULIN LISPRO 2 UNITS: 100 INJECTION, SOLUTION INTRAVENOUS; SUBCUTANEOUS at 13:48

## 2019-04-04 RX ADMIN — Medication 10 ML: at 19:47

## 2019-04-04 RX ADMIN — INSULIN GLARGINE 30 UNITS: 100 INJECTION, SOLUTION SUBCUTANEOUS at 19:57

## 2019-04-04 RX ADMIN — HEPARIN SODIUM 5000 UNITS: 5000 INJECTION INTRAVENOUS; SUBCUTANEOUS at 13:53

## 2019-04-04 RX ADMIN — OXYCODONE HYDROCHLORIDE 5 MG: 5 TABLET ORAL at 10:41

## 2019-04-04 RX ADMIN — MIDAZOLAM HYDROCHLORIDE 2 MG: 1 INJECTION, SOLUTION INTRAMUSCULAR; INTRAVENOUS at 02:11

## 2019-04-04 RX ADMIN — LANSOPRAZOLE 30 MG: 30 TABLET, ORALLY DISINTEGRATING, DELAYED RELEASE ORAL at 05:28

## 2019-04-04 RX ADMIN — OXYCODONE HYDROCHLORIDE 5 MG: 5 TABLET ORAL at 21:07

## 2019-04-04 RX ADMIN — INSULIN LISPRO 6 UNITS: 100 INJECTION, SOLUTION INTRAVENOUS; SUBCUTANEOUS at 02:13

## 2019-04-04 RX ADMIN — FUROSEMIDE 80 MG: 40 TABLET ORAL at 11:47

## 2019-04-04 RX ADMIN — AMIODARONE HYDROCHLORIDE 200 MG: 200 TABLET ORAL at 08:33

## 2019-04-04 RX ADMIN — INSULIN LISPRO 4 UNITS: 100 INJECTION, SOLUTION INTRAVENOUS; SUBCUTANEOUS at 08:11

## 2019-04-04 RX ADMIN — CITALOPRAM 10 MG: 10 TABLET, FILM COATED ORAL at 11:32

## 2019-04-04 ASSESSMENT — PULMONARY FUNCTION TESTS
PIF_VALUE: 14
PIF_VALUE: 31
PIF_VALUE: 23
PIF_VALUE: 14
PIF_VALUE: 19
PIF_VALUE: 20
PIF_VALUE: 25
PIF_VALUE: 14
PIF_VALUE: 20
PIF_VALUE: 28
PIF_VALUE: 14
PIF_VALUE: 28
PIF_VALUE: 27
PIF_VALUE: 24
PIF_VALUE: 25
PIF_VALUE: 24
PIF_VALUE: 23

## 2019-04-04 ASSESSMENT — PAIN DESCRIPTION - PAIN TYPE: TYPE: CHRONIC PAIN

## 2019-04-04 ASSESSMENT — PAIN SCALES - GENERAL
PAINLEVEL_OUTOF10: 1
PAINLEVEL_OUTOF10: 1
PAINLEVEL_OUTOF10: 8
PAINLEVEL_OUTOF10: 0

## 2019-04-04 ASSESSMENT — PAIN DESCRIPTION - ORIENTATION: ORIENTATION: POSTERIOR

## 2019-04-04 ASSESSMENT — PAIN DESCRIPTION - LOCATION: LOCATION: BACK

## 2019-04-04 NOTE — PROGRESS NOTES
Nephrology Progress Note      Subjective:  Patient was seen and examined . Hemodialysis will be done this afternoon . Mechanical vent, desatted increasing oxygen requirement FiO2 down to 50%.     Off pressors, a-fib on amiodarone, rate controlled  Had 1.3 liters urine output last 24 hrs.     + fluid balance since admission    Objective:  CURRENT TEMPERATURE:  Temp: 99.8 °F (37.7 °C)  MAXIMUM TEMPERATURE OVER 24HRS:  Temp (24hrs), Av.8 °F (37.1 °C), Min:98.1 °F (36.7 °C), Max:99.8 °F (37.7 °C)    CURRENT RESPIRATORY RATE:  Resp: 23  CURRENT PULSE:  Pulse: 84  CURRENT BLOOD PRESSURE:  BP: 132/68  24HR BLOOD PRESSURE RANGE:  Systolic (32ZAM), JTB:326 , Min:81 , VDY:109   ; Diastolic (41SUA), HRN:95, Min:45, Max:92    24HR INTAKE/OUTPUT:      Intake/Output Summary (Last 24 hours) at 2019 0906  Last data filed at 2019 0400  Gross per 24 hour   Intake 1719.83 ml   Output 3640 ml   Net -1920.17 ml     Patient Vitals for the past 96 hrs (Last 3 readings):   Weight   19 1715 286 lb 2.5 oz (129.8 kg)   19 1333 290 lb 9.1 oz (131.8 kg)   19 0300 (!) 304 lb 10.8 oz (138.2 kg)     Physical Exam:    General appearance: pt on vent  Skin: warm and dry, no rash or erythema, no jaundice  Eyes: conjunctivae pale   ENT: : ETT in place    Neck: supple  Pulmonary: no wheezing or rhonchi. +ve crackles  Cardiovascular: Normal S1 & S2, irregular No S3 or  S4, no Pericardial Rub no Murmur   Abdomen: bowel sounds hypoactive  , somewhat tense, no fluid wave  Extremities:no cyanosis, clubbing , + thigh edema    Labs:   CBC:  Recent Labs     19  0447  19  2141 19  0802 19  0428   WBC 22.1*  --   --  19.7* 16.7*   RBC 2.54*  --   --  2.52* 2.53*   HGB 7.9*   < > 8.0* 8.0* 7.9*   HCT 25.5*   < > 24.6* 25.5* 26.1*   .4  --   --  101.2 103.2*   MCH 31.1  --   --  31.7 31.2   MCHC 31.0  --   --  31.4 30.3   RDW 15.9*  --   --  18.5* 18.7*   PLT See Reflexed IPF Result  --   --  See Reflexed IPF Result 84*   MPV NOT REPORTED  --   --  NOT REPORTED 11.3    < > = values in this interval not displayed. BMP:   Recent Labs     04/02/19 0447 04/03/19  0802 04/04/19  0428    145* 141   K 3.6* 3.7 3.5*    102 104   CO2 22 24 23   * 124* 80*   CREATININE 4.40* 3.06* 2.25*   GLUCOSE 168* 259* 256*   CALCIUM 7.9* 7.9* 8.1*        Phosphorus:    Recent Labs     04/02/19 0447 04/03/19  0802 04/04/19  0428   PHOS 6.0* 5.7* 3.2     Magnesium:   Recent Labs     04/02/19 0447 04/03/19  0802 04/04/19  0428   MG 2.9* 2.7* 2.2     Albumin:   Recent Labs     04/02/19 0447 04/03/19  0802 04/04/19  0428   LABALBU 2.9* 3.0* 3.3*       SPEP:   Lab Results   Component Value Date    PROT 5.7 04/04/2019    ALBCAL 2.6 03/29/2019    ALBPCT 56 03/29/2019    LABALPH 0.2 03/29/2019    LABALPH 0.8 03/29/2019    A1PCT 4 03/29/2019    A2PCT 17 03/29/2019    LABBETA 0.5 03/29/2019    BETAPCT 11 03/29/2019    GAMGLOB 0.6 03/29/2019    GGPCT 12 03/29/2019    PATH ELECTRONICALLY SIGNED.  Maulik Hernandez M.D. 03/29/2019     Urine Creatinine:    Lab Results   Component Value Date    LABCREA 111.9 03/29/2019       Urinalysis:  U/A:   Lab Results   Component Value Date    NITRU NEGATIVE 03/28/2019    COLORU DARK YELLOW 03/28/2019    PHUR 5.0 03/28/2019    WBCUA 5 TO 10 03/28/2019    RBCUA 5 TO 10 03/28/2019    MUCUS NOT REPORTED 03/28/2019    TRICHOMONAS NOT REPORTED 03/28/2019    YEAST NOT REPORTED 03/28/2019    BACTERIA NOT REPORTED 03/28/2019    SPECGRAV 1.024 03/28/2019    LEUKOCYTESUR TRACE 03/28/2019    UROBILINOGEN Normal 03/28/2019    BILIRUBINUR NEGATIVE  Verified by ictotest. 03/28/2019    GLUCOSEU TRACE 03/28/2019    KETUA TRACE 03/28/2019    AMORPHOUS NOT REPORTED 03/28/2019     CAT SCAN ;     1. Large left retroperitoneal hematoma extending from the posterior   hemidiaphragm to the upper pelvis.  Hematocrit effect with a hematoma   measuring maximally 9.3 cm in diameter and about 14 cm in

## 2019-04-04 NOTE — PLAN OF CARE
symptoms  Outcome: Ongoing     Problem: Sensory Perception - Impaired:  Goal: Demonstrations of improved sensory functioning will increase  Description  Demonstrations of improved sensory functioning will increase  Outcome: Ongoing  Goal: Decrease in sensory misperception frequency  Description  Decrease in sensory misperception frequency  Outcome: Ongoing  Goal: Able to refrain from responding to false sensory perceptions  Description  Able to refrain from responding to false sensory perceptions  Outcome: Ongoing  Goal: Demonstrates accurate environmental perceptions  Description  Demonstrates accurate environmental perceptions  Outcome: Ongoing  Goal: Able to distinguish between reality-based and nonreality-based thinking  Description  Able to distinguish between reality-based and nonreality-based thinking  Outcome: Ongoing  Goal: Able to interrupt nonreality-based thinking  Description  Able to interrupt nonreality-based thinking  Outcome: Ongoing     Problem: Sleep Pattern Disturbance:  Goal: Appears well-rested  Description  Appears well-rested  Outcome: Ongoing     Problem: Restraint Use - Nonviolent/Non-Self-Destructive Behavior:  Goal: Absence of restraint indications  Description  Absence of restraint indications  Outcome: Ongoing  Goal: Absence of restraint-related injury  Description  Absence of restraint-related injury  Outcome: Ongoing     Problem: Nutrition  Goal: Optimal nutrition therapy  4/4/2019 0303 by Giulia Villela RN  Note:   Tube feed, dietary following.   4/3/2019 1802 by Sanjana Zepeda RN  Outcome: Ongoing     Problem: OXYGENATION/RESPIRATORY FUNCTION  Goal: Patient will maintain patent airway  Outcome: Ongoing  Goal: Patient will achieve/maintain normal respiratory rate/effort  Description  Respiratory rate and effort will be within normal limits for the patient  Outcome: Ongoing     Problem: MECHANICAL VENTILATION  Goal: Patient will maintain patent airway  Outcome: Ongoing  Goal: Oral health is maintained or improved  Outcome: Ongoing  Goal: ET tube will be managed safely  Outcome: Ongoing  Goal: Ability to express needs and understand communication  Outcome: Ongoing  Goal: Mobility/activity is maintained at optimum level for patient  Outcome: Ongoing     Problem: SKIN INTEGRITY  Goal: Skin integrity is maintained or improved  Outcome: Ongoing     Problem: Musculor/Skeletal Functional Status  Goal: Highest potential functional level  Outcome: Ongoing     Problem: Airway Clearance - Ineffective:  Goal: Ability to maintain a clear airway will improve  Description  Ability to maintain a clear airway will improve  Outcome: Ongoing     Problem: Aspiration:  Goal: Absence of aspiration  Description  Absence of aspiration  Outcome: Ongoing     Problem: Serum Glucose Level - Abnormal:  Goal: Ability to maintain appropriate glucose levels will improve to within specified parameters  Description  Ability to maintain appropriate glucose levels will improve to within specified parameters  Outcome: Ongoing     Problem: Pain:  Goal: Pain level will decrease  Description  Pain level will decrease  Outcome: Ongoing  Goal: Control of acute pain  Description  Control of acute pain  Outcome: Ongoing  Goal: Control of chronic pain  Description  Control of chronic pain  Outcome: Ongoing

## 2019-04-04 NOTE — PROGRESS NOTES
Treatment time: 3hrs 30min   Net UF: 74.3    Pre weight: 129.1  Post weight: 127.7  EDW:    Access used: CVC  Access function: fair    Medications or blood products given: None    Regular outpatient schedule:     Summary of response to treatment: Patient tolerated treatment fair. Patient family at bedside. Will continue to monitor. Copy of dialysis treatment record placed in chart, to be scanned into EMR.

## 2019-04-04 NOTE — PROGRESS NOTES
Occupational Therapy    Occupational Therapy Not Seen Note    DATE: 2019  Name: Soraya Almaguer  : 1945  MRN: [id number]    Patient not available for Occupational Therapy due to:    Sedation: Int/Sed/HD    Next Scheduled Treatment: As pt has been inappropriate for OT eval, pt will be changed to CHECK ICU status and OT will check on pt when significant progress is made or when pt leaves the ICU.     Electronically signed by Sofía Campos OT on 2019 at 11:49 AM

## 2019-04-04 NOTE — PROGRESS NOTES
INTENSIVE CARE UNIT  Resident Physician Progress Note    [facility] - Charley Byrd  Date of Admission -  3/19/2019  7:58 AM  Date of Evaluation -  4/4/2019  Room and Bed Number -  0105/0105-01   Hospital Day - 12    Chief complaint influenza, pneumonia, A. fib with RVR  SUBJECTIVE:     OVERNIGHT EVENTS:   No acute events overnight. Continue off sedation, following commands. Weaning well. Slight hypotension after dialysis but stable through night  T-max 100.2  Mild/moderate frothy secretions from ET tube  Received dialysis on 4/2 and 4/3, Creatinine improving  Heparin-induced antibody negative  CXR shows multifocal pneumonia vs. ARDS    I/O last 3 completed shifts: In: 2043.8 [I.V.:382.8; NG/GT:1661]  Out: 5034 [Urine:1365]  No intake/output data recorded. Results for Jigna Daily (MRN [id number]) as of 4/1/2019 08:22   Ref. Range 4/1/2019 04:15   POC pH Latest Ref Range: 7.350 - 7.450  7.504 (H)   POC pH Temp Unknown NOT REPORTED   POC pCO2 Latest Ref Range: 35.0 - 48.0 mm Hg 33.6 (L)   POC pCO2 Temp Latest Units: mm Hg NOT REPORTED   POC PO2 Latest Ref Range: 83.0 - 108.0 mm Hg 58.1 (L)   POC pO2 Temp Latest Units: mm Hg NOT REPORTED   POC HCO3 Latest Ref Range: 21.0 - 28.0 mmol/L 26.5   POC O2 SAT Latest Ref Range: 94.0 - 98.0 % 92 (L)     CXR 4/1/19     Bibasilar opacities with improvement on the left side.  Perihilar opacity on   the left has improved.  Multiple tubes and lines as above.          US RUQ 3/31/19  Impression   No evidence for acute cholecystitis, cholelithiasis or biliary dilatation.       Small volume perihepatic ascites.           AWAKE & FOLLOWING COMMANDS:  [] No   [x] Yes     SECRETIONS Amount:  [x] Small [] Moderate  [] Large  [] None  Color:     [x] White [x] Colored  [] Bloody    SEDATION:  RAAS Score:  [] Propofol gtt  [] Versed gtt  [] Ativan gtt   [x] No Sedation    PARALYZED:  [x] No    [] Yes    VASOPRESSORS:  [x] No    [] Yes  [] Levophed [] Dopamine [] Vasopressin  [] Dobutamine [] Phenylephrine [] Epinephrine    Review of Systems -  Intubated , not sedated     OBJECTIVE:     VITAL SIGNS:  /68   Pulse 84   Temp 99.8 °F (37.7 °C) (Core)   Resp 23   Ht 5' 8\" (1.727 m)   Wt 286 lb 2.5 oz (129.8 kg)   SpO2 99%   BMI 43.51 kg/m²   Tmax over 24 hours:  Temp (24hrs), Av.9 °F (37.2 °C), Min:98.1 °F (36.7 °C), Max:99.8 °F (37.7 °C)      Patient Vitals for the past 8 hrs:   BP Temp Temp src Pulse Resp SpO2   19 0813 -- -- -- 84 23 99 %   19 0700 -- -- -- 93 25 99 %   19 0600 132/68 -- -- 92 22 99 %   19 0500 (!) 109/54 -- -- 81 24 99 %   19 0400 109/71 99.8 °F (37.7 °C) CORE 83 27 96 %   19 0336 -- -- -- 86 24 97 %   19 0300 (!) 93/51 -- -- 78 24 97 %   19 0205 137/64 -- -- 91 24 91 %   19 0100 (!) 95/46 -- -- 71 22 98 %         Intake/Output Summary (Last 24 hours) at 2019 0859  Last data filed at 2019 0400  Gross per 24 hour   Intake 2043.83 ml   Output 3675 ml   Net -1631.17 ml     Date 19 0000 - 19 235   Shift 2291-2762 1300-7230 7917-2247 24 Hour Total   INTAKE   P.O.(mL/kg/hr) 0(0)   0   I. V.(mL/kg) 124.2(1)   124.2(1)   NG/GT(mL/kg) 576(4.4)   576(4.4)   Shift Total(mL/kg) 700.2(5.4)   700.2(5.4)   OUTPUT   Urine(mL/kg/hr) 340(0.3)   340   Shift Total(mL/kg) 340(2.6)   340(2.6)   Weight (kg) 129.8 129.8 129.8 129.8     Wt Readings from Last 3 Encounters:   19 286 lb 2.5 oz (129.8 kg)   19 294 lb 11.2 oz (133.7 kg)   19 300 lb (136.1 kg)     Body mass index is 43.51 kg/m².         PHYSICAL EXAM:  · General appearance: intubated sedated   · HEENT: Atraumatic, normocephalic, neck supple, normal EOM, thyroid normal, no lymphadenopathy   · Lungs: Wheezing improved, clear to auscultation  · Heart: Regular heart rate, tachycardic, S1, S2 normal, no murmur   · Abdomen: soft, non-tender; bowel sounds normal; no masses,  no organomegaly  · Extremities: No cyanosis or edema  Neurological:  Intubated, not sedated , following commands        MEDICATIONS:  Scheduled Meds:   midazolam        insulin glargine  30 Units Subcutaneous BID    insulin lispro  0-12 Units Subcutaneous Q4H    amiodarone  200 mg Oral BID    metoprolol tartrate  25 mg Oral BID    lansoprazole  30 mg Per NG tube QAM AC    citalopram  10 mg Oral Daily    furosemide  40 mg Intravenous Daily    docusate  100 mg Oral Daily    sodium chloride flush  10 mL Intravenous 2 times per day     Continuous Infusions:   sodium chloride 10 mL/hr at 04/01/19 0200    dextrose       PRN Meds:     sodium chloride 250 mL PRN   sodium chloride 150 mL PRN   fentanNYL 50 mcg Q1H PRN   Or     fentanNYL 100 mcg Q1H PRN   oxyCODONE 5 mg Q4H PRN   acetaminophen 650 mg Q4H PRN   magnesium hydroxide 30 mL Daily PRN   albuterol 2.5 mg Q6H PRN   LORazepam 0.5 mg Q8H PRN   sodium chloride flush 10 mL PRN   ondansetron 4 mg Q6H PRN   glucose 15 g PRN   dextrose 12.5 g PRN   glucagon (rDNA) 1 mg PRN   dextrose 100 mL/hr PRN       SUPPORT DEVICES: [x] Ventilator [] BIPAP  [] Nasal Cannula [] Room Air  . Lab Results   Component Value Date    PHART 7.310 03/18/2019    WZP2YQJ 51.7 03/18/2019    PO2ART 67.8 03/18/2019    KBD0IKB 25.4 03/18/2019    NMH3XET 27 04/04/2019    D2NKQKQH 91.7 03/18/2019    FIO2 50.0 04/04/2019     DATA:  Complete Blood Count:   Recent Labs     04/02/19  0447  04/02/19  2141 04/03/19  0802 04/04/19  0428   WBC 22.1*  --   --  19.7* 16.7*   RBC 2.54*  --   --  2.52* 2.53*   HGB 7.9*   < > 8.0* 8.0* 7.9*   HCT 25.5*   < > 24.6* 25.5* 26.1*   .4  --   --  101.2 103.2*   MCH 31.1  --   --  31.7 31.2   MCHC 31.0  --   --  31.4 30.3   RDW 15.9*  --   --  18.5* 18.7*   PLT See Reflexed IPF Result  --   --  See Reflexed IPF Result 84*   MPV NOT REPORTED  --   --  NOT REPORTED 11.3    < > = values in this interval not displayed.        Last 3 Blood Glucose:   Recent Labs     04/02/19  0447 04/03/19  0802 04/04/19  0428   GLUCOSE 168* 259* 256* PT/INR:    Lab Results   Component Value Date    PROTIME 12.5 03/29/2019    INR 1.2 03/29/2019     PTT:    Lab Results   Component Value Date    APTT 39.4 03/28/2019       Comprehensive Metabolic Profile:   Recent Labs     04/02/19  0447 04/03/19  0802 04/04/19  0428    145* 141   K 3.6* 3.7 3.5*    102 104   CO2 22 24 23   * 124* 80*   CREATININE 4.40* 3.06* 2.25*   GLUCOSE 168* 259* 256*   CALCIUM 7.9* 7.9* 8.1*   PROT 5.5* 5.7* 5.7*   LABALBU 2.9* 3.0* 3.3*   BILITOT 0.52 0.54 0.76   ALKPHOS 102 100 95   AST 59* 43* 31   * 208* 155*      Magnesium:   Lab Results   Component Value Date    MG 2.2 04/04/2019    MG 2.7 04/03/2019    MG 2.9 04/02/2019     Phosphorus:   Lab Results   Component Value Date    PHOS 3.2 04/04/2019    PHOS 5.7 04/03/2019    PHOS 6.0 04/02/2019     Ionized Calcium:   Lab Results   Component Value Date    CAION 1.10 04/04/2019    CAION 1.08 04/03/2019    CAION 1.09 04/02/2019        Urinalysis:   Lab Results   Component Value Date    NITRU NEGATIVE 03/28/2019    COLORU DARK YELLOW 03/28/2019    PHUR 5.0 03/28/2019    WBCUA 5 TO 10 03/28/2019    RBCUA 5 TO 10 03/28/2019    MUCUS NOT REPORTED 03/28/2019    TRICHOMONAS NOT REPORTED 03/28/2019    YEAST NOT REPORTED 03/28/2019    BACTERIA NOT REPORTED 03/28/2019    SPECGRAV 1.024 03/28/2019    LEUKOCYTESUR TRACE 03/28/2019    UROBILINOGEN Normal 03/28/2019    BILIRUBINUR NEGATIVE  Verified by ictotest. 03/28/2019    GLUCOSEU TRACE 03/28/2019    KETUA TRACE 03/28/2019    AMORPHOUS NOT REPORTED 03/28/2019       HgBA1c:    Lab Results   Component Value Date    LABA1C 8.6 03/18/2019     TSH:  No results found for: TSH  Lactic Acid:   Lab Results   Component Value Date    LACTA 2.2 03/18/2019    LACTA 2.6 03/18/2019      Troponin: No results for input(s): TROPONINI in the last 72 hours. Results for Rosalie Dixon (MRN [id number]) as of 3/23/2019 18:06   Ref.  Range 3/23/2019 04:07   POC pH Latest Ref Range: 7.350 - 7.450  7.437 fibrillationPersistent   Acute  hypoxic hypercapnic respiratory failureDue to pulmonary edema, persistent without any significant change   Diabetes mellitusType II with hyperglycemia   Obstructive sleep apnea    Plan:    Hemorrhagic shock 2/2 retroperitoneal bleed - improved, off pressors, hemoglobin stable 7.9, monitor hemoglobin daily, if hemoglobin drops we will do CT abdomen and pelvis. Vascular signed off. Currently no active intervention recommended. Acute Respiratory failure 2/2 Inf A and PNA. Intubated. Continue mechanical ventilation, keep SPO2 more than 92%. Weaning trial daily. Continue fentanyl   Q1 hr PRN. Breathing treatment with Duoneb. Last dose of Rocephin 2 g on 4/3. ID following     Acute on chronic systolic CHF - EF 04% 1/34/4595 - continue lasix 40 mg IV daily, improved urine output after dialysis on 4/2 and 4/3, Follow up with nephrology for further plans for dialysis, monitor I&O's. CXR shows concerns for multifocal pneumonia vs. ARDS    Chronic A. fib with RVR, controlled. Continue amiodarone 200 mg twice a day, Lopressor 25 mg twice a day. Cardiology following. CV requesting low dose heparin. ERNESTO 2/2 ischemic ATN. Had hemodialysis yesterday, neck removal 1900 and, right femoral Pankaj catheter placed, Nephrology on board. Appreciate recommendations. Plan for hemodialysis today . UO 2000 ml in last 24 hrs. Type 2 diabetes mellitus - blood glucose this morning in 300s, increase Lantus to 30 units twice a day with medium dose sliding scale, point-of-care glucose checks every 4 hours    Thrombocytopenia - platelet count decreased but no numbers reported, hit antibody negative. Continue to monitor. Possible restart heparin for DVT ppx    EPC cuffs for DVT prophylaxis   Prilosec for GI prophylaxis       Ronny Montalvo,    Critical Care PGY-3  4/4/2019 8:59 AM    Please note that this chart was generated using voice recognition Dragon dictation software.  Although every effort was made to ensure the accuracy of this automated transcription, some errors in transcription may have occurred.

## 2019-04-04 NOTE — PROGRESS NOTES
treated with Unique-flu as an out patient. He developed worsening sob and hypoxia leading to his admission at the outlying facility on 3/18/19 where he was started on Ceftriaxone, Azithromycin and Tamiflu. He was transferred to [facility] on 3/19/19 because of hypoxic respiratory distress and elevated troponin. He was admitted to ICU and started on BiPAP. CXR on 3-19-19 showed reticular infiltrate and LLL pneumonia. Respiratory culture was obtained on 3/22/19 and showed few gram positive cocci in clusters. Legionella antigen, Strep pneumoniae anitgen and Mycoplasma Ab negative. MRSA swab negative    The patient was intubated on 3/21/19 because of deteriorating respiratory status. The patient completed a course of Unique-flu on 3-25-19. He completed a course of abx and steroids on 3-26-19. The patient spiked a fever (t max 100.4) and became hypotensive requiring pressure support on 3-29-19. He was started on Vancomycin and Zosyn empirically. Blood, urine and sputum cultures were obtained and currently pending. The patient additionally was found to have a Lt sided spontaneous retroperitoneal bleed on 3-29-19 and was hypovolemic requiring multiple transfusions. H&H has been stable today at 9.0. The patient continues to require pressor support and is tachycardic (120). Presently the patient is intubated, sedated and on 3 pressors. He has been febrile (t max 101.5), tachycardic (110-120), tachypneic (30-40's). Lactic acid was elevated at 7.4 yesterday. WBC count has been elevated at 45.3 today. Hgb stable. His kidney function is worsening Cr 3.74    He is on zosyn. Vancomycin held because trough level 18.5 (3/29/19)  Cultures negative so far. CURRENT EVALUATION : 4/4/2019    Afebrile  VS stable    Remains on ventilator support. Making progress. Hopefully extubation later today. Off levophed  On amiodarone drip    Off sedation   Moves all extremities on his own  Opens eyes.  Communicates with family    ERNESTO worse  [facility]. Had HD on 4-2-19 and 4-3-19. HD later today. Labs Reviewed: 4/4/2019  WBC: 20.1 > 27.1 > 45.3-->19.7  Hgb: 9.0 > 8.2 > 8.6-->8.0  Lactic acid: 7.4 > 5.5 > 2.6 > 2.4    BUN: 68 > 84 > 87-->148-->124  Cr: 1.83 > 3.21 > 3.74-->4.4-->3.06      Cultures:  Urine:   · 3/28/19: No growth  · 3/15/19: No growth    Blood:   · 3/28/19: No growth    Sputum :  ·  3/28/19: No growth  · Gram positive cocci in clusters 3/22/19. Normal mansoor    Rapid flu positive for influenza A (3/15/19)    Discussed with patient, RN. I have personally reviewed the past medical history, past surgical history, medications, social history, and family history, and I have updated the database accordingly. Past Medical History:     Past Medical History:   Diagnosis Date    Abnormal nuclear stress test 03/12/2010    Stress and resting cardiolite images showed inferior wall hypoperfusion suggestive of previous myocardial infarction. Left ventricular wall motion showed inferior wall hypokinesia. EF 58%.  Arrhythmia 2007    A single episode    Atrial fibrillation ([facility] [address] 2007    Single Episode    CAD (coronary artery disease) 1996    MI    DM type 2 (diabetes mellitus, type 2) ([facility] [address] 2005    Hx of echocardiogram 04/05/2007    EF 62%, Increased left atrial and right ventricular diametes. Increased septal and posterior wall thickness suggest left ventricular hypertrophy.  Valves were normal.        Past Surgical  History:     Past Surgical History:   Procedure Laterality Date    CARDIOVERSION  05/18/2007    Successful-Dr. Leggett-[place]    CARDIOVERSION  03/13    CORONARY ANGIOPLASTY  1996       Medications:      midazolam        heparin (porcine)  5,000 Units Subcutaneous 3 times per day    insulin glargine  30 Units Subcutaneous BID    insulin lispro  0-12 Units Subcutaneous Q4H    amiodarone  200 mg Oral BID    metoprolol tartrate  25 mg Oral BID    lansoprazole  30 mg Per NG tube [facility]    citalopram  10 mg Oral Daily    furosemide  40 mg Intravenous Daily    docusate  100 mg Oral Daily    sodium chloride flush  10 mL Intravenous 2 times per day       Social History:     Social History     Socioeconomic History    Marital status:      Spouse name: Not on file    Number of children: Not on file    Years of education: Not on file    Highest education level: Not on file   Occupational History    Not on file   Social Needs    Financial resource strain: Not on file    Food insecurity:     Worry: Not on file     Inability: Not on file    Transportation needs:     Medical: Not on file     Non-medical: Not on file   Tobacco Use    Smoking status: Former Smoker     Packs/day: 1.50     Years: 30.00     Pack years: 45.00     Types: Cigarettes     Last attempt to quit: 1996     Years since quittin.8    Smokeless tobacco: Never Used   Substance and Sexual Activity    Alcohol use: No    Drug use: No    Sexual activity: Not on file   Lifestyle    Physical activity:     Days per week: Not on file     Minutes per session: Not on file    Stress: Not on file   Relationships    Social connections:     Talks on phone: Not on file     Gets together: Not on file     Attends Gnosticist service: Not on file     Active member of club or organization: Not on file     Attends meetings of clubs or organizations: Not on file     Relationship status: Not on file    Intimate partner violence:     Fear of current or ex partner: Not on file     Emotionally abused: Not on file     Physically abused: Not on file     Forced sexual activity: Not on file   Other Topics Concern    Not on file   Social History Narrative    Not on file       Family History:   No family history on file. Allergies:   Patient has no known allergies. Review of Systems:   Unable to perform ROS. Pt intubated and sedated.     Physical Examination :     Patient Vitals for the past 8 hrs:   BP Temp Temp src Pulse Resp SpO2 04/04/19 0900 110/65 -- -- 90 23 --   04/04/19 0813 -- -- -- 84 23 99 %   04/04/19 0800 123/74 99.5 °F (37.5 °C) CORE 85 18 --   04/04/19 0700 130/66 -- -- 93 25 99 %   04/04/19 0600 132/68 -- -- 92 22 99 %   04/04/19 0500 (!) 109/54 -- -- 81 24 99 %   04/04/19 0400 109/71 99.8 °F (37.7 °C) CORE 83 27 96 %   04/04/19 0336 -- -- -- 86 24 97 %   04/04/19 0300 (!) 93/51 -- -- 78 24 97 %   04/04/19 0205 137/64 -- -- 91 24 91 %     General Appearance: Intubated and sedated. Head:  Normocephalic, no trauma  ENT: Intubated. Mouth/throat: mucosa pink and moist.  Neck: Supple, without lymphadenopathy. Pulmonary/Chest: Coarse sounds  Cardiovascular: Irregular rate and rhythm without murmurs, rubs, or gallops. Abdomen: Soft, distended. Bowel sounds normal.  All four Extremities: No cyanosis, clubbing, edema, or effusions. Neurologic: Sedated  Skin: Warm and dry with good turgor. No signs of peripheral arterial or venous insufficiency. No ulcerations. No open wounds. Medical Decision Making -Laboratory:   I have independently reviewed/ordered the following labs:    CBC with Differential:   Recent Labs     04/03/19 0802 04/04/19 0428   WBC 19.7* 16.7*   HGB 8.0* 7.9*   HCT 25.5* 26.1*   PLT See Reflexed IPF Result 84*   LYMPHOPCT 6* 3*   MONOPCT 7 1     BMP:   Recent Labs     04/03/19  0802 04/04/19 0428   * 141   K 3.7 3.5*    104   CO2 24 23   * 80*   CREATININE 3.06* 2.25*   MG 2.7* 2.2     Hepatic Function Panel:   Recent Labs     04/03/19  0802 04/04/19 0428   PROT 5.7* 5.7*   LABALBU 3.0* 3.3*   BILIDIR 0.25 0.33*   IBILI 0.29 0.43   BILITOT 0.54 0.76   ALKPHOS 100 95   * 155*   AST 43* 31     No results for input(s): RPR in the last 72 hours. No results for input(s): HIV in the last 72 hours. No results for input(s): BC in the last 72 hours.   Lab Results   Component Value Date    MUCUS NOT REPORTED 03/28/2019    RBC 2.53 04/04/2019    TRICHOMONAS NOT REPORTED 03/28/2019    WBC 16.7 04/04/2019    YEAST NOT REPORTED 03/28/2019    TURBIDITY TURBID 03/28/2019     Lab Results   Component Value Date    CREATININE 2.25 04/04/2019    GLUCOSE 256 04/04/2019    GLUCOSE 104 11/26/2018       Medical Decision Making-Imaging:     Narrative   EXAMINATION:   SINGLE XRAY VIEW OF THE CHEST       3/29/2019 5:54 am       COMPARISON:   March 28, 2019, March 27, 2019       HISTORY:   ORDERING SYSTEM PROVIDED HISTORY: intubated   TECHNOLOGIST PROVIDED HISTORY:   intubated       FINDINGS:   Endotracheal tube terminates at the level of the clavicular heads.  The   enteric tube courses off the field of view in the upper abdomen.  The right   internal jugular line terminates at the distal SVC.  Shallow inflation. Cardiac silhouette enlargement again noted.  Perihilar and basilar opacities   are again noted without significant change.  No new airspace disease or   pneumothorax identified.           Impression   Unchanged appearance of support tubes and lines.       Perihilar and basilar opacities are without appreciable change which may   represent subsegmental atelectasis versus developing consolidation.             Medical Decision Making-Other: Thank you for allowing us to participate in the care of this patient. Please call with questions.     Sydney Beltran MD

## 2019-04-05 ENCOUNTER — APPOINTMENT (OUTPATIENT)
Dept: GENERAL RADIOLOGY | Age: 74
DRG: 207 | End: 2019-04-05
Attending: INTERNAL MEDICINE
Payer: MEDICARE

## 2019-04-05 LAB
ABSOLUTE EOS #: 0.24 K/UL (ref 0–0.44)
ABSOLUTE IMMATURE GRANULOCYTE: 0.29 K/UL (ref 0–0.3)
ABSOLUTE LYMPH #: 0.93 K/UL (ref 1.1–3.7)
ABSOLUTE MONO #: 0.84 K/UL (ref 0.1–1.2)
ALBUMIN SERPL-MCNC: 3.2 G/DL (ref 3.5–5.2)
ALBUMIN/GLOBULIN RATIO: 1.3 (ref 1–2.5)
ALLEN TEST: POSITIVE
ALP BLD-CCNC: 94 U/L (ref 40–129)
ALT SERPL-CCNC: 120 U/L (ref 5–41)
ANION GAP SERPL CALCULATED.3IONS-SCNC: 13 MMOL/L (ref 9–17)
AST SERPL-CCNC: 40 U/L
BASOPHILS # BLD: 0 % (ref 0–2)
BASOPHILS ABSOLUTE: <0.03 K/UL (ref 0–0.2)
BILIRUB SERPL-MCNC: 1 MG/DL (ref 0.3–1.2)
BILIRUBIN DIRECT: 0.34 MG/DL
BILIRUBIN, INDIRECT: 0.66 MG/DL (ref 0–1)
BUN BLDV-MCNC: 71 MG/DL (ref 8–23)
BUN/CREAT BLD: ABNORMAL (ref 9–20)
CALCIUM IONIZED: 1.1 MMOL/L (ref 1.13–1.33)
CALCIUM SERPL-MCNC: 7.8 MG/DL (ref 8.6–10.4)
CHLORIDE BLD-SCNC: 104 MMOL/L (ref 98–107)
CO2: 24 MMOL/L (ref 20–31)
CREAT SERPL-MCNC: 1.78 MG/DL (ref 0.7–1.2)
DIFFERENTIAL TYPE: ABNORMAL
EOSINOPHILS RELATIVE PERCENT: 2 % (ref 1–4)
FIO2: 30
GFR AFRICAN AMERICAN: 46 ML/MIN
GFR NON-AFRICAN AMERICAN: 38 ML/MIN
GFR SERPL CREATININE-BSD FRML MDRD: ABNORMAL ML/MIN/{1.73_M2}
GFR SERPL CREATININE-BSD FRML MDRD: ABNORMAL ML/MIN/{1.73_M2}
GLOBULIN: ABNORMAL G/DL (ref 1.5–3.8)
GLUCOSE BLD-MCNC: 137 MG/DL (ref 75–110)
GLUCOSE BLD-MCNC: 176 MG/DL (ref 75–110)
GLUCOSE BLD-MCNC: 196 MG/DL (ref 75–110)
GLUCOSE BLD-MCNC: 233 MG/DL (ref 75–110)
GLUCOSE BLD-MCNC: 249 MG/DL (ref 75–110)
GLUCOSE BLD-MCNC: 249 MG/DL (ref 75–110)
GLUCOSE BLD-MCNC: 265 MG/DL (ref 70–99)
GLUCOSE BLD-MCNC: 297 MG/DL (ref 74–100)
HCT VFR BLD CALC: 27.4 % (ref 40.7–50.3)
HEMOGLOBIN: 8.2 G/DL (ref 13–17)
IMMATURE GRANULOCYTES: 2 %
LYMPHOCYTES # BLD: 7 % (ref 24–43)
MAGNESIUM: 2.1 MG/DL (ref 1.6–2.6)
MCH RBC QN AUTO: 31.5 PG (ref 25.2–33.5)
MCHC RBC AUTO-ENTMCNC: 29.9 G/DL (ref 28.4–34.8)
MCV RBC AUTO: 105.4 FL (ref 82.6–102.9)
MODE: ABNORMAL
MONOCYTES # BLD: 7 % (ref 3–12)
NEGATIVE BASE EXCESS, ART: ABNORMAL (ref 0–2)
NRBC AUTOMATED: 0.5 PER 100 WBC
O2 DEVICE/FLOW/%: ABNORMAL
PATIENT TEMP: ABNORMAL
PDW BLD-RTO: 18.8 % (ref 11.8–14.4)
PHOSPHORUS: 3.5 MG/DL (ref 2.5–4.5)
PLATELET # BLD: 81 K/UL (ref 138–453)
PLATELET ESTIMATE: ABNORMAL
PMV BLD AUTO: 11.3 FL (ref 8.1–13.5)
POC HCO3: 24.8 MMOL/L (ref 21–28)
POC LACTIC ACID: 0.58 MMOL/L (ref 0.56–1.39)
POC O2 SATURATION: 90 % (ref 94–98)
POC PCO2 TEMP: ABNORMAL MM HG
POC PCO2: 42.7 MM HG (ref 35–48)
POC PH TEMP: ABNORMAL
POC PH: 7.37 (ref 7.35–7.45)
POC PO2 TEMP: ABNORMAL MM HG
POC PO2: 60.9 MM HG (ref 83–108)
POSITIVE BASE EXCESS, ART: 0 (ref 0–3)
POTASSIUM SERPL-SCNC: 3.9 MMOL/L (ref 3.7–5.3)
RBC # BLD: 2.6 M/UL (ref 4.21–5.77)
RBC # BLD: ABNORMAL 10*6/UL
SAMPLE SITE: ABNORMAL
SEG NEUTROPHILS: 82 % (ref 36–65)
SEGMENTED NEUTROPHILS ABSOLUTE COUNT: 10.54 K/UL (ref 1.5–8.1)
SODIUM BLD-SCNC: 141 MMOL/L (ref 135–144)
TCO2 (CALC), ART: 26 MMOL/L (ref 22–29)
TOTAL PROTEIN: 5.7 G/DL (ref 6.4–8.3)
WBC # BLD: 12.9 K/UL (ref 3.5–11.3)
WBC # BLD: ABNORMAL 10*3/UL

## 2019-04-05 PROCEDURE — 82803 BLOOD GASES ANY COMBINATION: CPT

## 2019-04-05 PROCEDURE — 6370000000 HC RX 637 (ALT 250 FOR IP): Performed by: INTERNAL MEDICINE

## 2019-04-05 PROCEDURE — 82330 ASSAY OF CALCIUM: CPT

## 2019-04-05 PROCEDURE — 99233 SBSQ HOSP IP/OBS HIGH 50: CPT | Performed by: INTERNAL MEDICINE

## 2019-04-05 PROCEDURE — 94762 N-INVAS EAR/PLS OXIMTRY CONT: CPT

## 2019-04-05 PROCEDURE — 6360000002 HC RX W HCPCS: Performed by: STUDENT IN AN ORGANIZED HEALTH CARE EDUCATION/TRAINING PROGRAM

## 2019-04-05 PROCEDURE — 2580000003 HC RX 258: Performed by: STUDENT IN AN ORGANIZED HEALTH CARE EDUCATION/TRAINING PROGRAM

## 2019-04-05 PROCEDURE — 2500000003 HC RX 250 WO HCPCS: Performed by: INTERNAL MEDICINE

## 2019-04-05 PROCEDURE — 83605 ASSAY OF LACTIC ACID: CPT

## 2019-04-05 PROCEDURE — 94003 VENT MGMT INPAT SUBQ DAY: CPT

## 2019-04-05 PROCEDURE — 36415 COLL VENOUS BLD VENIPUNCTURE: CPT

## 2019-04-05 PROCEDURE — 2700000000 HC OXYGEN THERAPY PER DAY

## 2019-04-05 PROCEDURE — 85025 COMPLETE CBC W/AUTO DIFF WBC: CPT

## 2019-04-05 PROCEDURE — 99291 CRITICAL CARE FIRST HOUR: CPT | Performed by: INTERNAL MEDICINE

## 2019-04-05 PROCEDURE — 2580000003 HC RX 258: Performed by: EMERGENCY MEDICINE

## 2019-04-05 PROCEDURE — 83735 ASSAY OF MAGNESIUM: CPT

## 2019-04-05 PROCEDURE — 6370000000 HC RX 637 (ALT 250 FOR IP): Performed by: STUDENT IN AN ORGANIZED HEALTH CARE EDUCATION/TRAINING PROGRAM

## 2019-04-05 PROCEDURE — 94770 HC ETCO2 MONITOR DAILY: CPT

## 2019-04-05 PROCEDURE — 93308 TTE F-UP OR LMTD: CPT

## 2019-04-05 PROCEDURE — 71045 X-RAY EXAM CHEST 1 VIEW: CPT

## 2019-04-05 PROCEDURE — 82947 ASSAY GLUCOSE BLOOD QUANT: CPT

## 2019-04-05 PROCEDURE — 97110 THERAPEUTIC EXERCISES: CPT

## 2019-04-05 PROCEDURE — 36600 WITHDRAWAL OF ARTERIAL BLOOD: CPT

## 2019-04-05 PROCEDURE — 80076 HEPATIC FUNCTION PANEL: CPT

## 2019-04-05 PROCEDURE — 80048 BASIC METABOLIC PNL TOTAL CA: CPT

## 2019-04-05 PROCEDURE — 84100 ASSAY OF PHOSPHORUS: CPT

## 2019-04-05 PROCEDURE — 2000000000 HC ICU R&B

## 2019-04-05 RX ORDER — FUROSEMIDE 40 MG/1
80 TABLET ORAL DAILY
Status: DISCONTINUED | OUTPATIENT
Start: 2019-04-05 | End: 2019-04-10 | Stop reason: HOSPADM

## 2019-04-05 RX ADMIN — Medication 1.6 ML: at 12:10

## 2019-04-05 RX ADMIN — INSULIN LISPRO 4 UNITS: 100 INJECTION, SOLUTION INTRAVENOUS; SUBCUTANEOUS at 05:12

## 2019-04-05 RX ADMIN — FUROSEMIDE 80 MG: 40 TABLET ORAL at 12:13

## 2019-04-05 RX ADMIN — LANSOPRAZOLE 30 MG: 30 TABLET, ORALLY DISINTEGRATING, DELAYED RELEASE ORAL at 08:45

## 2019-04-05 RX ADMIN — INSULIN LISPRO 4 UNITS: 100 INJECTION, SOLUTION INTRAVENOUS; SUBCUTANEOUS at 08:50

## 2019-04-05 RX ADMIN — Medication 10 ML: at 20:18

## 2019-04-05 RX ADMIN — INSULIN LISPRO 4 UNITS: 100 INJECTION, SOLUTION INTRAVENOUS; SUBCUTANEOUS at 01:17

## 2019-04-05 RX ADMIN — CITALOPRAM 10 MG: 10 TABLET, FILM COATED ORAL at 08:45

## 2019-04-05 RX ADMIN — INSULIN LISPRO 2 UNITS: 100 INJECTION, SOLUTION INTRAVENOUS; SUBCUTANEOUS at 21:35

## 2019-04-05 RX ADMIN — AMIODARONE HYDROCHLORIDE 200 MG: 200 TABLET ORAL at 20:17

## 2019-04-05 RX ADMIN — FENTANYL CITRATE 50 MCG: 50 INJECTION, SOLUTION INTRAMUSCULAR; INTRAVENOUS at 01:37

## 2019-04-05 RX ADMIN — SODIUM CHLORIDE: 9 INJECTION, SOLUTION INTRAVENOUS at 18:24

## 2019-04-05 RX ADMIN — AMIODARONE HYDROCHLORIDE 200 MG: 200 TABLET ORAL at 08:45

## 2019-04-05 RX ADMIN — Medication 10 ML: at 08:45

## 2019-04-05 RX ADMIN — HEPARIN SODIUM 5000 UNITS: 5000 INJECTION INTRAVENOUS; SUBCUTANEOUS at 14:04

## 2019-04-05 RX ADMIN — HEPARIN SODIUM 5000 UNITS: 5000 INJECTION INTRAVENOUS; SUBCUTANEOUS at 21:35

## 2019-04-05 RX ADMIN — INSULIN GLARGINE 30 UNITS: 100 INJECTION, SOLUTION SUBCUTANEOUS at 08:50

## 2019-04-05 RX ADMIN — INSULIN LISPRO 2 UNITS: 100 INJECTION, SOLUTION INTRAVENOUS; SUBCUTANEOUS at 12:15

## 2019-04-05 ASSESSMENT — PAIN SCALES - GENERAL
PAINLEVEL_OUTOF10: 0
PAINLEVEL_OUTOF10: 0
PAINLEVEL_OUTOF10: 8
PAINLEVEL_OUTOF10: 0

## 2019-04-05 ASSESSMENT — PULMONARY FUNCTION TESTS
PIF_VALUE: 22
PIF_VALUE: 12

## 2019-04-05 NOTE — PROGRESS NOTES
Physical Therapy    DATE: 2019  NAME: Travis Roberson  MRN: [id number]   : 1945      Discharge Recommendations   Further therapy recommended at discharge. Subjective: Per RN, patient okay for PT. Patient agreeable. Oriented x1 (appropriately nodding/shaking head and giving thumbs up/down). Expecting to extubate this date. Pain: Denied pain  Patient follows: Most commands  Is patient on ventilator: Yes  Is patient on sedation: No  Precautions: B Wrist restraints reapplied after session    Therapeutic exercises:  AA Upper extremity exercises: Bicep curl, shoulder flexion/extension, punches, shoulder abduction/adduction. Reps: 10. Some ROM limited d/t patient positioning  B LE AA Exercises: SLR, Hip abd/add, heel slides. Reps: 10  B quad set, hamstring set. Reps: 10    Bilateral gastrocnemius stretching 3x15\"  FDS reappliedto R LE    *Patient education regarding appropriate progression once extubated. Goals  Short Term Goals  Short term goal 1: Prevent contractures through ROM and stretching. Short term goal 2: Pt able to tolerate 30 min activity  Short term goal 3: Pt able to complete AROM of extrimites in all planes. Short term goal 4: Progress with mobility as appropriate. Plan: Progress functional mobility as medically appropriate.    Time In: 3959  Time Out: 0838  Time Coded Minutes (treatment minutes): 24  Rehab Potential: Fair  Treatments/week: 2-3x until extubated    Hermelinda Cortes PTA

## 2019-04-05 NOTE — PROGRESS NOTES
04/05/19 0931   Vent Information   Vent Type Servo i   Vent Mode CPAP   Pressure Support 6 cmH20   FiO2  30 %   Sensitivity 3   PEEP/CPAP 5   Humidification Source HME   Vent Patient Data   Peak Inspiratory Pressure 12 cmH2O   Mean Airway Pressure 7 cmH20   Rate Measured 21 br/min   Vt Exhaled 814 mL   Pt placed on SBT, pt tolerating at this time.

## 2019-04-05 NOTE — PLAN OF CARE
Problem: OXYGENATION/RESPIRATORY FUNCTION  Goal: Patient will maintain patent airway  4/5/2019 1016 by Kimberly Polanco RCP  Outcome: Ongoing  4/5/2019 0310 by Humberto Gomez RN  Outcome: Ongoing     Problem: OXYGENATION/RESPIRATORY FUNCTION  Goal: Patient will achieve/maintain normal respiratory rate/effort  Description  Respiratory rate and effort will be within normal limits for the patient  4/5/2019 1016 by Kimberly Polanco RCP  Outcome: Ongoing  4/5/2019 0310 by Humberto Gomez RN  Outcome: Ongoing     Problem: MECHANICAL VENTILATION  Goal: Patient will maintain patent airway  4/5/2019 1016 by Kimberly Polanco RCP  Outcome: Ongoing  4/5/2019 0310 by Humberto Gomez RN  Outcome: Ongoing     Problem: MECHANICAL VENTILATION  Goal: Oral health is maintained or improved  4/5/2019 1016 by Kimberly Polanco RCP  Outcome: Ongoing  4/5/2019 0310 by Humberto Gomez RN  Outcome: Ongoing     Problem: MECHANICAL VENTILATION  Goal: ET tube will be managed safely  4/5/2019 1016 by Kimberly Polanco RCP  Outcome: Ongoing  4/5/2019 0310 by Humberto Gomez RN  Outcome: Ongoing     Problem: MECHANICAL VENTILATION  Goal: Ability to express needs and understand communication  4/5/2019 1016 by Kimberly Polanco RCP  Outcome: Ongoing  4/5/2019 0310 by Humberto Gomez RN  Outcome: Ongoing     Problem: MECHANICAL VENTILATION  Goal: Mobility/activity is maintained at optimum level for patient  4/5/2019 1016 by Kimberly Polanco RCP  Outcome: Ongoing  4/5/2019 0310 by Humberto Gomez RN  Outcome: Ongoing     Problem: SKIN INTEGRITY  Goal: Skin integrity is maintained or improved  4/5/2019 1016 by Kimberly Polanco RCP  Outcome: Ongoing  4/5/2019 0310 by Humberto Gomez RN  Outcome: Ongoing     Problem: Airway Clearance - Ineffective:  Goal: Ability to maintain a clear airway will improve  Description  Ability to maintain a clear airway will improve  4/5/2019 1016 by Kimberly Polanco RCP  Outcome: Ongoing  4/5/2019 0310 by Dennis James, RN  Outcome: Ongoing     Problem: Aspiration:  Goal: Absence of aspiration  Description  Absence of aspiration  4/5/2019 1016 by Franco Reno RCP  Outcome: Ongoing  4/5/2019 0310 by Dennis James RN  Outcome: Ongoing

## 2019-04-05 NOTE — PROGRESS NOTES
11.3      BMP:   Recent Labs     04/03/19  0802 04/04/19  0428 04/05/19  0429   * 141 141   K 3.7 3.5* 3.9    104 104   CO2 24 23 24   * 80* 71*   CREATININE 3.06* 2.25* 1.78*   GLUCOSE 259* 256* 265*   CALCIUM 7.9* 8.1* 7.8*        Phosphorus:    Recent Labs     04/03/19  0802 04/04/19  0428 04/05/19 0429   PHOS 5.7* 3.2 3.5     Magnesium:   Recent Labs     04/03/19  0802 04/04/19  0428 04/05/19  0429   MG 2.7* 2.2 2.1     Albumin:   Recent Labs     04/03/19  0802 04/04/19  0428 04/05/19 0429   LABALBU 3.0* 3.3* 3.2*       SPEP:   Lab Results   Component Value Date    PROT 5.7 04/05/2019    ALBCAL 2.6 03/29/2019    ALBPCT 56 03/29/2019    LABALPH 0.2 03/29/2019    LABALPH 0.8 03/29/2019    A1PCT 4 03/29/2019    A2PCT 17 03/29/2019    LABBETA 0.5 03/29/2019    BETAPCT 11 03/29/2019    GAMGLOB 0.6 03/29/2019    GGPCT 12 03/29/2019    PATH ELECTRONICALLY SIGNED. Christine Bejarano M.D. 03/29/2019     Urine Creatinine:    Lab Results   Component Value Date    LABCREA 111.9 03/29/2019       Urinalysis:  U/A:   Lab Results   Component Value Date    NITRU NEGATIVE 03/28/2019    COLORU DARK YELLOW 03/28/2019    PHUR 5.0 03/28/2019    WBCUA 5 TO 10 03/28/2019    RBCUA 5 TO 10 03/28/2019    MUCUS NOT REPORTED 03/28/2019    TRICHOMONAS NOT REPORTED 03/28/2019    YEAST NOT REPORTED 03/28/2019    BACTERIA NOT REPORTED 03/28/2019    SPECGRAV 1.024 03/28/2019    LEUKOCYTESUR TRACE 03/28/2019    UROBILINOGEN Normal 03/28/2019    BILIRUBINUR NEGATIVE  Verified by ictotest. 03/28/2019    GLUCOSEU TRACE 03/28/2019    KETUA TRACE 03/28/2019    AMORPHOUS NOT REPORTED 03/28/2019     CAT SCAN ;     1. Large left retroperitoneal hematoma extending from the posterior   hemidiaphragm to the upper pelvis.  Hematocrit effect with a hematoma   measuring maximally 9.3 cm in diameter and about 14 cm in length.  There is   also a small amount of upper abdominal ascites.  No free air.    2. Bilateral pleural effusions with

## 2019-04-05 NOTE — PROGRESS NOTES
Nutrition Assessment (Enteral Nutrition)    Type and Reason for Visit: Reassess    Nutrition Recommendations: Start oral diet vs restart TF as able. Will continue to follow/monitor plans. Nutrition Assessment: Pt remains on vent at this time- plan for extubation today per RN. TF currently on hold. Nutrition Risk Level: High    Nutrition Needs:  · Estimated Daily Total Kcal: 7121-4488 kcal  · Estimated Daily Protein (g):  g pro/day    Nutrition Diagnosis:   · Problem: Inadequate oral intake  · Etiology: related to Impaired respiratory function-inability to consume food     Signs and symptoms:  as evidenced by NPO status due to medical condition, Nutrition support - EN    Objective Information:  · Nutrition-Focused Physical Findings: Last BM : 4/5  · Current Nutrition Therapies:  · Oral Diet Orders: NPO   · Tube Feeding (TF) Orders:   · Formula: Semi-elemental  · Rate (ml/hr):65 ml/hr --currently on hold for possible extubation    · Volume (ml/day): 1560 ml/day   · Duration: Continuous  · Current TF & Flush Orders Provides: 0  · Goal TF & Flush Orders Provides: at 65ml per hour 1872 kcal, 117 g protein  · Additional Calories: none  · Anthropometric Measures:  · Ht: 5' 8\" (172.7 cm)   · Current Body Wt: 280 lb 10.3 oz (127.3 kg)  · Admission Body Wt: 292 lb (132.5 kg)  · Ideal Body Wt: 154 lb (69.9 kg), % Ideal Body 182%  · BMI Classification: BMI > or equal to 40.0 Obese Class III    Nutrition Interventions:   Start oral diet vs restart TF as able.    Continued Inpatient Monitoring, Education Not Indicated    Nutrition Evaluation:   · Evaluation: Progressing toward goals   · Goals: Meet more than 75% of nutrition needs   · Monitoring: Nutrition Progression, Weight, Pertinent Labs      Electronically signed by Monica Reina RD, LD on 4/5/19 at 12:32 PM    Contact Number: [phone]

## 2019-04-05 NOTE — PROGRESS NOTES
[facility] -Progress Note    Today's Date and Time: 4/5/2019, 9:54 AM    Impression :   1. Acute Hypoxic Respiratory failure  2. Influenza A  3. Possible secondary pneumonia with normal mansoor  4. Atrial Fibrillation  5. DM II  6. CHF  7. Renal insufficiency  8. Leukocytosis secondary to hemorrhage, steroids    Recommendations:     · Ceftriaxone completed on 4/3  · Continue respiratory toilet. · Not extubated yesterday, hopefully today after completion of hemodialysis     Medical Decision Making/Summary/Discussion:   · Patient with Hx chronic smoking  · Admitted with acute hypoxic respiratory failure associated with Influenza   · Has pulmonary vascular congestion and pleural effusions  · Has CHF and renal insufficiency  · Has completed oseltamivir  · Will discontinue treatment for possible secondary pneumonia with ceftriaxone as of 4-3-19  · On hemodialysis, management per nephrology  · Extubation will be attempted today  Infection Control Recommendations     · Droplet Isolation    Antimicrobial Stewardship Recommendations     · Targeted therapy  · PK dosing  Coordination of Outpatient Care:   · Estimated Length of IV antimicrobials:TBD  · Patient will need Midline Catheter Insertionno:   · Patient will need PICC line Insertion:no   · Patient will need: Home IV , [place],  SNF,  LTAC: TBD  · Patient will need outpatient wound care:No    Chief complaint/reason for consultation:   · Leukocytosis      History of Present Illness:   Gabo Oates is a 69y.o.-year-old  male who was initially admitted on 3/19/2019. Patient seen at the request of Charles Weiner. INITIAL HISTORY:    ID has been consulted for evaluation of leukocytosis. The patient was transferred to [facility] on 3/19/19 from an outlying facility for worsening respiratory status, Influenza A and elevated troponin. PMH significant for CAD,DM 2, A fib and chronic smoking.     The patient presented to [facility] on 3/15 for sob where his Rapid flu was positive for Influenza A. He refused admission and was treated with Unique-flu as an out patient. He developed worsening sob and hypoxia leading to his admission at the outlying facility on 3/18/19 where he was started on Ceftriaxone, Azithromycin and Tamiflu. He was transferred to [facility] on 3/19/19 because of hypoxic respiratory distress and elevated troponin. He was admitted to ICU and started on BiPAP. CXR on 3-19-19 showed reticular infiltrate and LLL pneumonia. Respiratory culture was obtained on 3/22/19 and showed few gram positive cocci in clusters. Legionella antigen, Strep pneumoniae anitgen and Mycoplasma Ab negative. MRSA swab negative    The patient was intubated on 3/21/19 because of deteriorating respiratory status. The patient completed a course of Unique-flu on 3-25-19. He completed a course of abx and steroids on 3-26-19. The patient spiked a fever (t max 100.4) and became hypotensive requiring pressure support on 3-29-19. He was started on Vancomycin and Zosyn empirically. Blood, urine and sputum cultures were obtained and currently pending. The patient additionally was found to have a Lt sided spontaneous retroperitoneal bleed on 3-29-19 and was hypovolemic requiring multiple transfusions. H&H has been stable today at 9.0. The patient continues to require pressor support and is tachycardic (120). Presently the patient is intubated, sedated and on 3 pressors. He has been febrile (t max 101.5), tachycardic (110-120), tachypneic (30-40's). Lactic acid was elevated at 7.4 yesterday. WBC count has been elevated at 45.3 today. Hgb stable. His kidney function is worsening Cr 3.74    He is on zosyn. Vancomycin held because trough level 18.5 (3/29/19)  Cultures negative so far. CURRENT EVALUATION : 4/5/2019    Patient seen and examined. Remains intubated, but nods yes that he is feeling better. Denies fevers or chills.  They did not extubate him yesterday. Per family, they are to attempt extubation today after he completes dialysis this morning. Afebrile last 24 hours, Tmax of 37.2  VSS, sysolic in the 224'N    Remains on ventilator support. Making progress. Hopefully extubation later today. Off levophed  On amiodarone drip    Off sedation   Moves all extremities on his own  Opens eyes. Communicates with family    ERNESTO [facility]. Had HD on 4-2-19 and 4-3-19. HD this morning, management per nephrology. Labs Reviewed: 4/5/2019  WBC: 20.1 > 27.1 > 45.3-->19.7 -> 16.7 -> 12.9  Hgb: 9.0 > 8.2 > 8.6-->8.0 -> 8.2  Lactic acid: 7.4 > 5.5 > 2.6 > 2.4 ->     BUN: 68 > 84 > 87-->148-->124 -> 71  Cr: 1.83 > 3.21 > 3.74-->4.4-->3.06 -> 1.78      Cultures:  Urine:   · 3/28/19: No growth  · 3/15/19: No growth    Blood:   · 3/28/19: No growth    Sputum :  ·  3/28/19: No growth  · Gram positive cocci in clusters 3/22/19. Normal mansoor    Rapid flu positive for influenza A (3/15/19)    Discussed with patient, RN. I have personally reviewed the past medical history, past surgical history, medications, social history, and family history, and I have updated the database accordingly. Past Medical History:     Past Medical History:   Diagnosis Date    Abnormal nuclear stress test 03/12/2010    Stress and resting cardiolite images showed inferior wall hypoperfusion suggestive of previous myocardial infarction. Left ventricular wall motion showed inferior wall hypokinesia. EF 58%.  Arrhythmia 2007    A single episode    Atrial fibrillation ([address] 2007    Single Episode    CAD (coronary artery disease) 1996    MI    DM type 2 (diabetes mellitus, type 2) [address] [address] 2005    Hx of echocardiogram 04/05/2007    EF 62%, Increased left atrial and right ventricular diametes. Increased septal and posterior wall thickness suggest left ventricular hypertrophy.  Valves were normal.        Past Surgical  History:     Past Surgical History:   Procedure Laterality Date    CARDIOVERSION  2007    Successful-Dr. Leggett-[place]    CARDIOVERSION      CORONARY ANGIOPLASTY         Medications:      furosemide  80 mg Per NG tube Daily    heparin (porcine)  5,000 Units Subcutaneous 3 times per day    sodium chloride flush  10 mL Intravenous 2 times per day    anticoagulant sodium citrate  1.6 mL Intracatheter Once    anticoagulant sodium citrate  1.6 mL Intracatheter Once    insulin glargine  30 Units Subcutaneous BID    insulin lispro  0-12 Units Subcutaneous Q4H    amiodarone  200 mg Oral BID    metoprolol tartrate  25 mg Oral BID    lansoprazole  30 mg Per NG tube QAM AC    citalopram  10 mg Oral Daily    docusate  100 mg Oral Daily    sodium chloride flush  10 mL Intravenous 2 times per day       Social History:     Social History     Socioeconomic History    Marital status:      Spouse name: Not on file    Number of children: Not on file    Years of education: Not on file    Highest education level: Not on file   Occupational History    Not on file   Social Needs    Financial resource strain: Not on file    Food insecurity:     Worry: Not on file     Inability: Not on file    Transportation needs:     Medical: Not on file     Non-medical: Not on file   Tobacco Use    Smoking status: Former Smoker     Packs/day: 1.50     Years: 30.00     Pack years: 45.00     Types: Cigarettes     Last attempt to quit: 1996     Years since quittin.8    Smokeless tobacco: Never Used   Substance and Sexual Activity    Alcohol use: No    Drug use: No    Sexual activity: Not on file   Lifestyle    Physical activity:     Days per week: Not on file     Minutes per session: Not on file    Stress: Not on file   Relationships    Social connections:     Talks on phone: Not on file     Gets together: Not on file     Attends Yarsanism service: Not on file     Active member of club or organization: Not on file     Attends meetings of clubs or organizations: Not on file     Relationship status: Not on file    Intimate partner violence:     Fear of current or ex partner: Not on file     Emotionally abused: Not on file     Physically abused: Not on file     Forced sexual activity: Not on file   Other Topics Concern    Not on file   Social History Narrative    Not on file       Family History:   No family history on file. Allergies:   Patient has no known allergies. Review of Systems:   Unable to perform ROS. Pt intubated and sedated. Physical Examination :     Patient Vitals for the past 8 hrs:   BP Temp Temp src Pulse Resp SpO2 Weight   04/05/19 0700 (!) 98/59 -- -- 66 21 99 % --   04/05/19 0630 (!) 106/37 -- -- 68 20 98 % --   04/05/19 0600 121/73 -- -- 67 19 99 % --   04/05/19 0530 (!) 110/48 -- -- 74 19 100 % --   04/05/19 0500 (!) 104/48 -- -- 66 18 100 % --   04/05/19 0430 99/69 -- -- 75 21 100 % --   04/05/19 0413 -- -- -- -- 18 98 % --   04/05/19 0400 (!) 96/51 97.7 °F (36.5 °C) CORE 64 20 97 % 280 lb 10.3 oz (127.3 kg)   04/05/19 0330 108/61 -- -- 70 19 99 % --   04/05/19 0314 -- -- -- 72 16 100 % --   04/05/19 0300 (!) 97/56 -- -- 67 8 99 % --   04/05/19 0230 104/60 -- -- 72 18 96 % --   04/05/19 0200 (!) 86/51 -- -- 66 13 98 % --     General Appearance: Intubated and sedated. Head:  Normocephalic, no trauma  ENT: Intubated. Mouth/throat: mucosa pink and moist.  Neck: Supple, without lymphadenopathy. Pulmonary/Chest: Coarse sounds  Cardiovascular: Irregular rate and rhythm without murmurs, rubs, or gallops. Abdomen: Soft, distended. Bowel sounds normal.  All four Extremities: No cyanosis, clubbing, edema, or effusions. Neurologic: Sedated  Skin: Warm and dry with good turgor. No signs of peripheral arterial or venous insufficiency. No ulcerations. No open wounds.     Medical Decision Making -Laboratory:   I have independently reviewed/ordered the following labs:    CBC with Differential:   Recent Labs Decision Making-Other: Thank you for allowing us to participate in the care of this patient. Please call with questions. Alina Blankenship     ATTESTATION:    I have discussed the case, including pertinent history and exam findings with the residents. I have seen and examined the patient and the key elements of the encounter have been performed by me. I have reviewed the laboratory data, other diagnostic studies and discussed them with the residents. I have updated the medical record where necessary. I agree with the assessment, plan and orders as documented by the resident.     Page Suarez MD.

## 2019-04-05 NOTE — PROGRESS NOTES
PICC team spoke with bedside RN, Nic Baldwin, yesterday 4/4/19 and notified them that PICC team is unable to place any lines in patient as we can not Visualize any veins in which to place a line. Encouraged RN to keep central line at this time as we are unable place any. Continue to monitor.

## 2019-04-05 NOTE — PROGRESS NOTES
INTENSIVE CARE UNIT  Resident Physician Progress Note    [place] - Charley Byrd  Date of Admission -  3/19/2019  7:58 AM  Date of Evaluation -  4/5/2019  Room and Bed Number -  0105/0105-01   Hospital Day - 16    Chief complaint influenza, pneumonia, A. fib with RVR  SUBJECTIVE:     OVERNIGHT EVENTS:   Patient seen and examined bedside. Charts reviewed. Follows commands. Weaned all day yesterday. Getting hemodialysis session this am.  Afebrile. Blood pressure [id number]. Heparin-induced antibody negative  CXR this am.  On Lantus 30U BID. WBC trending down, 12.9 this am.  Hemoglobin stable at 8.2.  781. Urine output 1.1L. LFTs trending down. Will d/c daily lfts. 2d Echo per Cardiology, awaited. CXR 4/5/19  Impression   Interval reduction in cardiac size although cardiomegaly remains.  Interval   improvement and vascularity and bilateral pulmonary opacities consistent with   resolving pulmonary edema.  Small bilateral effusions.  Tubes and lines as   above. I/O last 3 completed shifts: In: 1407.4 [I.V.:229.4; NG/GT:1178]  Out: 8892 [Urine:1125]  No intake/output data recorded. Results for Jigna Daily (MRN [id number]) as of 4/1/2019 08:22   Ref. Range 4/1/2019 04:15   POC pH Latest Ref Range: 7.350 - 7.450  7.504 (H)   POC pH Temp Unknown NOT REPORTED   POC pCO2 Latest Ref Range: 35.0 - 48.0 mm Hg 33.6 (L)   POC pCO2 Temp Latest Units: mm Hg NOT REPORTED   POC PO2 Latest Ref Range: 83.0 - 108.0 mm Hg 58.1 (L)   POC pO2 Temp Latest Units: mm Hg NOT REPORTED   POC HCO3 Latest Ref Range: 21.0 - 28.0 mmol/L 26.5   POC O2 SAT Latest Ref Range: 94.0 - 98.0 % 92 (L)     CXR 4/1/19     Bibasilar opacities with improvement on the left side.  Perihilar opacity on   the left has improved.  Multiple tubes and lines as above.          US RUQ 3/31/19  Impression   No evidence for acute cholecystitis, cholelithiasis or biliary dilatation.       Small volume perihepatic ascites.           AWAKE & FOLLOWING COMMANDS:  [] No   [x] Yes     SECRETIONS Amount:  [x] Small [] Moderate  [] Large  [] None  Color:     [x] White [x] Colored  [] Bloody    SEDATION:  RAAS Score:  [] Propofol gtt  [] Versed gtt  [] Ativan gtt   [x] No Sedation    PARALYZED:  [x] No    [] Yes    VASOPRESSORS:  [x] No    [] Yes  [] Levophed [] Dopamine [] Vasopressin  [] Dobutamine [] Phenylephrine [] Epinephrine    Review of Systems -  Intubated , not sedated     OBJECTIVE:     VITAL SIGNS:  BP (!) 98/59   Pulse 66   Temp 97.7 °F (36.5 °C) (Core)   Resp 21   Ht 5' 8\" (1.727 m)   Wt 280 lb 10.3 oz (127.3 kg)   SpO2 99%   BMI 42.67 kg/m²   Tmax over 24 hours:  Temp (24hrs), Av °F (36.7 °C), Min:97.7 °F (36.5 °C), Max:98.8 °F (37.1 °C)      Patient Vitals for the past 8 hrs:   BP Temp Temp src Pulse Resp SpO2 Weight   19 0700 (!) 98/59 -- -- 66 21 99 % --   19 0630 (!) 106/37 -- -- 68 20 98 % --   19 0600 121/73 -- -- 67 19 99 % --   19 0530 (!) 110/48 -- -- 74 19 100 % --   19 0500 (!) 104/48 -- -- 66 18 100 % --   19 0430 99/69 -- -- 75 21 100 % --   19 0413 -- -- -- -- 18 98 % --   19 0400 (!) 96/51 97.7 °F (36.5 °C) CORE 64 20 97 % 280 lb 10.3 oz (127.3 kg)   19 0330 108/61 -- -- 70 19 99 % --   19 0314 -- -- -- 72 16 100 % --   19 0300 (!) 97/56 -- -- 67 8 99 % --   19 0230 104/60 -- -- 72 18 96 % --   19 0200 (!) 86/51 -- -- 66 13 98 % --   19 0130 (!) 100/57 -- -- 70 19 99 % --         Intake/Output Summary (Last 24 hours) at 2019 0916  Last data filed at 2019 0600  Gross per 24 hour   Intake 1103 ml   Output 875 ml   Net 228 ml     Date 19 0000 - 19 2359   Shift 4232-4675 1477-1244 9477-0712 24 Hour Total   INTAKE   I.V.(mL/kg) 86(0.7)   86(0.7)   NG/GT(mL/kg) 604(4.7)   604(4.7)   Shift Total(mL/kg) 690(5.4)   690(5.4)   OUTPUT   Urine(mL/kg/hr) 285(0.3)   285   Shift Total(mL/kg) 285(2.2)   285(2.2)   Weight (kg) 127.3 127.3 127.3 127. 3     Wt Readings from Last 3 Encounters:   04/05/19 280 lb 10.3 oz (127.3 kg)   03/19/19 294 lb 11.2 oz (133.7 kg)   03/18/19 300 lb (136.1 kg)     Body mass index is 42.67 kg/m².         PHYSICAL EXAM:  · General appearance: intubated sedated   · HEENT: Atraumatic, normocephalic, neck supple, normal EOM, thyroid normal, no lymphadenopathy   · Lungs: Wheezing improved, clear to auscultation  · Heart: Regular heart rate, tachycardic, S1, S2 normal, no murmur   · Abdomen: soft, non-tender; bowel sounds normal; no masses,  no organomegaly  · Extremities: No cyanosis or edema  Neurological:  Intubated, not sedated , following commands        MEDICATIONS:  Scheduled Meds:   heparin (porcine)  5,000 Units Subcutaneous 3 times per day    sodium chloride flush  10 mL Intravenous 2 times per day    anticoagulant sodium citrate  1.6 mL Intracatheter Once    anticoagulant sodium citrate  1.6 mL Intracatheter Once    insulin glargine  30 Units Subcutaneous BID    insulin lispro  0-12 Units Subcutaneous Q4H    amiodarone  200 mg Oral BID    metoprolol tartrate  25 mg Oral BID    lansoprazole  30 mg Per NG tube QAM AC    citalopram  10 mg Oral Daily    docusate  100 mg Oral Daily    sodium chloride flush  10 mL Intravenous 2 times per day     Continuous Infusions:   sodium chloride 10 mL/hr at 04/01/19 0200    dextrose       PRN Meds:     sodium chloride 250 mL PRN   sodium chloride 150 mL PRN   sodium chloride flush 10 mL PRN   sodium chloride 250 mL PRN   sodium chloride 150 mL PRN   fentanNYL 50 mcg Q1H PRN   Or     fentanNYL 100 mcg Q1H PRN   oxyCODONE 5 mg Q4H PRN   acetaminophen 650 mg Q4H PRN   magnesium hydroxide 30 mL Daily PRN   albuterol 2.5 mg Q6H PRN   LORazepam 0.5 mg Q8H PRN   sodium chloride flush 10 mL PRN   ondansetron 4 mg Q6H PRN   glucose 15 g PRN   dextrose 12.5 g PRN   glucagon (rDNA) 1 mg PRN   dextrose 100 mL/hr PRN       SUPPORT DEVICES: [x] Ventilator [] BIPAP  [] Nasal [facility])     Atrial fibrillation with rapid ventricular response ([address] 01/01/2007        PLAN:     WEAN PER PROTOCOL:  [] No   [x] Yes  [] N/A    ICU PROPHYLAXIS:  Stress ulcer:  [] PPI Agent  [x] H4Khrbl [] Sucralfate  [] Other:  VTE:   [] Enoxaparin  [x] he is on heparin GTT  [x] EPC Cuffs    NUTRITION:  [] NPO [x] Tube Feeding (Specify: ) [] TPN  [] PO    HOME MEDS RECONCILED: [] No  [x] Yes    CONSULTATION NEEDED:  [] No  [x] Yes    FAMILY UPDATED:    [x] No  [] Yes    TRANSFER OUT OF ICU:   [x] No  [] Yes    Additional Assessment:  Influenza A  Community acquired pneumonia with likely strep pneumoniae  Atrial fibrillation with RVRPersistent   Acute exacerbation of CHF sec due to atrial fibrillationPersistent   Acute  hypoxic hypercapnic respiratory failureDue to pulmonary edema, persistent without any significant change   Diabetes mellitusType II with hyperglycemia   Obstructive sleep apnea    Plan:    Hemorrhagic shock 2/2 retroperitoneal bleed - improved, off pressors, hemoglobin stable 7. Vascular signed off. Currently no active intervention recommended. Acute Respiratory failure 2/2 Inf A and PNA. Intubated. Continue mechanical ventilation, keep SPO2 more than 92%. Weaning trial daily. Plan to wean and possible extubation after HD session this am. Breathing treatment with Duoneb. Last dose of Rocephin 2 g on 4/3. ID following     Acute on chronic systolic CHF - EF 92% 9/67/6705 - On lasix 80 mg PO daily. Dialysis on 4/2 and 4/3, Follow up with nephrology for further plans for dialysis, monitor I&O's. CXR done this am.    Chronic A. fib with RVR, controlled. Continue amiodarone 200 mg twice a day, Lopressor 25 mg twice a day. Cardiology following. On Heparin TID dvt ppx. Platelets count stable at 81. ERNESTO 2/2 ischemic ATN. Had hemodialysis yesterday. Plan for more today, Nephrology on board. Appreciate recommendations.     Type 2 diabetes mellitus - blood glucose this morning in 300s, increase regarding the question of heparin drip if it can be started. Total critical care time caring for this patient with life threatening, unstable organ failure, including direct patient contact, management of life support systems, review of data including imaging and labs, discussions with other team members and physicians at least 27  Min so far today, excluding procedures. Jessee Ashford MD  4/5/2019 3:10 PM      Please note that this chart was generated using voice recognition Dragon dictation software. Although every effort was made to ensure the accuracy of this automated transcription, some errors in transcription may have occurred.

## 2019-04-06 LAB
-: NORMAL
ABSOLUTE EOS #: 0.39 K/UL (ref 0–0.44)
ABSOLUTE IMMATURE GRANULOCYTE: 0.26 K/UL (ref 0–0.3)
ABSOLUTE LYMPH #: 1.14 K/UL (ref 1.1–3.7)
ABSOLUTE MONO #: 0.81 K/UL (ref 0.1–1.2)
ABSOLUTE RETIC #: 0.33 M/UL (ref 0.03–0.08)
ANION GAP SERPL CALCULATED.3IONS-SCNC: 12 MMOL/L (ref 9–17)
BASOPHILS # BLD: 0 % (ref 0–2)
BASOPHILS ABSOLUTE: <0.03 K/UL (ref 0–0.2)
BUN BLDV-MCNC: 81 MG/DL (ref 8–23)
BUN/CREAT BLD: ABNORMAL (ref 9–20)
CALCIUM IONIZED: 1.11 MMOL/L (ref 1.13–1.33)
CALCIUM SERPL-MCNC: 8 MG/DL (ref 8.6–10.4)
CHLORIDE BLD-SCNC: 105 MMOL/L (ref 98–107)
CO2: 23 MMOL/L (ref 20–31)
CREAT SERPL-MCNC: 1.71 MG/DL (ref 0.7–1.2)
DIFFERENTIAL TYPE: ABNORMAL
EOSINOPHILS RELATIVE PERCENT: 3 % (ref 1–4)
FERRITIN: 600 UG/L (ref 30–400)
FOLATE: 17.6 NG/ML
GFR AFRICAN AMERICAN: 48 ML/MIN
GFR NON-AFRICAN AMERICAN: 40 ML/MIN
GFR SERPL CREATININE-BSD FRML MDRD: ABNORMAL ML/MIN/{1.73_M2}
GFR SERPL CREATININE-BSD FRML MDRD: ABNORMAL ML/MIN/{1.73_M2}
GLUCOSE BLD-MCNC: 106 MG/DL (ref 75–110)
GLUCOSE BLD-MCNC: 114 MG/DL (ref 70–99)
GLUCOSE BLD-MCNC: 126 MG/DL (ref 75–110)
GLUCOSE BLD-MCNC: 130 MG/DL (ref 75–110)
GLUCOSE BLD-MCNC: 176 MG/DL (ref 75–110)
GLUCOSE BLD-MCNC: 180 MG/DL (ref 75–110)
GLUCOSE BLD-MCNC: 231 MG/DL (ref 75–110)
HCT VFR BLD CALC: 29.9 % (ref 40.7–50.3)
HEMOGLOBIN: 8.8 G/DL (ref 13–17)
IMMATURE GRANULOCYTES: 2 %
IMMATURE RETIC FRACT: 37.1 % (ref 2.7–18.3)
IRON SATURATION: 22 % (ref 20–55)
IRON: 47 UG/DL (ref 59–158)
LYMPHOCYTES # BLD: 9 % (ref 24–43)
MAGNESIUM: 2.2 MG/DL (ref 1.6–2.6)
MCH RBC QN AUTO: 30.6 PG (ref 25.2–33.5)
MCHC RBC AUTO-ENTMCNC: 29.4 G/DL (ref 28.4–34.8)
MCV RBC AUTO: 103.8 FL (ref 82.6–102.9)
MONOCYTES # BLD: 6 % (ref 3–12)
NRBC AUTOMATED: 0.2 PER 100 WBC
PDW BLD-RTO: 19.2 % (ref 11.8–14.4)
PHOSPHORUS: 3.4 MG/DL (ref 2.5–4.5)
PLATELET # BLD: 86 K/UL (ref 138–453)
PLATELET ESTIMATE: ABNORMAL
PMV BLD AUTO: 10.8 FL (ref 8.1–13.5)
POTASSIUM SERPL-SCNC: 3.2 MMOL/L (ref 3.7–5.3)
RBC # BLD: 2.88 M/UL (ref 4.21–5.77)
RBC # BLD: ABNORMAL 10*6/UL
REASON FOR REJECTION: NORMAL
RETIC %: 11.7 % (ref 0.5–1.9)
RETIC HEMOGLOBIN: 34.2 PG (ref 28.2–35.7)
SEG NEUTROPHILS: 80 % (ref 36–65)
SEGMENTED NEUTROPHILS ABSOLUTE COUNT: 10.35 K/UL (ref 1.5–8.1)
SODIUM BLD-SCNC: 140 MMOL/L (ref 135–144)
TOTAL IRON BINDING CAPACITY: 217 UG/DL (ref 250–450)
UNSATURATED IRON BINDING CAPACITY: 170 UG/DL (ref 112–347)
VITAMIN B-12: 1943 PG/ML (ref 232–1245)
WBC # BLD: 13 K/UL (ref 3.5–11.3)
WBC # BLD: ABNORMAL 10*3/UL
ZZ NTE CLEAN UP: ORDERED TEST: NORMAL
ZZ NTE WITH NAME CLEAN UP: SPECIMEN SOURCE: NORMAL

## 2019-04-06 PROCEDURE — 6360000002 HC RX W HCPCS: Performed by: STUDENT IN AN ORGANIZED HEALTH CARE EDUCATION/TRAINING PROGRAM

## 2019-04-06 PROCEDURE — 36415 COLL VENOUS BLD VENIPUNCTURE: CPT

## 2019-04-06 PROCEDURE — 82746 ASSAY OF FOLIC ACID SERUM: CPT

## 2019-04-06 PROCEDURE — 80048 BASIC METABOLIC PNL TOTAL CA: CPT

## 2019-04-06 PROCEDURE — 6370000000 HC RX 637 (ALT 250 FOR IP): Performed by: INTERNAL MEDICINE

## 2019-04-06 PROCEDURE — 2060000000 HC ICU INTERMEDIATE R&B

## 2019-04-06 PROCEDURE — 99291 CRITICAL CARE FIRST HOUR: CPT | Performed by: INTERNAL MEDICINE

## 2019-04-06 PROCEDURE — 84100 ASSAY OF PHOSPHORUS: CPT

## 2019-04-06 PROCEDURE — 6370000000 HC RX 637 (ALT 250 FOR IP): Performed by: STUDENT IN AN ORGANIZED HEALTH CARE EDUCATION/TRAINING PROGRAM

## 2019-04-06 PROCEDURE — 83550 IRON BINDING TEST: CPT

## 2019-04-06 PROCEDURE — 82947 ASSAY GLUCOSE BLOOD QUANT: CPT

## 2019-04-06 PROCEDURE — 82607 VITAMIN B-12: CPT

## 2019-04-06 PROCEDURE — 2580000003 HC RX 258: Performed by: EMERGENCY MEDICINE

## 2019-04-06 PROCEDURE — 2580000003 HC RX 258: Performed by: STUDENT IN AN ORGANIZED HEALTH CARE EDUCATION/TRAINING PROGRAM

## 2019-04-06 PROCEDURE — 82728 ASSAY OF FERRITIN: CPT

## 2019-04-06 PROCEDURE — 82330 ASSAY OF CALCIUM: CPT

## 2019-04-06 PROCEDURE — 83540 ASSAY OF IRON: CPT

## 2019-04-06 PROCEDURE — 94660 CPAP INITIATION&MGMT: CPT

## 2019-04-06 PROCEDURE — 83735 ASSAY OF MAGNESIUM: CPT

## 2019-04-06 PROCEDURE — 85045 AUTOMATED RETICULOCYTE COUNT: CPT

## 2019-04-06 PROCEDURE — 85025 COMPLETE CBC W/AUTO DIFF WBC: CPT

## 2019-04-06 PROCEDURE — 99233 SBSQ HOSP IP/OBS HIGH 50: CPT | Performed by: INTERNAL MEDICINE

## 2019-04-06 RX ORDER — INSULIN GLARGINE 100 [IU]/ML
10 INJECTION, SOLUTION SUBCUTANEOUS NIGHTLY
Status: DISCONTINUED | OUTPATIENT
Start: 2019-04-06 | End: 2019-04-10 | Stop reason: HOSPADM

## 2019-04-06 RX ORDER — POTASSIUM CHLORIDE 20 MEQ/1
40 TABLET, EXTENDED RELEASE ORAL ONCE
Status: COMPLETED | OUTPATIENT
Start: 2019-04-06 | End: 2019-04-06

## 2019-04-06 RX ORDER — LACTOBACILLUS RHAMNOSUS GG 10B CELL
2 CAPSULE ORAL 2 TIMES DAILY WITH MEALS
Status: DISCONTINUED | OUTPATIENT
Start: 2019-04-06 | End: 2019-04-10 | Stop reason: HOSPADM

## 2019-04-06 RX ORDER — MIDODRINE HYDROCHLORIDE 5 MG/1
10 TABLET ORAL ONCE
Status: COMPLETED | OUTPATIENT
Start: 2019-04-06 | End: 2019-04-06

## 2019-04-06 RX ADMIN — FUROSEMIDE 80 MG: 40 TABLET ORAL at 10:17

## 2019-04-06 RX ADMIN — HEPARIN SODIUM 5000 UNITS: 5000 INJECTION INTRAVENOUS; SUBCUTANEOUS at 06:16

## 2019-04-06 RX ADMIN — POTASSIUM CHLORIDE 40 MEQ: 1500 TABLET, EXTENDED RELEASE ORAL at 13:32

## 2019-04-06 RX ADMIN — Medication 10 ML: at 10:18

## 2019-04-06 RX ADMIN — LANSOPRAZOLE 30 MG: 30 TABLET, ORALLY DISINTEGRATING, DELAYED RELEASE ORAL at 10:16

## 2019-04-06 RX ADMIN — HEPARIN SODIUM 5000 UNITS: 5000 INJECTION INTRAVENOUS; SUBCUTANEOUS at 13:33

## 2019-04-06 RX ADMIN — Medication 10 ML: at 20:27

## 2019-04-06 RX ADMIN — Medication 2 CAPSULE: at 17:17

## 2019-04-06 RX ADMIN — MIDODRINE HYDROCHLORIDE 10 MG: 5 TABLET ORAL at 13:32

## 2019-04-06 RX ADMIN — INSULIN LISPRO 4 UNITS: 100 INJECTION, SOLUTION INTRAVENOUS; SUBCUTANEOUS at 17:15

## 2019-04-06 RX ADMIN — INSULIN GLARGINE 10 UNITS: 100 INJECTION, SOLUTION SUBCUTANEOUS at 20:24

## 2019-04-06 RX ADMIN — AMIODARONE HYDROCHLORIDE 200 MG: 200 TABLET ORAL at 10:19

## 2019-04-06 RX ADMIN — INSULIN LISPRO 2 UNITS: 100 INJECTION, SOLUTION INTRAVENOUS; SUBCUTANEOUS at 13:32

## 2019-04-06 RX ADMIN — AMIODARONE HYDROCHLORIDE 200 MG: 200 TABLET ORAL at 20:31

## 2019-04-06 RX ADMIN — CITALOPRAM 10 MG: 10 TABLET, FILM COATED ORAL at 10:17

## 2019-04-06 RX ADMIN — HEPARIN SODIUM 5000 UNITS: 5000 INJECTION INTRAVENOUS; SUBCUTANEOUS at 20:24

## 2019-04-06 ASSESSMENT — PAIN SCALES - GENERAL
PAINLEVEL_OUTOF10: 0

## 2019-04-06 ASSESSMENT — PULMONARY FUNCTION TESTS
PIF_VALUE: 10
PIF_VALUE: 10

## 2019-04-06 NOTE — PLAN OF CARE
Problem: Risk for Impaired Skin Integrity  Goal: Tissue integrity - skin and mucous membranes  Description  Structural intactness and normal physiological function of skin and  mucous membranes.   Outcome: Ongoing     Problem: Confusion - Acute:  Goal: Absence of continued neurological deterioration signs and symptoms  Description  Absence of continued neurological deterioration signs and symptoms  Outcome: Ongoing  Goal: Mental status will be restored to baseline  Description  Mental status will be restored to baseline  Outcome: Ongoing     Problem: Discharge Planning:  Goal: Ability to perform activities of daily living will improve  Description  Ability to perform activities of daily living will improve  Outcome: Ongoing  Goal: Participates in care planning  Description  Participates in care planning  Outcome: Ongoing     Problem: Injury - Risk of, Physical Injury:  Goal: Absence of physical injury  Description  Absence of physical injury  Outcome: Ongoing  Goal: Will remain free from falls  Description  Will remain free from falls  Outcome: Ongoing     Problem: Mood - Altered:  Goal: Mood stable  Description  Mood stable  Outcome: Ongoing  Goal: Absence of abusive behavior  Description  Absence of abusive behavior  Outcome: Ongoing  Goal: Verbalizations of feeling emotionally comfortable while being cared for will increase  Description  Verbalizations of feeling emotionally comfortable while being cared for will increase  Outcome: Ongoing     Problem: Psychomotor Activity - Altered:  Goal: Absence of psychomotor disturbance signs and symptoms  Description  Absence of psychomotor disturbance signs and symptoms  Outcome: Ongoing     Problem: Sensory Perception - Impaired:  Goal: Demonstrations of improved sensory functioning will increase  Description  Demonstrations of improved sensory functioning will increase  Outcome: Ongoing  Goal: Decrease in sensory misperception frequency  Description  Decrease in sensory misperception frequency  Outcome: Ongoing  Goal: Able to refrain from responding to false sensory perceptions  Description  Able to refrain from responding to false sensory perceptions  Outcome: Ongoing  Goal: Demonstrates accurate environmental perceptions  Description  Demonstrates accurate environmental perceptions  Outcome: Ongoing  Goal: Able to distinguish between reality-based and nonreality-based thinking  Description  Able to distinguish between reality-based and nonreality-based thinking  Outcome: Ongoing  Goal: Able to interrupt nonreality-based thinking  Description  Able to interrupt nonreality-based thinking  Outcome: Ongoing     Problem: Sleep Pattern Disturbance:  Goal: Appears well-rested  Description  Appears well-rested  Outcome: Ongoing     Problem: Nutrition  Goal: Optimal nutrition therapy  Outcome: Ongoing     Problem: OXYGENATION/RESPIRATORY FUNCTION  Goal: Patient will maintain patent airway  Outcome: Ongoing  Goal: Patient will achieve/maintain normal respiratory rate/effort  Description  Respiratory rate and effort will be within normal limits for the patient  Outcome: Ongoing     Problem: SKIN INTEGRITY  Goal: Skin integrity is maintained or improved  Outcome: Ongoing     Problem: Musculor/Skeletal Functional Status  Goal: Highest potential functional level  Outcome: Ongoing     Problem: Airway Clearance - Ineffective:  Goal: Ability to maintain a clear airway will improve  Description  Ability to maintain a clear airway will improve  Outcome: Ongoing     Problem: Aspiration:  Goal: Absence of aspiration  Description  Absence of aspiration  Outcome: Ongoing     Problem: Serum Glucose Level - Abnormal:  Goal: Ability to maintain appropriate glucose levels will improve to within specified parameters  Description  Ability to maintain appropriate glucose levels will improve to within specified parameters  Outcome: Ongoing     Problem: Pain:  Goal: Pain level will decrease  Description  Pain level will decrease  Outcome: Ongoing  Goal: Control of acute pain  Description  Control of acute pain  Outcome: Ongoing  Goal: Control of chronic pain  Description  Control of chronic pain  Outcome: Ongoing

## 2019-04-06 NOTE — PLAN OF CARE
Problem: Risk for Impaired Skin Integrity  Goal: Tissue integrity - skin and mucous membranes  Description  Structural intactness and normal physiological function of skin and  mucous membranes.   4/5/2019 2051 by Steph Caputo RN  Outcome: Ongoing     Problem: Confusion - Acute:  Goal: Absence of continued neurological deterioration signs and symptoms  Description  Absence of continued neurological deterioration signs and symptoms  Outcome: Ongoing     Problem: Confusion - Acute:  Goal: Mental status will be restored to baseline  Description  Mental status will be restored to baseline  Outcome: Ongoing     Problem: Discharge Planning:  Goal: Ability to perform activities of daily living will improve  Description  Ability to perform activities of daily living will improve  Outcome: Ongoing     Problem: Discharge Planning:  Goal: Participates in care planning  Description  Participates in care planning  Outcome: Ongoing     Problem: Injury - Risk of, Physical Injury:  Goal: Absence of physical injury  Description  Absence of physical injury  Outcome: Ongoing     Problem: Injury - Risk of, Physical Injury:  Goal: Will remain free from falls  Description  Will remain free from falls  Outcome: Ongoing     Problem: Mood - Altered:  Goal: Mood stable  Description  Mood stable  Outcome: Ongoing     Problem: Mood - Altered:  Goal: Absence of abusive behavior  Description  Absence of abusive behavior  Outcome: Ongoing     Problem: Mood - Altered:  Goal: Verbalizations of feeling emotionally comfortable while being cared for will increase  Description  Verbalizations of feeling emotionally comfortable while being cared for will increase  Outcome: Ongoing     Problem: Psychomotor Activity - Altered:  Goal: Absence of psychomotor disturbance signs and symptoms  Description  Absence of psychomotor disturbance signs and symptoms  Outcome: Ongoing     Problem: Sensory Perception - Impaired:  Goal: Demonstrations of improved sensory functioning will increase  Description  Demonstrations of improved sensory functioning will increase  Outcome: Ongoing     Problem: Sensory Perception - Impaired:  Goal: Decrease in sensory misperception frequency  Description  Decrease in sensory misperception frequency  Outcome: Ongoing     Problem: Sensory Perception - Impaired:  Goal: Able to refrain from responding to false sensory perceptions  Description  Able to refrain from responding to false sensory perceptions  Outcome: Ongoing     Problem: Sensory Perception - Impaired:  Goal: Demonstrates accurate environmental perceptions  Description  Demonstrates accurate environmental perceptions  Outcome: Ongoing     Problem: Sensory Perception - Impaired:  Goal: Able to distinguish between reality-based and nonreality-based thinking  Description  Able to distinguish between reality-based and nonreality-based thinking  Outcome: Ongoing     Problem: Sensory Perception - Impaired:  Goal: Able to interrupt nonreality-based thinking  Description  Able to interrupt nonreality-based thinking  Outcome: Ongoing     Problem: Sleep Pattern Disturbance:  Goal: Appears well-rested  Description  Appears well-rested  Outcome: Ongoing     Problem: Nutrition  Goal: Optimal nutrition therapy  Outcome: Ongoing     Problem: OXYGENATION/RESPIRATORY FUNCTION  Goal: Patient will maintain patent airway  4/5/2019 2051 by Nafisa Tucker RN  Outcome: Ongoing     Problem: OXYGENATION/RESPIRATORY FUNCTION  Goal: Patient will achieve/maintain normal respiratory rate/effort  Description  Respiratory rate and effort will be within normal limits for the patient  4/5/2019 2051 by Nafisa Tucker RN  Outcome: Ongoing     Problem: SKIN INTEGRITY  Goal: Skin integrity is maintained or improved  4/5/2019 2051 by Nafisa Tucker RN  Outcome: Ongoing     Problem: Musculor/Skeletal Functional Status  Goal: Highest potential functional level  Outcome: Ongoing     Problem: Airway Clearance - Ineffective:  Goal: Ability to maintain a clear airway will improve  Description  Ability to maintain a clear airway will improve  4/5/2019 2051 by Ritchie Butterfield RN  Outcome: Ongoing     Problem: Aspiration:  Goal: Absence of aspiration  Description  Absence of aspiration  4/5/2019 2051 by Ritchie Butterfield RN  Outcome: Ongoing     Problem: Serum Glucose Level - Abnormal:  Goal: Ability to maintain appropriate glucose levels will improve to within specified parameters  Description  Ability to maintain appropriate glucose levels will improve to within specified parameters  Outcome: Ongoing     Problem: Pain:  Goal: Pain level will decrease  Description  Pain level will decrease  Outcome: Ongoing     Problem: Pain:  Goal: Control of acute pain  Description  Control of acute pain  Outcome: Ongoing     Problem: Pain:  Goal: Control of chronic pain  Description  Control of chronic pain  Outcome: Ongoing     Problem: Restraint Use - Nonviolent/Non-Self-Destructive Behavior:  Goal: Absence of restraint indications  Description  Absence of restraint indications  Outcome: Completed     Problem: Restraint Use - Nonviolent/Non-Self-Destructive Behavior:  Goal: Absence of restraint-related injury  Description  Absence of restraint-related injury  Outcome: Completed

## 2019-04-06 NOTE — PROGRESS NOTES
Patient Update  Spoke with the vascular surgery, about restarting low dose heparin drip. Per Vascular, patient is okay to start on heparin drip, with monitoring for signs and symptoms of re-bleeding and repeat hemoglobin. Spoke with family about pt's need for anticoagulation. Patient and wife expressed understanding of the risks and benefits associated with anticoagulation and not anticoagulating, given patient's current situation. Patient and wife agreeable for restarting heparin gtt for anticoagulation. Plan to start low dose heparin drip-without the initial bolus, once platelet count improves to above 100,000. And continue to monitor the site for increase in size, and check hemoglobin and hematocrit every 8 hr, for first few days.       Stephane Hidalgo MD  PGY-2, Internal Medicine  [facility]

## 2019-04-06 NOTE — PROGRESS NOTES
Nephrology Progress Note    Subjective:  Patient was seen and examined at bedside. Hemodialysis done yesterday 2 hrs UF only removed 1.8 kg. Extubated yesterday afternoon. Vital signs reviewed overnight with slight hypotension noted. Remains off pressors and a-fib on amiodarone with his rate controlled. Had 1.61 liters urine output last 24 hrs. Wt down by about 20-23 lbs over last 5 days  Creatinine 1.71 mg/dL and BUN 81 today. K 3.2. Hbg 8.8. Objective:  CURRENT TEMPERATURE:  Temp: 96.8 °F (36 °C)  MAXIMUM TEMPERATURE OVER 24HRS:  Temp (24hrs), Av.4 °F (36.3 °C), Min:96.8 °F (36 °C), Max:99.3 °F (37.4 °C)    CURRENT RESPIRATORY RATE:  Resp: 19  CURRENT PULSE:  Pulse: 63  CURRENT BLOOD PRESSURE:  BP: (!) 97/52  24HR BLOOD PRESSURE RANGE:  Systolic (09IJR), DYZ:515 , Min:78 , MELANIE:005   ; Diastolic (40DKP), LTJ:99, Min:29, Max:80    24HR INTAKE/OUTPUT:      Intake/Output Summary (Last 24 hours) at 2019 0742  Last data filed at 2019 0622  Gross per 24 hour   Intake 718.5 ml   Output 1610 ml   Net -891.5 ml     Patient Vitals for the past 96 hrs (Last 3 readings):   Weight   19 0600 277 lb 9 oz (125.9 kg)   19 1224 276 lb 10.8 oz (125.5 kg)   19 0945 280 lb 10.3 oz (127.3 kg)     Physical Exam:    General appearance:  A&O x 4, No acute distress noted. Skin: warm and dry, no rash or erythema, no jaundice  Eyes: conjunctivae pale   ENT: Atraumatic, normocephalic, no lymphadenopathy noted. Neck: supple   Pulmonary: no wheezing or rhonchi. Diminished in posterior bases. Cardiovascular: Normal S1 & S2, irregular No S3 or  S4, no Pericardial Rub no Murmur   Abdomen: bowel sounds hypoactive an distant.    Extremities:no cyanosis, clubbing , + thigh edema    Labs:   CBC:  Recent Labs     19  0428 19  0429 19  0447   WBC 16.7* 12.9* 13.0*   RBC 2.53* 2.60* 2.88*   HGB 7.9* 8.2* 8.8*   HCT 26.1* 27.4* 29.9*   .2* 105.4* 103.8*   MCH 31.2 31.5 30.6   MCHC 30.3 29.9 29.4   RDW 18.7* 18.8* 19.2*   PLT 84* 81* 86*   MPV 11.3 11.3 10.8      BMP:   Recent Labs     04/03/19  0802 04/04/19 0428 04/05/19 0429   * 141 141   K 3.7 3.5* 3.9    104 104   CO2 24 23 24   * 80* 71*   CREATININE 3.06* 2.25* 1.78*   GLUCOSE 259* 256* 265*   CALCIUM 7.9* 8.1* 7.8*        Phosphorus:    Recent Labs     04/03/19  0802 04/04/19  0428 04/05/19 0429   PHOS 5.7* 3.2 3.5     Magnesium:   Recent Labs     04/03/19  0802 04/04/19 0428 04/05/19 0429   MG 2.7* 2.2 2.1     Albumin:   Recent Labs     04/03/19  0802 04/04/19 0428 04/05/19 0429   LABALBU 3.0* 3.3* 3.2*       SPEP:   Lab Results   Component Value Date    PROT 5.7 04/05/2019    ALBCAL 2.6 03/29/2019    ALBPCT 56 03/29/2019    LABALPH 0.2 03/29/2019    LABALPH 0.8 03/29/2019    A1PCT 4 03/29/2019    A2PCT 17 03/29/2019    LABBETA 0.5 03/29/2019    BETAPCT 11 03/29/2019    GAMGLOB 0.6 03/29/2019    GGPCT 12 03/29/2019    PATH ELECTRONICALLY SIGNED.  Alexsandra Barnes M.D. 03/29/2019     Urine Creatinine:    Lab Results   Component Value Date    LABCREA 111.9 03/29/2019       Urinalysis:  U/A:   Lab Results   Component Value Date    NITRU NEGATIVE 03/28/2019    COLORU DARK YELLOW 03/28/2019    PHUR 5.0 03/28/2019    WBCUA 5 TO 10 03/28/2019    RBCUA 5 TO 10 03/28/2019    MUCUS NOT REPORTED 03/28/2019    TRICHOMONAS NOT REPORTED 03/28/2019    YEAST NOT REPORTED 03/28/2019    BACTERIA NOT REPORTED 03/28/2019    SPECGRAV 1.024 03/28/2019    LEUKOCYTESUR TRACE 03/28/2019    UROBILINOGEN Normal 03/28/2019    BILIRUBINUR NEGATIVE  Verified by ictotest. 03/28/2019    GLUCOSEU TRACE 03/28/2019    KETUA TRACE 03/28/2019    AMORPHOUS NOT REPORTED 03/28/2019     CT SCAN ;     1. Large left retroperitoneal hematoma extending from the posterior   hemidiaphragm to the upper pelvis.  Hematocrit effect with a hematoma   measuring maximally 9.3 cm in diameter and about 14 cm in length.  There is   also a small amount of upper abdominal ascites.  No free air. 2. Bilateral pleural effusions with dependent lower lobe atelectasis. 3. No evidence of bowel obstruction.  Diverticulosis with no acute features     CXR: Reviewed as available. Assessment:  1. ERNESTO secondary to ischemic ATN in face of hemorrhagic shock secondary to retroperitoneal bleed. Hgb stable, pt was started on HD for severe azotemia and fluid overload. Last session yesterday. Creatinine continues to improve to 1.78 mg/dL today from 2.25 mg/dL yesterday. BUN 71 compared to 124 yesterday. 2. Recent influenza A infection with PNA. 3. Hx of hypertension. 4. Chronic AFib   5. VDRF. Plan:  1. Urine output on 80 mg of PO lasix was 1619 mL with 1.8 kg removed with dialysis yesterday. Improved creatinine and BUN as noted. 2. Replace K.   2. Strict I&Os and daily weights. 3. Avoid all nephrotoxic agents and IV contrast if possible. 4. Will follow. Please do not hesitate to call with questions. Electronically signed by Judd Black CNP, APRN - CNP on 4/6/2019 at 7:42 AM     Attending Physician Statement  I have discussed the care of Christine Bahena, including pertinent history and exam findings with the resident/fellow. I have reviewed the key elements of all parts of the encounter with the resident/fellow. I have seen and examined the patient with the resident/fellow. I agree with the assessment and plan and status of the problem list as documented. Addiitionally I recommend a dose of proamitine . No dialysis today   Avoid hypotension  .   Loretta Hensley MD

## 2019-04-06 NOTE — PROGRESS NOTES
WBC 16.7* 12.9* 13.0*   RBC 2.53* 2.60* 2.88*   HGB 7.9* 8.2* 8.8*   HCT 26.1* 27.4* 29.9*   .2* 105.4* 103.8*   MCH 31.2 31.5 30.6   MCHC 30.3 29.9 29.4   RDW 18.7* 18.8* 19.2*   PLT 84* 81* 86*   MPV 11.3 11.3 10.8       Last 3 Blood Glucose:   Recent Labs     04/03/19  0802 04/04/19 0428 04/05/19 0429   GLUCOSE 259* 256* 265*        PT/INR:    Lab Results   Component Value Date    PROTIME 12.5 03/29/2019    INR 1.2 03/29/2019     PTT:    Lab Results   Component Value Date    APTT 39.4 03/28/2019       Comprehensive Metabolic Profile:   Recent Labs     04/03/19  0802 04/04/19 0428 04/05/19 0429   * 141 141   K 3.7 3.5* 3.9    104 104   CO2 24 23 24   * 80* 71*   CREATININE 3.06* 2.25* 1.78*   GLUCOSE 259* 256* 265*   CALCIUM 7.9* 8.1* 7.8*   PROT 5.7* 5.7* 5.7*   LABALBU 3.0* 3.3* 3.2*   BILITOT 0.54 0.76 1.00   ALKPHOS 100 95 94   AST 43* 31 40*   * 155* 120*      Magnesium:   Lab Results   Component Value Date    MG 2.1 04/05/2019    MG 2.2 04/04/2019    MG 2.7 04/03/2019     Phosphorus:   Lab Results   Component Value Date    PHOS 3.5 04/05/2019    PHOS 3.2 04/04/2019    PHOS 5.7 04/03/2019     Ionized Calcium:   Lab Results   Component Value Date    CAION 1.11 04/06/2019    CAION 1.10 04/05/2019    CAION 1.10 04/04/2019        Urinalysis:   Lab Results   Component Value Date    NITRU NEGATIVE 03/28/2019    COLORU DARK YELLOW 03/28/2019    PHUR 5.0 03/28/2019    WBCUA 5 TO 10 03/28/2019    RBCUA 5 TO 10 03/28/2019    MUCUS NOT REPORTED 03/28/2019    TRICHOMONAS NOT REPORTED 03/28/2019    YEAST NOT REPORTED 03/28/2019    BACTERIA NOT REPORTED 03/28/2019    SPECGRAV 1.024 03/28/2019    LEUKOCYTESUR TRACE 03/28/2019    UROBILINOGEN Normal 03/28/2019    BILIRUBINUR NEGATIVE  Verified by ictotest. 03/28/2019    GLUCOSEU TRACE 03/28/2019    KETUA TRACE 03/28/2019    AMORPHOUS NOT REPORTED 03/28/2019       HgBA1c:    Lab Results   Component Value Date    LABA1C 8.6 03/18/2019 RECONCILED: [] No  [x] Yes    CONSULTATION NEEDED:  [] No  [x] Yes    FAMILY UPDATED:    [x] No  [] Yes    TRANSFER OUT OF ICU:   [] No  [x] Yes    Additional Assessment:  Influenza A  Community acquired pneumonia with likely strep pneumoniae  Atrial fibrillation with RVRPersistent   Acute exacerbation of CHF sec due to atrial fibrillationPersistent   Acute  hypoxic hypercapnic respiratory failureDue to pulmonary edema, persistent without any significant change   Diabetes mellitusType II with hyperglycemia   Obstructive sleep apnea    Plan:    Hemorrhagic shock 2/2 retroperitoneal bleed - improved, off pressors, hemoglobin stable 7. Vascular signed off. Currently no active intervention recommended. Awaiting vascular surgery recs for Heparin drip    Acute Respiratory failure 2/2 Inf A and PNA. Extubated 4/5 after HD. Wore BiPAP for half of the night, now on nasal canula, tolerating well. Breathing treatment with Duoneb. Last dose of Rocephin 2 g on 4/3. ID following     Acute on chronic systolic CHF - EF 56% 8/61/3935 - On lasix 80 mg PO daily. Follow up with nephrology for further plans for dialysis, monitor I&O's. Chronic A. fib with RVR, controlled. Continue amiodarone 200 mg twice a day, Lopressor 25 mg twice a day. Cardiology following. On Heparin TID dvt ppx. Platelets count stable at 86. ERNESTO 2/2 ischemic ATN. Had hemodialysis yesterday. Made 1619 ml urine in past 24 hours Nephrology on board. Appreciate recommendations. Type 2 diabetes mellitus - blood glucose improved with increase Lantus to 30 units twice a day with medium dose sliding scale, point-of-care glucose checks every 4 hours    Thrombocytopenia -On Heparin TID dvt ppx. Platelets count stable at 86. hit antibody negative. Continue to monitor. Will again page vascular for recommendation for Heparin drip. DVT Ppx: EPC cuffs and SQ Heparin.   GI Ppx: Prilosec    Transfer out of ICU    Glendy Mohan DO  4/6/2019 7:20 AM Attending Physician Statement  I have discussed the care of Clarence Odom, including pertinent history and exam findings with the resident. I have reviewed the key elements of all parts of the encounter with the resident. I have seen and examined the patient with the resident. I agree with the assessment and plan and status of the problem list as documented. I seen the patient during my round today, I have reviewed the events, chart seen and medications reviewed. He is intermittently confused and disoriented but most of the time alert and awake and follows command and mentally much improved than few days ago. He was extubated yesterday and tolerated extubation well and remained on nasal cannula he had used BiPAP overnight for a few hours and then refused after that  On exam he does not have much wheezing bilateral breath sound is much better. He had hemodialysis yesterday, his BUN is 8118 is 1.7, his urine output is good with 80 mg of IV Lasix. His blood sugar is better controlled and since she is off to feeding his blood sugars are in 100, he is on 30 units twice a day of Lantus and his Lantus was hold last night and this morning. Replace potassium. Nephrology today to decide about hemodialysis. On Lasix and follow-up intake and output and BUN. His platelet count is 80 still stable but not improving, his heparin platelet antibody was negative, he was started on heparin for DVT prophylaxis yesterday, vascular surgery was called and they are okay at this time to start him on heparin drip, his platelet count is 80 still and he had large retroperitoneal hematoma although there is a risk of atrial fibrillation/embolic CVA he had large hematoma with hemorrhagic shock and will wait until his platelet count is improved to 100 before resuming his heparin and once his platelet count improved 100 or above then will restart heparin drip low-dose for atrial fibrillation.   Continuous supplemental O2 and use BiPAP at night and as needed during the daytime. Will give 15 units of Lantus tonight and adjust Lantus according to his oral intake and blood sugar  Encourage cough and deep breathing   Ambulate out of bed to chair  Will transfer patient to stepdown once bed available with medicine team and will follow as pulmonary. Total critical care time caring for this patient with life threatening, unstable organ failure, including direct patient contact, management of life support systems, review of data including imaging and labs, discussions with other team members and physicians at least 27  Min so far today, excluding procedures. Amy Shen MD  4/6/2019 11:19 AM      Please note that this chart was generated using voice recognition Dragon dictation software. Although every effort was made to ensure the accuracy of this automated transcription, some errors in transcription may have occurred.

## 2019-04-06 NOTE — PROGRESS NOTES
[facility] -Progress Note    Today's Date and Time: 4/6/2019, 8:27 AM    Impression :   1. Acute Hypoxic Respiratory failure  2. Influenza A  3. Possible secondary pneumonia with normal mansoor  4. Atrial Fibrillation  5. DM II  6. CHF  7. Renal insufficiency  8. Leukocytosis secondary to hemorrhage, steroids  9. Retroperitoneal bleed    Recommendations:     · Ceftriaxone completed on 4/3  · Continue respiratory toilet. Medical Decision Making/Summary/Discussion:   · Patient with Hx chronic smoking  · Admitted with acute hypoxic respiratory failure associated with Influenza   · Has pulmonary vascular congestion and pleural effusions  · Has CHF and renal insufficiency  · Has completed oseltamivir  · Will discontinue treatment for possible secondary pneumonia with ceftriaxone as of 4-3-19  · On hemodialysis, management per nephrology  · Extubation performed successfully on 4/5  Infection Control Recommendations     · Droplet Isolation    Antimicrobial Stewardship Recommendations     · Targeted therapy  · PK dosing  Coordination of Outpatient Care:   · Estimated Length of IV antimicrobials:TBD  · Patient will need Midline Catheter Insertionno:   · Patient will need PICC line Insertion:no   · Patient will need: Home IV , [place],  SNF,  LTAC: TBD  · Patient will need outpatient wound care:No    Chief complaint/reason for consultation:   · Leukocytosis      History of Present Illness:   Donavan Burgos is a 69y.o.-year-old  male who was initially admitted on 3/19/2019. Patient seen at the request of Virginia Ulloa. INITIAL HISTORY:    ID has been consulted for evaluation of leukocytosis. The patient was transferred to [address] [place] [address] on 3/19/19 from an outlying facility for worsening respiratory status, Influenza A and elevated troponin. PMH significant for CAD,DM 2, A fib and chronic smoking.     The patient presented to [facility] on 3/15 for sob where his Rapid flu was positive for Influenza A. He refused admission and was treated with Unique-flu as an out patient. He developed worsening sob and hypoxia leading to his admission at the outlying facility on 3/18/19 where he was started on Ceftriaxone, Azithromycin and Tamiflu. He was transferred to [facility] on 3/19/19 because of hypoxic respiratory distress and elevated troponin. He was admitted to ICU and started on BiPAP. CXR on 3-19-19 showed reticular infiltrate and LLL pneumonia. Respiratory culture was obtained on 3/22/19 and showed few gram positive cocci in clusters. Legionella antigen, Strep pneumoniae anitgen and Mycoplasma Ab negative. MRSA swab negative    The patient was intubated on 3/21/19 because of deteriorating respiratory status. The patient completed a course of Unique-flu on 3-25-19. He completed a course of abx and steroids on 3-26-19. The patient spiked a fever (t max 100.4) and became hypotensive requiring pressure support on 3-29-19. He was started on Vancomycin and Zosyn empirically. Blood, urine and sputum cultures were obtained and currently pending. The patient additionally was found to have a Lt sided spontaneous retroperitoneal bleed on 3-29-19 and was hypovolemic requiring multiple transfusions. H&H has been stable today at 9.0. The patient continues to require pressor support and is tachycardic (120). Presently the patient is intubated, sedated and on 3 pressors. He has been febrile (t max 101.5), tachycardic (110-120), tachypneic (30-40's). Lactic acid was elevated at 7.4 yesterday. WBC count has been elevated at 45.3 today. Hgb stable. His kidney function is worsening Cr 3.74    He is on zosyn. Vancomycin held because trough level 18.5 (3/29/19)  Cultures negative so far. CURRENT EVALUATION : 4/6/2019    Patient seen and examined. Extubated successfully yesterday. Says he is continuing to feel better, but is sleepy today.    Denies fevers, chills, cough, sputum production, or shortness of breath. Afebrile last 24 hours  VSS, systolic remains in the 777'M    Off ventilator support. Off levophed  On amiodarone drip    Off sedation   Moves all extremities on his own  Opens eyes. Communicates with family    ERNESTO worse  [facility]. Had HD on 4-2-19 and 4-3-19. HD management per nephrology. Labs Reviewed: 4/6/2019  WBC: 20.1 > 27.1 > 45.3-->19.7 -> 16.7 -> 12.9 -> 13  Hgb: 9.0 > 8.2 > 8.6-->8.0 -> 8.2 -> 8.8  Lactic acid: 7.4 > 5.5 > 2.6 > 2.4 ->     BUN: 68 > 84 > 87-->148-->124 -> 71 -> 81  Cr: 1.83 > 3.21 > 3.74-->4.4-->3.06 -> 1.78 -> 1.71      Cultures:  Urine:   · 3/28/19: No growth  · 3/15/19: No growth    Blood:   · 3/28/19: No growth    Sputum :  ·  3/28/19: No growth  · Gram positive cocci in clusters 3/22/19. Normal mansoor    Rapid flu positive for influenza A (3/15/19)    Discussed with patient, RN. I have personally reviewed the past medical history, past surgical history, medications, social history, and family history, and I have updated the database accordingly. Past Medical History:     Past Medical History:   Diagnosis Date    Abnormal nuclear stress test 03/12/2010    Stress and resting cardiolite images showed inferior wall hypoperfusion suggestive of previous myocardial infarction. Left ventricular wall motion showed inferior wall hypokinesia. EF 58%.  Arrhythmia 2007    A single episode    Atrial fibrillation ([address] 2007    Single Episode    CAD (coronary artery disease) 1996    MI    DM type 2 (diabetes mellitus, type 2) ([address] 2005    Hx of echocardiogram 04/05/2007    EF 62%, Increased left atrial and right ventricular diametes. Increased septal and posterior wall thickness suggest left ventricular hypertrophy.  Valves were normal.        Past Surgical  History:     Past Surgical History:   Procedure Laterality Date    CARDIOVERSION  05/18/2007    Successful-Dr. Tera Arroyo    CARDIOVERSION  03/13    CORONARY ANGIOPLASTY        Medications:      furosemide  80 mg Per NG tube Daily    heparin (porcine)  5,000 Units Subcutaneous 3 times per day    sodium chloride flush  10 mL Intravenous 2 times per day    insulin glargine  30 Units Subcutaneous BID    insulin lispro  0-12 Units Subcutaneous Q4H    amiodarone  200 mg Oral BID    metoprolol tartrate  25 mg Oral BID    lansoprazole  30 mg Per NG tube QAM AC    citalopram  10 mg Oral Daily    docusate  100 mg Oral Daily    sodium chloride flush  10 mL Intravenous 2 times per day       Social History:     Social History     Socioeconomic History    Marital status:      Spouse name: Not on file    Number of children: Not on file    Years of education: Not on file    Highest education level: Not on file   Occupational History    Not on file   Social Needs    Financial resource strain: Not on file    Food insecurity:     Worry: Not on file     Inability: Not on file    Transportation needs:     Medical: Not on file     Non-medical: Not on file   Tobacco Use    Smoking status: Former Smoker     Packs/day: 1.50     Years: 30.00     Pack years: 45.00     Types: Cigarettes     Last attempt to quit: 1996     Years since quittin.8    Smokeless tobacco: Never Used   Substance and Sexual Activity    Alcohol use: No    Drug use: No    Sexual activity: Not on file   Lifestyle    Physical activity:     Days per week: Not on file     Minutes per session: Not on file    Stress: Not on file   Relationships    Social connections:     Talks on phone: Not on file     Gets together: Not on file     Attends Faith service: Not on file     Active member of club or organization: Not on file     Attends meetings of clubs or organizations: Not on file     Relationship status: Not on file    Intimate partner violence:     Fear of current or ex partner: Not on file     Emotionally abused: Not on file     Physically abused: Not on file     Forced sexual activity: Not on file   Other Topics Concern    Not on file   Social History Narrative    Not on file       Family History:   No family history on file. Allergies:   Patient has no known allergies. Review of Systems:   Unable to perform ROS. Pt intubated and sedated. Physical Examination :     Patient Vitals for the past 8 hrs:   BP Temp Temp src Pulse Resp SpO2 Weight   04/06/19 0800 (!) 101/57 96.1 °F (35.6 °C) CORE 67 17 -- --   04/06/19 0700 (!) 97/52 -- -- 63 19 99 % --   04/06/19 0600 (!) 103/56 -- -- 65 21 100 % 277 lb 9 oz (125.9 kg)   04/06/19 0500 (!) 122/44 -- -- 70 21 99 % --   04/06/19 0400 111/64 96.8 °F (36 °C) CORE 72 20 100 % --   04/06/19 0327 -- -- -- 65 19 99 % --   04/06/19 0300 (!) 99/57 -- -- 64 19 98 % --   04/06/19 0200 (!) 89/49 -- -- 66 20 100 % --   04/06/19 0105 -- -- -- 72 19 96 % --   04/06/19 0100 (!) 104/53 -- -- 73 24 (!) 89 % --     General Appearance: Extubated. Head:  Normocephalic, no trauma  ENT: Intubated. Mouth/throat: mucosa pink and moist.  Neck: Supple, without lymphadenopathy. Pulmonary/Chest: Coarse sounds  Cardiovascular: Irregular rate and rhythm without murmurs, rubs, or gallops. Abdomen: Soft, distended. Bowel sounds normal.  All four Extremities: No cyanosis, clubbing, edema, or effusions. Neurologic: Awake, responsive  Skin: Warm and dry with good turgor. No signs of peripheral arterial or venous insufficiency. No ulcerations. No open wounds.     Medical Decision Making -Laboratory:   I have independently reviewed/ordered the following labs:    CBC with Differential:   Recent Labs     04/05/19  0429 04/06/19  0447   WBC 12.9* 13.0*   HGB 8.2* 8.8*   HCT 27.4* 29.9*   PLT 81* 86*   LYMPHOPCT 7* 9*   MONOPCT 7 6     BMP:   Recent Labs     04/05/19  0429 04/06/19  0710    140   K 3.9 3.2*    105   CO2 24 23   BUN 71* 81*   CREATININE 1.78* 1.71*   MG 2.1 2.2     Hepatic Function Panel:   Recent Labs     04/04/19  0428 04/05/19  0429   PROT 5.7* 5.7* LABALBU 3.3* 3.2*   BILIDIR 0.33* 0.34*   IBILI 0.43 0.66   BILITOT 0.76 1.00   ALKPHOS 95 94   * 120*   AST 31 40*     No results for input(s): RPR in the last 72 hours. No results for input(s): HIV in the last 72 hours. No results for input(s): BC in the last 72 hours. Lab Results   Component Value Date    MUCUS NOT REPORTED 03/28/2019    RBC 2.88 04/06/2019    TRICHOMONAS NOT REPORTED 03/28/2019    WBC 13.0 04/06/2019    YEAST NOT REPORTED 03/28/2019    TURBIDITY TURBID 03/28/2019     Lab Results   Component Value Date    CREATININE 1.71 04/06/2019    GLUCOSE 114 04/06/2019    GLUCOSE 104 11/26/2018       Medical Decision Making-Imaging:     Narrative   EXAMINATION:   SINGLE XRAY VIEW OF THE CHEST       3/29/2019 5:54 am       COMPARISON:   March 28, 2019, March 27, 2019       HISTORY:   ORDERING SYSTEM PROVIDED HISTORY: intubated   TECHNOLOGIST PROVIDED HISTORY:   intubated       FINDINGS:   Endotracheal tube terminates at the level of the clavicular heads.  The   enteric tube courses off the field of view in the upper abdomen.  The right   internal jugular line terminates at the distal SVC.  Shallow inflation. Cardiac silhouette enlargement again noted.  Perihilar and basilar opacities   are again noted without significant change.  No new airspace disease or   pneumothorax identified.           Impression   Unchanged appearance of support tubes and lines.       Perihilar and basilar opacities are without appreciable change which may   represent subsegmental atelectasis versus developing consolidation.             Medical Decision Making-Other: Thank you for allowing us to participate in the care of this patient. Please call with questions. Kathleen Blankenship     ATTESTATION:    I have discussed the case, including pertinent history and exam findings with the residents. I have seen and examined the patient and the key elements of the encounter have been performed by me.  I have reviewed the laboratory data, other diagnostic studies and discussed them with the residents. I have updated the medical record where necessary. I agree with the assessment, plan and orders as documented by the resident.     Valeria Vivar MD.

## 2019-04-06 NOTE — PLAN OF CARE
Problem: Risk for Impaired Skin Integrity  Goal: Tissue integrity - skin and mucous membranes  Description  Structural intactness and normal physiological function of skin and  mucous membranes.   4/6/2019 1958 by West Patiño RN  Outcome: Ongoing  4/6/2019 1155 by Day Miller RN  Outcome: Ongoing     Problem: Confusion - Acute:  Goal: Absence of continued neurological deterioration signs and symptoms  Description  Absence of continued neurological deterioration signs and symptoms  4/6/2019 1958 by West Patiño RN  Outcome: Ongoing  4/6/2019 1155 by Day Miller RN  Outcome: Ongoing  Goal: Mental status will be restored to baseline  Description  Mental status will be restored to baseline  4/6/2019 1958 by West Patiño RN  Outcome: Ongoing  4/6/2019 1155 by Day Miller RN  Outcome: Ongoing     Problem: Discharge Planning:  Goal: Ability to perform activities of daily living will improve  Description  Ability to perform activities of daily living will improve  4/6/2019 1958 by Wets Patiño RN  Outcome: Ongoing  4/6/2019 1155 by Day Miller RN  Outcome: Ongoing  Goal: Participates in care planning  Description  Participates in care planning  4/6/2019 1958 by West Patiño RN  Outcome: Ongoing  4/6/2019 1155 by Day Miller RN  Outcome: Ongoing     Problem: Injury - Risk of, Physical Injury:  Goal: Absence of physical injury  Description  Absence of physical injury  4/6/2019 1958 by West Patiño RN  Outcome: Ongoing  4/6/2019 1155 by Day Miller RN  Outcome: Ongoing  Goal: Will remain free from falls  Description  Will remain free from falls  4/6/2019 1958 by West Patiño RN  Outcome: Ongoing  4/6/2019 1155 by Day Miller RN  Outcome: Ongoing     Problem: Mood - Altered:  Goal: Mood stable  Description  Mood stable  4/6/2019 1958 by West Patiño RN  Outcome: Ongoing  4/6/2019 1155 by Day Miller RN  Outcome: Ongoing  Goal: Absence of abusive behavior  Description  Absence of abusive behavior  4/6/2019 1958 by West Patiño RN  Outcome: nonreality-based thinking  4/6/2019 1958 by Ranjit Ballard RN  Outcome: Ongoing  4/6/2019 1155 by Chelsea Quevedo RN  Outcome: Ongoing     Problem: Sleep Pattern Disturbance:  Goal: Appears well-rested  Description  Appears well-rested  4/6/2019 1958 by Ranjit Ballard RN  Outcome: Ongoing  4/6/2019 1155 by Chelsea Quevedo RN  Outcome: Ongoing     Problem: Nutrition  Goal: Optimal nutrition therapy  4/6/2019 1958 by Ranjit Ballard RN  Outcome: Ongoing  4/6/2019 1155 by Chelsea Quevedo RN  Outcome: Ongoing     Problem: OXYGENATION/RESPIRATORY FUNCTION  Goal: Patient will maintain patent airway  4/6/2019 1958 by Ranjit Ballard RN  Outcome: Ongoing  4/6/2019 1155 by Chelsea Quevedo RN  Outcome: Ongoing  Goal: Patient will achieve/maintain normal respiratory rate/effort  Description  Respiratory rate and effort will be within normal limits for the patient  4/6/2019 1958 by Ranjit Ballard RN  Outcome: Ongoing  4/6/2019 1155 by Chelsea Quevedo RN  Outcome: Ongoing     Problem: SKIN INTEGRITY  Goal: Skin integrity is maintained or improved  4/6/2019 1958 by Ranjit Ballard RN  Outcome: Ongoing  4/6/2019 1155 by Chelsea Quevedo RN  Outcome: Ongoing     Problem: Musculor/Skeletal Functional Status  Goal: Highest potential functional level  4/6/2019 1958 by Ranjit Ballard RN  Outcome: Ongoing  4/6/2019 1155 by Chelsea Quevedo RN  Outcome: Ongoing     Problem: Airway Clearance - Ineffective:  Goal: Ability to maintain a clear airway will improve  Description  Ability to maintain a clear airway will improve  4/6/2019 1958 by Ranjit Ballard RN  Outcome: Ongoing  4/6/2019 1155 by Chelsea Quevedo RN  Outcome: Ongoing     Problem: Aspiration:  Goal: Absence of aspiration  Description  Absence of aspiration  4/6/2019 1958 by Ranjit Ballard RN  Outcome: Ongoing  4/6/2019 1155 by Chelsea Quevedo RN  Outcome: Ongoing     Problem: Serum Glucose Level - Abnormal:  Goal: Ability to maintain appropriate glucose levels will improve to within specified parameters  Description  Ability to maintain appropriate glucose levels will improve to within specified parameters  4/6/2019 1958 by West Patiño RN  Outcome: Ongoing  4/6/2019 1155 by Day Miller RN  Outcome: Ongoing     Problem: Pain:  Goal: Pain level will decrease  Description  Pain level will decrease  4/6/2019 1958 by West Patiño RN  Outcome: Ongoing  4/6/2019 1155 by Day Miller RN  Outcome: Ongoing  Goal: Control of acute pain  Description  Control of acute pain  4/6/2019 1958 by West Patiño RN  Outcome: Ongoing  4/6/2019 1155 by Day Miller RN  Outcome: Ongoing  Goal: Control of chronic pain  Description  Control of chronic pain  4/6/2019 1958 by West Patiño RN  Outcome: Ongoing  4/6/2019 1155 by Day Miller RN  Outcome: Ongoing

## 2019-04-07 LAB
ABSOLUTE EOS #: 0.32 K/UL (ref 0–0.4)
ABSOLUTE IMMATURE GRANULOCYTE: 0.32 K/UL (ref 0–0.3)
ABSOLUTE LYMPH #: 0.96 K/UL (ref 1–4.8)
ABSOLUTE MONO #: 1.28 K/UL (ref 0.1–0.8)
ANION GAP SERPL CALCULATED.3IONS-SCNC: 12 MMOL/L (ref 9–17)
BASOPHILS # BLD: 0 % (ref 0–2)
BASOPHILS ABSOLUTE: 0 K/UL (ref 0–0.2)
BUN BLDV-MCNC: 73 MG/DL (ref 8–23)
BUN/CREAT BLD: ABNORMAL (ref 9–20)
CALCIUM IONIZED: 1.17 MMOL/L (ref 1.13–1.33)
CALCIUM SERPL-MCNC: 7.8 MG/DL (ref 8.6–10.4)
CHLORIDE BLD-SCNC: 99 MMOL/L (ref 98–107)
CO2: 24 MMOL/L (ref 20–31)
CREAT SERPL-MCNC: 1.67 MG/DL (ref 0.7–1.2)
DIFFERENTIAL TYPE: ABNORMAL
EOSINOPHILS RELATIVE PERCENT: 2 % (ref 1–4)
GFR AFRICAN AMERICAN: 49 ML/MIN
GFR NON-AFRICAN AMERICAN: 41 ML/MIN
GFR SERPL CREATININE-BSD FRML MDRD: ABNORMAL ML/MIN/{1.73_M2}
GFR SERPL CREATININE-BSD FRML MDRD: ABNORMAL ML/MIN/{1.73_M2}
GLUCOSE BLD-MCNC: 111 MG/DL (ref 70–99)
GLUCOSE BLD-MCNC: 141 MG/DL (ref 75–110)
GLUCOSE BLD-MCNC: 211 MG/DL (ref 75–110)
GLUCOSE BLD-MCNC: 218 MG/DL (ref 75–110)
GLUCOSE BLD-MCNC: 87 MG/DL (ref 75–110)
HCT VFR BLD CALC: 27.4 % (ref 40.7–50.3)
HCT VFR BLD CALC: 28 % (ref 40.7–50.3)
HCT VFR BLD CALC: 29.3 % (ref 40.7–50.3)
HEMOGLOBIN: 8.4 G/DL (ref 13–17)
HEMOGLOBIN: 8.6 G/DL (ref 13–17)
HEMOGLOBIN: 9 G/DL (ref 13–17)
IMMATURE GRANULOCYTES: 2 %
LYMPHOCYTES # BLD: 6 % (ref 24–44)
MAGNESIUM: 1.9 MG/DL (ref 1.6–2.6)
MCH RBC QN AUTO: 31.6 PG (ref 25.2–33.5)
MCHC RBC AUTO-ENTMCNC: 30.7 G/DL (ref 28.4–34.8)
MCV RBC AUTO: 102.8 FL (ref 82.6–102.9)
MONOCYTES # BLD: 8 % (ref 1–7)
MORPHOLOGY: ABNORMAL
NRBC AUTOMATED: 1.1 PER 100 WBC
NUCLEATED RED BLOOD CELLS: 1 PER 100 WBC
PARTIAL THROMBOPLASTIN TIME: 22.2 SEC (ref 20.5–30.5)
PARTIAL THROMBOPLASTIN TIME: 29.8 SEC (ref 20.5–30.5)
PDW BLD-RTO: 19.3 % (ref 11.8–14.4)
PHOSPHORUS: 3.1 MG/DL (ref 2.5–4.5)
PLATELET # BLD: 220 K/UL (ref 138–453)
PLATELET # BLD: 86 K/UL (ref 138–453)
PLATELET # BLD: ABNORMAL K/UL (ref 138–453)
PLATELET ESTIMATE: ABNORMAL
PLATELET, FLUORESCENCE: NORMAL K/UL (ref 138–453)
PMV BLD AUTO: ABNORMAL FL (ref 8.1–13.5)
POTASSIUM SERPL-SCNC: 3.4 MMOL/L (ref 3.7–5.3)
RBC # BLD: 2.85 M/UL (ref 4.21–5.77)
RBC # BLD: ABNORMAL 10*6/UL
SEG NEUTROPHILS: 82 % (ref 36–66)
SEGMENTED NEUTROPHILS ABSOLUTE COUNT: 13.12 K/UL (ref 1.8–7.7)
SODIUM BLD-SCNC: 135 MMOL/L (ref 135–144)
WBC # BLD: 16 K/UL (ref 3.5–11.3)
WBC # BLD: ABNORMAL 10*3/UL

## 2019-04-07 PROCEDURE — 85025 COMPLETE CBC W/AUTO DIFF WBC: CPT

## 2019-04-07 PROCEDURE — 6360000002 HC RX W HCPCS: Performed by: STUDENT IN AN ORGANIZED HEALTH CARE EDUCATION/TRAINING PROGRAM

## 2019-04-07 PROCEDURE — 2700000000 HC OXYGEN THERAPY PER DAY

## 2019-04-07 PROCEDURE — 6360000002 HC RX W HCPCS: Performed by: EMERGENCY MEDICINE

## 2019-04-07 PROCEDURE — 99233 SBSQ HOSP IP/OBS HIGH 50: CPT | Performed by: INTERNAL MEDICINE

## 2019-04-07 PROCEDURE — 6370000000 HC RX 637 (ALT 250 FOR IP): Performed by: INTERNAL MEDICINE

## 2019-04-07 PROCEDURE — 94660 CPAP INITIATION&MGMT: CPT

## 2019-04-07 PROCEDURE — 85014 HEMATOCRIT: CPT

## 2019-04-07 PROCEDURE — 85049 AUTOMATED PLATELET COUNT: CPT

## 2019-04-07 PROCEDURE — 2060000000 HC ICU INTERMEDIATE R&B

## 2019-04-07 PROCEDURE — 36415 COLL VENOUS BLD VENIPUNCTURE: CPT

## 2019-04-07 PROCEDURE — 85730 THROMBOPLASTIN TIME PARTIAL: CPT

## 2019-04-07 PROCEDURE — 94762 N-INVAS EAR/PLS OXIMTRY CONT: CPT

## 2019-04-07 PROCEDURE — 85018 HEMOGLOBIN: CPT

## 2019-04-07 PROCEDURE — 82330 ASSAY OF CALCIUM: CPT

## 2019-04-07 PROCEDURE — 6370000000 HC RX 637 (ALT 250 FOR IP): Performed by: STUDENT IN AN ORGANIZED HEALTH CARE EDUCATION/TRAINING PROGRAM

## 2019-04-07 PROCEDURE — 84100 ASSAY OF PHOSPHORUS: CPT

## 2019-04-07 PROCEDURE — 80048 BASIC METABOLIC PNL TOTAL CA: CPT

## 2019-04-07 PROCEDURE — 2580000003 HC RX 258: Performed by: STUDENT IN AN ORGANIZED HEALTH CARE EDUCATION/TRAINING PROGRAM

## 2019-04-07 PROCEDURE — 83735 ASSAY OF MAGNESIUM: CPT

## 2019-04-07 PROCEDURE — 2580000003 HC RX 258: Performed by: EMERGENCY MEDICINE

## 2019-04-07 PROCEDURE — 85055 RETICULATED PLATELET ASSAY: CPT

## 2019-04-07 RX ORDER — HEPARIN SODIUM 10000 [USP'U]/100ML
1000 INJECTION, SOLUTION INTRAVENOUS CONTINUOUS
Status: DISCONTINUED | OUTPATIENT
Start: 2019-04-07 | End: 2019-04-07

## 2019-04-07 RX ORDER — MIDODRINE HYDROCHLORIDE 5 MG/1
10 TABLET ORAL ONCE
Status: COMPLETED | OUTPATIENT
Start: 2019-04-07 | End: 2019-04-07

## 2019-04-07 RX ORDER — HEPARIN SODIUM 1000 [USP'U]/ML
4000 INJECTION, SOLUTION INTRAVENOUS; SUBCUTANEOUS PRN
Status: DISCONTINUED | OUTPATIENT
Start: 2019-04-07 | End: 2019-04-07

## 2019-04-07 RX ORDER — MIDODRINE HYDROCHLORIDE 5 MG/1
5 TABLET ORAL
Status: DISCONTINUED | OUTPATIENT
Start: 2019-04-07 | End: 2019-04-08

## 2019-04-07 RX ORDER — POTASSIUM CHLORIDE 20 MEQ/1
40 TABLET, EXTENDED RELEASE ORAL ONCE
Status: COMPLETED | OUTPATIENT
Start: 2019-04-07 | End: 2019-04-07

## 2019-04-07 RX ORDER — HEPARIN SODIUM 1000 [USP'U]/ML
2000 INJECTION, SOLUTION INTRAVENOUS; SUBCUTANEOUS PRN
Status: DISCONTINUED | OUTPATIENT
Start: 2019-04-07 | End: 2019-04-07

## 2019-04-07 RX ADMIN — AMIODARONE HYDROCHLORIDE 200 MG: 200 TABLET ORAL at 20:02

## 2019-04-07 RX ADMIN — INSULIN GLARGINE 10 UNITS: 100 INJECTION, SOLUTION SUBCUTANEOUS at 20:09

## 2019-04-07 RX ADMIN — Medication 2 CAPSULE: at 09:25

## 2019-04-07 RX ADMIN — MIDODRINE HYDROCHLORIDE 10 MG: 5 TABLET ORAL at 12:16

## 2019-04-07 RX ADMIN — INSULIN LISPRO 2 UNITS: 100 INJECTION, SOLUTION INTRAVENOUS; SUBCUTANEOUS at 20:10

## 2019-04-07 RX ADMIN — FUROSEMIDE 80 MG: 40 TABLET ORAL at 09:26

## 2019-04-07 RX ADMIN — CITALOPRAM 10 MG: 10 TABLET, FILM COATED ORAL at 09:26

## 2019-04-07 RX ADMIN — AMIODARONE HYDROCHLORIDE 200 MG: 200 TABLET ORAL at 09:26

## 2019-04-07 RX ADMIN — Medication 2 CAPSULE: at 18:09

## 2019-04-07 RX ADMIN — LANSOPRAZOLE 30 MG: 30 TABLET, ORALLY DISINTEGRATING, DELAYED RELEASE ORAL at 09:26

## 2019-04-07 RX ADMIN — Medication 10 ML: at 20:06

## 2019-04-07 RX ADMIN — Medication 10 ML: at 09:27

## 2019-04-07 RX ADMIN — MIDODRINE HYDROCHLORIDE 5 MG: 5 TABLET ORAL at 20:02

## 2019-04-07 RX ADMIN — POTASSIUM CHLORIDE 40 MEQ: 20 TABLET, EXTENDED RELEASE ORAL at 09:26

## 2019-04-07 RX ADMIN — Medication 10 ML: at 20:07

## 2019-04-07 RX ADMIN — LORAZEPAM 0.5 MG: 2 INJECTION INTRAMUSCULAR; INTRAVENOUS at 21:53

## 2019-04-07 RX ADMIN — HEPARIN SODIUM 5000 UNITS: 5000 INJECTION INTRAVENOUS; SUBCUTANEOUS at 22:45

## 2019-04-07 RX ADMIN — INSULIN LISPRO 4 UNITS: 100 INJECTION, SOLUTION INTRAVENOUS; SUBCUTANEOUS at 18:10

## 2019-04-07 RX ADMIN — HEPARIN SODIUM AND DEXTROSE 7.89 UNITS/KG/HR: 10000; 5 INJECTION INTRAVENOUS at 14:27

## 2019-04-07 RX ADMIN — SODIUM CHLORIDE: 9 INJECTION, SOLUTION INTRAVENOUS at 09:41

## 2019-04-07 RX ADMIN — HEPARIN SODIUM 5000 UNITS: 5000 INJECTION INTRAVENOUS; SUBCUTANEOUS at 06:06

## 2019-04-07 ASSESSMENT — PULMONARY FUNCTION TESTS
PIF_VALUE: 11
PIF_VALUE: 11

## 2019-04-07 ASSESSMENT — PAIN SCALES - GENERAL
PAINLEVEL_OUTOF10: 0

## 2019-04-07 NOTE — PROGRESS NOTES
[facility] -Progress Note    Today's Date and Time: 4/7/2019, 2:25 PM    Impression :   1. Acute Hypoxic Respiratory failure  2. Influenza A  3. Possible secondary pneumonia with normal manosor  4. Atrial Fibrillation  5. DM II  6. CHF  7. Renal insufficiency  8. Leukocytosis secondary to hemorrhage, steroids  9. Retroperitoneal bleed    Recommendations:     · Ceftriaxone completed on 4/3  · Continue respiratory toilet. Medical Decision Making/Summary/Discussion:   · Patient with Hx chronic smoking  · Admitted with acute hypoxic respiratory failure associated with Influenza   · Has pulmonary vascular congestion and pleural effusions  · Has CHF and renal insufficiency  · Has completed oseltamivir  · Will discontinue treatment for possible secondary pneumonia with ceftriaxone as of 4-3-19  · On hemodialysis, management per nephrology  · Extubation performed successfully on 4/5  Infection Control Recommendations     · Droplet Isolation    Antimicrobial Stewardship Recommendations     · Targeted therapy  · PK dosing  Coordination of Outpatient Care:   · Estimated Length of IV antimicrobials:TBD  · Patient will need Midline Catheter Insertionno:   · Patient will need PICC line Insertion:no   · Patient will need: Home IV , [place],  SNF,  LTAC: TBD  · Patient will need outpatient wound care:No    Chief complaint/reason for consultation:   · Leukocytosis      History of Present Illness:   Christine Bahena is a 69y.o.-year-old  male who was initially admitted on 3/19/2019. Patient seen at the request of Magi Ventura. INITIAL HISTORY:    ID has been consulted for evaluation of leukocytosis. The patient was transferred to [facility] on 3/19/19 from an outlying facility for worsening respiratory status, Influenza A and elevated troponin. PMH significant for CAD,DM 2, A fib and chronic smoking.     The patient presented to [place] ER on 3/15 for sob where his Rapid flu was positive for Influenza A. He refused admission and was treated with Unique-flu as an out patient. He developed worsening sob and hypoxia leading to his admission at the outlying facility on 3/18/19 where he was started on Ceftriaxone, Azithromycin and Tamiflu. He was transferred to [facility] on 3/19/19 because of hypoxic respiratory distress and elevated troponin. He was admitted to ICU and started on BiPAP. CXR on 3-19-19 showed reticular infiltrate and LLL pneumonia. Respiratory culture was obtained on 3/22/19 and showed few gram positive cocci in clusters. Legionella antigen, Strep pneumoniae anitgen and Mycoplasma Ab negative. MRSA swab negative    The patient was intubated on 3/21/19 because of deteriorating respiratory status. The patient completed a course of Unique-flu on 3-25-19. He completed a course of abx and steroids on 3-26-19. The patient spiked a fever (t max 100.4) and became hypotensive requiring pressure support on 3-29-19. He was started on Vancomycin and Zosyn empirically. Blood, urine and sputum cultures were obtained and currently pending. The patient additionally was found to have a Lt sided spontaneous retroperitoneal bleed on 3-29-19 and was hypovolemic requiring multiple transfusions. H&H has been stable today at 9.0. The patient continues to require pressor support and is tachycardic (120). Presently the patient is intubated, sedated and on 3 pressors. He has been febrile (t max 101.5), tachycardic (110-120), tachypneic (30-40's). Lactic acid was elevated at 7.4 yesterday. WBC count has been elevated at 45.3 today. Hgb stable. His kidney function is worsening Cr 3.74    He is on zosyn. Vancomycin held because trough level 18.5 (3/29/19)  Cultures negative so far. CURRENT EVALUATION : 4/7/2019    Patient seen and examined. Extubated successfully yesterday. Says he is continuing to feel better, but is sleepy today.    Denies fevers, chills, cough, sputum production, or shortness of breath. Afebrile last 24 hours  VSS, systolic remains in the 696'R    Off ventilator support. Off levophed  On amiodarone drip    Off sedation   Moves all extremities on his own  Opens eyes. Communicates with family    ERNESTO worse  Pankaj site Cassie Soriano. Had HD on 4-2-19 and 4-3-19. HD management per nephrology. Labs Reviewed: 4/7/2019  WBC: 20.1 > 27.1 > 45.3-->19.7 -> 16.7 -> 12.9 -> 13  Hgb: 9.0 > 8.2 > 8.6-->8.0 -> 8.2 -> 8.8  Lactic acid: 7.4 > 5.5 > 2.6 > 2.4 ->     BUN: 68 > 84 > 87-->148-->124 -> 71 -> 81  Cr: 1.83 > 3.21 > 3.74-->4.4-->3.06 -> 1.78 -> 1.71      Cultures:  Urine:   · 3/28/19: No growth  · 3/15/19: No growth    Blood:   · 3/28/19: No growth    Sputum :  ·  3/28/19: No growth  · Gram positive cocci in clusters 3/22/19. Normal mansoor    Rapid flu positive for influenza A (3/15/19)    Discussed with patient, RN. I have personally reviewed the past medical history, past surgical history, medications, social history, and family history, and I have updated the database accordingly. Past Medical History:     Past Medical History:   Diagnosis Date    Abnormal nuclear stress test 03/12/2010    Stress and resting cardiolite images showed inferior wall hypoperfusion suggestive of previous myocardial infarction. Left ventricular wall motion showed inferior wall hypokinesia. EF 58%.  Arrhythmia 2007    A single episode    Atrial fibrillation ([address] 2007    Single Episode    CAD (coronary artery disease) 1996    MI    DM type 2 (diabetes mellitus, type 2) [facility] 2005    Hx of echocardiogram 04/05/2007    EF 62%, Increased left atrial and right ventricular diametes. Increased septal and posterior wall thickness suggest left ventricular hypertrophy.  Valves were normal.        Past Surgical  History:     Past Surgical History:   Procedure Laterality Date    CARDIOVERSION  05/18/2007    Successful-Dr. Tera Arroyo    CARDIOVERSION  03/13    CORONARY ANGIOPLASTY        Medications:      midodrine  5 mg Oral TID     insulin glargine  10 Units Subcutaneous Nightly    insulin lispro  0-12 Units Subcutaneous TID     insulin lispro  0-6 Units Subcutaneous Nightly    lactobacillus  2 capsule Oral BID     furosemide  80 mg Per NG tube Daily    heparin (porcine)  5,000 Units Subcutaneous 3 times per day    sodium chloride flush  10 mL Intravenous 2 times per day    amiodarone  200 mg Oral BID    metoprolol tartrate  25 mg Oral BID    lansoprazole  30 mg Per NG tube QAM     citalopram  10 mg Oral Daily    docusate  100 mg Oral Daily    sodium chloride flush  10 mL Intravenous 2 times per day       Social History:     Social History     Socioeconomic History    Marital status:      Spouse name: Not on file    Number of children: Not on file    Years of education: Not on file    Highest education level: Not on file   Occupational History    Not on file   Social Needs    Financial resource strain: Not on file    Food insecurity:     Worry: Not on file     Inability: Not on file    Transportation needs:     Medical: Not on file     Non-medical: Not on file   Tobacco Use    Smoking status: Former Smoker     Packs/day: 1.50     Years: 30.00     Pack years: 45.00     Types: Cigarettes     Last attempt to quit: 1996     Years since quittin.8    Smokeless tobacco: Never Used   Substance and Sexual Activity    Alcohol use: No    Drug use: No    Sexual activity: Not on file   Lifestyle    Physical activity:     Days per week: Not on file     Minutes per session: Not on file    Stress: Not on file   Relationships    Social connections:     Talks on phone: Not on file     Gets together: Not on file     Attends Shinto service: Not on file     Active member of club or organization: Not on file     Attends meetings of clubs or organizations: Not on file     Relationship status: Not on file    Intimate partner violence:     Fear of

## 2019-04-07 NOTE — PROGRESS NOTES
19.3*   PLT 81* 86* See Reflexed IPF Result   MPV 11.3 10.8 NOT REPORTED      BMP:   Recent Labs     04/05/19  0429 04/06/19  0710 04/07/19 0447    140 135   K 3.9 3.2* 3.4*    105 99   CO2 24 23 24   BUN 71* 81* 73*   CREATININE 1.78* 1.71* 1.67*   GLUCOSE 265* 114* 111*   CALCIUM 7.8* 8.0* 7.8*        Phosphorus:    Recent Labs     04/05/19  0429 04/06/19  0710 04/07/19  0447   PHOS 3.5 3.4 3.1     Magnesium:   Recent Labs     04/05/19 0429 04/06/19  0710 04/07/19  0447   MG 2.1 2.2 1.9     Albumin:   Recent Labs     04/05/19 0429   LABALBU 3.2*       SPEP:   Lab Results   Component Value Date    PROT 5.7 04/05/2019    ALBCAL 2.6 03/29/2019    ALBPCT 56 03/29/2019    LABALPH 0.2 03/29/2019    LABALPH 0.8 03/29/2019    A1PCT 4 03/29/2019    A2PCT 17 03/29/2019    LABBETA 0.5 03/29/2019    BETAPCT 11 03/29/2019    GAMGLOB 0.6 03/29/2019    GGPCT 12 03/29/2019    PATH ELECTRONICALLY SIGNED. Forest Blandon M.D. 03/29/2019     Urine Creatinine:    Lab Results   Component Value Date    LABCREA 111.9 03/29/2019       Urinalysis:  U/A:   Lab Results   Component Value Date    NITRU NEGATIVE 03/28/2019    COLORU DARK YELLOW 03/28/2019    PHUR 5.0 03/28/2019    WBCUA 5 TO 10 03/28/2019    RBCUA 5 TO 10 03/28/2019    MUCUS NOT REPORTED 03/28/2019    TRICHOMONAS NOT REPORTED 03/28/2019    YEAST NOT REPORTED 03/28/2019    BACTERIA NOT REPORTED 03/28/2019    SPECGRAV 1.024 03/28/2019    LEUKOCYTESUR TRACE 03/28/2019    UROBILINOGEN Normal 03/28/2019    BILIRUBINUR NEGATIVE  Verified by ictotest. 03/28/2019    GLUCOSEU TRACE 03/28/2019    KETUA TRACE 03/28/2019    AMORPHOUS NOT REPORTED 03/28/2019     CT SCAN ;     1. Large left retroperitoneal hematoma extending from the posterior   hemidiaphragm to the upper pelvis.  Hematocrit effect with a hematoma   measuring maximally 9.3 cm in diameter and about 14 cm in length.  There is   also a small amount of upper abdominal ascites.  No free air.    2. Bilateral pleural effusions with dependent lower lobe atelectasis. 3. No evidence of bowel obstruction.  Diverticulosis with no acute features     CXR: Reviewed as available. Assessment:  1. ERNESTO secondary to ischemic ATN in face of hemorrhagic shock secondary to retroperitoneal bleed. Hgb stable, pt was started on HD for severe azotemia and fluid overload. Last session yesterday. Creatinine continues to improve to 1.63 mg/dL today from 1.67 mg/dL yesterday. BUN 73 compared to 81 yesterday. 2. Recent influenza A infection with PNA. 3. Hx of hypertension. 4. Chronic AFib - controlled. 5. VDRF. Plan:  1. Urine output on 80 mg of NG lasix was 2.3 L. Continue Lasix NG.  2. Strict I&Os and daily weights. 3. Avoid all nephrotoxic agents and IV contrast if possible. 4. Will follow. No dialysis today. Please do not hesitate to call with questions. Electronically signed by Lori Segura CNP, APRN - CNP on 4/7/2019 at 7:35 AM   Attending Physician Statement  I have discussed the care of Greg Ohara, including pertinent history and exam findings with the resident/fellow. I have reviewed the key elements of all parts of the encounter with the resident/fellow. I have seen and examined the patient with the resident/fellow. I agree with the assessment and plan and status of the problem list as documented. Addiitionally I  think that he is not going to need dialysis anymore .  Will leave QM cath in for another 24 hrs  Though   Lucía Marie MD

## 2019-04-07 NOTE — PROGRESS NOTES
RN spoke with  about pt's 1000 PLT count 220. Previous PLT counts were 80-90. Tech said she will make a slide and verify that the count is correct. Dr. Fadi Ivy notified. RN will follow up.

## 2019-04-07 NOTE — PROGRESS NOTES
INTENSIVE CARE UNIT  Resident Physician Progress Note    Patient - Micah Gosselin  Date of Admission -  3/19/2019  7:58 AM  Date of Evaluation -  2019  Room and Bed Number -  0105/0105-01   Hospital Day -     Chief complaint influenza, pneumonia, A. fib with RVR  SUBJECTIVE:     OVERNIGHT EVENTS:   No acute events overnight. Patient seen and examined bedside. Patient tolerated nasal cannula overnight. Awaiting transfer to [place]. Plan to start Heparin when platelets > [id number]. OBJECTIVE:     VITAL SIGNS:  BP (!) 108/55   Pulse 65   Temp 97.9 °F (36.6 °C) (Oral)   Resp 18   Ht 5' 8\" (1.727 m)   Wt 279 lb 5.2 oz (126.7 kg)   SpO2 98%   BMI 42.47 kg/m²   Tmax over 24 hours:  Temp (24hrs), Av.2 °F (36.2 °C), Min:96.1 °F (35.6 °C), Max:97.9 °F (36.6 °C)      Patient Vitals for the past 8 hrs:   BP Temp Temp src Pulse Resp SpO2 Weight   19 0700 (!) 108/55 -- -- 65 18 98 % --   19 0600 (!) 95/59 -- -- 63 22 97 % 279 lb 5.2 oz (126.7 kg)   19 0500 101/61 -- -- 66 22 100 % --   19 0400 123/64 97.9 °F (36.6 °C) Oral 77 24 100 % --   19 0300 (!) 100/52 -- -- 64 22 93 % --   19 0200 (!) 102/49 -- -- 66 23 91 % --   19 0100 (!) 101/50 -- -- 72 21 93 % --   19 0000 (!) 102/49 97.9 °F (36.6 °C) CORE 70 24 94 % --         Intake/Output Summary (Last 24 hours) at 2019 0715  Last data filed at 2019 0600  Gross per 24 hour   Intake 481 ml   Output 2295 ml   Net -1814 ml     Date 19 0000 - 19 2359   Shift 6529-5933 6521-3726 1941-2039 24 Hour Total   INTAKE   I.V.(mL/kg) 80(0.6)   80(0.6)   Shift Total(mL/kg) 80(0.6)   80(0.6)   OUTPUT   Urine(mL/kg/hr) 550   550   Shift Total(mL/kg) 550(4.3)   550(4.3)   Weight (kg) 126.7 126.7 126.7 126.7     Wt Readings from Last 3 Encounters:   19 279 lb 5.2 oz (126.7 kg)   19 294 lb 11.2 oz (133.7 kg)   19 300 lb (136.1 kg)     Body mass index is 42.47 kg/m².         PHYSICAL EXAM:  · General appearance: AOx4, no distress  · HEENT: Atraumatic, normocephalic, neck supple, normal EOM, thyroid normal, no lymphadenopathy   · Lungs: no wheeze, clear to auscultation  · Heart: Regular heart rate, tachycardic, S1, S2 normal, no murmur   · Abdomen: soft, non-tender; bowel sounds normal; no masses,  no organomegaly  · Extremities: No cyanosis or edema  · Neurological:  MAEx4, no focal deficits        MEDICATIONS:  Scheduled Meds:   potassium chloride  40 mEq Oral Once    insulin glargine  10 Units Subcutaneous Nightly    insulin lispro  0-12 Units Subcutaneous TID WC    insulin lispro  0-6 Units Subcutaneous Nightly    lactobacillus  2 capsule Oral BID WC    furosemide  80 mg Per NG tube Daily    heparin (porcine)  5,000 Units Subcutaneous 3 times per day    sodium chloride flush  10 mL Intravenous 2 times per day    amiodarone  200 mg Oral BID    metoprolol tartrate  25 mg Oral BID    lansoprazole  30 mg Per NG tube QAM AC    citalopram  10 mg Oral Daily    docusate  100 mg Oral Daily    sodium chloride flush  10 mL Intravenous 2 times per day     Continuous Infusions:   sodium chloride 10 mL/hr at 04/05/19 1824    dextrose       PRN Meds:     sodium chloride 250 mL PRN   sodium chloride 150 mL PRN   sodium chloride flush 10 mL PRN   sodium chloride 250 mL PRN   sodium chloride 150 mL PRN   fentanNYL 50 mcg Q1H PRN   Or     fentanNYL 100 mcg Q1H PRN   acetaminophen 650 mg Q4H PRN   magnesium hydroxide 30 mL Daily PRN   albuterol 2.5 mg Q6H PRN   LORazepam 0.5 mg Q8H PRN   sodium chloride flush 10 mL PRN   ondansetron 4 mg Q6H PRN   glucose 15 g PRN   dextrose 12.5 g PRN   glucagon (rDNA) 1 mg PRN   dextrose 100 mL/hr PRN       SUPPORT DEVICES: [] Ventilator [] BIPAP  [x] Nasal Cannula [] Room Air  .   Lab Results   Component Value Date    PHART 7.310 03/18/2019    CUH2MXJ 51.7 03/18/2019    PO2ART 67.8 03/18/2019    TQU5RWD 25.4 03/18/2019    BXV6VJU 26 04/05/2019    Y5RVPOHZ 91.7 03/18/2019 03/28/2019    KETUA TRACE 03/28/2019    AMORPHOUS NOT REPORTED 03/28/2019       HgBA1c:    Lab Results   Component Value Date    LABA1C 8.6 03/18/2019     TSH:  No results found for: TSH  Lactic Acid:   Lab Results   Component Value Date    LACTA 2.2 03/18/2019    LACTA 2.6 03/18/2019      Troponin: No results for input(s): TROPONINI in the last 72 hours. Results for Misty Martinez (MRN [id number]) as of 3/23/2019 18:06   Ref.  Range 3/23/2019 04:07   POC pH Latest Ref Range: 7.350 - 7.450  7.437   POC pH Temp Unknown NOT REPORTED   POC pCO2 Latest Ref Range: 35.0 - 48.0 mm Hg 63.7 (H)   POC pCO2 Temp Latest Units: mm Hg NOT REPORTED   POC PO2 Latest Ref Range: 83.0 - 108.0 mm Hg 65.1 (L)   POC pO2 Temp Latest Units: mm Hg NOT REPORTED   POC HCO3 Latest Ref Range: 21.0 - 28.0 mmol/L 42.9 (HH)   POC O2 SAT Latest Ref Range: 94.0 - 98.0 % 92 (L)     ASSESSMENT:     Patient Active Problem List    Diagnosis Date Noted    ERNESTO (acute kidney injury) (HCC)     Transaminitis     Ischemic hepatitis     IVIS (obstructive sleep apnea)     Leukocytosis     Hemorrhagic shock (HCC)     Retroperitoneal hematoma     Lactic acidosis     Hyperglycemia     Acute blood loss anemia     Community acquired pneumonia of left lower lobe of lung [facility] [address]     Influenza A with respiratory manifestations 03/18/2019    Acute respiratory failure with hypoxia (HCC) 03/18/2019    Influenza A 03/18/2019    Acute hypercapnic respiratory failure (HCC) 03/18/2019    NSTEMI (non-ST elevated myocardial infarction) ([facility])     Type 2 diabetes mellitus without complication, without long-term current use of insulin ([facility] [address] 09/06/2016    Adult BMI > 30 05/06/2016    CAD (coronary artery disease)     DM type 2 (diabetes mellitus, type 2) ([facility])     Atrial fibrillation with rapid ventricular response ([facility] [address] 01/01/2007        PLAN:     WEAN PER PROTOCOL:  [] No   [x] Yes  [] N/A    ICU PROPHYLAXIS:  Stress ulcer:  [] PPI Agent  [x] [id number] [] Attending Physician Statement  I have discussed the care of Valente Garsia, including pertinent history and exam findings with the resident. I have reviewed the key elements of all parts of the encounter with the resident. I have seen and examined the patient with the resident. I agree with the assessment and plan and status of the problem list as documented. Overnight no acute events, his blood pressure is borderline around systolic of 90, his neurological status remained stable and he remains alert and slightly getting more awake and more oriented and less confused. His urine output is 2.3 later on Lasix 80 mg IV, his creatinine is 1.6 and BUN is around 70. His platelet count shows clump and apparently were decreased and platelet was ordered again and it was reported to be 220, lab was called and they confirmed that his platelet is 933. He had no hemodialysis is today. His heart rate is controlled on amiodarone oral.  Will start midodrine. Vascular surgery is okay to start heparin drip, discussed with wife and the patient explained the risk of rebleeding with heparin drip and also risk of CVA without anticoagulation with discharge ambulation and patient and wife both understand and they want to proceed with anticoagulation. Will start low dose heparin drip without bolus. Will follow-up hemoglobin and hematocrit for next 24 hours starting on heparin drip. Encouraged BiPAP at night. Transfer to stepdown unit with medicine team.  We will follow as pulmonary      Ronda Carver MD  4/7/2019 10:51 AM     Please note that this chart was generated using voice recognition Dragon dictation software. Although every effort was made to ensure the accuracy of this automated transcription, some errors in transcription may have occurred.

## 2019-04-08 LAB
ABSOLUTE EOS #: 0.38 K/UL (ref 0–0.44)
ABSOLUTE IMMATURE GRANULOCYTE: 0.17 K/UL (ref 0–0.3)
ABSOLUTE LYMPH #: 0.95 K/UL (ref 1.1–3.7)
ABSOLUTE MONO #: 1.18 K/UL (ref 0.1–1.2)
ANION GAP SERPL CALCULATED.3IONS-SCNC: 11 MMOL/L (ref 9–17)
BASOPHILS # BLD: 0 % (ref 0–2)
BASOPHILS ABSOLUTE: <0.03 K/UL (ref 0–0.2)
BUN BLDV-MCNC: 74 MG/DL (ref 8–23)
BUN/CREAT BLD: ABNORMAL (ref 9–20)
CALCIUM IONIZED: 1.13 MMOL/L (ref 1.13–1.33)
CALCIUM SERPL-MCNC: 7.9 MG/DL (ref 8.6–10.4)
CHLORIDE BLD-SCNC: 103 MMOL/L (ref 98–107)
CO2: 23 MMOL/L (ref 20–31)
CREAT SERPL-MCNC: 1.62 MG/DL (ref 0.7–1.2)
DIFFERENTIAL TYPE: ABNORMAL
EOSINOPHILS RELATIVE PERCENT: 3 % (ref 1–4)
GFR AFRICAN AMERICAN: 51 ML/MIN
GFR NON-AFRICAN AMERICAN: 42 ML/MIN
GFR SERPL CREATININE-BSD FRML MDRD: ABNORMAL ML/MIN/{1.73_M2}
GFR SERPL CREATININE-BSD FRML MDRD: ABNORMAL ML/MIN/{1.73_M2}
GLUCOSE BLD-MCNC: 135 MG/DL (ref 70–99)
GLUCOSE BLD-MCNC: 151 MG/DL (ref 75–110)
GLUCOSE BLD-MCNC: 162 MG/DL (ref 75–110)
GLUCOSE BLD-MCNC: 181 MG/DL (ref 75–110)
GLUCOSE BLD-MCNC: 99 MG/DL (ref 75–110)
HCT VFR BLD CALC: 27.7 % (ref 40.7–50.3)
HCT VFR BLD CALC: 30.3 % (ref 40.7–50.3)
HEMOGLOBIN: 8.5 G/DL (ref 13–17)
HEMOGLOBIN: 9.1 G/DL (ref 13–17)
IMMATURE GRANULOCYTES: 1 %
LYMPHOCYTES # BLD: 8 % (ref 24–43)
MAGNESIUM: 2 MG/DL (ref 1.6–2.6)
MCH RBC QN AUTO: 31.7 PG (ref 25.2–33.5)
MCHC RBC AUTO-ENTMCNC: 30.7 G/DL (ref 28.4–34.8)
MCV RBC AUTO: 103.4 FL (ref 82.6–102.9)
MONOCYTES # BLD: 9 % (ref 3–12)
NRBC AUTOMATED: 0 PER 100 WBC
PDW BLD-RTO: 19.3 % (ref 11.8–14.4)
PHOSPHORUS: 3.5 MG/DL (ref 2.5–4.5)
PLATELET # BLD: 89 K/UL (ref 138–453)
PLATELET # BLD: 93 K/UL (ref 138–453)
PLATELET # BLD: 94 K/UL (ref 138–453)
PLATELET ESTIMATE: ABNORMAL
PMV BLD AUTO: 10.5 FL (ref 8.1–13.5)
POTASSIUM SERPL-SCNC: 3.7 MMOL/L (ref 3.7–5.3)
RBC # BLD: 2.68 M/UL (ref 4.21–5.77)
RBC # BLD: ABNORMAL 10*6/UL
SEG NEUTROPHILS: 79 % (ref 36–65)
SEGMENTED NEUTROPHILS ABSOLUTE COUNT: 9.83 K/UL (ref 1.5–8.1)
SODIUM BLD-SCNC: 137 MMOL/L (ref 135–144)
SURGICAL PATHOLOGY REPORT: NORMAL
WBC # BLD: 12.5 K/UL (ref 3.5–11.3)
WBC # BLD: ABNORMAL 10*3/UL

## 2019-04-08 PROCEDURE — 6370000000 HC RX 637 (ALT 250 FOR IP): Performed by: INTERNAL MEDICINE

## 2019-04-08 PROCEDURE — 99232 SBSQ HOSP IP/OBS MODERATE 35: CPT | Performed by: INTERNAL MEDICINE

## 2019-04-08 PROCEDURE — 85018 HEMOGLOBIN: CPT

## 2019-04-08 PROCEDURE — 6370000000 HC RX 637 (ALT 250 FOR IP): Performed by: STUDENT IN AN ORGANIZED HEALTH CARE EDUCATION/TRAINING PROGRAM

## 2019-04-08 PROCEDURE — 84100 ASSAY OF PHOSPHORUS: CPT

## 2019-04-08 PROCEDURE — 85014 HEMATOCRIT: CPT

## 2019-04-08 PROCEDURE — 82947 ASSAY GLUCOSE BLOOD QUANT: CPT

## 2019-04-08 PROCEDURE — 80048 BASIC METABOLIC PNL TOTAL CA: CPT

## 2019-04-08 PROCEDURE — 2580000003 HC RX 258: Performed by: STUDENT IN AN ORGANIZED HEALTH CARE EDUCATION/TRAINING PROGRAM

## 2019-04-08 PROCEDURE — 99291 CRITICAL CARE FIRST HOUR: CPT | Performed by: INTERNAL MEDICINE

## 2019-04-08 PROCEDURE — 36415 COLL VENOUS BLD VENIPUNCTURE: CPT

## 2019-04-08 PROCEDURE — 83735 ASSAY OF MAGNESIUM: CPT

## 2019-04-08 PROCEDURE — 82330 ASSAY OF CALCIUM: CPT

## 2019-04-08 PROCEDURE — 85049 AUTOMATED PLATELET COUNT: CPT

## 2019-04-08 PROCEDURE — 85025 COMPLETE CBC W/AUTO DIFF WBC: CPT

## 2019-04-08 PROCEDURE — 2700000000 HC OXYGEN THERAPY PER DAY

## 2019-04-08 PROCEDURE — 94660 CPAP INITIATION&MGMT: CPT

## 2019-04-08 PROCEDURE — 97530 THERAPEUTIC ACTIVITIES: CPT

## 2019-04-08 PROCEDURE — 94762 N-INVAS EAR/PLS OXIMTRY CONT: CPT

## 2019-04-08 PROCEDURE — 2580000003 HC RX 258: Performed by: EMERGENCY MEDICINE

## 2019-04-08 PROCEDURE — 97164 PT RE-EVAL EST PLAN CARE: CPT

## 2019-04-08 PROCEDURE — 6360000002 HC RX W HCPCS: Performed by: STUDENT IN AN ORGANIZED HEALTH CARE EDUCATION/TRAINING PROGRAM

## 2019-04-08 PROCEDURE — 2060000000 HC ICU INTERMEDIATE R&B

## 2019-04-08 RX ORDER — MIDODRINE HYDROCHLORIDE 5 MG/1
5 TABLET ORAL 3 TIMES DAILY PRN
Status: DISCONTINUED | OUTPATIENT
Start: 2019-04-08 | End: 2019-04-10 | Stop reason: HOSPADM

## 2019-04-08 RX ORDER — MIDODRINE HYDROCHLORIDE 5 MG/1
5 TABLET ORAL SEE ADMIN INSTRUCTIONS
Status: DISCONTINUED | OUTPATIENT
Start: 2019-04-08 | End: 2019-04-08

## 2019-04-08 RX ADMIN — LANSOPRAZOLE 30 MG: 30 TABLET, ORALLY DISINTEGRATING, DELAYED RELEASE ORAL at 08:35

## 2019-04-08 RX ADMIN — APIXABAN 5 MG: 5 TABLET, FILM COATED ORAL at 21:11

## 2019-04-08 RX ADMIN — HEPARIN SODIUM 5000 UNITS: 5000 INJECTION INTRAVENOUS; SUBCUTANEOUS at 05:57

## 2019-04-08 RX ADMIN — INSULIN GLARGINE 10 UNITS: 100 INJECTION, SOLUTION SUBCUTANEOUS at 21:14

## 2019-04-08 RX ADMIN — MIDODRINE HYDROCHLORIDE 5 MG: 5 TABLET ORAL at 08:35

## 2019-04-08 RX ADMIN — Medication 10 ML: at 12:37

## 2019-04-08 RX ADMIN — FUROSEMIDE 80 MG: 40 TABLET ORAL at 08:35

## 2019-04-08 RX ADMIN — AMIODARONE HYDROCHLORIDE 200 MG: 200 TABLET ORAL at 08:35

## 2019-04-08 RX ADMIN — INSULIN LISPRO 2 UNITS: 100 INJECTION, SOLUTION INTRAVENOUS; SUBCUTANEOUS at 18:17

## 2019-04-08 RX ADMIN — Medication 10 ML: at 21:10

## 2019-04-08 RX ADMIN — Medication 100 MG: at 08:35

## 2019-04-08 RX ADMIN — AMIODARONE HYDROCHLORIDE 200 MG: 200 TABLET ORAL at 21:11

## 2019-04-08 RX ADMIN — Medication 10 ML: at 21:08

## 2019-04-08 RX ADMIN — MIDODRINE HYDROCHLORIDE 5 MG: 5 TABLET ORAL at 21:11

## 2019-04-08 RX ADMIN — CITALOPRAM 10 MG: 10 TABLET, FILM COATED ORAL at 08:35

## 2019-04-08 RX ADMIN — Medication 2 CAPSULE: at 08:35

## 2019-04-08 RX ADMIN — Medication 2 CAPSULE: at 18:08

## 2019-04-08 RX ADMIN — INSULIN LISPRO 1 UNITS: 100 INJECTION, SOLUTION INTRAVENOUS; SUBCUTANEOUS at 21:15

## 2019-04-08 RX ADMIN — INSULIN LISPRO 2 UNITS: 100 INJECTION, SOLUTION INTRAVENOUS; SUBCUTANEOUS at 13:14

## 2019-04-08 ASSESSMENT — PULMONARY FUNCTION TESTS
PIF_VALUE: 15
PIF_VALUE: 14

## 2019-04-08 ASSESSMENT — PAIN SCALES - GENERAL
PAINLEVEL_OUTOF10: 0

## 2019-04-08 NOTE — PROGRESS NOTES
Patient platelets returned at 86. Previous platelet reading at 808 but prior readings have platelets in the 72'R. Will assume 220 prior reading is inaccurate and turn off heparin low dose infusion for now until platelets rise above 100K. Adriana Barnett MD  Internal Medicine Resident, PGY-1  [facility];  [place], [place]  4/7/2019, 10:05 PM

## 2019-04-08 NOTE — PROGRESS NOTES
Nutrition Assessment    Type and Reason for Visit: Reassess    Nutrition Recommendations: Continue Carb Controlled diet. Encourage intake as tolerated. Will continue to monitor tolerance to diet and adequacy of intake. Send ONS if/as needed. Nutrition Assessment: Pt extubated 4/5/19. Pt sleeping in chair at this time. Lunch tray at bedside- ate 1/4 of meal. Noted pt ate 50-75% of breakfast today. Nutrition Risk Level: Moderate    Nutrition Needs:  · Estimated Daily Total Kcal: 6447-5319 kcal/day  · Estimated Daily Protein (g):  g pro/day    Nutrition Diagnosis:   · Problem: Inadequate oral intake  · Etiology: related to current medical condition   Signs and symptoms:  as evidenced by variable intake    Objective Information:  · Current Nutrition Therapies:  · Oral Diet Orders: Carb Control 4 Carbs/Meal   · Oral Diet intake: variable; <25-75% of meals  · Oral Nutrition Supplement (ONS) Orders: None  · Tube Feeding (TF) Orders: None- TF discontinued with extubation. · Anthropometric Measures:  · Ht: 5' 8\" (172.7 cm)   · Current Body Wt: 284 lb 6.3 oz (129 kg)  · Admission Body Wt: 292 lb (132.5 kg)  · Ideal Body Wt: 154 lb (69.9 kg), % Ideal Body 184%  · BMI Classification: BMI > or equal to 40.0 Obese Class III    Nutrition Interventions:   Continue current diet. Encourage intake as tolerated. ONS as needed.   Continued Inpatient Monitoring, Education Not Indicated    Nutrition Evaluation:   · Evaluation: Progressing toward goals   · Goals: Meet more than 75% of nutrition needs   · Monitoring: Meal Intake, Diet Tolerance, Weight, Pertinent Labs      Electronically signed by Devin Little RD, LD on 4/8/19 at 3:00 PM    Contact Number: [phone]

## 2019-04-08 NOTE — PROGRESS NOTES
Physical Therapy    Facility/Department: [address]  Re- Assessment    NAME: Micah Gosselin  : 1945  MRN: [id number]         Date of Service: 2019    Discharge Recommendations:  Further therapy recommended at discharge. Assessment   Body structures, Functions, Activity limitations: Decreased functional mobility ; Decreased endurance;Decreased ROM; Decreased strength;Decreased balance  Assessment: Pt independent for supine<>sit, MaxA+2 for stand to SaraStedy with increased cusing for postural corrections, unsuccessful at [facility] transfer from EOB to chair d/t decreased strength and endurance. Able to stand at [facility] for max of 30sec. Prognosis: Good  Decision Making: Medium Complexity  REQUIRES PT FOLLOW UP: Yes  Activity Tolerance  Activity Tolerance: Patient limited by endurance; Patient limited by fatigue       Patient Diagnosis(es): There were no encounter diagnoses. has a past medical history of Abnormal nuclear stress test, Arrhythmia, Atrial fibrillation ([facility] [address] CAD (coronary artery disease), DM type 2 (diabetes mellitus, type 2) ([facility] [address] and Hx of echocardiogram.   has a past surgical history that includes Coronary angioplasty (); Cardioversion (2007); and Cardioversion (). Restrictions  Restrictions/Precautions  Restrictions/Precautions: General Precautions, Isolation  Required Braces or Orthoses?: No  Position Activity Restriction  Other position/activity restrictions: Droplet iso  Vision/Hearing  Vision: Impaired  Vision Exceptions: Wears glasses at all times  Hearing: Within functional limits     Subjective  General  Chart Reviewed: Yes  Patient assessed for rehabilitation services?: Yes  Response To Previous Treatment: Patient with no complaints from previous session. Family / Caregiver Present: Yes(Wife)  Follows Commands: Within Functional Limits  Subjective  Subjective: Pt and RN agreeable to PT. Per, wife pt has not been up since the .  Pt extubated on 04/05. Pain Screening  Patient Currently in Pain: Denies  Vital Signs  Patient Currently in Pain: Denies       Orientation  Orientation  Overall Orientation Status: Within Functional Limits  Social/Functional History  Social/Functional History  Lives With: Spouse  Type of Home: House  Home Layout: Two level(bed/bath on first floor. )  Home Access: Stairs to enter with rails  Entrance Stairs - Number of Steps: 3  Entrance Stairs - Rails: Left  Bathroom Shower/Tub: Tub/Shower unit  Bathroom Toilet: Standard  Home Equipment: Standard walker, Cane(Has AD's but does not use them.)  ADL Assistance: Independent  Homemaking Assistance: Independent  Ambulation Assistance: Independent  Transfer Assistance: Independent  Active : Yes  Mode of Transportation: Car  Type of occupation: . Objective    PROM RLE (degrees)  RLE PROM: WFL  AROM RLE (degrees)  RLE AROM: Exceptions  RLE General AROM: Lacking 15deg full extension in sitting  PROM LLE (degrees)  LLE PROM: WFL  AROM LLE (degrees)  LLE AROM : Exceptions  LLE General AROM: Lacking 15deg full extension in sitting  PROM RUE (degrees)  RUE PROM: WFL  AROM RUE (degrees)  RUE AROM : Exceptions  RUE General AROM: ~45 deg flex/abd  AROM LUE (degrees)  LUE AROM : WFL  Strength RLE  Strength RLE: WFL  Strength LLE  Strength LLE: WFL  Strength RUE  Comment: Unable to hold test position for MMT, through observation with functional tasks pt is able to assist with holding himself up to sit EOB and hold on to [employer]  Strength LUE  Strength LUE: Exception  Comment: ~3+/5  Strength Other  Other: Bilateral LE's grossly 4-/5     Sensation  Overall Sensation Status: (Pt denies n/t. Able to detect light touch equal and bilat UE/LE.)  Bed mobility  Rolling to Left: Moderate assistance;2 Person assistance  Rolling to Right: Moderate assistance;2 Person assistance  Rolling assessed during bed mobility to assist RN with laying a new lift pad and change pt. Supine to Sit: Dependent/Total  Sit to Supine: Dependent/Total  Comment: Pt dependent for supine<>sit. During rolling, pt able to assist with roll and maintain himself using h/r. Transfers  Sit to Stand: Maximum Assistance;2 Person Assistance  Stand to sit: Maximum Assistance;2 Person Assistance  Comment: Pt able to complete sit<>stand to SaraStedy x4 in attempt to get from EOB to chair. pt unable to stand for longer than 30sec. Pt had BM which required RN assistance to clean while in standing. After 4x attempts to clean up patient, pt was unable to assist with standing to [facility] and pt reports he feels like his legs were not strong enough to hold him up. Pt required increased cueing for standing posture to assist with the stand, however was unable to stand straight enough to get support panels down on SaraStedy. Pt was returned to supine in bed per RN plans to lift pt to chair. Balance  Sitting - Static: Poor  Sitting - Dynamic: Poor  Standing - Static: Poor  Standing - Dynamic: Poor  Comments: Balance assessed sitting at EOB, and during stand to [facility]. Plan   Plan  Times per week: 5-6x/week  Current Treatment Recommendations: ROM, Strengthening, Endurance Training, Functional Mobility Training, Balance Training, Transfer Training, Safety Education & Training  Safety Devices  Type of devices: Call light within reach, Left in bed, Nurse notified(Nurse present)  Restraints  Initially in place:  Yes      AM-PAC Score  AM-PAC Inpatient Mobility Raw Score : 7  AM-PAC Inpatient T-Scale Score : 26.42  Mobility Inpatient CMS 0-100% Score: 92.36  Mobility Inpatient CMS G-Code Modifier : CM  AM-PAC Inpatient Mobility without Stair Climbing Raw Score : 5  AM-PAC Inpatient without Stair Climbing T-Scale Score : 23.59  Mobility Inpatient CMS 0-100% Score: 100  Mobility Inpatient without Stair CMS G-Code Modifier : CN       Goals  Short term goals  Time Frame for Short term goals: 15  Short term goal 1:

## 2019-04-08 NOTE — PLAN OF CARE
Problem: Risk for Impaired Skin Integrity  Goal: Tissue integrity - skin and mucous membranes  Description  Structural intactness and normal physiological function of skin and  mucous membranes.   4/7/2019 2249 by Harpal Sharpe RN  Outcome: Ongoing  4/7/2019 1329 by Roxanne Rivera RN  Outcome: Ongoing     Problem: Confusion - Acute:  Goal: Absence of continued neurological deterioration signs and symptoms  Description  Absence of continued neurological deterioration signs and symptoms  4/7/2019 2249 by Harpal Sharpe RN  Outcome: Ongoing  4/7/2019 1329 by Roxanne Rivera RN  Outcome: Ongoing  Goal: Mental status will be restored to baseline  Description  Mental status will be restored to baseline  4/7/2019 2249 by Harpal Sharpe RN  Outcome: Ongoing  4/7/2019 1329 by Roxanne Rivera RN  Outcome: Ongoing     Problem: Discharge Planning:  Goal: Ability to perform activities of daily living will improve  Description  Ability to perform activities of daily living will improve  4/7/2019 2249 by Harpal Sharpe RN  Outcome: Ongoing  4/7/2019 1329 by Roxanne Rivera RN  Outcome: Ongoing  Goal: Participates in care planning  Description  Participates in care planning  4/7/2019 2249 by Harpal Sharpe RN  Outcome: Ongoing  4/7/2019 1329 by Roxanne Rivera RN  Outcome: Ongoing     Problem: Injury - Risk of, Physical Injury:  Goal: Absence of physical injury  Description  Absence of physical injury  4/7/2019 2249 by Harpal Sharpe RN  Outcome: Ongoing  4/7/2019 1329 by Roxanne Rivera RN  Outcome: Ongoing  Goal: Will remain free from falls  Description  Will remain free from falls  4/7/2019 2249 by Harpal Sharpe RN  Outcome: Ongoing  4/7/2019 1329 by Roxanne Rivera RN  Outcome: Ongoing     Problem: Mood - Altered:  Goal: Mood stable  Description  Mood stable  4/7/2019 2249 by Harpal Sharpe RN  Outcome: Ongoing  4/7/2019 1329 by Roxanne Rivera RN  Outcome: Ongoing  Goal: Absence of abusive behavior  Description  Absence of abusive behavior  4/7/2019 2249 by Harpal Sharpe RN  Outcome: Ongoing  4/7/2019 1329 by Mel Rainey RN  Outcome: Ongoing  Goal: Verbalizations of feeling emotionally comfortable while being cared for will increase  Description  Verbalizations of feeling emotionally comfortable while being cared for will increase  4/7/2019 2249 by Walter Barber RN  Outcome: Ongoing  4/7/2019 1329 by Mel Rainey RN  Outcome: Ongoing     Problem: Psychomotor Activity - Altered:  Goal: Absence of psychomotor disturbance signs and symptoms  Description  Absence of psychomotor disturbance signs and symptoms  4/7/2019 2249 by Walter Barber RN  Outcome: Ongoing  4/7/2019 1329 by Mel Rainey RN  Outcome: Ongoing     Problem: Sensory Perception - Impaired:  Goal: Demonstrations of improved sensory functioning will increase  Description  Demonstrations of improved sensory functioning will increase  4/7/2019 2249 by Walter Barber RN  Outcome: Ongoing  4/7/2019 1329 by Mel Rainey RN  Outcome: Ongoing  Goal: Decrease in sensory misperception frequency  Description  Decrease in sensory misperception frequency  4/7/2019 2249 by Walter Barber RN  Outcome: Ongoing  4/7/2019 1329 by Mel Rainey RN  Outcome: Ongoing  Goal: Able to refrain from responding to false sensory perceptions  Description  Able to refrain from responding to false sensory perceptions  4/7/2019 2249 by Walter Barber RN  Outcome: Ongoing  4/7/2019 1329 by Mel Rainey RN  Outcome: Ongoing  Goal: Demonstrates accurate environmental perceptions  Description  Demonstrates accurate environmental perceptions  4/7/2019 2249 by Walter Barber RN  Outcome: Ongoing  4/7/2019 1329 by Mel Rainey RN  Outcome: Ongoing  Goal: Able to distinguish between reality-based and nonreality-based thinking  Description  Able to distinguish between reality-based and nonreality-based thinking  4/7/2019 2249 by Walter Barber RN  Outcome: Ongoing  4/7/2019 1329 by Mel Rainey RN  Outcome: Ongoing  Goal: Able to interrupt nonreality-based thinking  Description  Able to interrupt nonreality-based thinking  4/7/2019 2249 by Pete Perez RN  Outcome: Ongoing  4/7/2019 1329 by Svetlana Piedra RN  Outcome: Ongoing     Problem: Sleep Pattern Disturbance:  Goal: Appears well-rested  Description  Appears well-rested  4/7/2019 2249 by Pete Perez RN  Outcome: Ongoing  4/7/2019 1329 by Svetlana Piedra RN  Outcome: Ongoing     Problem: Nutrition  Goal: Optimal nutrition therapy  4/7/2019 2249 by Pete Perez RN  Outcome: Ongoing  4/7/2019 1329 by Svetlana Piedra RN  Outcome: Ongoing     Problem: OXYGENATION/RESPIRATORY FUNCTION  Goal: Patient will maintain patent airway  4/7/2019 2249 by Pete Perez RN  Outcome: Ongoing  4/7/2019 1329 by Svetlana Piedra RN  Outcome: Ongoing  Goal: Patient will achieve/maintain normal respiratory rate/effort  Description  Respiratory rate and effort will be within normal limits for the patient  4/7/2019 2249 by Pete Perez RN  Outcome: Ongoing  4/7/2019 1329 by Svetlana Piedra RN  Outcome: Ongoing     Problem: SKIN INTEGRITY  Goal: Skin integrity is maintained or improved  4/7/2019 2249 by Pete Perez RN  Outcome: Ongoing  4/7/2019 1329 by Svetlana Piedra RN  Outcome: Ongoing     Problem: Musculor/Skeletal Functional Status  Goal: Highest potential functional level  4/7/2019 2249 by Pete Perez RN  Outcome: Ongoing  4/7/2019 1329 by Svetlana Piedra RN  Outcome: Ongoing     Problem: Airway Clearance - Ineffective:  Goal: Ability to maintain a clear airway will improve  Description  Ability to maintain a clear airway will improve  4/7/2019 2249 by Pete Perez RN  Outcome: Ongoing  4/7/2019 1329 by Svetlana Piedra RN  Outcome: Ongoing     Problem: Aspiration:  Goal: Absence of aspiration  Description  Absence of aspiration  4/7/2019 2249 by Pete Perez RN  Outcome: Ongoing  4/7/2019 1329 by Svetlana Piedra RN  Outcome: Ongoing     Problem: Serum Glucose Level - Abnormal:  Goal: Ability to maintain appropriate glucose levels will improve to within specified parameters  Description  Ability to maintain appropriate glucose levels will improve to within specified parameters  4/7/2019 2249 by Dorinda Diez RN  Outcome: Ongoing  4/7/2019 1329 by Sirisha Rowe RN  Outcome: Ongoing     Problem: Pain:  Goal: Pain level will decrease  Description  Pain level will decrease  4/7/2019 2249 by Dorinda Diez RN  Outcome: Ongoing  4/7/2019 1329 by Sirisha Rowe RN  Outcome: Ongoing  Goal: Control of acute pain  Description  Control of acute pain  4/7/2019 2249 by Dorinda Diez RN  Outcome: Ongoing  4/7/2019 1329 by Sirisha Rowe RN  Outcome: Ongoing  Goal: Control of chronic pain  Description  Control of chronic pain  4/7/2019 2249 by Dorinda Diez RN  Outcome: Ongoing  4/7/2019 1329 by Sirisha Rowe RN  Outcome: Ongoing

## 2019-04-08 NOTE — PLAN OF CARE
ICU Team Daily Plan of Care     NEURO    Pain Score:  Pain Level: 0    Pain Medication Indicated ? no    Transition to PO ? yes   Agitation/Sedation      RASS Goal: 0 to -2 Current RASS 0 (Alert and Calm)   Sedation Awakening Trial (SAT) Indicated ?  not applicable   Delirium (CAM-ICU) negative    Non-pharmacologic therapies reviewed ? yes    Sleep enhancement needed ? not applicable    Pharmacological Rx indicated ? yes    Restains needed ? not applicable   CARDS     Vasoactive Agents ? not applicable    MAP Goal: >65 mmHg     Current IV fluid rate I.V.: 37 mL     Can IV fluids be discontinued ? yes    Additional IV access needed ? not applicable    Cardiac Meds reviewed ? yes    Echocardiogram reviewed ? no   RESP    Mechanically Ventilated ?   not applicable   Vent Information  $Ventilation: $Subsequent Day  Ventilator Started: Yes  Ventilator Stopped: Yes  Skin Assessment: Clean, dry, & intact  Equipment ID: [id number] #[id number]  Equipment Changed: HME  Vent Type: Servo i  Vent Mode: NIV/PC  Vt Ordered: 550 mL  Pressure Ordered: 7  Rate Set: 10 bmp  Pressure Support: 6 cmH20  FiO2 : 40 %  Sensitivity: 3  PEEP/CPAP: 5  I Time/ I Time %: 0.9 s  Cuff Pressure (cm H2O): 25 cm H2O  Humidification Source: HME  Humidification Temp: 31  Humidification Temp Measured: 31  Circuit Condensation: Drained  Nitric Oxide/Epoprostenol In Use?: No     Lung Protective Ventilation ? not applicable    Vent Bundle (Oral care, HOB >30, Subglottic suction) reviewed ?  not applicable   Spontaneous breathing trial (SBT) indicated ? not applicable   Coordinated SAT & SBT ? not applicable   CXR/CT reviewed ? not applicable   RENAL    Urine Output: adequate  Output  Urine: 625 mL Urine: 625 mL     Intake/Output Summary (Last 24 hours) at 4/8/2019 1017  Last data filed at 4/8/2019 0600  Gross per 24 hour   Intake 313.9 ml   Output 1620 ml   Net -1306.1 ml        Fluid Balance Goal  positive    Renal panel/BMP reviewed?   yes  Lytes replaced ? yes    Renal replacement therapy (HD/CRRT) ? yes   HEME/ONC     CBC/Coagulation profile reviewed ? yes    Require transfusion ?  not applicable   DVT prophylaxis eliquis   GI    Nutrition Plan:   continue current nutrition therapy    Current TF rate at Goal ? not applicable    TPN indicated ? not applicable    Bowel function/regimen required ?   not applicable   GI prophylaxis ? yes proton pump inhibitor per orders   ENDOCRINE     Adequate glycemic control ? yes    Insulin gtt indicated ? not applicable    Stress dose steroids indicated ? not applicable    Taper steroids? not applicable   ID     Antibiotics ? not applicable    Positive Cx ? no    Source Control ? not applicable    De-esclation plan ?  yes   DERM     Skin integrity/Wound care reviewed ? yes    WOC  nurse needed? no   EARLY MOBILIZATION PLAN     Lines, Tubes, Drains needed ?  yes    PT/OT/Speech Therapy PT and OT   DISPOSITION/CODE STATUS/GOAL OF CARE     Disposition/Code Status/Goals of care Med surg

## 2019-04-08 NOTE — PROGRESS NOTES
INTENSIVE CARE UNIT  Resident Physician Progress Note    Patient - Travis Bears  Date of Admission -  3/19/2019  7:58 AM  Date of Evaluation -  2019  Room and Bed Number -  0105/0105-01   Hospital Day -       SUBJECTIVE:     OVERNIGHT EVENTS:   No acute events overnight. Per nursing staff, patient is still requiring redirection to wear BiPAP at night.  Patient without concerns this AM.      TODAY:      AWAKE & FOLLOWING COMMANDS:  [] No   [x] Yes    SECRETIONS Amount:  [x] Small [] Moderate  [] Large  [] None  Color:     [x] White [] Colored  [] Bloody    SEDATION:  RAAS Score:  [] Propofol gtt  [] Versed gtt  [] Ativan gtt   [x] No Sedation    PARALYZED:  [] No    [] Yes    VASOPRESSORS:  [x] No    [] Yes  [] Levophed [] Dopamine [] Vasopressin  [] Dobutamine [] Phenylephrine [] Epinephrine      OBJECTIVE:     VITAL SIGNS:  BP (!) 126/57   Pulse 72   Temp 97.9 °F (36.6 °C)   Resp 15   Ht 5' 8\" (1.727 m)   Wt 279 lb 5.2 oz (126.7 kg)   SpO2 99%   BMI 42.47 kg/m²   Tmax over 24 hours:  Temp (24hrs), Av.8 °F (36.6 °C), Min:97 °F (36.1 °C), Max:98.1 °F (36.7 °C)      Patient Vitals for the past 8 hrs:   BP Temp Pulse Resp SpO2   19 0600 (!) 126/57 -- 72 15 99 %   19 0500 (!) 111/56 -- 67 22 99 %   19 0400 (!) 75/33 97.9 °F (36.6 °C) 68 24 92 %   19 0334 -- -- 75 21 99 %   19 0300 106/62 -- 68 21 98 %   19 0200 (!) 99/56 -- 69 22 99 %   19 0100 (!) 95/53 -- 71 24 99 %   19 0000 (!) 94/51 98.1 °F (36.7 °C) 68 24 97 %         Intake/Output Summary (Last 24 hours) at 2019 0739  Last data filed at 2019 0600  Gross per 24 hour   Intake 323.9 ml   Output 1890 ml   Net -1566.1 ml     Date 19 0000 - 19 2359   Shift 8491-6203 8335-8982 3261-3870 24 Hour Total   INTAKE   I.V.(mL/kg) 96(0.8)   96(0.8)   Shift Total(mL/kg) 96(0.8)   96(0.8)   OUTPUT   Urine(mL/kg/hr) 460   460   Shift Total(mL/kg) 460(3.6)   460(3.6)   Weight (kg) 126.7 126.7 126.7 flush 10 mL PRN   sodium chloride 250 mL PRN   sodium chloride 150 mL PRN   fentanNYL 50 mcg Q1H PRN   Or     fentanNYL 100 mcg Q1H PRN   acetaminophen 650 mg Q4H PRN   magnesium hydroxide 30 mL Daily PRN   albuterol 2.5 mg Q6H PRN   LORazepam 0.5 mg Q8H PRN   sodium chloride flush 10 mL PRN   ondansetron 4 mg Q6H PRN   glucose 15 g PRN   dextrose 12.5 g PRN   glucagon (rDNA) 1 mg PRN   dextrose 100 mL/hr PRN       SUPPORT DEVICES: [] Ventilator [] BIPAP  [x] Nasal Cannula [] Room Air    VENT SETTINGS (Comprehensive) (if applicable):    Vent Information  $Ventilation: $Subsequent Day  Ventilator Started: Yes  Ventilator Stopped: Yes  Skin Assessment: Clean, dry, & intact  Equipment ID: [id number] #[id number]  Equipment Changed: HME  Vent Type: Servo i  Vent Mode: NIV/PC  Vt Ordered: 550 mL  Pressure Ordered: 7  Rate Set: 10 bmp  Pressure Support: 6 cmH20  FiO2 : 40 %  Sensitivity: 3  PEEP/CPAP: 5  I Time/ I Time %: 0.9 s  Cuff Pressure (cm H2O): 25 cm H2O  Humidification Source: HME  Humidification Temp: 31  Humidification Temp Measured: 31  Circuit Condensation: Drained  Nitric Oxide/Epoprostenol In Use?: No  Additional Respiratory  Assessments  Pulse: 72  Resp: 15  SpO2: 99 %  End Tidal CO2: 35 (%)  pCO2 (TCOM, mmHg): 63 mmHg  Position: Semi-Lyn's  Humidification Source: HME  Humidification Temp: 31  Circuit Condensation: Drained  Oral Care Completed?: Yes  Oral Care: Suction toothette, Mouth suctioned, Mouth swabbed, Mouthwash  Subglottic Suction Done?: Yes  Cuff Pressure (cm H2O): 25 cm H2O  Skin barrier applied: Yes    ABGs:     Lab Results   Component Value Date    PHART 7.310 03/18/2019    NVK1HXR 51.7 03/18/2019    PO2ART 67.8 03/18/2019    FAU8XFB 25.4 03/18/2019    VAT6TKE 26 04/05/2019    Y5UBDIMT 91.7 03/18/2019    FIO2 30.0 04/05/2019         DATA:  Complete Blood Count: Recent Labs     04/06/19  0447 04/07/19  0447 04/07/19  1009 04/07/19 2023 04/07/19  2329 04/08/19  0448   WBC 13.0* 16.0*  --   --   --  12.5* RBC 2.88* 2.85*  --   --   --  2.68*   HGB 8.8* 9.0*  --  8.6* 8.4* 8.5*   HCT 29.9* 29.3*  --  28.0* 27.4* 27.7*   .8* 102.8  --   --   --  103.4*   MCH 30.6 31.6  --   --   --  31.7   MCHC 29.4 30.7  --   --   --  30.7   RDW 19.2* 19.3*  --   --   --  19.3*   PLT 86* See Reflexed IPF Result 220 86*  --  89*   MPV 10.8 NOT REPORTED  --   --   --  10.5        Last 3 Blood Glucose:   Recent Labs     04/06/19  0710 04/07/19 0447 04/08/19 0448   GLUCOSE 114* 111* 135*        PT/INR:    Lab Results   Component Value Date    PROTIME 12.5 03/29/2019    INR 1.2 03/29/2019     PTT:    Lab Results   Component Value Date    APTT 29.8 04/07/2019       Comprehensive Metabolic Profile:   Recent Labs     04/06/19  0710 04/07/19 0447 04/08/19 0448    135 137   K 3.2* 3.4* 3.7    99 103   CO2 23 24 23   BUN 81* 73* 74*   CREATININE 1.71* 1.67* 1.62*   GLUCOSE 114* 111* 135*   CALCIUM 8.0* 7.8* 7.9*      Magnesium:   Lab Results   Component Value Date    MG 2.0 04/08/2019    MG 1.9 04/07/2019    MG 2.2 04/06/2019     Phosphorus:   Lab Results   Component Value Date    PHOS 3.5 04/08/2019    PHOS 3.1 04/07/2019    PHOS 3.4 04/06/2019     Ionized Calcium:   Lab Results   Component Value Date    CAION 1.13 04/08/2019    CAION 1.17 04/07/2019    CAION 1.11 04/06/2019        Urinalysis: Lab Results   Component Value Date    NITRU NEGATIVE 03/28/2019    COLORU DARK YELLOW 03/28/2019    PHUR 5.0 03/28/2019    WBCUA 5 TO 10 03/28/2019    RBCUA 5 TO 10 03/28/2019    MUCUS NOT REPORTED 03/28/2019    TRICHOMONAS NOT REPORTED 03/28/2019    YEAST NOT REPORTED 03/28/2019    BACTERIA NOT REPORTED 03/28/2019    SPECGRAV 1.024 03/28/2019    LEUKOCYTESUR TRACE 03/28/2019    UROBILINOGEN Normal 03/28/2019    BILIRUBINUR NEGATIVE  Verified by ictotest. 03/28/2019    GLUCOSEU TRACE 03/28/2019    KETUA TRACE 03/28/2019    AMORPHOUS NOT REPORTED 03/28/2019       HgBA1c:    Lab Results   Component Value Date    LABA1C 8.6 03/18/2019     TSH:  No results found for: TSH  Lactic Acid:   Lab Results   Component Value Date    LACTA 2.2 03/18/2019    LACTA 2.6 03/18/2019      Troponin: No results for input(s): TROPONINI in the last 72 hours. ASSESSMENT:     Patient Active Problem List    Diagnosis Date Noted    [facility]     Transaminitis     Ischemic hepatitis     IVIS (obstructive sleep apnea)     Leukocytosis     Hemorrhagic shock (HCC)     Retroperitoneal hematoma     Lactic acidosis     Hyperglycemia     Acute blood loss anemia     Community acquired pneumonia of left lower lobe of lung ([facility]     Influenza A with respiratory manifestations 03/18/2019    Acute respiratory failure with hypoxia ([facility] 03/18/2019    Influenza A 03/18/2019    Acute hypercapnic respiratory failure ([facility] 03/18/2019    NSTEMI (non-ST elevated myocardial infarction) ([facility]     Type 2 diabetes mellitus without complication, without long-term current use of insulin ([facility] 09/06/2016    Adult BMI > 30 05/06/2016    CAD (coronary artery disease)     DM type 2 (diabetes mellitus, type 2) ([facility]     Atrial fibrillation with rapid ventricular response ([facility] 01/01/2007          PLAN:     WEAN PER PROTOCOL:  [] No   [x] Yes  [] N/A    ICU PROPHYLAXIS:  Stress ulcer:  [x] PPI Agent  [] U4Tdamj [] Sucralfate  [] Other:  VTE:   [] Enoxaparin  [x] Unfract. Heparin Subcut  [x] EPC Cuffs    NUTRITION:  [] NPO [] Tube Feeding (Specify: ) [] TPN  [x] PO    HOME MEDS RECONCILED: [] No  [x] Yes    CONSULTATION NEEDED:  [x] No  [] Yes    FAMILY UPDATED:    [] No  [] Yes    TRANSFER OUT OF ICU:   [] No  [x] Yes        Additional Assessment:   Influenza A  Community Acquired PNA (likely Strep Pneumonia)  A fib with RVR  Acute CHF Exacerbation  Acute hypoxic hypercapnic respiratory failure 2/2 pulmonary edema  Diabetes Type II  IVIS        Plan:  Hemorrhagic shock 2/2 retroperitoneal bleed - improved, off pressors, hemoglobin stable 7. Vascular signed off. Currently no active intervention recommended. Vascular surgery okay for Heparin drip. Will start when platelets reach >[phone].     Acute Respiratory failure 2/2 Inf A and PNA. Extubated 4/5 after HD. On nasal canula, tolerating well. Breathing treatment with Duoneb. Last dose of Rocephin 2 g on 4/3. ID following. BiPAP at night, NC during the day.      Acute on chronic systolic CHF - EF 88% 3/75/7431 - On lasix 80 mg PO daily. Follow up with nephrology for further plans for dialysis, monitor I&O's.     Chronic A. fib with RVR, controlled. Continue amiodarone 200 mg twice a day, Lopressor 25 mg twice a day. Cardiology following. On Heparin TID dvt ppx.     ERNESTO 2/2 ischemic ATN. Nephro held hemodialysis yesterday, f/u there plan if further needed. Urine output 0.8 ml/kg/hr.     Type 2 diabetes mellitus - blood glucose improved. Lantus to 10 units nightly with medium dose sliding scale, point-of-care glucose checks every 4 hours     Thrombocytopenia -On Heparin TID dvt ppx.hit antibody negative. Continue to monitor.             Anjali Strong MD  Internal Medicine PGY2  4/8/2019 7:39 AM

## 2019-04-08 NOTE — PROGRESS NOTES
[facility] -Progress Note    Today's Date and Time: 4/8/2019, 6:59 AM    Impression :   1. Acute Hypoxic Respiratory failure  2. Influenza A  3. Possible secondary pneumonia with normal mansoor  4. Atrial Fibrillation  5. DM II  6. CHF  7. Renal insufficiency  8. Leukocytosis secondary to hemorrhage, steroids  9. Retroperitoneal bleed    Recommendations:   · Monitor off antibiotics  · Ceftriaxone completed on 4/3  · Continue respiratory toilet. Medical Decision Making/Summary/Discussion:   · Patient with Hx chronic smoking  · Admitted with acute hypoxic respiratory failure associated with Influenza   · Has pulmonary vascular congestion and pleural effusions  · Has CHF and renal insufficiency  · Has completed oseltamivir  · Will discontinue treatment for possible secondary pneumonia with ceftriaxone as of 4-3-19  · On hemodialysis, management per nephrology  · Extubation performed successfully on 4/5  Infection Control Recommendations     · Droplet Isolation    Antimicrobial Stewardship Recommendations     · Targeted therapy  · PK dosing  Coordination of Outpatient Care:   · Estimated Length of IV antimicrobials:TBD  · Patient will need Midline Catheter Insertionno:   · Patient will need PICC line Insertion:no   · Patient will need: Home IV , [place],  SNF,  LTAC: TBD  · Patient will need outpatient wound care:No    Chief complaint/reason for consultation:   · Leukocytosis      History of Present Illness:   Soraya Almaguer is a 69y.o.-year-old  male who was initially admitted on 3/19/2019. Patient seen at the request of Kenna Boudreaux. INITIAL HISTORY:    ID has been consulted for evaluation of leukocytosis. The patient was transferred to [address] [place] [address] on 3/19/19 from an outlying facility for worsening respiratory status, Influenza A and elevated troponin. PMH significant for CAD,DM 2, A fib and chronic smoking.     [facility] for sob chills, cough, sputum production, or shortness of breath. Platelets dropped to 89. Heparin gtt stopped per critical care. Hit work up not started. 80 mg Lasix IV per day per nephrology started. Afebrile last 24 hours  VSS, systolic remains in the 115'U    Off ventilator support. Off levophed  On amiodarone drip  Off sedation     Moves all extremities on his own  Opens eyes. Communicates with family    HD management per nephrology for ATN/ERNESTO. Labs Reviewed: 4/8/2019  WBC: 20.1 > 27.1 > 45.3-->19.7 -> 16.7 -> 12.9 -> 13 -> 12.5  Hgb: 9.0 > 8.2 > 8.6-->8.0 -> 8.2 -> 8.8 -> 8.5  Lactic acid: 7.4 > 5.5 > 2.6 > 2.4 ->     BUN: 68 > 84 > 87-->148-->124 -> 71 -> 81 -> 74  Cr: 1.83 > 3.21 > 3.74-->4.4-->3.06 -> 1.78 -> 1.71 -> 1.62      Cultures:  Urine:   · 3/28/19: No growth  · 3/15/19: No growth    Blood:   · 3/28/19: No growth    Sputum :  ·  3/28/19: No growth  · Gram positive cocci in clusters 3/22/19. Normal mansoor    Rapid flu positive for influenza A (3/15/19)    Discussed with patient, RN. I have personally reviewed the past medical history, past surgical history, medications, social history, and family history, and I have updated the database accordingly. Past Medical History:     Past Medical History:   Diagnosis Date    Abnormal nuclear stress test 03/12/2010    Stress and resting cardiolite images showed inferior wall hypoperfusion suggestive of previous myocardial infarction. Left ventricular wall motion showed inferior wall hypokinesia. EF 58%.  Arrhythmia 2007    A single episode    Atrial fibrillation ([facility] 2007    Single Episode    CAD (coronary artery disease) 1996    MI    DM type 2 (diabetes mellitus, type 2) ([facility] 2005    Hx of echocardiogram 04/05/2007    EF 62%, Increased left atrial and right ventricular diametes. Increased septal and posterior wall thickness suggest left ventricular hypertrophy.  Valves were normal.        Past Surgical  History:     Past Surgical Not on file     Attends meetings of clubs or organizations: Not on file     Relationship status: Not on file    Intimate partner violence:     Fear of current or ex partner: Not on file     Emotionally abused: Not on file     Physically abused: Not on file     Forced sexual activity: Not on file   Other Topics Concern    Not on file   Social History Narrative    Not on file       Family History:   No family history on file. Allergies:   Patient has no known allergies. Review of Systems:   Unable to perform ROS. Pt intubated and sedated. Physical Examination :     Patient Vitals for the past 8 hrs:   BP Temp Pulse Resp SpO2   04/08/19 0600 (!) 126/57 -- 72 15 99 %   04/08/19 0500 (!) 111/56 -- 67 22 99 %   04/08/19 0400 (!) 75/33 97.9 °F (36.6 °C) 68 24 92 %   04/08/19 0334 -- -- 75 21 99 %   04/08/19 0300 106/62 -- 68 21 98 %   04/08/19 0200 (!) 99/56 -- 69 22 99 %   04/08/19 0100 (!) 95/53 -- 71 24 99 %   04/08/19 0000 (!) 94/51 98.1 °F (36.7 °C) 68 24 97 %   04/07/19 2300 105/60 -- 73 23 99 %     General Appearance: Extubated. Head:  Normocephalic, no trauma  ENT: Intubated. Mouth/throat: mucosa pink and moist.  Neck: Supple, without lymphadenopathy. Pulmonary/Chest: Coarse sounds  Cardiovascular: Irregular rate and rhythm without murmurs, rubs, or gallops. Abdomen: Soft, distended. Bowel sounds normal.  All four Extremities: No cyanosis, clubbing, edema, or effusions. Neurologic: Awake, responsive  Skin: Warm and dry with good turgor. No signs of peripheral arterial or venous insufficiency. No ulcerations. No open wounds.     Medical Decision Making -Laboratory:   I have independently reviewed/ordered the following labs:    CBC with Differential:   Recent Labs     04/07/19 0447 04/07/19 2023 04/07/19 2329 04/08/19 0448   WBC 16.0*  --   --   --  12.5*   HGB 9.0*  --  8.6* 8.4* 8.5*   HCT 29.3*  --  28.0* 27.4* 27.7*   PLT See Reflexed IPF Result   < > 86*  --  89*   LYMPHOPCT 6*  --   -- --  8*   MONOPCT 8*  --   --   --  9    < > = values in this interval not displayed. BMP:   Recent Labs     04/07/19  0447 04/08/19  0448    137   K 3.4* 3.7   CL 99 103   CO2 24 23   BUN 73* 74*   CREATININE 1.67* 1.62*   MG 1.9 2.0     Hepatic Function Panel:   No results for input(s): PROT, LABALBU, BILIDIR, IBILI, BILITOT, ALKPHOS, ALT, AST in the last 72 hours. No results for input(s): RPR in the last 72 hours. No results for input(s): HIV in the last 72 hours. No results for input(s): BC in the last 72 hours. Lab Results   Component Value Date    MUCUS NOT REPORTED 03/28/2019    RBC 2.68 04/08/2019    TRICHOMONAS NOT REPORTED 03/28/2019    WBC 12.5 04/08/2019    YEAST NOT REPORTED 03/28/2019    TURBIDITY TURBID 03/28/2019     Lab Results   Component Value Date    CREATININE 1.62 04/08/2019    GLUCOSE 135 04/08/2019    GLUCOSE 104 11/26/2018       Medical Decision Making-Imaging:     Narrative   EXAMINATION:   SINGLE XRAY VIEW OF THE CHEST       3/29/2019 5:54 am       COMPARISON:   March 28, 2019, March 27, 2019       HISTORY:   ORDERING SYSTEM PROVIDED HISTORY: intubated   TECHNOLOGIST PROVIDED HISTORY:   intubated       FINDINGS:   Endotracheal tube terminates at the level of the clavicular heads.  The   enteric tube courses off the field of view in the upper abdomen.  The right   internal jugular line terminates at the distal SVC.  Shallow inflation. Cardiac silhouette enlargement again noted.  Perihilar and basilar opacities   are again noted without significant change.  No new airspace disease or   pneumothorax identified.           Impression   Unchanged appearance of support tubes and lines.       Perihilar and basilar opacities are without appreciable change which may   represent subsegmental atelectasis versus developing consolidation.             Medical Decision Making-Other: Thank you for allowing us to participate in the care of this patient.  Please call with questions. Merritt Hernandez [place]     ATTESTATION:    I have discussed the case, including pertinent history and exam findings with the residents. I have seen and examined the patient and the key elements of the encounter have been performed by me. I have reviewed the laboratory data, other diagnostic studies and discussed them with the residents. I have updated the medical record where necessary. I agree with the assessment, plan and orders as documented by the resident.     Drew Holt MD.

## 2019-04-08 NOTE — PROGRESS NOTES
NEPHROLOGY PROGRESS NOTE      SUBJECTIVE     Last hemodialysis couple of days back. Renal function is improving. Decent amount of diuresis on oral Lasix. Blood pressure fluctuating. Tolerating oral intake well. Hemoglobin is stable. OBJECTIVE     Vitals:    04/08/19 0800 04/08/19 0900 04/08/19 1000 04/08/19 1100   BP: 115/65 (!) 122/57 (!) 121/48 (!) 99/56   Pulse: 79 76 73 65   Resp: 28 23 21 22   Temp:       TempSrc:       SpO2: 99% 100% 99% 97%   Weight:       Height:         24HR INTAKE/OUTPUT:      Intake/Output Summary (Last 24 hours) at 4/8/2019 1157  Last data filed at 4/8/2019 0800  Gross per 24 hour   Intake 313.9 ml   Output 2120 ml   Net -1806.1 ml       General appearance:Awake, alert, in no acute distress  HEENT: PERRLA  Respiratory::vesicular breath sounds,no wheeze/crackles  Cardiovascular:S1 S2 normal,no gallop or organic murmur. Abdomen:Non tender/non distended. Bowel sounds present  Extremities: No Cyanosis or Clubbing, present Lower extremity edema  Neurological:Alert and oriented. No abnormalities of mood, affect, memory, mentation, or behavior are noted      MEDICATIONS     Scheduled Meds:    midodrine  5 mg Oral See Admin Instructions    apixaban  2.5 mg Oral BID    insulin glargine  10 Units Subcutaneous Nightly    insulin lispro  0-12 Units Subcutaneous TID WC    insulin lispro  0-6 Units Subcutaneous Nightly    lactobacillus  2 capsule Oral BID WC    furosemide  80 mg Per NG tube Daily    sodium chloride flush  10 mL Intravenous 2 times per day    amiodarone  200 mg Oral BID    metoprolol tartrate  25 mg Oral BID    citalopram  10 mg Oral Daily    docusate  100 mg Oral Daily    sodium chloride flush  10 mL Intravenous 2 times per day     Continuous Infusions:    sodium chloride 10 mL/hr at 04/07/19 0941    dextrose       PRN Meds:  sodium chloride, sodium chloride, sodium chloride flush, sodium chloride, sodium chloride, fentanNYL **OR** fentanNYL, acetaminophen, magnesium hydroxide, albuterol, LORazepam, sodium chloride flush, ondansetron, glucose, dextrose, glucagon (rDNA), dextrose  Home Meds:                Medications Prior to Admission: insulin glargine (LANTUS) 100 UNIT/ML injection vial, Inject 15 Units into the skin nightly  FLUAD 0.5 ML OLIVIA, inject 0.5 milliliter intramuscularly  PRADAXA 150 MG capsule, TAKE 1 CAPSULE TWICE DAILY  saxagliptin (ONGLYZA) 5 MG TABS tablet, Take 5 mg by mouth daily  metFORMIN (GLUCOPHAGE) 1000 MG tablet, TAKE 1 TABLET TWICE A DAY  glyBURIDE (DIABETA) 5 MG tablet, TAKE 2 TABLETS TWICE A DAY  atorvastatin (LIPITOR) 80 MG tablet, TAKE 1 TABLET DAILY  citalopram (CELEXA) 10 MG tablet, TAKE 1 TABLET DAILY  diltiazem (TIAZAC) 120 MG extended release capsule, TAKE 1 CAPSULE DAILY  propranolol (INDERAL) 10 MG tablet, 1 tablet daily as needed for tremor  metoprolol (LOPRESSOR) 25 MG tablet, TAKE 1 TABLET TWICE A DAY    INVESTIGATIONS     Last 3 CMP:    Recent Labs     04/06/19  0710 04/07/19 0447 04/08/19 0448    135 137   K 3.2* 3.4* 3.7    99 103   CO2 23 24 23   BUN 81* 73* 74*   CREATININE 1.71* 1.67* 1.62*   CALCIUM 8.0* 7.8* 7.9*       Last 3 CBC:  Recent Labs     04/06/19 0447 04/07/19 0447 04/07/19 2023 04/07/19  2329 04/08/19 0448 04/08/19  0753   WBC 13.0* 16.0*  --   --   --  12.5*  --    RBC 2.88* 2.85*  --   --   --  2.68*  --    HGB 8.8* 9.0*  --  8.6* 8.4* 8.5*  --    HCT 29.9* 29.3*  --  28.0* 27.4* 27.7*  --    .8* 102.8  --   --   --  103.4*  --    MCH 30.6 31.6  --   --   --  31.7  --    MCHC 29.4 30.7  --   --   --  30.7  --    RDW 19.2* 19.3*  --   --   --  19.3*  --    PLT 86* See Reflexed IPF Result   < > 86*  --  89* 93*   MPV 10.8 NOT REPORTED  --   --   --  10.5  --     < > = values in this interval not displayed. ASSESSMENT     1.   Acute kidney injury is secondary to ischemic ATN (hemorrhagic shock/influenza a infection) - Had HD for few days and now recovering  Baseline creatinine normal  2. Spontaneous left retroperitoneal bleed  3. Influenza A  4. Atrial fibrillation persistent with echo revealing ejection fraction 40%  5. Anemia    PLAN     1. Continue oral diuretics  2. Discontinue Pankaj catheter  3. Avoid nephrotoxins  4.   Will follow    Please do not hesitate to call with questions    This note is created with the assistance of a speech-recognition program. While intending to generate a document that actually reflects the content of the visit, no guarantees can be provided that every mistake has been identified and corrected by editing    Toan Stephens MD, MRCP [place]), [address]   4/8/2019 11:57 AM  [facility]

## 2019-04-08 NOTE — PROGRESS NOTES
not displayed. BMP:    Recent Labs     04/06/19  0710 04/07/19  0447 04/08/19  0448    135 137   K 3.2* 3.4* 3.7    99 103   CO2 23 24 23   BUN 81* 73* 74*   CREATININE 1.71* 1.67* 1.62*   GLUCOSE 114* 111* 135*     Hepatic:   No results for input(s): AST, ALT, ALB, BILITOT, ALKPHOS in the last 72 hours. Objective:   Vitals: /65   Pulse 79   Temp 97.9 °F (36.6 °C) (Oral)   Resp 28   Ht 5' 8\" (1.727 m)   Wt 284 lb 6.3 oz (129 kg)   SpO2 99%   BMI 43.24 kg/m²     General appearance: Awake, alert and oriented. HEENT: Head: Normocephalic, no lesions, without obvious abnormality  Neck: no JVD  Lungs: Diminished air entry at bases, +ve basilar rales, no wheezing   Heart: Irregularly irregular heart rate, rate controlled. No murmur, click, rub or gallop  Abdomen: soft, non-tender; bowel sounds normal  Extremities: No LE edema  Neurologic: Mental status: Alert, oriented. No gross motor or sensory abnormality       Cardiac testing    ECHO (3/18/19):  Poor image quality, likely due to patient body habitus and/or lung disease. Global left ventricular systolic function appears moderately reduced with an  estimated ejection fraction of 40%. The left ventricular cavity size is mildly enlarged and the left ventricular  wall thickness is mildly increased. Thinning and hypokinesis of the distal half on the interventricular septum. Mild mitral and tricuspid regurgitation. Aortic valve sclerosis with no significant aortic stenosis. Can not rule out  bicuspid aortic valve. Diastolic function cannot be assessed due to atrial fibrillation. Assessment    1. Atrial fibrillation, persistent, controlled vent response    2. Influenza A with PNA  3. Acute hypoxic resp failure on vent   4. Left large Retroperitoneal bleed on 3/28/19  5. Hemorrhagic shock requiring multiple pressors, now resolved   6. Atrial flutter/fib with RVR  7. ARF likely secondary to ATN   8.  Mild systolic cardiomyopathy (LVEF ~ 40%)  9. Type II MI  10. ESRD newly on HD      Plan:   1. Continue PO Lopressor 25 mg BID,  amiodarone to  mg BID   2. Recommend to restart heparin. W/h by critical care for low platelets. Restart when appropriate  3. Diuretic management as per nephro  4. Limited Echo to eval LVEF    D/w nurse and patient    Thank you for allowing me to participate in the care of this patient, please do not hesitate to call if you have any questions. Meme Mcpherson MD  PGY-1 Internal Medicine Resident  [address], [place]  4/8/2019 10:06 AM      I performed a history and physical examination of the patient and discussed management with the resident. I reviewed the residents note and agree with the documented findings and plan of care. Any areas of disagreement are noted on the chart. I was personally present for the key portions of any procedures. I have documented in the chart those procedures where I was not present during the key portions. I have personally evaluated this patient and have completed at least one if not all key elements of the E/M (history, physical exam, and MDM). Additional findings are as noted. Extubated on 4/5/19. Afib rate controlled. Participating in PT/OT. Obtain limited TTE as planned. In my opinion stress test can be done as outpatient. Anticoagulation with eliquis 5mg po BID when ok with primary team. continue amiodarone and lopressor.     Erasmo Morton MD

## 2019-04-09 ENCOUNTER — APPOINTMENT (OUTPATIENT)
Dept: ULTRASOUND IMAGING | Age: 74
DRG: 207 | End: 2019-04-09
Attending: INTERNAL MEDICINE
Payer: MEDICARE

## 2019-04-09 LAB
-: NORMAL
ABSOLUTE EOS #: 0.32 K/UL (ref 0–0.44)
ABSOLUTE IMMATURE GRANULOCYTE: 0.13 K/UL (ref 0–0.3)
ABSOLUTE LYMPH #: 0.91 K/UL (ref 1.1–3.7)
ABSOLUTE MONO #: 1.26 K/UL (ref 0.1–1.2)
ANION GAP SERPL CALCULATED.3IONS-SCNC: 11 MMOL/L (ref 9–17)
BASOPHILS # BLD: 0 % (ref 0–2)
BASOPHILS ABSOLUTE: 0.03 K/UL (ref 0–0.2)
BUN BLDV-MCNC: 64 MG/DL (ref 8–23)
BUN/CREAT BLD: ABNORMAL (ref 9–20)
CALCIUM IONIZED: 1.12 MMOL/L (ref 1.13–1.33)
CALCIUM SERPL-MCNC: 8 MG/DL (ref 8.6–10.4)
CHLORIDE BLD-SCNC: 101 MMOL/L (ref 98–107)
CO2: 23 MMOL/L (ref 20–31)
CREAT SERPL-MCNC: 1.48 MG/DL (ref 0.7–1.2)
DIFFERENTIAL TYPE: ABNORMAL
EOSINOPHILS RELATIVE PERCENT: 3 % (ref 1–4)
GFR AFRICAN AMERICAN: 57 ML/MIN
GFR NON-AFRICAN AMERICAN: 47 ML/MIN
GFR SERPL CREATININE-BSD FRML MDRD: ABNORMAL ML/MIN/{1.73_M2}
GFR SERPL CREATININE-BSD FRML MDRD: ABNORMAL ML/MIN/{1.73_M2}
GLUCOSE BLD-MCNC: 147 MG/DL (ref 70–99)
GLUCOSE BLD-MCNC: 184 MG/DL (ref 75–110)
GLUCOSE BLD-MCNC: 214 MG/DL (ref 75–110)
GLUCOSE BLD-MCNC: 243 MG/DL (ref 75–110)
HCT VFR BLD CALC: 26.9 % (ref 40.7–50.3)
HCT VFR BLD CALC: 28.3 % (ref 40.7–50.3)
HEMOGLOBIN: 8.3 G/DL (ref 13–17)
HEMOGLOBIN: 8.8 G/DL (ref 13–17)
IMMATURE GRANULOCYTES: 1 %
LYMPHOCYTES # BLD: 8 % (ref 24–43)
MAGNESIUM: 1.8 MG/DL (ref 1.6–2.6)
MCH RBC QN AUTO: 31.9 PG (ref 25.2–33.5)
MCHC RBC AUTO-ENTMCNC: 30.9 G/DL (ref 28.4–34.8)
MCV RBC AUTO: 103.5 FL (ref 82.6–102.9)
MONOCYTES # BLD: 11 % (ref 3–12)
NRBC AUTOMATED: 0 PER 100 WBC
PATHOLOGIST REVIEW: NORMAL
PDW BLD-RTO: 19 % (ref 11.8–14.4)
PHOSPHORUS: 2.8 MG/DL (ref 2.5–4.5)
PLATELET # BLD: 101 K/UL (ref 138–453)
PLATELET # BLD: 102 K/UL (ref 138–453)
PLATELET # BLD: 116 K/UL (ref 138–453)
PLATELET ESTIMATE: ABNORMAL
PMV BLD AUTO: 10.2 FL (ref 8.1–13.5)
POTASSIUM SERPL-SCNC: 3.8 MMOL/L (ref 3.7–5.3)
RBC # BLD: 2.6 M/UL (ref 4.21–5.77)
RBC # BLD: ABNORMAL 10*6/UL
REASON FOR REJECTION: NORMAL
SEG NEUTROPHILS: 77 % (ref 36–65)
SEGMENTED NEUTROPHILS ABSOLUTE COUNT: 9.01 K/UL (ref 1.5–8.1)
SODIUM BLD-SCNC: 135 MMOL/L (ref 135–144)
WBC # BLD: 11.7 K/UL (ref 3.5–11.3)
WBC # BLD: ABNORMAL 10*3/UL
ZZ NTE CLEAN UP: ORDERED TEST: NORMAL
ZZ NTE WITH NAME CLEAN UP: SPECIMEN SOURCE: NORMAL

## 2019-04-09 PROCEDURE — 6370000000 HC RX 637 (ALT 250 FOR IP): Performed by: INTERNAL MEDICINE

## 2019-04-09 PROCEDURE — 85018 HEMOGLOBIN: CPT

## 2019-04-09 PROCEDURE — 2580000003 HC RX 258: Performed by: STUDENT IN AN ORGANIZED HEALTH CARE EDUCATION/TRAINING PROGRAM

## 2019-04-09 PROCEDURE — 6370000000 HC RX 637 (ALT 250 FOR IP): Performed by: STUDENT IN AN ORGANIZED HEALTH CARE EDUCATION/TRAINING PROGRAM

## 2019-04-09 PROCEDURE — 80048 BASIC METABOLIC PNL TOTAL CA: CPT

## 2019-04-09 PROCEDURE — 99232 SBSQ HOSP IP/OBS MODERATE 35: CPT | Performed by: INTERNAL MEDICINE

## 2019-04-09 PROCEDURE — 2580000003 HC RX 258: Performed by: EMERGENCY MEDICINE

## 2019-04-09 PROCEDURE — 1200000000 HC SEMI PRIVATE

## 2019-04-09 PROCEDURE — 85014 HEMATOCRIT: CPT

## 2019-04-09 PROCEDURE — 83735 ASSAY OF MAGNESIUM: CPT

## 2019-04-09 PROCEDURE — 36415 COLL VENOUS BLD VENIPUNCTURE: CPT

## 2019-04-09 PROCEDURE — 97535 SELF CARE MNGMENT TRAINING: CPT

## 2019-04-09 PROCEDURE — 94762 N-INVAS EAR/PLS OXIMTRY CONT: CPT

## 2019-04-09 PROCEDURE — 97166 OT EVAL MOD COMPLEX 45 MIN: CPT

## 2019-04-09 PROCEDURE — 85025 COMPLETE CBC W/AUTO DIFF WBC: CPT

## 2019-04-09 PROCEDURE — 82330 ASSAY OF CALCIUM: CPT

## 2019-04-09 PROCEDURE — 82947 ASSAY GLUCOSE BLOOD QUANT: CPT

## 2019-04-09 PROCEDURE — 2700000000 HC OXYGEN THERAPY PER DAY

## 2019-04-09 PROCEDURE — 99233 SBSQ HOSP IP/OBS HIGH 50: CPT | Performed by: INTERNAL MEDICINE

## 2019-04-09 PROCEDURE — 76770 US EXAM ABDO BACK WALL COMP: CPT

## 2019-04-09 PROCEDURE — 84100 ASSAY OF PHOSPHORUS: CPT

## 2019-04-09 PROCEDURE — 85049 AUTOMATED PLATELET COUNT: CPT

## 2019-04-09 RX ORDER — DABIGATRAN ETEXILATE 150 MG/1
150 CAPSULE, COATED PELLETS ORAL 2 TIMES DAILY
Status: DISCONTINUED | OUTPATIENT
Start: 2019-04-09 | End: 2019-04-10 | Stop reason: HOSPADM

## 2019-04-09 RX ADMIN — AMIODARONE HYDROCHLORIDE 200 MG: 200 TABLET ORAL at 09:30

## 2019-04-09 RX ADMIN — DABIGATRAN ETEXILATE MESYLATE 150 MG: 150 CAPSULE ORAL at 20:12

## 2019-04-09 RX ADMIN — AMIODARONE HYDROCHLORIDE 200 MG: 200 TABLET ORAL at 20:11

## 2019-04-09 RX ADMIN — METOPROLOL TARTRATE 25 MG: 25 TABLET ORAL at 09:30

## 2019-04-09 RX ADMIN — CITALOPRAM 10 MG: 10 TABLET, FILM COATED ORAL at 09:30

## 2019-04-09 RX ADMIN — APIXABAN 5 MG: 5 TABLET, FILM COATED ORAL at 09:30

## 2019-04-09 RX ADMIN — INSULIN LISPRO 2 UNITS: 100 INJECTION, SOLUTION INTRAVENOUS; SUBCUTANEOUS at 17:13

## 2019-04-09 RX ADMIN — INSULIN LISPRO 2 UNITS: 100 INJECTION, SOLUTION INTRAVENOUS; SUBCUTANEOUS at 20:19

## 2019-04-09 RX ADMIN — Medication 10 ML: at 09:30

## 2019-04-09 RX ADMIN — INSULIN GLARGINE 10 UNITS: 100 INJECTION, SOLUTION SUBCUTANEOUS at 20:19

## 2019-04-09 RX ADMIN — INSULIN LISPRO 4 UNITS: 100 INJECTION, SOLUTION INTRAVENOUS; SUBCUTANEOUS at 13:13

## 2019-04-09 RX ADMIN — Medication 10 ML: at 20:23

## 2019-04-09 RX ADMIN — FUROSEMIDE 80 MG: 40 TABLET ORAL at 09:30

## 2019-04-09 RX ADMIN — Medication 2 CAPSULE: at 17:14

## 2019-04-09 RX ADMIN — Medication 2 CAPSULE: at 09:30

## 2019-04-09 ASSESSMENT — PAIN SCALES - GENERAL
PAINLEVEL_OUTOF10: 0

## 2019-04-09 ASSESSMENT — PULMONARY FUNCTION TESTS: PIF_VALUE: 14

## 2019-04-09 NOTE — CARE COORDINATION
Called [facility] admissions-Kandis-left  re:referral.    1100 message from Sharron in admissions.  They are reviewing referral.
Case Management Initial Discharge [address],             Met with:spouse/SO to discuss discharge plans. Information verified: address, contacts, phone number, , insurance Yes  PCP: Christine Galarza MD  Date of last visit: 3/18    Insurance Provider: medicare and [facility]. No Part D    Discharge Planning    Living Arrangements:  Spouse/Significant Other   Support Systems:  Spouse/Significant Other, Family Members    Home has  stories   stairs to climb to get into front door, stairs to climb to reach second floor  Location of bedroom/bathroom in home main    Patient able to perform ADL's:Independent    Current Services (outpatient & in home) none  DME equipment: none  DME provider:     Pharmacy: [facility] in [address] Medications:  No  Does patient want to participate in local refill/ meds to beds program?  No    Potential Assistance Needed:  Home Care, Durable Medical Equipment    Patient agreeable to home care: No  [place] of choice provided:  n/a    Prior SNF/Rehab Placement and Facility: no  Agreeable to SNF/Rehab: No  [place] of choice provided: n/a   Evaluation: n/a    Expected Discharge date:  19  Patient expects to be discharged to:  home  Follow Up Appointment: Best Day/ Time:      Transportation provider: self/wife  Transportation arrangements needed for discharge: No    Readmission Risk              Risk of Unplanned Readmission:        13             Does patient have a readmission risk score greater than 14?: No  If yes, follow-up appointment must be made within 7 days of discharge. Discharge Plan: Pt on bipap. Spoke with pt's wife Sarthak Johnson at bedside. Pt is normally independent. No DME. Monitor for discharge needs. Plan home with wife. Electronically signed by Madhuri Jamison RN on 3/19/19 at 12:37 PM    He does go to Dr Funmilayo Lord at [facility] [place].
Per respiratory, failed weaning this day
Pt extubated. Spoke with pt and his wife at bedside. Since pt just got extubated she is unsure of transition needs. Pt will need to work with PT to see if he is strong enough to go home. He goal is home. He does not wear home O2 - may need home O2 eval.  She states he gets meds from [place]. She will need paper Rx to send to [place] at discharge. She states nephro has not told her if dialysis will be long term. If it is explained [facility] would set up OP hemo for pt. Will continue to follow. Anticipate pt will need rehab at discharge.
Pt intubated today. Will follow progress. Goal home with wife.
Pt remains intubated, no weaning today. New dialysis. Await Resp plan from MD.  Likely will need SNF/LTACH pending resp status.
Pt remains intubated. Critical condition today. Will follow for transition needs. Original goal was home.
Pt remains intubated. Spoke with pt's wife Alexandrea Boogie at bedside. I had received a message from Edda Roberts at [facility] stating pt's daughter wanted him to go to [facility] at discharge. Spoke with wife about transition planning. She states the goal is home. She does state her dtr is DON at [facility] but does not know it pt would want to go there at discharge. Offered home care if pt goes home. Will see how pt progresses-too soon to know dc needs at this time.
Referral sent to [address] [place] [address] per family request. Daughter oJhn Paul Cavanaugh works there and the facility is expecting the referral per wife & grand-daughter.  Needs PT/OT evals also
Refused SNF, would like to continue with [facility] at discharge, will send referral if not previously done, following as POC develops.
Transitional Planning  Pt remains intubated and sedated  Receiving abx tamiflu
Transitional planning.  Per RN pt remains intubated and critical.
n[facility] in media. - MSSP Patient. Per message from [facility] Team included with n[facility] Predict:    Greater gains expected with SNF over [facility]. Member dependent with mobility, ind prior to admission. Deconditioned due to extended acute stay, will require ongoing therapy before safely returning home. Outreach to Case Management for unit. LM on secure VM informing CM of n[facility]ict availability, matching suggested best outcomes, and plan for delivery. Contact information provided.  -NR
Independent  Ability to manage medications: Independent  Ability to prepare food:  Independent  Ability to maintain home (clean home, laundry): Independent  Ability to drive and/or has transportation:  Independent  Ability to do shopping:  Independent  Ability to manage finances:   Independent  Is patient able to live independently?:  Yes  Hearing and Vision  Visual Impairment:  Visual impairment (Glasses/contacts)  Hearing Impairment:  None  Care Transitions Interventions         Follow Up  Future Appointments   Date Time Provider Cassie Mehta   12/6/2019  8:30 AM Cele Link MD [facility] [address] [facility] Due   Topic Date Due    Diabetic microalbuminuria test  06/09/2018       Levar Deleon RN

## 2019-04-09 NOTE — PROGRESS NOTES
Occupational Therapy   Occupational Therapy Initial Assessment  Date: 2019   Patient Name: Lexi Suero  MRN: [id number]     : 1945    Date of Service: 2019    Discharge Recommendations:    Further therapy recommended at discharge. Assessment   Performance deficits / Impairments: Decreased functional mobility ; Decreased strength;Decreased endurance;Decreased high-level IADLs;Decreased safe awareness;Decreased ADL status; Decreased balance  Assessment: Patient demonstrates severe B UE weakness, impairing patient performance with all self-care tasks and bed mobility. Patient has positive attitude, but is greatly fatigued after little movement. Patient is limited due to increased fatigue and has caregiver dependency to participate in self-cares. Patient expected to need skilled OT services during LOS and at d/c. Prognosis: Good  Decision Making: Medium Complexity  Patient Education: Patient educated on role of OT, purpose of eval, and OT POC. REQUIRES OT FOLLOW UP: Yes  Activity Tolerance  Activity Tolerance: Patient limited by fatigue  Activity Tolerance: ~5 min  Safety Devices  Safety Devices in place: Yes  Type of devices: Patient at risk for falls; Left in chair;Nurse notified;Call light within reach           Patient Diagnosis(es): There were no encounter diagnoses. has a past medical history of Abnormal nuclear stress test, Arrhythmia, Atrial fibrillation ([facility] [address] CAD (coronary artery disease), DM type 2 (diabetes mellitus, type 2) ([facility] [address] and Hx of echocardiogram.   has a past surgical history that includes Coronary angioplasty (); Cardioversion (2007); and Cardioversion ().          Restrictions  Restrictions/Precautions  Restrictions/Precautions: General Precautions, Isolation  Required Braces or Orthoses?: No  Position Activity Restriction  Other position/activity restrictions: Droplet iso    Subjective   General  Chart Reviewed: No  Patient assessed for rehabilitation services?: Yes  Family / Caregiver Present: No  General Comment  Comments: RN ok'd patient for therapy this a.m. Good response from patient throughout   Oxygen Therapy  SpO2: 97 %    Social/Functional History  Social/Functional History  Lives With: Spouse  Type of Home: House  Home Layout: Two level  Home Access: Stairs to enter with rails  Entrance Stairs - Number of Steps: 5  Entrance Stairs - Rails: Both  Bathroom Shower/Tub: Tub/Shower unit, Shower chair with back, Walk-in shower  Bathroom Toilet: Handicap height  Bathroom Equipment: Grab bars around toilet  Bathroom Accessibility: Accessible  Home Equipment: Standard walker, Cane(Has AD's but does not use them.)  Receives Help From: Family  ADL Assistance: Independent  Homemaking Assistance: Independent  Homemaking Responsibilities: Yes  Meal Prep Responsibility: Primary  Laundry Responsibility: Primary  Cleaning Responsibility: Primary  Ambulation Assistance: Independent  Transfer Assistance: Independent  Active : Yes  Mode of Transportation: Car, Truck  Occupation: Retired  Type of occupation: . IADL Comments: Wife is home at all times with patient, Patient and wife both complete IADL activities        Objective   Vision: Impaired  Vision Exceptions: Wears glasses at all times  Hearing: Within functional limits    Orientation  Overall Orientation Status: Within Functional Limits        ADL  Feeding: Modified independent   Grooming: Minimal assistance  UE Bathing: Moderate assistance  LE Bathing: Maximum assistance  UE Dressing: Moderate assistance  LE Dressing: Maximum assistance  Toileting: Maximum assistance  Additional Comments: OT trailed to get patient to sit EOB to complete grooming tasks, patient Max A to move trunk. Patient juanita lifted to chair and completed grooming task with min A of washing face.    Tone RUE  RUE Tone: Normotonic  Tone LUE  LUE Tone: Normotonic  Coordination  Movements Are Fluid And Coordinated: Yes     Bed mobility  Supine to Sit: Maximum assistance  Comment: patient Max A to trial supine to sit, patient attempted to move LE's to EOB with little success.  Patient unable to move trunk and UB and returned to supine to be lifted using lift equipment         Cognition  Overall Cognitive Status: WFL        Sensation  Overall Sensation Status: Impaired        LUE AROM : WFL  Left Hand AROM: WFL  RUE PROM: WFL  Right Hand PROM: WFL  LUE Strength  Gross LUE Strength: Exceptions to [facility]  L Hand Grasp: 3/5  L Hand Release: 3/5  RUE Strength  Gross RUE Strength: Exceptions to [facility]  R Hand Grasp: 3/5  R Hand Release: 3/5              Plan   Plan  Times per week: 4-5x/wk  Current Treatment Recommendations: Strengthening, Endurance Training, Patient/Caregiver Education & Training, Self-Care / ADL, Home Management Training, Safety Education & Training    AM-PAC Score        AM-PAC Inpatient Daily Activity Raw Score: 12  AM-PAC Inpatient ADL T-Scale Score : 30.6  ADL Inpatient CMS 0-100% Score: 66.57  ADL Inpatient CMS G-Code Modifier : CL    Goals  Short term goals  Time Frame for Short term goals: Patient will, by discharge  Short term goal 1: complete bed mobility at [address] term goal 2: complete grooming tasks at [facility] [address] term goal 3: demonstrate UB self-cares at [address] term goal 4: demonstrate ~20 minutes of functional activity tolerance  Short term goal 5: demonstrate BSC transfer at Mod A        Therapy Time   Individual Concurrent Group Co-treatment   Time In 0900         Time Out 0926         Minutes [facility] KAL Pratt/L

## 2019-04-09 NOTE — PROGRESS NOTES
Pt continually ripping off Bipap mask, and is refusing it at this time. Nasal cannula applied. Pt states he will call if he feels short of breath. RN Hilda Wallace informed. RT will continue to assess and monitor and provide Bipap if needed.

## 2019-04-09 NOTE — PROGRESS NOTES
NEPHROLOGY PROGRESS NOTE      SUBJECTIVE     Last hemodialysis couple of days back. Renal function is improving. Decent amount of diuresis on oral Lasix. Tolerating oral intake well. Hemoglobin is stable. OBJECTIVE     Vitals:    04/09/19 0404 04/09/19 0500 04/09/19 0600 04/09/19 0633   BP: (!) 101/48 (!) 103/49 (!) 106/47    Pulse: 71 66 74    Resp: 18 23 19    Temp: 97.5 °F (36.4 °C)      TempSrc: Oral      SpO2: 96% 97% 97%    Weight:    283 lb 15.2 oz (128.8 kg)   Height:         24HR INTAKE/OUTPUT:      Intake/Output Summary (Last 24 hours) at 4/9/2019 0857  Last data filed at 4/9/2019 5783  Gross per 24 hour   Intake 1027 ml   Output 2100 ml   Net -1073 ml       General appearance:Awake, alert, in no acute distress  HEENT: PERRLA  Respiratory::vesicular breath sounds,no wheeze/crackles  Cardiovascular:S1 S2 normal,no gallop or organic murmur. Abdomen:Non tender/non distended. Bowel sounds present  Extremities: No Cyanosis or Clubbing, present Lower extremity edema  Neurological:Alert and oriented. No abnormalities of mood, affect, memory, mentation, or behavior are noted      MEDICATIONS     Scheduled Meds:    apixaban  5 mg Oral BID    insulin glargine  10 Units Subcutaneous Nightly    insulin lispro  0-12 Units Subcutaneous TID WC    insulin lispro  0-6 Units Subcutaneous Nightly    lactobacillus  2 capsule Oral BID WC    furosemide  80 mg Per NG tube Daily    sodium chloride flush  10 mL Intravenous 2 times per day    amiodarone  200 mg Oral BID    metoprolol tartrate  25 mg Oral BID    citalopram  10 mg Oral Daily    docusate  100 mg Oral Daily    sodium chloride flush  10 mL Intravenous 2 times per day     Continuous Infusions:    sodium chloride Stopped (04/09/19 0411)    dextrose       PRN Meds:  midodrine, sodium chloride, sodium chloride, sodium chloride flush, sodium chloride, sodium chloride, fentanNYL **OR** fentanNYL, acetaminophen, magnesium hydroxide, albuterol, LORazepam, A  4.  Atrial fibrillation persistent with echo revealing ejection fraction 40%  5. Anemia    PLAN     1. Continue oral diuretics  2. Discontinue Pankaj catheter and Pitt  3. Avoid nephrotoxins  4.   Will follow    Please do not hesitate to call with questions    This note is created with the assistance of a speech-recognition program. While intending to generate a document that actually reflects the content of the visit, no guarantees can be provided that every mistake has been identified and corrected by editing    Maria Elena Tee MD, [facility] Be[facility] Khan, [address]   4/9/2019 8:57 AM  [facility]

## 2019-04-09 NOTE — PROGRESS NOTES
INTENSIVE CARE UNIT  Resident Physician Progress Note    Patient - Delories Pore  Date of Admission -  3/19/2019  7:58 AM  Date of Evaluation -  2019  Room and Bed Number -  0105/0105-01   Hospital Day -       SUBJECTIVE:     OVERNIGHT EVENTS:    No acute events overnight. Patient didn't tolerate BiPAP well overnight. AOx2 this AM. No concerns.     TODAY:      AWAKE & FOLLOWING COMMANDS:  [] No   [x] Yes    SECRETIONS Amount:  [] Small [] Moderate  [] Large  [x] None  Color:     [] White [] Colored  [] Bloody    SEDATION:  RAAS Score:  [] Propofol gtt  [] Versed gtt  [] Ativan gtt   [x] No Sedation    PARALYZED:  [x] No    [] Yes    VASOPRESSORS:  [x] No    [] Yes  [] Levophed [] Dopamine [] Vasopressin  [] Dobutamine [] Phenylephrine [] Epinephrine      OBJECTIVE:     VITAL SIGNS:  BP 92/61   Pulse 75   Temp 98.2 °F (36.8 °C) (Oral)   Resp 27   Ht 5' 8\" (1.727 m)   Wt 283 lb 15.2 oz (128.8 kg)   SpO2 97%   BMI 43.17 kg/m²   Tmax over 24 hours:  Temp (24hrs), Av.9 °F (36.6 °C), Min:97.5 °F (36.4 °C), Max:98.2 °F (36.8 °C)      Patient Vitals for the past 8 hrs:   BP Temp Temp src Pulse Resp SpO2 Weight   19 1000 92/61 -- -- 75 27 97 % --   19 0900 (!) 112/54 -- -- 73 22 95 % --   19 0800 (!) 111/51 98.2 °F (36.8 °C) Oral 67 21 94 % --   19 0700 (!) 96/55 -- -- 69 24 96 % --   19 0633 -- -- -- -- -- -- 283 lb 15.2 oz (128.8 kg)   19 0600 (!) 106/47 -- -- 74 19 97 % --   19 0500 (!) 103/49 -- -- 66 23 97 % --   19 0404 (!) 101/48 97.5 °F (36.4 °C) Oral 71 18 96 % --   19 0300 (!) 107/49 -- -- 73 23 96 % --         Intake/Output Summary (Last 24 hours) at 2019 1043  Last data filed at 2019 1000  Gross per 24 hour   Intake 1027 ml   Output 2160 ml   Net -1133 ml     Date 19 0000 - 19 2359   Shift 9734-3002 3140-4245 0084-7817 24 Hour Total   INTAKE   I.V.(mL/kg) 71(0.6)   71(0.6)   Shift Total(mL/kg) 71(0.6)   71(0.6)   OUTPUT Urine(mL/kg/hr) 605(0.6) 60  665   Shift Total(mL/kg) 605(4.7) 60(0.5)  665(5.2)   Weight (kg) 128.8 128.8 128.8 128.8     Wt Readings from Last 3 Encounters:   04/09/19 283 lb 15.2 oz (128.8 kg)   03/19/19 294 lb 11.2 oz (133.7 kg)   03/18/19 300 lb (136.1 kg)     Body mass index is 43.17 kg/m².         PHYSICAL EXAM:  GEN:  awake, alert, cooperative, no apparent distress, and appears stated age  EYES:  pupils equal, round, and reactive to light, extra-ocular muscles intact, sclera clear  HEENT:  Normocepalic, without obvious abnormality  LUNGS:  no increased work of breathing, good air exchange and no crackles or wheezing, clear to auscultation  CV:    regular rate and rhythm and no murmur noted  ABDOMEN:   normal bowel sounds, soft and non-distended  MSK:    there is no redness, warmth, or swelling of the joints  NEURO[de-identified]   Awake, alert, oriented to name, year but not location   SKIN:   No bruising or bleeding  EXTREMITIES:  No pedal or leg edema, no calf tenderness/swelling, no erythema, distal pulses intact       MEDICATIONS:  Scheduled Meds:   apixaban  5 mg Oral BID    insulin glargine  10 Units Subcutaneous Nightly    insulin lispro  0-12 Units Subcutaneous TID WC    insulin lispro  0-6 Units Subcutaneous Nightly    lactobacillus  2 capsule Oral BID WC    furosemide  80 mg Per NG tube Daily    sodium chloride flush  10 mL Intravenous 2 times per day    amiodarone  200 mg Oral BID    metoprolol tartrate  25 mg Oral BID    citalopram  10 mg Oral Daily    docusate  100 mg Oral Daily    sodium chloride flush  10 mL Intravenous 2 times per day     Continuous Infusions:   sodium chloride Stopped (04/09/19 0411)    dextrose       PRN Meds:     midodrine 5 mg TID PRN   sodium chloride 250 mL PRN   sodium chloride 150 mL PRN   sodium chloride flush 10 mL PRN   sodium chloride 250 mL PRN   sodium chloride 150 mL PRN   fentanNYL 50 mcg Q1H PRN   Or     fentanNYL 100 mcg Q1H PRN   acetaminophen 650 mg Q4H PRN   magnesium hydroxide 30 mL Daily PRN   albuterol 2.5 mg Q6H PRN   LORazepam 0.5 mg Q8H PRN   sodium chloride flush 10 mL PRN   ondansetron 4 mg Q6H PRN   glucose 15 g PRN   dextrose 12.5 g PRN   glucagon (rDNA) 1 mg PRN   dextrose 100 mL/hr PRN       SUPPORT DEVICES: [] Ventilator [] BIPAP  [] Nasal Cannula [x] Room Air      Vent Information  $Ventilation: $Subsequent Day  Ventilator Started: Yes  Ventilator Stopped: Yes  Skin Assessment: Clean, dry, & intact  Equipment ID: [id number] #55  Equipment Changed: HME  Vent Type: Servo i  Vent Mode: NIV/PC  Vt Ordered: 550 mL  Pressure Ordered: 7  Rate Set: 10 bmp  Pressure Support: 6 cmH20  FiO2 : 40 %  Sensitivity: 3  PEEP/CPAP: 5  I Time/ I Time %: 0.9 s  Cuff Pressure (cm H2O): 25 cm H2O  Humidification Source: HME  Humidification Temp: 31  Humidification Temp Measured: 31  Circuit Condensation: Drained  Nitric Oxide/Epoprostenol In Use?: No  Additional Respiratory  Assessments  Pulse: 75  Resp: 27  SpO2: 97 %  End Tidal CO2: 35 (%)  pCO2 (TCOM, mmHg): 63 mmHg  Position: Semi-Lyn's  Humidification Source: HME  Humidification Temp: 31  Circuit Condensation: Drained  Oral Care Completed?: Yes  Oral Care: Mouth moisturizer, Mouth suctioned  Subglottic Suction Done?: Yes  Cuff Pressure (cm H2O): 25 cm H2O  Skin barrier applied: Yes      Lab Results   Component Value Date    PHART 7.310 03/18/2019    EIW7TCM 51.7 03/18/2019    PO2ART 67.8 03/18/2019    AEC6LQN 25.4 03/18/2019    RZL5PWQ 26 04/05/2019    V8HNQQZB 91.7 03/18/2019    FIO2 30.0 04/05/2019         DATA:  Complete Blood Count: Recent Labs     04/07/19  0447  04/08/19  0448  04/08/19  1752 04/09/19  0358 04/09/19  0756   WBC 16.0*  --  12.5*  --   --  11.7*  --    RBC 2.85*  --  2.68*  --   --  2.60*  --    HGB 9.0*   < > 8.5*  --  9.1* 8.3*  --    HCT 29.3*   < > 27.7*  --  30.3* 26.9*  --    .8  --  103.4*  --   --  103.5*  --    MCH 31.6  --  31.7  --   --  31.9  --    MCHC 30.7  --  30.7  --   -- plans for hemodialysis. No s/s of fluid overload.       Chronic A. fib with RVR, controlled.  Continue amiodarone 200 mg twice a day, Lopressor 25 mg twice a day.  Cardiology following. On Eliquis.     ERNESTO 2/2 ischemic ATN.  Nephro recommending no further hemodialysis, creatinine continues to improve. Urine output 0.5 ml/kg/hr.     Type 2 diabetes mellitus - blood glucose improved. Lantus to 10 units nightly with medium dose sliding scale, point-of-care glucose checks every 4 hours     Thrombocytopenia -On Eliquis. Platelets stable.                Fausto Ramos MD  Internal Medicine PGY2  4/9/2019 10:43 AM

## 2019-04-09 NOTE — PROGRESS NOTES
[facility] -Progress Note    Today's Date and Time: 4/9/2019, 8:57 AM    Impression :   1. Acute Hypoxic Respiratory failure  2. Influenza A  3. Possible secondary pneumonia with normal mansoor  4. Atrial Fibrillation  5. DM II  6. CHF  7. Renal insufficiency  8. Leukocytosis secondary to hemorrhage, steroids  9. Retroperitoneal bleed    Recommendations:   · Monitor off antibiotics  · Ceftriaxone completed on 4/3  · Continue respiratory toilet. Medical Decision Making/Summary/Discussion:   · Patient with Hx chronic smoking  · Admitted with acute hypoxic respiratory failure associated with Influenza   · Has pulmonary vascular congestion and pleural effusions  · Has CHF and renal insufficiency  · Has completed oseltamivir  · Will discontinue treatment for possible secondary pneumonia with ceftriaxone as of 4-3-19  · On hemodialysis, management per nephrology  · Extubation performed successfully on 4/5  · Pankaj catheter to be removed today per nephrology   Infection Control Recommendations     · Droplet Isolation    Antimicrobial Stewardship Recommendations     · Targeted therapy  · PK dosing  Coordination of Outpatient Care:   · Estimated Length of IV antimicrobials:TBD  · Patient will need Midline Catheter Insertionno:   · Patient will need PICC line Insertion:no   · Patient will need: Home IV , [place],  SNF,  LTAC: TBD  · Patient will need outpatient wound care:No    Chief complaint/reason for consultation:   · Leukocytosis      History of Present Illness:   Muna Parikh is a 69y.o.-year-old  male who was initially admitted on 3/19/2019. Patient seen at the request of Kt Amor. INITIAL HISTORY:    ID has been consulted for evaluation of leukocytosis. The patient was transferred to [facility] on 3/19/19 from an outlying facility for worsening respiratory status, Influenza A and elevated troponin.  PMH significant for CAD,DM 2, A fib and chronic smoking. The patient presented to [facility] on 3/15 for sob where his Rapid flu was positive for Influenza A. He refused admission and was treated with Unique-flu as an out patient. He developed worsening sob and hypoxia leading to his admission at the outlying facility on 3/18/19 where he was started on Ceftriaxone, Azithromycin and Tamiflu. [facility] because of hypoxic respiratory distress and elevated troponin. He was admitted to ICU and started on BiPAP. CXR on 3-19-19 showed reticular infiltrate and LLL pneumonia. Respiratory culture was obtained on 3/22/19 and showed few gram positive cocci in clusters. Legionella antigen, Strep pneumoniae anitgen and Mycoplasma Ab negative. MRSA swab negative    The patient was intubated on 3/21/19 because of deteriorating respiratory status. The patient completed a course of Unique-flu on 3-25-19. He completed a course of abx and steroids on 3-26-19. The patient spiked a fever (t max 100.4) and became hypotensive requiring pressure support on 3-29-19. He was started on Vancomycin and Zosyn empirically. Blood, urine and sputum cultures were obtained and currently pending. The patient additionally was found to have a Lt sided spontaneous retroperitoneal bleed on 3-29-19 and was hypovolemic requiring multiple transfusions. H&H has been stable today at 9.0. The patient continues to require pressor support and is tachycardic (120). Presently the patient is intubated, sedated and on 3 pressors. He has been febrile (t max 101.5), tachycardic (110-120), tachypneic (30-40's). Lactic acid was elevated at 7.4 yesterday. WBC count has been elevated at 45.3 today. Hgb stable. His kidney function is worsening Cr 3.74    He is on zosyn. Vancomycin held because trough level 18.5 (3/29/19)  Cultures negative so far. CURRENT EVALUATION : 4/9/2019    Patient seen and examined. Extubated successfully. Resting comfortably. mellitus, type 2) [facility]    Hx of echocardiogram 2007    EF 62%, Increased left atrial and right ventricular diametes. Increased septal and posterior wall thickness suggest left ventricular hypertrophy.  Valves were normal.        Past Surgical  History:     Past Surgical History:   Procedure Laterality Date    CARDIOVERSION  2007    Successful-Dr. Sandra Arroyo    CARDIOVERSION      CORONARY ANGIOPLASTY         Medications:      apixaban  5 mg Oral BID    insulin glargine  10 Units Subcutaneous Nightly    insulin lispro  0-12 Units Subcutaneous TID WC    insulin lispro  0-6 Units Subcutaneous Nightly    lactobacillus  2 capsule Oral BID WC    furosemide  80 mg Per NG tube Daily    sodium chloride flush  10 mL Intravenous 2 times per day    amiodarone  200 mg Oral BID    metoprolol tartrate  25 mg Oral BID    citalopram  10 mg Oral Daily    docusate  100 mg Oral Daily    sodium chloride flush  10 mL Intravenous 2 times per day       Social History:     Social History     Socioeconomic History    Marital status:      Spouse name: Not on file    Number of children: Not on file    Years of education: Not on file    Highest education level: Not on file   Occupational History    Not on file   Social Needs    Financial resource strain: Not on file    Food insecurity:     Worry: Not on file     Inability: Not on file    Transportation needs:     Medical: Not on file     Non-medical: Not on file   Tobacco Use    Smoking status: Former Smoker     Packs/day: 1.50     Years: 30.00     Pack years: 45.00     Types: Cigarettes     Last attempt to quit: 1996     Years since quittin.8    Smokeless tobacco: Never Used   Substance and Sexual Activity    Alcohol use: No    Drug use: No    Sexual activity: Not on file   Lifestyle    Physical activity:     Days per week: Not on file     Minutes per session: Not on file    Stress: Not on file   Relationships 12.5*  --   --  11.7*  --    HGB 8.5*  --  9.1* 8.3*  --    HCT 27.7*  --  30.3* 26.9*  --    PLT 89*   < > 94* 101* 102*   LYMPHOPCT 8*  --   --  8*  --    MONOPCT 9  --   --  11  --     < > = values in this interval not displayed. BMP:   Recent Labs     04/08/19  0448 04/09/19  0358    135   K 3.7 3.8    101   CO2 23 23   BUN 74* 64*   CREATININE 1.62* 1.48*   MG 2.0 1.8     Hepatic Function Panel:   No results for input(s): PROT, LABALBU, BILIDIR, IBILI, BILITOT, ALKPHOS, ALT, AST in the last 72 hours. No results for input(s): RPR in the last 72 hours. No results for input(s): HIV in the last 72 hours. No results for input(s): BC in the last 72 hours. Lab Results   Component Value Date    MUCUS NOT REPORTED 03/28/2019    RBC 2.60 04/09/2019    TRICHOMONAS NOT REPORTED 03/28/2019    WBC 11.7 04/09/2019    YEAST NOT REPORTED 03/28/2019    TURBIDITY TURBID 03/28/2019     Lab Results   Component Value Date    CREATININE 1.48 04/09/2019    GLUCOSE 147 04/09/2019    GLUCOSE 104 11/26/2018       Medical Decision Making-Imaging:     Narrative   EXAMINATION:   SINGLE XRAY VIEW OF THE CHEST       3/29/2019 5:54 am       COMPARISON:   March 28, 2019, March 27, 2019       HISTORY:   ORDERING SYSTEM PROVIDED HISTORY: intubated   TECHNOLOGIST PROVIDED HISTORY:   intubated       FINDINGS:   Endotracheal tube terminates at the level of the clavicular heads.  The   enteric tube courses off the field of view in the upper abdomen.  The right   internal jugular line terminates at the distal SVC.  Shallow inflation.    Cardiac silhouette enlargement again noted.  Perihilar and basilar opacities   are again noted without significant change.  No new airspace disease or   pneumothorax identified.           Impression   Unchanged appearance of support tubes and lines.       Perihilar and basilar opacities are without appreciable change which may   represent subsegmental atelectasis versus developing consolidation.     Medical Decision Making-Other: Thank you for allowing us to participate in the care of this patient. Please call with questions. Purvi Blankenship     ATTESTATION:    I have discussed the case, including pertinent history and exam findings with the residents. I have seen and examined the patient and the key elements of the encounter have been performed by me. I have reviewed the laboratory data, other diagnostic studies and discussed them with the residents. I have updated the medical record where necessary. I agree with the assessment, plan and orders as documented by the resident.     Lisa Talley MD.

## 2019-04-10 VITALS
RESPIRATION RATE: 23 BRPM | OXYGEN SATURATION: 100 % | SYSTOLIC BLOOD PRESSURE: 106 MMHG | DIASTOLIC BLOOD PRESSURE: 54 MMHG | HEART RATE: 64 BPM | TEMPERATURE: 96.6 F | WEIGHT: 280.87 LBS | BODY MASS INDEX: 42.57 KG/M2 | HEIGHT: 68 IN

## 2019-04-10 LAB
ABSOLUTE EOS #: 0.38 K/UL (ref 0–0.44)
ABSOLUTE IMMATURE GRANULOCYTE: 0.25 K/UL (ref 0–0.3)
ABSOLUTE LYMPH #: 1.14 K/UL (ref 1.1–3.7)
ABSOLUTE MONO #: 1.52 K/UL (ref 0.1–1.2)
ANION GAP SERPL CALCULATED.3IONS-SCNC: 10 MMOL/L (ref 9–17)
BASOPHILS # BLD: 0 % (ref 0–2)
BASOPHILS ABSOLUTE: 0 K/UL (ref 0–0.2)
BUN BLDV-MCNC: 61 MG/DL (ref 8–23)
BUN/CREAT BLD: ABNORMAL (ref 9–20)
CALCIUM IONIZED: 1.18 MMOL/L (ref 1.13–1.33)
CALCIUM SERPL-MCNC: 8.5 MG/DL (ref 8.6–10.4)
CHLORIDE BLD-SCNC: 100 MMOL/L (ref 98–107)
CO2: 26 MMOL/L (ref 20–31)
CREAT SERPL-MCNC: 1.33 MG/DL (ref 0.7–1.2)
DIFFERENTIAL TYPE: ABNORMAL
EOSINOPHILS RELATIVE PERCENT: 3 % (ref 1–4)
GFR AFRICAN AMERICAN: >60 ML/MIN
GFR NON-AFRICAN AMERICAN: 53 ML/MIN
GFR SERPL CREATININE-BSD FRML MDRD: ABNORMAL ML/MIN/{1.73_M2}
GFR SERPL CREATININE-BSD FRML MDRD: ABNORMAL ML/MIN/{1.73_M2}
GLUCOSE BLD-MCNC: 141 MG/DL (ref 75–110)
GLUCOSE BLD-MCNC: 160 MG/DL (ref 70–99)
GLUCOSE BLD-MCNC: 187 MG/DL (ref 75–110)
HCT VFR BLD CALC: 29.4 % (ref 40.7–50.3)
HEMOGLOBIN: 9 G/DL (ref 13–17)
IMMATURE GRANULOCYTES: 2 %
LYMPHOCYTES # BLD: 9 % (ref 24–43)
MAGNESIUM: 2 MG/DL (ref 1.6–2.6)
MCH RBC QN AUTO: 31.5 PG (ref 25.2–33.5)
MCHC RBC AUTO-ENTMCNC: 30.6 G/DL (ref 28.4–34.8)
MCV RBC AUTO: 102.8 FL (ref 82.6–102.9)
MONOCYTES # BLD: 12 % (ref 3–12)
MORPHOLOGY: ABNORMAL
NRBC AUTOMATED: 0 PER 100 WBC
PDW BLD-RTO: 19.5 % (ref 11.8–14.4)
PHOSPHORUS: 2.8 MG/DL (ref 2.5–4.5)
PLATELET # BLD: 117 K/UL (ref 138–453)
PLATELET ESTIMATE: ABNORMAL
PMV BLD AUTO: 9.8 FL (ref 8.1–13.5)
POTASSIUM SERPL-SCNC: 3.6 MMOL/L (ref 3.7–5.3)
RBC # BLD: 2.86 M/UL (ref 4.21–5.77)
RBC # BLD: ABNORMAL 10*6/UL
SEG NEUTROPHILS: 74 % (ref 36–65)
SEGMENTED NEUTROPHILS ABSOLUTE COUNT: 9.41 K/UL (ref 1.5–8.1)
SODIUM BLD-SCNC: 136 MMOL/L (ref 135–144)
WBC # BLD: 12.7 K/UL (ref 3.5–11.3)
WBC # BLD: ABNORMAL 10*3/UL

## 2019-04-10 PROCEDURE — 6370000000 HC RX 637 (ALT 250 FOR IP): Performed by: STUDENT IN AN ORGANIZED HEALTH CARE EDUCATION/TRAINING PROGRAM

## 2019-04-10 PROCEDURE — 80048 BASIC METABOLIC PNL TOTAL CA: CPT

## 2019-04-10 PROCEDURE — 2580000003 HC RX 258: Performed by: EMERGENCY MEDICINE

## 2019-04-10 PROCEDURE — 83735 ASSAY OF MAGNESIUM: CPT

## 2019-04-10 PROCEDURE — 82330 ASSAY OF CALCIUM: CPT

## 2019-04-10 PROCEDURE — 6370000000 HC RX 637 (ALT 250 FOR IP): Performed by: INTERNAL MEDICINE

## 2019-04-10 PROCEDURE — 82947 ASSAY GLUCOSE BLOOD QUANT: CPT

## 2019-04-10 PROCEDURE — 93005 ELECTROCARDIOGRAM TRACING: CPT

## 2019-04-10 PROCEDURE — 97530 THERAPEUTIC ACTIVITIES: CPT

## 2019-04-10 PROCEDURE — 99232 SBSQ HOSP IP/OBS MODERATE 35: CPT | Performed by: INTERNAL MEDICINE

## 2019-04-10 PROCEDURE — 85025 COMPLETE CBC W/AUTO DIFF WBC: CPT

## 2019-04-10 PROCEDURE — 36415 COLL VENOUS BLD VENIPUNCTURE: CPT

## 2019-04-10 PROCEDURE — 84100 ASSAY OF PHOSPHORUS: CPT

## 2019-04-10 RX ORDER — AMIODARONE HYDROCHLORIDE 200 MG/1
200 TABLET ORAL 2 TIMES DAILY
Qty: 28 TABLET | Refills: 0 | Status: ON HOLD | OUTPATIENT
Start: 2019-04-10 | End: 2019-04-24 | Stop reason: ALTCHOICE

## 2019-04-10 RX ORDER — ALBUTEROL SULFATE 2.5 MG/3ML
2.5 SOLUTION RESPIRATORY (INHALATION) EVERY 6 HOURS PRN
Qty: 120 EACH | Refills: 3 | Status: ON HOLD | OUTPATIENT
Start: 2019-04-10 | End: 2019-05-08 | Stop reason: HOSPADM

## 2019-04-10 RX ORDER — LACTOBACILLUS RHAMNOSUS GG 10B CELL
2 CAPSULE ORAL 2 TIMES DAILY WITH MEALS
Qty: 30 CAPSULE | Refills: 0 | Status: ON HOLD | OUTPATIENT
Start: 2019-04-10 | End: 2019-05-08 | Stop reason: SDUPTHER

## 2019-04-10 RX ORDER — AMIODARONE HYDROCHLORIDE 200 MG/1
200 TABLET ORAL 2 TIMES DAILY
Qty: 30 TABLET | Refills: 3 | Status: SHIPPED | OUTPATIENT
Start: 2019-04-10 | End: 2019-04-10

## 2019-04-10 RX ORDER — INSULIN GLARGINE 100 [IU]/ML
10 INJECTION, SOLUTION SUBCUTANEOUS NIGHTLY
Qty: 1 VIAL | Refills: 3 | Status: SHIPPED | OUTPATIENT
Start: 2019-04-10 | End: 2019-05-28

## 2019-04-10 RX ORDER — AMIODARONE HYDROCHLORIDE 200 MG/1
200 TABLET ORAL DAILY
Qty: 30 TABLET | Refills: 3 | Status: SHIPPED | OUTPATIENT
Start: 2019-04-24 | End: 2019-04-24

## 2019-04-10 RX ORDER — FUROSEMIDE 80 MG
80 TABLET ORAL DAILY
Qty: 60 TABLET | Refills: 3 | Status: ON HOLD | OUTPATIENT
Start: 2019-04-11 | End: 2019-04-30 | Stop reason: SDUPTHER

## 2019-04-10 RX ADMIN — Medication 10 ML: at 10:38

## 2019-04-10 RX ADMIN — AMIODARONE HYDROCHLORIDE 200 MG: 200 TABLET ORAL at 10:37

## 2019-04-10 RX ADMIN — CITALOPRAM 10 MG: 10 TABLET, FILM COATED ORAL at 10:48

## 2019-04-10 RX ADMIN — FUROSEMIDE 80 MG: 40 TABLET ORAL at 10:48

## 2019-04-10 RX ADMIN — METOPROLOL TARTRATE 25 MG: 25 TABLET ORAL at 10:37

## 2019-04-10 RX ADMIN — DABIGATRAN ETEXILATE MESYLATE 150 MG: 150 CAPSULE ORAL at 10:37

## 2019-04-10 RX ADMIN — Medication 2 CAPSULE: at 08:19

## 2019-04-10 RX ADMIN — INSULIN LISPRO 2 UNITS: 100 INJECTION, SOLUTION INTRAVENOUS; SUBCUTANEOUS at 14:17

## 2019-04-10 RX ADMIN — Medication 2 CAPSULE: at 17:47

## 2019-04-10 RX ADMIN — INSULIN LISPRO 2 UNITS: 100 INJECTION, SOLUTION INTRAVENOUS; SUBCUTANEOUS at 08:18

## 2019-04-10 RX ADMIN — INSULIN LISPRO 2 UNITS: 100 INJECTION, SOLUTION INTRAVENOUS; SUBCUTANEOUS at 17:43

## 2019-04-10 RX ADMIN — Medication 100 MG: at 10:48

## 2019-04-10 ASSESSMENT — PAIN SCALES - GENERAL
PAINLEVEL_OUTOF10: 0
PAINLEVEL_OUTOF10: 0

## 2019-04-10 NOTE — PLAN OF CARE
Problem: Risk for Impaired Skin Integrity  Goal: Tissue integrity - skin and mucous membranes  Description  Structural intactness and normal physiological function of skin and  mucous membranes.   Outcome: Ongoing     Problem: Confusion - Acute:  Goal: Absence of continued neurological deterioration signs and symptoms  Description  Absence of continued neurological deterioration signs and symptoms  Outcome: Ongoing  Goal: Mental status will be restored to baseline  Description  Mental status will be restored to baseline  Outcome: Ongoing     Problem: Discharge Planning:  Goal: Ability to perform activities of daily living will improve  Description  Ability to perform activities of daily living will improve  Outcome: Ongoing  Goal: Participates in care planning  Description  Participates in care planning  Outcome: Ongoing     Problem: Injury - Risk of, Physical Injury:  Goal: Absence of physical injury  Description  Absence of physical injury  Outcome: Ongoing  Goal: Will remain free from falls  Description  Will remain free from falls  Outcome: Ongoing

## 2019-04-10 NOTE — PROGRESS NOTES
[facility] -Progress Note    Today's Date and Time: 4/10/2019, 7:24 AM    Impression :   1. Acute Hypoxic Respiratory failure  2. Influenza A  3. Possible secondary pneumonia with normal mansoor  4. Atrial Fibrillation  5. DM II  6. CHF  7. Renal insufficiency  8. Leukocytosis secondary to hemorrhage, steroids  9. Retroperitoneal bleed    Recommendations:   · Monitor off antibiotics  · Ceftriaxone completed on 4/3  · Continue respiratory toilet. Medical Decision Making/Summary/Discussion:   · Patient with Hx chronic smoking  · Admitted with acute hypoxic respiratory failure associated with Influenza   · Has pulmonary vascular congestion and pleural effusions  · Has CHF and renal insufficiency  · Has completed oseltamivir  · Will discontinue treatment for possible secondary pneumonia with ceftriaxone as of 4-3-19  · On hemodialysis, management per nephrology  · Extubation performed successfully on 4/5  · Pankaj catheter to be removed today per nephrology   Infection Control Recommendations     · Droplet Isolation    Antimicrobial Stewardship Recommendations     · Targeted therapy  · PK dosing  Coordination of Outpatient Care:   · Estimated Length of IV antimicrobials:TBD  · Patient will need Midline Catheter Insertionno:   · Patient will need PICC line Insertion:no   · Patient will need: Home IV , [place],  SNF,  LTAC: TBD  · Patient will need outpatient wound care:No    Chief complaint/reason for consultation:   · Leukocytosis      History of Present Illness:   Micah Gosselin is a 69y.o.-year-old  male who was initially admitted on 3/19/2019. Patient seen at the request of Yossi Graham. INITIAL HISTORY:    ID has been consulted for evaluation of leukocytosis. The patient was transferred to [facility] on 3/19/19 from an outlying facility for worsening respiratory status, Influenza A and elevated troponin.  PMH significant for CAD,DM 2, A fib and chronic smoking. The patient presented to [facility] on 3/15 for sob where his Rapid flu was positive for Influenza A. He refused admission and was treated with Unique-flu as an out patient. He developed worsening sob and hypoxia leading to his admission at the outlying facility on 3/18/19 where he was started on Ceftriaxone, Azithromycin and Tamiflu. He was transferred to [facility] on 3/19/19 because of hypoxic respiratory distress and elevated troponin. He was admitted to ICU and started on BiPAP. CXR on 3-19-19 showed reticular infiltrate and LLL pneumonia. Respiratory culture was obtained on 3/22/19 and showed few gram positive cocci in clusters. Legionella antigen, Strep pneumoniae anitgen and Mycoplasma Ab negative. MRSA swab negative    The patient was intubated on 3/21/19 because of deteriorating respiratory status. The patient completed a course of Unique-flu on 3-25-19. He completed a course of abx and steroids on 3-26-19. The patient spiked a fever (t max 100.4) and became hypotensive requiring pressure support on 3-29-19. He was started on Vancomycin and Zosyn empirically. Blood, urine and sputum cultures were obtained and currently pending. The patient additionally was found to have a Lt sided spontaneous retroperitoneal bleed on 3-29-19 and was hypovolemic requiring multiple transfusions. H&H has been stable today at 9.0. The patient continues to require pressor support and is tachycardic (120). Presently the patient is intubated, sedated and on 3 pressors. He has been febrile (t max 101.5), tachycardic (110-120), tachypneic (30-40's). Lactic acid was elevated at 7.4 yesterday. WBC count has been elevated at 45.3 today. Hgb stable. His kidney function is worsening Cr 3.74    He is on zosyn. Vancomycin held because trough level 18.5 (3/29/19)  Cultures negative so far. CURRENT EVALUATION : 4/10/2019    Patient seen and examined. Says he is continuing to improve.  Says breathing is improving and his cough is also improving. Extubated successfully. Resting comfortably. Denies fevers or chills. Creatinine continuing to improve, 1.33 today from 1.48 yesterday. 80 mg Lasix IV per day per nephrology started. Nephrology has ordered Pankaj to be removed, not anticipating need for further dialysis at this time. Denies fevers, chills, cough, sputum production, or shortness of breath. Platelets improved to 117 today. Heparin gtt stopped per critical care. Hit work up not started. Afebrile last 24 hours  VSS, systolic still remains in the 100's  Respiratory rate slightly elevated, 20 - 25 overnight. On 2l o2. Off ventilator support. Off levophed  On amiodarone drip  Off sedation     Moves all extremities on his own  Opens eyes. Communicates with family    Patient to be discharged to [address] [place] [address] for further rehab. Labs Reviewed: 4/10/2019  WBC: 20.1 > 27.1 > 45.3-->19.7 -> 16.7 -> 12.9 -> 13 -> 12.5 -> 11.7 -> 12.7   Hgb: 9.0 > 8.2 > 8.6-->8.0 -> 8.2 -> 8.8 -> 8.5 -> 8.3 -> 9.0  Lactic acid: 7.4 > 5.5 > 2.6 > 2.4    Platelets: 89 -> 427    BUN: 68 > 84 > 87-->148-->124 -> 71 -> 81 -> 74 -> 64 -> 61  Cr: 1.83 > 3.21 > 3.74-->4.4-->3.06 -> 1.78 -> 1.71 -> 1.62 -> 1.48 -> 1.33    Cultures:  Urine:   · 3/28/19: No growth  · 3/15/19: No growth    Blood:   · 3/28/19: No growth    Sputum :  ·  3/28/19: No growth  · Gram positive cocci in clusters 3/22/19. Normal mansoor    Rapid flu positive for influenza A (3/15/19)    Discussed with patient, RN. I have personally reviewed the past medical history, past surgical history, medications, social history, and family history, and I have updated the database accordingly. Past Medical History:     Past Medical History:   Diagnosis Date    Abnormal nuclear stress test 03/12/2010    Stress and resting cardiolite images showed inferior wall hypoperfusion suggestive of previous myocardial infarction.  Left ventricular wall motion showed inferior wall hypokinesia. EF 58%.  Arrhythmia 2007    A single episode    Atrial fibrillation ([facility]     Single Episode    CAD (coronary artery disease) 1996    MI    DM type 2 (diabetes mellitus, type 2) ([facility]     Hx of echocardiogram 2007    EF 62%, Increased left atrial and right ventricular diametes. Increased septal and posterior wall thickness suggest left ventricular hypertrophy.  Valves were normal.        Past Surgical  History:     Past Surgical History:   Procedure Laterality Date    CARDIOVERSION  2007    Successful-Dr. Renu Arroyo    CARDIOVERSION      CORONARY ANGIOPLASTY         Medications:      dabigatran  150 mg Oral BID    insulin glargine  10 Units Subcutaneous Nightly    insulin lispro  0-12 Units Subcutaneous TID WC    insulin lispro  0-6 Units Subcutaneous Nightly    lactobacillus  2 capsule Oral BID WC    furosemide  80 mg Per NG tube Daily    sodium chloride flush  10 mL Intravenous 2 times per day    amiodarone  200 mg Oral BID    metoprolol tartrate  25 mg Oral BID    citalopram  10 mg Oral Daily    docusate  100 mg Oral Daily    sodium chloride flush  10 mL Intravenous 2 times per day       Social History:     Social History     Socioeconomic History    Marital status:      Spouse name: Not on file    Number of children: Not on file    Years of education: Not on file    Highest education level: Not on file   Occupational History    Not on file   Social Needs    Financial resource strain: Not on file    Food insecurity:     Worry: Not on file     Inability: Not on file    Transportation needs:     Medical: Not on file     Non-medical: Not on file   Tobacco Use    Smoking status: Former Smoker     Packs/day: 1.50     Years: 30.00     Pack years: 45.00     Types: Cigarettes     Last attempt to quit: 1996     Years since quittin.8    Smokeless tobacco: Never Used   Substance and Sexual Activity    Alcohol use: No    Drug use: No    Sexual activity: Not on file   Lifestyle    Physical activity:     Days per week: Not on file     Minutes per session: Not on file    Stress: Not on file   Relationships    Social connections:     Talks on phone: Not on file     Gets together: Not on file     Attends [employer] service: Not on file     Active member of club or organization: Not on file     Attends meetings of clubs or organizations: Not on file     Relationship status: Not on file    Intimate partner violence:     Fear of current or ex partner: Not on file     Emotionally abused: Not on file     Physically abused: Not on file     Forced sexual activity: Not on file   Other Topics Concern    Not on file   Social History Narrative    Not on file       Family History:   No family history on file. Allergies:   Patient has no known allergies. Review of Systems:   Unable to perform ROS. Pt intubated and sedated. Physical Examination :     Patient Vitals for the past 8 hrs:   BP Temp Temp src Pulse Resp SpO2 Weight   04/10/19 0600 (!) 104/47 -- -- 65 20 100 % 280 lb 13.9 oz (127.4 kg)   04/10/19 0500 (!) 119/56 -- -- 65 20 97 % --   04/10/19 0400 (!) 104/58 -- -- 66 25 92 % --   04/10/19 0300 (!) 98/47 97.7 °F (36.5 °C) Oral 63 23 95 % --   04/10/19 0200 127/72 -- -- 69 23 99 % --   04/10/19 0100 (!) 123/51 -- -- 65 22 98 % --   04/10/19 0010 109/68 96.8 °F (36 °C) Axillary 67 20 100 % --     General Appearance: Extubated. Head:  Normocephalic, no trauma  ENT: Intubated. Mouth/throat: mucosa pink and moist.  Neck: Supple, without lymphadenopathy. Pulmonary/Chest: Coarse sounds  Cardiovascular: Irregular rate and rhythm without murmurs, rubs, or gallops. Abdomen: Soft, distended. Bowel sounds normal.  All four Extremities: No cyanosis, clubbing, edema, or effusions. Neurologic: Awake, responsive  Skin: Warm and dry with good turgor. No signs of peripheral arterial or venous insufficiency. No ulcerations.  No open wounds. Medical Decision Making -Laboratory:   I have independently reviewed/ordered the following labs:    CBC with Differential:   Recent Labs     04/09/19  0358  04/09/19  1219 04/10/19  0446   WBC 11.7*  --   --  12.7*   HGB 8.3*  --  8.8* 9.0*   HCT 26.9*  --  28.3* 29.4*   *   < > 116* 117*   LYMPHOPCT 8*  --   --  9*   MONOPCT 11  --   --  12    < > = values in this interval not displayed. BMP:   Recent Labs     04/09/19  0358 04/10/19  0446    136   K 3.8 3.6*    100   CO2 23 26   BUN 64* 61*   CREATININE 1.48* 1.33*   MG 1.8 2.0     Hepatic Function Panel:   No results for input(s): PROT, LABALBU, BILIDIR, IBILI, BILITOT, ALKPHOS, ALT, AST in the last 72 hours. No results for input(s): RPR in the last 72 hours. No results for input(s): HIV in the last 72 hours. No results for input(s): BC in the last 72 hours. Lab Results   Component Value Date    MUCUS NOT REPORTED 03/28/2019    RBC 2.86 04/10/2019    TRICHOMONAS NOT REPORTED 03/28/2019    WBC 12.7 04/10/2019    YEAST NOT REPORTED 03/28/2019    TURBIDITY TURBID 03/28/2019     Lab Results   Component Value Date    CREATININE 1.33 04/10/2019    GLUCOSE 160 04/10/2019    GLUCOSE 104 11/26/2018       Medical Decision Making-Imaging:     Narrative   EXAMINATION:   SINGLE XRAY VIEW OF THE CHEST       3/29/2019 5:54 am       COMPARISON:   March 28, 2019, March 27, 2019       HISTORY:   ORDERING SYSTEM PROVIDED HISTORY: intubated   TECHNOLOGIST PROVIDED HISTORY:   intubated       FINDINGS:   Endotracheal tube terminates at the level of the clavicular heads.  The   enteric tube courses off the field of view in the upper abdomen.  The right   internal jugular line terminates at the distal SVC.  Shallow inflation.    Cardiac silhouette enlargement again noted.  Perihilar and basilar opacities   are again noted without significant change.  No new airspace disease or   pneumothorax identified.           Impression   Unchanged appearance of support tubes and lines.       Perihilar and basilar opacities are without appreciable change which may   represent subsegmental atelectasis versus developing consolidation.             Medical Decision Making-Other: Thank you for allowing us to participate in the care of this patient. Please call with questions. Alexsandra Blankenship     ATTESTATION:    I have discussed the case, including pertinent history and exam findings with the residents. I have seen and examined the patient and the key elements of the encounter have been performed by me. I have reviewed the laboratory data, other diagnostic studies and discussed them with the residents. I have updated the medical record where necessary. I agree with the assessment, plan and orders as documented by the resident.     Cathi Sanchez MD.

## 2019-04-10 NOTE — PLAN OF CARE
ICU Team Daily Plan of Care     NEURO    Pain Score:  Pain Level: 0    Pain Medication Indicated ? no    Transition to PO ? not applicable   Agitation/Sedation      RASS Goal: 0 to -2 Current RASS 0 (Alert and Calm)   Sedation Awakening Trial (SAT) Indicated ?  not applicable   Delirium (CAM-ICU) positive    Non-pharmacologic therapies reviewed ? yes    Sleep enhancement needed ? no    Pharmacological Rx indicated ? no    Restains needed ? no   CARDS     Vasoactive Agents ? no    MAP Goal: >65 mmHg     Current IV fluid rate I.V.: 37 mL     Can IV fluids be discontinued ? not applicable    Additional IV access needed ? no    Cardiac Meds reviewed ? yes    Echocardiogram reviewed ? yes   RESP    Mechanically Ventilated ?   no   Vent Information  $Ventilation: $Subsequent Day  Ventilator Started: Yes  Ventilator Stopped: Yes  Skin Assessment: Clean, dry, & intact  Equipment ID: [id number] #[id number]  Equipment Changed: HME  Vent Type: Servo i  Vent Mode: NIV/PC  Vt Ordered: 550 mL  Pressure Ordered: 6  Rate Set: 10 bmp  Pressure Support: 6 cmH20  FiO2 : 40 %  Sensitivity: 3  PEEP/CPAP: 5  I Time/ I Time %: 0.9 s  Cuff Pressure (cm H2O): 25 cm H2O  Humidification Source: HME  Humidification Temp: 31  Humidification Temp Measured: 31  Circuit Condensation: Drained  Nitric Oxide/Epoprostenol In Use?: No     Lung Protective Ventilation ? no    Vent Bundle (Oral care, HOB >30, Subglottic suction) reviewed ?  not applicable   Spontaneous breathing trial (SBT) indicated ? not applicable   Coordinated SAT & SBT ? not applicable   CXR/CT reviewed ? not applicable   RENAL    Urine Output: adequate  Output  Urine: 250 mL Urine: 250 mL     Intake/Output Summary (Last 24 hours) at 4/10/2019 1343  Last data filed at 4/10/2019 1300  Gross per 24 hour   Intake 480 ml   Output 1245 ml   Net -765 ml        Fluid Balance Goal  positive    Renal panel/BMP reviewed?   yes    Lytes replaced ?  not applicable    Renal replacement

## 2019-04-10 NOTE — PROGRESS NOTES
Physical Therapy  Facility/Department: [address] SICU  Daily Treatment Note  NAME: Kelly Cox  : 1945  MRN: [id number]    Date of Service: 4/10/2019    Discharge Recommendations:  Further therapy recommended at discharge. Patient Diagnosis(es): There were no encounter diagnoses. has a past medical history of Abnormal nuclear stress test, Arrhythmia, Atrial fibrillation ([facility] CAD (coronary artery disease), DM type 2 (diabetes mellitus, type 2) ([facility] and Hx of echocardiogram.   has a past surgical history that includes Coronary angioplasty (); Cardioversion (2007); and Cardioversion (). Restrictions  Restrictions/Precautions  Restrictions/Precautions: General Precautions, Isolation  Required Braces or Orthoses?: No  Position Activity Restriction  Other position/activity restrictions: Droplet iso  Subjective   General  Chart Reviewed: Yes  Response To Previous Treatment: Patient with no complaints from previous session. Family / Caregiver Present: Yes(Spouse)  Subjective  Subjective: Per RN, patient okay for PT. Patient agreeable to transfer to chair. Denied pain. Orientation  Orientation  Overall Orientation Status: Within Functional Limits  Objective   Bed mobility  Supine to Sit: Moderate assistance;Maximum assistance;2 Person assistance  Scooting: Maximal assistance  Transfers  Bed to Chair: Dependent/Total(Luci Roy)  Comment: Patient completed STS with [place] x2 trials from various surface heights; Koki x2 from EOB, CGA from elevated seat height, ModA x2 from chair. Ambulation  Ambulation?: No(Unsafe to attempt)     Balance  Posture: Good  Sitting - Static: Poor;+  Sitting - Dynamic: Poor  Standing - Static: Poor;+  Comments: Patient required Koki to maintain EOB balance, standing balance assessed with SS. Encouraged maximal standing time with each trials (~25\"). Assessment   Body structures, Functions, Activity limitations: Decreased functional mobility ; Decreased endurance;Decreased ROM; Decreased strength;Decreased balance  Assessment: Patient required two person physical assistance to safely complete bed mobility. Improved strength as demonstrated by ability to stand with increased independence. Significant strength and balance deficits persist.   Prognosis: Good  REQUIRES PT FOLLOW UP: Yes  Activity Tolerance  Activity Tolerance: Patient limited by fatigue;Patient limited by endurance     Goals  Short term goals  Time Frame for Short term goals: 14  Short term goal 1: Pt to improve static/dynamic sitting and standing balance to good.     Short term goal 2: Pt to complete bed mobility tasks with CGA  Short term goal 3: Pt to stand to SaraStedy for 2min with CGA  Short term goal 4: Pt to complete SaraStedy transfers from EOB to chair using CGA  Short term goal 5: Pt to tolerate 30 minutes of activity to assist with increasing strength and endurance    Plan    Plan  Times per week: 5-6x/week  Current Treatment Recommendations: ROM, Strengthening, Endurance Training, Functional Mobility Training, Balance Training, Transfer Training, Safety Education & Training  Safety Devices  Type of devices: Call light within reach, Gait belt, Left in chair, Nurse notified  Restraints  Initially in place: No     Therapy Time   Individual Concurrent Group Co-treatment   Time In 1114         Time Out 1142         Minutes [address], [address]

## 2019-04-10 NOTE — DISCHARGE INSTR - COC
Continuity of Care Form    Patient Name: Zev Holman   :  1945  MRN:  [id number]    [facility] date:  3/19/2019  Discharge date:  4/10/19    Code Status Order: Full Code   Advance Directives:   [facility] Documentation     Date/Time Healthcare Directive Type of Healthcare Directive Copy in [address] Agent's Name Healthcare Agent's Phone Number    [phone]  No, patient does not have an advance directive for healthcare treatment -- -- -- -- --          Admitting Physician:  Toro Walls MD  PCP: Lori Arreola MD    Discharging Nurse: Robb Bella RN  [address] Unit/Room#: 0105/0105-01  Discharging Unit Phone Number: [phone]    Emergency Contact:   Extended Emergency Contact Information  Primary Emergency Contact: Rashida Yang  Address: [address] Phone: [phone]  Mobile Phone: [phone]  Relation: Spouse   needed? No  Secondary Emergency Contact: Gloria Caldwell [address] Phone: [phone]  Relation: Child   needed?  No    Past Surgical History:  Past Surgical History:   Procedure Laterality Date    CARDIOVERSION  2007    Successful-Dr. Tera Arroyo    CARDIOVERSION      CORONARY ANGIOPLASTY         Immunization History:   Immunization History   Administered Date(s) Administered    Influenza Vaccine, unspecified formulation 10/06/2017    Influenza Virus Vaccine 10/30/2014, 10/22/2015    Influenza, [place], 3 Years and older, IM (Fluzone 3 yrs and older or Afluria 5 yrs and older) 10/10/2016    Influenza, [place], 3 yrs and older, IM, PF (Fluzone 3 yrs and older or Afluria 5 yrs and older) 10/11/2018    Pneumococcal 13-valent Conjugate ([id number]) 2017    Pneumococcal Polysaccharide ([id number]) 2018    Tetanus 2013    Zoster Live (Zostavax) 2017       Active Problems:  Patient Active necessary for the continuing treatment of the diagnosis listed and that he requires [place] for less 30 days.      Update Admission H&P: No change in H&P    PHYSICIAN SIGNATURE:  Electronically signed by Franco Mcgrath MD on 4/10/19 at 11:20 AM     NEPHROLOGY  BMP next week Monday and fax results to [phone] Dr Anam Smith  Call [phone] for appointment in [facility] with Dr. Anam Smith in 4-6 weeks    Dr Antionette Solares

## 2019-04-10 NOTE — PROGRESS NOTES
INTENSIVE CARE UNIT  Resident Physician Progress Note    Patient - Gus Wade  Date of Admission -  3/19/2019  7:58 AM  Date of Evaluation -  4/10/2019  Room and Bed Number -  0105/0105-01   Hospital Day -       SUBJECTIVE:     OVERNIGHT EVENTS:    No acute events. Patient continuing to refuse BiPAP intermittently. Stating he wants to go home.   TODAY:      AWAKE & FOLLOWING COMMANDS:  [] No   [x] Yes    SECRETIONS Amount:  [] Small [] Moderate  [] Large  [x] None  Color:     [] White [] Colored  [] Bloody    SEDATION:  RAAS Score:  [] Propofol gtt  [] Versed gtt  [] Ativan gtt   [x] No Sedation    PARALYZED:  [x] No    [] Yes    VASOPRESSORS:  [x] No    [] Yes  [] Levophed [] Dopamine [] Vasopressin  [] Dobutamine [] Phenylephrine [] Epinephrine      OBJECTIVE:     VITAL SIGNS:  BP (!) 104/47   Pulse 65   Temp 97.7 °F (36.5 °C) (Oral)   Resp 20   Ht 5' 8\" (1.727 m)   Wt 280 lb 13.9 oz (127.4 kg)   SpO2 100%   BMI 42.71 kg/m²   Tmax over 24 hours:  Temp (24hrs), Av.9 °F (36.6 °C), Min:96.8 °F (36 °C), Max:98.2 °F (36.8 °C)      Patient Vitals for the past 8 hrs:   BP Temp Temp src Pulse Resp SpO2 Weight   04/10/19 0600 (!) 104/47 -- -- 65 20 100 % 280 lb 13.9 oz (127.4 kg)   04/10/19 0500 (!) 119/56 -- -- 65 20 97 % --   04/10/19 0400 (!) 104/58 -- -- 66 25 92 % --   04/10/19 0300 (!) 98/47 97.7 °F (36.5 °C) Oral 63 23 95 % --   04/10/19 0200 127/72 -- -- 69 23 99 % --   04/10/19 0100 (!) 123/51 -- -- 65 22 98 % --   04/10/19 0010 109/68 96.8 °F (36 °C) Axillary 67 20 100 % --   19 2321 -- -- -- 69 21 99 % --         Intake/Output Summary (Last 24 hours) at 4/10/2019 0704  Last data filed at 4/10/2019 0458  Gross per 24 hour   Intake 840 ml   Output 1260 ml   Net -420 ml     Date 04/10/19 0000 - 04/10/19 2359   Shift 0380-6668 9534-3410 8292-4459 24 Hour Total   INTAKE   P.O.(mL/kg/hr) 120   120   Shift Total(mL/kg) 120(0.9)   120(0.9)   OUTPUT   Urine(mL/kg/hr) 450   450   Shift Total(mL/kg) 450(3.5)   450(3.5)   Weight (kg) 127.4 127.4 127.4 127.4     Wt Readings from Last 3 Encounters:   04/10/19 280 lb 13.9 oz (127.4 kg)   03/19/19 294 lb 11.2 oz (133.7 kg)   03/18/19 300 lb (136.1 kg)     Body mass index is 42.71 kg/m².         PHYSICAL EXAM:  GEN:  awake, alert, cooperative, no apparent distress, and appears stated age  EYES:  pupils equal, round, and reactive to light  HEENT:  Normocepalic, without obvious abnormality  LUNGS:  No increased work of breathing, good air exchange, clear to auscultation bilaterally, no crackles or wheezing  CV:    regular rate and rhythm and no murmur noted  ABDOMEN:   normal bowel sounds  MSK:    there is no redness, warmth, or swelling of the joints  NEURO[de-identified]   Alert, following commands, alert to name, states it is 1900 and divina is the president, then laughs and says \"I'm just messing with you\"  SKIN:   No bruising or bleeding  EXTREMITIES:  No pedal or leg edema, no calf tenderness/swelling, no erythema, distal pulses intact       MEDICATIONS:  Scheduled Meds:   dabigatran  150 mg Oral BID    insulin glargine  10 Units Subcutaneous Nightly    insulin lispro  0-12 Units Subcutaneous TID WC    insulin lispro  0-6 Units Subcutaneous Nightly    lactobacillus  2 capsule Oral BID WC    furosemide  80 mg Per NG tube Daily    sodium chloride flush  10 mL Intravenous 2 times per day    amiodarone  200 mg Oral BID    metoprolol tartrate  25 mg Oral BID    citalopram  10 mg Oral Daily    docusate  100 mg Oral Daily    sodium chloride flush  10 mL Intravenous 2 times per day     Continuous Infusions:   dextrose       PRN Meds:     midodrine 5 mg TID PRN   sodium chloride 250 mL PRN   sodium chloride 150 mL PRN   sodium chloride flush 10 mL PRN   sodium chloride 250 mL PRN   sodium chloride 150 mL PRN   fentanNYL 50 mcg Q1H PRN   Or     fentanNYL 100 mcg Q1H PRN   acetaminophen 650 mg Q4H PRN   magnesium hydroxide 30 mL Daily PRN   albuterol 2.5 mg Q6H PRN LORazepam 0.5 mg Q8H PRN   sodium chloride flush 10 mL PRN   ondansetron 4 mg Q6H PRN   glucose 15 g PRN   dextrose 12.5 g PRN   glucagon (rDNA) 1 mg PRN   dextrose 100 mL/hr PRN       SUPPORT DEVICES: [] Ventilator [] BIPAP  [] Nasal Cannula [x] Room Air    VENT SETTINGS (Comprehensive) (if applicable):  Vent Information  $Ventilation: $Subsequent Day  Ventilator Started: Yes  Ventilator Stopped: Yes  Skin Assessment: Clean, dry, & intact  Equipment ID: [id number] #[id number]  Equipment Changed: HME  Vent Type: Servo i  Vent Mode: NIV/PC  Vt Ordered: 550 mL  Pressure Ordered: 6  Rate Set: 10 bmp  Pressure Support: 6 cmH20  FiO2 : 40 %  Sensitivity: 3  PEEP/CPAP: 5  I Time/ I Time %: 0.9 s  Cuff Pressure (cm H2O): 25 cm H2O  Humidification Source: HME  Humidification Temp: 31  Humidification Temp Measured: 31  Circuit Condensation: Drained  Nitric Oxide/Epoprostenol In Use?: No  Additional Respiratory  Assessments  Pulse: 65  Resp: 20  SpO2: 100 %  End Tidal CO2: 35 (%)  pCO2 (TCOM, mmHg): 63 mmHg  Position: Semi-Lyn's  Humidification Source: HME  Humidification Temp: 31  Circuit Condensation: Drained  Oral Care Completed?: Yes  Oral Care: Mouth moisturizer, Mouth suctioned  Subglottic Suction Done?: Yes  Cuff Pressure (cm H2O): 25 cm H2O  Skin barrier applied: Yes    ABGs:   Lab Results   Component Value Date    PHART 7.310 03/18/2019    CGH6RJQ 51.7 03/18/2019    PO2ART 67.8 03/18/2019    WZR3SIU 25.4 03/18/2019    LXR6XOL 26 04/05/2019    O5SJLKPS 91.7 03/18/2019    FIO2 30.0 04/05/2019         DATA:  Complete Blood Count: Recent Labs     04/08/19  0448  04/09/19  0358 04/09/19  0756 04/09/19  1219 04/10/19  0446   WBC 12.5*  --  11.7*  --   --  12.7*   RBC 2.68*  --  2.60*  --   --  2.86*   HGB 8.5*   < > 8.3*  --  8.8* 9.0*   HCT 27.7*   < > 26.9*  --  28.3* 29.4*   .4*  --  103.5*  --   --  102.8   MCH 31.7  --  31.9  --   --  31.5   MCHC 30.7  --  30.9  --   --  30.6   RDW 19.3*  --  19.0*  --   --  19.5* PLT 89*   < > 101* 102* 116* 117*   MPV 10.5  --  10.2  --   --  9.8    < > = values in this interval not displayed. Last 3 Blood Glucose:   Recent Labs     04/08/19 0448 04/09/19 0358 04/10/19  0446   GLUCOSE 135* 147* 160*        PT/INR:    Lab Results   Component Value Date    PROTIME 12.5 03/29/2019    INR 1.2 03/29/2019     PTT:    Lab Results   Component Value Date    APTT 29.8 04/07/2019       Comprehensive Metabolic Profile:   Recent Labs     04/08/19 0448 04/09/19 0358 04/10/19  0446    135 136   K 3.7 3.8 3.6*    101 100   CO2 23 23 26   BUN 74* 64* 61*   CREATININE 1.62* 1.48* 1.33*   GLUCOSE 135* 147* 160*   CALCIUM 7.9* 8.0* 8.5*      Magnesium:   Lab Results   Component Value Date    MG 2.0 04/10/2019    MG 1.8 04/09/2019    MG 2.0 04/08/2019     Phosphorus:   Lab Results   Component Value Date    PHOS 2.8 04/10/2019    PHOS 2.8 04/09/2019    PHOS 3.5 04/08/2019     Ionized Calcium:   Lab Results   Component Value Date    CAION 1.18 04/10/2019    CAION 1.12 04/09/2019    CAION 1.13 04/08/2019        Urinalysis: Lab Results   Component Value Date    NITRU NEGATIVE 03/28/2019    COLORU DARK YELLOW 03/28/2019    PHUR 5.0 03/28/2019    WBCUA 5 TO 10 03/28/2019    RBCUA 5 TO 10 03/28/2019    MUCUS NOT REPORTED 03/28/2019    TRICHOMONAS NOT REPORTED 03/28/2019    YEAST NOT REPORTED 03/28/2019    BACTERIA NOT REPORTED 03/28/2019    SPECGRAV 1.024 03/28/2019    LEUKOCYTESUR TRACE 03/28/2019    UROBILINOGEN Normal 03/28/2019    BILIRUBINUR NEGATIVE  Verified by ictotest. 03/28/2019    GLUCOSEU TRACE 03/28/2019    KETUA TRACE 03/28/2019    AMORPHOUS NOT REPORTED 03/28/2019       HgBA1c:    Lab Results   Component Value Date    LABA1C 8.6 03/18/2019     TSH:  No results found for: TSH  Lactic Acid:   Lab Results   Component Value Date    LACTA 2.2 03/18/2019    LACTA 2.6 03/18/2019      Troponin: No results for input(s): TROPONINI in the last 72 hours.       ASSESSMENT:     Patient Active Problem List    Diagnosis Date Noted    [facility]STO [facility] ([facility]     Transaminitis     Ischemic hepatitis     IVIS (obstructive sleep apnea)     Leukocytosis     Hemorrhagic shock (HCC)     Retroperitoneal hematoma     Lactic acidosis     Hyperglycemia     Acute blood loss anemia     Community acquired pneumonia of left lower lobe of lung ([facility]     Influenza A with respiratory manifestations 03/18/2019    Acute respiratory failure with hypoxia ([facility] 03/18/2019    Influenza A 03/18/2019    Acute hypercapnic respiratory failure ([facility] 03/18/2019    NSTEMI (non-ST elevated myocardial infarction) ([facility]     Type 2 diabetes mellitus without complication, without long-term current use of insulin ([facility] 09/06/2016    Adult BMI > 30 05/06/2016    CAD (coronary artery disease)     DM type 2 (diabetes mellitus, type 2) ([facility])     Atrial fibrillation with rapid ventricular response ([facility] 01/01/2007          PLAN:     WEAN PER PROTOCOL:  [x] No   [] Yes  [] N/A    ICU PROPHYLAXIS:  Stress ulcer:  [] PPI Agent  [] [id number] [] Sucralfate  [] Other:  VTE:   [x] Enoxaparin  [] Unfract. Heparin Subcut  [] EPC Cuffs    NUTRITION:  [] NPO [] Tube Feeding (Specify: ) [] TPN  [x] PO    HOME MEDS RECONCILED: [] No  [x] Yes    CONSULTATION NEEDED:  [x] No  [] Yes    FAMILY UPDATED:    [] No  [x] Yes    TRANSFER OUT OF ICU:   [] No  [x] Yes        Plan:  Hemorrhagic shock 2/2 retroperitoneal bleed - Resolved, off pressors, hemoglobin stable 9.0. Vascular signed off.       Acute Respiratory failure 2/2 Inf A and PNA. Resolved. Extubated 4/5 after HD. On nasal canula, tolerating well. Breathing treatment with Duoneb.  Last dose of Rocephin 2 g on 4/3.  ID following. BiPAP at night, NC during the day. On RA.      Acute on chronic systolic CHF - EF 35% 4/57/8235 - On lasix 80 mg PO daily. No further plans for hemodialysis. No s/s of fluid overload. Borderline hypotension- doubt hypovolemia, will schedule midodrine. Hold Lopressor for BP <100     Chronic A. fib with RVR, controlled.  Continue amiodarone 200 mg twice a day, Lopressor 25 mg twice a day.  Cardiology following. On Eliquis.     ERNESTO 2/2 ischemic ATN.  Nephro recommending no further hemodialysis, creatinine continues to improve. Urine output 0.5 ml/kg/hr.     Type 2 diabetes mellitus - blood glucose improved. Lantus to 10 units nightly with medium dose sliding scale, point-of-care glucose checks every 4 hours     Thrombocytopenia -On Eliquis. Platelets stable.          Murphy Fowler MD  Internal Medicine PGY2  4/10/2019 7:04 AM

## 2019-04-10 NOTE — FLOWSHEET NOTE
04/09/19 1530   Provider Notification   Reason for Communication Evaluate  (critical care on unit)   Provider Name Dr. Teo Stearns   Provider Notification Physician   Method of Communication Face to face   Response In department   Notification Time    Dr. Teo Stearns on unit- okay with browne catheter coming out, and if able to get a PIV, RN able to d/c central line. RN to follow up.
DATE: 2019  NAME: Charley Byrd  MRN: [id number]   : 1945      Discharge Recommendations   Further therapy recommended at discharge. Subjective:RN and pt agreeable to ROM. Pain: Pt alert in bed; shook head \"No\" when questioned about pain  Patient follows: most commands  Is patient on ventilator: yes  Is patient on sedation: no  Precautions: B wrist restraints reapplied after session    Therapeutic exercises:  AAROM x BLEs in all planes of motion x 10 reps, moderate VC/TC for pt participation  Bilateral gastrocnemius stretching 30''x2  AAROM BUEs in all planes of motion x 10 reps each    Goals  Short Term Goals  Short term goal 1: Prevent contractures through ROM and stretching. Short term goal 2: Pt able to tolerate 30 min activity  Short term goal 3: Pt able to complete AROM of extrimites in all planes. Short term goal 4: Progress with mobility as appropriate. Plan: Progress functional mobility as medically appropriate.    Time In: 1146 am  Time Out: 1207 pm  Time Coded Minutes (treatment minutes): 21  Rehab Potential: fair  Treatments/week: 2-3x until extubated    Fuentes Shen PTA
DATE: 4/3/2019    NAME: Fiona Vidales  MRN: [id number]   : 1945    Patient not seen this date for Physical Therapy due to:  [] Blood transfusion in progress  [x] Hemodialysis; will check back in PM time permitting  []  Patient Declined  [] Spine Precautions   [] Strict Bedrest  [] Surgery/ Procedure  [] Testing      [] Other        [] PT being discontinued at this time. Patient independent. No further needs. [] PT being discontinued at this time as the patient has been transferred to palliative care. No further needs.     Fly Vick, PTA
Discussed sedation with Dr Darryle Buhl. Verbal orders given to DC Precedex, continue Fentanyl gtt and start propofol gtt.
Dr Racquel Alexander spoke to wife   And family about current care. Blood count stable but pt requiring 3 BP meds at this time. . Kidneys are not workin well.
Pt refusing BiPAP at this time. Pt was educated as to the risks associated with doing so, he states understanding.
feelings/needs/concerns;Expressed gratitude

## 2019-04-10 NOTE — PROGRESS NOTES
NEPHROLOGY PROGRESS NOTE      SUBJECTIVE     Pankaj catheter and Pitt catheter has been removed. No significant post void residual noted. Urine output is excellent 1.2 L last 24 hours recorded. It is not accurate as he was incontinent 2. On oral Lasix at a stable dose. Blood pressure reasonably well controlled. Appetite is decent. Renal function continues to improve. Ultrasound of the kidneys noted. Plans for discharge today noted to [address] [place] [address]. OBJECTIVE     Vitals:    04/10/19 0400 04/10/19 0500 04/10/19 0600 04/10/19 0800   BP: (!) 104/58 (!) 119/56 (!) 104/47    Pulse: 66 65 65    Resp: 25 20 20    Temp:    97.5 °F (36.4 °C)   TempSrc:    Axillary   SpO2: 92% 97% 100%    Weight:   280 lb 13.9 oz (127.4 kg)    Height:         24HR INTAKE/OUTPUT:      Intake/Output Summary (Last 24 hours) at 4/10/2019 1017  Last data filed at 4/10/2019 0458  Gross per 24 hour   Intake 600 ml   Output 950 ml   Net -350 ml       General appearance:Awake, alert, in no acute distress  HEENT: PERRLA  Respiratory::vesicular breath sounds,no wheeze/crackles  Cardiovascular:S1 S2 normal,no gallop or organic murmur. Abdomen:Non tender/non distended. Bowel sounds present  Extremities: No Cyanosis or Clubbing, present Lower extremity edema  Neurological:Alert and oriented. No abnormalities of mood, affect, memory, mentation, or behavior are noted      MEDICATIONS     Scheduled Meds:    dabigatran  150 mg Oral BID    insulin glargine  10 Units Subcutaneous Nightly    insulin lispro  0-12 Units Subcutaneous TID WC    insulin lispro  0-6 Units Subcutaneous Nightly    lactobacillus  2 capsule Oral BID WC    furosemide  80 mg Per NG tube Daily    sodium chloride flush  10 mL Intravenous 2 times per day    amiodarone  200 mg Oral BID    metoprolol tartrate  25 mg Oral BID    citalopram  10 mg Oral Daily    docusate  100 mg Oral Daily    sodium chloride flush  10 mL Intravenous 2 times per day     Continuous Infusions:    shock/influenza a infection) - Had HD for few days and now recovering  Baseline creatinine normal  2. Left retroperitoneal bleed - repeat USS stable. Watch for PAGE Kidney in future  3. Influenza A  4. Atrial fibrillation persistent with echo revealing ejection fraction 40%  5. Anemia    PLAN     1. Continue oral diuretics  2. Follow-up with Dr. Shayy Short in [facility] in 4-6 weeks  3.   We will sign off    Please do not hesitate to call with questions    This note is created with the assistance of a speech-recognition program. While intending to generate a document that actually reflects the content of the visit, no guarantees can be provided that every mistake has been identified and corrected by editing    Tom Smith MD, [facility] Jorgito Wall, [address]   4/10/2019 10:17 AM  [facility]

## 2019-04-11 LAB
EKG ATRIAL RATE: 50 BPM
EKG Q-T INTERVAL: 470 MS
EKG QRS DURATION: 104 MS
EKG QTC CALCULATION (BAZETT): 470 MS
EKG R AXIS: 20 DEGREES
EKG T AXIS: 96 DEGREES
EKG VENTRICULAR RATE: 60 BPM
GLUCOSE BLD-MCNC: 191 MG/DL (ref 75–110)

## 2019-04-15 ENCOUNTER — HOSPITAL ENCOUNTER (OUTPATIENT)
Age: 74
Setting detail: SPECIMEN
Discharge: HOME | End: 2019-04-15
Payer: OTHER GOVERNMENT

## 2019-04-15 LAB
ANION GAP SERPL CALCULATED.3IONS-SCNC: 7 MMOL/L (ref 9–17)
BUN BLDV-MCNC: 36 MG/DL (ref 8–23)
BUN/CREAT BLD: 30 (ref 9–20)
CALCIUM SERPL-MCNC: 8.5 MG/DL (ref 8.6–10.4)
CHLORIDE BLD-SCNC: 99 MMOL/L (ref 98–107)
CO2: 30 MMOL/L (ref 20–31)
CREAT SERPL-MCNC: 1.21 MG/DL (ref 0.7–1.2)
GFR AFRICAN AMERICAN: >60 ML/MIN
GFR NON-AFRICAN AMERICAN: 59 ML/MIN
GFR SERPL CREATININE-BSD FRML MDRD: ABNORMAL ML/MIN/{1.73_M2}
GFR SERPL CREATININE-BSD FRML MDRD: ABNORMAL ML/MIN/{1.73_M2}
GLUCOSE BLD-MCNC: 146 MG/DL (ref 70–99)
HCT VFR BLD CALC: 30.6 % (ref 40.7–50.3)
HEMOGLOBIN: 9.2 G/DL (ref 13–17)
MCH RBC QN AUTO: 31.7 PG (ref 25.2–33.5)
MCHC RBC AUTO-ENTMCNC: 30.1 G/DL (ref 28.4–34.8)
MCV RBC AUTO: 105.5 FL (ref 82.6–102.9)
NRBC AUTOMATED: 0 PER 100 WBC
PDW BLD-RTO: 18.8 % (ref 11.8–14.4)
PLATELET # BLD: 201 K/UL (ref 138–453)
PMV BLD AUTO: 9.8 FL (ref 8.1–13.5)
POTASSIUM SERPL-SCNC: 4.1 MMOL/L (ref 3.7–5.3)
RBC # BLD: 2.9 M/UL (ref 4.21–5.77)
SODIUM BLD-SCNC: 136 MMOL/L (ref 135–144)
WBC # BLD: 10.9 K/UL (ref 3.5–11.3)

## 2019-04-15 PROCEDURE — 36415 COLL VENOUS BLD VENIPUNCTURE: CPT

## 2019-04-15 PROCEDURE — 85027 COMPLETE CBC AUTOMATED: CPT

## 2019-04-15 PROCEDURE — 80048 BASIC METABOLIC PNL TOTAL CA: CPT

## 2019-04-17 PROBLEM — J10.1 INFLUENZA A: Status: RESOLVED | Noted: 2019-03-18 | Resolved: 2019-04-17

## 2019-04-17 NOTE — DISCHARGE SUMMARY
transfusions to compensate for his hemorrhagic shock and required pressor support as well. He developed ERNESTO secondary to ischemic HTN, was briefly on dialysis, and this resolved during hospital admission. Patient did require BiPAP after extubation, which she intermittently refused to wear. Labs and imaging were followed daily. At time of discharge, Charlotte Hernandez was tolerating a No diet orders on file, having bowel movements, and is medically stable to be discharged to be discharged to SNF. PROCEDURES:    Intubation, hemodialysis,     PHYSICAL EXAMINATION        Discharge Vitals:  height is 5' 8\" (1.727 m) and weight is 280 lb 13.9 oz (127.4 kg). His axillary temperature is 96.6 °F (35.9 °C). His blood pressure is 106/54 (abnormal) and his pulse is 64. His respiration is 23 and oxygen saturation is 100%.    GEN:               awake, alert, cooperative, no apparent distress, and appears stated age  EYES:  pupils equal, round, and reactive to light  HEENT:           Normocepalic, without obvious abnormality  LUNGS:           No increased work of breathing, good air exchange, clear to auscultation bilaterally, no crackles or wheezing  CV:                  regular rate and rhythm and no murmur noted  ABDOMEN:     normal bowel sounds  MSK:               there is no redness, warmth, or swelling of the joints  NEURO[de-identified]         Alert, following commands, alert to name, states it is 1900 and divina is the president, then laughs and says \"I'm just messing with you\"  SKIN:               No bruising or bleeding  EXTREMITIES:  No pedal or leg edema, no calf tenderness/swelling, no erythema, distal       LABS/IMAGING     Recent Labs     04/15/19  0715   WBC 10.9   HGB 9.2*   HCT 30.6*         K 4.1   CL 99   CO2 30   BUN 36*   CREATININE 1.21*       Ct Abdomen Pelvis Wo Contrast    Result Date: 3/28/2019  EXAMINATION: CT OF THE ABDOMEN AND PELVIS WITHOUT CONTRAST, 3/28/2019 1:54 pm TECHNIQUE: CT of the abdomen and pelvis was performed without the administration of intravenous contrast. Multiplanar reformatted images are provided for review. Dose modulation, iterative reconstruction, and/or weight based adjustment of the mA/kV was utilized to reduce the radiation dose to as low as reasonably achievable. COMPARISON: 09/05/2007. HISTORY: ORDERING SYSTEM PROVIDED HISTORY: r/o retroperitoneal bleed FINDINGS: Lower Chest: Bilateral pleural effusions with dependent lower lobe atelectasis. The heart is enlarged. Small pericardial effusion. Organs: The unenhanced liver, spleen, pancreas and adrenal glands are unremarkable. No acute biliary findings. There is anterior displacement of the left kidney by the retroperitoneal hematoma. Punctate nonobstructive lower pole right renal calculus. No ureteral calculi or significant hydronephrosis. GI/Bowel: Scattered colonic gas and stool with no pericolonic inflammatory changes. Diverticulosis with no acute features. No small bowel distension. The stomach is decompressed with enteric catheter in the gastric body. The duodenal C-loop is unremarkable. Pelvis: There is diffuse infiltration of the fat planes throughout the pelvis. No well-formed pelvic hematoma or free pelvic fluid. The bladder is decompressed with a Pitt catheter in place. The prostate is not enlarged. Peritoneum/Retroperitoneum: Large retroperitoneal hematoma along the left psoas muscle with hematocrit effect. The hematoma measures 9.2 x 9.3 x 14 cm in length. There is hemorrhage tracking to the left hemidiaphragm posteriorly. There is displacement of the left kidney anteriorly. There is some hemorrhagic stranding extending in the retroperitoneum around the right kidney. Small quantity of upper abdominal ascites. No free intraperitoneal air. There is diffuse ectasia of the abdominal aorta with severe atherosclerotic plaque.   There is internal calcification within the aortic lumen suggesting a dissection. Bones/Soft Tissues: No acute osseous or soft tissue abnormality. Fat containing inguinal hernias bilaterally. 1. Large left retroperitoneal hematoma extending from the posterior hemidiaphragm to the upper pelvis. Hematocrit effect with a hematoma measuring maximally 9.3 cm in diameter and about 14 cm in length. There is also a small amount of upper abdominal ascites. No free air. 2. Bilateral pleural effusions with dependent lower lobe atelectasis. 3. No evidence of bowel obstruction. Diverticulosis with no acute features. 4. Diffuse ectasia of the abdominal aorta with severe atherosclerotic plaque. No follow-up imaging based on comparison of the aneurysmal segment to the nonaneurysmal segment. Intraluminal calcifications suggesting a chronic dissection. Findings were discussed with Monet Vallejo at 2:13 pm on 3/28/2019. RECOMMENDATIONS: Managing Abdominal Aortic Aneurysms 2.6-2.9 cm: Every 5 years* *For abdominal aortas with maximum diameter of 2.6-2.9 cm meeting criteria for AAA (>50% of proximal normal segment). Reference: J Vasc Surg. 2009 Oct;50(4 Suppl):S2-49     Ct Chest Wo Contrast    Result Date: 3/21/2019  EXAMINATION: CT OF THE CHEST WITHOUT CONTRAST 3/21/2019 3:32 pm TECHNIQUE: CT of the chest was performed without the administration of intravenous contrast. Multiplanar reformatted images are provided for review. Dose modulation, iterative reconstruction, and/or weight based adjustment of the mA/kV was utilized to reduce the radiation dose to as low as reasonably achievable. COMPARISON: Chest x-ray dated 03/21/2019 HISTORY: ORDERING SYSTEM PROVIDED HISTORY: Hypoxia, LL infilterate FINDINGS: Mediastinum: Endotracheal tube is in place. Enteric tube is seen as well, with distal tip in the mid stomach. Heart is mildly prominent in size. There is minimal pericardial fluid. Coronary artery calcification and/or stenting is noted.   Thoracic aorta and pulmonary arteries are normal in caliber. There is no mediastinal lymphadenopathy. No obvious hilar lymphadenopathy, but assessment is limited due to lack of IV contrast. Calcified lymph nodes are noted in the mediastinum and left hilum. There is bilateral gynecomastia. No evidence of axillary lymphadenopathy. Lungs/pleura: There is a small to moderate right pleural effusion and tiny left pleural effusion. There is bibasilar airspace consolidation, more dependent in position on the right and patchy at the left lung base. No pneumothorax. There are patchy ground-glass densities throughout the aerated portions of the lungs, more so on the right. Upper Abdomen: Limited visualized upper abdominal structures are grossly unremarkable. Soft Tissues/Bones: There is no acute or suspicious osseous abnormality. Visualized superficial soft tissues are within normal limits. 1.  Small to moderate right pleural effusion and tiny left pleural effusion with bibasilar airspace consolidation, possibly related to atelectasis or pneumonia. 2.  Patchy ground-glass opacities in the mid and upper lungs, more so on the right, suggesting asymmetric pulmonary edema. 3.  Endotracheal and enteric tubes in place. Us Renal Complete    Result Date: 4/9/2019  EXAMINATION: RETROPERITONEAL ULTRASOUND OF THE KIDNEYS AND URINARY BLADDER 4/9/2019 COMPARISON: None HISTORY: ORDERING SYSTEM PROVIDED HISTORY: RENAL FAILURE, ACUTE (KIDNEY INJURY) TECHNOLOGIST PROVIDED HISTORY: ERNESTO FINDINGS: Kidneys: Right kidney measures 12.3 cm in length and left kidney measures 12.2 cm in length. Kidneys demonstrate normal cortical echogenicity. No evidence of hydronephrosis or intrarenal stones. Incidental note is made of a small amount of free fluid in the anterior pelvis. In addition, there is an ill-defined region of hypoechogenicity adjacent to the left kidney, likely corresponding to the retroperitoneal hematoma seen on previous CT. Left pleural effusion is noted.  Bladder: Urinary bladder is nondistended due to Pitt catheterization. 1.  Unremarkable sonographic appearance of the kidneys. 2.  Incidental note of a small amount of free fluid in the pelvis as well as ill-defined hypoechogenicity adjacent to the left kidney, likely representing the retroperitoneal hematoma seen on previous CT. Left pleural effusion is noted as well. 3.  Nonvisualized urinary bladder due to Pitt catheterization. Us Gallbladder Ruq    Result Date: 3/31/2019  EXAMINATION: RIGHT UPPER QUADRANT ULTRASOUND 3/31/2019 7:27 am COMPARISON: CT 03/28/2019 HISTORY: ORDERING SYSTEM PROVIDED HISTORY: ABD PAIN, FEVER, NO RECENT SURGERY FINDINGS: Technically limited exam. LIVER:  The liver demonstrates normal echogenicity without evidence of intrahepatic biliary ductal dilatation. The main portal vein is patent with antegrade flow. BILIARY SYSTEM:  Gallbladder is unremarkable without evidence of pericholecystic fluid, wall thickening or stones. Negative sonographic Flores's sign. Common bile duct is within normal limits measuring 5 mm. RIGHT KIDNEY: Not visualized. PANCREAS:  Not visualized. OTHER: Small amount of perihepatic ascites. No evidence for acute cholecystitis, cholelithiasis or biliary dilatation. Small volume perihepatic ascites. Xr Chest Portable    Result Date: 4/5/2019  EXAMINATION: SINGLE XRAY VIEW OF THE CHEST 4/5/2019 6:36 am COMPARISON: 4 April 2019 HISTORY: ORDERING SYSTEM PROVIDED HISTORY: follow up pulmonary edema TECHNOLOGIST PROVIDED HISTORY: follow up pulmonary edema FINDINGS: AP portable view of the chest time stamped at 604 hours demonstrates overlying cardiac monitoring electrodes, and a right internal jugular catheter terminating at the cavoatrial junction. Endotracheal tube terminates 3.6 cm above the lovely. Intestinal tube extends beyond the diaphragm. Mild-to-moderate cardiomegaly is noted with slight reduction in cardiac size since prior study.   There has been mild-to-moderate interval improvement in airspace disease. Small bilateral effusions are noted. Degree of improvement favors clearing of pulmonary edema. No extrapleural air is noted. Osseous structures are age-appropriate. Interval reduction in cardiac size although cardiomegaly remains. Interval improvement and vascularity and bilateral pulmonary opacities consistent with resolving pulmonary edema. Small bilateral effusions. Tubes and lines as above. Xr Chest Portable    Result Date: 4/4/2019  EXAMINATION: SINGLE XRAY VIEW OF THE CHEST 4/4/2019 8:18 am COMPARISON: 4/4/2019 HISTORY: ORDERING SYSTEM PROVIDED HISTORY: r/o infectious process TECHNOLOGIST PROVIDED HISTORY: r/o infectious process FINDINGS: Right IJ line in good position. Cardiac monitoring leads overlie the chest. Heart is enlarged. Left-sided pleural effusion. Diffuse airspace disease is stable compared to the prior exam. ET tube in good position and NG tube passes beneath the diaphragm. Similar diffuse airspace disease possibly multifocal pneumonia versus ARDS. Xr Chest Portable    Result Date: 4/4/2019  EXAMINATION: SINGLE XRAY VIEW OF THE CHEST 4/4/2019 5:51 am COMPARISON: 2 April 2019 HISTORY: ORDERING SYSTEM PROVIDED HISTORY: follow up pulmonary edema TECHNOLOGIST PROVIDED HISTORY: follow up pulmonary edema FINDINGS: AP portable view of the chest time stamped at 512 hours demonstrates overlying cardiac monitoring electrodes. Endotracheal tube terminates 4.8 cm above the lovely. Intestinal tube is noted, extending below the diaphragm, tip not included. There is no significant change in the cardiomegaly, vascular congestion and scattered bilateral opacities. No extrapleural air is noted. Osseous structures are age-appropriate. No significant change in pulmonary vascular congestion and scattered bilateral opacities likely related to pulmonary edema. Tubes and lines as above.      Xr Chest Portable    Result Date: 4/2/2019  EXAMINATION: SINGLE XRAY VIEW OF THE CHEST 4/2/2019 6:12 am COMPARISON: 1 April 2019 HISTORY: ORDERING SYSTEM PROVIDED HISTORY: intubated TECHNOLOGIST PROVIDED HISTORY: intubated FINDINGS: AP portable view of the chest time stamped at 552 hours demonstrates overlying cardiac monitoring electrodes. Endotracheal tube terminates 4.6 cm above the lovely. Right internal jugular catheter terminates at the cavoatrial junction. Stable cardiomegaly is noted. There is evidence of pulmonary vascular congestion and bilateral effusions with interval developing opacity in the right mid to lower lung and left perihilar area field suspicious for evolving edema. No extrapleural air is noted. Stable cardiomegaly. The patient has pulmonary vascular congestion with bilateral pleural effusions. There is been interval development of patchy opacity involving the lower 2/3 of the right hemithorax and left perihilar area likely developing edema. Xr Chest Portable    Result Date: 4/1/2019  EXAMINATION: SINGLE XRAY VIEW OF THE CHEST 4/1/2019 6:08 am COMPARISON: 31 March 2019 HISTORY: ORDERING SYSTEM PROVIDED HISTORY: intubated TECHNOLOGIST PROVIDED HISTORY: intubated FINDINGS: AP portable view of the chest time stamped at 601 hours demonstrates overlying cardiac monitoring electrodes. A right internal jugular catheter terminates at the cavoatrial junction. Endotracheal tube terminates 5 cm above the lovely. Intestinal tube terminates below the body of the stomach. Stable cardiomegaly is noted. There is some improvement in the patient's bibasilar opacities particularly at the left lung base. Small left effusion is present. No extrapleural air is seen. Osseous structures are stable. Bibasilar opacities with improvement on the left side. Perihilar opacity on the left has improved. Multiple tubes and lines as above.      Xr Chest Portable    Result Date: 3/31/2019  EXAMINATION: SINGLE XRAY VIEW OF THE CHEST 3/31/2019 6:05 am COMPARISON: March 30, 2018, March 29, 2019, March 28, 2019 HISTORY: ORDERING SYSTEM PROVIDED HISTORY: intubated TECHNOLOGIST PROVIDED HISTORY: intubated FINDINGS: The endotracheal tube remains in appropriate position above the lovely. The enteric tube courses off the field of view in the upper abdomen. The right internal jugular line appears unchanged. Cardiac silhouette enlargement is noted. Perihilar and basilar opacities, left greater than right are again noted. Probable small left effusion. No pneumothorax identified. Unchanged appearance of support tubes and lines. Bilateral airspace disease again noted without appreciable change. Probable small left effusion. Xr Chest Portable    Result Date: 3/30/2019  EXAMINATION: SINGLE XRAY VIEW OF THE CHEST 3/30/2019 5:55 am COMPARISON: March 29, 2019, March 28, 2018 HISTORY: ORDERING SYSTEM PROVIDED HISTORY: intubated TECHNOLOGIST PROVIDED HISTORY: intubated FINDINGS: The endotracheal tube the remains in appropriate position above the lovely. The enteric tube courses in the upper abdomen. The right internal jugular line appears unchanged. Cardiac silhouette enlargement is again noted. Bilateral interstitial and alveolar opacities are again demonstrated without appreciable change. No pneumothorax identified. No definite effusion. Unchanged appearance of support tubes and lines. No appreciable change in bilateral airspace disease. Xr Chest Portable    Result Date: 3/29/2019  EXAMINATION: SINGLE XRAY VIEW OF THE CHEST 3/29/2019 5:54 am COMPARISON: March 28, 2019, March 27, 2019 HISTORY: ORDERING SYSTEM PROVIDED HISTORY: intubated TECHNOLOGIST PROVIDED HISTORY: intubated FINDINGS: Endotracheal tube terminates at the level of the clavicular heads. The enteric tube courses off the field of view in the upper abdomen. The right internal jugular line terminates at the distal SVC. Shallow inflation.  Cardiac silhouette enlargement again noted. Perihilar and basilar opacities are again noted without significant change. No new airspace disease or pneumothorax identified. Unchanged appearance of support tubes and lines. Perihilar and basilar opacities are without appreciable change which may represent subsegmental atelectasis versus developing consolidation. Xr Chest Portable    Result Date: 3/28/2019  EXAMINATION: SINGLE XRAY VIEW OF THE CHEST 3/28/2019 11:42 am COMPARISON: March 28, 2019, March 27, 2019 HISTORY: ORDERING SYSTEM PROVIDED HISTORY: CVC placement TECHNOLOGIST PROVIDED HISTORY: CVC placement FINDINGS: Right internal jugular line has been placed with the tip at the superior caval atrial junction. No pneumothorax. The endotracheal and enteric tubes appear unchanged. Shallow inflation. Mild perihilar and linear basilar opacities are again noted. Cardiac silhouette enlargement again noted. Right internal jugular line terminates at the superior caval atrial junction. Xr Chest Portable    Result Date: 3/28/2019  EXAMINATION: SINGLE XRAY VIEW OF THE CHEST 3/28/2019 10:34 am COMPARISON: 27 March 2019 HISTORY: ORDERING SYSTEM PROVIDED HISTORY: intubated TECHNOLOGIST PROVIDED HISTORY: intubated FINDINGS: AP portable view of the chest time stamped at 950 hours demonstrates overlying cardiac monitoring electrodes, endotracheal tube terminating 6.2 cm above the lovely, and intestinal tube extending below the body of the stomach. Stable cardiomegaly is noted. There is mild central vascular congestion with minimal improvement compared to prior study. No extrapleural air is noted. No gross effusions are noted. Osseous and mediastinal structures are stable. Lung volumes are low. Little change from prior study. Mild central vascular congestion. Tubes and lines as above.      Xr Chest Portable    Result Date: 3/27/2019  EXAMINATION: SINGLE XRAY VIEW OF THE CHEST 3/27/2019 8:05 am COMPARISON: AP chest from 03/26/2019 HISTORY: ORDERING SYSTEM PROVIDED HISTORY: intubated TECHNOLOGIST PROVIDED HISTORY: intubated History of respiratory failure, coronary artery disease, diabetes, and recent influenza. FINDINGS: ETT tip approximately 4.2 cm above the lovely and stable. Enteric tube tip and side hole project over the stomach. Overlying ECG monitor leads, tubes and necklace. Mildly enlarged but stable appearing cardiac silhouette. Mediastinal structures midline unchanged. Mild central vascular congestion, again improved slightly; minimal airspace disease right base, either edema or minimal infiltrate, also slightly improved. No large pleural effusion or consolidation. Bones and soft tissues stable. Slight further improvement suspected vascular congestion, as above. No other interval change. Support tubes remain satisfactory. Xr Chest Portable    Result Date: 3/26/2019  EXAMINATION: SINGLE XRAY VIEW OF THE CHEST 3/26/2019 6:36 am COMPARISON: 03/25/2019 HISTORY: ORDERING SYSTEM PROVIDED HISTORY: FOLLOW UP TECHNOLOGIST PROVIDED HISTORY: FOLLOW UP FINDINGS: Cardiac silhouette is borderline enlarged and there is mild central vascular congestion, slightly improved as compared to previous study. Endotracheal tube terminates approximately 4.1 cm above the lovely. Enteric tube courses below left hemidiaphragm with its distal extent not included on the provided image. No convincing evidence for pneumothorax. Osseous structures and soft tissues are grossly intact and stable. Cardiomegaly and pulmonary vascular congestion, slightly improved from previous study. Xr Chest Portable    Result Date: 3/25/2019  EXAMINATION: SINGLE XRAY VIEW OF THE CHEST 3/25/2019 12:20 pm COMPARISON: March 24, 2019, March 21, 2019 HISTORY: ORDERING SYSTEM PROVIDED HISTORY: ETtube TECHNOLOGIST PROVIDED HISTORY: ETtube FINDINGS: Diminished penetration through soft tissues noted. Lordotic positioning.  The endotracheal tube terminates approximately 5 cm above the lovely. The enteric tube terminates in the region of the body of the stomach. Cardiac silhouette enlargement again noted. Mild perihilar linear and basilar opacities are noted without appreciable change. No new airspace disease identified. Endotracheal tube terminates in appropriate position above the lovely. The enteric tube terminates in the body of the stomach. Cardiac silhouette enlargement with findings consistent with vascular congestion and basilar atelectasis again noted. Xr Chest Portable    Result Date: 3/24/2019  EXAMINATION: SINGLE XRAY VIEW OF THE CHEST 3/24/2019 8:59 am COMPARISON: Chest radiograph performed 03/21/2019. HISTORY: ORDERING SYSTEM PROVIDED HISTORY: follow up TECHNOLOGIST PROVIDED HISTORY: follow up FINDINGS: There is bilateral pulmonary congestion. There is no pneumothorax. The heart is enlarged. The upper abdomen is unremarkable. The extrathoracic soft tissues are unremarkable. There are similar endotracheal tube and gastric tube positioning. Cardiomegaly and bilateral congestion that is similar compared to prior. Xr Chest Portable    Result Date: 3/21/2019  EXAMINATION: SINGLE XRAY VIEW OF THE CHEST 3/21/2019 12:54 pm COMPARISON: AP chest from 03/21/2019 HISTORY: ORDERING SYSTEM PROVIDED HISTORY: s/p intubation TECHNOLOGIST PROVIDED HISTORY: s/p intubation FINDINGS: ETT now seen,, its tip ~ 6.4 cm above the lovely and satisfactory. Enteric tube tip and side hole project below the left hemidiaphragm, but are poorly visualized. Overlying ECG monitor leads. Enlarged cardiac silhouette, stable by comparison. Mediastinal structures accentuated by portable supine slightly rotated technique, but likely unchanged. Low lung volumes. Increased opacity right lung; layering fluid, infiltrate and some degree of atelectasis likely. Some retrocardiac density, likely atelectatic. Bones and soft tissues unchanged. ETT now in place, and satisfactory. Enteric tube tip projects beneath the left hemidiaphragm. Right lung opacity; consider layering fluid, infiltrate/aspiration and some degree of atelectasis. Probable retrocardiac atelectasis. Xr Chest Portable    Result Date: 3/21/2019  EXAMINATION: SINGLE XRAY VIEW OF THE CHEST 3/21/2019 9:14 am COMPARISON: 03/18/2019 HISTORY: ORDERING SYSTEM PROVIDED HISTORY: follow up TECHNOLOGIST PROVIDED HISTORY: follow up FINDINGS: Cardiac silhouette is borderline prominent. Mild central vascular congestion identified. Subtle left basilar opacity likely represent atelectasis. Suspected bilateral trace pleural effusions. Trachea is midline. Osseous structures and soft tissues are grossly intact. Overall stable examination demonstrating cardiomegaly and pulmonary vascular congestion. Questionable left basilar airspace disease and small pleural effusions bilaterally. Xr Chest Portable    Result Date: 3/18/2019  EXAMINATION: TWO VIEWS OF THE CHEST 3/18/2019 3:22 pm COMPARISON: 03/15/2019 radiograph HISTORY: ORDERING SYSTEM PROVIDED HISTORY: flu TECHNOLOGIST PROVIDED HISTORY: flu FINDINGS: The cardiac silhouette is magnified due to technique. The mediastinum is normal.  No focal consolidation in the lungs. Mild reticular opacities are relatively stable centrally. No skeletal abnormalities. Reticular airspace opacities are stable centrally. This may represent an underlying viral infection. No focal consolidation to suggest pneumonia.      Vl Dup Lower Extremity Venous Bilateral    Result Date: 3/31/2019    [facility]  Vascular Lower Extremities DVT Study Procedure   Patient Name      Kwabena JENKINS Date of Study             03/30/2019   Date of Birth     1945  Gender                    Male   Age               69 year(s)  Race                         Room Number       [address] [facility] ID #    [id number]   Patient Acct #    [de-identified]   MR #              [id number]     [id number] Hitesh Spencervirgie   Accession #       [id number]   Interpreting Physician    Rodolfo Thompson   Referring Nurse               Referring Physician       Nelson Roberts  Practitioner  Procedure Type of Study:   Veins: Lower Extremities DVT Study, Venous Scan Lower Bilateral.  Indications for Study:R/O DVT. Patient Status: In Patient. Technical Quality:Limited visualization. Conclusions   Summary   No evidence of deep venous thrombosis in both lower extremities. Superficial phlebitis of the bilateral lesser saphenous vein. Signature   ----------------------------------------------------------------  Electronically signed by Arianna Carrion(Sonographer) on  03/30/2019 03:50 PM  ----------------------------------------------------------------   ----------------------------------------------------------------  Electronically signed by Rodolfo Thompson(Interpreting physician)  on 03/31/2019 12:51 AM  ----------------------------------------------------------------  Findings:   Right Impression:                    Left Impression:  The common femoral, superficial      The common femoral, superficial  femoral, popliteal, tibials, and     femoral, popliteal, tibials, and  saphenous veins are compressible     saphenous veins are compressible with  with normal doppler responses. normal doppler responses. Normal compressibility of the great  Normal compressibility of the great  saphenous vein. saphenous vein. Normal compressibility of the small  Normal compressibility of the small  saphenous vein. saphenous vein. Velocities are measured in cm/s ; Diameters are measured in cm Right Lower Extremities DVT Study Measurements Right 2D Measurements +------------------------------------+----------+---------------+----------+ ! Location                            ! Visualized! Compressibility! Thrombosis! +------------------------------------+----------+---------------+----------+ ! Common Femoral                      !Yes       ! Yes            ! None      ! +------------------------------------+----------+---------------+----------+ ! Prox Femoral                        !Yes       ! Yes            ! None      ! +------------------------------------+----------+---------------+----------+ ! Mid Femoral                         !Yes       ! Yes            ! None      ! +------------------------------------+----------+---------------+----------+ ! Dist Femoral                        !Yes       ! Yes            ! None      ! +------------------------------------+----------+---------------+----------+ ! Deep Femoral                        !Yes       ! Yes            ! None      ! +------------------------------------+----------+---------------+----------+ ! Popliteal                           !Yes       ! Yes            ! None      ! +------------------------------------+----------+---------------+----------+ ! Sapheno Femoral Junction            ! Yes       ! Yes            ! None      ! +------------------------------------+----------+---------------+----------+ ! PTV                                 ! Yes       ! Yes            ! None      ! +------------------------------------+----------+---------------+----------+ ! Peroneal                            !Yes       ! Yes            ! None      ! +------------------------------------+----------+---------------+----------+ ! Gastroc                             ! Yes       ! Yes            ! None      ! +------------------------------------+----------+---------------+----------+ ! GSV Thigh                           ! Yes       ! Yes            ! None      ! +------------------------------------+----------+---------------+----------+ ! GSV Knee                            ! Yes       ! Yes            ! None      ! +------------------------------------+----------+---------------+----------+ ! GSV Ankle                           ! Yes       ! Yes            ! None      ! +------------------------------------+----------+---------------+----------+ ! SSV                                 ! Yes       ! Partial        !AI        ! +------------------------------------+----------+---------------+----------+ Right Doppler Measurements +---------------------------+------+------+--------------------------------+ ! Location                   ! Signal!Reflux! Reflux (msec)                   ! +---------------------------+------+------+--------------------------------+ ! Common Femoral             !Phasic!      !                                ! +---------------------------+------+------+--------------------------------+ ! Prox Femoral               !Phasic!      !                                ! +---------------------------+------+------+--------------------------------+ ! Popliteal                  !Phasic!      !                                ! +---------------------------+------+------+--------------------------------+ Left Lower Extremities DVT Study Measurements Left 2D Measurements +------------------------------------+----------+---------------+----------+ ! Location                            ! Visualized! Compressibility! Thrombosis! +------------------------------------+----------+---------------+----------+ ! Common Femoral                      !Yes       ! Yes            ! None      ! +------------------------------------+----------+---------------+----------+ ! Prox Femoral                        !Yes       ! Yes            ! None      ! +------------------------------------+----------+---------------+----------+ ! Mid Femoral                         !Yes       ! Yes            ! None      ! +------------------------------------+----------+---------------+----------+ ! Dist Femoral                        !Yes       ! Yes            ! None      ! +------------------------------------+----------+---------------+----------+ ! Popliteal                           !Yes       ! Yes            ! None      ! +------------------------------------+----------+---------------+----------+ ! Sapheno Femoral Junction            ! Yes       ! Yes            ! None      ! +------------------------------------+----------+---------------+----------+ ! PTV                                 ! Yes       ! Yes            ! None      ! +------------------------------------+----------+---------------+----------+ ! Peroneal                            !No        !               !          ! +------------------------------------+----------+---------------+----------+ ! Gastroc                             ! Yes       ! Yes            ! None      ! +------------------------------------+----------+---------------+----------+ ! GSV Thigh                           ! Yes       ! Yes            ! None      ! +------------------------------------+----------+---------------+----------+ ! GSV Knee                            ! Yes       ! Yes            ! None      ! +------------------------------------+----------+---------------+----------+ ! GSV Ankle                           ! Yes       ! Yes            ! None      ! +------------------------------------+----------+---------------+----------+ ! SSV                                 ! Yes       ! Partial        !AI        ! +------------------------------------+----------+---------------+----------+ Left Doppler Measurements +---------------------------+------+------+--------------------------------+ ! Location                   ! Signal!Reflux! Reflux (msec)                   ! +---------------------------+------+------+--------------------------------+ ! Common Femoral             !Phasic!      !                                ! +---------------------------+------+------+--------------------------------+ ! Prox Femoral               !Phasic!      !                                ! +---------------------------+------+------+--------------------------------+ ! Popliteal                  !Phasic!      ! ! +---------------------------+------+------+--------------------------------+        DISCHARGE INSTRUCTIONS     Discharge Medications:        Medication List      START taking these medications    albuterol (2.5 MG/3ML) 0.083% nebulizer solution  Commonly known as:  PROVENTIL  Take 3 mLs by nebulization every 6 hours as needed for Wheezing     * amiodarone 200 MG tablet  Commonly known as:  CORDARONE  Take 1 tablet by mouth 2 times daily for 14 days     * amiodarone 200 MG tablet  Commonly known as:  CORDARONE  Take 1 tablet by mouth daily  Start taking on:  4/24/2019     docusate 50 MG/5ML liquid  Commonly known as:  COLACE  Take 10 mLs by mouth daily     furosemide 80 MG tablet  Commonly known as:  LASIX  Take 1 tablet by mouth daily     * insulin lispro 100 UNIT/ML injection vial  Commonly known as:  HUMALOG  Inject 0-12 Units into the skin 3 times daily (with meals) <150 mg /dl 0 Units. 151-200 2 Units. 201-250 4 Units. 251-300 6 Units. 301-350 8 Units. 351-400 10 Units. >400 12 Units     * insulin lispro 100 UNIT/ML injection vial  Commonly known as:  HUMALOG  Inject 0-6 Units into the skin nightly <150 mg /dl 0 Units. 151-200 2 Units. 201-250 4 Units. 251-300 6 Units. 301-350 8 Units. 351-400 10 Units. >400 12 Units     lactobacillus capsule  Take 2 capsules by mouth 2 times daily (with meals)         * This list has 4 medication(s) that are the same as other medications prescribed for you. Read the directions carefully, and ask your doctor or other care provider to review them with you. CHANGE how you take these medications    insulin glargine 100 UNIT/ML injection vial  Commonly known as:  LANTUS  Inject 10 Units into the skin nightly  What changed:  how much to take     metoprolol tartrate 25 MG tablet  Commonly known as:  LOPRESSOR  Take 0.5 tablets by mouth 2 times daily Hold for SBP<100 and/or HR<60. Otherwise give  What changed:  See the new instructions.         CONTINUE taking these medications atorvastatin 80 MG tablet  Commonly known as:  LIPITOR  TAKE 1 TABLET DAILY     citalopram 10 MG tablet  Commonly known as:  CELEXA  TAKE 1 TABLET DAILY     PRADAXA 150 MG capsule  Generic drug:  dabigatran  TAKE 1 CAPSULE TWICE DAILY        STOP taking these medications    diltiazem 120 MG extended release capsule  Commonly known as:  TIAZAC     FLUAD 0.5 ML Usha  Generic drug:  Influenza Vac A&B Surf Ant Adj     glyBURIDE 5 MG tablet  Commonly known as:  DIABETA     metFORMIN 1000 MG tablet  Commonly known as:  GLUCOPHAGE     propranolol 10 MG tablet  Commonly known as:  INDERAL     saxagliptin 5 MG Tabs tablet  Commonly known as:  ONGLYZA           Where to Get Your Medications      These medications were sent to [address] [facility] [phone]  [facility] [address]    Phone:  [phone]   · albuterol (2.5 MG/3ML) 0.083% nebulizer solution  · amiodarone 200 MG tablet  · amiodarone 200 MG tablet  · docusate 50 MG/5ML liquid  · furosemide 80 MG tablet  · insulin glargine 100 UNIT/ML injection vial  · lactobacillus capsule  · metoprolol tartrate 25 MG tablet     You can get these medications from any pharmacy    Bring a paper prescription for each of these medications  · insulin lispro 100 UNIT/ML injection vial  · insulin lispro 100 UNIT/ML injection vial       Diet: No diet orders on file diet as tolerated  Activity: Provided in AVS  Follow-up:  Pulmonary, Nephro  Time Spent for discharge: 30 minutes    Corky Hidalgo MD  4/17/2019, 2:39 PM

## 2019-04-22 ENCOUNTER — HOSPITAL ENCOUNTER (OUTPATIENT)
Age: 74
Setting detail: SPECIMEN
Discharge: HOME | End: 2019-04-22
Payer: OTHER GOVERNMENT

## 2019-04-22 LAB
ANION GAP SERPL CALCULATED.3IONS-SCNC: 8 MMOL/L (ref 9–17)
BUN BLDV-MCNC: 25 MG/DL (ref 8–23)
BUN/CREAT BLD: 21 (ref 9–20)
CALCIUM SERPL-MCNC: 8.4 MG/DL (ref 8.6–10.4)
CHLORIDE BLD-SCNC: 97 MMOL/L (ref 98–107)
CO2: 34 MMOL/L (ref 20–31)
CREAT SERPL-MCNC: 1.17 MG/DL (ref 0.7–1.2)
GFR AFRICAN AMERICAN: >60 ML/MIN
GFR NON-AFRICAN AMERICAN: >60 ML/MIN
GFR SERPL CREATININE-BSD FRML MDRD: ABNORMAL ML/MIN/{1.73_M2}
GFR SERPL CREATININE-BSD FRML MDRD: ABNORMAL ML/MIN/{1.73_M2}
GLUCOSE BLD-MCNC: 147 MG/DL (ref 70–99)
HCT VFR BLD CALC: 32.9 % (ref 40.7–50.3)
HEMOGLOBIN: 9.8 G/DL (ref 13–17)
MCH RBC QN AUTO: 30.9 PG (ref 25.2–33.5)
MCHC RBC AUTO-ENTMCNC: 29.8 G/DL (ref 28.4–34.8)
MCV RBC AUTO: 103.8 FL (ref 82.6–102.9)
NRBC AUTOMATED: 0 PER 100 WBC
PDW BLD-RTO: 17.2 % (ref 11.8–14.4)
PLATELET # BLD: 301 K/UL (ref 138–453)
PMV BLD AUTO: 9.2 FL (ref 8.1–13.5)
POTASSIUM SERPL-SCNC: 4.2 MMOL/L (ref 3.7–5.3)
RBC # BLD: 3.17 M/UL (ref 4.21–5.77)
SODIUM BLD-SCNC: 139 MMOL/L (ref 135–144)
WBC # BLD: 10.5 K/UL (ref 3.5–11.3)

## 2019-04-22 PROCEDURE — 36415 COLL VENOUS BLD VENIPUNCTURE: CPT

## 2019-04-22 PROCEDURE — 80048 BASIC METABOLIC PNL TOTAL CA: CPT

## 2019-04-22 PROCEDURE — 85027 COMPLETE CBC AUTOMATED: CPT

## 2019-04-22 PROCEDURE — P9603 ONE-WAY ALLOW PRORATED MILES: HCPCS

## 2019-04-23 ENCOUNTER — HOSPITAL ENCOUNTER (INPATIENT)
Age: 74
LOS: 7 days | Discharge: NURSING, REHAB OR LONG-TERM CARE | DRG: 291 | End: 2019-04-30
Attending: EMERGENCY MEDICINE | Admitting: INTERNAL MEDICINE
Payer: MEDICARE

## 2019-04-23 ENCOUNTER — APPOINTMENT (OUTPATIENT)
Dept: GENERAL RADIOLOGY | Age: 74
DRG: 291 | End: 2019-04-23
Payer: MEDICARE

## 2019-04-23 DIAGNOSIS — I50.9 CONGESTIVE HEART FAILURE, UNSPECIFIED HF CHRONICITY, UNSPECIFIED HEART FAILURE TYPE (HCC): Primary | ICD-10-CM

## 2019-04-23 DIAGNOSIS — R09.02 HYPOXIA: ICD-10-CM

## 2019-04-23 DIAGNOSIS — R06.89 RESPIRATORY INSUFFICIENCY: ICD-10-CM

## 2019-04-23 DIAGNOSIS — J96.02 ACUTE ON CHRONIC RESPIRATORY ACIDOSIS (HCC): ICD-10-CM

## 2019-04-23 DIAGNOSIS — J96.12 ACUTE ON CHRONIC RESPIRATORY ACIDOSIS (HCC): ICD-10-CM

## 2019-04-23 DIAGNOSIS — J90 PLEURAL EFFUSION: ICD-10-CM

## 2019-04-23 PROBLEM — R77.8 ELEVATED TROPONIN: Status: ACTIVE | Noted: 2019-04-23

## 2019-04-23 PROBLEM — I25.2 HISTORY OF NON-ST ELEVATION MYOCARDIAL INFARCTION (NSTEMI): Status: ACTIVE | Noted: 2019-04-23

## 2019-04-23 PROBLEM — I50.23 ACUTE ON CHRONIC SYSTOLIC HEART FAILURE (HCC): Status: ACTIVE | Noted: 2019-04-23

## 2019-04-23 LAB
ABSOLUTE EOS #: 0.21 K/UL (ref 0–0.44)
ABSOLUTE IMMATURE GRANULOCYTE: 0.38 K/UL (ref 0–0.3)
ABSOLUTE LYMPH #: 1.39 K/UL (ref 1.1–3.7)
ABSOLUTE MONO #: 1.46 K/UL (ref 0.1–1.2)
ALBUMIN SERPL-MCNC: 2.9 G/DL (ref 3.5–5.2)
ALBUMIN/GLOBULIN RATIO: 0.8 (ref 1–2.5)
ALLEN TEST: ABNORMAL
ALLEN TEST: ABNORMAL
ALP BLD-CCNC: 170 U/L (ref 40–129)
ALT SERPL-CCNC: 32 U/L (ref 5–41)
ANION GAP SERPL CALCULATED.3IONS-SCNC: 8 MMOL/L (ref 9–17)
AST SERPL-CCNC: 28 U/L
BASOPHILS # BLD: 1 % (ref 0–2)
BASOPHILS ABSOLUTE: 0.09 K/UL (ref 0–0.2)
BILIRUB SERPL-MCNC: 0.85 MG/DL (ref 0.3–1.2)
BILIRUBIN DIRECT: 0.26 MG/DL
BILIRUBIN, INDIRECT: 0.59 MG/DL (ref 0–1)
BNP INTERPRETATION: ABNORMAL
BUN BLDV-MCNC: 23 MG/DL (ref 8–23)
BUN/CREAT BLD: 22 (ref 9–20)
CALCIUM SERPL-MCNC: 8.5 MG/DL (ref 8.6–10.4)
CARBOXYHEMOGLOBIN: ABNORMAL % (ref 0–5)
CARBOXYHEMOGLOBIN: ABNORMAL % (ref 0–5)
CHLORIDE BLD-SCNC: 98 MMOL/L (ref 98–107)
CO2: 33 MMOL/L (ref 20–31)
CREAT SERPL-MCNC: 1.03 MG/DL (ref 0.7–1.2)
DIFFERENTIAL TYPE: ABNORMAL
EKG ATRIAL RATE: 174 BPM
EKG Q-T INTERVAL: 394 MS
EKG QRS DURATION: 98 MS
EKG QTC CALCULATION (BAZETT): 451 MS
EKG R AXIS: 0 DEGREES
EKG T AXIS: 88 DEGREES
EKG VENTRICULAR RATE: 79 BPM
EOSINOPHILS RELATIVE PERCENT: 2 % (ref 1–4)
FIO2: ABNORMAL
FIO2: ABNORMAL
GFR AFRICAN AMERICAN: >60 ML/MIN
GFR NON-AFRICAN AMERICAN: >60 ML/MIN
GFR SERPL CREATININE-BSD FRML MDRD: ABNORMAL ML/MIN/{1.73_M2}
GFR SERPL CREATININE-BSD FRML MDRD: ABNORMAL ML/MIN/{1.73_M2}
GLOBULIN: ABNORMAL G/DL (ref 1.5–3.8)
GLUCOSE BLD-MCNC: 133 MG/DL (ref 70–99)
GLUCOSE BLD-MCNC: 144 MG/DL (ref 74–100)
GLUCOSE BLD-MCNC: 236 MG/DL (ref 74–100)
HCO3 ARTERIAL: 34.7 MMOL/L (ref 22–26)
HCO3 ARTERIAL: 37.5 MMOL/L (ref 22–26)
HCT VFR BLD CALC: 33.7 % (ref 40.7–50.3)
HEMOGLOBIN: 10.2 G/DL (ref 13–17)
IMMATURE GRANULOCYTES: 4 %
INR BLD: 1.3 (ref 0.9–1.2)
LYMPHOCYTES # BLD: 14 % (ref 24–43)
MCH RBC QN AUTO: 31.7 PG (ref 25.2–33.5)
MCHC RBC AUTO-ENTMCNC: 30.3 G/DL (ref 28.4–34.8)
MCV RBC AUTO: 104.7 FL (ref 82.6–102.9)
METHEMOGLOBIN: ABNORMAL % (ref 0–1.9)
METHEMOGLOBIN: ABNORMAL % (ref 0–1.9)
MODE: ABNORMAL
MODE: ABNORMAL
MONOCYTES # BLD: 15 % (ref 3–12)
NEGATIVE BASE EXCESS, ART: ABNORMAL MMOL/L (ref 0–2)
NEGATIVE BASE EXCESS, ART: ABNORMAL MMOL/L (ref 0–2)
NOTIFICATION TIME: ABNORMAL
NOTIFICATION TIME: ABNORMAL
NOTIFICATION: ABNORMAL
NOTIFICATION: ABNORMAL
NRBC AUTOMATED: 0 PER 100 WBC
O2 DEVICE/FLOW/%: ABNORMAL
O2 DEVICE/FLOW/%: ABNORMAL
O2 SAT, ARTERIAL: 91.5 % (ref 95–98)
O2 SAT, ARTERIAL: 93.6 % (ref 95–98)
OXYHEMOGLOBIN: ABNORMAL % (ref 95–98)
OXYHEMOGLOBIN: ABNORMAL % (ref 95–98)
PARTIAL THROMBOPLASTIN TIME: 35.2 SEC (ref 23.2–34.4)
PATIENT TEMP: 37
PATIENT TEMP: 37
PCO2 ARTERIAL: 59.7 MMHG (ref 35–45)
PCO2 ARTERIAL: 69.8 MMHG (ref 35–45)
PCO2, ART, TEMP ADJ: ABNORMAL
PCO2, ART, TEMP ADJ: ABNORMAL
PDW BLD-RTO: 17.2 % (ref 11.8–14.4)
PEEP/CPAP: ABNORMAL
PEEP/CPAP: ABNORMAL
PH ARTERIAL: 7.31 (ref 7.35–7.45)
PH ARTERIAL: 7.42 (ref 7.35–7.45)
PH, ART, TEMP ADJ: ABNORMAL (ref 7.35–7.45)
PH, ART, TEMP ADJ: ABNORMAL (ref 7.35–7.45)
PLATELET # BLD: 318 K/UL (ref 138–453)
PLATELET ESTIMATE: ABNORMAL
PMV BLD AUTO: 8.6 FL (ref 8.1–13.5)
PO2 ARTERIAL: 68.3 MMHG (ref 80–100)
PO2 ARTERIAL: 68.7 MMHG (ref 80–100)
PO2, ART, TEMP ADJ: ABNORMAL MMHG (ref 80–100)
PO2, ART, TEMP ADJ: ABNORMAL MMHG (ref 80–100)
POSITIVE BASE EXCESS, ART: 10.4 MMOL/L (ref 0–2)
POSITIVE BASE EXCESS, ART: 5.8 MMOL/L (ref 0–2)
POTASSIUM SERPL-SCNC: 4.4 MMOL/L (ref 3.7–5.3)
PRO-BNP: 5365 PG/ML
PROTHROMBIN TIME: 12.9 SEC (ref 9.7–12.2)
PSV: ABNORMAL
PSV: ABNORMAL
PT. POSITION: ABNORMAL
PT. POSITION: ABNORMAL
RBC # BLD: 3.22 M/UL (ref 4.21–5.77)
RBC # BLD: ABNORMAL 10*6/UL
RESPIRATORY RATE: ABNORMAL
RESPIRATORY RATE: ABNORMAL
SAMPLE SITE: ABNORMAL
SAMPLE SITE: ABNORMAL
SEG NEUTROPHILS: 64 % (ref 36–65)
SEGMENTED NEUTROPHILS ABSOLUTE COUNT: 6.15 K/UL (ref 1.5–8.1)
SET RATE: ABNORMAL
SET RATE: ABNORMAL
SODIUM BLD-SCNC: 139 MMOL/L (ref 135–144)
TEXT FOR RESPIRATORY: ABNORMAL
TEXT FOR RESPIRATORY: ABNORMAL
TOTAL HB: ABNORMAL G/DL (ref 12–16)
TOTAL HB: ABNORMAL G/DL (ref 12–16)
TOTAL PROTEIN: 6.4 G/DL (ref 6.4–8.3)
TOTAL RATE: ABNORMAL
TOTAL RATE: ABNORMAL
TROPONIN INTERP: ABNORMAL
TROPONIN INTERP: ABNORMAL
TROPONIN INTERP: NORMAL
TROPONIN T: 0.03 NG/ML
TROPONIN T: 0.04 NG/ML
TROPONIN T: <0.03 NG/ML
TROPONIN, HIGH SENSITIVITY: ABNORMAL NG/L (ref 0–22)
TROPONIN, HIGH SENSITIVITY: ABNORMAL NG/L (ref 0–22)
TROPONIN, HIGH SENSITIVITY: NORMAL NG/L (ref 0–22)
VT: ABNORMAL
VT: ABNORMAL
WBC # BLD: 9.7 K/UL (ref 3.5–11.3)
WBC # BLD: ABNORMAL 10*3/UL

## 2019-04-23 PROCEDURE — 6370000000 HC RX 637 (ALT 250 FOR IP): Performed by: INTERNAL MEDICINE

## 2019-04-23 PROCEDURE — 6370000000 HC RX 637 (ALT 250 FOR IP): Performed by: EMERGENCY MEDICINE

## 2019-04-23 PROCEDURE — 93005 ELECTROCARDIOGRAM TRACING: CPT

## 2019-04-23 PROCEDURE — 85025 COMPLETE CBC W/AUTO DIFF WBC: CPT

## 2019-04-23 PROCEDURE — 2580000003 HC RX 258: Performed by: INTERNAL MEDICINE

## 2019-04-23 PROCEDURE — 80048 BASIC METABOLIC PNL TOTAL CA: CPT

## 2019-04-23 PROCEDURE — 84484 ASSAY OF TROPONIN QUANT: CPT

## 2019-04-23 PROCEDURE — 99285 EMERGENCY DEPT VISIT HI MDM: CPT

## 2019-04-23 PROCEDURE — 51702 INSERT TEMP BLADDER CATH: CPT

## 2019-04-23 PROCEDURE — 36415 COLL VENOUS BLD VENIPUNCTURE: CPT

## 2019-04-23 PROCEDURE — 6360000002 HC RX W HCPCS: Performed by: PHYSICIAN ASSISTANT

## 2019-04-23 PROCEDURE — 96374 THER/PROPH/DIAG INJ IV PUSH: CPT

## 2019-04-23 PROCEDURE — 2000000000 HC ICU R&B

## 2019-04-23 PROCEDURE — 85730 THROMBOPLASTIN TIME PARTIAL: CPT

## 2019-04-23 PROCEDURE — 94660 CPAP INITIATION&MGMT: CPT

## 2019-04-23 PROCEDURE — 6360000002 HC RX W HCPCS: Performed by: EMERGENCY MEDICINE

## 2019-04-23 PROCEDURE — 82805 BLOOD GASES W/O2 SATURATION: CPT

## 2019-04-23 PROCEDURE — 99222 1ST HOSP IP/OBS MODERATE 55: CPT | Performed by: INTERNAL MEDICINE

## 2019-04-23 PROCEDURE — 5A09457 ASSISTANCE WITH RESPIRATORY VENTILATION, 24-96 CONSECUTIVE HOURS, CONTINUOUS POSITIVE AIRWAY PRESSURE: ICD-10-PCS | Performed by: INTERNAL MEDICINE

## 2019-04-23 PROCEDURE — 83880 ASSAY OF NATRIURETIC PEPTIDE: CPT

## 2019-04-23 PROCEDURE — 82947 ASSAY GLUCOSE BLOOD QUANT: CPT

## 2019-04-23 PROCEDURE — 71045 X-RAY EXAM CHEST 1 VIEW: CPT

## 2019-04-23 PROCEDURE — 80076 HEPATIC FUNCTION PANEL: CPT

## 2019-04-23 PROCEDURE — 85610 PROTHROMBIN TIME: CPT

## 2019-04-23 RX ORDER — ASPIRIN 325 MG
325 TABLET ORAL ONCE
Status: COMPLETED | OUTPATIENT
Start: 2019-04-23 | End: 2019-04-23

## 2019-04-23 RX ORDER — LANOLIN ALCOHOL/MO/W.PET/CERES
1000 CREAM (GRAM) TOPICAL DAILY
Status: ON HOLD | COMMUNITY
End: 2019-07-30 | Stop reason: HOSPADM

## 2019-04-23 RX ORDER — POTASSIUM CHLORIDE 20 MEQ/1
40 TABLET, EXTENDED RELEASE ORAL PRN
Status: DISCONTINUED | OUTPATIENT
Start: 2019-04-23 | End: 2019-04-30 | Stop reason: HOSPADM

## 2019-04-23 RX ORDER — SODIUM CHLORIDE 0.9 % (FLUSH) 0.9 %
10 SYRINGE (ML) INJECTION EVERY 12 HOURS SCHEDULED
Status: DISCONTINUED | OUTPATIENT
Start: 2019-04-23 | End: 2019-04-30 | Stop reason: HOSPADM

## 2019-04-23 RX ORDER — AMIODARONE HYDROCHLORIDE 200 MG/1
200 TABLET ORAL 2 TIMES DAILY
Status: DISCONTINUED | OUTPATIENT
Start: 2019-04-23 | End: 2019-04-23

## 2019-04-23 RX ORDER — ATORVASTATIN CALCIUM 80 MG/1
40 TABLET, FILM COATED ORAL DAILY
Status: ON HOLD | COMMUNITY
End: 2019-05-08 | Stop reason: SDUPTHER

## 2019-04-23 RX ORDER — ACETAMINOPHEN 160 MG
2000 TABLET,DISINTEGRATING ORAL DAILY
COMMUNITY

## 2019-04-23 RX ORDER — POTASSIUM CHLORIDE 7.45 MG/ML
10 INJECTION INTRAVENOUS PRN
Status: DISCONTINUED | OUTPATIENT
Start: 2019-04-23 | End: 2019-04-30 | Stop reason: HOSPADM

## 2019-04-23 RX ORDER — ACETAMINOPHEN 325 MG/1
650 TABLET ORAL EVERY 6 HOURS PRN
COMMUNITY
End: 2019-05-06

## 2019-04-23 RX ORDER — ACETAMINOPHEN 325 MG/1
650 TABLET ORAL EVERY 4 HOURS PRN
Status: DISCONTINUED | OUTPATIENT
Start: 2019-04-23 | End: 2019-04-30 | Stop reason: HOSPADM

## 2019-04-23 RX ORDER — NICOTINE POLACRILEX 4 MG
15 LOZENGE BUCCAL PRN
Status: DISCONTINUED | OUTPATIENT
Start: 2019-04-23 | End: 2019-04-30 | Stop reason: HOSPADM

## 2019-04-23 RX ORDER — ONDANSETRON 2 MG/ML
4 INJECTION INTRAMUSCULAR; INTRAVENOUS EVERY 6 HOURS PRN
Status: DISCONTINUED | OUTPATIENT
Start: 2019-04-23 | End: 2019-04-30 | Stop reason: HOSPADM

## 2019-04-23 RX ORDER — DEXTROSE MONOHYDRATE 50 MG/ML
100 INJECTION, SOLUTION INTRAVENOUS PRN
Status: DISCONTINUED | OUTPATIENT
Start: 2019-04-23 | End: 2019-04-30 | Stop reason: HOSPADM

## 2019-04-23 RX ORDER — SODIUM CHLORIDE 0.9 % (FLUSH) 0.9 %
10 SYRINGE (ML) INJECTION PRN
Status: DISCONTINUED | OUTPATIENT
Start: 2019-04-23 | End: 2019-04-30 | Stop reason: HOSPADM

## 2019-04-23 RX ORDER — ALBUTEROL SULFATE 2.5 MG/3ML
2.5 SOLUTION RESPIRATORY (INHALATION) EVERY 6 HOURS PRN
Status: DISCONTINUED | OUTPATIENT
Start: 2019-04-23 | End: 2019-04-30 | Stop reason: HOSPADM

## 2019-04-23 RX ORDER — AMIODARONE HYDROCHLORIDE 200 MG/1
200 TABLET ORAL DAILY
Status: DISCONTINUED | OUTPATIENT
Start: 2019-04-24 | End: 2019-04-30 | Stop reason: HOSPADM

## 2019-04-23 RX ORDER — DOCUSATE SODIUM 100 MG/1
100 CAPSULE, LIQUID FILLED ORAL DAILY
Status: DISCONTINUED | OUTPATIENT
Start: 2019-04-24 | End: 2019-04-30 | Stop reason: HOSPADM

## 2019-04-23 RX ORDER — CITALOPRAM 10 MG/1
1 TABLET ORAL DAILY
Status: DISCONTINUED | OUTPATIENT
Start: 2019-04-23 | End: 2019-04-23 | Stop reason: ALTCHOICE

## 2019-04-23 RX ORDER — DEXTROSE MONOHYDRATE 25 G/50ML
12.5 INJECTION, SOLUTION INTRAVENOUS PRN
Status: DISCONTINUED | OUTPATIENT
Start: 2019-04-23 | End: 2019-04-30 | Stop reason: HOSPADM

## 2019-04-23 RX ORDER — DABIGATRAN ETEXILATE 150 MG/1
150 CAPSULE, COATED PELLETS ORAL 2 TIMES DAILY
Status: ON HOLD | COMMUNITY
End: 2019-05-08 | Stop reason: SDUPTHER

## 2019-04-23 RX ORDER — FUROSEMIDE 10 MG/ML
60 INJECTION INTRAMUSCULAR; INTRAVENOUS ONCE
Status: COMPLETED | OUTPATIENT
Start: 2019-04-23 | End: 2019-04-23

## 2019-04-23 RX ORDER — DOCUSATE SODIUM 100 MG/1
100 CAPSULE, LIQUID FILLED ORAL DAILY
Status: ON HOLD | COMMUNITY
End: 2019-05-08 | Stop reason: SDUPTHER

## 2019-04-23 RX ORDER — FUROSEMIDE 10 MG/ML
40 INJECTION INTRAMUSCULAR; INTRAVENOUS 2 TIMES DAILY
Status: DISCONTINUED | OUTPATIENT
Start: 2019-04-23 | End: 2019-04-26

## 2019-04-23 RX ORDER — LANOLIN ALCOHOL/MO/W.PET/CERES
3 CREAM (GRAM) TOPICAL NIGHTLY PRN
COMMUNITY

## 2019-04-23 RX ORDER — ATORVASTATIN CALCIUM 40 MG/1
80 TABLET, FILM COATED ORAL NIGHTLY
Status: DISCONTINUED | OUTPATIENT
Start: 2019-04-23 | End: 2019-04-30 | Stop reason: HOSPADM

## 2019-04-23 RX ORDER — ACETAMINOPHEN 500 MG
1000 TABLET ORAL 2 TIMES DAILY
Status: ON HOLD | COMMUNITY
End: 2019-04-30 | Stop reason: HOSPADM

## 2019-04-23 RX ADMIN — FUROSEMIDE 60 MG: 10 INJECTION, SOLUTION INTRAVENOUS at 11:51

## 2019-04-23 RX ADMIN — METOPROLOL TARTRATE 12.5 MG: 25 TABLET ORAL at 20:18

## 2019-04-23 RX ADMIN — INSULIN LISPRO 2 UNITS: 100 INJECTION, SOLUTION INTRAVENOUS; SUBCUTANEOUS at 20:18

## 2019-04-23 RX ADMIN — Medication 10 ML: at 21:00

## 2019-04-23 RX ADMIN — FUROSEMIDE 40 MG: 10 INJECTION, SOLUTION INTRAMUSCULAR; INTRAVENOUS at 17:21

## 2019-04-23 RX ADMIN — ATORVASTATIN CALCIUM 80 MG: 40 TABLET, FILM COATED ORAL at 20:18

## 2019-04-23 RX ADMIN — METOPROLOL TARTRATE 12.5 MG: 25 TABLET ORAL at 14:43

## 2019-04-23 RX ADMIN — ASPIRIN 325 MG: 325 TABLET, COATED ORAL at 12:25

## 2019-04-23 RX ADMIN — INSULIN GLARGINE 10 UNITS: 100 INJECTION, SOLUTION SUBCUTANEOUS at 20:18

## 2019-04-23 RX ADMIN — AMIODARONE HYDROCHLORIDE 200 MG: 200 TABLET ORAL at 14:43

## 2019-04-23 ASSESSMENT — PAIN SCALES - GENERAL
PAINLEVEL_OUTOF10: 0

## 2019-04-23 ASSESSMENT — ENCOUNTER SYMPTOMS
EYE REDNESS: 0
CHEST TIGHTNESS: 0
SHORTNESS OF BREATH: 1
APNEA: 0
EYE DISCHARGE: 0
ABDOMINAL PAIN: 0
SORE THROAT: 0

## 2019-04-23 NOTE — PROGRESS NOTES
[facility]  Inpatient/Observation/Outpatient Rehabilitation    Date: 2019  Patient Name: Soraya Almaguer       [x] Inpatient Acute/Observation       []  Outpatient  : 1945       [] Pt no showed for scheduled appointment    [] Pt refused/declined therapy at this time due to:           [x] Pt cancelled due to:  [] No Reason Given   [] Sick/ill   [] Other: Patient is very lethargic with CPAP this date and unable to respond appropriately to questions and commands. Will hold PT until patient is more appropriate. Will attempt evaluation at our earliest opportunity.        Nain Funk, PT, DPT Date: 2019

## 2019-04-23 NOTE — H&P
ICU  History and Physical    Patient:  Navjot Benton  MRN: [id number]    Chief Complaint:  SOB    History Obtained From:  patient, electronic medical record    PCP: Lynn Glass MD    History of Present Illness: The patient is a 69 y.o. male who presents with 1-2 days of progressively worsening SOB, orthopnea from [address] [place] [address]. Apparently he had been more somnolent. He was brought to the ER. He was hypoxic. ABG showed respiratory acidosis. Imaging showed CHF and moderate left pleural effusion. WBC normal. Elevated troponin. Placed on BIPAP. Patient was admitted with respiratory acidosis, acute on chronic CHF, left pleural effusion. Patient with recent admission [facility]-4/10/19 for flu A, NSTEMI, PNA, respiratory failure resulting intubation, retroperitoneal bleed requiring multiple PRBC transfusions and IV pressors, ERNESTO requiring HD for a brief period. He was discharged to [facility] 4/10/19 and has been there for rehab. He has not been ambulating much at all. Past Medical History:        Diagnosis Date    Abnormal nuclear stress test 03/12/2010    Stress and resting cardiolite images showed inferior wall hypoperfusion suggestive of previous myocardial infarction. Left ventricular wall motion showed inferior wall hypokinesia. EF 58%.  Arrhythmia 2007    A single episode    Atrial fibrillation ([address] 2007    Single Episode    CAD (coronary artery disease) 1996    MI    DM type 2 (diabetes mellitus, type 2) [facility] 2005    Hx of echocardiogram 04/05/2007    EF 62%, Increased left atrial and right ventricular diametes. Increased septal and posterior wall thickness suggest left ventricular hypertrophy. Valves were normal.        Past Surgical History:        Procedure Laterality Date    CARDIOVERSION  05/18/2007    Successful-Dr. Leggett-[place]    CARDIOVERSION  03/13    CORONARY ANGIOPLASTY  1996       Family History:   History reviewed.  No pertinent family history. Social History:   TOBACCO:   reports that he quit smoking about 22 years ago. His smoking use included cigarettes. He has a 45.00 pack-year smoking history. He has never used smokeless tobacco.  ETOH:   reports that he does not drink alcohol. OCCUPATION: none    Allergies:  Patient has no known allergies. Medications Prior to Admission:    Prior to Admission medications    Medication Sig Start Date End Date Taking? Authorizing Provider   atorvastatin (LIPITOR) 80 MG tablet Take 80 mg by mouth daily   Yes Historical Provider, MD   dabigatran (PRADAXA) 150 MG capsule Take 150 mg by mouth 2 times daily   Yes Historical Provider, MD   acetaminophen (TYLENOL) 500 MG tablet Take 1,000 mg by mouth 2 times daily   Yes Historical Provider, MD   acetaminophen (TYLENOL) 325 MG tablet Take 650 mg by mouth every 6 hours as needed for Pain   Yes Historical Provider, MD   Cholecalciferol (VITAMIN D3) 2000 units CAPS Take 2,000 Units by mouth daily   Yes Historical Provider, MD   vitamin B-12 (CYANOCOBALAMIN) 1000 MCG tablet Take 1,000 mcg by mouth daily   Yes Historical Provider, MD   melatonin 3 MG TABS tablet Take 3 mg by mouth daily   Yes Historical Provider, MD   lactobacillus (CULTURELLE) capsule Take 2 capsules by mouth 2 times daily (with meals) 4/10/19  Yes Nicole Almanzar MD   metoprolol tartrate (LOPRESSOR) 25 MG tablet Take 0.5 tablets by mouth 2 times daily Hold for SBP<100 and/or HR<60.  Otherwise give 4/10/19  Yes Nicole Almanzar MD   furosemide (LASIX) 80 MG tablet Take 1 tablet by mouth daily 4/11/19  Yes Nicole Almanzar MD   docusate (COLACE) 50 MG/5ML liquid Take 10 mLs by mouth daily 4/11/19  Yes Nicole Almanzar MD   amiodarone (CORDARONE) 200 MG tablet Take 1 tablet by mouth 2 times daily for 14 days  Patient taking differently: Take 200 mg by mouth daily  4/10/19 4/24/19 Yes Nicole Almanzar MD   insulin glargine (LANTUS) 100 UNIT/ML injection vial Inject 10 Units into the skin nightly 4/10/19  Yes Chas Khan MD   insulin lispro (HUMALOG) 100 UNIT/ML injection vial Inject 0-12 Units into the skin 3 times daily (with meals) <150 mg /dl 0 Units. 151-200 2 Units. 201-250 4 Units. 251-300 6 Units. 301-350 8 Units. 351-400 10 Units. >400 12 Units 4/10/19  Yes Chas Khan MD   insulin lispro (HUMALOG) 100 UNIT/ML injection vial Inject 0-6 Units into the skin nightly <150 mg /dl 0 Units. 151-200 2 Units. 201-250 4 Units. 251-300 6 Units. 301-350 8 Units. 351-400 10 Units. >400 12 Units  Patient taking differently: Inject 0-6 Units into the skin nightly <150 mg /dl 0 Units. 151-200 1 Units. 201-250 2 Units. 251-300 3 Units. 301-350 4 Units. 351-400 5 Units. >400 6 Units 4/10/19  Yes Chas Khan MD   albuterol (PROVENTIL) (2.5 MG/3ML) 0.083% nebulizer solution Take 3 mLs by nebulization every 6 hours as needed for Wheezing 4/10/19   Chas Khan MD       Review of Systems:  Constitutional:negative  for fevers, and negative for chills. Eyes: negative for visual disturbance   ENT: negative for sore throat, negative nasal congestion, and negative for earache  Respiratory: positive for shortness of breath, negative for cough, and negative for wheezing  Cardiovascular: negative for chest pain, negative for palpitations, and negative for syncope  Gastrointestinal: negative for abdominal pain, negative for nausea,negative for vomiting, negative for diarrhea, negative for constipation, and negative for hematochezia or melena  Genitourinary: negative for dysuria, negative for urinary urgency, negative for urinary frequency, and negative for hematuria  Skin: negative for skin rash, and negative for skin lesions  Neurological: negative for unilateral weakness, numbness or tingling.     Physical Exam:    Vitals:   Temp: 97.8 °F (36.6 °C)  BP: 112/86  Resp: 22  Pulse: 88  SpO2: 96 %  24HR INTAKE/OUTPUT:  No intake or output data in the 24 hours ending 04/23/19 1347    Exam:  GEN:  alert and oriented to person, place and time, well-developed and well-nourished, on BIPAP  NECK: normal, supple  PULM: distant bilaterally - no wheezes, bilateral rales, no rhonchi, mild respiratory distress, + use of accessory muscles, on BIPAP  COR:   irregularly irregular and systolic murmur  ABD:    Obese, soft, non-tender, non-distended, normal bowel sounds, no masses or organomegaly  EXT:    edema: 1+ affecting bilateral foot, bilateral ankle, bilateral calf  NEURO: follows commands, MULLIGAN, no deficits  SKIN:   no rashes or significant lesions    -----------------------------------------------------------------  Diagnostic Data:   Lab Results   Component Value Date    WBC 9.7 04/23/2019    HGB 10.2 (L) 04/23/2019     04/23/2019       Lab Results   Component Value Date    BUN 23 04/23/2019    CREATININE 1.03 04/23/2019     04/23/2019    K 4.4 04/23/2019    CALCIUM 8.5 (L) 04/23/2019    CL 98 04/23/2019    CO2 33 (H) 04/23/2019    LABGLOM >60 04/23/2019       Lab Results   Component Value Date    WBCUA 5 TO 10 03/28/2019    RBCUA 5 TO 10 03/28/2019    EPITHUA 0 TO 2 03/28/2019    LEUKOCYTESUR TRACE (A) 03/28/2019    SPECGRAV 1.024 03/28/2019    GLUCOSEU TRACE (A) 03/28/2019    KETUA TRACE (A) 03/28/2019    PROTEINU 1+ (A) 03/28/2019    HGBUR SMALL (A) 03/28/2019    CASTUA  03/28/2019     2 TO 5 HYALINE Reference range defined for non-centrifuged specimen. CRYSTUA NOT REPORTED 03/28/2019    BACTERIA NOT REPORTED 03/28/2019    YEAST NOT REPORTED 03/28/2019       Lab Results   Component Value Date    TROPONINT 0.04 (H) 04/23/2019    PROBNP 5,365 (H) 04/23/2019       Xr Chest Portable    Result Date: 4/23/2019  EXAMINATION: SINGLE XRAY VIEW OF THE CHEST 4/23/2019 9:34 am COMPARISON: 04/05/2019 HISTORY: ORDERING SYSTEM PROVIDED HISTORY: sob TECHNOLOGIST PROVIDED HISTORY: sob Follow-up exam. FINDINGS: The cardiac silhouette is enlarged.   Mediastinal contours are normal. Endotracheal tube, orogastric tube, and use of insulin ([facility] 09/06/2016    Adult BMI > 30 05/06/2016    CAD (coronary artery disease)     DM type 2 [facility]diabetes mellitus, type 2) [facility]     Atrial fibrillation with rapid ventricular response ([facility] 01/01/2007       Plan:     · This patient requires overnight observation because of left pleural effusion, acute on chronic CHF, hypercapnic respiratory failure, elevated troponin  · Factors affecting the medical complexity of this patient include recent NSTEMI, IVIS, DM  · Estimated length of stay is 3 days  · Lasix   · Cardiology consult  · Fluid restrict  · bipap  · SSI  · PT OT  · Trend troponin  · Hold pradaxa as he ay need left thoracentesis  · High risk medication monitoring: none    CORE MEASURES  DVT prophylaxis: TEDs  Decubitus ulcer present on admission: No  CODE STATUS: FULL CODE  Nutrition Status: fair   Physical therapy: Yes   [place] reviewed: Yes  EKG Reviewed:  Yes  Advance Directive Addressed: Yes    32 min or critical care time spent on this patient independent of other services    Creig Spurling, PA-C  4/23/2019, 1:47 PM

## 2019-04-23 NOTE — PROGRESS NOTES
[facility] SPECIALTY [facility]  OCCUPATIONAL THERAPY  No Visit Note    [x] ICU    [] Acute   Patient: Soraya Almaguer  Room: H84/Z976-39      Soraya Almaguer not seen on 4/23/2019 at 2:18 PM due to  Patient being very lethargic, unable to arouse for evaluation. Will attempt at our earliest convenience. Signature:  Tara Hackett, BRAN, OTR/L

## 2019-04-23 NOTE — ED NOTES
Dr. Delon Sen returned call. Currently speaking with Dr. Shruthi Vanessa.      Micheal Underwood  04/23/19 8900

## 2019-04-23 NOTE — ED NOTES
Bed: 02  Expected date:   Expected time:   Means of arrival:   Comments:  Medic 1     Chery Underwood  04/23/19 0848

## 2019-04-23 NOTE — ED PROVIDER NOTES
[address]      Pt Name: Chavez Delaney  MRN: [id number]  [place] 1945  Date of evaluation: 4/23/2019  Provider: Edith Haynes, [address] [place] [address]       Chief Complaint   Patient presents with    Manic Behavior     Onset this AM per nursing home       [address] [place] [address] Ella Luciano is a 69 y.o. male with past medical history of ACS, CHF, atrial fibrillation on Pradaxa who presents to the emergency department from [facility] rehab with chief complaint of shortness of breath for 24-48 hours. Patient states the shortness breath is worse with exertion and laying down. Of note, patient was most recently at [place] [address] and discharged March 19 to [facility] rehab with the diagnosis of: Non-STEMI, hemorrhagic shock, pneumonia, and influenza. Today the patient was sent from [facility] because the patient was \"falling asleep\" in the shower. Patient states he did not sleep very well last night. He does take sleeping pills to sleep at the rehab facility. Patient denies: Chest pain, loss of consciousness, dizziness, nausea, vomiting, and neural focal deficit. Triage notes and Nursing notes were reviewed by myself. Any discrepancies are addressed above. PAST MEDICAL HISTORY     Past Medical History:   Diagnosis Date    Abnormal nuclear stress test 03/12/2010    Stress and resting cardiolite images showed inferior wall hypoperfusion suggestive of previous myocardial infarction. Left ventricular wall motion showed inferior wall hypokinesia. EF 58%.  Arrhythmia 2007    A single episode    Atrial fibrillation ([facility] 2007    Single Episode    CAD (coronary artery disease) 1996    MI    DM type 2 (diabetes mellitus, type 2) ([facility] 2005    Hx of echocardiogram 04/05/2007    EF 62%, Increased left atrial and right ventricular diametes. Increased septal and posterior wall thickness suggest left ventricular hypertrophy.  Valves were normal.        SURGICAL HISTORY       Past Surgical History:   Procedure Laterality Date    CARDIOVERSION  05/18/2007    Successful-Dr. Leggett-[place]    CARDIOVERSION  03/13    CORONARY ANGIOPLASTY  1996       CURRENT MEDICATIONS       Current Discharge Medication List      CONTINUE these medications which have NOT CHANGED    Details   atorvastatin (LIPITOR) 80 MG tablet Take 80 mg by mouth daily      dabigatran (PRADAXA) 150 MG capsule Take 150 mg by mouth 2 times daily      !! acetaminophen (TYLENOL) 325 MG tablet Take 650 mg by mouth every 6 hours as needed for Pain      Cholecalciferol (VITAMIN D3) 2000 units CAPS Take 2,000 Units by mouth daily      vitamin B-12 (CYANOCOBALAMIN) 1000 MCG tablet Take 1,000 mcg by mouth daily      melatonin 3 MG TABS tablet Take 3 mg by mouth daily      docusate sodium (COLACE) 100 MG capsule Take 100 mg by mouth daily      lactobacillus (CULTURELLE) capsule Take 2 capsules by mouth 2 times daily (with meals)  Qty: 30 capsule, Refills: 0      metoprolol tartrate (LOPRESSOR) 25 MG tablet Take 0.5 tablets by mouth 2 times daily Hold for SBP<100 and/or HR<60. Otherwise give  Qty: 60 tablet, Refills: 3      furosemide (LASIX) 80 MG tablet Take 1 tablet by mouth daily  Qty: 60 tablet, Refills: 3      docusate (COLACE) 50 MG/5ML liquid Take 10 mLs by mouth daily  Qty: 473 mL, Refills: 2      insulin glargine (LANTUS) 100 UNIT/ML injection vial Inject 10 Units into the skin nightly  Qty: 1 vial, Refills: 3      !! insulin lispro (HUMALOG) 100 UNIT/ML injection vial Inject 0-12 Units into the skin 3 times daily (with meals) <150 mg /dl 0 Units. 151-200 2 Units. 201-250 4 Units. 251-300 6 Units. 301-350 8 Units. 351-400 10 Units. >400 12 Units  Qty: 1 vial, Refills: 3      !! insulin lispro (HUMALOG) 100 UNIT/ML injection vial Inject 0-6 Units into the skin nightly <150 mg /dl 0 Units. 151-200 2 Units. 201-250 4 Units. 251-300 6 Units. 301-350 8 Units. 351-400 10 Units. >400 12 Units  Qty: 1 vial, Refills: 3 !! acetaminophen (TYLENOL) 500 MG tablet Take 1,000 mg by mouth 2 times daily      albuterol (PROVENTIL) (2.5 MG/3ML) 0.083% nebulizer solution Take 3 mLs by nebulization every 6 hours as needed for Wheezing  Qty: 120 each, Refills: 3       !! - Potential duplicate medications found. Please discuss with provider. ALLERGIES     Patient has no known allergies. FAMILY HISTORY     History reviewed. No pertinent family history. SOCIAL HISTORY       Social History     Socioeconomic History    Marital status:      Spouse name: None    Number of children: None    Years of education: None    Highest education level: None   Occupational History    None   Social Needs    Financial resource strain: None    Food insecurity:     Worry: None     Inability: None    Transportation needs:     Medical: None     Non-medical: None   Tobacco Use    Smoking status: Former Smoker     Packs/day: 1.50     Years: 30.00     Pack years: 45.00     Types: Cigarettes     Last attempt to quit: 1996     Years since quittin.8    Smokeless tobacco: Never Used   Substance and Sexual Activity    Alcohol use: No    Drug use: No    Sexual activity: None   Lifestyle    Physical activity:     Days per week: None     Minutes per session: None    Stress: None   Relationships    Social connections:     Talks on phone: None     Gets together: None     Attends Buddhist service: None     Active member of club or organization: None     Attends meetings of clubs or organizations: None     Relationship status: None    Intimate partner violence:     Fear of current or ex partner: None     Emotionally abused: None     Physically abused: None     Forced sexual activity: None   Other Topics Concern    None   Social History Narrative    None       REVIEW OF SYSTEMS     Review of Systems   Constitutional: Negative for activity change, diaphoresis and fever.    HENT: Negative for congestion, ear discharge, ear pain and sore throat. Eyes: Negative for discharge and redness. Respiratory: Positive for shortness of breath. Negative for apnea and chest tightness. Cardiovascular: Negative for chest pain. Gastrointestinal: Negative for abdominal pain. Skin: Negative for pallor and rash. Neurological: Negative for dizziness, seizures, syncope, facial asymmetry, speech difficulty, weakness and headaches. All other systems reviewed and are negative. Except as noted above the remainder of the review of systems was reviewed and is negative. PHYSICAL EXAM    (up to 7 for level 4, 8 or more for level 5)     ED Triage Vitals   BP Temp Temp Source Pulse Resp SpO2 Height Weight   04/23/19 0911 04/23/19 0857 04/23/19 0857 04/23/19 0857 04/23/19 0857 04/23/19 0857 -- 04/23/19 0857   122/62 97 °F (36.1 °C) Tympanic 79 20 (!) 88 %  290 lb (131.5 kg)       Physical Exam   Constitutional: He is oriented to person, place, and time. He appears well-developed and well-nourished. HENT:   Head: Normocephalic and atraumatic. Eyes: Pupils are equal, round, and reactive to light. Conjunctivae and EOM are normal.   Neck: Normal range of motion. Neck supple. Cardiovascular: Normal rate, regular rhythm, normal heart sounds and intact distal pulses. Pulmonary/Chest: Effort normal. He has rales. Abdominal: Soft. Bowel sounds are normal.   Musculoskeletal: Normal range of motion. Right lower leg: He exhibits edema. Left lower leg: He exhibits edema. Neurological: He is alert and oriented to person, place, and time. GCS eye subscore is 4. GCS verbal subscore is 5. GCS motor subscore is 6. Cranial nerves II through XII intact. +5/5 upper extremity upper/lower extremity strength bilaterally. Normal finger to nose and heel to shin tests. Skin: Skin is warm and dry. Nursing note and vitals reviewed.       DIAGNOSTIC RESULTS     EKG:    Atrial fibrillation and ventricular rate of 79 bpm  Left axis deviation, no acute ST-T wave elevations or depressions  QRS, QTC intervals: Normal    All EKG's are [facility] who either signs or Co-signs this chart in the absence of a cardiologist.      RADIOLOGY:   Interpretation per the Radiologist below, if available at the time of this note:    XR CHEST PORTABLE   Final Result   1. Successful ultrasound guided left thoracentesis with small volume   remaining dependently. 2.  Postprocedural chest radiograph demonstrates reduction of left pleural   fluid with residual effusion and left basilar atelectasis/consolidation. No   pneumothorax or new findings otherwise appreciated. US THORACENTESIS   Final Result   1. Successful ultrasound guided left thoracentesis with small volume   remaining dependently. 2.  Postprocedural chest radiograph demonstrates reduction of left pleural   fluid with residual effusion and left basilar atelectasis/consolidation. No   pneumothorax or new findings otherwise appreciated. XR CHEST PORTABLE   Final Result   1. Cardiomegaly with pulmonary edema, increased. 2. There is a moderate left pleural effusion, increased in size, with   associated atelectasis in the left lung base.              LABS:  Labs Reviewed   BODY FLUID CULTURE - Abnormal; Notable for the following components:       Result Value    Direct Exam FEW NEUTROPHILS (*)     All other components within normal limits   BASIC METABOLIC PANEL W/ REFLEX TO MG FOR LOW K - Abnormal; Notable for the following components:    Glucose 133 (*)     Bun/Cre Ratio 22 (*)     Calcium 8.5 (*)     CO2 33 (*)     Anion Gap 8 (*)     All other components within normal limits   HEPATIC FUNCTION PANEL - Abnormal; Notable for the following components:    Alb 2.9 (*)     Alkaline Phosphatase 170 (*)     Albumin/Globulin Ratio 0.8 (*)     All other components within normal limits   TROPONIN - Abnormal; Notable for the following components:    Troponin T 0.04 (*) All other components within normal limits   BRAIN NATRIURETIC PEPTIDE - Abnormal; Notable for the following components:    Pro-BNP 5,365 (*)     All other components within normal limits   PROTIME-INR - Abnormal; Notable for the following components:    Protime 12.9 (*)     INR 1.3 (*)     All other components within normal limits   APTT - Abnormal; Notable for the following components:    PTT 35.2 (*)     All other components within normal limits   BLOOD GAS, ARTERIAL - Abnormal; Notable for the following components:    pH, Arterial 7.314 (*)     pCO2, Arterial 69.8 (*)     pO2, Arterial 68.3 (*)     HCO3, Arterial 34.7 (*)     Positive Base Excess, Art 5.8 (*)     O2 Sat, Arterial 91.5 (*)     All other components within normal limits   CBC WITH AUTO DIFFERENTIAL - Abnormal; Notable for the following components:    RBC 3.22 (*)     Hemoglobin 10.2 (*)     Hematocrit 33.7 (*)     .7 (*)     RDW 17.2 (*)     Lymphocytes 14 (*)     Monocytes 15 (*)     Immature Granulocytes 4 (*)     Absolute Mono # 1.46 (*)     Absolute Immature Granulocyte 0.38 (*)     All other components within normal limits   TROPONIN - Abnormal; Notable for the following components:    Troponin T 0.03 (*)     All other components within normal limits   TROPONIN - Abnormal; Notable for the following components:    Troponin T 0.03 (*)     All other components within normal limits   GLUCOSE, WHOLE BLOOD - Abnormal; Notable for the following components:    POC Glucose 144 (*)     All other components within normal limits   BLOOD GAS, ARTERIAL - Abnormal; Notable for the following components:    pCO2, Arterial 59.7 (*)     pO2, Arterial 68.7 (*)     HCO3, Arterial 37.5 (*)     Positive Base Excess, Art 10.4 (*)     O2 Sat, Arterial 93.6 (*)     All other components within normal limits   COMPREHENSIVE METABOLIC PANEL W/ REFLEX TO MG FOR LOW K - Abnormal; Notable for the following components:    Glucose 144 (*)     BUN 24 (*)     Bun/Cre Ratio 21 (*)     Chloride 95 (*)     CO2 37 (*)     Anion Gap 7 (*)     Alkaline Phosphatase 148 (*)     Total Protein 5.6 (*)     Alb 2.7 (*)     Albumin/Globulin Ratio 0.9 (*)     All other components within normal limits   CBC - Abnormal; Notable for the following components:    RBC 2.96 (*)     Hemoglobin 9.5 (*)     Hematocrit 30.9 (*)     .4 (*)     RDW 17.2 (*)     All other components within normal limits   GLUCOSE, WHOLE BLOOD - Abnormal; Notable for the following components:    POC Glucose 236 (*)     All other components within normal limits   GLUCOSE, WHOLE BLOOD - Abnormal; Notable for the following components:    POC Glucose 124 (*)     All other components within normal limits   BODY FLUID CELL COUNT WITH DIFFERENTIAL - Abnormal; Notable for the following components:    Neutrophil Count, Fluid 19 (*)     Lymphocytes, Body Fluid 29 (*)     Other Cells, Fluid 52 (*)     All other components within normal limits   GLUCOSE, WHOLE BLOOD - Abnormal; Notable for the following components:    POC Glucose 133 (*)     All other components within normal limits   GLUCOSE, WHOLE BLOOD - Abnormal; Notable for the following components:    POC Glucose 177 (*)     All other components within normal limits   CBC - Abnormal; Notable for the following components:    RBC 3.07 (*)     Hemoglobin 9.6 (*)     Hematocrit 31.0 (*)     RDW 17.4 (*)     All other components within normal limits   GLUCOSE, WHOLE BLOOD - Abnormal; Notable for the following components:    POC Glucose 223 (*)     All other components within normal limits   GLUCOSE, WHOLE BLOOD - Abnormal; Notable for the following components:    POC Glucose 138 (*)     All other components within normal limits   GLUCOSE, WHOLE BLOOD - Abnormal; Notable for the following components:    POC Glucose 161 (*)     All other components within normal limits   ACID FAST CULTURE WITH SMEAR   FUNGUS CULTURE   TROPONIN   CYTOLOGY, NON-GYN   GLUCOSE, BODY FLUID   LACTATE DEHYDROGENASE, BODY FLUID   PH, BODY FLUID   SURGICAL PATHOLOGY   POCT GLUCOSE   POCT GLUCOSE   POCT GLUCOSE   POCT GLUCOSE   POCT GLUCOSE   POCT GLUCOSE   POCT GLUCOSE   POCT GLUCOSE       All other labs were within normal range or not returned as of this dictation. EMERGENCY DEPARTMENT COURSE andMedical Decision Making: Total critical care time caring for this patient with life threatening, unstable organ failure, including direct patient contact, management of life support systems, review of data including imaging and labs, discussions with other team members and physicians at least 30 minutes so far today, excluding separately billable procedures. 1056:  Spoke with Dr. Marcia Henriquez about all aspects of patient's case. He states will admit for further evaluation.       ED Medications administered this visit:    Medications   albuterol (PROVENTIL) nebulizer solution 2.5 mg (has no administration in time range)   atorvastatin (LIPITOR) tablet 80 mg (80 mg Oral Given 4/24/19 2009)   insulin lispro (HUMALOG) injection pen 0-6 Units (2 Units Subcutaneous Given 4/24/19 2017)   insulin lispro (HUMALOG) injection pen 0-12 Units (0 Units Subcutaneous Not Given 4/25/19 0758)   insulin glargine (LANTUS) injection pen 10 Units (10 Units Subcutaneous Given 4/24/19 2017)   metoprolol tartrate (LOPRESSOR) tablet 12.5 mg (12.5 mg Oral Given 4/25/19 0828)   sodium chloride flush 0.9 % injection 10 mL (10 mLs Intravenous Given 4/25/19 0828)   sodium chloride flush 0.9 % injection 10 mL (has no administration in time range)   magnesium hydroxide (MILK OF MAGNESIA) 400 MG/5ML suspension 30 mL (has no administration in time range)   ondansetron (ZOFRAN) injection 4 mg (has no administration in time range)   acetaminophen (TYLENOL) tablet 650 mg (650 mg Oral Given 4/24/19 2013)   furosemide (LASIX) injection 40 mg (40 mg Intravenous Given 4/25/19 0828)   potassium chloride (KLOR-CON M) extended release tablet 40 mEq (has no administration in time range)     Or   potassium bicarb-citric acid (EFFER-K) effervescent tablet 40 mEq (has no administration in time range)     Or   potassium chloride 10 mEq/100 mL IVPB (Peripheral Line) (has no administration in time range)   glucose (GLUTOSE) 40 % oral gel 15 g (has no administration in time range)   dextrose 50 % solution 12.5 g (has no administration in time range)   glucagon (rDNA) injection 1 mg (has no administration in time range)   dextrose 5 % solution (has no administration in time range)   docusate sodium (COLACE) capsule 100 mg (100 mg Oral Given 4/25/19 0828)   amiodarone (CORDARONE) tablet 200 mg (200 mg Oral Given 4/25/19 0828)   dabigatran (PRADAXA) capsule 150 mg (150 mg Oral Given 4/25/19 0828)   furosemide (LASIX) injection 60 mg (60 mg Intravenous Given 4/23/19 1151)   aspirin tablet 325 mg (325 mg Oral Given 4/23/19 1225)   lidocaine 2 % injection (5 mLs Intradermal Given 4/24/19 1140)            CLINICAL       1. Congestive heart failure, unspecified HF chronicity, unspecified heart failure type ([address] Stable   2. Acute on chronic respiratory acidosis Stable   3. Pleural effusion Stable   4. Hypoxia    5.  Respiratory insufficiency          DISPOSITION/PLAN   DISPOSITION        PATIENT REFERRED TO:  Lucretia Tucker MD  [place] [address], [facility]  [address]  [phone]          DISCHARGE MEDICATIONS:  Current Discharge Medication List                 (Please note that portions of this note were completed with a voice recognition program.  Efforts were made to edit the dictations but occasionallywords are mis-transcribed.)      Rose Martin DO (electronically signed)  Attending Emergency Department Provider     Violette Vick DO  04/25/19 4445

## 2019-04-23 NOTE — PROGRESS NOTES
RESPIRATORY ASSESSMENT PROTOCOL                                                                                              Patient Name: Greg Ohara Room#: G295/T735-59 : 1945     Admitting diagnosis: HF [facility]heart failure) ([facility] [I50.9]  Heart failure ([facility] [I50.9]       Medical History:   Past Medical History:   Diagnosis Date    Abnormal nuclear stress test 2010    Stress and resting cardiolite images showed inferior wall hypoperfusion suggestive of previous myocardial infarction. Left ventricular wall motion showed inferior wall hypokinesia. EF 58%.  Arrhythmia     A single episode    Atrial fibrillation [facility]     Single Episode    CAD (coronary artery disease)     MI    DM type 2 (diabetes mellitus, type 2) ([facility]     Hx of echocardiogram 2007    EF 62%, Increased left atrial and right ventricular diametes. Increased septal and posterior wall thickness suggest left ventricular hypertrophy.  Valves were normal.        PATIENT ASSESSMENT    LABORATORY DATA  Hematology:   Lab Results   Component Value Date    WBC 9.7 2019    RBC 3.22 2019    HGB 10.2 2019    HCT 33.7 2019     2019     Chemistry:    Lab Results   Component Value Date    PHART 7.314 2019    TOG6GXK 69.8 2019    PO2ART 68.3 2019    Z4IAOIDP 91.5 2019    BDP1BPE 34.7 2019    PBEA 5.8 2019       Blood Culture:   Sputum Culture:     VITALS  Pulse: 77   Resp: 12  BP: 130/78  SpO2: 96 % O2 Device: Nasal cannula  Temp: 97.8 °F (36.6 °C)  Comment:   SKIN COLOR  [] Normal  [] Pale  [] Dusky  [] Cyanotic      RESPIRATORY PATTERN  [] Normal  [] Dyspnea  [] Cheyne-Arana  [] Kussmaul  [] Biots  AMBULATORY  [] Yes  [] No  [] With Assistance    PEAK FLOW  Predicted:     Personal Best:        VITAL CAPACITY  Predicted value:  ml  Actual Value:  ml  30% of Predicted:  ml  Patient Acuity 0 1 2 3 4 Score   Level of Concious (LOC) [x]  Alert & Oriented or Pt cough  Instruct patient to self-perform IS q1hr WA   Directed Cough self-performed q1hr WA     If Acuity Level is 2 or above in the following:    [] PULMONARY HISTORY (PULM HX)    Goal: Assist patient in quitting smoking to slow or stop the progression of lung disease.     [] Smoking Cessation Protocol    SMOKING CESSATION EDUCATION provided according to policy FH_982: (vadim with an X)  ____Yes    ____ No     ____ NA    Smoking Cessation Booklet given:  ____Yes  ____No ____Patient Nancy Macias

## 2019-04-24 ENCOUNTER — APPOINTMENT (OUTPATIENT)
Dept: GENERAL RADIOLOGY | Age: 74
DRG: 291 | End: 2019-04-24
Payer: MEDICARE

## 2019-04-24 ENCOUNTER — APPOINTMENT (OUTPATIENT)
Dept: ULTRASOUND IMAGING | Age: 74
DRG: 291 | End: 2019-04-24
Payer: MEDICARE

## 2019-04-24 LAB
ALBUMIN SERPL-MCNC: 2.7 G/DL (ref 3.5–5.2)
ALBUMIN/GLOBULIN RATIO: 0.9 (ref 1–2.5)
ALP BLD-CCNC: 148 U/L (ref 40–129)
ALT SERPL-CCNC: 24 U/L (ref 5–41)
ANION GAP SERPL CALCULATED.3IONS-SCNC: 7 MMOL/L (ref 9–17)
APPEARANCE FLUID: ABNORMAL
AST SERPL-CCNC: 19 U/L
BASO FLUID: ABNORMAL %
BILIRUB SERPL-MCNC: 0.71 MG/DL (ref 0.3–1.2)
BUN BLDV-MCNC: 24 MG/DL (ref 8–23)
BUN/CREAT BLD: 21 (ref 9–20)
CALCIUM SERPL-MCNC: 8.9 MG/DL (ref 8.6–10.4)
CHLORIDE BLD-SCNC: 95 MMOL/L (ref 98–107)
CO2: 37 MMOL/L (ref 20–31)
COLOR FLUID: ABNORMAL
CREAT SERPL-MCNC: 1.12 MG/DL (ref 0.7–1.2)
EOSINOPHIL FLUID: ABNORMAL %
FLUID DIFF COMMENT: ABNORMAL
GFR AFRICAN AMERICAN: >60 ML/MIN
GFR NON-AFRICAN AMERICAN: >60 ML/MIN
GFR SERPL CREATININE-BSD FRML MDRD: ABNORMAL ML/MIN/{1.73_M2}
GFR SERPL CREATININE-BSD FRML MDRD: ABNORMAL ML/MIN/{1.73_M2}
GLUCOSE BLD-MCNC: 124 MG/DL (ref 74–100)
GLUCOSE BLD-MCNC: 133 MG/DL (ref 74–100)
GLUCOSE BLD-MCNC: 144 MG/DL (ref 70–99)
GLUCOSE BLD-MCNC: 177 MG/DL (ref 74–100)
GLUCOSE BLD-MCNC: 223 MG/DL (ref 74–100)
GLUCOSE, FLUID: 155 MG/DL
HCT VFR BLD CALC: 30.9 % (ref 40.7–50.3)
HEMOGLOBIN: 9.5 G/DL (ref 13–17)
LACTATE DEHYDROGENASE, FLUID: 1494 U/L
LYMPHOCYTES, BODY FLUID: 29 %
MCH RBC QN AUTO: 32.1 PG (ref 25.2–33.5)
MCHC RBC AUTO-ENTMCNC: 30.7 G/DL (ref 28.4–34.8)
MCV RBC AUTO: 104.4 FL (ref 82.6–102.9)
MONOCYTE, FLUID: ABNORMAL %
NEUTROPHIL, FLUID: 19 %
NRBC AUTOMATED: 0 PER 100 WBC
OTHER CELLS FLUID: 52 %
PDW BLD-RTO: 17.2 % (ref 11.8–14.4)
PH FLUID: 8.5
PLATELET # BLD: 265 K/UL (ref 138–453)
PMV BLD AUTO: 8.5 FL (ref 8.1–13.5)
POTASSIUM SERPL-SCNC: 4.9 MMOL/L (ref 3.7–5.3)
RBC # BLD: 2.96 M/UL (ref 4.21–5.77)
RBC FLUID: ABNORMAL /MM3
SODIUM BLD-SCNC: 139 MMOL/L (ref 135–144)
SPECIMEN TYPE: ABNORMAL
SPECIMEN TYPE: NORMAL
TOTAL PROTEIN: 5.6 G/DL (ref 6.4–8.3)
TROPONIN INTERP: ABNORMAL
TROPONIN T: 0.03 NG/ML
TROPONIN, HIGH SENSITIVITY: ABNORMAL NG/L (ref 0–22)
WBC # BLD: 9.1 K/UL (ref 3.5–11.3)
WBC FLUID: 1840 /MM3

## 2019-04-24 PROCEDURE — 97110 THERAPEUTIC EXERCISES: CPT

## 2019-04-24 PROCEDURE — 84484 ASSAY OF TROPONIN QUANT: CPT

## 2019-04-24 PROCEDURE — 2500000003 HC RX 250 WO HCPCS: Performed by: RADIOLOGY

## 2019-04-24 PROCEDURE — 87205 SMEAR GRAM STAIN: CPT

## 2019-04-24 PROCEDURE — 6370000000 HC RX 637 (ALT 250 FOR IP): Performed by: INTERNAL MEDICINE

## 2019-04-24 PROCEDURE — 88112 CYTOPATH CELL ENHANCE TECH: CPT

## 2019-04-24 PROCEDURE — 85027 COMPLETE CBC AUTOMATED: CPT

## 2019-04-24 PROCEDURE — 83986 ASSAY PH BODY FLUID NOS: CPT

## 2019-04-24 PROCEDURE — 2580000003 HC RX 258: Performed by: INTERNAL MEDICINE

## 2019-04-24 PROCEDURE — 80053 COMPREHEN METABOLIC PANEL: CPT

## 2019-04-24 PROCEDURE — 94660 CPAP INITIATION&MGMT: CPT

## 2019-04-24 PROCEDURE — 87206 SMEAR FLUORESCENT/ACID STAI: CPT

## 2019-04-24 PROCEDURE — 89051 BODY FLUID CELL COUNT: CPT

## 2019-04-24 PROCEDURE — 2709999900 US THORACENTESIS

## 2019-04-24 PROCEDURE — 87102 FUNGUS ISOLATION CULTURE: CPT

## 2019-04-24 PROCEDURE — 2000000000 HC ICU R&B

## 2019-04-24 PROCEDURE — 87116 MYCOBACTERIA CULTURE: CPT

## 2019-04-24 PROCEDURE — 87075 CULTR BACTERIA EXCEPT BLOOD: CPT

## 2019-04-24 PROCEDURE — 87015 SPECIMEN INFECT AGNT CONCNTJ: CPT

## 2019-04-24 PROCEDURE — 97166 OT EVAL MOD COMPLEX 45 MIN: CPT

## 2019-04-24 PROCEDURE — 82947 ASSAY GLUCOSE BLOOD QUANT: CPT

## 2019-04-24 PROCEDURE — 36415 COLL VENOUS BLD VENIPUNCTURE: CPT

## 2019-04-24 PROCEDURE — 88305 TISSUE EXAM BY PATHOLOGIST: CPT

## 2019-04-24 PROCEDURE — 82945 GLUCOSE OTHER FLUID: CPT

## 2019-04-24 PROCEDURE — 83615 LACTATE (LD) (LDH) ENZYME: CPT

## 2019-04-24 PROCEDURE — 6360000002 HC RX W HCPCS: Performed by: PHYSICIAN ASSISTANT

## 2019-04-24 PROCEDURE — 71045 X-RAY EXAM CHEST 1 VIEW: CPT

## 2019-04-24 PROCEDURE — 87070 CULTURE OTHR SPECIMN AEROBIC: CPT

## 2019-04-24 RX ORDER — LIDOCAINE HYDROCHLORIDE 20 MG/ML
INJECTION, SOLUTION INFILTRATION; PERINEURAL
Status: COMPLETED | OUTPATIENT
Start: 2019-04-24 | End: 2019-04-24

## 2019-04-24 RX ADMIN — INSULIN LISPRO 2 UNITS: 100 INJECTION, SOLUTION INTRAVENOUS; SUBCUTANEOUS at 16:32

## 2019-04-24 RX ADMIN — DOCUSATE SODIUM 100 MG: 100 CAPSULE, LIQUID FILLED ORAL at 08:11

## 2019-04-24 RX ADMIN — METOPROLOL TARTRATE 12.5 MG: 25 TABLET ORAL at 08:11

## 2019-04-24 RX ADMIN — Medication 10 ML: at 08:12

## 2019-04-24 RX ADMIN — Medication 10 ML: at 20:09

## 2019-04-24 RX ADMIN — ATORVASTATIN CALCIUM 80 MG: 40 TABLET, FILM COATED ORAL at 20:09

## 2019-04-24 RX ADMIN — ACETAMINOPHEN 650 MG: 325 TABLET, FILM COATED ORAL at 20:13

## 2019-04-24 RX ADMIN — FUROSEMIDE 40 MG: 10 INJECTION, SOLUTION INTRAMUSCULAR; INTRAVENOUS at 17:34

## 2019-04-24 RX ADMIN — FUROSEMIDE 40 MG: 10 INJECTION, SOLUTION INTRAMUSCULAR; INTRAVENOUS at 08:12

## 2019-04-24 RX ADMIN — LIDOCAINE HYDROCHLORIDE 5 ML: 20 INJECTION, SOLUTION INFILTRATION; PERINEURAL at 11:40

## 2019-04-24 RX ADMIN — INSULIN LISPRO 2 UNITS: 100 INJECTION, SOLUTION INTRAVENOUS; SUBCUTANEOUS at 20:17

## 2019-04-24 RX ADMIN — AMIODARONE HYDROCHLORIDE 200 MG: 200 TABLET ORAL at 08:11

## 2019-04-24 RX ADMIN — METOPROLOL TARTRATE 12.5 MG: 25 TABLET ORAL at 20:09

## 2019-04-24 RX ADMIN — INSULIN GLARGINE 10 UNITS: 100 INJECTION, SOLUTION SUBCUTANEOUS at 20:17

## 2019-04-24 ASSESSMENT — PAIN DESCRIPTION - DESCRIPTORS: DESCRIPTORS: ACHING;SORE

## 2019-04-24 ASSESSMENT — PAIN SCALES - GENERAL
PAINLEVEL_OUTOF10: 0
PAINLEVEL_OUTOF10: 0
PAINLEVEL_OUTOF10: 3

## 2019-04-24 ASSESSMENT — PAIN DESCRIPTION - PAIN TYPE: TYPE: ACUTE PAIN

## 2019-04-24 ASSESSMENT — PAIN DESCRIPTION - LOCATION: LOCATION: LEG

## 2019-04-24 ASSESSMENT — PAIN DESCRIPTION - ORIENTATION: ORIENTATION: POSTERIOR

## 2019-04-24 ASSESSMENT — PAIN DESCRIPTION - FREQUENCY: FREQUENCY: INTERMITTENT

## 2019-04-24 NOTE — PLAN OF CARE
Problem: Falls - Risk of:  Goal: Will remain free from falls  Description  Will remain free from falls  Outcome: Ongoing  Note:   Patient is alert and oriented and has demonstrated the use of using the call light for assistance before getting up. Education has been provided to defer the use of the bed alarm and/or restraint free alarm as the patient has shown competency in calling for assistance and verbalizing the risk. Problem: Risk for Impaired Skin Integrity  Goal: Tissue integrity - skin and mucous membranes  Description  Structural intactness and normal physiological function of skin and  mucous membranes.   Outcome: Ongoing     Problem: Musculor/Skeletal Functional Status  Goal: Highest potential functional level  Outcome: Ongoing  Note:   PT/OT ordered     Problem: Fluid Volume:  Goal: Hemodynamic stability will improve  Description  Hemodynamic stability will improve  Outcome: Ongoing  Note:   Lasix given     Problem: Respiratory:  Goal: Respiratory status will improve  Description  Respiratory status will improve  Outcome: Ongoing

## 2019-04-24 NOTE — PROGRESS NOTES
Occupational Therapy   Occupational Therapy Initial Assessment  Date: 2019   Patient Name: Zev Holman  MRN: [id number]     : 1945    Date of Service: 2019    Discharge Recommendations:  ECF with OT, 24 hour supervision or assist, Continue to assess pending progress       Assessment   Performance deficits / Impairments: Decreased functional mobility ; Decreased ADL status; Decreased high-level IADLs;Decreased strength;Decreased endurance  Assessment: Patient admitted due to CHF exacerbation. Patient reports with decreased strength, endurance, and decreased ability to functionally transfer self, relying on increased assistance at this time with all IADL/ADL tasks. Patient to benefit from OT services in order to address these deficits. Prognosis: Fair  Decision Making: Medium Complexity  REQUIRES OT FOLLOW UP: Yes  Safety Devices  Safety Devices in place: Yes  Type of devices: Call light within reach; Left in chair;Nurse notified           Patient Diagnosis(es): The primary encounter diagnosis was Congestive heart failure, unspecified HF chronicity, unspecified heart failure type ([facility] Diagnoses of Acute on chronic respiratory acidosis and Pleural effusion were also pertinent to this visit. has a past medical history of Abnormal nuclear stress test, Arrhythmia, Atrial fibrillation ([address] CAD (coronary artery disease), DM type 2 (diabetes mellitus, type 2) ([facility] and Hx of echocardiogram.   has a past surgical history that includes Coronary angioplasty (); Cardioversion (2007); and Cardioversion ().            Restrictions  Restrictions/Precautions  Restrictions/Precautions: Fall Risk, General Precautions    Subjective   General  Chart Reviewed: Yes  Patient assessed for rehabilitation services?: Yes  Family / Caregiver Present: Yes(Wife present)  Referring Practitioner: Robb Bella PA-C  Diagnosis: CHF exacerbation  Subjective  Subjective: Patient reports 0/10 pain this date upon arrival for OT evaluation. Oxygen Therapy  SpO2: 97 %  O2 Flow Rate (L/min): 3 L/min  Social/Functional History  Social/Functional History  Lives With: Other (comment)([facility])  Type of Home: Facility  Home Layout: One level  Home Access: Level entry  Bathroom Shower/Tub: Shower chair with back, Walk-in shower  Bathroom Toilet: Handicap height  Bathroom Equipment: Grab bars in shower, Shower chair, Grab bars around toilet, Hand-held shower  Bathroom Accessibility: Accessible  Home Equipment: Rolling walker, [address] Help From: Personal care attendant  ADL Assistance: Needs assistance  Homemaking Assistance: Needs assistance  Ambulation Assistance: Needs assistance  Transfer Assistance: Needs assistance  Active : No  Additional Comments: Patient recently staying at [address] [place] [address] home under skilled care. Nursing home utilizing noble lift for transfers. Prior to living at [address] [place] [address], patient lived at home with his wife in a 2-story home, however everything located on first level. Objective   Vision: Within Functional Limits  Hearing: Within functional limits          Balance  Sitting Balance: Stand by assistance  Standing Balance: Moderate assistance(Mod A of 2 with use of gaitbelt and rolling walker. )  ADL  Feeding: Modified independent   Grooming: Minimal assistance  UE Bathing: Minimal assistance  LE Bathing: Dependent/Total  UE Dressing: Moderate assistance  LE Dressing: Dependent/Total  Toileting: Maximum assistance           Transfers  Sit to stand: Moderate assistance  Stand to sit: Moderate assistance  Transfer Comments: Moderate assistance of 2 with use of gait belt and rolling walker to perform sit to stand transfer.                         LUE AROM (degrees)  LUE AROM : WFL  Left Hand AROM (degrees)  Left Hand AROM: WFL  RUE AROM (degrees)  RUE AROM : WFL  Right Hand AROM (degrees)  Right Hand AROM: WFL  LUE Strength  Gross LUE Strength: Exceptions to [facility] Shoulder Flex: 3+/5  L Hand Grasp: 4-/5  L Hand Release: 4-/5  RUE Strength  Gross RUE Strength: Exceptions to [facility]  R Shoulder Flex: 3+/5  R Hand Grasp: 4-/5  R Hand Release: 4-/5                   Plan   Plan  Times per week: 7  Times per day: Daily  Current Treatment Recommendations: Strengthening, Endurance Training, Patient/Caregiver Education & Training, Self-Care / ADL, Functional Mobility Training, Other (comment), Safety Education & Training  Plan Comment: Energy Conservation, ther-act, ther-ex, and self-care tasks. Goals  Short term goals  Time Frame for Short term goals: 10 visits  Short term goal 1: Patient to perform functional transfers with mod A of 2 with use of gaitbelt and LRAD. Short term goal 2: Patient to tolerate 10 minutes ther-ex/ther-act to improve strength/endurance for ADLs with no greater than 4 restbreaks. Short term goal 3: Patient to complete ADL grooming tasks wtih min A. Short term goal 4: Patient to state 2 energy conservation techniques following education in order to improve daily endurance.         Therapy Time   Individual Concurrent Group Co-treatment   Time In 1320         Time Out 1331         Minutes Vladimir [address] [place] [address], OTR/L

## 2019-04-24 NOTE — PROGRESS NOTES
Discussed discharge plans with the patient. Patient is a 69year old male here with left pleural effusion, acute on chronic CHF, hypercapnic respiratory failure. He is alert and oriented. Patient is  and is at [address] [place] [address] receiving skilled care for nursing and pt/ot. He uses a walker and wheelchair. The nursing home is assisting him with his ADL's. The nursing home is managing his medications and transportation. His PCP is Dr. Liza Campos and the [facility] doctor. He has medical insurance and gets his medications from the [facility]. The discharge plan is to return to [address] [place] [address] under skilled care. Patient does not have advance directives. LSW to monitor and assist with any needs or concerns as they arise.     GUILLERMINA Solano

## 2019-04-24 NOTE — PROGRESS NOTES
Nutrition Assessment    Type and Reason for Visit: Initial(nutrition screen/CHF)    Nutrition Recommendations:   1. Continue with 1,800 kcal, 4 CHO/meal diet, no added salt diet, fluid restriction of 1500 ml.   2. Provided diet instruction for CHO counting, serving size, low sodium diet for CHF. Nutrition Assessment: Pt is adequately nourished at admission AEB good oral intakes, however may be slightly compromised due to pt report of fatigue, and feeling \"dry\". Pt denies any chewing or swallowing issues, is able to take % of meals. Pt does have elevated blood sugar (is improving) and hemoglobin/hematocrit correlating with fatigue. Pt reports does not use salt, and drinks soda pop, but not daily. Pt states has had previous diet instruction, is willing to receive more. Last bowel movement today. Pt is at moderate nutrition risk. Pt may need review when is feeling more alert for alternate beverage choices. Pt wt is expected to decrease with decrease in edema. Malnutrition Assessment:  · Malnutrition Status: At risk for malnutrition  · Context: Chronic illness  · Findings of the 6 clinical characteristics of malnutrition (Minimum of 2 out of 6 clinical characteristics is required to make the diagnosis of moderate or severe Protein Calorie Malnutrition based on AND/ASPEN Guidelines):  1. Energy Intake-(> 75%), Unable to assess    2. Weight Loss-No significant weight loss, unable to assess  3. Fat Loss-No significant subcutaneous fat loss,    4. Muscle Loss-No significant muscle mass loss,    5. Fluid Accumulation-Mild fluid accumulation, Extremities  6.  Strength-Not measured     Nutrition Risk Level:  Moderate    Nutrient Needs:  · Estimated Daily Total Kcal: 7237-1714  · Estimated Daily Protein (g): 70-84  · Estimated Daily Total Fluid (ml/day): 9314-0990    Nutrition Diagnosis:   · Problem: Predicted suboptimal energy intake  · Etiology: related to (pt report of fatigue)     Signs and symptoms: as evidenced by Lab values(low hematocrit 30.9, Hgb 9.5.)    Objective Information:  · Nutrition-Focused Physical Findings: soft abdomen, active bowel sounds, BLE edema: +2 pitting  · Wound Type: None  · Current Nutrition Therapies:  · Oral Diet Orders: Carb Control 4 Carbs/Meal, No Added Salt (3-4gm), Fluid Restriction(1500 ml)   · Oral Diet intake: %  · Oral Nutrition Supplement (ONS) Orders: None  · ONS intake: Unable to assess(none ordered)  · Anthropometric Measures:  · Ht: 5' 8\" (172.7 cm)   · Current Body Wt: 292 lb 8.8 oz (132.7 kg)  · Admission Body Wt: 289 lb 14.5 oz (131.5 kg)  · Usual Body Wt: 280 lb (127 kg)(on 4/10/19)  · % Weight Change:  ,  4.2 % gain x 2 weeks  · Ideal Body Wt: 154 lb (69.9 kg), % Ideal Body 189%  · Adjusted Body Wt: 188 lb 8 oz (85.5 kg), body weight adjusted for (Obesity)  · BMI Classification: BMI > or equal to 40.0 Obese Class III    Nutrition Interventions:   Continue current diet  Continued Inpatient Monitoring, Education Completed    Nutrition Evaluation:   · Evaluation: Goals set   · Goals: Maintain % consumption of meals    · Monitoring: Meal Intake, Diet Tolerance, Weight, Pertinent Labs, Patient/Family Education      . Electronically signed by Marika STARK, RDN, LD on 4/24/2019 at 11:16 AM    Contact Number:  [phone]

## 2019-04-24 NOTE — PROGRESS NOTES
demonstated fair tolerance to tx. required 2 attempts to transfer from recliner to RW, mod A+2. Barriers to Learning: Issued and educated pt on discharge folder, pt with appropriate understanding  REQUIRES PT FOLLOW UP: Yes     G-Code     OutComes Score                                                  AM-PAC Score             Goals       Plan    Safety Devices  Type of devices:  All fall risk precautions in place, Call light within reach, Gait belt, Left in chair, Nurse notified(wife in room)     Therapy Time   Individual Concurrent Group Co-treatment   Time In [address]         Time Out 1345         Minutes [address] [place] [address]/[facility], [id number]

## 2019-04-24 NOTE — PROGRESS NOTES
Physical Therapy    Facility/Department: [facility] AT THE [facility]  Initial Assessment    NAME: Greg Ohara  : 1945  MRN: [id number]    Date of Service: 2019    Discharge Recommendations:  Continue to assess pending progress        Assessment   Body structures, Functions, Activity limitations: Decreased functional mobility ; Decreased endurance;Decreased strength;Decreased balance;Decreased high-level IADLs  Assessment: Pt is a 69year old male that was hosptialized for CHF. Pt presents with decreased activity tolerance, needs assist for all transfers, inability to ambulate and reduce dynamic standing balance and LE strength. Pt will benefit from skilled PT in order to address these deficits. Treatment Diagnosis: general weakness   Prognosis: Fair  Decision Making: Medium Complexity  Patient Education: pt educated on his POC and HEP   REQUIRES PT FOLLOW UP: Yes  Activity Tolerance  Activity Tolerance: Patient limited by endurance; Patient limited by fatigue       Patient Diagnosis(es): The primary encounter diagnosis was Congestive heart failure, unspecified HF chronicity, unspecified heart failure type ([address] Diagnoses of Acute on chronic respiratory acidosis and Pleural effusion were also pertinent to this visit. has a past medical history of Abnormal nuclear stress test, Arrhythmia, Atrial fibrillation ([address] CAD (coronary artery disease), DM type 2 (diabetes mellitus, type 2) ([address] and Hx of echocardiogram.   has a past surgical history that includes Coronary angioplasty (); Cardioversion (2007); and Cardioversion (). Restrictions  Restrictions/Precautions  Restrictions/Precautions: Fall Risk, General Precautions  Vision/Hearing  Vision: Within Functional Limits  Hearing: Within functional limits     Subjective  General  Chart Reviewed: Yes  Patient assessed for rehabilitation services?: Yes  Response To Previous Treatment: Patient with no complaints from previous session.   Family / Caregiver Present: Yes  Referring Practitioner: Dr. Zeina Noland   Referral Date : 04/23/19  Diagnosis: heart failure   General Comment  Comments: Pt denies pain. Subjective  Subjective: pt reports being very tired   Pain Screening  Patient Currently in Pain: Denies  Vital Signs  Patient Currently in Pain: Denies       Orientation  Orientation  Overall Orientation Status: Within Functional Limits  Social/Functional History  Social/Functional History  Lives With: Other (comment)  Type of Home: Facility  Home Layout: One level  Home Access: Level entry  Bathroom Shower/Tub: Shower chair with back, Walk-in shower  Bathroom Toilet: Handicap height  Bathroom Equipment: Grab bars in shower, Shower chair, Grab bars around toilet, Hand-held shower  Bathroom Accessibility: Accessible  Home Equipment: Rolling walker, [address] Help From: Personal care attendant  ADL Assistance: Needs assistance  Homemaking Assistance: Needs assistance  Ambulation Assistance: Needs assistance  Transfer Assistance: Needs assistance  Active : No  Additional Comments: Patient recently staying at [address] [place] [address] under skilled care. Nursing home utilizing noble lift for transfers. Prior to living at [address] [place] [address], patient lived at home with his wife in a 2-story home, however everything located on first level. Cognition        Objective          AROM RLE (degrees)  RLE AROM: WFL  AROM LLE (degrees)  LLE AROM : WFL  Strength RLE  Comment: grossly 3/5   Strength LLE  Comment: grossly 3/5         Bed mobility  Supine to Sit: Moderate assistance  Transfers  Sit to Stand: 2 Person Assistance; Moderate Assistance  Stand to sit: Moderate Assistance;2 Person Assistance  Ambulation  Ambulation?: No  WB Status: non-ambulatory per [place]     Balance  Posture: Fair  Sitting - Static: Good  Sitting - Dynamic: Fair;+  Standing - Static: Fair;-  Standing - Dynamic: Fair;-        Plan   Plan  Times per week: 7x/wk   Times per day: Twice a day  Plan weeks: 2x/daily except weekends 1x/daily   Current Treatment Recommendations: Strengthening, Transfer Training, Endurance Training, Neuromuscular Re-education, Patient/Caregiver Education & Training, Equipment Evaluation, Education, & procurement, Balance Training, Gait Training, Home Exercise Program, Functional Mobility Training, Stair training, Safety Education & Training  Safety Devices  Type of devices:  All fall risk precautions in place, Call light within reach, Gait belt, Left in chair, Nurse notified    G-Code       OutComes Score                                                  AM-PAC Score             Goals  Short term goals  Time Frame for Short term goals: 10 days   Short term goal 1: Pt will be educated on his POC  Short term goal 2: Pt will perform sit to stand transfers Koki   Short term goal 3: Pt will increase dynamic standing balance to Fair in order to reduce fall risk   Short term goal 4: Pt will tolerate 20 minutes on exercises in order to increase endurnace   Patient Goals   Patient goals : \"to get stronger\"       Therapy Time   Individual Concurrent Group Co-treatment   Time In 1000         Time Out 1021         Minutes Leora Heck 75, PT

## 2019-04-24 NOTE — DISCHARGE INSTR - COC
Continuity of Care Form    Patient Name: Kelly Cox   :  1945  MRN:  [id number]    Admit date:  2019  Discharge date:  2019      Code Status Order: Full Code   Advance Directives:   [facility] Documentation     Date/Time Healthcare Directive Type of Healthcare Directive Copy in [facility] [address] Agent's Name Healthcare Agent's Phone Number    19 1315  No, patient does not have an advance directive for healthcare treatment -- -- -- -- --          Admitting Physician:  Lucretia Tucker MD  PCP: Lucretia Tucker MD    Discharging Nurse: [facility] Unit/Room#: 0302/0302-01  Discharging Unit Phone Number: [phone]    Emergency Contact:   Extended Emergency Contact Information  Primary Emergency Contact: Fina Eng  Address: [address] Phone: [phone]  Mobile Phone: [phone]  Relation: Spouse   needed? No  Secondary Emergency Contact: Paulette Oscar [address] Phone: [phone]  Relation: Child   needed?  No    Past Surgical History:  Past Surgical History:   Procedure Laterality Date    CARDIOVERSION  2007    Successful-Dr. Tera Arroyo    CARDIOVERSION      CORONARY ANGIOPLASTY         Immunization History:   Immunization History   Administered Date(s) Administered    Influenza Vaccine, unspecified formulation 10/06/2017    Influenza Virus Vaccine 10/30/2014, 10/22/2015    Influenza, Monia Med[place], 3 Years and older, IM (Fluzone 3 yrs and older or Afluria 5 yrs and older) 10/10/2016    Influenza, Monia [place], 3 yrs and older, IM, PF (Fluzone 3 yrs and older or Afluria 5 yrs and older) 10/11/2018    Pneumococcal 13-valent Conjugate ([id number]) 2017    Pneumococcal Polysaccharide ([id number]) 2018    Tetanus 2013    Zoster Live (Zostavax) 2017       Active Problems:  Patient Active Problem List   Diagnosis Code    ASHD (arteriosclerotic heart disease) I25.10    DM type 2 (diabetes mellitus, type 2) ([facility] E11.9    Adult BMI > 30 [id number]    Type 2 diabetes mellitus without complication, without long-term current use of insulin ([facility]) E11.9    Influenza A with respiratory manifestations J10.1    Acute respiratory failure with hypoxia ([facility]) J96.01    Atrial fibrillation with rapid ventricular response ([facility]) I48.91    Acute hypercapnic respiratory failure ([facility]) J96.02    NSTEMI (non-ST elevated myocardial infarction) ([facility] I21.4    Community acquired pneumonia of left lower lobe of lung ([facility]) J18.1    IVIS (obstructive sleep apnea) G47.33    Leukocytosis D72.829    Hemorrhagic shock ([facility]) R57.8    Retroperitoneal hematoma K66.1    Lactic acidosis E87.2    Hyperglycemia R73.9    Acute blood loss anemia D62    Ischemic hepatitis K75.9    ERNESTO (acute kidney injury) ([facility] N17.9    HF (heart failure) ([facility]) I50.9    History of non-ST elevation myocardial infarction (NSTEMI) I25.2    Elevated troponin R74.8    Acute on chronic systolic heart failure ([facility]) I50.23    Pleural effusion, left J90    Hypercapnic respiratory failure, chronic ([facility]) J96.12    Heart failure ([facility]) I50.9    Acute on chronic respiratory acidosis E87.2    Essential hypertension I10    Dyslipidemia E78.5    Persistent atrial fibrillation ([facility]) I48.1       Isolation/Infection:   Isolation          No Isolation            Nurse Assessment:  Last Vital Signs: BP (!) 102/48   Pulse 79   Temp 97.7 °F (36.5 °C) (Temporal)   Resp 20   Ht 5' 8\" (1.727 m)   Wt 282 lb 12.8 oz (128.3 kg)   SpO2 97%   BMI 43.00 kg/m²     Last documented pain score (0-10 scale): Pain Level: 7  Last Weight:   Wt Readings from Last 1 Encounters:   04/30/19 282 lb 12.8 oz (128.3 kg)     Mental Status:  oriented and alert    IV Access:  - None    Nursing Mobility/ADLs:  Walking   Assisted  Transfer  Assisted  Bathing Manager/ signature: Electronically signed by GUILLERMINA Wallace on 4/24/19 at 9:46 AM    PHYSICIAN/Provider SECTION    Prognosis: Fair    Condition at Discharge: Stable    Rehab Potential (if transferring to Rehab): Fair    Recommended Labs or Other Treatments After Discharge: PT/OT, BMP twice a week with results to Dr. Esther Flores, daily weights, 2L fluid restrict, Dr. Hao Amaya 1-2 weeks, CXR 1 week, BIPAP at night, naps and PRN    Physician/Provider Certification: I certify the above information and transfer of Shasha Logan  is necessary for the continuing treatment of the diagnosis listed and that he requires [facility] for less 30 days.      Update Admission H&P: Changes in H&P as follows - thoracentesis X 2    PHYSICIAN/Provider SIGNATURE:  Electronically signed by Josselin Clinton PA-C on 4/30/19 at 9:16 AM

## 2019-04-24 NOTE — PROGRESS NOTES
ICU Progress Note    SUBJECTIVE/INTERVAL HISTORY:    Patient seen in follow up for CHF exacerbation, IVIS, acute on chronic respiratory acidosis/chronic hypercapnic respiratory failure, afib, elevated troponin, recent NSTEMI, left pleural effusion. Patient is feeling better this morning. Slept well with BIPAP. Down 1#. No chest pain. Still has SOB. Troponins down to 0.03. OBJECTIVE:    Vitals:   Temp: 98 °F (36.7 °C)  Temp range:    Temp  Av.8 °F (36.6 °C)  Min: 97.6 °F (36.4 °C)  Max: 98 °F (36.7 °C)    BP: 111/65  BP Range:      Systolic (32OWF), JVI:815 , Min:91 , AZZ:827      Diastolic (18JLW), YCK:12, Min:43, Max:95    Pulse: 71  Pulse Range:    Pulse  Av.4  Min: 59  Max: 92    Resp: 9  Resp Range:   Resp  Av.7  Min: 8  Max: 37    SpO2: 93 % on supplemental O2  SpO2 range:   SpO2  Av.6 %  Min: 90 %  Max: 99 %    24HR INTAKE/OUTPUT:      Intake/Output Summary (Last 24 hours) at 2019 1039  Last data filed at 2019 0940  Gross per 24 hour   Intake 615 ml   Output 1400 ml   Net -785 ml       -----------------------------------------------------------------  Review of Systems:  Constitutional:negative  for fevers, and negative for chills.   Eyes: negative for visual disturbance   ENT: negative for sore throat, negative nasal congestion, and negative for earache  Respiratory: positive for shortness of breath, positive for cough, and negative for wheezing  Cardiovascular: negative for chest pain, negative for palpitations, and negative for syncope  Gastrointestinal: negative for abdominal pain, negative for nausea,negative for vomiting, negative for diarrhea, negative for constipation, and negative for hematochezia or melena  Genitourinary: negative for dysuria, negative for urinary urgency, negative for urinary frequency, and negative for hematuria  Skin: negative for skin rash, and negative for skin lesions  Neurological: negative for unilateral weakness, numbness or tingling. Exam:  GEN:  alert and oriented to person, place and time, well-developed and well-nourished  NECK: normal, supple  PULM: distant bilaterally - no wheezes, bilateral rales, no rhonchi, no respiratory distress, no use of accessory muscles, on NC  COR:   irregularly irregular and systolic murmur  ABD:    Obese, soft, non-tender, non-distended, normal bowel sounds, no masses or organomegaly  EXT:    edema: 1+ affecting bilateral foot, bilateral ankle, bilateral  calf, bilateral thigh  NEURO: follows commands, MULLIGAN, no deficits  SKIN:   no rashes or significant lesions    -----------------------------------------------------------------  Diagnostic Data:  Lab Results   Component Value Date    WBC 9.1 04/24/2019    HGB 9.5 (L) 04/24/2019    HCT 30.9 (L) 04/24/2019     04/24/2019    CHOL 114 03/18/2019    TRIG 95 03/27/2019    HDL 47 03/18/2019    ALT 24 04/24/2019    AST 19 04/24/2019     04/24/2019    K 4.9 04/24/2019    CL 95 (L) 04/24/2019    CREATININE 1.12 04/24/2019    BUN 24 (H) 04/24/2019    CO2 37 (H) 04/24/2019    INR 1.3 (H) 04/23/2019    LABA1C 8.6 (H) 03/18/2019       ASSESSMENT:    Principal Problem:    Acute on chronic systolic heart failure (HCC)  Active Problems:    ASHD (arteriosclerotic heart disease)    DM type 2 (diabetes mellitus, type 2) ([facility])    IVIS (obstructive sleep apnea)    HF (heart failure) ([facility])    History of non-ST elevation myocardial infarction (NSTEMI)    Elevated troponin    Pleural effusion, left    Hypercapnic respiratory failure, chronic (HCC)    Heart failure (HCC)    Acute on chronic respiratory acidosis    Essential hypertension    Dyslipidemia    Persistent atrial fibrillation ([facility]  Resolved Problems:    * No resolved hospital problems.  *      Patient Active Problem List    Diagnosis Date Noted    HF (heart failure) ([facility] 04/23/2019    History of non-ST elevation myocardial infarction (NSTEMI) 04/23/2019    Elevated troponin 04/23/2019    Acute on chronic systolic heart failure (HCC) 04/23/2019    Pleural effusion, left 04/23/2019    Hypercapnic respiratory failure, chronic (HCC) 04/23/2019    Heart failure ([address] 04/23/2019    Acute on chronic respiratory acidosis     Essential hypertension     Dyslipidemia     Persistent atrial fibrillation (HCC)     ERNESTO (acute kidney injury) (HCC)     Transaminitis     Ischemic hepatitis     IVIS (obstructive sleep apnea)     Leukocytosis     Hemorrhagic shock (HCC)     Retroperitoneal hematoma     Lactic acidosis     Hyperglycemia     Acute blood loss anemia     Community acquired pneumonia of left lower lobe of lung ([address]     Influenza A with respiratory manifestations 03/18/2019    Acute respiratory failure with hypoxia ([address] 03/18/2019    Acute hypercapnic respiratory failure ([address] 03/18/2019    NSTEMI (non-ST elevated myocardial infarction) ([address]     Type 2 diabetes mellitus without complication, without long-term current use of insulin ([address] 09/06/2016    Adult BMI > 30 05/06/2016    ASHD (arteriosclerotic heart disease)     DM type 2 (diabetes mellitus, type 2) ([facility])     Atrial fibrillation with rapid ventricular response ([address] 01/01/2007       PLAN:    Acute on chronic respiratory acidosis/Hypercapnic respiratory failure, chronic    O2   Prn BIPAP   diuresis    Acute on chronic systolic heart failure   Appreciate cardiology   Diuresis   Fluid restrict    History of non-ST elevation myocardial infarction/ASHD (arteriosclerotic heart disease)    DM type 2    SSI   Home meds    IVIS   BIPAP night and prn    Elevated troponin   Likely due to demand ischemia/CHF    Pleural effusion, left   Thoracentesis today    Essential hypertension    Dyslipidemia    Persistent atrial fibrillation    pradaxa held for thoracentesis  Resolved Problems:    * No resolved hospital problems.  *    · High risk medication: none    KENYATTA DAILEY PA-C  4/24/2019, 10:39 AM    I spent 32 minutes providing critical care management to this patient.   This excludes time spent in performing separately billed procedures

## 2019-04-24 NOTE — PROGRESS NOTES
Patient repositioned in the bed. Medications given. No complaints at this time. Will continue to assess and monitor.

## 2019-04-24 NOTE — PROGRESS NOTES
Physical Therapy  Facility/Department: [facility] AT THE [facility]  Daily Treatment Note  NAME: Honora Lombard  : 1945  MRN: [id number]    Date of Service: 2019    Discharge Recommendations:  Continue to assess pending progress        Patient Diagnosis(es): The primary encounter diagnosis was Congestive heart failure, unspecified HF chronicity, unspecified heart failure type ([facility] [address] Diagnoses of Acute on chronic respiratory acidosis and Pleural effusion were also pertinent to this visit. has a past medical history of Abnormal nuclear stress test, Arrhythmia, Atrial fibrillation ([facility] [address] CAD (coronary artery disease), DM type 2 (diabetes mellitus, type 2) ([facility] [address] and Hx of echocardiogram.   has a past surgical history that includes Coronary angioplasty (); Cardioversion (2007); and Cardioversion (). Restrictions  Restrictions/Precautions  Restrictions/Precautions: General Precautions, Fall Risk  Subjective   General  Chart Reviewed: Yes  Response To Previous Treatment: Patient with no complaints from previous session. Family / Caregiver Present: Yes  Referring Practitioner: Dr. Mary Grace Gandhi  Subjective: pt denies pain  General Comment  Comments: Pt denies pain. Pain Screening  Patient Currently in Pain: Denies  Vital Signs  Patient Currently in Pain: Denies       Orientation  Orientation  Overall Orientation Status: Within Functional Limits  Cognition      Objective      Transfers  Sit to Stand: Moderate Assistance;2 Person Assistance  Stand to sit: 2 Person Assistance; Moderate Assistance  Comment: Requires VCs for hand placement, required 2 attempts to maintain standing position  Ambulation  Ambulation?: Yes  WB Status: non-ambulatory per Castine     Balance  Standing - Static: Fair;-  Standing - Dynamic: Fair;-  Exercises  Straight Leg Raise: x10  Quad Sets: x10  Heelslides: x10  Hip Abduction: x10  Knee Long Arc Quad: x10  Knee Short Arc Quad: x10  Ankle Pumps: 10x2  Comments: seated marching x10, B LE ther ex completed reclined and seated                        Assessment   Body structures, Functions, Activity limitations: Decreased functional mobility ; Decreased endurance;Decreased strength;Decreased balance;Decreased high-level IADLs  Assessment: Pt demonstrated fair tolerance to tx. Required 2 attempts to stand mod A+2. Treatment Diagnosis: general weakness   Prognosis: Fair  Patient Education: issued and educated pt on discharge folder, pt with appropriate  understanding  Barriers to Learning: Issued and educated pt on discharge folder, pt with appropriate understanding  REQUIRES PT FOLLOW UP: Yes     G-Code     OutComes Score                                                  AM-PAC Score             Goals  Short term goals  Time Frame for Short term goals: 10 days   Short term goal 1: Pt will be educated on his POC  Short term goal 2: Pt will perform sit to stand transfers Koki   Short term goal 3: Pt will increase dynamic standing balance to Fair in order to reduce fall risk   Short term goal 4: Pt will tolerate 20 minutes on exercises in order to increase endurnace   Patient Goals   Patient goals : \"to get stronger\"    Plan    Plan  Times per week: 7x/wk   Times per day: Twice a day  Plan weeks: 2x/daily except weekends 1x/daily   Current Treatment Recommendations: Strengthening, Transfer Training, Endurance Training, Neuromuscular Re-education, Patient/Caregiver Education & Training, Equipment Evaluation, Education, & procurement, Balance Training, Gait Training, Home Exercise Program, Functional Mobility Training, Stair training, Safety Education & Training  Safety Devices  Type of devices:  All fall risk precautions in place, Call light within reach, Gait belt, Left in chair, Nurse notified     Therapy Time   Individual Concurrent Group Co-treatment   Time In [address]         Time Out 1345         Minutes [address] [place] [address]/[facility], [id number]

## 2019-04-24 NOTE — PLAN OF CARE
Problem: Falls - Risk of:  Goal: Will remain free from falls  Description  Will remain free from falls  4/24/2019 1934 by Araceli Pemberton RN  Outcome: Ongoing  Note:   Pt has no falls and is continuing to be monitored. Fall precautions remain intact. Bracelet on pt, star on, bed low and locked, alarm on, side rails up, call light and personal belongings within reach, and room clear and clean of clutter. Patient is two assist. PT/OT ordered. Problem: Falls - Risk of:  Goal: Absence of physical injury  Description  Absence of physical injury  Outcome: Ongoing     Problem: Risk for Impaired Skin Integrity  Goal: Tissue integrity - skin and mucous membranes  Description  Structural intactness and normal physiological function of skin and  mucous membranes. 4/24/2019 1934 by Araceli Pemberton RN  Outcome: Ongoing  Note:   Patient able to turn self. Needs assistance at times. No pressure ulcers noted. Problem: Fluid Volume:  Description  [TRUNCATED] Hyponatremia indicates a relatively greater amount of water to sodium in plasma. In hypovolemia, a deficit of both total body water and sodium exists but relatively less water deficit. Euvolemia represents near normal total body sodium co .. Alem Namrata [TRUNCATED] Hyponatremia indicates a relatively greater amount of water to sodium in plasma. In hypovolemia, a deficit of both total body water and sodium exists but relatively less water deficit. Euvolemia represents near normal total body sodium co ... Goal: Hemodynamic stability will improve  Description  Hemodynamic stability will improve  4/24/2019 1934 by Araceli Pemberton RN  Outcome: Ongoing  Note:   Vitals signs are stable. Patient in atrial fib on telemetry. 1500 mL fluid restriction in place. Lasix ordered BID.       Problem: Respiratory:  Goal: Respiratory status will improve  Description  Respiratory status will improve  4/24/2019 1934 by Araceli Pemberton RN  Outcome: Ongoing  Note:   Patient on 3 liters of oxygen via nasal cannula. BIPAP worn at night.

## 2019-04-24 NOTE — PLAN OF CARE
Problem: Falls - Risk of:  Goal: Will remain free from falls  Description  Will remain free from falls  Outcome: Ongoing  Note:   Bed alarm on. Bed in low position and wheels locked. Call light in reach. Falling star posted on door. Yellow bracelet on. Non skid socks on. Side rails up. Will continue to assess. Goal: Absence of physical injury  Description  Absence of physical injury  Outcome: Ongoing     Problem: Risk for Impaired Skin Integrity  Goal: Tissue integrity - skin and mucous membranes  Description  Structural intactness and normal physiological function of skin and  mucous membranes. Outcome: Ongoing  Note:   Skin assessment performed at regular intervals. No redness or open areas noted. Patient reminded to turn and relieve areas known to breakdown. Continue to monitor.

## 2019-04-25 LAB
CASE NUMBER:: NORMAL
GLUCOSE BLD-MCNC: 138 MG/DL (ref 74–100)
GLUCOSE BLD-MCNC: 161 MG/DL (ref 74–100)
GLUCOSE BLD-MCNC: 185 MG/DL (ref 74–100)
GLUCOSE BLD-MCNC: 207 MG/DL (ref 74–100)
HCT VFR BLD CALC: 31 % (ref 40.7–50.3)
HEMOGLOBIN: 9.6 G/DL (ref 13–17)
MCH RBC QN AUTO: 31.3 PG (ref 25.2–33.5)
MCHC RBC AUTO-ENTMCNC: 31 G/DL (ref 28.4–34.8)
MCV RBC AUTO: 101 FL (ref 82.6–102.9)
NRBC AUTOMATED: 0 PER 100 WBC
PDW BLD-RTO: 17.4 % (ref 11.8–14.4)
PLATELET # BLD: 283 K/UL (ref 138–453)
PMV BLD AUTO: 8.9 FL (ref 8.1–13.5)
RBC # BLD: 3.07 M/UL (ref 4.21–5.77)
SPECIMEN DESCRIPTION: NORMAL
WBC # BLD: 9.1 K/UL (ref 3.5–11.3)

## 2019-04-25 PROCEDURE — 97110 THERAPEUTIC EXERCISES: CPT

## 2019-04-25 PROCEDURE — 36415 COLL VENOUS BLD VENIPUNCTURE: CPT

## 2019-04-25 PROCEDURE — 6370000000 HC RX 637 (ALT 250 FOR IP): Performed by: INTERNAL MEDICINE

## 2019-04-25 PROCEDURE — 97112 NEUROMUSCULAR REEDUCATION: CPT

## 2019-04-25 PROCEDURE — 85027 COMPLETE CBC AUTOMATED: CPT

## 2019-04-25 PROCEDURE — 97530 THERAPEUTIC ACTIVITIES: CPT

## 2019-04-25 PROCEDURE — 2580000003 HC RX 258: Performed by: INTERNAL MEDICINE

## 2019-04-25 PROCEDURE — 6360000002 HC RX W HCPCS: Performed by: PHYSICIAN ASSISTANT

## 2019-04-25 PROCEDURE — 82947 ASSAY GLUCOSE BLOOD QUANT: CPT

## 2019-04-25 PROCEDURE — 2000000000 HC ICU R&B

## 2019-04-25 PROCEDURE — 94660 CPAP INITIATION&MGMT: CPT

## 2019-04-25 RX ORDER — DABIGATRAN ETEXILATE 75 MG/1
150 CAPSULE, COATED PELLETS ORAL 2 TIMES DAILY
Status: DISCONTINUED | OUTPATIENT
Start: 2019-04-25 | End: 2019-04-26

## 2019-04-25 RX ADMIN — INSULIN LISPRO 2 UNITS: 100 INJECTION, SOLUTION INTRAVENOUS; SUBCUTANEOUS at 17:24

## 2019-04-25 RX ADMIN — Medication 10 ML: at 20:52

## 2019-04-25 RX ADMIN — INSULIN GLARGINE 10 UNITS: 100 INJECTION, SOLUTION SUBCUTANEOUS at 20:52

## 2019-04-25 RX ADMIN — FUROSEMIDE 40 MG: 10 INJECTION, SOLUTION INTRAMUSCULAR; INTRAVENOUS at 08:28

## 2019-04-25 RX ADMIN — METOPROLOL TARTRATE 12.5 MG: 25 TABLET ORAL at 20:51

## 2019-04-25 RX ADMIN — FUROSEMIDE 40 MG: 10 INJECTION, SOLUTION INTRAMUSCULAR; INTRAVENOUS at 17:28

## 2019-04-25 RX ADMIN — ATORVASTATIN CALCIUM 80 MG: 40 TABLET, FILM COATED ORAL at 20:51

## 2019-04-25 RX ADMIN — DABIGATRAN ETEXILATE MESYLATE 150 MG: 75 CAPSULE ORAL at 08:28

## 2019-04-25 RX ADMIN — ACETAMINOPHEN 650 MG: 325 TABLET, FILM COATED ORAL at 20:51

## 2019-04-25 RX ADMIN — AMIODARONE HYDROCHLORIDE 200 MG: 200 TABLET ORAL at 08:28

## 2019-04-25 RX ADMIN — DOCUSATE SODIUM 100 MG: 100 CAPSULE, LIQUID FILLED ORAL at 08:28

## 2019-04-25 RX ADMIN — DABIGATRAN ETEXILATE MESYLATE 150 MG: 75 CAPSULE ORAL at 20:51

## 2019-04-25 RX ADMIN — INSULIN LISPRO 2 UNITS: 100 INJECTION, SOLUTION INTRAVENOUS; SUBCUTANEOUS at 12:11

## 2019-04-25 RX ADMIN — Medication 10 ML: at 08:28

## 2019-04-25 RX ADMIN — INSULIN LISPRO 2 UNITS: 100 INJECTION, SOLUTION INTRAVENOUS; SUBCUTANEOUS at 20:53

## 2019-04-25 RX ADMIN — METOPROLOL TARTRATE 12.5 MG: 25 TABLET ORAL at 08:28

## 2019-04-25 ASSESSMENT — PAIN SCALES - GENERAL
PAINLEVEL_OUTOF10: 0

## 2019-04-25 NOTE — PROGRESS NOTES
denies problems    Other:  none     Spiritual history/needs:   notified: n/a    Palliative Performance Scale:  ___70%  Ambulation reduced; Some disease; Can't do normal job or work; intake normal or reduced; can do full self care; LOC full  ___60%  Ambulation reduced; Significant disease; Can't do hobbies/housework; intake normal or reduced; occasional assist; LOC full/confusion  _x__50%  Mainly sit/lie; Extensive disease; Can't do any work; Considerable assist; intake normal or reduced; LOC full/confusion  ___40%  Mainly in bed; Extensive disease; Mainly assist; intake normal or reduced; LOC full/confusion   ___30%  Bed Bound; Extensive disease; Total care; intake reduced; LOCfull/confusion  ___20%  Bed Bound; Extensive disease; Total care; intake minimal; Drowsy/coma  ___10%  Bed Bound; Extensive disease; Total care; Mouth care only; Drowsy/coma  ___0       Death    1 year Mortality Risk %:     5%  Readmission Risk Score: 28    Notes:   You Al is resting in bed during my visit. His wife is present. You Al is in ICU with CHF--pulmonary edema. He had a thoracentesis done yesterday for which 950 ml bloody fluid was removed. He states that he is breathing better today after the procedure. He appears moderately dyspneic at rest though denies any dyspnea, etc.    Wife that that he had gotten the flu, then got pneumonia. When admitted here to hospital there was concern over myocardial injury so transferred to [place]. [facility]. Wife states he was in the hospital for 3 weeks. Was on a ventilator, had issues with bleeding, and then his kidneys, then was discharged to [address] [place] [address]. Don's chief complaint is tiredness and weakness. Receiving therapy for strengthening. States \"too weak to walk\". Per discharge note from [place]. V's \" Arrived to the emergency Department same V's on BiPAP, was started on antibiotics for pneumonia. He subsequently decompensated and required intubation.   His hospital course was complicated by a retroperitoneal bleed. Vascular surgery was consulted, and patient required multiple blood transfusions to compensate for his hemorrhagic shock and required pressor support as well. He developed ERNESTO secondary to ischemic HTN, was briefly on dialysis, and this resolved during hospital admission. Patient did require BiPAP after extubation, which she intermittently refused to wear. \"   Support given to Nichole Boudreaux and his wife. He hopes to be able to go home soon, though realizes he isn't strong enough yet. Wants to see his dog. Using BiPap occasionally in the hospital.    Discussion regarding CHF. He states that he doesn't add salt to his diet and had quit smoking when he had his heart attack in the past.  Was active prior to admission to hospital in March. Discussion regarding daily weights and keeping a log. Wife given CHF education packet. Dietitian has seen pt this AM.    ACP discussion. Not interested in completing documents. Given handout on advance directives. Wife tells me that Nichole Boudreaux is a full code. Aware of need for physician order for a DNR order. Also given brochure about Palliative Care along with my contact information. Discussed the chronic nature of CHF. Encouragement and support given to patient. Will continue to follow and support. Palliative Care Plan:  Education/support to family  Education/support to patient  Providing support for coping/adaptation/distress of patient  Continue with current plan of care  Code status clarified: Full Code  Palliative Care Goals:  improve or maintain function/quality of life, remain at home, preserve independence/autonomy/control and support for family/caregiver  Visit focus:  Routine meeting  Discuss goals of care  Listen to patient/family concerns  Interdiscplinary collaboration  Build trust  Provide emotional support to the family  Elicit patient's goals and values, and use these to establish or modify goals of care     Ofelia Solano RN, [facility]

## 2019-04-25 NOTE — PLAN OF CARE
Problem: Falls - Risk of:  Goal: Will remain free from falls  Description  Will remain free from falls  Outcome: Ongoing  Note:   Patient at high risk for falls. Fall precautions in place. Non skid slipper socks on, bed in lowest position with wheels locked and siderails up x2, bed alarm on. Patient is being monitored on a regular basis . Call light within reach       Problem: Risk for Impaired Skin Integrity  Goal: Tissue integrity - skin and mucous membranes  Description  Structural intactness and normal physiological function of skin and  mucous membranes. Outcome: Ongoing  Note:   Skin assessment performed at regular intervals. No redness or open areas noted. Pt able to turn self, assistance provided when needed. Will continue to monitor patient closely for breakdown. Problem: Musculor/Skeletal Functional Status  Goal: Highest potential functional level  Outcome: Ongoing  Note:   Patient participates in ordered therapies. Able to ambulate short distances in room with 2 assist and walker. Problem: Fluid Volume:  Goal: Hemodynamic stability will improve  Description  Hemodynamic stability will improve  Outcome: Ongoing  Note:   Vitals are stable. Remains in afib on telemetry. 1500ml fluid restriction continues, patient compliant. Lasix admin per orders, patient with good urine output. Indwelling browne in place. I&O being monitored routinel. Problem: Respiratory:  Goal: Respiratory status will improve  Description  Respiratory status will improve  Outcome: Ongoing  Note:   Lungs are clear t/o, dim in bases. SpO2 maintained mid to high 90s on 3L of oxygen. Wears bipap at hs.

## 2019-04-25 NOTE — PROGRESS NOTES
Physical Therapy  Facility/Department: [facility] AT THE [facility]  Daily Treatment Note  NAME: Charley Byrd  : 1945  MRN: [id number]    Date of Service: 2019    Discharge Recommendations:  Continue to assess pending progress        Patient Diagnosis(es): The primary encounter diagnosis was Congestive heart failure, unspecified HF chronicity, unspecified heart failure type ([facility] [address] Diagnoses of Acute on chronic respiratory acidosis and Pleural effusion were also pertinent to this visit. has a past medical history of Abnormal nuclear stress test, Arrhythmia, Atrial fibrillation ([facility] [address] CAD (coronary artery disease), DM type 2 (diabetes mellitus, type 2) ([facility] [address] and Hx of echocardiogram.   has a past surgical history that includes Coronary angioplasty (); Cardioversion (2007); and Cardioversion (). Restrictions  Restrictions/Precautions  Restrictions/Precautions: General Precautions, Fall Risk  Subjective   General  Chart Reviewed: Yes  Response To Previous Treatment: Patient with no complaints from previous session. Family / Caregiver Present: Yes  Referring Practitioner: Dr. Charlee Lafleur  Subjective: Pt denies pain. Pt reports not sleeping  well last night d/t \"entire L leg throbbing. \"  Pt states \"it gets like that sometimes. \"  Pain Screening  Patient Currently in Pain: Denies  Vital Signs  Patient Currently in Pain: Denies       Orientation  Orientation  Overall Orientation Status: Within Functional Limits  Cognition      Objective   Bed mobility  Supine to Sit: Moderate assistance  Transfers  Sit to Stand: Moderate Assistance;2 Person Assistance  Stand to sit: 2 Person Assistance; Moderate Assistance           Exercises  Straight Leg Raise: x10  Quad Sets: x10  Heelslides: x10  Hip Flexion: x10  Hip Abduction: x10  Knee Long Arc Quad: x10  Knee Short Arc Quad: x10  Ankle Pumps: x10  Comments: Above exercises performed x10 in seated and supine.       Assessment      Patient Education: Pt instructed on safety when sitting down, using safe hand placement. Pt verbalizing understanding. REQUIRES PT FOLLOW UP: Yes  Activity Tolerance  Activity Tolerance: Patient limited by endurance; Patient limited by fatigue       Goals  Short term goals  Time Frame for Short term goals: 10 days   Short term goal 1: Pt will be educated on his POC  Short term goal 2: Pt will perform sit to stand transfers Koki   Short term goal 3: Pt will increase dynamic standing balance to Fair in order to reduce fall risk   Short term goal 4: Pt will tolerate 20 minutes on exercises in order to increase endurnace   Patient Goals   Patient goals : \"to get stronger\"    Plan    Plan  Times per week: 7x/wk   Times per day: Twice a day  Plan weeks: 2x/daily except weekends 1x/daily   Current Treatment Recommendations: Strengthening, Transfer Training, Endurance Training, Neuromuscular Re-education, Patient/Caregiver Education & Training, Equipment Evaluation, Education, & procurement, Balance Training, Gait Training, Home Exercise Program, Functional Mobility Training, Stair training, Safety Education & Training  Safety Devices  Type of devices:  All fall risk precautions in place, Call light within reach, Gait belt, Left in chair, Nurse notified     Therapy Time   Individual Concurrent Group Co-treatment   Time In 0703         Time Out 0730         Minutes [facility]

## 2019-04-25 NOTE — PROGRESS NOTES
day: Daily  Current Treatment Recommendations: Strengthening, Endurance Training, Patient/Caregiver Education & Training, Self-Care / ADL, Functional Mobility Training, Other (comment), Safety Education & Training  Plan Comment: Energy Conservation, ther-act, ther-ex, and self-care tasks. G-Code     OutComes Score                                                  AM-PAC Score             Goals  Short term goals  Time Frame for Short term goals: 10 visits  Short term goal 1: Patient to perform functional transfers with mod A of 2 with use of gaitbelt and LRAD. Short term goal 2: Patient to tolerate 10 minutes ther-ex/ther-act to improve strength/endurance for ADLs with no greater than 4 restbreaks. Short term goal 3: Patient to complete ADL grooming tasks wtih min A. Short term goal 4: Patient to state 2 energy conservation techniques following education in order to improve daily endurance.         Therapy Time   Individual Concurrent Group Co-treatment   Time In 1343         Time Out 1400         Minutes 17                 GINGER Marinelli

## 2019-04-25 NOTE — PROGRESS NOTES
ICU Progress Note    SUBJECTIVE/INTERVAL HISTORY:    Patient seen in follow up for CHF exacerbation, IVIS, acute on chronic respiratory acidosis/chronic hypercapnic respiratory failure, afib, elevated troponin, recent NSTEMI, left pleural effusion. Patient is feeling better this morning. Slept well with BIPAP. Down 4#. No chest pain. SOB improved. 970ml off with left thoracentesis yesterday    OBJECTIVE:    Vitals:   Temp: 98.2 °F (36.8 °C)  Temp range:    Temp  Av.9 °F (36.6 °C)  Min: 97.3 °F (36.3 °C)  Max: 98.2 °F (36.8 °C)    BP: 120/64  BP Range:      Systolic (88WCO), DVZ:961 , Min:85 , MID:822      Diastolic (55IPJ), SFA:18, Min:34, Max:90    Pulse: 80  Pulse Range:    Pulse  Av.3  Min: 60  Max: 89    Resp: (!) 31  Resp Range:   Resp  Av.2  Min: 12  Max: 53    SpO2: 98 % on supplemental O2  SpO2 range:   SpO2  Av.2 %  Min: 92 %  Max: 99 %    24HR INTAKE/OUTPUT:      Intake/Output Summary (Last 24 hours) at 2019 1415  Last data filed at 2019 1344  Gross per 24 hour   Intake 1250 ml   Output 1900 ml   Net -650 ml       -----------------------------------------------------------------  Review of Systems:  Constitutional:negative  for fevers, and negative for chills.   Eyes: negative for visual disturbance   ENT: negative for sore throat, negative nasal congestion, and negative for earache  Respiratory: positive for shortness of breath, negative for cough, and negative for wheezing  Cardiovascular: negative for chest pain, negative for palpitations, and negative for syncope  Gastrointestinal: negative for abdominal pain, negative for nausea,negative for vomiting, negative for diarrhea, negative for constipation, and negative for hematochezia or melena  Genitourinary: negative for dysuria, negative for urinary urgency, negative for urinary frequency, and negative for hematuria  Skin: negative for skin rash, and negative for skin lesions  Neurological: negative for unilateral weakness, numbness or tingling. Exam:  GEN:  alert and oriented to person, place and time, well-developed and well-nourished  NECK: normal, supple  PULM: distant bilaterally - no wheezes, bilateral rales, no rhonchi, no respiratory distress, no use of accessory muscles, on NC  COR:   irregularly irregular and systolic murmur  ABD:    Obese, soft, non-tender, non-distended, normal bowel sounds, no masses or organomegaly  EXT:    edema: 1+ affecting bilateral foot, bilateral ankle, bilateral calf, bilateral thigh, TEDs  NEURO: follows commands, MULLIGAN, no deficits  SKIN:   no rashes or significant lesions    -----------------------------------------------------------------  Diagnostic Data:  Lab Results   Component Value Date    WBC 9.1 04/25/2019    HGB 9.6 (L) 04/25/2019    HCT 31.0 (L) 04/25/2019     04/25/2019    CHOL 114 03/18/2019    TRIG 95 03/27/2019    HDL 47 03/18/2019    ALT 24 04/24/2019    AST 19 04/24/2019     04/24/2019    K 4.9 04/24/2019    CL 95 (L) 04/24/2019    CREATININE 1.12 04/24/2019    BUN 24 (H) 04/24/2019    CO2 37 (H) 04/24/2019    INR 1.3 (H) 04/23/2019    LABA1C 8.6 (H) 03/18/2019       ASSESSMENT:    Principal Problem:    Acute on chronic systolic heart failure (HCC)  Active Problems:    ASHD (arteriosclerotic heart disease)    DM type 2 (diabetes mellitus, type 2) ([facility])    IVIS (obstructive sleep apnea)    HF (heart failure) ([facility])    History of non-ST elevation myocardial infarction (NSTEMI)    Elevated troponin    Pleural effusion, left    Hypercapnic respiratory failure, chronic (HCC)    Heart failure (HCC)    Acute on chronic respiratory acidosis    Essential hypertension    Dyslipidemia    Persistent atrial fibrillation ([facility]  Resolved Problems:    * No resolved hospital problems.  *      Patient Active Problem List    Diagnosis Date Noted    HF (heart failure) ([address] 04/23/2019    History of non-ST elevation myocardial infarction (NSTEMI) 04/23/2019    Elevated troponin 04/23/2019    Acute on chronic systolic heart failure (HCC) 04/23/2019    Pleural effusion, left 04/23/2019    Hypercapnic respiratory failure, chronic (HCC) 04/23/2019    Heart failure ([address] 04/23/2019    Acute on chronic respiratory acidosis     Essential hypertension     Dyslipidemia     Persistent atrial fibrillation (HCC)     ERNESTO (acute kidney injury) (HCC)     Transaminitis     Ischemic hepatitis     IVIS (obstructive sleep apnea)     Leukocytosis     Hemorrhagic shock ([facility])     Retroperitoneal hematoma     Lactic acidosis     Hyperglycemia     Acute blood loss anemia     Community acquired pneumonia of left lower lobe of lung ([address]     Influenza A with respiratory manifestations 03/18/2019    Acute respiratory failure with hypoxia ([facility]) 03/18/2019    Acute hypercapnic respiratory failure ([address] 03/18/2019    NSTEMI (non-ST elevated myocardial infarction) ([address]     Type 2 diabetes mellitus without complication, without long-term current use of insulin ([facility] 09/06/2016    Adult BMI > 30 05/06/2016    ASHD (arteriosclerotic heart disease)     DM type 2 (diabetes mellitus, type 2) ([facility])     Atrial fibrillation with rapid ventricular response ([address] 01/01/2007       PLAN:    Acute on chronic respiratory acidosis/Hypercapnic respiratory failure, chronic    O2   Prn BIPAP   Continue diuresis    Acute on chronic systolic heart failure   Appreciate cardiology   Diuresis   Fluid restrict    History of non-ST elevation myocardial infarction/ASHD (arteriosclerotic heart disease)    DM type 2    SSI   Home meds    IVIS   BIPAP night and prn    Elevated troponin   Likely due to demand ischemia/CHF    Pleural effusion, left   Thoracentesis appreciated    Essential hypertension    Dyslipidemia    Persistent atrial fibrillation    pradaxa resumed    Resolved Problems:    * No resolved hospital problems.  *    · High risk medication: none    KENYATTA DAILEY PA-C  4/25/2019, 2:15 PM    I spent 30 minutes providing critical care management to this patient.   This excludes time spent in performing separately billed procedures

## 2019-04-25 NOTE — PROGRESS NOTES
Physical Therapy  Facility/Department: [facility] AT THE [facility]  Daily Treatment Note  NAME: Sandra Raymond  : 1945  MRN: [id number]    Date of Service: 2019    Discharge Recommendations:  Continue to assess pending progress        Patient Diagnosis(es): The primary encounter diagnosis was Congestive heart failure, unspecified HF chronicity, unspecified heart failure type ([address] Diagnoses of Acute on chronic respiratory acidosis, Pleural effusion, Hypoxia, and Respiratory insufficiency were also pertinent to this visit. has a past medical history of Abnormal nuclear stress test, Arrhythmia, Atrial fibrillation ([address] CAD (coronary artery disease), DM type 2 (diabetes mellitus, type 2) ([facility] and Hx of echocardiogram.   has a past surgical history that includes Coronary angioplasty (); Cardioversion (2007); and Cardioversion (). Restrictions  Restrictions/Precautions  Restrictions/Precautions: General Precautions, Fall Risk  Subjective   General  Chart Reviewed: Yes  Response To Previous Treatment: Patient with no complaints from previous session. Family / Caregiver Present: Yes  Referring Practitioner: Dr. Eliana Noonan: Pt denies pain and was seated in chair upon arrival.  Pain Screening  Patient Currently in Pain: Denies  Vital Signs  Patient Currently in Pain: Denies       Orientation  Orientation  Overall Orientation Status: Within Functional Limits  Cognition      Objective   Bed mobility  Supine to Sit: Moderate assistance  Transfers  Sit to Stand: Minimal Assistance  Stand to sit: Minimal Assistance;2 Person Assistance  Comment: Verbal cues for hand placement. Koki to prevent posterior lean. Balance  Standing - Static: Fair;-  Standing - Dynamic: Fair;-  Comments: Static standing at RW x18\" and 24\"with CGA.    Exercises  Straight Leg Raise: x15 (AAROM)  Quad Sets: x15  Heelslides: x15  Gluteal Sets: x15  Hip Flexion: x15  Hip Abduction: x15  Knee Long Arc Quad: x15  Knee Short Arc Quad: x15  Ankle Pumps: x15  Comments: AAROM with B SLRs. Exercises completed while reclined and seated. Assessment      Patient Education: Pt educated on safe transfer performance. REQUIRES PT FOLLOW UP: Yes  Activity Tolerance  Activity Tolerance: Patient limited by endurance; Patient limited by fatigue     Goals  Short term goals  Time Frame for Short term goals: 10 days   Short term goal 1: Pt will be educated on his POC  Short term goal 2: Pt will perform sit to stand transfers Koki   Short term goal 3: Pt will increase dynamic standing balance to Fair in order to reduce fall risk   Short term goal 4: Pt will tolerate 20 minutes on exercises in order to increase endurnace   Patient Goals   Patient goals : \"to get stronger\"    Plan    Plan  Times per week: 7x/wk   Times per day: Twice a day  Plan weeks: 2x/daily except weekends 1x/daily   Current Treatment Recommendations: Strengthening, Transfer Training, Endurance Training, Neuromuscular Re-education, Patient/Caregiver Education & Training, Equipment Evaluation, Education, & procurement, Balance Training, Gait Training, Home Exercise Program, Functional Mobility Training, Stair training, Safety Education & Training  Safety Devices  Type of devices:  All fall risk precautions in place, Call light within reach, Gait belt, Left in chair, Nurse notified     Therapy Time   Individual Concurrent Group Co-treatment   Time In 1312         Time Out 1340         Minutes [facility]

## 2019-04-25 NOTE — PROGRESS NOTES
Nutrition Assessment    Type and Reason for Visit: Reassess    Nutrition Recommendations:   1. Continue with 1,800 kcal, 4 CHO/meal diet, no added salt diet, fluid restriction of 1500 ml.   2. Will follow up with diet instruction need before discharge. Have been educated on CHF diet guidelines for low sodium. Nutrition Assessment: Pt is improving from a nutritional standpoint as now reports more energy and ability to complete meals 100% post thoracentesis. Discussed beverage choices, and pt has changed today to lemonade instead of pop to drink. Note blood sugars have been overall with improvement. Expected wt loss of 6# (2%) x 1 day noted. Pt continues to be at risk for malnutrition. Will follow up with diet instruction needs at follow up. Malnutrition Assessment:  · Malnutrition Status: At risk for malnutrition  · Context: Chronic illness  · Findings of the 6 clinical characteristics of malnutrition (Minimum of 2 out of 6 clinical characteristics is required to make the diagnosis of moderate or severe Protein Calorie Malnutrition based on AND/ASPEN Guidelines):  1. Energy Intake-(> 75%), Unable to assess    2. Weight Loss-No significant weight loss, unable to assess  3. Fat Loss-No significant subcutaneous fat loss,    4. Muscle Loss-No significant muscle mass loss,    5. Fluid Accumulation-Mild fluid accumulation, Extremities  6.  Strength-Not measured    Nutrition Risk Level:  Moderate    Nutrient Needs:  · Estimated Daily Total Kcal: 3696-1780  · Estimated Daily Protein (g): 70-84  · Estimated Daily Total Fluid (ml/day): 4897-1694    Nutrition Diagnosis:   · Problem: Predicted suboptimal energy intake  · Etiology: related to (pt report of fatigue)     Signs and symptoms:  as evidenced by Lab values(low hematocrit 30.9, Hgb 9.5.)    Objective Information:  · Nutrition-Focused Physical Findings: soft abdomen, active bowel sounds, BLE edema: +2 pitting, LUE: trace  · Wound Type: None  · Current Nutrition Therapies:  · Oral Diet Orders: Carb Control 4 Carbs/Meal, No Added Salt (3-4gm), Fluid Restriction   · Oral Diet intake: %  · Oral Nutrition Supplement (ONS) Orders: None  · ONS intake: Unable to assess  · Anthropometric Measures:  · Ht: 5' 8\" (172.7 cm)   · Current Body Wt: 286 lb (129.7 kg)  · Admission Body Wt: 289 lb 14.5 oz (131.5 kg)  · Usual Body Wt: 280 lb (127 kg)  · % Weight Change:  ,  2% loss x 1 day  · Ideal Body Wt: 154 lb (69.9 kg), % Ideal Body 186%  · BMI Classification: BMI > or equal to 40.0 Obese Class III    Nutrition Interventions:   Continue current diet  Continued Inpatient Monitoring, Education Completed, Coordination of Care    Nutrition Evaluation:   · Evaluation: Progressing toward goals   · Goals: Maintain % consumption of meals    · Monitoring: Meal Intake, Diet Tolerance, Weight, Pertinent Labs, Patient/Family Education      Electronically signed by Marika Olmedo RD, LD on 4/25/19 at 1:39 PM    Contact Number: [phone]

## 2019-04-26 ENCOUNTER — APPOINTMENT (OUTPATIENT)
Dept: GENERAL RADIOLOGY | Age: 74
DRG: 291 | End: 2019-04-26
Payer: MEDICARE

## 2019-04-26 LAB
ANION GAP SERPL CALCULATED.3IONS-SCNC: 8 MMOL/L (ref 9–17)
BNP INTERPRETATION: ABNORMAL
BUN BLDV-MCNC: 24 MG/DL (ref 8–23)
BUN/CREAT BLD: 20 (ref 9–20)
CALCIUM SERPL-MCNC: 8.2 MG/DL (ref 8.6–10.4)
CHLORIDE BLD-SCNC: 94 MMOL/L (ref 98–107)
CO2: 38 MMOL/L (ref 20–31)
CREAT SERPL-MCNC: 1.21 MG/DL (ref 0.7–1.2)
GFR AFRICAN AMERICAN: >60 ML/MIN
GFR NON-AFRICAN AMERICAN: 59 ML/MIN
GFR SERPL CREATININE-BSD FRML MDRD: ABNORMAL ML/MIN/{1.73_M2}
GFR SERPL CREATININE-BSD FRML MDRD: ABNORMAL ML/MIN/{1.73_M2}
GLUCOSE BLD-MCNC: 115 MG/DL (ref 70–99)
GLUCOSE BLD-MCNC: 151 MG/DL (ref 74–100)
GLUCOSE BLD-MCNC: 204 MG/DL (ref 74–100)
GLUCOSE BLD-MCNC: 231 MG/DL (ref 74–100)
HCT VFR BLD CALC: 30.8 % (ref 40.7–50.3)
HEMOGLOBIN: 9.3 G/DL (ref 13–17)
MCH RBC QN AUTO: 31.3 PG (ref 25.2–33.5)
MCHC RBC AUTO-ENTMCNC: 30.2 G/DL (ref 28.4–34.8)
MCV RBC AUTO: 103.7 FL (ref 82.6–102.9)
NRBC AUTOMATED: 0 PER 100 WBC
PDW BLD-RTO: 17.2 % (ref 11.8–14.4)
PLATELET # BLD: 250 K/UL (ref 138–453)
PMV BLD AUTO: 8.8 FL (ref 8.1–13.5)
POTASSIUM SERPL-SCNC: 4.3 MMOL/L (ref 3.7–5.3)
PRO-BNP: 5437 PG/ML
RBC # BLD: 2.97 M/UL (ref 4.21–5.77)
SODIUM BLD-SCNC: 140 MMOL/L (ref 135–144)
SURGICAL PATHOLOGY REPORT: NORMAL
WBC # BLD: 10.2 K/UL (ref 3.5–11.3)

## 2019-04-26 PROCEDURE — 36415 COLL VENOUS BLD VENIPUNCTURE: CPT

## 2019-04-26 PROCEDURE — 94664 DEMO&/EVAL PT USE INHALER: CPT

## 2019-04-26 PROCEDURE — 97110 THERAPEUTIC EXERCISES: CPT

## 2019-04-26 PROCEDURE — 71045 X-RAY EXAM CHEST 1 VIEW: CPT

## 2019-04-26 PROCEDURE — 82947 ASSAY GLUCOSE BLOOD QUANT: CPT

## 2019-04-26 PROCEDURE — 85027 COMPLETE CBC AUTOMATED: CPT

## 2019-04-26 PROCEDURE — 83880 ASSAY OF NATRIURETIC PEPTIDE: CPT

## 2019-04-26 PROCEDURE — 6370000000 HC RX 637 (ALT 250 FOR IP): Performed by: INTERNAL MEDICINE

## 2019-04-26 PROCEDURE — 6360000002 HC RX W HCPCS: Performed by: PHYSICIAN ASSISTANT

## 2019-04-26 PROCEDURE — 2580000003 HC RX 258: Performed by: INTERNAL MEDICINE

## 2019-04-26 PROCEDURE — 94660 CPAP INITIATION&MGMT: CPT

## 2019-04-26 PROCEDURE — 80048 BASIC METABOLIC PNL TOTAL CA: CPT

## 2019-04-26 PROCEDURE — 2700000000 HC OXYGEN THERAPY PER DAY

## 2019-04-26 PROCEDURE — 6360000002 HC RX W HCPCS: Performed by: INTERNAL MEDICINE

## 2019-04-26 PROCEDURE — 99232 SBSQ HOSP IP/OBS MODERATE 35: CPT | Performed by: INTERNAL MEDICINE

## 2019-04-26 PROCEDURE — 1200000000 HC SEMI PRIVATE

## 2019-04-26 PROCEDURE — 97530 THERAPEUTIC ACTIVITIES: CPT

## 2019-04-26 RX ORDER — DABIGATRAN ETEXILATE 75 MG/1
150 CAPSULE, COATED PELLETS ORAL 2 TIMES DAILY
Status: DISCONTINUED | OUTPATIENT
Start: 2019-04-27 | End: 2019-04-27

## 2019-04-26 RX ORDER — FUROSEMIDE 10 MG/ML
60 INJECTION INTRAMUSCULAR; INTRAVENOUS 2 TIMES DAILY
Status: DISCONTINUED | OUTPATIENT
Start: 2019-04-26 | End: 2019-04-30 | Stop reason: HOSPADM

## 2019-04-26 RX ORDER — POLYETHYLENE GLYCOL 3350 17 G/17G
17 POWDER, FOR SOLUTION ORAL DAILY
Status: DISCONTINUED | OUTPATIENT
Start: 2019-04-26 | End: 2019-04-30 | Stop reason: HOSPADM

## 2019-04-26 RX ADMIN — DABIGATRAN ETEXILATE MESYLATE 150 MG: 75 CAPSULE ORAL at 08:14

## 2019-04-26 RX ADMIN — AMIODARONE HYDROCHLORIDE 200 MG: 200 TABLET ORAL at 08:14

## 2019-04-26 RX ADMIN — Medication 10 ML: at 08:14

## 2019-04-26 RX ADMIN — INSULIN GLARGINE 10 UNITS: 100 INJECTION, SOLUTION SUBCUTANEOUS at 20:12

## 2019-04-26 RX ADMIN — DOCUSATE SODIUM 100 MG: 100 CAPSULE, LIQUID FILLED ORAL at 08:14

## 2019-04-26 RX ADMIN — POLYETHYLENE GLYCOL (3350) 17 G: 17 POWDER, FOR SOLUTION ORAL at 18:52

## 2019-04-26 RX ADMIN — ATORVASTATIN CALCIUM 80 MG: 40 TABLET, FILM COATED ORAL at 20:15

## 2019-04-26 RX ADMIN — METOPROLOL TARTRATE 12.5 MG: 25 TABLET ORAL at 20:15

## 2019-04-26 RX ADMIN — INSULIN LISPRO 2 UNITS: 100 INJECTION, SOLUTION INTRAVENOUS; SUBCUTANEOUS at 08:14

## 2019-04-26 RX ADMIN — METOPROLOL TARTRATE 12.5 MG: 25 TABLET ORAL at 08:14

## 2019-04-26 RX ADMIN — FUROSEMIDE 40 MG: 10 INJECTION, SOLUTION INTRAMUSCULAR; INTRAVENOUS at 08:14

## 2019-04-26 RX ADMIN — INSULIN LISPRO 2 UNITS: 100 INJECTION, SOLUTION INTRAVENOUS; SUBCUTANEOUS at 20:13

## 2019-04-26 RX ADMIN — FUROSEMIDE 60 MG: 10 INJECTION, SOLUTION INTRAMUSCULAR; INTRAVENOUS at 18:52

## 2019-04-26 RX ADMIN — Medication 10 ML: at 20:15

## 2019-04-26 RX ADMIN — INSULIN LISPRO 4 UNITS: 100 INJECTION, SOLUTION INTRAVENOUS; SUBCUTANEOUS at 18:04

## 2019-04-26 ASSESSMENT — PAIN SCALES - GENERAL
PAINLEVEL_OUTOF10: 0
PAINLEVEL_OUTOF10: 0

## 2019-04-26 NOTE — PROGRESS NOTES
Berta Collins am scribing for and in the presence of Patience Rao MD, F.A.C.C..    Patient: Donavan Burgos  : 1945  Date of Admission: 2019  Primary Care Physician: Yumiko Murphy  Today's Date: 2019    REASON FOR CONSULTATION: Manic Behavior (Onset this AM per nursing home)      HPI: Mr. Jazmín Mendes is a 69 y.o. male who was admitted to the hospital with a worsening shortness of breath, orthopnea and lethargy. I inially saw him back on 3/18/2019, he was admitted with Influenza A infection at that time and had elevated troponin. He was sent to [address] [place] [address] for further management. His illness was complicated by respiratory failure requiring intubation, type II myocardial infarction, retroperitoneal bleeding requiring blood transfusion and acute renal failure requiring dialysis temporarily. Patient was discharged to [address] He was sent from the nursing home because of worsening shortness of breath, orthopnea and increased lethargy/sleepness. Currently on BiPAP but easily arousal. Denies any chest pain, pressure or tightness. His breathing is getting better. He gives a remote history of angioplasty, no stent placed. He also has history of atrial flutter and cardioversion back in . He has paroxysmal atrial flutter/fibrillation on rate control and anticoagulation since that time.     Family history contains his father, mother and brother who had heat disease. His dad started having issues since he was 59. Also his mother and brother had heart disease in there 52's. His most recent ejection fraction is 40%. Mr. Jazmín Mendes states he is feeling better today. He denies any chest pain, cough or fever. He can walk from chair to bed without any problems with shortness of breath. Negative fluid balance of about 4 L since admission.      Past Medical History:   Diagnosis Date    Abnormal nuclear stress test 2010    Stress and resting cardiolite images showed acid (EFFER-K) effervescent tablet 40 mEq PRN   Or    potassium chloride 10 mEq/100 mL IVPB (Peripheral Line) PRN   glucose (GLUTOSE) 40 % oral gel 15 g PRN   dextrose 50 % solution 12.5 g PRN   glucagon (rDNA) injection 1 mg PRN   dextrose 5 % solution PRN   docusate sodium (COLACE) capsule 100 mg Daily   amiodarone (CORDARONE) tablet 200 mg Daily        FAMILY HISTORY: See HPI    PHYSICAL EXAM:   /78   Pulse 84   Temp 98.2 °F (36.8 °C) (Temporal)   Resp 24   Ht 5' 8\" (1.727 m)   Wt 287 lb 12.8 oz (130.5 kg)   SpO2 96%   BMI 43.76 kg/m²  Body mass index is 43.76 kg/m². Constitutional: Mild respiratory distress. On BiPAP  HEENT: Normocephalic and atraumatic. No JVD present. Carotid bruit is not present. No mass and no thyromegaly present. No lymphadenopathy present. Cardiovascular: Normal rate, regularly irregular rhythm, normal heart sounds. Exam reveals no gallop and no friction rubs. 2/6 systolic murmur, 5th intercostal space on the LEFT in the mid-clavicular line (cardiac apex). Pulmonary/Chest: Effort normal and breath sounds normal. No respiratory distress. He has no wheezes, rhonchi or rales. Abdominal: Soft, non-tender. Bowel sounds and aorta are normal. He exhibits no organomegaly, mass or bruit. Extremities: 1+ 1/2 up to the knees bilaterally. No cyanosis or clubbing. 2+ radial and carotid pulses. Distal extremity pulses: 2+ bilaterally. .  Neurological: He is alert and oriented to person, place, and time. No evidence of gross cranial nerve deficit. Coordination appeared normal.   Skin: Skin is warm and dry. There is no rash or diaphoresis. Psychiatric: He has a normal mood and affect.  His speech is normal and behavior is normal.      MOST RECENT LABS ON RECORD:   Lab Results   Component Value Date    WBC 10.2 04/26/2019    HGB 9.3 (L) 04/26/2019    HCT 30.8 (L) 04/26/2019     04/26/2019    CHOL 114 03/18/2019    TRIG 95 03/27/2019    HDL 47 03/18/2019    LDLCHOLESTEROL 49 03/18/2019    ALT 24 04/24/2019    AST 19 04/24/2019     04/24/2019    K 4.9 04/24/2019    CL 95 (L) 04/24/2019    CREATININE 1.12 04/24/2019    BUN 24 (H) 04/24/2019    CO2 37 (H) 04/24/2019    INR 1.3 (H) 04/23/2019    LABA1C 8.6 (H) 03/18/2019        ASSESSMENT:  Patient Active Problem List    Diagnosis Date Noted    HF (heart failure) ([address] 04/23/2019    History of non-ST elevation myocardial infarction (NSTEMI) 04/23/2019    Elevated troponin 04/23/2019    Acute on chronic systolic heart failure (HCC) 04/23/2019    Pleural effusion, left 04/23/2019    Hypercapnic respiratory failure, chronic (HCC) 04/23/2019    Heart failure ([address] 04/23/2019    Acute on chronic respiratory acidosis     Essential hypertension     Dyslipidemia     Persistent atrial fibrillation (HCC)     ERNESTO (acute kidney injury) (HCC)     Transaminitis     Ischemic hepatitis     IVIS (obstructive sleep apnea)     Leukocytosis     Hemorrhagic shock (HCC)     Retroperitoneal hematoma     Lactic acidosis     Hyperglycemia     Acute blood loss anemia     Community acquired pneumonia of left lower lobe of lung ([address]     Influenza A with respiratory manifestations 03/18/2019    Acute respiratory failure with hypoxia ([address] 03/18/2019    Acute hypercapnic respiratory failure ([address] 03/18/2019    NSTEMI (non-ST elevated myocardial infarction) ([address]     Type 2 diabetes mellitus without complication, without long-term current use of insulin ([address] 09/06/2016    Adult BMI > 30 05/06/2016    ASHD (arteriosclerotic heart disease)     DM type 2 (diabetes mellitus, type 2) ([facility]     Atrial fibrillation with rapid ventricular response ([address] 01/01/2007       PLAN:  Very complicated medical history as outlined in HPI.   Recently discharged from [facility]. Vs after being treated for influenza A Pneumonia, respiratory failure requiring intubation, retroperitoneal  bleeding, renal failure requiring dialysis and type II myocardial infarction. Current symptoms are secondary to acute on chronic combined heart failure and chronic hypoxemic/hypercarbic respiratory failure. Improving. Can transition him to oral diuretics starting , prefer Bumex or Demadex because if has better bioavailability compared to oral lasix. Continue Metoprolol   Hold off ACEI/ARB for now. As far   as his persistent atrial fibrillation:  Continue Amiodarone 200 mg daily. Continue Metoprolol   Continue Pradaxa    CAD/Ischemic cardiomyopathy  Continue aspirin and Lipitor. Continue beta-blocker therapy. No ACEI or ARB patient just recovered from AFR. Once again, thank you for allowing me to participate in this patients care. Please do not hesitate to contact me if I could be of any further assistance. Sincerely,  Shara Roche MD, F.A.C.C. [facility] Cardiology Specialist     [facility], [address] [place] [address]  Phone: [phone], Fax: [phone]     I believe that the risk of significant morbidity and mortality related to the patient's current medical conditions are: intermediate-high. The documentation recorded by the scribe, accurately and completely reflects the services I personally performed and the decisions made by me. Shara Roche MD, F.A.C.C.  April 26, 2019

## 2019-04-26 NOTE — PROGRESS NOTES
ICU Progress Note    SUBJECTIVE/INTERVAL HISTORY:    Patient seen in follow up for CHF exacerbation, IVIS, acute on chronic respiratory acidosis/chronic hypercapnic respiratory failure, afib, elevated troponin, recent NSTEMI, left pleural effusion. Slept well with BIPAP. Down 6#. No chest pain. SOB improved. OBJECTIVE:    Vitals:   Temp: 98.2 °F (36.8 °C)  Temp range:    Temp  Av.5 °F (36.9 °C)  Min: 97.8 °F (36.6 °C)  Max: 99.2 °F (37.3 °C)    BP: 114/78  BP Range:      Systolic (09AUS), VQY:839 , Min:85 , ZZM:792      Diastolic (64OIW), FQH:96, Min:46, Max:92    Pulse: 84  Pulse Range:    Pulse  Av.2  Min: 59  Max: 96    Resp: 24  Resp Range:   Resp  Av.1  Min: 13  Max: 42    SpO2: 96 % on supplemental O2  SpO2 range:   SpO2  Av.4 %  Min: 91 %  Max: 99 %    24HR INTAKE/OUTPUT:      Intake/Output Summary (Last 24 hours) at 2019 1430  Last data filed at 2019 0514  Gross per 24 hour   Intake 240 ml   Output 1200 ml   Net -960 ml       -----------------------------------------------------------------  Review of Systems:  Constitutional:negative  for fevers, and negative for chills. Eyes: negative for visual disturbance   ENT: negative for sore throat, negative nasal congestion, and negative for earache  Respiratory: positive for shortness of breath, negative for cough, and negative for wheezing  Cardiovascular: negative for chest pain, negative for palpitations, and negative for syncope  Gastrointestinal: negative for abdominal pain, negative for nausea,negative for vomiting, negative for diarrhea, negative for constipation, and negative for hematochezia or melena  Genitourinary: negative for dysuria, negative for urinary urgency, negative for urinary frequency, and negative for hematuria  Skin: negative for skin rash, and negative for skin lesions  Neurological: negative for unilateral weakness, numbness or tingling.     Exam:  GEN:  alert and oriented to person, place and time, well-developed and well-nourished  NECK: normal, supple  PULM: distant bilaterally - no wheezes, faint bilateral rales, no rhonchi, no respiratory distress, no use of accessory muscles, on NC  COR:   irregularly irregular and systolic murmur  ABD:    Obese, soft, non-tender, non-distended, normal bowel sounds, no masses or organomegaly  EXT:    edema: 1+ affecting bilateral foot, bilateral ankle, bilateral calf, bilateral thigh, TEDs  NEURO: follows commands, MULLIGAN, no deficits  SKIN:   no rashes or significant lesions    -----------------------------------------------------------------  Diagnostic Data:  Lab Results   Component Value Date    WBC 10.2 04/26/2019    HGB 9.3 (L) 04/26/2019    HCT 30.8 (L) 04/26/2019     04/26/2019    CHOL 114 03/18/2019    TRIG 95 03/27/2019    HDL 47 03/18/2019    ALT 24 04/24/2019    AST 19 04/24/2019     04/26/2019    K 4.3 04/26/2019    CL 94 (L) 04/26/2019    CREATININE 1.21 (H) 04/26/2019    BUN 24 (H) 04/26/2019    CO2 38 (H) 04/26/2019    INR 1.3 (H) 04/23/2019    LABA1C 8.6 (H) 03/18/2019       ASSESSMENT:    Principal Problem:    Acute on chronic systolic heart failure (HCC)  Active Problems:    ASHD (arteriosclerotic heart disease)    DM type 2 (diabetes mellitus, type 2) ([facility])    IVIS (obstructive sleep apnea)    HF (heart failure) ([facility])    History of non-ST elevation myocardial infarction (NSTEMI)    Elevated troponin    Pleural effusion, left    Hypercapnic respiratory failure, chronic (HCC)    Heart failure ([facility])    Acute on chronic respiratory acidosis    Essential hypertension    Dyslipidemia    Persistent atrial fibrillation ([facility]  Resolved Problems:    * No resolved hospital problems.  *      Patient Active Problem List    Diagnosis Date Noted    HF (heart failure) ([facility] 04/23/2019    History of non-ST elevation myocardial infarction (NSTEMI) 04/23/2019    Elevated troponin 04/23/2019    Acute on chronic systolic heart failure ([facility] 04/23/2019    Pleural effusion, left 04/23/2019    Hypercapnic respiratory failure, chronic (HCC) 04/23/2019    Heart failure ([address] 04/23/2019    Acute on chronic respiratory acidosis     Essential hypertension     Dyslipidemia     Persistent atrial fibrillation (HCC)     ERNESTO (acute kidney injury) (HCC)     Transaminitis     Ischemic hepatitis     IVIS (obstructive sleep apnea)     Leukocytosis     Hemorrhagic shock (HCC)     Retroperitoneal hematoma     Lactic acidosis     Hyperglycemia     Acute blood loss anemia     Community acquired pneumonia of left lower lobe of lung ([address]     Influenza A with respiratory manifestations 03/18/2019    Acute respiratory failure with hypoxia (HCC) 03/18/2019    Acute hypercapnic respiratory failure ([address] 03/18/2019    NSTEMI (non-ST elevated myocardial infarction) ([address]     Type 2 diabetes mellitus without complication, without long-term current use of insulin ([address] 09/06/2016    Adult BMI > 30 05/06/2016    ASHD (arteriosclerotic heart disease)     DM type 2 (diabetes mellitus, type 2) ([facility])     Atrial fibrillation with rapid ventricular response ([address] 01/01/2007       PLAN:    Acute on chronic respiratory acidosis/Hypercapnic respiratory failure, chronic    O2   Prn BIPAP   Continue diuresis    Acute on chronic systolic heart failure   Appreciate cardiology   Diuresis   Fluid restrict to continue    History of non-ST elevation myocardial infarction/ASHD (arteriosclerotic heart disease)    DM type 2    SSI   Home meds    IVIS   BIPAP night and prn    Elevated troponin   Likely due to demand ischemia/CHF    Pleural effusion, left   Thoracentesis appreciated   Recurrence of left effusion    May need repeat thoracentesis - will need to hold pradaxa    Essential hypertension    Dyslipidemia    Persistent atrial fibrillation    pradaxa    Resolved Problems:    * No resolved hospital problems.  *    · High risk medication: none    KENYATTA Lamb PA-C  4/26/2019, 2:30 PM    I spent 30 minutes providing critical care management to this patient.   This excludes time spent in performing separately billed procedures

## 2019-04-26 NOTE — PROGRESS NOTES
Physical Therapy  Facility/Department: [facility] MED SURG  Daily Treatment Note  NAME: Donavan Burgos  : 1945  MRN: [id number]    Date of Service: 2019    Discharge Recommendations:  Continue to assess pending progress        Patient Diagnosis(es): The primary encounter diagnosis was Congestive heart failure, unspecified HF chronicity, unspecified heart failure type ([facility] Diagnoses of Acute on chronic respiratory acidosis, Pleural effusion, Hypoxia, and Respiratory insufficiency were also pertinent to this visit. has a past medical history of Abnormal nuclear stress test, Arrhythmia, Atrial fibrillation ([facility] CAD (coronary artery disease), DM type 2 (diabetes mellitus, type 2) ([facility] and Hx of echocardiogram.   has a past surgical history that includes Coronary angioplasty (); Cardioversion (2007); and Cardioversion (). Restrictions  Restrictions/Precautions  Restrictions/Precautions: General Precautions, Fall Risk  Subjective   General  Chart Reviewed: Yes  Response To Previous Treatment: Patient with no complaints from previous session. Family / Caregiver Present: No  Referring Practitioner: Dr. Anderson Grief: Pt has no c/o pain upon arrival.  Pain Screening  Patient Currently in Pain: Denies  Vital Signs  Patient Currently in Pain: Denies       Orientation  Orientation  Overall Orientation Status: Within Functional Limits        Exercises  Straight Leg Raise: x15 Cosimo Eans)  Quad Sets: x15  Heelslides: x15  Gluteal Sets: x15  Hip Abduction: x15  Knee Short Arc Quad: x15  Ankle Pumps: x15  Comments: Above exercises completed in supine. AAROM with B SLRs. Assessment      Patient Education: Educated pt on importance of participation with therapy. Pt verbalizing understanding.   REQUIRES PT FOLLOW UP: Yes  Activity Tolerance  Activity Tolerance: Patient Tolerated treatment well       Goals  Short term goals  Time Frame for Short term goals: 10 days   Short term goal 1: Pt will be educated on his POC  Short term goal 2: Pt will perform sit to stand transfers Koki   Short term goal 3: Pt will increase dynamic standing balance to Fair in order to reduce fall risk   Short term goal 4: Pt will tolerate 20 minutes on exercises in order to increase endurnace   Patient Goals   Patient goals : \"to get stronger\"    Plan    Plan  Times per week: 7x/wk   Times per day: Twice a day  Plan weeks: 2x/daily except weekends 1x/daily   Current Treatment Recommendations: Strengthening, Transfer Training, Endurance Training, Neuromuscular Re-education, Patient/Caregiver Education & Training, Equipment Evaluation, Education, & procurement, Balance Training, Gait Training, Home Exercise Program, Functional Mobility Training, Stair training, Safety Education & Training  Safety Devices  Type of devices:  All fall risk precautions in place, Call light within reach, Nurse notified, Left in bed     Therapy Time   Individual Concurrent Group Co-treatment   Time In 1338         Time Out 1351         Minutes [address]

## 2019-04-26 NOTE — PROGRESS NOTES
[]  Increased RR. Greater than 20 bpm   []  SOB w/ exertion or RR greater than 24 bpm []  Access- ory muscle use at rest. Abn.  resp. []  SOB at rest.   0   Bilateral Breath Sounds (BBS) []  Clear [x]  Diminish-ed bases  []  Diminish-ed t/o, or rales   []  Sporadic, scattered wheezes or rhonchi []  Persistentwheezes and, or absent BBS 1   Cough [x]  Strong, effective, & non-prod. []  Effective & prod. Less than 25 ml (2 TBSP) over past 24 hrs []  Ineffective & non-prod to less than 25 ML over past 24 hrs []  Ineffective and, or greater than 25 ml sputum prod. past 24 hrs. []  Nonspon- taneous; Requires suctioning 0   Pulmonary History  (PULM HX) [x]  No smoking and no chronic pulmonaryhistory []  Former smoker. Quit over 12 mos. ago []  Current smoker or quit w/ in 12 mos []  Pulm. History and, or 20 pk/yr smoking hx []  Admitted w/ acute pulm. dx and, or has been admitted w/ pulm. dx 2 or more times over past 12 mos 0   Surgical History this Admit  (SURG HX) [x]  No surgery []  General surgery []  Lower abdominal []  Thoracic or upper abdominal   []  Thoracic w/ pulm. disease 0   Chest X-Ray (CXR)/CT Scan []  Clear or not applicable []  Not available [x]  Atelect- asis or pleural effusions []  Localized infiltrate or pulm. edema []  Con-solidated Infiltrates, bilateral, or in more than 1 lobe 2   Slow or Forced VC, FEV1 OR PEFR (PULM FXN)  [x]  80% or greater, or not indicated []  Pt. unable to perform []  FEV1 or PEFR or VC 51-79%.   []  FEV1 or PEFR or VC  30-49%   []  FEV1 or PEFR or VC less than 30%   0   TOTAL ACUITY: 3       CARE PLAN    If Acuity Level is 2, 3, or 4 in any of the following:    [] BILATERAL BREATH SOUNDS (BBS)     [] PULMONARY HISTORY (PULM HX)  [] PULMONARY FUNCTION (PULM FX)    Goal: Improve respiratory functions in patients with airway disease and decrease WOB    [x] AEROSOL PROTOCOL    Total Acuity:   16-32  []  Secondary Assessment in 24 hrs Total Acuity:  9-15  []  Secondary Assessment in 24 hrs Total Acuity:  4-8  []  Secondary Assessment in 48 hrs Total Acuity:  0-3  [x]  Secondary Assessment in 72 hrs   HHN AEROSOL THERAPY with  [physician-ordered bronchodilator(s)] q 4 & Albuterol PRN q2 hrs. Breath-Actuated Neb if BBS Acuity = 4, and pt. can use MP. Notify physician if condition deteriorates. HHN AEROSOL THERAPY with  [physician-ordered bronchodilator(s)]  QID and Albuterol PRN q4 hrs. Breath-Actuated Neb if BBS Acuity = 4, and pt. can use MP. Notify physician if condition deteriorates. MDI THERAPY with  2 actuations of [physician-ordered bronchodilator(s)] via spacer TID Albuterol and PRNq4 hrs. If unable to utilize MDI: HHN [physician-ordered bronchodilator(s)] TID and Albuterol PRN q4 hrs. Notify physician if condition deteriorates. MDI THERAPY with  [physician-ordered bronchodilator(s)] via spacer TID PRN. If unable to utilize MDI: HHN [physician-ordered bronchodilator(s)] TID PRN. Notify physician if condition deteriorates. If Acuity Level is 2, 3, or 4 in any of the following:    [] COUGH     [] SURGICAL HISTORY (SURG HX)  [] CHEST XRAY (CXR)    Goal: Improvement in sputum mobilization in patients with ineffective airway clearance. Reverse atelectasis.     [] Bronchopulmonary Hygiene Protocol    Total Acuity:   16-32  []  Secondary Assessment in 24 hrs Total Acuity:  9-15  []  Secondary Assessment in 24 hrs Total Acuity:  4-8  []  Secondary Assessment in 48 hrs Total Acuity:  0-3  []  Secondary Assessment in 72 hrs   METANEB QID with [physician-ordered bronchodilator(s)] if CXR Acuity = 4; otherwise:  PD&P, PEP, or Vest QID & PRN  NT Sxn PRN for ineffective cough  METANEB QID with [physician-ordered bronchodilator(s)] if CXR Acuity = 4; otherwise:  PD&P, PEP, or Vest TID & PRN  NT Sxn PRN for ineffective cough  Instruct patient to self-perform IS q1hr WA   Directed Cough self-performed q1hr WA     If Acuity Level is 2 or above in the following:    [] PULMONARY HISTORY (PULM HX)    Goal: Assist patient in quitting smoking to slow or stop the progression of lung disease.     [] Smoking Cessation Protocol    SMOKING CESSATION EDUCATION provided according to policy [place] (vadim with an X)  ____Yes    ____ No     ____ NA    Smoking Cessation Booklet given:  ____Yes  ____No ____Patient Lindajunior Berger

## 2019-04-26 NOTE — PLAN OF CARE
browne in place. I&O being monitored routinel. Problem: Respiratory:  Goal: Respiratory status will improve  Description  Respiratory status will improve  4/25/2019 2156 by Abena Saenz RN  Note:   Lungs clear. Patient on 3 L O2 and Bipap at night. Will continue to monitor. 4/25/2019 1403 by Georgia Baker RN  Outcome: Ongoing  Note:   Lungs are clear t/o, dim in bases. SpO2 maintained mid to high 90s on 3L of oxygen. Wears bipap at hs.

## 2019-04-26 NOTE — PROGRESS NOTES
Writer checked on pt. Pt was not on bipap at this time. Per RN pt was on it earlier and requested to come off. Pt is resting comfortably on 3 liters nasal cannula and is showing no signs of distress at this time.

## 2019-04-26 NOTE — PROGRESS NOTES
Physical Therapy  Facility/Department: [facility] THE [facility]  Daily Treatment Note  NAME: Loree Dee  : 1945  MRN: [id number]    Date of Service: 2019    Discharge Recommendations:  Continue to assess pending progress        Patient Diagnosis(es): The primary encounter diagnosis was Congestive heart failure, unspecified HF chronicity, unspecified heart failure type ([address] Diagnoses of Acute on chronic respiratory acidosis, Pleural effusion, Hypoxia, and Respiratory insufficiency were also pertinent to this visit. has a past medical history of Abnormal nuclear stress test, Arrhythmia, Atrial fibrillation ([address] CAD (coronary artery disease), DM type 2 (diabetes mellitus, type 2) ([facility] and Hx of echocardiogram.   has a past surgical history that includes Coronary angioplasty (); Cardioversion (2007); and Cardioversion (). Restrictions  Restrictions/Precautions  Restrictions/Precautions: General Precautions, Fall Risk  Subjective   General  Chart Reviewed: Yes  Response To Previous Treatment: Patient with no complaints from previous session. Family / Caregiver Present: No  Referring Practitioner: Dr. Domi Gil: Pt with no pain. Orientation  Orientation  Overall Orientation Status: Within Functional Limits  Cognition      Objective   Bed mobility  Supine to Sit: Moderate assistance  Scooting: Minimal assistance; Moderate assistance(To scoot to EOB.)  Transfers  Sit to Stand: Minimal Assistance;2 Person Assistance; Moderate Assistance  Stand to sit: Minimal Assistance;2 Person Assistance  Comment: Verbal cues for technique. Ambulation  Ambulation?: No     Balance  Posture: Fair  Sitting - Static: Good  Sitting - Dynamic: Fair;+  Standing - Static: Fair;-  Standing - Dynamic: Fair;-  Exercises  Hip Flexion: 20x  Hip Abduction: 20x  Knee Long Arc Quad: 20x  Ankle Pumps: 20x  Comments: Above exercises completed in sitting.        Assessment      Patient Education: Educated pt on safety with transfers. Pt with fair to good understanding. REQUIRES PT FOLLOW UP: Yes  Activity Tolerance  Activity Tolerance: Patient Tolerated treatment well     G-Code     OutComes Score    AM-PAC Score    Goals  Short term goals  Time Frame for Short term goals: 10 days   Short term goal 1: Pt will be educated on his POC  Short term goal 2: Pt will perform sit to stand transfers Koki   Short term goal 3: Pt will increase dynamic standing balance to Fair in order to reduce fall risk   Short term goal 4: Pt will tolerate 20 minutes on exercises in order to increase endurnace   Patient Goals   Patient goals : \"to get stronger\"    Plan    Plan  Times per week: 7x/wk   Times per day: Twice a day  Plan weeks: 2x/daily except weekends 1x/daily   Current Treatment Recommendations: Strengthening, Transfer Training, Endurance Training, Neuromuscular Re-education, Patient/Caregiver Education & Training, Equipment Evaluation, Education, & procurement, Balance Training, Gait Training, Home Exercise Program, Functional Mobility Training, Stair training, Safety Education & Training  Safety Devices  Type of devices:  All fall risk precautions in place, Left in chair, Call light within reach, Nurse notified, Gait belt(No alarm upon entry.)     Therapy Time   Individual Concurrent Group Co-treatment   Time In 0818         Time Out 0850         Minutes [address] [place] [address]

## 2019-04-27 LAB
GLUCOSE BLD-MCNC: 122 MG/DL (ref 74–100)
GLUCOSE BLD-MCNC: 178 MG/DL (ref 74–100)
GLUCOSE BLD-MCNC: 198 MG/DL (ref 74–100)
GLUCOSE BLD-MCNC: 212 MG/DL (ref 74–100)
HCT VFR BLD CALC: 30.3 % (ref 40.7–50.3)
HEMOGLOBIN: 9.4 G/DL (ref 13–17)
MCH RBC QN AUTO: 31.9 PG (ref 25.2–33.5)
MCHC RBC AUTO-ENTMCNC: 31 G/DL (ref 28.4–34.8)
MCV RBC AUTO: 102.7 FL (ref 82.6–102.9)
NRBC AUTOMATED: 0 PER 100 WBC
PDW BLD-RTO: 17.2 % (ref 11.8–14.4)
PLATELET # BLD: 266 K/UL (ref 138–453)
PMV BLD AUTO: 9 FL (ref 8.1–13.5)
RBC # BLD: 2.95 M/UL (ref 4.21–5.77)
WBC # BLD: 11.6 K/UL (ref 3.5–11.3)

## 2019-04-27 PROCEDURE — 36415 COLL VENOUS BLD VENIPUNCTURE: CPT

## 2019-04-27 PROCEDURE — 6370000000 HC RX 637 (ALT 250 FOR IP): Performed by: INTERNAL MEDICINE

## 2019-04-27 PROCEDURE — 82947 ASSAY GLUCOSE BLOOD QUANT: CPT

## 2019-04-27 PROCEDURE — 97110 THERAPEUTIC EXERCISES: CPT

## 2019-04-27 PROCEDURE — 2580000003 HC RX 258: Performed by: INTERNAL MEDICINE

## 2019-04-27 PROCEDURE — 85027 COMPLETE CBC AUTOMATED: CPT

## 2019-04-27 PROCEDURE — 6360000002 HC RX W HCPCS: Performed by: INTERNAL MEDICINE

## 2019-04-27 PROCEDURE — 1200000000 HC SEMI PRIVATE

## 2019-04-27 RX ORDER — DABIGATRAN ETEXILATE 75 MG/1
150 CAPSULE, COATED PELLETS ORAL 2 TIMES DAILY
Status: DISCONTINUED | OUTPATIENT
Start: 2019-04-27 | End: 2019-04-30 | Stop reason: HOSPADM

## 2019-04-27 RX ADMIN — AMIODARONE HYDROCHLORIDE 200 MG: 200 TABLET ORAL at 10:09

## 2019-04-27 RX ADMIN — Medication 10 ML: at 17:06

## 2019-04-27 RX ADMIN — FUROSEMIDE 60 MG: 10 INJECTION, SOLUTION INTRAMUSCULAR; INTRAVENOUS at 10:09

## 2019-04-27 RX ADMIN — DOCUSATE SODIUM 100 MG: 100 CAPSULE, LIQUID FILLED ORAL at 10:09

## 2019-04-27 RX ADMIN — Medication 10 ML: at 21:07

## 2019-04-27 RX ADMIN — METOPROLOL TARTRATE 12.5 MG: 25 TABLET ORAL at 21:02

## 2019-04-27 RX ADMIN — POLYETHYLENE GLYCOL (3350) 17 G: 17 POWDER, FOR SOLUTION ORAL at 10:09

## 2019-04-27 RX ADMIN — ATORVASTATIN CALCIUM 80 MG: 40 TABLET, FILM COATED ORAL at 21:02

## 2019-04-27 RX ADMIN — METOPROLOL TARTRATE 12.5 MG: 25 TABLET ORAL at 10:09

## 2019-04-27 RX ADMIN — Medication 10 ML: at 10:10

## 2019-04-27 RX ADMIN — INSULIN LISPRO 4 UNITS: 100 INJECTION, SOLUTION INTRAVENOUS; SUBCUTANEOUS at 17:00

## 2019-04-27 RX ADMIN — DABIGATRAN ETEXILATE MESYLATE 150 MG: 75 CAPSULE ORAL at 21:02

## 2019-04-27 RX ADMIN — DABIGATRAN ETEXILATE MESYLATE 150 MG: 75 CAPSULE ORAL at 10:09

## 2019-04-27 RX ADMIN — INSULIN GLARGINE 10 UNITS: 100 INJECTION, SOLUTION SUBCUTANEOUS at 21:02

## 2019-04-27 RX ADMIN — INSULIN LISPRO 1 UNITS: 100 INJECTION, SOLUTION INTRAVENOUS; SUBCUTANEOUS at 21:01

## 2019-04-27 RX ADMIN — FUROSEMIDE 60 MG: 10 INJECTION, SOLUTION INTRAMUSCULAR; INTRAVENOUS at 17:06

## 2019-04-27 ASSESSMENT — PAIN SCALES - GENERAL
PAINLEVEL_OUTOF10: 0

## 2019-04-27 NOTE — PROGRESS NOTES
Lab Results   Component Value Date    WBCUA 5 TO 10 03/28/2019    RBCUA 5 TO 10 03/28/2019    EPITHUA 0 TO 2 03/28/2019    LEUKOCYTESUR TRACE (A) 03/28/2019    SPECGRAV 1.024 03/28/2019    GLUCOSEU TRACE (A) 03/28/2019    KETUA TRACE (A) 03/28/2019    PROTEINU 1+ (A) 03/28/2019    HGBUR SMALL (A) 03/28/2019    CASTUA  03/28/2019     2 TO 5 HYALINE Reference range defined for non-centrifuged specimen. CRYSTUA NOT REPORTED 03/28/2019    BACTERIA NOT REPORTED 03/28/2019    YEAST NOT REPORTED 03/28/2019       Lab Results   Component Value Date    TROPONINT 0.03 (H) 04/24/2019    PROBNP 5,437 (H) 04/26/2019       Xr Chest Portable    Result Date: 4/24/2019  PROCEDURE: ULTRASOUNDGUIDED LEFT THORACENTESIS UPRIGHT AP CHEST RADIOGRAPH 4/24/2019 HISTORY: ORDERING SYSTEM PROVIDED HISTORY: left pleural effusion TECHNOLOGIST PROVIDED HISTORY: left pleural effusion TECHNIQUE: Informed consent was obtained after a detailed explanation of the procedure including risks, benefits, and alternatives. Universal protocol was performed. The left chest was prepped and draped in sterile fashion and local anesthesia was achieved with lidocaine. A 5 English needle sheath was advanced under ultrasound guidance into pleural effusion and thoracentesis was performed. The patient tolerated the procedure well. FINDINGS: LEFT THORACENTESIS:  A total of 970 mL of bloody fluid was removed. Postprocedural ultrasound reveals small volume residual fluid inferiorly in hemithorax. Sample was sent for laboratory evaluation, as requested. POSTPROCEDURAL CHEST RADIOGRAPH: No pneumothorax identified. Interval reduction of left pleural fluid. Pleural fluid remains inferiorly as well as associated atelectasis or consolidation. Cardiac silhouette enlargement again noted. No new findings identified in the right chest.     1.  Successful ultrasound guided left thoracentesis with small volume remaining dependently.  2.  Postprocedural chest radiograph demonstrates reduction of left pleural fluid with residual effusion and left basilar atelectasis/consolidation. No pneumothorax or new findings otherwise appreciated. Xr Chest Portable    Result Date: 4/23/2019  EXAMINATION: SINGLE XRAY VIEW OF THE CHEST 4/23/2019 9:34 am COMPARISON: 04/05/2019 HISTORY: ORDERING SYSTEM PROVIDED HISTORY: sob TECHNOLOGIST PROVIDED HISTORY: sob Follow-up exam. FINDINGS: The cardiac silhouette is enlarged. Mediastinal contours are normal. Endotracheal tube, orogastric tube, and right internal jugular vein catheter have been removed. There is pulmonary edema. There is a moderate left pleural effusion which has increased in size, with associated left basilar atelectasis. No focal right lung infiltrates. The visualized osseous structures are unremarkable. 1. Cardiomegaly with pulmonary edema, increased. 2. There is a moderate left pleural effusion, increased in size, with associated atelectasis in the left lung base. Us Thoracentesis    Result Date: 4/24/2019  PROCEDURE: ULTRASOUNDGUIDED LEFT THORACENTESIS UPRIGHT AP CHEST RADIOGRAPH 4/24/2019 HISTORY: ORDERING SYSTEM PROVIDED HISTORY: left pleural effusion TECHNOLOGIST PROVIDED HISTORY: left pleural effusion TECHNIQUE: Informed consent was obtained after a detailed explanation of the procedure including risks, benefits, and alternatives. Universal protocol was performed. The left chest was prepped and draped in sterile fashion and local anesthesia was achieved with lidocaine. A 5 S[place]inese needle sheath was advanced under ultrasound guidance into pleural effusion and thoracentesis was performed. The patient tolerated the procedure well. FINDINGS: LEFT THORACENTESIS:  A total of 970 mL of bloody fluid was removed. Postprocedural ultrasound reveals small volume residual fluid inferiorly in hemithorax. Sample was sent for laboratory evaluation, as requested. POSTPROCEDURAL CHEST RADIOGRAPH: No pneumothorax identified. Interval reduction of left pleural fluid. Pleural fluid remains inferiorly as well as associated atelectasis or consolidation. Cardiac silhouette enlargement again noted. No new findings identified in the right chest.     1.  Successful ultrasound guided left thoracentesis with small volume remaining dependently. 2.  Postprocedural chest radiograph demonstrates reduction of left pleural fluid with residual effusion and left basilar atelectasis/consolidation. No pneumothorax or new findings otherwise appreciated. ASSESSMENT:    Principal Problem:    Acute on chronic systolic heart failure (HCC)  Active Problems:    ASHD (arteriosclerotic heart disease)    DM type 2 (diabetes mellitus, type 2) ([facility])    IVIS (obstructive sleep apnea)    HF (heart failure) ([facility])    History of non-ST elevation myocardial infarction (NSTEMI)    Elevated troponin    Pleural effusion, left    Hypercapnic respiratory failure, chronic (HCC)    Heart failure (HCC)    Acute on chronic respiratory acidosis    Essential hypertension    Dyslipidemia    Persistent atrial fibrillation ([facility]  Resolved Problems:    * No resolved hospital problems.  *      Patient Active Problem List    Diagnosis Date Noted    HF (heart failure) ([facility] 04/23/2019    History of non-ST elevation myocardial infarction (NSTEMI) 04/23/2019    Elevated troponin 04/23/2019    Acute on chronic systolic heart failure ([address] 04/23/2019    Pleural effusion, left 04/23/2019    Hypercapnic respiratory failure, chronic (HCC) 04/23/2019    Heart failure ([address] 04/23/2019    Acute on chronic respiratory acidosis     Essential hypertension     Dyslipidemia     Persistent atrial fibrillation (HCC)     ERNESTO (acute kidney injury) ([address]     Transaminitis     Ischemic hepatitis     IVIS (obstructive sleep apnea)     Leukocytosis     Hemorrhagic shock (HCC)     Retroperitoneal hematoma     Lactic acidosis     Hyperglycemia     Acute blood loss anemia    [employer] acquired pneumonia of left lower lobe of lung ([facility]     Influenza A with respiratory manifestations 03/18/2019    Acute respiratory failure with hypoxia (HCC) 03/18/2019    Acute hypercapnic respiratory failure (HCC) 03/18/2019    NSTEMI (non-ST elevated myocardial infarction) ([facility]     Type 2 diabetes mellitus without complication, without long-term current use of insulin ([facility] 09/06/2016    Adult BMI > 30 05/06/2016    ASHD (arteriosclerotic heart disease)     DM type 2 (diabetes mellitus, type 2) ([facility])     Atrial fibrillation with rapid ventricular response ([facility] 01/01/2007       PLAN:  ·   Continue gradual diuresis. Lasix.   Repeat chest x-ray on Monday and if still substantial pleural effusion will need a repeat thoracentesis  · Critical Care Time:  0  ·  physical therapy      Irais Henry M.D.

## 2019-04-27 NOTE — PROGRESS NOTES
Occupational Therapy  Facility/Department: [facility] AT THE [facility] MED SURG  Daily Treatment Note  NAME: Tika Santana  : 1945  MRN: [id number]    Date of Service: 2019    Discharge Recommendations:  ECF with OT, 24 hour supervision or assist, Continue to assess pending progress       Assessment      Activity Tolerance  Activity Tolerance: Patient limited by fatigue  Safety Devices  Safety Devices in place: Yes  Type of devices: Call light within reach; Left in chair  Restraints  Initially in place: Yes         Patient Diagnosis(es): The primary encounter diagnosis was Congestive heart failure, unspecified HF chronicity, unspecified heart failure type ([address] Diagnoses of Acute on chronic respiratory acidosis, Pleural effusion, Hypoxia, and Respiratory insufficiency were also pertinent to this visit. has a past medical history of Abnormal nuclear stress test, Arrhythmia, Atrial fibrillation ([facility] CAD (coronary artery disease), DM type 2 (diabetes mellitus, type 2) ([facility] and Hx of echocardiogram.   has a past surgical history that includes Coronary angioplasty (); Cardioversion (2007); and Cardioversion (). Restrictions  Restrictions/Precautions  Restrictions/Precautions: General Precautions, Fall Risk  Subjective   General  Chart Reviewed: Yes  Patient assessed for rehabilitation services?: Yes  Response to previous treatment: Patient with no complaints from previous session  Family / Caregiver Present: No  Referring Practitioner: Jono Sarabia PA-C  Diagnosis: CHF exacerbation  Subjective  Subjective: Pt had no complaints of pain this date. General Comment  Comments: Pt sitting up in bedside chair upon arrival. Pt agreed to participate in therapy session.        Orientation     Objective                                                                Type of ROM/Therapeutic Exercise  Type of ROM/Therapeutic Exercise: AROM  Comment: Pt tolerated BUE ther ex with 1# dumbbell x 7 planes x 15 reps x 2 sets to increase UE strength and endurance in order to increase Ind with ADL tasks. Pt required RBs as needed secondary to fatigue. Plan   Plan  Times per week: 7  Times per day: Daily  Current Treatment Recommendations: Strengthening, Endurance Training, Patient/Caregiver Education & Training, Self-Care / ADL, Functional Mobility Training, Other (comment), Safety Education & Training  Plan Comment: Energy Conservation, ther-act, ther-ex, and self-care tasks. G-Code     OutComes Score                                                  AM-PAC Score             Goals  Short term goals  Time Frame for Short term goals: 10 visits  Short term goal 1: Patient to perform functional transfers with mod A of 2 with use of gaitbelt and LRAD. Short term goal 2: Patient to tolerate 10 minutes ther-ex/ther-act to improve strength/endurance for ADLs with no greater than 4 restbreaks. Short term goal 3: Patient to complete ADL grooming tasks wtih min A. Short term goal 4: Patient to state 2 energy conservation techniques following education in order to improve daily endurance.         Therapy Time   Individual Concurrent Group Co-treatment   Time In 8905         Time Out 1412         Minutes 17                 SU Graham/GREGORY

## 2019-04-27 NOTE — PROGRESS NOTES
Physical Therapy  Facility/Department: [facility] AT THE [facility] MED SURG  Daily Treatment Note  NAME: Loree Dee  : 1945  MRN: [id number]    Date of Service: 2019    Discharge Recommendations:  Continue to assess pending progress        Patient Diagnosis(es): The primary encounter diagnosis was Congestive heart failure, unspecified HF chronicity, unspecified heart failure type ([facility] Diagnoses of Acute on chronic respiratory acidosis, Pleural effusion, Hypoxia, and Respiratory insufficiency were also pertinent to this visit. has a past medical history of Abnormal nuclear stress test, Arrhythmia, Atrial fibrillation ([facility] CAD (coronary artery disease), DM type 2 (diabetes mellitus, type 2) ([facility] and Hx of echocardiogram.   has a past surgical history that includes Coronary angioplasty (); Cardioversion (2007); and Cardioversion (). Restrictions  Restrictions/Precautions  Restrictions/Precautions: General Precautions, Fall Risk  Subjective   General  Chart Reviewed: Yes  Response To Previous Treatment: Patient with no complaints from previous session. Family / Caregiver Present: No  Referring Practitioner: Dr. Duron Font: Pt. denies pain but is very tired due to not sleeping well last night. Orientation  Orientation  Overall Orientation Status: Within Functional Limits  Cognition      Objective      Transfers  Sit to Stand: Minimal Assistance; Moderate Assistance  Stand to sit: Minimal Assistance; Moderate Assistance  Comment: Pt. completes 2 sit/stand transfers with min/modAx1 this date with min. vc's for hand placement and safe technique. Ambulation  Ambulation?: No  Ambulation 1  Comments: Pt. completed weight-shifting in standing, heel raises, and marching in place with CGA and vc's for safe technique with hhax2 on FWW. Standing ther ex to prepre for ambulation this date.      Balance  Posture: Fair  Sitting - Static: Good  Sitting - Dynamic: Good  Standing - Static: Fair  Standing - Dynamic: Fair  Exercises  Quad Sets: x15  Gluteal Sets: x15  Hip Flexion: 15x  Hip Abduction: x15  Knee Long Arc Quad: 15x  Ankle Pumps: x15  Comments: Seated B LE ther ex. Standing B LE ther ex: heel raises, marching, weight shifting x5-10 to prepare for ambulation as tolerated. Assessment   Assessment: Pt. able to tolerate 2 sit/stand transfers with min/modAx1 and stood for ~1minute each time to complete weight-shifting, heel raises and marching in place to prepare for ambulation with CGA only and no LOB noted. Pt. required seated rest breaks due to WINTERS, back pain and fatigue. Activity Tolerance  Activity Tolerance: Patient Tolerated treatment well;Patient limited by pain; Patient limited by endurance     Goals  Short term goals  Time Frame for Short term goals: 10 days   Short term goal 1: Pt will be educated on his POC  Short term goal 2: Pt will perform sit to stand transfers Koki   Short term goal 3: Pt will increase dynamic standing balance to Fair in order to reduce fall risk   Short term goal 4: Pt will tolerate 20 minutes on exercises in order to increase endurnace   Patient Goals   Patient goals : \"to get stronger\"    Plan    Plan  Times per week: 7x/wk   Times per day: Twice a day  Plan weeks: 2x/daily except weekends 1x/daily   Current Treatment Recommendations: Strengthening, Transfer Training, Endurance Training, Neuromuscular Re-education, Patient/Caregiver Education & Training, Equipment Evaluation, Education, & procurement, Balance Training, Gait Training, Home Exercise Program, Functional Mobility Training, Stair training, Safety Education & Training  Safety Devices  Type of devices:  All fall risk precautions in place, Call light within reach, Left in chair, Gait belt, Patient at risk for falls     Therapy Time   Individual Concurrent Group Co-treatment   Time In 0917         Time Out 0932         Minutes 15                 Keegan Ritchie, PT, DPT

## 2019-04-28 ENCOUNTER — APPOINTMENT (OUTPATIENT)
Dept: GENERAL RADIOLOGY | Age: 74
DRG: 291 | End: 2019-04-28
Payer: MEDICARE

## 2019-04-28 LAB
GLUCOSE BLD-MCNC: 132 MG/DL (ref 74–100)
GLUCOSE BLD-MCNC: 201 MG/DL (ref 74–100)
GLUCOSE BLD-MCNC: 222 MG/DL (ref 74–100)
HCT VFR BLD CALC: 31.5 % (ref 40.7–50.3)
HEMOGLOBIN: 9.5 G/DL (ref 13–17)
MCH RBC QN AUTO: 31.4 PG (ref 25.2–33.5)
MCHC RBC AUTO-ENTMCNC: 30.2 G/DL (ref 28.4–34.8)
MCV RBC AUTO: 104 FL (ref 82.6–102.9)
NRBC AUTOMATED: 0 PER 100 WBC
PDW BLD-RTO: 16.8 % (ref 11.8–14.4)
PLATELET # BLD: 255 K/UL (ref 138–453)
PMV BLD AUTO: 8.9 FL (ref 8.1–13.5)
RBC # BLD: 3.03 M/UL (ref 4.21–5.77)
WBC # BLD: 11.7 K/UL (ref 3.5–11.3)

## 2019-04-28 PROCEDURE — 97110 THERAPEUTIC EXERCISES: CPT

## 2019-04-28 PROCEDURE — 1200000000 HC SEMI PRIVATE

## 2019-04-28 PROCEDURE — 6370000000 HC RX 637 (ALT 250 FOR IP): Performed by: INTERNAL MEDICINE

## 2019-04-28 PROCEDURE — 82947 ASSAY GLUCOSE BLOOD QUANT: CPT

## 2019-04-28 PROCEDURE — 2580000003 HC RX 258: Performed by: INTERNAL MEDICINE

## 2019-04-28 PROCEDURE — 36415 COLL VENOUS BLD VENIPUNCTURE: CPT

## 2019-04-28 PROCEDURE — 6360000002 HC RX W HCPCS: Performed by: INTERNAL MEDICINE

## 2019-04-28 PROCEDURE — 85027 COMPLETE CBC AUTOMATED: CPT

## 2019-04-28 PROCEDURE — 97530 THERAPEUTIC ACTIVITIES: CPT

## 2019-04-28 RX ADMIN — INSULIN LISPRO 4 UNITS: 100 INJECTION, SOLUTION INTRAVENOUS; SUBCUTANEOUS at 12:46

## 2019-04-28 RX ADMIN — FUROSEMIDE 60 MG: 10 INJECTION, SOLUTION INTRAMUSCULAR; INTRAVENOUS at 09:18

## 2019-04-28 RX ADMIN — AMIODARONE HYDROCHLORIDE 200 MG: 200 TABLET ORAL at 09:19

## 2019-04-28 RX ADMIN — POLYETHYLENE GLYCOL (3350) 17 G: 17 POWDER, FOR SOLUTION ORAL at 09:19

## 2019-04-28 RX ADMIN — INSULIN GLARGINE 10 UNITS: 100 INJECTION, SOLUTION SUBCUTANEOUS at 20:32

## 2019-04-28 RX ADMIN — Medication 10 ML: at 09:19

## 2019-04-28 RX ADMIN — METOPROLOL TARTRATE 12.5 MG: 25 TABLET ORAL at 09:19

## 2019-04-28 RX ADMIN — DABIGATRAN ETEXILATE MESYLATE 150 MG: 75 CAPSULE ORAL at 09:18

## 2019-04-28 RX ADMIN — DOCUSATE SODIUM 100 MG: 100 CAPSULE, LIQUID FILLED ORAL at 09:19

## 2019-04-28 RX ADMIN — FUROSEMIDE 60 MG: 10 INJECTION, SOLUTION INTRAMUSCULAR; INTRAVENOUS at 17:44

## 2019-04-28 RX ADMIN — ATORVASTATIN CALCIUM 80 MG: 40 TABLET, FILM COATED ORAL at 20:33

## 2019-04-28 RX ADMIN — INSULIN LISPRO 2 UNITS: 100 INJECTION, SOLUTION INTRAVENOUS; SUBCUTANEOUS at 20:31

## 2019-04-28 RX ADMIN — ONDANSETRON 4 MG: 2 INJECTION INTRAMUSCULAR; INTRAVENOUS at 01:00

## 2019-04-28 RX ADMIN — INSULIN LISPRO 2 UNITS: 100 INJECTION, SOLUTION INTRAVENOUS; SUBCUTANEOUS at 17:45

## 2019-04-28 ASSESSMENT — PAIN SCALES - GENERAL
PAINLEVEL_OUTOF10: 0

## 2019-04-28 NOTE — PROGRESS NOTES
Physical Therapy  Facility/Department: [facility] MED SURG  Daily Treatment Note  NAME: Celestina Ba  : 1945  MRN: [id number]    Date of Service: 2019    Discharge Recommendations:  Continue to assess pending progress        Patient Diagnosis(es): The primary encounter diagnosis was Congestive heart failure, unspecified HF chronicity, unspecified heart failure type ([facility] Diagnoses of Acute on chronic respiratory acidosis, Pleural effusion, Hypoxia, and Respiratory insufficiency were also pertinent to this visit. has a past medical history of Abnormal nuclear stress test, Arrhythmia, Atrial fibrillation ([facility] CAD (coronary artery disease), DM type 2 (diabetes mellitus, type 2) ([facility] and Hx of echocardiogram.   has a past surgical history that includes Coronary angioplasty (); Cardioversion (2007); and Cardioversion (). Restrictions  Restrictions/Precautions  Restrictions/Precautions: General Precautions, Fall Risk  Subjective   General  Chart Reviewed: Yes  Response To Previous Treatment: Patient with no complaints from previous session. Family / Caregiver Present: No  Referring Practitioner: Dr. Liborio Marks: Pt has no c/o pain. Pt reports not sleeping much last night. Pt has hopes of going back to [address] [place] [address] tomorrow. General Comment  Comments: Pt denies pain. Pain Screening  Patient Currently in Pain: Denies  Vital Signs  Patient Currently in Pain: Denies       Orientation  Orientation  Overall Orientation Status: Within Functional Limits  Cognition      Objective   Bed mobility  Rolling to Left: Stand by assistance  Supine to Sit: Moderate assistance  Scooting: Moderate assistance  Comment: Pt 1/3 of the way off of the bed upon arrival.  Cues for hand placement and for rolling. Pt required 1 hand assist to fully sit up.    Transfers  Sit to Stand: Minimal Assistance  Stand to sit: Minimal Assistance  Bed to Chair: Contact guard assistance  Ambulation  Ambulation?: Yes  Ambulation 1  Surface: level tile  Device: Rolling Walker  Assistance: Contact guard assistance  Quality of Gait: Reduced B step height and postural sway. Distance: x5 ft to chair. Comments: Pt requested for recliner to be moved farther back so he could walk to it. Pt had no LOB. Balance  Posture: Fair  Sitting - Static: Good  Sitting - Dynamic: Good  Standing - Static: Fair  Standing - Dynamic: Fair  Exercises  Hip Flexion: x15  Hip Abduction: x15  Knee Long Arc Quad: x15  Ankle Pumps: x15  Comments: Above exercises completed in sitting. Standing B LE ex: marches and weight shifting x5-10 to prepare for ambulation. Assessment      Activity Tolerance  Activity Tolerance: Patient Tolerated treatment well;Patient limited by endurance       Goals  Short term goals  Time Frame for Short term goals: 10 days   Short term goal 1: Pt will be educated on his POC  Short term goal 2: Pt will perform sit to stand transfers Koki   Short term goal 3: Pt will increase dynamic standing balance to Fair in order to reduce fall risk   Short term goal 4: Pt will tolerate 20 minutes on exercises in order to increase endurnace   Patient Goals   Patient goals : \"to get stronger\"    Plan    Plan  Times per week: 7x/wk   Times per day: Twice a day  Plan weeks: 2x/daily except weekends 1x/daily   Current Treatment Recommendations: Strengthening, Transfer Training, Endurance Training, Neuromuscular Re-education, Patient/Caregiver Education & Training, Equipment Evaluation, Education, & procurement, Balance Training, Gait Training, Home Exercise Program, Functional Mobility Training, Stair training, Safety Education & Training  Safety Devices  Type of devices:  All fall risk precautions in place, Call light within reach, Left in chair, Gait belt, Patient at risk for falls     Therapy Time   Individual Concurrent Group Co-treatment   Time In 0728         Time Out 0758         Minutes 30 [place], PTA

## 2019-04-28 NOTE — PROGRESS NOTES
Progress Note    SUBJECTIVE:  FU related to   Shortness of breath with CHF. Much improved. Overall breathing better. Less swelling in his hands. Still very weak to walk. Able to ambulate to the bathroom with some assistance. No palpitations. OBJECTIVE:    Vitals:   TEMPERATURE:  Current - Temp: 97.5 °F (36.4 °C); Max - Temp  Av.8 °F (36.6 °C)  Min: 97.5 °F (36.4 °C)  Max: 98.1 °F (36.7 °C)  RESPIRATIONS RANGE: Resp  Av.3  Min: 18  Max: 20  PULSE RANGE: Pulse  Av  Min: 80  Max: 93  BLOOD PRESSURE RANGE:  Systolic (90RRL), LYK:70 , Min:89 , NNL:63   ; Diastolic (37WZK), YLI:24, Min:41, Max:54    PULSE OXIMETRY RANGE: SpO2  Av %  Min: 94 %  Max: 96 %  24HR INTAKE/OUTPUT:      Intake/Output Summary (Last 24 hours) at 2019 1241  Last data filed at 2019 0817  Gross per 24 hour   Intake 610 ml   Output 875 ml   Net -265 ml     -----------------------------------------------------------------  Exam:    Patient is alert and oriented. He is shows no signs of confusion. Neck is supple. No lymphadenopathy. Heart is regular /  Lungs have a little bit diminished breath sounds with just a trace rales. Really not much impressive. Just diminished overall. Upper and lower extremities still have some edema upper extremities look   Near normal.  Lower extremities still have some edema. Support stockings noted.   -----------------------------------------------------------------  Diagnostic Data:  Lab Results   Component Value Date    WBC 11.7 (H) 2019    HGB 9.5 (L) 2019     2019       Lab Results   Component Value Date    BUN 24 (H) 2019    CREATININE 1.21 (H) 2019     2019    K 4.3 2019    CALCIUM 8.2 (L) 2019    CL 94 (L) 2019    CO2 38 (H) 2019    LABGLOM 59 (L) 2019       Lab Results   Component Value Date    WBCUA 5 TO 10 2019    RBCUA 5 TO 10 2019    EPITHUA 0 TO 2 2019    LEUKOCYTESUR TRACE (A) 03/28/2019    SPECGRAV 1.024 03/28/2019    GLUCOSEU TRACE (A) 03/28/2019    KETUA TRACE (A) 03/28/2019    PROTEINU 1+ (A) 03/28/2019    HGBUR SMALL (A) 03/28/2019    CASTUA  03/28/2019     2 TO 5 HYALINE Reference range defined for non-centrifuged specimen. CRYSTUA NOT REPORTED 03/28/2019    BACTERIA NOT REPORTED 03/28/2019    YEAST NOT REPORTED 03/28/2019       Lab Results   Component Value Date    TROPONINT 0.03 (H) 04/24/2019    PROBNP 5,437 (H) 04/26/2019       Xr Chest Portable    Result Date: 4/24/2019  PROCEDURE: ULTRASOUNDGUIDED LEFT THORACENTESIS UPRIGHT AP CHEST RADIOGRAPH 4/24/2019 HISTORY: ORDERING SYSTEM PROVIDED HISTORY: left pleural effusion TECHNOLOGIST PROVIDED HISTORY: left pleural effusion TECHNIQUE: Informed consent was obtained after a detailed explanation of the procedure including risks, benefits, and alternatives. Universal protocol was performed. The left chest was prepped and draped in sterile fashion and local anesthesia was achieved with lidocaine. A 5 Occitan needle sheath was advanced under ultrasound guidance into pleural effusion and thoracentesis was performed. The patient tolerated the procedure well. FINDINGS: LEFT THORACENTESIS:  A total of 970 mL of bloody fluid was removed. Postprocedural ultrasound reveals small volume residual fluid inferiorly in hemithorax. Sample was sent for laboratory evaluation, as requested. POSTPROCEDURAL CHEST RADIOGRAPH: No pneumothorax identified. Interval reduction of left pleural fluid. Pleural fluid remains inferiorly as well as associated atelectasis or consolidation. Cardiac silhouette enlargement again noted. No new findings identified in the right chest.     1.  Successful ultrasound guided left thoracentesis with small volume remaining dependently. 2.  Postprocedural chest radiograph demonstrates reduction of left pleural fluid with residual effusion and left basilar atelectasis/consolidation.   No pneumothorax or new findings otherwise appreciated. Xr Chest Portable    Result Date: 4/23/2019  EXAMINATION: SINGLE XRAY VIEW OF THE CHEST 4/23/2019 9:34 am COMPARISON: 04/05/2019 HISTORY: ORDERING SYSTEM PROVIDED HISTORY: sob TECHNOLOGIST PROVIDED HISTORY: sob Follow-up exam. FINDINGS: The cardiac silhouette is enlarged. Mediastinal contours are normal. Endotracheal tube, orogastric tube, and right internal jugular vein catheter have been removed. There is pulmonary edema. There is a moderate left pleural effusion which has increased in size, with associated left basilar atelectasis. No focal right lung infiltrates. The visualized osseous structures are unremarkable. 1. Cardiomegaly with pulmonary edema, increased. 2. There is a moderate left pleural effusion, increased in size, with associated atelectasis in the left lung base. Us Thoracentesis    Result Date: 4/24/2019  PROCEDURE: ULTRASOUNDGUIDED LEFT THORACENTESIS UPRIGHT AP CHEST RADIOGRAPH 4/24/2019 HISTORY: ORDERING SYSTEM PROVIDED HISTORY: left pleural effusion TECHNOLOGIST PROVIDED HISTORY: left pleural effusion TECHNIQUE: Informed consent was obtained after a detailed explanation of the procedure including risks, benefits, and alternatives. Universal protocol was performed. The left chest was prepped and draped in sterile fashion and local anesthesia was achieved with lidocaine. A 5 Pakistani needle sheath was advanced under ultrasound guidance into pleural effusion and thoracentesis was performed. The patient tolerated the procedure well. FINDINGS: LEFT THORACENTESIS:  A total of 970 mL of bloody fluid was removed. Postprocedural ultrasound reveals small volume residual fluid inferiorly in hemithorax. Sample was sent for laboratory evaluation, as requested. POSTPROCEDURAL CHEST RADIOGRAPH: No pneumothorax identified. Interval reduction of left pleural fluid. Pleural fluid remains inferiorly as well as associated atelectasis or consolidation.   Cardiac failure with hypoxia ([facility] 03/18/2019    Acute hypercapnic respiratory failure ([facility] 03/18/2019    NSTEMI (non-ST elevated myocardial infarction) ([facility]     Type 2 diabetes mellitus without complication, without long-term current use of insulin ([facility] 09/06/2016    Adult BMI > 30 05/06/2016    ASHD (arteriosclerotic heart disease)     DM type 2 (diabetes mellitus, type 2) ([facility]     Atrial fibrillation with rapid ventricular response ([facility] 01/01/2007       PLAN:  ·   Continue with aggressive diuresis. Patient is down several lb and will need ongoing diuretics. ·   Labs look appropriate kidney function looks reasonable. · Critical Care Time:   0  ·  long-term outpatient PT may need inpatient rehab. ·   Reduce beta-blocker due to low blood pressure episodes  ·  chest x-ray in the morning. Likely needing another thoracentesis.   ·   Hold Pradaxa tonight and tomorrow morning      Fanta Jaquez M.D.

## 2019-04-28 NOTE — PLAN OF CARE
Problem: Falls - Risk of:  Goal: Will remain free from falls  Description  Will remain free from falls  Outcome: Ongoing  A&Ox3, able to call for assistance when needed. Call light within reach at all times. Bed locked. Bed alarm and/or personal alarm on at all times. Non-skid socks on when out of bed. Assistance provided for transfers, assistive device used for ambulation. Will continue to monitor. Problem: Risk for Impaired Skin Integrity  Goal: Tissue integrity - skin and mucous membranes  Description  Structural intactness and normal physiological function of skin and  mucous membranes. Outcome: Ongoing  Skin assessment completed per shift and as needed. No skin concerns noted at this time. Pt turns self independently and frequently t/o shift. Will continue to monitor. Problem: Nutrition  Goal: Optimal nutrition therapy  Description  Nutrition Problem: Predicted suboptimal energy intake  Intervention: Food and/or Nutrient Delivery: Continue current diet  Nutritional Goals: Maintain % consumption of meals  Outcome: Ongoing  Meals provided per selective menu and as ordered by MD. Pt's appetite is good. Pt encouraged to take in adequate nutrition and increase fluids. See I&O flow sheet for documented intake. Will continue to monitor. Problem: Musculor/Skeletal Functional Status  Goal: Highest potential functional level  Outcome: Ongoing  SBA +1 with walker for all transfers/ambulation. Pt's gait is weak and slow. Pt able to follow commands. Safe transfer techniques instructed. Pt continues to participate with PT & OT as ordered. Will continue to monitor. Problem: Fluid Volume:  Goal: Hemodynamic stability will improve  Description  Hemodynamic stability will improve  Outcome: Ongoing  Vital signs monitored as ordered, stable vital signs t/o shift. Peripheral pulses noted/strong, cap refill WNL. Will continue to monitor.

## 2019-04-28 NOTE — PLAN OF CARE
Problem: Falls - Risk of:  Goal: Will remain free from falls  Description  Will remain free from falls  Outcome: Ongoing  Note:   Bed in lowest position. Wheels locked. Call light in reach. 2/4 siderails up. Bed alarm on. Problem: Risk for Impaired Skin Integrity  Goal: Tissue integrity - skin and mucous membranes  Description  Structural intactness and normal physiological function of skin and  mucous membranes. Outcome: Ongoing  Note:   Mucous membranes are pink and intact. No reddened or open area. Patient has puncture site to left posterior back. Will continue to monitor. Problem: Fluid Volume:  Goal: Hemodynamic stability will improve  Description  Hemodynamic stability will improve  Outcome: Ongoing  Note:   Patient has been urinating adequately and staying within 1500mL fluid restriction parameters. Will continue to monitor. Problem: Respiratory:  Goal: Respiratory status will improve  Description  Respiratory status will improve  Outcome: Ongoing  Note:   Patient's lung sounds are clear and diminished. Will continue to monitor.

## 2019-04-28 NOTE — PROGRESS NOTES
Occupational Therapy  Facility/Department: [facility] AT THE [facility] MED SURG  Daily Treatment Note  NAME: Lino Squires  : 1945  MRN: [id number]    Date of Service: 2019    Discharge Recommendations:  ECF with OT, 24 hour supervision or assist, Continue to assess pending progress       Assessment      Activity Tolerance  Activity Tolerance: Patient Tolerated treatment well  Safety Devices  Safety Devices in place: Yes  Type of devices: Call light within reach; Left in chair  Restraints  Initially in place: Yes         Patient Diagnosis(es): The primary encounter diagnosis was Congestive heart failure, unspecified HF chronicity, unspecified heart failure type ([facility] Diagnoses of Acute on chronic respiratory acidosis, Pleural effusion, Hypoxia, and Respiratory insufficiency were also pertinent to this visit. has a past medical history of Abnormal nuclear stress test, Arrhythmia, Atrial fibrillation ([facility] CAD (coronary artery disease), DM type 2 (diabetes mellitus, type 2) ([facility] and Hx of echocardiogram.   has a past surgical history that includes Coronary angioplasty (); Cardioversion (2007); and Cardioversion (). Restrictions  Restrictions/Precautions  Restrictions/Precautions: General Precautions, Fall Risk  Subjective   General  Chart Reviewed: Yes  Patient assessed for rehabilitation services?: Yes  Response to previous treatment: Patient with no complaints from previous session  Family / Caregiver Present: No  Referring Practitioner: Rosio Guy PA-C  Diagnosis: CHF exacerbation  Subjective  Subjective: Pt had no complaints of pain this date. General Comment  Comments: Pt sitting up in bedside chair upon arrival. Pt agreed to participate in therapy session.        Orientation     Objective                                                                Type of ROM/Therapeutic Exercise  Type of ROM/Therapeutic Exercise: AROM  Comment: Pt tolerated BUE ther ex with 1# dumbbell x 7 planes x 15 reps x 2 sets to increase UE strength and endurance in order to increase participation with ADL tasks. Pt required RBs as needed secondary to fatigue. Plan   Plan  Times per week: 7  Times per day: Daily  Current Treatment Recommendations: Strengthening, Endurance Training, Patient/Caregiver Education & Training, Self-Care / ADL, Functional Mobility Training, Other (comment), Safety Education & Training  Plan Comment: Energy Conservation, ther-act, ther-ex, and self-care tasks. G-Code     OutComes Score                                                  AM-PAC Score             Goals  Short term goals  Time Frame for Short term goals: 10 visits  Short term goal 1: Patient to perform functional transfers with mod A of 2 with use of gaitbelt and LRAD. Short term goal 2: Patient to tolerate 10 minutes ther-ex/ther-act to improve strength/endurance for ADLs with no greater than 4 restbreaks. Short term goal 3: Patient to complete ADL grooming tasks wtih min A. Short term goal 4: Patient to state 2 energy conservation techniques following education in order to improve daily endurance.         Therapy Time   Individual Concurrent Group Co-treatment   Time In 1455         Time Out 1518         Minutes 23                 SU Graham/GREGORY

## 2019-04-29 ENCOUNTER — APPOINTMENT (OUTPATIENT)
Dept: ULTRASOUND IMAGING | Age: 74
DRG: 291 | End: 2019-04-29
Payer: MEDICARE

## 2019-04-29 ENCOUNTER — APPOINTMENT (OUTPATIENT)
Dept: GENERAL RADIOLOGY | Age: 74
DRG: 291 | End: 2019-04-29
Payer: MEDICARE

## 2019-04-29 LAB
ALBUMIN SERPL-MCNC: 2.8 G/DL (ref 3.5–5.2)
ALBUMIN/GLOBULIN RATIO: 1 (ref 1–2.5)
ALP BLD-CCNC: 132 U/L (ref 40–129)
ALT SERPL-CCNC: 21 U/L (ref 5–41)
ANION GAP SERPL CALCULATED.3IONS-SCNC: 6 MMOL/L (ref 9–17)
AST SERPL-CCNC: 17 U/L
BILIRUB SERPL-MCNC: 0.58 MG/DL (ref 0.3–1.2)
BUN BLDV-MCNC: 27 MG/DL (ref 8–23)
BUN/CREAT BLD: 21 (ref 9–20)
CALCIUM SERPL-MCNC: 8.4 MG/DL (ref 8.6–10.4)
CHLORIDE BLD-SCNC: 90 MMOL/L (ref 98–107)
CO2: 40 MMOL/L (ref 20–31)
CREAT SERPL-MCNC: 1.26 MG/DL (ref 0.7–1.2)
GFR AFRICAN AMERICAN: >60 ML/MIN
GFR NON-AFRICAN AMERICAN: 56 ML/MIN
GFR SERPL CREATININE-BSD FRML MDRD: ABNORMAL ML/MIN/{1.73_M2}
GFR SERPL CREATININE-BSD FRML MDRD: ABNORMAL ML/MIN/{1.73_M2}
GLUCOSE BLD-MCNC: 126 MG/DL (ref 74–100)
GLUCOSE BLD-MCNC: 133 MG/DL (ref 70–99)
GLUCOSE BLD-MCNC: 186 MG/DL (ref 74–100)
GLUCOSE BLD-MCNC: 196 MG/DL (ref 74–100)
GLUCOSE BLD-MCNC: 240 MG/DL (ref 74–100)
HCT VFR BLD CALC: 29.3 % (ref 40.7–50.3)
HEMOGLOBIN: 9.1 G/DL (ref 13–17)
MCH RBC QN AUTO: 31.7 PG (ref 25.2–33.5)
MCHC RBC AUTO-ENTMCNC: 31.1 G/DL (ref 28.4–34.8)
MCV RBC AUTO: 102.1 FL (ref 82.6–102.9)
NRBC AUTOMATED: 0 PER 100 WBC
PDW BLD-RTO: 16.5 % (ref 11.8–14.4)
PLATELET # BLD: 257 K/UL (ref 138–453)
PMV BLD AUTO: 9.1 FL (ref 8.1–13.5)
POTASSIUM SERPL-SCNC: 4.1 MMOL/L (ref 3.7–5.3)
RBC # BLD: 2.87 M/UL (ref 4.21–5.77)
SODIUM BLD-SCNC: 136 MMOL/L (ref 135–144)
TOTAL PROTEIN: 5.6 G/DL (ref 6.4–8.3)
WBC # BLD: 11.7 K/UL (ref 3.5–11.3)

## 2019-04-29 PROCEDURE — 97110 THERAPEUTIC EXERCISES: CPT

## 2019-04-29 PROCEDURE — 1200000000 HC SEMI PRIVATE

## 2019-04-29 PROCEDURE — 6360000002 HC RX W HCPCS: Performed by: INTERNAL MEDICINE

## 2019-04-29 PROCEDURE — 97535 SELF CARE MNGMENT TRAINING: CPT

## 2019-04-29 PROCEDURE — 85027 COMPLETE CBC AUTOMATED: CPT

## 2019-04-29 PROCEDURE — 6370000000 HC RX 637 (ALT 250 FOR IP): Performed by: INTERNAL MEDICINE

## 2019-04-29 PROCEDURE — 97116 GAIT TRAINING THERAPY: CPT

## 2019-04-29 PROCEDURE — 80053 COMPREHEN METABOLIC PANEL: CPT

## 2019-04-29 PROCEDURE — 0W9B3ZZ DRAINAGE OF LEFT PLEURAL CAVITY, PERCUTANEOUS APPROACH: ICD-10-PCS | Performed by: INTERNAL MEDICINE

## 2019-04-29 PROCEDURE — 2709999900 US THORACENTESIS

## 2019-04-29 PROCEDURE — 36415 COLL VENOUS BLD VENIPUNCTURE: CPT

## 2019-04-29 PROCEDURE — 82947 ASSAY GLUCOSE BLOOD QUANT: CPT

## 2019-04-29 PROCEDURE — 2500000003 HC RX 250 WO HCPCS: Performed by: RADIOLOGY

## 2019-04-29 PROCEDURE — 2580000003 HC RX 258: Performed by: INTERNAL MEDICINE

## 2019-04-29 PROCEDURE — 71046 X-RAY EXAM CHEST 2 VIEWS: CPT

## 2019-04-29 RX ORDER — LIDOCAINE HYDROCHLORIDE 10 MG/ML
INJECTION, SOLUTION EPIDURAL; INFILTRATION; INTRACAUDAL; PERINEURAL
Status: COMPLETED | OUTPATIENT
Start: 2019-04-29 | End: 2019-04-29

## 2019-04-29 RX ADMIN — LIDOCAINE HYDROCHLORIDE 3 ML: 10 INJECTION, SOLUTION EPIDURAL; INFILTRATION; INTRACAUDAL; PERINEURAL at 13:47

## 2019-04-29 RX ADMIN — ACETAMINOPHEN 650 MG: 325 TABLET, FILM COATED ORAL at 15:08

## 2019-04-29 RX ADMIN — FUROSEMIDE 60 MG: 10 INJECTION, SOLUTION INTRAMUSCULAR; INTRAVENOUS at 08:12

## 2019-04-29 RX ADMIN — FUROSEMIDE 60 MG: 10 INJECTION, SOLUTION INTRAMUSCULAR; INTRAVENOUS at 14:12

## 2019-04-29 RX ADMIN — METOPROLOL TARTRATE 12.5 MG: 25 TABLET ORAL at 08:12

## 2019-04-29 RX ADMIN — DOCUSATE SODIUM 100 MG: 100 CAPSULE, LIQUID FILLED ORAL at 08:12

## 2019-04-29 RX ADMIN — POLYETHYLENE GLYCOL (3350) 17 G: 17 POWDER, FOR SOLUTION ORAL at 08:16

## 2019-04-29 RX ADMIN — AMIODARONE HYDROCHLORIDE 200 MG: 200 TABLET ORAL at 08:16

## 2019-04-29 RX ADMIN — ATORVASTATIN CALCIUM 80 MG: 40 TABLET, FILM COATED ORAL at 20:33

## 2019-04-29 RX ADMIN — INSULIN LISPRO 2 UNITS: 100 INJECTION, SOLUTION INTRAVENOUS; SUBCUTANEOUS at 16:56

## 2019-04-29 RX ADMIN — Medication 10 ML: at 20:34

## 2019-04-29 RX ADMIN — Medication 10 ML: at 08:16

## 2019-04-29 RX ADMIN — DABIGATRAN ETEXILATE MESYLATE 150 MG: 75 CAPSULE ORAL at 20:33

## 2019-04-29 RX ADMIN — INSULIN LISPRO 2 UNITS: 100 INJECTION, SOLUTION INTRAVENOUS; SUBCUTANEOUS at 11:59

## 2019-04-29 RX ADMIN — INSULIN LISPRO 2 UNITS: 100 INJECTION, SOLUTION INTRAVENOUS; SUBCUTANEOUS at 20:34

## 2019-04-29 RX ADMIN — INSULIN GLARGINE 10 UNITS: 100 INJECTION, SOLUTION SUBCUTANEOUS at 20:34

## 2019-04-29 ASSESSMENT — PAIN DESCRIPTION - LOCATION
LOCATION: SHOULDER
LOCATION: SHOULDER

## 2019-04-29 ASSESSMENT — PAIN DESCRIPTION - ORIENTATION
ORIENTATION: LEFT
ORIENTATION: LEFT

## 2019-04-29 ASSESSMENT — PAIN DESCRIPTION - PAIN TYPE
TYPE: ACUTE PAIN
TYPE: ACUTE PAIN

## 2019-04-29 ASSESSMENT — PAIN DESCRIPTION - DESCRIPTORS
DESCRIPTORS: ACHING
DESCRIPTORS: ACHING

## 2019-04-29 ASSESSMENT — PAIN SCALES - GENERAL
PAINLEVEL_OUTOF10: 0
PAINLEVEL_OUTOF10: 6
PAINLEVEL_OUTOF10: 3
PAINLEVEL_OUTOF10: 0

## 2019-04-29 ASSESSMENT — PAIN DESCRIPTION - ONSET: ONSET: ON-GOING

## 2019-04-29 ASSESSMENT — PAIN DESCRIPTION - FREQUENCY: FREQUENCY: CONTINUOUS

## 2019-04-29 NOTE — CONSULTS
[facility]              Inpatient Medication Education Note                                 Patient admitted to [facility] for CHF exacerbation. Medications reviewed with the patient and patient's wife include:  furosemide, metoprolol, Pradaxa (on hold prior to thoracentesis), ondansetron, potassium chloride. Patient education provided when necessary to include potential medication related side effects with acknowledgement of understanding. Patient provided [phone] [facility] Medication Side Effects Pamphlet and Pharmacy Department business card for contact information. Skinny Gibson.  Sarita Barbosa, 4/29/2019, 12:14 PM

## 2019-04-29 NOTE — PROGRESS NOTES
Physical Therapy  Facility/Department: [facility] AT THE [facility] MED SURG  Daily Treatment Note  NAME: Gabo Oates  : 1945  MRN: [id number]    Date of Service: 2019    Discharge Recommendations:  Continue to assess pending progress        Patient Diagnosis(es): The primary encounter diagnosis was Congestive heart failure, unspecified HF chronicity, unspecified heart failure type ([facility] Diagnoses of Acute on chronic respiratory acidosis, Pleural effusion, Hypoxia, and Respiratory insufficiency were also pertinent to this visit. has a past medical history of Abnormal nuclear stress test, Arrhythmia, Atrial fibrillation ([facility] CAD (coronary artery disease), DM type 2 (diabetes mellitus, type 2) ([facility] and Hx of echocardiogram.   has a past surgical history that includes Coronary angioplasty (); Cardioversion (2007); and Cardioversion (). Restrictions  Restrictions/Precautions  Restrictions/Precautions: General Precautions, Fall Risk  Subjective   General  Chart Reviewed: Yes  Response To Previous Treatment: Patient with no complaints from previous session. Family / Caregiver Present: No  Referring Practitioner: Dr. Arnoldo Hoff: Pt reported 6/10 L shld pain. Stated that it started huring after using grab bar to stand up from [employer]. General Comment  Comments: Nursing Keyonyne Riser) alerted to pain level. Orientation  Orientation  Overall Orientation Status: Within Normal Limits  Cognition      Objective         Ambulation  Ambulation?: No(Pt refused any amb d/t L shld pain. )     Balance  Posture: Fair  Sitting - Static: Good  Sitting - Dynamic: Good  Exercises  Straight Leg Raise: 15x AAROM  Quad Sets: x15  Heelslides: x15  Gluteal Sets: x15  Hip Flexion: x15 (Marching in sitting)  Hip Abduction: x15 both in sitting and reclined position  Knee Long Arc Quad: x15  Knee Short Arc Quad: x15  Ankle Pumps: x15  Comments: Above exercises completed in sitting/reclined.  Cues for technique. Assessment      Patient Education: Educated pt on importance of participating in physical therapy. Pt with good understanding. REQUIRES PT FOLLOW UP: Yes  Activity Tolerance  Activity Tolerance: Patient Tolerated treatment well;Patient limited by endurance; Patient limited by pain     G-Code     OutComes Score    AM-PAC Score    Goals  Short term goals  Time Frame for Short term goals: 10 days   Short term goal 1: Pt will be educated on his POC  Short term goal 2: Pt will perform sit to stand transfers Koki   Short term goal 3: Pt will increase dynamic standing balance to Fair in order to reduce fall risk   Short term goal 4: Pt will tolerate 20 minutes on exercises in order to increase endurnace   Patient Goals   Patient goals : \"to get stronger\"    Plan    Plan  Times per week: 7x/wk   Times per day: Twice a day  Plan weeks: 2x/daily except weekends 1x/daily   Current Treatment Recommendations: Strengthening, Transfer Training, Endurance Training, Neuromuscular Re-education, Patient/Caregiver Education & Training, Equipment Evaluation, Education, & procurement, Balance Training, Gait Training, Home Exercise Program, Functional Mobility Training, Stair training, Safety Education & Training  Safety Devices  Type of devices:  All fall risk precautions in place, Call light within reach, Left in chair, Gait belt, Nurse notified(No alarm upon entry.)     Therapy Time   Individual Concurrent Group Co-treatment   Time In [id number] [facility]         Time Out [address] [place], [place]

## 2019-04-29 NOTE — PROGRESS NOTES
Physical Therapy  Facility/Department: [facility] AT THE [facility] MED SURG  Daily Treatment Note  NAME: Gabo Oates  : 1945  MRN: [id number]    Date of Service: 2019    Discharge Recommendations:  Continue to assess pending progress        Patient Diagnosis(es): The primary encounter diagnosis was Congestive heart failure, unspecified HF chronicity, unspecified heart failure type ([facility] Diagnoses of Acute on chronic respiratory acidosis, Pleural effusion, Hypoxia, and Respiratory insufficiency were also pertinent to this visit. has a past medical history of Abnormal nuclear stress test, Arrhythmia, Atrial fibrillation ([facility] CAD (coronary artery disease), DM type 2 (diabetes mellitus, type 2) ([facility] and Hx of echocardiogram.   has a past surgical history that includes Coronary angioplasty (); Cardioversion (2007); and Cardioversion (). Restrictions  Restrictions/Precautions  Restrictions/Precautions: General Precautions, Fall Risk  Subjective   General  Chart Reviewed: Yes  Response To Previous Treatment: Patient with no complaints from previous session. Family / Caregiver Present: No  Referring Practitioner: Dr. Arnoldo Hoff: Pt has no c/o pain. Pt reports that he will be going back to [address] [place] [address] tomorrow. Orientation  Orientation  Overall Orientation Status: Within Normal Limits  Cognition      Objective      Transfers  Sit to Stand: Contact guard assistance  Stand to sit: Contact guard assistance  Comment: Pt needed CGA for sit to stand from recliner with verbal cues for hand placement and safe technique. Ambulation  Ambulation?: Yes  Ambulation 1  Surface: level tile  Device: Rolling Walker  Assistance: Contact guard assistance  Quality of Gait: Reduced step length/height and stiff gait pattern. Distance: x20'   Comments: Pt requested gait to only be done in the room.        Balance  Posture: Fair  Sitting - Static: Good  Sitting - Dynamic: Good  Standing - Static: Fair  Standing - Dynamic: Fair  Exercises  Quad Sets: x15  Heelslides: x15  Gluteal Sets: x15  Hip Flexion: x15  Hip Abduction: x15 both in sitting and reclined position  Knee Long Arc Quad: x15  Ankle Pumps: x15                        Assessment      Patient Education: Educated pt on proper safety techniques with transfers with good understanding. REQUIRES PT FOLLOW UP: Yes  Activity Tolerance  Activity Tolerance: Patient Tolerated treatment well;Patient limited by endurance     G-Code     OutComes Score     AM-PAC Score    Goals  Short term goals  Time Frame for Short term goals: 10 days   Short term goal 1: Pt will be educated on his POC  Short term goal 2: Pt will perform sit to stand transfers Koki   Short term goal 3: Pt will increase dynamic standing balance to Fair in order to reduce fall risk   Short term goal 4: Pt will tolerate 20 minutes on exercises in order to increase endurnace   Patient Goals   Patient goals : \"to get stronger\"    Plan    Plan  Times per week: 7x/wk   Times per day: Twice a day  Plan weeks: 2x/daily except weekends 1x/daily   Current Treatment Recommendations: Strengthening, Transfer Training, Endurance Training, Neuromuscular Re-education, Patient/Caregiver Education & Training, Equipment Evaluation, Education, & procurement, Balance Training, Gait Training, Home Exercise Program, Functional Mobility Training, Stair training, Safety Education & Training  Safety Devices  Type of devices:  All fall risk precautions in place, Call light within reach, Left in chair, Gait belt, Nurse notified(no alarm upon entry)     Therapy Time   Individual Concurrent Group Co-treatment   Time In 0927         Time Out 0957         Minutes Miguel [facility]igaC[facility] Hospitaldayday [address] [place] [address]

## 2019-04-29 NOTE — PROGRESS NOTES
Occupational Therapy  Facility/Department: Pending sale to [facility] AT THE [facility] MED SURG  Daily Treatment Note  NAME: Kelly Cox  : 1945  MRN: [id number]    Date of Service: 2019    Discharge Recommendations:  ECF with OT, 24 hour supervision or assist, Continue to assess pending progress       Assessment      Activity Tolerance  Activity Tolerance: Patient Tolerated treatment well  Safety Devices  Safety Devices in place: Yes  Type of devices: Left in chair;Call light within reach;Nurse notified(Nursing Present)         Patient Diagnosis(es): The primary encounter diagnosis was Congestive heart failure, unspecified HF chronicity, unspecified heart failure type ([facility] Diagnoses of Acute on chronic respiratory acidosis, Pleural effusion, Hypoxia, and Respiratory insufficiency were also pertinent to this visit. has a past medical history of Abnormal nuclear stress test, Arrhythmia, Atrial fibrillation ([facility] [address] CAD (coronary artery disease), DM type 2 (diabetes mellitus, type 2) ([facility] [address] and Hx of echocardiogram.   has a past surgical history that includes Coronary angioplasty (); Cardioversion (2007); and Cardioversion (). Restrictions  Restrictions/Precautions  Restrictions/Precautions: General Precautions, Fall Risk  Subjective   General  Chart Reviewed: Yes  Patient assessed for rehabilitation services?: Yes  Response to previous treatment: Patient with no complaints from previous session  Family / Caregiver Present: No  Referring Practitioner: Guillermina Hall PA-C  Diagnosis: CHF exacerbation  Subjective  Subjective: Pt with c/o shoulder pain at end of therapy session. Pt reports feeling that he pulled too hard on the grab bar when standing up from toilet. Nursing present for complaint. General Comment  Comments: Pt sitting EOB on therapist arrival, agreeable to OT tx. Orientation     Objective    ADL  Toileting: Maximum assistance  Additional Comments: Pt required modAx2 to stand from toilet.  MaxA for toilet hygiene for wiping after BM. Educated on using grab bar to stand up, rather than pulling on FWW. Completed hand hygiene at sink CGA. Balance  Sitting Balance: Independent  Standing Balance: Contact guard assistance  Functional Mobility  Functional - Mobility Device: Rolling Walker  Activity: To/from bathroom  Assist Level: Contact guard assistance  Functional Mobility Comments: Pt ambulated to/from bathroom CGA using FWW. Toilet Transfers  Toilet - Technique: Ambulating  Equipment Used: Standard toilet  Toilet Transfer: Moderate assistance;2 Person assistance  Toilet Transfers Comments: modAx2 to stand from toilet surface. Educated on use of grab bars when standing up. Transfers  Sit to stand: 2 Person assistance; Moderate assistance(from toilet (lower surface))  Stand to sit: Contact guard assistance  Transfer Comments: minAx2 to stand from bed with bed height slightly raised. ModAx2 for sit to stand from lower surface of toilet. Continues to required VC for hand placement both for sitting and standing. Type of ROM/Therapeutic Exercise  Type of ROM/Therapeutic Exercise: Free weights  Comment: Pt engaged in BUE exercises to increase UB strength for increased independence with ADL and functional transfers. Pt completed 2 exercises x 2sets of 10 reps each using 2# dumbbell. Then completed 2 exercises x 2 sets of 10 reps each using 1# dumbbell. Pt required rest breaks between, but tolerated well. Plan   Plan  Times per week: 7  Times per day: Daily  Current Treatment Recommendations: Strengthening, Endurance Training, Patient/Caregiver Education & Training, Self-Care / ADL, Functional Mobility Training, Other (comment), Safety Education & Training  Plan Comment: Energy Conservation, ther-act, ther-ex, and self-care tasks.      Goals  Short term goals  Time Frame for Short term goals: 10 visits  Short term goal 1: Patient to perform functional transfers with mod A of 2 with use of gaitbelt and LRAD. Short term goal 2: Patient to tolerate 10 minutes ther-ex/ther-act to improve strength/endurance for ADLs with no greater than 4 restbreaks. Short term goal 3: Patient to complete ADL grooming tasks wtih min A. Short term goal 4: Patient to state 2 energy conservation techniques following education in order to improve daily endurance.         Therapy Time   Individual Concurrent Group Co-treatment   Time In 2561         Time Out 1450         Minutes [address], [address] [facility], OTR/L

## 2019-04-29 NOTE — PROGRESS NOTES
and well-nourished  NECK: normal, supple  PULM: distant bilaterally, le diminished left base - no wheezes, faint bilateral rales, no rhonchi, no respiratory distress, no use of accessory muscles, on NC  COR:   irregularly irregular and systolic murmur  ABD:    Obese, soft, non-tender, non-distended, normal bowel sounds, no masses or organomegaly  EXT:    edema: 1+ affecting bilateral foot, bilateral ankle, bilateral calf, TEDs  NEURO: follows commands, MULLIGAN, no deficits  SKIN:   no rashes or significant lesions    -----------------------------------------------------------------  Diagnostic Data:  Lab Results   Component Value Date    WBC 11.7 (H) 04/29/2019    HGB 9.1 (L) 04/29/2019    HCT 29.3 (L) 04/29/2019     04/29/2019    CHOL 114 03/18/2019    TRIG 95 03/27/2019    HDL 47 03/18/2019    ALT 21 04/29/2019    AST 17 04/29/2019     04/29/2019    K 4.1 04/29/2019    CL 90 (L) 04/29/2019    CREATININE 1.26 (H) 04/29/2019    BUN 27 (H) 04/29/2019    CO2 40 (H) 04/29/2019    INR 1.3 (H) 04/23/2019    LABA1C 8.6 (H) 03/18/2019       ASSESSMENT:    Principal Problem:    Acute on chronic systolic heart failure (HCC)  Active Problems:    ASHD (arteriosclerotic heart disease)    DM type 2 (diabetes mellitus, type 2) ([facility])    IVIS (obstructive sleep apnea)    HF (heart failure) ([facility])    History of non-ST elevation myocardial infarction (NSTEMI)    Elevated troponin    Pleural effusion, left    Hypercapnic respiratory failure, chronic (HCC)    Heart failure ([facility])    Acute on chronic respiratory acidosis    Essential hypertension    Dyslipidemia    Persistent atrial fibrillation ([facility] [address]  Resolved Problems:    * No resolved hospital problems.  *      Patient Active Problem List    Diagnosis Date Noted    HF (heart failure) ([facility] [address] 04/23/2019    History of non-ST elevation myocardial infarction (NSTEMI) 04/23/2019    Elevated troponin 04/23/2019    Acute on chronic systolic heart failure ([facility] [address] 04/23/2019    Pleural

## 2019-04-29 NOTE — SEDATION DOCUMENTATION
Skin cleansed and bandaid applied to site. Tolerated procedure well. Have Your Spot(S) Been Treated In The Past?: has not been treated Hpi Title: Evaluation of Skin Lesions

## 2019-04-29 NOTE — PROGRESS NOTES
PALLIATIVE CARE  Follow up visit with Bertha Ventura and his wife. Bertha Ventura is sitting up in the chair during my visit. He feels that he is doing better. Hoping to be discharged soon back to [address] [place] [address]. Has been participating with therapy. Appetite has been good. Denies any new issues with bowel/bladder from my last visit. Bertha Ventura tells me that he is going to have another thoracentesis today. \"Not looking forward to it\". Reassurance and support given. Continuing to use BiPap at night and PRN, on fluid restrictions. Breathing seems less labored than my previous visit. Pt and wife have no questions or concerns at this time. Will follow and support. Ofelia Ward RN, Alannah Arroyo and Meme Lucas Nurse Coordinator  4/29/2019 11:45 AM

## 2019-04-29 NOTE — SEDATION DOCUMENTATION
Needle placed to pleural space and catheter advanced, fluid aspirated. Catheter connected to drainage canister.

## 2019-04-30 VITALS
OXYGEN SATURATION: 97 % | RESPIRATION RATE: 20 BRPM | DIASTOLIC BLOOD PRESSURE: 48 MMHG | WEIGHT: 282.8 LBS | SYSTOLIC BLOOD PRESSURE: 102 MMHG | HEART RATE: 79 BPM | HEIGHT: 68 IN | BODY MASS INDEX: 42.86 KG/M2 | TEMPERATURE: 97.7 F

## 2019-04-30 LAB
ALBUMIN SERPL-MCNC: 2.7 G/DL (ref 3.5–5.2)
ALBUMIN/GLOBULIN RATIO: 1 (ref 1–2.5)
ALP BLD-CCNC: 130 U/L (ref 40–129)
ALT SERPL-CCNC: 19 U/L (ref 5–41)
ANION GAP SERPL CALCULATED.3IONS-SCNC: 7 MMOL/L (ref 9–17)
AST SERPL-CCNC: 17 U/L
BILIRUB SERPL-MCNC: 0.55 MG/DL (ref 0.3–1.2)
BUN BLDV-MCNC: 29 MG/DL (ref 8–23)
BUN/CREAT BLD: 24 (ref 9–20)
CALCIUM SERPL-MCNC: 8.4 MG/DL (ref 8.6–10.4)
CHLORIDE BLD-SCNC: 91 MMOL/L (ref 98–107)
CO2: 38 MMOL/L (ref 20–31)
CREAT SERPL-MCNC: 1.23 MG/DL (ref 0.7–1.2)
CULTURE: ABNORMAL
DIRECT EXAM: ABNORMAL
DIRECT EXAM: ABNORMAL
GFR AFRICAN AMERICAN: >60 ML/MIN
GFR NON-AFRICAN AMERICAN: 58 ML/MIN
GFR SERPL CREATININE-BSD FRML MDRD: ABNORMAL ML/MIN/{1.73_M2}
GFR SERPL CREATININE-BSD FRML MDRD: ABNORMAL ML/MIN/{1.73_M2}
GLUCOSE BLD-MCNC: 150 MG/DL (ref 74–100)
GLUCOSE BLD-MCNC: 168 MG/DL (ref 70–99)
GLUCOSE BLD-MCNC: 206 MG/DL (ref 74–100)
HCT VFR BLD CALC: 28.6 % (ref 40.7–50.3)
HEMOGLOBIN: 8.8 G/DL (ref 13–17)
Lab: ABNORMAL
MCH RBC QN AUTO: 32 PG (ref 25.2–33.5)
MCHC RBC AUTO-ENTMCNC: 30.8 G/DL (ref 28.4–34.8)
MCV RBC AUTO: 104 FL (ref 82.6–102.9)
NRBC AUTOMATED: 0 PER 100 WBC
PDW BLD-RTO: 16.1 % (ref 11.8–14.4)
PLATELET # BLD: 250 K/UL (ref 138–453)
PMV BLD AUTO: 9.1 FL (ref 8.1–13.5)
POTASSIUM SERPL-SCNC: 3.8 MMOL/L (ref 3.7–5.3)
RBC # BLD: 2.75 M/UL (ref 4.21–5.77)
SODIUM BLD-SCNC: 136 MMOL/L (ref 135–144)
SPECIMEN DESCRIPTION: ABNORMAL
TOTAL PROTEIN: 5.4 G/DL (ref 6.4–8.3)
WBC # BLD: 11.7 K/UL (ref 3.5–11.3)

## 2019-04-30 PROCEDURE — 97110 THERAPEUTIC EXERCISES: CPT

## 2019-04-30 PROCEDURE — 6370000000 HC RX 637 (ALT 250 FOR IP): Performed by: INTERNAL MEDICINE

## 2019-04-30 PROCEDURE — 82947 ASSAY GLUCOSE BLOOD QUANT: CPT

## 2019-04-30 PROCEDURE — 2580000003 HC RX 258: Performed by: INTERNAL MEDICINE

## 2019-04-30 PROCEDURE — 97116 GAIT TRAINING THERAPY: CPT

## 2019-04-30 PROCEDURE — 97535 SELF CARE MNGMENT TRAINING: CPT

## 2019-04-30 PROCEDURE — 36415 COLL VENOUS BLD VENIPUNCTURE: CPT

## 2019-04-30 PROCEDURE — 80053 COMPREHEN METABOLIC PANEL: CPT

## 2019-04-30 PROCEDURE — 85027 COMPLETE CBC AUTOMATED: CPT

## 2019-04-30 PROCEDURE — 6360000002 HC RX W HCPCS: Performed by: INTERNAL MEDICINE

## 2019-04-30 RX ORDER — AMIODARONE HYDROCHLORIDE 200 MG/1
200 TABLET ORAL DAILY
Qty: 30 TABLET | Refills: 3 | Status: ON HOLD | DISCHARGE
Start: 2019-05-01 | End: 2019-05-08 | Stop reason: SDUPTHER

## 2019-04-30 RX ORDER — POLYETHYLENE GLYCOL 3350 17 G/17G
17 POWDER, FOR SOLUTION ORAL DAILY
Qty: 527 G | Refills: 1 | Status: ON HOLD | DISCHARGE
Start: 2019-05-01 | End: 2019-05-08 | Stop reason: SDUPTHER

## 2019-04-30 RX ORDER — FUROSEMIDE 40 MG/1
60 TABLET ORAL 2 TIMES DAILY
Status: ON HOLD | DISCHARGE
Start: 2019-04-30 | End: 2019-05-08 | Stop reason: SDUPTHER

## 2019-04-30 RX ADMIN — ACETAMINOPHEN 650 MG: 325 TABLET, FILM COATED ORAL at 04:30

## 2019-04-30 RX ADMIN — ACETAMINOPHEN 650 MG: 325 TABLET, FILM COATED ORAL at 11:10

## 2019-04-30 RX ADMIN — AMIODARONE HYDROCHLORIDE 200 MG: 200 TABLET ORAL at 07:53

## 2019-04-30 RX ADMIN — Medication 10 ML: at 07:54

## 2019-04-30 RX ADMIN — DABIGATRAN ETEXILATE MESYLATE 150 MG: 75 CAPSULE ORAL at 07:52

## 2019-04-30 RX ADMIN — INSULIN LISPRO 4 UNITS: 100 INJECTION, SOLUTION INTRAVENOUS; SUBCUTANEOUS at 11:10

## 2019-04-30 RX ADMIN — FUROSEMIDE 60 MG: 10 INJECTION, SOLUTION INTRAMUSCULAR; INTRAVENOUS at 07:54

## 2019-04-30 RX ADMIN — POLYETHYLENE GLYCOL (3350) 17 G: 17 POWDER, FOR SOLUTION ORAL at 07:54

## 2019-04-30 ASSESSMENT — PAIN DESCRIPTION - DESCRIPTORS: DESCRIPTORS: ACHING

## 2019-04-30 ASSESSMENT — PAIN DESCRIPTION - PAIN TYPE: TYPE: ACUTE PAIN

## 2019-04-30 ASSESSMENT — PAIN DESCRIPTION - LOCATION: LOCATION: SHOULDER

## 2019-04-30 ASSESSMENT — PAIN SCALES - GENERAL
PAINLEVEL_OUTOF10: 7
PAINLEVEL_OUTOF10: 0
PAINLEVEL_OUTOF10: 5

## 2019-04-30 ASSESSMENT — PAIN DESCRIPTION - ORIENTATION: ORIENTATION: LEFT

## 2019-04-30 NOTE — DISCHARGE SUMMARY
supple  PULM: distant bilaterally, no wheezes, faint bilateral rales, no rhonchi, no respiratory distress, no use of accessory muscles, on NC  COR:   irregularly irregular and systolic murmur  ABD:    Obese, soft, non-tender, non-distended, normal bowel sounds, no masses or organomegaly  EXT:    edema: 1+ affecting bilateral foot, bilateral ankle, bilateral calf, TEDs  NEURO: follows commands, MULLIGAN, no deficits  SKIN:   no rashes or significant lesions    Significant Diagnostic Studies:   Lab Results   Component Value Date    WBC 11.7 (H) 04/30/2019    HGB 8.8 (L) 04/30/2019     04/30/2019       Lab Results   Component Value Date    BUN 29 (H) 04/30/2019    CREATININE 1.23 (H) 04/30/2019     04/30/2019    K 3.8 04/30/2019    CALCIUM 8.4 (L) 04/30/2019    CL 91 (L) 04/30/2019    CO2 38 (H) 04/30/2019    LABGLOM 58 (L) 04/30/2019       Lab Results   Component Value Date    WBCUA 5 TO 10 03/28/2019    RBCUA 5 TO 10 03/28/2019    EPITHUA 0 TO 2 03/28/2019    LEUKOCYTESUR TRACE (A) 03/28/2019    SPECGRAV 1.024 03/28/2019    GLUCOSEU TRACE (A) 03/28/2019    KETUA TRACE (A) 03/28/2019    PROTEINU 1+ (A) 03/28/2019    HGBUR SMALL (A) 03/28/2019    CASTUA  03/28/2019     2 TO 5 HYALINE Reference range defined for non-centrifuged specimen. CRYSTUA NOT REPORTED 03/28/2019    BACTERIA NOT REPORTED 03/28/2019    YEAST NOT REPORTED 03/28/2019       Xr Chest Portable    Result Date: 4/24/2019  PROCEDURE: ULTRASOUNDGUIDED LEFT THORACENTESIS UPRIGHT AP CHEST RADIOGRAPH 4/24/2019 HISTORY: ORDERING SYSTEM PROVIDED HISTORY: left pleural effusion TECHNOLOGIST PROVIDED HISTORY: left pleural effusion TECHNIQUE: Informed consent was obtained after a detailed explanation of the procedure including risks, benefits, and alternatives. Universal protocol was performed. The left chest was prepped and draped in sterile fashion and local anesthesia was achieved with lidocaine.   A 5 Telugu needle sheath was advanced under ultrasound guidance into pleural effusion and thoracentesis was performed. The patient tolerated the procedure well. FINDINGS: LEFT THORACENTESIS:  A total of 970 mL of bloody fluid was removed. Postprocedural ultrasound reveals small volume residual fluid inferiorly in hemithorax. Sample was sent for laboratory evaluation, as requested. POSTPROCEDURAL CHEST RADIOGRAPH: No pneumothorax identified. Interval reduction of left pleural fluid. Pleural fluid remains inferiorly as well as associated atelectasis or consolidation. Cardiac silhouette enlargement again noted. No new findings identified in the right chest.     1.  Successful ultrasound guided left thoracentesis with small volume remaining dependently. 2.  Postprocedural chest radiograph demonstrates reduction of left pleural fluid with residual effusion and left basilar atelectasis/consolidation. No pneumothorax or new findings otherwise appreciated. Xr Chest Portable    Result Date: 4/23/2019  EXAMINATION: SINGLE XRAY VIEW OF THE CHEST 4/23/2019 9:34 am COMPARISON: 04/05/2019 HISTORY: ORDERING SYSTEM PROVIDED HISTORY: sob TECHNOLOGIST PROVIDED HISTORY: sob Follow-up exam. FINDINGS: The cardiac silhouette is enlarged. Mediastinal contours are normal. Endotracheal tube, orogastric tube, and right internal jugular vein catheter have been removed. There is pulmonary edema. There is a moderate left pleural effusion which has increased in size, with associated left basilar atelectasis. No focal right lung infiltrates. The visualized osseous structures are unremarkable. 1. Cardiomegaly with pulmonary edema, increased. 2. There is a moderate left pleural effusion, increased in size, with associated atelectasis in the left lung base.      Us Thoracentesis    Result Date: 4/24/2019  PROCEDURE: ULTRASOUNDGUIDED LEFT THORACENTESIS UPRIGHT AP CHEST RADIOGRAPH 4/24/2019 HISTORY: ORDERING SYSTEM PROVIDED HISTORY: left pleural effusion TECHNOLOGIST PROVIDED HISTORY: left pleural effusion TECHNIQUE: Informed consent was obtained after a detailed explanation of the procedure including risks, benefits, and alternatives. Universal protocol was performed. The left chest was prepped and draped in sterile fashion and local anesthesia was achieved with lidocaine. A 5 Malay needle sheath was advanced under ultrasound guidance into pleural effusion and thoracentesis was performed. The patient tolerated the procedure well. FINDINGS: LEFT THORACENTESIS:  A total of 970 mL of bloody fluid was removed. Postprocedural ultrasound reveals small volume residual fluid inferiorly in hemithorax. Sample was sent for laboratory evaluation, as requested. POSTPROCEDURAL CHEST RADIOGRAPH: No pneumothorax identified. Interval reduction of left pleural fluid. Pleural fluid remains inferiorly as well as associated atelectasis or consolidation. Cardiac silhouette enlargement again noted. No new findings identified in the right chest.     1.  Successful ultrasound guided left thoracentesis with small volume remaining dependently. 2.  Postprocedural chest radiograph demonstrates reduction of left pleural fluid with residual effusion and left basilar atelectasis/consolidation. No pneumothorax or new findings otherwise appreciated. Discharge Diagnoses:    Principal Problem:    Acute on chronic systolic heart failure ([facility])  Active Problems:    ASHD (arteriosclerotic heart disease)    DM type 2 (diabetes mellitus, type 2) ([facility])    IVIS (obstructive sleep apnea)    HF (heart failure) ([facility])    History of non-ST elevation myocardial infarction (NSTEMI)    Elevated troponin    Pleural effusion, left    Hypercapnic respiratory failure, chronic ([facility])    Heart failure ([facility])    Acute on chronic respiratory acidosis    Essential hypertension    Dyslipidemia    Persistent atrial fibrillation ([facility] [address]  Resolved Problems:    * No resolved hospital problems.  *      Active Hospital Problems    Diagnosis Date Noted  HF (heart failure) ([facility] [I50.9] 04/23/2019    History of non-ST elevation myocardial infarction (NSTEMI) [I25.2] 04/23/2019    Elevated troponin [R74.8] 04/23/2019    Acute on chronic systolic heart failure (HCC) [I50.23] 04/23/2019    Pleural effusion, left [J90] 04/23/2019    Hypercapnic respiratory failure, chronic (HCC) [J96.12] 04/23/2019    Heart failure ([facility] [I50.9] 04/23/2019    Acute on chronic respiratory acidosis [E87.2]     Essential hypertension [I10]     Dyslipidemia [E78.5]     Persistent atrial fibrillation (HCC) [I48.1]     IVIS (obstructive sleep apnea) [G47.33]     DM type 2 (diabetes mellitus, type 2) ([facility] [E11.9]     ASHD (arteriosclerotic heart disease) [I25.10]        Discharge Medications:       Marianna Splinter   Home Medication Instructions [id number]    Printed on:04/30/19 0931   Medication Information                      acetaminophen (TYLENOL) 325 MG tablet  Take 650 mg by mouth every 6 hours as needed for Pain             albuterol (PROVENTIL) (2.5 MG/3ML) 0.083% nebulizer solution  Take 3 mLs by nebulization every 6 hours as needed for Wheezing             amiodarone (CORDARONE) 200 MG tablet  Take 1 tablet by mouth daily             atorvastatin (LIPITOR) 80 MG tablet  Take 80 mg by mouth daily             Cholecalciferol (VITAMIN D3) 2000 units CAPS  Take 2,000 Units by mouth daily             dabigatran (PRADAXA) 150 MG capsule  Take 150 mg by mouth 2 times daily             docusate (COLACE) 50 MG/5ML liquid  Take 10 mLs by mouth daily             docusate sodium (COLACE) 100 MG capsule  Take 100 mg by mouth daily             furosemide (LASIX) 40 MG tablet  Take 1.5 tablets by mouth 2 times daily             insulin glargine (LANTUS) 100 UNIT/ML injection vial  Inject 10 Units into the skin nightly             insulin lispro (HUMALOG) 100 UNIT/ML injection vial  Inject 0-12 Units into the skin 3 times daily (with meals) <150 mg /dl 0 Units. 151-200 2

## 2019-04-30 NOTE — PROGRESS NOTES
Nutrition Assessment    Type and Reason for Visit: Reassess    Nutrition Recommendations:  Encourage dietary compliance    Nutrition Assessment: Improving nutrition intakes r/t respiratory and cardiac dysfunction, AEB PO >75% meals. Discussion with spouse and patient regarding sodium in diet, with accompanying literature provided as well. Hopes to get back home after another week at [facility] for rehab. Nutrition Risk Level:  Moderate    Nutrient Needs:  · Estimated Daily Total Kcal: 5912-1935  · Estimated Daily Protein (g): 70-84  · Estimated Daily Total Fluid (ml/day): 0155-5867    Objective Information:  · Nutrition-Focused Physical Findings: obese and SOB  · Wound Type: None  · Current Nutrition Therapies:  · Oral Diet Orders: Carb Control 4 Carbs/Meal, No Added Salt (3-4gm), Fluid Restriction   · Oral Diet intake: %  · Oral Nutrition Supplement (ONS) Orders: None  · ONS intake: Unable to assess  · Anthropometric Measures:  · Ht: 5' 8\" (172.7 cm)   · Current Body Wt: 282 lb 12.8 oz (128.3 kg)  · Admission Body Wt: 289 lb 14.5 oz (131.5 kg)  · Usual Body Wt: 280 lb (127 kg)  · % Weight Change:  ,  1% gains over usual values and 2.5% losses from admission (desired)  · Ideal Body Wt: 154 lb (69.9 kg), % Ideal Body 186%  · BMI Classification: BMI > or equal to 40.0 Obese Class III    Lab Results   Component Value Date     04/30/2019    K 3.8 04/30/2019    CL 91 (L) 04/30/2019    CO2 38 (H) 04/30/2019    BUN 29 (H) 04/30/2019    CREATININE 1.23 (H) 04/30/2019    GLUCOSE 168 (H) 04/30/2019    CALCIUM 8.4 (L) 04/30/2019    PROT 5.4 (L) 04/30/2019    LABALBU 2.7 (L) 04/30/2019    BILITOT 0.55 04/30/2019    ALKPHOS 130 (H) 04/30/2019    AST 17 04/30/2019    ALT 19 04/30/2019    LABGLOM 58 (L) 04/30/2019    GFRAA >60 04/30/2019    GLOB NOT REPORTED 04/23/2019     Recent Labs     04/28/19  0703 04/28/19  1113 04/28/19 2007 04/29/19  6161 04/29/19  1153 04/29/19  1616 04/29/19  1945/19  4198 THERESA 132* 222* 201* 126* 186* 196* 240* 150*     Nutrition Interventions:   Continue current diet  Continued Inpatient Monitoring, Education Completed, Coordination of Care    Nutrition Evaluation:   · Evaluation: Progressing toward goals   · Goals: Maintain % consumption of meals    · Monitoring: Meal Intake, Diet Tolerance, Weight, Pertinent Labs, Patient/Family Education    Electronically signed by Александр Guardado RD, LD on 4/30/19 at 9:42 AM    Contact Number: [phone]

## 2019-04-30 NOTE — PROGRESS NOTES
Physical Therapy  Facility/Department: [facility] AT THE [facility] MED SURG  Daily Treatment Note  NAME: Katelynn Ramirez  : 1945  MRN: [id number]    Date of Service: 2019    Discharge Recommendations:  Continue to assess pending progress        Patient Diagnosis(es): The primary encounter diagnosis was Congestive heart failure, unspecified HF chronicity, unspecified heart failure type ([facility] [address] Diagnoses of Acute on chronic respiratory acidosis, Pleural effusion, Hypoxia, and Respiratory insufficiency were also pertinent to this visit. has a past medical history of Abnormal nuclear stress test, Arrhythmia, Atrial fibrillation ([facility] [address] CAD (coronary artery disease), DM type 2 (diabetes mellitus, type 2) ([facility] [address] and Hx of echocardiogram.   has a past surgical history that includes Coronary angioplasty (); Cardioversion (2007); and Cardioversion (). Restrictions  Restrictions/Precautions  Restrictions/Precautions: General Precautions, Fall Risk  Subjective   General  Chart Reviewed: Yes  Response To Previous Treatment: Patient with no complaints from previous session. Referring Practitioner: Dr. Faheem Sanchez: Pt reports L shoulder pain but did not rate. Pt states it kept him up all night and he did not sleep well. Pt states he gets short of breath quickly but is feeling better. Orientation  Orientation  Overall Orientation Status: Within Normal Limits  Cognition      Objective   Bed mobility  Sit to Supine: Minimal assistance     Ambulation  Ambulation?: Yes  Ambulation 1  Surface: level tile  Device: Rolling Walker  Assistance: Contact guard assistance  Quality of Gait: Slow ariane with WBOS and uneven step height/length. Distance: 20 ft x2  Comments: Pt requested amb in room only d/t SOB. SPO2 94-96% post amb.       Balance  Posture: Fair  Sitting - Static: Good  Sitting - Dynamic: Good  Standing - Static: Fair  Standing - Dynamic: Fair  Exercises  Quad Sets: x15  Heelslides: x15  Gluteal Sets: x15  Hip Flexion: x15  Hip Abduction: x15  Knee Long Arc Quad: x15  Knee Short Arc Quad: x15  Ankle Pumps: x15  Comments: BLE ther ex completed in seated at EOB and supine. Assessment   Treatment Diagnosis: general weakness   Prognosis: Fair  REQUIRES PT FOLLOW UP: Yes  Activity Tolerance  Activity Tolerance: Patient Tolerated treatment well;Patient limited by endurance     G-Code     OutComes Score                                                  AM-PAC Score             Goals  Short term goals  Time Frame for Short term goals: 10 days   Short term goal 1: Pt will be educated on his POC  Short term goal 2: Pt will perform sit to stand transfers Koki   Short term goal 3: Pt will increase dynamic standing balance to Fair in order to reduce fall risk   Short term goal 4: Pt will tolerate 20 minutes on exercises in order to increase endurnace   Patient Goals   Patient goals : \"to get stronger\"    Plan    Plan  Times per week: 7x/wk   Times per day: Twice a day  Plan weeks: 2x/daily except weekends 1x/daily   Current Treatment Recommendations: Strengthening, Transfer Training, Endurance Training, Neuromuscular Re-education, Patient/Caregiver Education & Training, Equipment Evaluation, Education, & procurement, Balance Training, Gait Training, Home Exercise Program, Functional Mobility Training, Stair training, Safety Education & Training  Safety Devices  Type of devices:  All fall risk precautions in place, Call light within reach, Nurse notified, Gait belt, Left in bed(no alarm upon entry )     Therapy Time   Individual Concurrent Group Co-treatment   Time In 0855         Time Out 0918         Minutes [address]

## 2019-04-30 NOTE — PROGRESS NOTES
Patient is discharge to [address] [place] [address] and will go by [facility]. They will transport around 1:30 PM. Discharge paper work done and fax to SNF.   GUILLERMINA Cook

## 2019-04-30 NOTE — PROGRESS NOTES
tingling. Exam:  GEN:  alert and oriented to person, place and time, well-developed and well-nourished  NECK: normal, supple  PULM: distant bilaterally, no wheezes, faint bilateral rales, no rhonchi, no respiratory distress, no use of accessory muscles, on NC  COR:   irregularly irregular and systolic murmur  ABD:    Obese, soft, non-tender, non-distended, normal bowel sounds, no masses or organomegaly  EXT:    edema: 1+ affecting bilateral foot, bilateral ankle, bilateral calf, TEDs  NEURO: follows commands, MULLIGAN, no deficits  SKIN:   no rashes or significant lesions    -----------------------------------------------------------------  Diagnostic Data:  Lab Results   Component Value Date    WBC 11.7 (H) 04/30/2019    HGB 8.8 (L) 04/30/2019    HCT 28.6 (L) 04/30/2019     04/30/2019    CHOL 114 03/18/2019    TRIG 95 03/27/2019    HDL 47 03/18/2019    ALT 19 04/30/2019    AST 17 04/30/2019     04/30/2019    K 3.8 04/30/2019    CL 91 (L) 04/30/2019    CREATININE 1.23 (H) 04/30/2019    BUN 29 (H) 04/30/2019    CO2 38 (H) 04/30/2019    INR 1.3 (H) 04/23/2019    LABA1C 8.6 (H) 03/18/2019       ASSESSMENT:    Principal Problem:    Acute on chronic systolic heart failure (HCC)  Active Problems:    ASHD (arteriosclerotic heart disease)    DM type 2 (diabetes mellitus, type 2) (HCC)    IVIS (obstructive sleep apnea)    HF (heart failure) ([facility])    History of non-ST elevation myocardial infarction (NSTEMI)    Elevated troponin    Pleural effusion, left    Hypercapnic respiratory failure, chronic (HCC)    Heart failure (HCC)    Acute on chronic respiratory acidosis    Essential hypertension    Dyslipidemia    Persistent atrial fibrillation ([facility]  Resolved Problems:    * No resolved hospital problems.  *      Patient Active Problem List    Diagnosis Date Noted    HF (heart failure) ([address] 04/23/2019    History of non-ST elevation myocardial infarction (NSTEMI) 04/23/2019    Elevated troponin 04/23/2019    Acute on chronic systolic heart failure (HCC) 04/23/2019    Pleural effusion, left 04/23/2019    Hypercapnic respiratory failure, chronic (HCC) 04/23/2019    Heart failure ([facility] 04/23/2019    Acute on chronic respiratory acidosis     Essential hypertension     Dyslipidemia     Persistent atrial fibrillation (HCC)     ERNESTO (acute kidney injury) (HCC)     Ischemic hepatitis     IVIS (obstructive sleep apnea)     Leukocytosis     Hemorrhagic shock (HCC)     Retroperitoneal hematoma     Lactic acidosis     Hyperglycemia     Acute blood loss anemia     Community acquired pneumonia of left lower lobe of lung ([facility]     Influenza A with respiratory manifestations 03/18/2019    Acute respiratory failure with hypoxia ([address] 03/18/2019    Acute hypercapnic respiratory failure ([address] 03/18/2019    NSTEMI (non-ST elevated myocardial infarction) ([address]     Type 2 diabetes mellitus without complication, without long-term current use of insulin ([facility] 09/06/2016    Adult BMI > 30 05/06/2016    ASHD (arteriosclerotic heart disease)     DM type 2 (diabetes mellitus, type 2) ([facility])     Atrial fibrillation with rapid ventricular response ([address] 01/01/2007       PLAN:    Acute on chronic respiratory acidosis - resolved/Hypercapnic respiratory failure, chronic    O2   BIPAP AT NIGHT AND NAPS   Continue diuresis 60MG LASIX BID    Acute on chronic systolic heart failure   Appreciate cardiology   Diuresis   Fluid restrict 2L   BMP twice a week on discharge    History of non-ST elevation myocardial infarction/ASHD    DM type 2    SSI   Home meds    IVIS   BIPAP night and prn    Elevated troponin   Likely due to demand ischemia/CHF    Pleural effusion, left   Thoracentesis X 2 appreciated   CXR 1 week    Essential hypertension    Dyslipidemia    Persistent atrial fibrillation    pradaxa    Resolved Problems:    * No resolved hospital problems.  *    · High risk medication: none    Discharge to [facility]    Jos Moeller PA-C  4/30/2019, 9:14 AM

## 2019-04-30 NOTE — PROGRESS NOTES
[facility]  Inpatient/Observation/Outpatient Rehabilitation    Date: 2019  Patient Name: Katelynn Ramirez       [x] Inpatient Acute/Observation       []  Outpatient  : 1945       Pt not seen this PM d/t discharge summaries being submitted. Will attempt evaluation at our earliest opportunity.        Karsten Hollis   Date: 2019

## 2019-04-30 NOTE — PROGRESS NOTES
Report called to [facility] at [facility].

## 2019-04-30 NOTE — PLAN OF CARE
Problem: Falls - Risk of:  Goal: Will remain free from falls  Description  Will remain free from falls  Note:   Bed alarm on. Bed in low position and wheels locked. Call light in reach. Falling star posted on door. Yellow bracelet on. Non skid socks on. Side rails up. Will continue to assess. Problem: Risk for Impaired Skin Integrity  Goal: Tissue integrity - skin and mucous membranes  Description  Structural intactness and normal physiological function of skin and  mucous membranes. Note:   Skin assessment performed at regular intervals. No redness or open areas noted. Patient reminded to turn and relieve areas known to breakdown. Continue to monitor. Problem: Fluid Volume:  Goal: Hemodynamic stability will improve  Description  Hemodynamic stability will improve  Note:   Vitals are stable. Patient continues on 1500 ML fluid restriction. Will continue to monitor. Problem: Pain:  Goal: Pain level will decrease  Description  Pain level will decrease  Note:   Assess pain every 4 hours and prn using a 0-10 scale. Teach patient adverse complication of uncontrolled pain. Plan patients day so that aggravating activities coincide with peak of analgesic. Patients should be medicated before procedures and activities that incite pain to prevent spikes in pain. Provide patient with distractions of choice such as TV, music or reading. Discuss with patient what a tolerable pain rating would be and work towards that and not just eliminating all pain.

## 2019-04-30 NOTE — PROGRESS NOTES
Occupational Therapy  Facility/Department: [facility] AT THE [facility] MED SURG  Daily Treatment Note  NAME: Valente Garsia  : 1945  MRN: [id number]    Date of Service: 2019    Discharge Recommendations:  ECF with OT, 24 hour supervision or assist, Continue to assess pending progress       Assessment      Safety Devices  Safety Devices in place: Yes  Type of devices: Left in chair;Call light within reach;Nurse notified         Patient Diagnosis(es): The primary encounter diagnosis was Congestive heart failure, unspecified HF chronicity, unspecified heart failure type ([address] Diagnoses of Acute on chronic respiratory acidosis, Pleural effusion, Hypoxia, and Respiratory insufficiency were also pertinent to this visit. has a past medical history of Abnormal nuclear stress test, Arrhythmia, Atrial fibrillation ([facility] CAD (coronary artery disease), DM type 2 (diabetes mellitus, type 2) ([facility] and Hx of echocardiogram.   has a past surgical history that includes Coronary angioplasty (); Cardioversion (2007); and Cardioversion (). Restrictions  Restrictions/Precautions  Restrictions/Precautions: General Precautions, Fall Risk  Subjective   General  Chart Reviewed: Yes  Patient assessed for rehabilitation services?: Yes  Response to previous treatment: Patient with no complaints from previous session  Family / Caregiver Present: No  Referring Practitioner: Lenin Mcclendon PA-C  Diagnosis: CHF exacerbation  Subjective  Subjective: Pt states he is \"uncomfortable\" in L shoulder and it kept him awake most of the night however \"wouldn't call it pain. \"  General Comment  Comments: Pt seated in chair upon arrival and agreeable to OT. Orientation     Objective    ADL  UE Bathing: Stand by assistance  LE Bathing: Stand by assistance(while seated)  Additional Comments: Pt completed sponge bath while seated in bedside chair with SBA. Educated on discharge folder. Type of ROM/Therapeutic Exercise  Type of ROM/Therapeutic Exercise: Free weights  Comment: Pt completed R UE ther ex with 2# dumbell, 20 reps x 1 set x all planes with good tolerance. Pt did not complete ther ex with L UE due to being \"uncomfortable. \"  Exercises  Other: Pt educated on discharge folder. Plan   Plan  Times per week: 7  Times per day: Daily  Current Treatment Recommendations: Strengthening, Endurance Training, Patient/Caregiver Education & Training, Self-Care / ADL, Functional Mobility Training, Other (comment), Safety Education & Training  Plan Comment: Energy Conservation, ther-act, ther-ex, and self-care tasks. G-Code     OutComes Score                                                  AM-PAC Score             Goals  Short term goals  Time Frame for Short term goals: 10 visits  Short term goal 1: Patient to perform functional transfers with mod A of 2 with use of gaitbelt and LRAD. Short term goal 2: Patient to tolerate 10 minutes ther-ex/ther-act to improve strength/endurance for ADLs with no greater than 4 restbreaks. Short term goal 3: Patient to complete ADL grooming tasks wtih min A. Short term goal 4: Patient to state 2 energy conservation techniques following education in order to improve daily endurance.         Therapy Time   Individual Concurrent Group Co-treatment   Time In 0804         Time Out 0832         Minutes [facility]

## 2019-05-02 ENCOUNTER — HOSPITAL ENCOUNTER (OUTPATIENT)
Age: 74
Setting detail: SPECIMEN
Discharge: HOME | End: 2019-05-02
Payer: OTHER GOVERNMENT

## 2019-05-02 LAB
ANION GAP SERPL CALCULATED.3IONS-SCNC: 11 MMOL/L (ref 9–17)
BUN BLDV-MCNC: 24 MG/DL (ref 8–23)
BUN/CREAT BLD: 19 (ref 9–20)
CALCIUM SERPL-MCNC: 8.8 MG/DL (ref 8.6–10.4)
CHLORIDE BLD-SCNC: 92 MMOL/L (ref 98–107)
CO2: 37 MMOL/L (ref 20–31)
CREAT SERPL-MCNC: 1.26 MG/DL (ref 0.7–1.2)
GFR AFRICAN AMERICAN: >60 ML/MIN
GFR NON-AFRICAN AMERICAN: 56 ML/MIN
GFR SERPL CREATININE-BSD FRML MDRD: ABNORMAL ML/MIN/{1.73_M2}
GFR SERPL CREATININE-BSD FRML MDRD: ABNORMAL ML/MIN/{1.73_M2}
GLUCOSE BLD-MCNC: 108 MG/DL (ref 70–99)
POTASSIUM SERPL-SCNC: 3.9 MMOL/L (ref 3.7–5.3)
SODIUM BLD-SCNC: 140 MMOL/L (ref 135–144)

## 2019-05-02 PROCEDURE — 36415 COLL VENOUS BLD VENIPUNCTURE: CPT

## 2019-05-02 PROCEDURE — 80048 BASIC METABOLIC PNL TOTAL CA: CPT

## 2019-05-02 PROCEDURE — P9604 ONE-WAY ALLOW PRORATED TRIP: HCPCS

## 2019-05-06 ENCOUNTER — HOSPITAL ENCOUNTER (OUTPATIENT)
Dept: GENERAL RADIOLOGY | Age: 74
Discharge: HOME | End: 2019-05-08
Payer: MEDICARE

## 2019-05-06 ENCOUNTER — HOSPITAL ENCOUNTER (OUTPATIENT)
Age: 74
Setting detail: SPECIMEN
Discharge: HOME | End: 2019-05-06
Payer: MEDICARE

## 2019-05-06 ENCOUNTER — HOSPITAL ENCOUNTER (OUTPATIENT)
Age: 74
Discharge: HOME | End: 2019-05-08
Payer: MEDICARE

## 2019-05-06 ENCOUNTER — HOSPITAL ENCOUNTER (INPATIENT)
Age: 74
LOS: 2 days | Discharge: HOME HEALTH | DRG: 291 | End: 2019-05-08
Attending: EMERGENCY MEDICINE | Admitting: INTERNAL MEDICINE
Payer: MEDICARE

## 2019-05-06 ENCOUNTER — APPOINTMENT (OUTPATIENT)
Dept: CT IMAGING | Age: 74
DRG: 291 | End: 2019-05-06
Payer: MEDICARE

## 2019-05-06 ENCOUNTER — TELEPHONE (OUTPATIENT)
Dept: OTHER | Facility: CLINIC | Age: 74
End: 2019-05-06

## 2019-05-06 ENCOUNTER — OFFICE VISIT (OUTPATIENT)
Dept: CARDIOLOGY | Age: 74
End: 2019-05-06
Payer: MEDICARE

## 2019-05-06 VITALS
HEART RATE: 73 BPM | SYSTOLIC BLOOD PRESSURE: 98 MMHG | DIASTOLIC BLOOD PRESSURE: 60 MMHG | HEIGHT: 68 IN | WEIGHT: 291.2 LBS | RESPIRATION RATE: 18 BRPM | OXYGEN SATURATION: 94 % | BODY MASS INDEX: 44.13 KG/M2

## 2019-05-06 DIAGNOSIS — J96.11 CHRONIC HYPOXEMIC RESPIRATORY FAILURE (HCC): ICD-10-CM

## 2019-05-06 DIAGNOSIS — R40.0 DAYTIME SLEEPINESS: ICD-10-CM

## 2019-05-06 DIAGNOSIS — J96.21 ACUTE AND CHRONIC RESPIRATORY FAILURE WITH HYPOXIA (HCC): ICD-10-CM

## 2019-05-06 DIAGNOSIS — I50.9 CONGESTIVE HEART FAILURE, UNSPECIFIED HF CHRONICITY, UNSPECIFIED HEART FAILURE TYPE (HCC): ICD-10-CM

## 2019-05-06 DIAGNOSIS — R58 RETROPERITONEAL BLEEDING: ICD-10-CM

## 2019-05-06 DIAGNOSIS — J90 PLEURAL EFFUSION: Primary | ICD-10-CM

## 2019-05-06 DIAGNOSIS — G47.33 OSA (OBSTRUCTIVE SLEEP APNEA): ICD-10-CM

## 2019-05-06 DIAGNOSIS — J90 PLEURAL EFFUSION: ICD-10-CM

## 2019-05-06 DIAGNOSIS — R53.81 DEBILITY: ICD-10-CM

## 2019-05-06 DIAGNOSIS — R09.02 HYPOXIA: ICD-10-CM

## 2019-05-06 DIAGNOSIS — I50.43 ACUTE ON CHRONIC COMBINED SYSTOLIC AND DIASTOLIC CHF, NYHA CLASS 3 (HCC): Primary | ICD-10-CM

## 2019-05-06 DIAGNOSIS — J90 PLEURAL EFFUSION ON RIGHT: ICD-10-CM

## 2019-05-06 DIAGNOSIS — I50.23 ACUTE ON CHRONIC SYSTOLIC HEART FAILURE (HCC): ICD-10-CM

## 2019-05-06 DIAGNOSIS — J90 RECURRENT LEFT PLEURAL EFFUSION: ICD-10-CM

## 2019-05-06 DIAGNOSIS — J90 PLEURAL EFFUSION ON LEFT: ICD-10-CM

## 2019-05-06 DIAGNOSIS — I48.0 PAROXYSMAL ATRIAL FIBRILLATION (HCC): ICD-10-CM

## 2019-05-06 PROBLEM — I95.1 ORTHOSTATIC HYPOTENSION: Status: ACTIVE | Noted: 2019-05-06

## 2019-05-06 PROBLEM — J96.22 ACUTE ON CHRONIC RESPIRATORY FAILURE WITH HYPOXIA AND HYPERCAPNIA (HCC): Status: ACTIVE | Noted: 2019-04-23

## 2019-05-06 LAB
ABSOLUTE EOS #: 0.72 K/UL (ref 0–0.44)
ABSOLUTE IMMATURE GRANULOCYTE: 0 K/UL (ref 0–0.3)
ABSOLUTE LYMPH #: 1.58 K/UL (ref 1.1–3.7)
ABSOLUTE MONO #: 0.86 K/UL (ref 0.1–1.2)
ALBUMIN SERPL-MCNC: 3 G/DL (ref 3.5–5.2)
ALBUMIN/GLOBULIN RATIO: 0.8 (ref 1–2.5)
ALLEN TEST: ABNORMAL
ALP BLD-CCNC: 150 U/L (ref 40–129)
ALT SERPL-CCNC: 20 U/L (ref 5–41)
ANION GAP SERPL CALCULATED.3IONS-SCNC: 11 MMOL/L (ref 9–17)
ANION GAP SERPL CALCULATED.3IONS-SCNC: 7 MMOL/L (ref 9–17)
AST SERPL-CCNC: 16 U/L
BASOPHILS # BLD: 1 % (ref 0–2)
BASOPHILS ABSOLUTE: 0.14 K/UL (ref 0–0.2)
BILIRUB SERPL-MCNC: 0.43 MG/DL (ref 0.3–1.2)
BNP INTERPRETATION: ABNORMAL
BUN BLDV-MCNC: 19 MG/DL (ref 8–23)
BUN BLDV-MCNC: 19 MG/DL (ref 8–23)
BUN/CREAT BLD: 16 (ref 9–20)
BUN/CREAT BLD: 19 (ref 9–20)
CALCIUM SERPL-MCNC: 8.6 MG/DL (ref 8.6–10.4)
CALCIUM SERPL-MCNC: 8.7 MG/DL (ref 8.6–10.4)
CARBOXYHEMOGLOBIN: ABNORMAL % (ref 0–5)
CHLORIDE BLD-SCNC: 92 MMOL/L (ref 98–107)
CHLORIDE BLD-SCNC: 94 MMOL/L (ref 98–107)
CO2: 35 MMOL/L (ref 20–31)
CO2: 38 MMOL/L (ref 20–31)
CREAT SERPL-MCNC: 1.01 MG/DL (ref 0.7–1.2)
CREAT SERPL-MCNC: 1.2 MG/DL (ref 0.7–1.2)
DIFFERENTIAL TYPE: ABNORMAL
EOSINOPHILS RELATIVE PERCENT: 5 % (ref 1–4)
FIO2: 21
GFR AFRICAN AMERICAN: >60 ML/MIN
GFR AFRICAN AMERICAN: >60 ML/MIN
GFR NON-AFRICAN AMERICAN: 60 ML/MIN
GFR NON-AFRICAN AMERICAN: >60 ML/MIN
GFR SERPL CREATININE-BSD FRML MDRD: ABNORMAL ML/MIN/{1.73_M2}
GLUCOSE BLD-MCNC: 181 MG/DL (ref 70–99)
GLUCOSE BLD-MCNC: 186 MG/DL (ref 74–100)
GLUCOSE BLD-MCNC: 286 MG/DL (ref 74–100)
GLUCOSE BLD-MCNC: 317 MG/DL (ref 70–99)
HCO3 ARTERIAL: 35.3 MMOL/L (ref 22–26)
HCT VFR BLD CALC: 31.6 % (ref 40.7–50.3)
HEMOGLOBIN: 9.7 G/DL (ref 13–17)
IMMATURE GRANULOCYTES: 0 %
INR BLD: 1.2 (ref 0.9–1.2)
LACTIC ACID, WHOLE BLOOD: NORMAL MMOL/L (ref 0.7–2.1)
LACTIC ACID: 1.6 MMOL/L (ref 0.5–2.2)
LYMPHOCYTES # BLD: 11 % (ref 24–43)
MCH RBC QN AUTO: 31.8 PG (ref 25.2–33.5)
MCHC RBC AUTO-ENTMCNC: 30.7 G/DL (ref 28.4–34.8)
MCV RBC AUTO: 103.6 FL (ref 82.6–102.9)
METHEMOGLOBIN: ABNORMAL % (ref 0–1.9)
MODE: ABNORMAL
MONOCYTES # BLD: 6 % (ref 3–12)
MORPHOLOGY: NORMAL
NEGATIVE BASE EXCESS, ART: ABNORMAL MMOL/L (ref 0–2)
NOTIFICATION TIME: ABNORMAL
NOTIFICATION: ABNORMAL
NRBC AUTOMATED: 0 PER 100 WBC
O2 DEVICE/FLOW/%: ABNORMAL
O2 SAT, ARTERIAL: 83.4 % (ref 95–98)
OXYHEMOGLOBIN: ABNORMAL % (ref 95–98)
PARTIAL THROMBOPLASTIN TIME: 36.6 SEC (ref 23.2–34.4)
PATIENT TEMP: 37
PCO2 ARTERIAL: 57.3 MMHG (ref 35–45)
PCO2, ART, TEMP ADJ: ABNORMAL (ref 35–45)
PDW BLD-RTO: 15.9 % (ref 11.8–14.4)
PEEP/CPAP: ABNORMAL
PH ARTERIAL: 7.41 (ref 7.35–7.45)
PH, ART, TEMP ADJ: ABNORMAL (ref 7.35–7.45)
PLATELET # BLD: 318 K/UL (ref 138–453)
PLATELET ESTIMATE: ABNORMAL
PMV BLD AUTO: 8.9 FL (ref 8.1–13.5)
PO2 ARTERIAL: 48.3 MMHG (ref 80–100)
PO2, ART, TEMP ADJ: ABNORMAL MMHG (ref 80–100)
POSITIVE BASE EXCESS, ART: 8.4 MMOL/L (ref 0–2)
POTASSIUM SERPL-SCNC: 4.2 MMOL/L (ref 3.7–5.3)
POTASSIUM SERPL-SCNC: 4.4 MMOL/L (ref 3.7–5.3)
PRO-BNP: 4871 PG/ML
PROTHROMBIN TIME: 12.3 SEC (ref 9.7–12.2)
PSV: ABNORMAL
PT. POSITION: ABNORMAL
RBC # BLD: 3.05 M/UL (ref 4.21–5.77)
RBC # BLD: ABNORMAL 10*6/UL
RESPIRATORY RATE: 32
SAMPLE SITE: ABNORMAL
SEG NEUTROPHILS: 77 % (ref 36–65)
SEGMENTED NEUTROPHILS ABSOLUTE COUNT: 11.1 K/UL (ref 1.5–8.1)
SET RATE: ABNORMAL
SODIUM BLD-SCNC: 137 MMOL/L (ref 135–144)
SODIUM BLD-SCNC: 140 MMOL/L (ref 135–144)
TEXT FOR RESPIRATORY: ABNORMAL
TOTAL HB: ABNORMAL G/DL (ref 12–16)
TOTAL PROTEIN: 6.6 G/DL (ref 6.4–8.3)
TOTAL RATE: ABNORMAL
TROPONIN INTERP: NORMAL
TROPONIN T: <0.03 NG/ML
TROPONIN, HIGH SENSITIVITY: NORMAL NG/L (ref 0–22)
VT: ABNORMAL
WBC # BLD: 14.4 K/UL (ref 3.5–11.3)
WBC # BLD: ABNORMAL 10*3/UL

## 2019-05-06 PROCEDURE — G8417 CALC BMI ABV UP PARAM F/U: HCPCS | Performed by: INTERNAL MEDICINE

## 2019-05-06 PROCEDURE — 80053 COMPREHEN METABOLIC PANEL: CPT

## 2019-05-06 PROCEDURE — P9603 ONE-WAY ALLOW PRORATED MILES: HCPCS

## 2019-05-06 PROCEDURE — 6360000002 HC RX W HCPCS: Performed by: INTERNAL MEDICINE

## 2019-05-06 PROCEDURE — 85025 COMPLETE CBC W/AUTO DIFF WBC: CPT

## 2019-05-06 PROCEDURE — 36415 COLL VENOUS BLD VENIPUNCTURE: CPT

## 2019-05-06 PROCEDURE — 94660 CPAP INITIATION&MGMT: CPT

## 2019-05-06 PROCEDURE — 2580000003 HC RX 258: Performed by: PHYSICIAN ASSISTANT

## 2019-05-06 PROCEDURE — 2580000003 HC RX 258: Performed by: EMERGENCY MEDICINE

## 2019-05-06 PROCEDURE — 1036F TOBACCO NON-USER: CPT | Performed by: INTERNAL MEDICINE

## 2019-05-06 PROCEDURE — 85610 PROTHROMBIN TIME: CPT

## 2019-05-06 PROCEDURE — 85730 THROMBOPLASTIN TIME PARTIAL: CPT

## 2019-05-06 PROCEDURE — 82805 BLOOD GASES W/O2 SATURATION: CPT

## 2019-05-06 PROCEDURE — 6370000000 HC RX 637 (ALT 250 FOR IP): Performed by: PHYSICIAN ASSISTANT

## 2019-05-06 PROCEDURE — 3017F COLORECTAL CA SCREEN DOC REV: CPT | Performed by: INTERNAL MEDICINE

## 2019-05-06 PROCEDURE — 71046 X-RAY EXAM CHEST 2 VIEWS: CPT

## 2019-05-06 PROCEDURE — G8598 ASA/ANTIPLAT THER USED: HCPCS | Performed by: INTERNAL MEDICINE

## 2019-05-06 PROCEDURE — 99285 EMERGENCY DEPT VISIT HI MDM: CPT

## 2019-05-06 PROCEDURE — 80048 BASIC METABOLIC PNL TOTAL CA: CPT

## 2019-05-06 PROCEDURE — 2700000000 HC OXYGEN THERAPY PER DAY

## 2019-05-06 PROCEDURE — 1200000000 HC SEMI PRIVATE

## 2019-05-06 PROCEDURE — 99214 OFFICE O/P EST MOD 30 MIN: CPT | Performed by: INTERNAL MEDICINE

## 2019-05-06 PROCEDURE — 4040F PNEUMOC VAC/ADMIN/RCVD: CPT | Performed by: INTERNAL MEDICINE

## 2019-05-06 PROCEDURE — 97165 OT EVAL LOW COMPLEX 30 MIN: CPT

## 2019-05-06 PROCEDURE — 97162 PT EVAL MOD COMPLEX 30 MIN: CPT

## 2019-05-06 PROCEDURE — 93005 ELECTROCARDIOGRAM TRACING: CPT

## 2019-05-06 PROCEDURE — 1111F DSCHRG MED/CURRENT MED MERGE: CPT | Performed by: INTERNAL MEDICINE

## 2019-05-06 PROCEDURE — 71250 CT THORAX DX C-: CPT

## 2019-05-06 PROCEDURE — G8427 DOCREV CUR MEDS BY ELIG CLIN: HCPCS | Performed by: INTERNAL MEDICINE

## 2019-05-06 PROCEDURE — 74176 CT ABD & PELVIS W/O CONTRAST: CPT

## 2019-05-06 PROCEDURE — 84484 ASSAY OF TROPONIN QUANT: CPT

## 2019-05-06 PROCEDURE — 83605 ASSAY OF LACTIC ACID: CPT

## 2019-05-06 PROCEDURE — 99223 1ST HOSP IP/OBS HIGH 75: CPT | Performed by: INTERNAL MEDICINE

## 2019-05-06 PROCEDURE — 94640 AIRWAY INHALATION TREATMENT: CPT

## 2019-05-06 PROCEDURE — 94664 DEMO&/EVAL PT USE INHALER: CPT

## 2019-05-06 PROCEDURE — 83880 ASSAY OF NATRIURETIC PEPTIDE: CPT

## 2019-05-06 PROCEDURE — 6360000002 HC RX W HCPCS: Performed by: EMERGENCY MEDICINE

## 2019-05-06 PROCEDURE — 87040 BLOOD CULTURE FOR BACTERIA: CPT

## 2019-05-06 PROCEDURE — 6360000002 HC RX W HCPCS: Performed by: PHYSICIAN ASSISTANT

## 2019-05-06 PROCEDURE — 1123F ACP DISCUSS/DSCN MKR DOCD: CPT | Performed by: INTERNAL MEDICINE

## 2019-05-06 PROCEDURE — 82947 ASSAY GLUCOSE BLOOD QUANT: CPT

## 2019-05-06 RX ORDER — LANOLIN ALCOHOL/MO/W.PET/CERES
1000 CREAM (GRAM) TOPICAL DAILY
Status: DISCONTINUED | OUTPATIENT
Start: 2019-05-07 | End: 2019-05-08 | Stop reason: HOSPADM

## 2019-05-06 RX ORDER — DEXTROSE MONOHYDRATE 50 MG/ML
100 INJECTION, SOLUTION INTRAVENOUS PRN
Status: DISCONTINUED | OUTPATIENT
Start: 2019-05-06 | End: 2019-05-08 | Stop reason: HOSPADM

## 2019-05-06 RX ORDER — ACYCLOVIR 400 MG/1
400 TABLET ORAL EVERY MORNING
Status: ON HOLD | COMMUNITY
End: 2019-05-06 | Stop reason: CLARIF

## 2019-05-06 RX ORDER — GLIMEPIRIDE 2 MG/1
1 TABLET ORAL 2 TIMES DAILY
Status: DISCONTINUED | OUTPATIENT
Start: 2019-05-06 | End: 2019-05-06

## 2019-05-06 RX ORDER — PYRIDOXINE HCL (VITAMIN B6) 100 MG
100 TABLET ORAL DAILY
Status: ON HOLD | COMMUNITY
End: 2019-05-06 | Stop reason: CLARIF

## 2019-05-06 RX ORDER — NICOTINE POLACRILEX 4 MG
15 LOZENGE BUCCAL PRN
Status: DISCONTINUED | OUTPATIENT
Start: 2019-05-06 | End: 2019-05-08 | Stop reason: HOSPADM

## 2019-05-06 RX ORDER — MAGNESIUM SULFATE 1 G/100ML
1 INJECTION INTRAVENOUS PRN
Status: DISCONTINUED | OUTPATIENT
Start: 2019-05-06 | End: 2019-05-08 | Stop reason: HOSPADM

## 2019-05-06 RX ORDER — LATANOPROST 50 UG/ML
1 SOLUTION/ DROPS OPHTHALMIC NIGHTLY
Status: DISCONTINUED | OUTPATIENT
Start: 2019-05-06 | End: 2019-05-06

## 2019-05-06 RX ORDER — DABIGATRAN ETEXILATE 75 MG/1
150 CAPSULE, COATED PELLETS ORAL 2 TIMES DAILY
Status: DISCONTINUED | OUTPATIENT
Start: 2019-05-06 | End: 2019-05-08 | Stop reason: HOSPADM

## 2019-05-06 RX ORDER — POLYETHYLENE GLYCOL 3350 17 G/17G
17 POWDER, FOR SOLUTION ORAL DAILY PRN
Status: DISCONTINUED | OUTPATIENT
Start: 2019-05-06 | End: 2019-05-08 | Stop reason: HOSPADM

## 2019-05-06 RX ORDER — ACETAMINOPHEN 325 MG/1
650 TABLET ORAL EVERY 6 HOURS PRN
COMMUNITY

## 2019-05-06 RX ORDER — OYSTER SHELL CALCIUM WITH VITAMIN D 500; 200 MG/1; [IU]/1
2 TABLET, FILM COATED ORAL 2 TIMES DAILY
Status: DISCONTINUED | OUTPATIENT
Start: 2019-05-06 | End: 2019-05-06

## 2019-05-06 RX ORDER — OYSTER SHELL CALCIUM WITH VITAMIN D 500; 200 MG/1; [IU]/1
2 TABLET, FILM COATED ORAL 2 TIMES DAILY
Status: ON HOLD | COMMUNITY
End: 2019-05-06 | Stop reason: CLARIF

## 2019-05-06 RX ORDER — POLYETHYLENE GLYCOL 3350 17 G/17G
17 POWDER, FOR SOLUTION ORAL DAILY
Status: DISCONTINUED | OUTPATIENT
Start: 2019-05-06 | End: 2019-05-08 | Stop reason: HOSPADM

## 2019-05-06 RX ORDER — GLIMEPIRIDE 2 MG/1
1 TABLET ORAL 2 TIMES DAILY
Status: ON HOLD | COMMUNITY
End: 2019-05-06 | Stop reason: CLARIF

## 2019-05-06 RX ORDER — DEXTROSE MONOHYDRATE 25 G/50ML
12.5 INJECTION, SOLUTION INTRAVENOUS PRN
Status: DISCONTINUED | OUTPATIENT
Start: 2019-05-06 | End: 2019-05-08 | Stop reason: HOSPADM

## 2019-05-06 RX ORDER — FUROSEMIDE 10 MG/ML
60 INJECTION INTRAMUSCULAR; INTRAVENOUS DAILY
Status: DISCONTINUED | OUTPATIENT
Start: 2019-05-06 | End: 2019-05-06

## 2019-05-06 RX ORDER — LANOLIN ALCOHOL/MO/W.PET/CERES
100 CREAM (GRAM) TOPICAL DAILY
Status: DISCONTINUED | OUTPATIENT
Start: 2019-05-06 | End: 2019-05-06

## 2019-05-06 RX ORDER — AMIODARONE HYDROCHLORIDE 200 MG/1
200 TABLET ORAL DAILY
Status: DISCONTINUED | OUTPATIENT
Start: 2019-05-07 | End: 2019-05-08 | Stop reason: HOSPADM

## 2019-05-06 RX ORDER — MEGESTROL ACETATE 40 MG/ML
800 SUSPENSION ORAL DAILY
Status: DISCONTINUED | OUTPATIENT
Start: 2019-05-06 | End: 2019-05-06

## 2019-05-06 RX ORDER — ONDANSETRON 2 MG/ML
4 INJECTION INTRAMUSCULAR; INTRAVENOUS EVERY 6 HOURS PRN
Status: DISCONTINUED | OUTPATIENT
Start: 2019-05-06 | End: 2019-05-08 | Stop reason: HOSPADM

## 2019-05-06 RX ORDER — ALBUTEROL SULFATE 2.5 MG/3ML
2.5 SOLUTION RESPIRATORY (INHALATION) EVERY 6 HOURS PRN
Status: DISCONTINUED | OUTPATIENT
Start: 2019-05-06 | End: 2019-05-07

## 2019-05-06 RX ORDER — MULTIVIT WITH MINERALS/LUTEIN
TABLET ORAL DAILY
Status: ON HOLD | COMMUNITY
End: 2019-05-06 | Stop reason: CLARIF

## 2019-05-06 RX ORDER — ATORVASTATIN CALCIUM 40 MG/1
40 TABLET, FILM COATED ORAL DAILY
Status: DISCONTINUED | OUTPATIENT
Start: 2019-05-06 | End: 2019-05-08 | Stop reason: HOSPADM

## 2019-05-06 RX ORDER — HYOSCYAMINE SULFATE EXTENDED-RELEASE 0.38 MG/1
375 TABLET ORAL 2 TIMES DAILY
Status: DISCONTINUED | OUTPATIENT
Start: 2019-05-06 | End: 2019-05-06

## 2019-05-06 RX ORDER — ALBUTEROL SULFATE 2.5 MG/3ML
2.5 SOLUTION RESPIRATORY (INHALATION) 3 TIMES DAILY
Status: DISCONTINUED | OUTPATIENT
Start: 2019-05-06 | End: 2019-05-07

## 2019-05-06 RX ORDER — LATANOPROST 50 UG/ML
1 SOLUTION/ DROPS OPHTHALMIC NIGHTLY
Status: ON HOLD | COMMUNITY
End: 2019-05-06 | Stop reason: CLARIF

## 2019-05-06 RX ORDER — SODIUM CHLORIDE 0.9 % (FLUSH) 0.9 %
10 SYRINGE (ML) INJECTION PRN
Status: DISCONTINUED | OUTPATIENT
Start: 2019-05-06 | End: 2019-05-08 | Stop reason: HOSPADM

## 2019-05-06 RX ORDER — SODIUM CHLORIDE 0.9 % (FLUSH) 0.9 %
10 SYRINGE (ML) INJECTION EVERY 12 HOURS SCHEDULED
Status: DISCONTINUED | OUTPATIENT
Start: 2019-05-06 | End: 2019-05-08 | Stop reason: HOSPADM

## 2019-05-06 RX ORDER — ASPIRIN 81 MG/1
81 TABLET, CHEWABLE ORAL DAILY
Status: DISCONTINUED | OUTPATIENT
Start: 2019-05-07 | End: 2019-05-08 | Stop reason: HOSPADM

## 2019-05-06 RX ORDER — UREA 10 %
3 LOTION (ML) TOPICAL DAILY
Status: DISCONTINUED | OUTPATIENT
Start: 2019-05-06 | End: 2019-05-08 | Stop reason: HOSPADM

## 2019-05-06 RX ORDER — PROBIOTIC PRODUCT - TAB 1B-250 MG
TAB ORAL DAILY
Status: ON HOLD | COMMUNITY
End: 2019-05-06 | Stop reason: SDUPTHER

## 2019-05-06 RX ORDER — LACTOBACILLUS RHAMNOSUS GG 10B CELL
2 CAPSULE ORAL 2 TIMES DAILY WITH MEALS
Status: DISCONTINUED | OUTPATIENT
Start: 2019-05-06 | End: 2019-05-08 | Stop reason: HOSPADM

## 2019-05-06 RX ORDER — ACYCLOVIR 400 MG/1
400 TABLET ORAL EVERY MORNING
Status: DISCONTINUED | OUTPATIENT
Start: 2019-05-06 | End: 2019-05-06

## 2019-05-06 RX ORDER — HYOSCYAMINE SULFATE EXTENDED-RELEASE 0.38 MG/1
375 TABLET ORAL 2 TIMES DAILY
Status: ON HOLD | COMMUNITY
End: 2019-05-06 | Stop reason: CLARIF

## 2019-05-06 RX ADMIN — INSULIN LISPRO 3 UNITS: 100 INJECTION, SOLUTION INTRAVENOUS; SUBCUTANEOUS at 21:56

## 2019-05-06 RX ADMIN — ALBUTEROL SULFATE 2.5 MG: 2.5 SOLUTION RESPIRATORY (INHALATION) at 21:03

## 2019-05-06 RX ADMIN — METOPROLOL TARTRATE 12.5 MG: 25 TABLET ORAL at 21:51

## 2019-05-06 RX ADMIN — INSULIN LISPRO 2 UNITS: 100 INJECTION, SOLUTION INTRAVENOUS; SUBCUTANEOUS at 16:53

## 2019-05-06 RX ADMIN — Medication 10 ML: at 21:53

## 2019-05-06 RX ADMIN — Medication 2 CAPSULE: at 16:22

## 2019-05-06 RX ADMIN — CEFEPIME 2 G: 2 INJECTION, POWDER, FOR SOLUTION INTRAVENOUS at 11:45

## 2019-05-06 RX ADMIN — Medication 3 MG: at 21:53

## 2019-05-06 RX ADMIN — INSULIN GLARGINE 10 UNITS: 100 INJECTION, SOLUTION SUBCUTANEOUS at 21:55

## 2019-05-06 RX ADMIN — POLYETHYLENE GLYCOL (3350) 17 G: 17 POWDER, FOR SOLUTION ORAL at 21:50

## 2019-05-06 RX ADMIN — ATORVASTATIN CALCIUM 40 MG: 40 TABLET, FILM COATED ORAL at 21:53

## 2019-05-06 RX ADMIN — FUROSEMIDE 60 MG: 20 TABLET ORAL at 16:22

## 2019-05-06 RX ADMIN — ALBUTEROL SULFATE 2.5 MG: 2.5 SOLUTION RESPIRATORY (INHALATION) at 15:19

## 2019-05-06 ASSESSMENT — PAIN SCALES - GENERAL: PAINLEVEL_OUTOF10: 0

## 2019-05-06 NOTE — TELEPHONE ENCOUNTER
Writer contacted Dr. Zetta Bumpers, ED provider to inform of 30 day readmission risk. No Decision on disposition at this time. Pt in SNF.

## 2019-05-06 NOTE — PROGRESS NOTES
Clarification:    Patient with acute on chronic hypercapnic and hypoxemic respiratory failure on admission.      Henry Guzmán PA-C  5/6/2019, 9:52 AM

## 2019-05-07 ENCOUNTER — APPOINTMENT (OUTPATIENT)
Dept: VASCULAR LAB | Age: 74
DRG: 291 | End: 2019-05-07
Payer: MEDICARE

## 2019-05-07 LAB
ALBUMIN SERPL-MCNC: 2.7 G/DL (ref 3.5–5.2)
ALBUMIN/GLOBULIN RATIO: 0.8 (ref 1–2.5)
ALP BLD-CCNC: 128 U/L (ref 40–129)
ALT SERPL-CCNC: 17 U/L (ref 5–41)
ANION GAP SERPL CALCULATED.3IONS-SCNC: 11 MMOL/L (ref 9–17)
AST SERPL-CCNC: 13 U/L
BILIRUB SERPL-MCNC: 0.45 MG/DL (ref 0.3–1.2)
BUN BLDV-MCNC: 20 MG/DL (ref 8–23)
BUN/CREAT BLD: 19 (ref 9–20)
CALCIUM SERPL-MCNC: 8.6 MG/DL (ref 8.6–10.4)
CHLORIDE BLD-SCNC: 92 MMOL/L (ref 98–107)
CHOLESTEROL/HDL RATIO: 2.7
CHOLESTEROL: 123 MG/DL
CO2: 34 MMOL/L (ref 20–31)
CREAT SERPL-MCNC: 1.04 MG/DL (ref 0.7–1.2)
GFR AFRICAN AMERICAN: >60 ML/MIN
GFR NON-AFRICAN AMERICAN: >60 ML/MIN
GFR SERPL CREATININE-BSD FRML MDRD: ABNORMAL ML/MIN/{1.73_M2}
GFR SERPL CREATININE-BSD FRML MDRD: ABNORMAL ML/MIN/{1.73_M2}
GLUCOSE BLD-MCNC: 199 MG/DL (ref 74–100)
GLUCOSE BLD-MCNC: 249 MG/DL (ref 74–100)
GLUCOSE BLD-MCNC: 255 MG/DL (ref 70–99)
GLUCOSE BLD-MCNC: 275 MG/DL (ref 74–100)
GLUCOSE BLD-MCNC: 316 MG/DL (ref 74–100)
HCT VFR BLD CALC: 28.6 % (ref 40.7–50.3)
HDLC SERPL-MCNC: 46 MG/DL
HEMOGLOBIN: 8.8 G/DL (ref 13–17)
LDL CHOLESTEROL: 58 MG/DL (ref 0–130)
MCH RBC QN AUTO: 31.5 PG (ref 25.2–33.5)
MCHC RBC AUTO-ENTMCNC: 30.8 G/DL (ref 28.4–34.8)
MCV RBC AUTO: 102.5 FL (ref 82.6–102.9)
NRBC AUTOMATED: 0 PER 100 WBC
PDW BLD-RTO: 15.8 % (ref 11.8–14.4)
PLATELET # BLD: 278 K/UL (ref 138–453)
PMV BLD AUTO: 8.9 FL (ref 8.1–13.5)
POTASSIUM SERPL-SCNC: 4 MMOL/L (ref 3.7–5.3)
RBC # BLD: 2.79 M/UL (ref 4.21–5.77)
SODIUM BLD-SCNC: 137 MMOL/L (ref 135–144)
TOTAL PROTEIN: 5.9 G/DL (ref 6.4–8.3)
TRIGL SERPL-MCNC: 95 MG/DL
VLDLC SERPL CALC-MCNC: NORMAL MG/DL (ref 1–30)
WBC # BLD: 13.2 K/UL (ref 3.5–11.3)

## 2019-05-07 PROCEDURE — 94640 AIRWAY INHALATION TREATMENT: CPT

## 2019-05-07 PROCEDURE — 6370000000 HC RX 637 (ALT 250 FOR IP): Performed by: PHYSICIAN ASSISTANT

## 2019-05-07 PROCEDURE — 36415 COLL VENOUS BLD VENIPUNCTURE: CPT

## 2019-05-07 PROCEDURE — 2580000003 HC RX 258: Performed by: PHYSICIAN ASSISTANT

## 2019-05-07 PROCEDURE — 2700000000 HC OXYGEN THERAPY PER DAY

## 2019-05-07 PROCEDURE — 94664 DEMO&/EVAL PT USE INHALER: CPT

## 2019-05-07 PROCEDURE — 1200000000 HC SEMI PRIVATE

## 2019-05-07 PROCEDURE — 93970 EXTREMITY STUDY: CPT

## 2019-05-07 PROCEDURE — 99224 PR SBSQ OBSERVATION CARE/DAY 15 MINUTES: CPT | Performed by: INTERNAL MEDICINE

## 2019-05-07 PROCEDURE — 6360000002 HC RX W HCPCS: Performed by: PHYSICIAN ASSISTANT

## 2019-05-07 PROCEDURE — 80053 COMPREHEN METABOLIC PANEL: CPT

## 2019-05-07 PROCEDURE — 97110 THERAPEUTIC EXERCISES: CPT

## 2019-05-07 PROCEDURE — 80061 LIPID PANEL: CPT

## 2019-05-07 PROCEDURE — 85027 COMPLETE CBC AUTOMATED: CPT

## 2019-05-07 PROCEDURE — 94660 CPAP INITIATION&MGMT: CPT

## 2019-05-07 PROCEDURE — 6360000002 HC RX W HCPCS: Performed by: INTERNAL MEDICINE

## 2019-05-07 PROCEDURE — 82947 ASSAY GLUCOSE BLOOD QUANT: CPT

## 2019-05-07 PROCEDURE — 94667 MNPJ CHEST WALL 1ST: CPT

## 2019-05-07 RX ORDER — ALBUTEROL SULFATE 2.5 MG/3ML
2.5 SOLUTION RESPIRATORY (INHALATION) EVERY 4 HOURS PRN
Status: DISCONTINUED | OUTPATIENT
Start: 2019-05-07 | End: 2019-05-08 | Stop reason: HOSPADM

## 2019-05-07 RX ORDER — ALBUTEROL SULFATE 2.5 MG/3ML
2.5 SOLUTION RESPIRATORY (INHALATION) 4 TIMES DAILY
Status: DISCONTINUED | OUTPATIENT
Start: 2019-05-07 | End: 2019-05-08 | Stop reason: HOSPADM

## 2019-05-07 RX ADMIN — Medication 10 ML: at 21:12

## 2019-05-07 RX ADMIN — ALBUTEROL SULFATE 2.5 MG: 2.5 SOLUTION RESPIRATORY (INHALATION) at 15:26

## 2019-05-07 RX ADMIN — ALBUTEROL SULFATE 2.5 MG: 2.5 SOLUTION RESPIRATORY (INHALATION) at 09:23

## 2019-05-07 RX ADMIN — INSULIN LISPRO 4 UNITS: 100 INJECTION, SOLUTION INTRAVENOUS; SUBCUTANEOUS at 21:08

## 2019-05-07 RX ADMIN — INSULIN GLARGINE 10 UNITS: 100 INJECTION, SOLUTION SUBCUTANEOUS at 21:08

## 2019-05-07 RX ADMIN — Medication 2 CAPSULE: at 17:13

## 2019-05-07 RX ADMIN — FUROSEMIDE 60 MG: 20 TABLET ORAL at 08:22

## 2019-05-07 RX ADMIN — INSULIN LISPRO 4 UNITS: 100 INJECTION, SOLUTION INTRAVENOUS; SUBCUTANEOUS at 17:10

## 2019-05-07 RX ADMIN — INSULIN LISPRO 2 UNITS: 100 INJECTION, SOLUTION INTRAVENOUS; SUBCUTANEOUS at 08:30

## 2019-05-07 RX ADMIN — INSULIN LISPRO 6 UNITS: 100 INJECTION, SOLUTION INTRAVENOUS; SUBCUTANEOUS at 12:11

## 2019-05-07 RX ADMIN — DOCUSATE SODIUM 100 MG: 50 LIQUID ORAL at 08:23

## 2019-05-07 RX ADMIN — POLYETHYLENE GLYCOL (3350) 17 G: 17 POWDER, FOR SOLUTION ORAL at 20:58

## 2019-05-07 RX ADMIN — METOPROLOL TARTRATE 12.5 MG: 25 TABLET ORAL at 08:22

## 2019-05-07 RX ADMIN — Medication 10 ML: at 12:06

## 2019-05-07 RX ADMIN — Medication 10 ML: at 08:23

## 2019-05-07 RX ADMIN — CEFEPIME 2 G: 2 INJECTION, POWDER, FOR SOLUTION INTRAVENOUS at 00:56

## 2019-05-07 RX ADMIN — Medication 2 CAPSULE: at 08:22

## 2019-05-07 RX ADMIN — Medication 3 MG: at 20:57

## 2019-05-07 RX ADMIN — CEFEPIME 2 G: 2 INJECTION, POWDER, FOR SOLUTION INTRAVENOUS at 23:47

## 2019-05-07 RX ADMIN — METOPROLOL TARTRATE 12.5 MG: 25 TABLET ORAL at 20:57

## 2019-05-07 RX ADMIN — CYANOCOBALAMIN TAB 1000 MCG 1000 MCG: 1000 TAB at 08:22

## 2019-05-07 RX ADMIN — FUROSEMIDE 60 MG: 20 TABLET ORAL at 17:13

## 2019-05-07 RX ADMIN — CEFEPIME 2 G: 2 INJECTION, POWDER, FOR SOLUTION INTRAVENOUS at 12:05

## 2019-05-07 RX ADMIN — AMIODARONE HYDROCHLORIDE 200 MG: 200 TABLET ORAL at 08:23

## 2019-05-07 RX ADMIN — ATORVASTATIN CALCIUM 40 MG: 40 TABLET, FILM COATED ORAL at 20:57

## 2019-05-07 ASSESSMENT — PAIN SCALES - GENERAL
PAINLEVEL_OUTOF10: 0
PAINLEVEL_OUTOF10: 0

## 2019-05-08 ENCOUNTER — APPOINTMENT (OUTPATIENT)
Dept: GENERAL RADIOLOGY | Age: 74
DRG: 291 | End: 2019-05-08
Payer: MEDICARE

## 2019-05-08 ENCOUNTER — APPOINTMENT (OUTPATIENT)
Dept: ULTRASOUND IMAGING | Age: 74
DRG: 291 | End: 2019-05-08
Payer: MEDICARE

## 2019-05-08 ENCOUNTER — HOSPITAL ENCOUNTER (OUTPATIENT)
Age: 74
Setting detail: SPECIMEN
Discharge: HOME | End: 2019-05-08
Payer: MEDICARE

## 2019-05-08 VITALS
TEMPERATURE: 97.8 F | SYSTOLIC BLOOD PRESSURE: 92 MMHG | HEIGHT: 68 IN | DIASTOLIC BLOOD PRESSURE: 68 MMHG | HEART RATE: 110 BPM | WEIGHT: 293 LBS | OXYGEN SATURATION: 94 % | RESPIRATION RATE: 12 BRPM | BODY MASS INDEX: 44.41 KG/M2

## 2019-05-08 PROBLEM — R40.0 DAYTIME SLEEPINESS: Status: ACTIVE | Noted: 2019-05-08

## 2019-05-08 PROBLEM — R09.02 HYPOXIA: Status: ACTIVE | Noted: 2019-05-08

## 2019-05-08 LAB
ALBUMIN SERPL-MCNC: 3.1 G/DL (ref 3.5–5.2)
ALBUMIN/GLOBULIN RATIO: 1 (ref 1–2.5)
ALP BLD-CCNC: 128 U/L (ref 40–129)
ALT SERPL-CCNC: 16 U/L (ref 5–41)
ANION GAP SERPL CALCULATED.3IONS-SCNC: 9 MMOL/L (ref 9–17)
APPEARANCE FLUID: ABNORMAL
AST SERPL-CCNC: 15 U/L
BASO FLUID: 1 %
BILIRUB SERPL-MCNC: 0.57 MG/DL (ref 0.3–1.2)
BUN BLDV-MCNC: 24 MG/DL (ref 8–23)
BUN/CREAT BLD: 24 (ref 9–20)
CALCIUM SERPL-MCNC: 8.7 MG/DL (ref 8.6–10.4)
CHLORIDE BLD-SCNC: 92 MMOL/L (ref 98–107)
CO2: 35 MMOL/L (ref 20–31)
COLOR FLUID: ABNORMAL
CREAT SERPL-MCNC: 1.01 MG/DL (ref 0.7–1.2)
EKG ATRIAL RATE: 89 BPM
EKG Q-T INTERVAL: 420 MS
EKG QRS DURATION: 98 MS
EKG QTC CALCULATION (BAZETT): 493 MS
EKG R AXIS: 24 DEGREES
EKG T AXIS: 93 DEGREES
EKG VENTRICULAR RATE: 83 BPM
EOSINOPHIL FLUID: 1 %
FLUID DIFF COMMENT: ABNORMAL
GFR AFRICAN AMERICAN: >60 ML/MIN
GFR NON-AFRICAN AMERICAN: >60 ML/MIN
GFR SERPL CREATININE-BSD FRML MDRD: ABNORMAL ML/MIN/{1.73_M2}
GFR SERPL CREATININE-BSD FRML MDRD: ABNORMAL ML/MIN/{1.73_M2}
GLUCOSE BLD-MCNC: 228 MG/DL (ref 70–99)
GLUCOSE BLD-MCNC: 233 MG/DL (ref 74–100)
GLUCOSE BLD-MCNC: 283 MG/DL (ref 74–100)
GLUCOSE BLD-MCNC: 309 MG/DL (ref 74–100)
GLUCOSE, FLUID: 253 MG/DL
HCT VFR BLD CALC: 28.6 % (ref 40.7–50.3)
HEMOGLOBIN: 8.9 G/DL (ref 13–17)
LACTATE DEHYDROGENASE, FLUID: 413 U/L
LYMPHOCYTES, BODY FLUID: 65 %
MCH RBC QN AUTO: 31.7 PG (ref 25.2–33.5)
MCHC RBC AUTO-ENTMCNC: 31.1 G/DL (ref 28.4–34.8)
MCV RBC AUTO: 101.8 FL (ref 82.6–102.9)
MONOCYTE, FLUID: 3 %
NEUTROPHIL, FLUID: 29 %
NRBC AUTOMATED: 0.5 PER 100 WBC
OTHER CELLS FLUID: 1 %
PDW BLD-RTO: 15.9 % (ref 11.8–14.4)
PH FLUID: 8
PLATELET # BLD: 278 K/UL (ref 138–453)
PMV BLD AUTO: 9 FL (ref 8.1–13.5)
POTASSIUM SERPL-SCNC: 4.1 MMOL/L (ref 3.7–5.3)
RBC # BLD: 2.81 M/UL (ref 4.21–5.77)
RBC FLUID: ABNORMAL /MM3
SODIUM BLD-SCNC: 136 MMOL/L (ref 135–144)
SPECIMEN TYPE: ABNORMAL
SPECIMEN TYPE: NORMAL
TOTAL PROTEIN, BODY FLUID: 2.8 G/DL
TOTAL PROTEIN: 6.2 G/DL (ref 6.4–8.3)
WBC # BLD: 13.6 K/UL (ref 3.5–11.3)
WBC FLUID: 2267 /MM3

## 2019-05-08 PROCEDURE — 2580000003 HC RX 258: Performed by: PHYSICIAN ASSISTANT

## 2019-05-08 PROCEDURE — 94640 AIRWAY INHALATION TREATMENT: CPT

## 2019-05-08 PROCEDURE — 6360000002 HC RX W HCPCS: Performed by: INTERNAL MEDICINE

## 2019-05-08 PROCEDURE — 6360000002 HC RX W HCPCS: Performed by: PHYSICIAN ASSISTANT

## 2019-05-08 PROCEDURE — 6370000000 HC RX 637 (ALT 250 FOR IP): Performed by: PHYSICIAN ASSISTANT

## 2019-05-08 PROCEDURE — 83615 LACTATE (LD) (LDH) ENZYME: CPT

## 2019-05-08 PROCEDURE — 84157 ASSAY OF PROTEIN OTHER: CPT

## 2019-05-08 PROCEDURE — 2700000000 HC OXYGEN THERAPY PER DAY

## 2019-05-08 PROCEDURE — 88112 CYTOPATH CELL ENHANCE TECH: CPT

## 2019-05-08 PROCEDURE — 97110 THERAPEUTIC EXERCISES: CPT

## 2019-05-08 PROCEDURE — 71045 X-RAY EXAM CHEST 1 VIEW: CPT

## 2019-05-08 PROCEDURE — 87070 CULTURE OTHR SPECIMN AEROBIC: CPT

## 2019-05-08 PROCEDURE — 94664 DEMO&/EVAL PT USE INHALER: CPT

## 2019-05-08 PROCEDURE — 87075 CULTR BACTERIA EXCEPT BLOOD: CPT

## 2019-05-08 PROCEDURE — 2500000003 HC RX 250 WO HCPCS: Performed by: RADIOLOGY

## 2019-05-08 PROCEDURE — 97116 GAIT TRAINING THERAPY: CPT

## 2019-05-08 PROCEDURE — 83986 ASSAY PH BODY FLUID NOS: CPT

## 2019-05-08 PROCEDURE — 82947 ASSAY GLUCOSE BLOOD QUANT: CPT

## 2019-05-08 PROCEDURE — 94668 MNPJ CHEST WALL SBSQ: CPT

## 2019-05-08 PROCEDURE — 89051 BODY FLUID CELL COUNT: CPT

## 2019-05-08 PROCEDURE — 36415 COLL VENOUS BLD VENIPUNCTURE: CPT

## 2019-05-08 PROCEDURE — C1729 CATH, DRAINAGE: HCPCS

## 2019-05-08 PROCEDURE — 87205 SMEAR GRAM STAIN: CPT

## 2019-05-08 PROCEDURE — 82945 GLUCOSE OTHER FLUID: CPT

## 2019-05-08 PROCEDURE — 85027 COMPLETE CBC AUTOMATED: CPT

## 2019-05-08 PROCEDURE — 80053 COMPREHEN METABOLIC PANEL: CPT

## 2019-05-08 PROCEDURE — 0W9B3ZZ DRAINAGE OF LEFT PLEURAL CAVITY, PERCUTANEOUS APPROACH: ICD-10-PCS | Performed by: RADIOLOGY

## 2019-05-08 PROCEDURE — 88305 TISSUE EXAM BY PATHOLOGIST: CPT

## 2019-05-08 RX ORDER — POLYETHYLENE GLYCOL 3350 17 G/17G
17 POWDER, FOR SOLUTION ORAL DAILY
Qty: 527 G | Refills: 1 | COMMUNITY
Start: 2019-05-08 | End: 2019-06-07

## 2019-05-08 RX ORDER — METOLAZONE 2.5 MG/1
2.5 TABLET ORAL EVERY OTHER DAY
Qty: 30 TABLET | Refills: 0 | Status: SHIPPED | OUTPATIENT
Start: 2019-05-08 | End: 2019-05-13 | Stop reason: DRUGHIGH

## 2019-05-08 RX ORDER — ALBUTEROL SULFATE 2.5 MG/3ML
2.5 SOLUTION RESPIRATORY (INHALATION) 4 TIMES DAILY
Qty: 120 EACH | Refills: 0 | Status: SHIPPED | OUTPATIENT
Start: 2019-05-08

## 2019-05-08 RX ORDER — FUROSEMIDE 40 MG/1
60 TABLET ORAL 2 TIMES DAILY
Qty: 90 TABLET | Refills: 0 | Status: SHIPPED | OUTPATIENT
Start: 2019-05-08 | End: 2019-05-24

## 2019-05-08 RX ORDER — DOCUSATE SODIUM 100 MG/1
100 CAPSULE, LIQUID FILLED ORAL DAILY
COMMUNITY
Start: 2019-05-08

## 2019-05-08 RX ORDER — ATORVASTATIN CALCIUM 40 MG/1
40 TABLET, FILM COATED ORAL DAILY
Qty: 30 TABLET | Refills: 0 | Status: SHIPPED | OUTPATIENT
Start: 2019-05-08

## 2019-05-08 RX ORDER — DABIGATRAN ETEXILATE 150 MG/1
150 CAPSULE, COATED PELLETS ORAL 2 TIMES DAILY
Qty: 60 CAPSULE | Refills: 0 | Status: ON HOLD | OUTPATIENT
Start: 2019-05-08 | End: 2019-07-30 | Stop reason: HOSPADM

## 2019-05-08 RX ORDER — AMIODARONE HYDROCHLORIDE 200 MG/1
200 TABLET ORAL DAILY
Qty: 30 TABLET | Refills: 0 | Status: ON HOLD | OUTPATIENT
Start: 2019-05-08 | End: 2019-07-30 | Stop reason: HOSPADM

## 2019-05-08 RX ORDER — LIDOCAINE HYDROCHLORIDE 20 MG/ML
INJECTION, SOLUTION INFILTRATION; PERINEURAL
Status: COMPLETED | OUTPATIENT
Start: 2019-05-08 | End: 2019-05-08

## 2019-05-08 RX ORDER — LACTOBACILLUS RHAMNOSUS GG 10B CELL
2 CAPSULE ORAL 2 TIMES DAILY WITH MEALS
Qty: 30 CAPSULE | Refills: 0 | COMMUNITY
Start: 2019-05-08

## 2019-05-08 RX ADMIN — Medication 2 CAPSULE: at 17:19

## 2019-05-08 RX ADMIN — FUROSEMIDE 60 MG: 20 TABLET ORAL at 09:44

## 2019-05-08 RX ADMIN — ALBUTEROL SULFATE 2.5 MG: 2.5 SOLUTION RESPIRATORY (INHALATION) at 06:33

## 2019-05-08 RX ADMIN — METOPROLOL TARTRATE 12.5 MG: 25 TABLET ORAL at 09:38

## 2019-05-08 RX ADMIN — CYANOCOBALAMIN TAB 1000 MCG 1000 MCG: 1000 TAB at 09:38

## 2019-05-08 RX ADMIN — CEFEPIME 2 G: 2 INJECTION, POWDER, FOR SOLUTION INTRAVENOUS at 12:18

## 2019-05-08 RX ADMIN — INSULIN LISPRO 6 UNITS: 100 INJECTION, SOLUTION INTRAVENOUS; SUBCUTANEOUS at 12:13

## 2019-05-08 RX ADMIN — Medication 10 ML: at 12:19

## 2019-05-08 RX ADMIN — DABIGATRAN ETEXILATE MESYLATE 150 MG: 75 CAPSULE ORAL at 09:37

## 2019-05-08 RX ADMIN — LIDOCAINE HYDROCHLORIDE 9 ML: 20 INJECTION, SOLUTION INFILTRATION; PERINEURAL at 08:56

## 2019-05-08 RX ADMIN — Medication 10 ML: at 09:44

## 2019-05-08 RX ADMIN — DOCUSATE SODIUM 100 MG: 50 LIQUID ORAL at 09:38

## 2019-05-08 RX ADMIN — Medication 2 CAPSULE: at 09:44

## 2019-05-08 RX ADMIN — INSULIN LISPRO 8 UNITS: 100 INJECTION, SOLUTION INTRAVENOUS; SUBCUTANEOUS at 17:13

## 2019-05-08 RX ADMIN — ASPIRIN 81 MG 81 MG: 81 TABLET ORAL at 09:37

## 2019-05-08 RX ADMIN — ALBUTEROL SULFATE 2.5 MG: 2.5 SOLUTION RESPIRATORY (INHALATION) at 14:30

## 2019-05-08 RX ADMIN — INSULIN LISPRO 4 UNITS: 100 INJECTION, SOLUTION INTRAVENOUS; SUBCUTANEOUS at 09:39

## 2019-05-08 RX ADMIN — AMIODARONE HYDROCHLORIDE 200 MG: 200 TABLET ORAL at 09:37

## 2019-05-08 ASSESSMENT — PAIN SCALES - GENERAL
PAINLEVEL_OUTOF10: 0
PAINLEVEL_OUTOF10: 0

## 2019-05-09 ENCOUNTER — CARE COORDINATION (OUTPATIENT)
Dept: CASE MANAGEMENT | Age: 74
End: 2019-05-09

## 2019-05-09 LAB
CASE NUMBER:: NORMAL
SPECIMEN DESCRIPTION: NORMAL

## 2019-05-09 NOTE — CARE COORDINATION
[address] Transitions Initial Follow Up Call    Call within 2 business days of discharge: Yes    Patient: Cornel Lemon Patient : 1945   MRN: [id number]  Reason for Admission: Pleural Effusion, CHF  Discharge Date: 19 RARS: Readmission Risk Score: 30      Last Discharge [address] [address]       Complaint Diagnosis Description Type Department Provider    19 Shortness of Breath Pleural effusion . .. ED to Hosp-Admission (Discharged) (ADMITTED) Cande Hayden MD; Silvestre MANTILLA Eit. .. Spoke with: Maria Teresa Malin, patient and Nathaniel Woods, wife    Facility: [facility]      Care Transitions 24 Hour Call    Do you have any ongoing symptoms?:  No  Do you have all of your prescriptions and are they filled?:  No  Have you scheduled your follow up appointment?:  Yes  How are you going to get to your appointment?:  Car - family or friend to transport  Were you discharged with any Home Care or Post Acute Services:  Yes  Post Acute Services:  Home Health  Patient DME:  Veronika Vital  Other Patient DME:  lift chair  Do you have support at home?:  Roommate/Housemate  Do you feel like you have everything you need to keep you well at home?:  Yes  Are you an active caregiver in your home?:  No  Care Transitions Interventions         Follow Up:  Initial 24 hour [address] follow up call completed. Spoke with patient. He states he is feeling really good. Reports being short of breath with exertion but at baseline. He is on 2 L of oxygen continuously. Continues to weigh himself daily. States his weight today is 286.4 lbs. He also reports swelling has decreased. Continues to elevate lower extremities when sitting down. Patient receiving [facility] health [facility]. Patient states he is getting around just fine. He is using a walker currently. Reviewed medication list with Nathaniel Woods, wife. 1111F completed.   She states they are waiting to hear from the pharmacy in regards to patient's Amiodarone, Melatonin, Metolazone. Kalynsa Ferrara also states they will need to check patient's BP prior to taking Metoprolol. She plans on purchasing a BP cuff. Patient has follow up appointments on Friday, 5/10/19 and Monday, 5/13/19. No needs or concerns at this time.          Future Appointments   Date Time Provider Cassie Mehta   5/10/2019  1:00 PM Connor Jung MD [facility]   5/13/2019  1:30 PM MD Marlo Marie   12/6/2019  8:30 AM MD Marlo Marie RN

## 2019-05-10 ENCOUNTER — HOSPITAL ENCOUNTER (OUTPATIENT)
Age: 74
Discharge: HOME | End: 2019-05-10
Payer: MEDICARE

## 2019-05-10 ENCOUNTER — HOSPITAL ENCOUNTER (OUTPATIENT)
Dept: GENERAL RADIOLOGY | Age: 74
Discharge: HOME | End: 2019-05-12
Payer: MEDICARE

## 2019-05-10 ENCOUNTER — HOSPITAL ENCOUNTER (OUTPATIENT)
Age: 74
Discharge: HOME | End: 2019-05-12
Payer: MEDICARE

## 2019-05-10 ENCOUNTER — OFFICE VISIT (OUTPATIENT)
Dept: CARDIOLOGY | Age: 74
End: 2019-05-10
Payer: MEDICARE

## 2019-05-10 VITALS
HEIGHT: 68 IN | DIASTOLIC BLOOD PRESSURE: 62 MMHG | BODY MASS INDEX: 44.19 KG/M2 | WEIGHT: 291.6 LBS | SYSTOLIC BLOOD PRESSURE: 93 MMHG | HEART RATE: 83 BPM | OXYGEN SATURATION: 96 %

## 2019-05-10 DIAGNOSIS — J90 RECURRENT LEFT PLEURAL EFFUSION: ICD-10-CM

## 2019-05-10 DIAGNOSIS — I50.32 CHRONIC DIASTOLIC CHF (CONGESTIVE HEART FAILURE), NYHA CLASS 3 (HCC): Primary | ICD-10-CM

## 2019-05-10 DIAGNOSIS — Z79.01 CHRONIC ANTICOAGULATION: ICD-10-CM

## 2019-05-10 DIAGNOSIS — I50.32 CHRONIC DIASTOLIC CHF (CONGESTIVE HEART FAILURE), NYHA CLASS 3 (HCC): ICD-10-CM

## 2019-05-10 DIAGNOSIS — I50.23 ACUTE ON CHRONIC SYSTOLIC HEART FAILURE (HCC): ICD-10-CM

## 2019-05-10 DIAGNOSIS — G47.33 OSA (OBSTRUCTIVE SLEEP APNEA): ICD-10-CM

## 2019-05-10 DIAGNOSIS — J90 RECURRENT RIGHT PLEURAL EFFUSION: ICD-10-CM

## 2019-05-10 DIAGNOSIS — I48.0 PAF (PAROXYSMAL ATRIAL FIBRILLATION) (HCC): ICD-10-CM

## 2019-05-10 DIAGNOSIS — I25.10 ASHD (ARTERIOSCLEROTIC HEART DISEASE): ICD-10-CM

## 2019-05-10 LAB
ABSOLUTE EOS #: 0.41 K/UL (ref 0–0.44)
ABSOLUTE IMMATURE GRANULOCYTE: 0 K/UL (ref 0–0.3)
ABSOLUTE LYMPH #: 3.4 K/UL (ref 1.1–3.7)
ABSOLUTE MONO #: 1.09 K/UL (ref 0.1–1.2)
ANION GAP SERPL CALCULATED.3IONS-SCNC: 7 MMOL/L (ref 9–17)
BASOPHILS # BLD: 0 % (ref 0–2)
BASOPHILS ABSOLUTE: 0 K/UL (ref 0–0.2)
BUN BLDV-MCNC: 27 MG/DL (ref 8–23)
BUN/CREAT BLD: 21 (ref 9–20)
CALCIUM SERPL-MCNC: 9.2 MG/DL (ref 8.6–10.4)
CHLORIDE BLD-SCNC: 91 MMOL/L (ref 98–107)
CO2: 39 MMOL/L (ref 20–31)
CREAT SERPL-MCNC: 1.28 MG/DL (ref 0.7–1.2)
DIFFERENTIAL TYPE: ABNORMAL
EOSINOPHILS RELATIVE PERCENT: 3 % (ref 1–4)
GFR AFRICAN AMERICAN: >60 ML/MIN
GFR NON-AFRICAN AMERICAN: 55 ML/MIN
GFR SERPL CREATININE-BSD FRML MDRD: ABNORMAL ML/MIN/{1.73_M2}
GFR SERPL CREATININE-BSD FRML MDRD: ABNORMAL ML/MIN/{1.73_M2}
GLUCOSE BLD-MCNC: 212 MG/DL (ref 70–99)
HCT VFR BLD CALC: 29.1 % (ref 40.7–50.3)
HEMOGLOBIN: 9 G/DL (ref 13–17)
IMMATURE GRANULOCYTES: 0 %
LYMPHOCYTES # BLD: 25 % (ref 24–43)
MCH RBC QN AUTO: 31.9 PG (ref 25.2–33.5)
MCHC RBC AUTO-ENTMCNC: 30.9 G/DL (ref 28.4–34.8)
MCV RBC AUTO: 103.2 FL (ref 82.6–102.9)
MONOCYTES # BLD: 8 % (ref 3–12)
MORPHOLOGY: NORMAL
NRBC AUTOMATED: 0 PER 100 WBC
PDW BLD-RTO: 16.2 % (ref 11.8–14.4)
PLATELET # BLD: 282 K/UL (ref 138–453)
PLATELET ESTIMATE: ABNORMAL
PMV BLD AUTO: 8.7 FL (ref 8.1–13.5)
POTASSIUM SERPL-SCNC: 5.4 MMOL/L (ref 3.7–5.3)
RBC # BLD: 2.82 M/UL (ref 4.21–5.77)
RBC # BLD: ABNORMAL 10*6/UL
SEG NEUTROPHILS: 64 % (ref 36–65)
SEGMENTED NEUTROPHILS ABSOLUTE COUNT: 8.7 K/UL (ref 1.5–8.1)
SODIUM BLD-SCNC: 137 MMOL/L (ref 135–144)
SURGICAL PATHOLOGY REPORT: NORMAL
WBC # BLD: 13.6 K/UL (ref 3.5–11.3)
WBC # BLD: ABNORMAL 10*3/UL

## 2019-05-10 PROCEDURE — 85025 COMPLETE CBC W/AUTO DIFF WBC: CPT

## 2019-05-10 PROCEDURE — 71046 X-RAY EXAM CHEST 2 VIEWS: CPT

## 2019-05-10 PROCEDURE — 3017F COLORECTAL CA SCREEN DOC REV: CPT | Performed by: INTERNAL MEDICINE

## 2019-05-10 PROCEDURE — 80048 BASIC METABOLIC PNL TOTAL CA: CPT

## 2019-05-10 PROCEDURE — 36415 COLL VENOUS BLD VENIPUNCTURE: CPT

## 2019-05-10 PROCEDURE — 1123F ACP DISCUSS/DSCN MKR DOCD: CPT | Performed by: INTERNAL MEDICINE

## 2019-05-10 PROCEDURE — 4040F PNEUMOC VAC/ADMIN/RCVD: CPT | Performed by: INTERNAL MEDICINE

## 2019-05-10 PROCEDURE — G8417 CALC BMI ABV UP PARAM F/U: HCPCS | Performed by: INTERNAL MEDICINE

## 2019-05-10 PROCEDURE — 99214 OFFICE O/P EST MOD 30 MIN: CPT | Performed by: INTERNAL MEDICINE

## 2019-05-10 PROCEDURE — G8427 DOCREV CUR MEDS BY ELIG CLIN: HCPCS | Performed by: INTERNAL MEDICINE

## 2019-05-10 PROCEDURE — 1111F DSCHRG MED/CURRENT MED MERGE: CPT | Performed by: INTERNAL MEDICINE

## 2019-05-10 PROCEDURE — 1036F TOBACCO NON-USER: CPT | Performed by: INTERNAL MEDICINE

## 2019-05-10 PROCEDURE — G8598 ASA/ANTIPLAT THER USED: HCPCS | Performed by: INTERNAL MEDICINE

## 2019-05-10 NOTE — PROGRESS NOTES
sided thoracentesis. Mr. Greg Blake is here to follow up after being admitted from 05/06-05/08. He continues to be short of breath and was sent home on 2L OF O2 at all times. He says he is more comfortable at home, though he does nto feel much better since discharge from the hospital. His tremors seem to worsening. He has difficulty falling asleep. He denies any breathing issues during sleep. He is scheduled to have a sleep study done. No chest pain, pressure or tightness. No palpitations or dizziness. No history of loss of consciousness. No significant change in his weight. No problems with current medications. Exercise Tolerance: Mr. Greg Blake reports that he has a fairly poor exercise tolerance. His says that he could walk about 50 feet without having to stop due to shortness of breath. I reviewed his pleural fluid analysis, many neutrophils and lymphocytes but no growth. Cytology is Negative for malignancy. Pleural fluid protein is 2.8, by mouth oral fluid to serum protein ratio more than 0.5  Elevated LDH. PAST MEDICAL HISTORY:        Past Medical History:   Diagnosis Date    Abnormal nuclear stress test 03/12/2010    Stress and resting cardiolite images showed inferior wall hypoperfusion suggestive of previous myocardial infarction. Left ventricular wall motion showed inferior wall hypokinesia. EF 58%.  Arrhythmia 2007    A single episode    Atrial fibrillation ([address] 2007    Single Episode    CAD (coronary artery disease) 1996    MI    DM type 2 (diabetes mellitus, type 2) ([facility] 2005    History of echocardiogram 18/98/5068    LV systolic function mildly/moderately reduced. EF 40%. EF vis Avilez's method is 40%. [place] and apical segments are hypokinetic    Hx of echocardiogram 04/05/2007    EF 62%, Increased left atrial and right ventricular diametes. Increased septal and posterior wall thickness suggest left ventricular hypertrophy.  Valves were normal.        CURRENT ALLERGIES: Patient has no known allergies. REVIEW OF SYSTEMS: 14 systems were reviewed. Pertinent positives and negatives as above, all else negative. Past Surgical History:   Procedure Laterality Date    CARDIOVERSION  2007    Successful-Dr. Tera Arroyo    CARDIOVERSION      CORONARY ANGIOPLASTY      Social History:  Social History     Tobacco Use    Smoking status: Former Smoker     Packs/day: 1.50     Years: 30.00     Pack years: 45.00     Types: Cigarettes     Last attempt to quit: 1996     Years since quittin.9    Smokeless tobacco: Never Used   Substance Use Topics    Alcohol use: No    Drug use: No        CURRENT MEDICATIONS:        Outpatient Medications Marked as Taking for the 5/10/19 encounter (Office Visit) with Lexii Acuña MD   Medication Sig Dispense Refill    aspirin 81 MG tablet Take 1 tablet by mouth daily 30 tablet 3    albuterol (PROVENTIL) (2.5 MG/3ML) 0.083% nebulizer solution Take 3 mLs by nebulization 4 times daily 120 each 0    amiodarone (CORDARONE) 200 MG tablet Take 1 tablet by mouth daily 30 tablet 0    dabigatran (PRADAXA) 150 MG capsule Take 1 capsule by mouth 2 times daily 60 capsule 0    lactobacillus (CULTURELLE) capsule Take 2 capsules by mouth 2 times daily (with meals) 30 capsule 0    atorvastatin (LIPITOR) 40 MG tablet Take 1 tablet by mouth daily 30 tablet 0    metoprolol tartrate (LOPRESSOR) 25 MG tablet Take 0.5 tablets by mouth 2 times daily Hold for SBP<100 and/or HR<60.  Otherwise give 60 tablet 0    furosemide (LASIX) 40 MG tablet Take 1.5 tablets by mouth 2 times daily (9am and 3pm) 90 tablet 0    docusate sodium (COLACE) 100 MG capsule Take 1 capsule by mouth daily      polyethylene glycol (GLYCOLAX) packet Take 17 g by mouth daily 527 g 1    metolazone (ZAROXOLYN) 2.5 MG tablet Take 1 tablet by mouth every other day , , , TAKE 30 minutes prior to morning lasix dose 30 tablet 0    acetaminophen (TYLENOL) 325 MG tablet Take 650 mg by mouth every 6 hours as needed for Pain or Fever      Cholecalciferol (VITAMIN D3) 2000 units CAPS Take 2,000 Units by mouth daily      vitamin B-12 (CYANOCOBALAMIN) 1000 MCG tablet Take 1,000 mcg by mouth daily      insulin glargine (LANTUS) 100 UNIT/ML injection vial Inject 10 Units into the skin nightly 1 vial 3       FAMILY HISTORY: His family history contains his father, mother and brother who had heart disease. His dad started having issues since he was 59. Also his mother and brother had heart disease in there 52's. Physical Examination:     BP 93/62 (Site: Right Upper Arm, Position: Sitting, Cuff Size: Medium Adult)   Pulse 83   Ht 5' 8\" (1.727 m)   Wt 291 lb 9.6 oz (132.3 kg)   SpO2 96%   BMI 44.34 kg/m²  Body mass index is 44.34 kg/m². Constitutional: He is oriented to person, place, and time. He appears well-developed and well-nourished. In no acute distress. HEENT: Normocephalic and atraumatic. No JVD present. Carotid bruit is not present. No mass and no thyromegaly present. No lymphadenopathy present. Cardiovascular: Normal rate, regular rhythm, normal heart sounds. Exam reveals no gallop and no friction rubs. 2/6 systolic murmur, 5th intercostal space on the LEFT in the mid-clavicular line (cardiac apex). Pulmonary/Chest: Effort normal and breath sounds normal. No respiratory distress. He has no rhonchi or rales. Decreased lung sound at the bases bilaterally with wheezing noted. Abdominal: Soft, non-tender. Bowel sounds and aorta are normal. He exhibits no organomegaly, mass or bruit. Extremities: 2+ up to the knees bilaterally. No cyanosis or clubbing. 2+ radial and carotid pulses. Distal extremity pulses: 2+ bilaterally. .  Neurological: He is alert and oriented to person, place, and time. No evidence of gross cranial nerve deficit. Coordination appeared normal.   Skin: Skin is warm and dry. There is no rash or diaphoresis. Pale in color.   Psychiatric: He compression stockings and I advised them to try and keep their legs up whenever possible and to limit salt in their diet.  Basal metabolic panel reviewed, creatinine 1.28, BUN 21.  Serum potassium is 5.4   I will schedule him to repeat blood work on Monday.  I reviewed his pleural fluid analysis. Cytology is negative for malignancy. Seems to be a mixture of transudate effusion with inflammatory component.  We will schedule him to see Dr. Vikas Graham next week.  Pending sleep study. Paroxysmal Atrial Fibrillation: Rhythm Control  Beta Blocker: Continue metoprolol tartrate (Lopressor) 12.5 mg twice daily. Calcium Channel Blocker: Not indicated      Rhythm Control Agent: Continue Amiodarone (Cordarone) 200 mg once daily   Monitoring: Amiodarone Since he is being maintained on Amiodarone, I told him that we will need to closely monitor him for potential side effects. These include monitoring LFTs and TSH at least every 6 months as well as chest x-rays, pulmonary function tests, and eye exams at least on a yearly basis. Stroke Risk: His CHADS2-VASc score is greater than 2 (2.2% stroke risk)  Anticoagulation: Not indicated   Additional Testing: None         Chronic hypoxemic respiratory failure, recurrent right-sided pleural effusion.  Obstructive sleep apnea/obesity hypoventilation syndrome.  Follow-up with Dr. Vikas Graham as outlined above. FOLLOW UP:   I told Mr. Acharya to call my office if he had any problems, but otherwise I asked him to Return for 2 weeks in [facility]. However, I would be happy to see him sooner should the need arise. Sincerely,  Pauly Thomas MD, F.A.C.C. [facility] Cardiology Specialist    [address]  [facility], [address] [place] [address]  Phone: [phone], Fax: [phone]     I believe that the risk of significant morbidity and mortality related to the patient's current medical conditions are: intermediate-high.       The documentation recorded by the scribe, accurately

## 2019-05-10 NOTE — PATIENT INSTRUCTIONS
SURVEY:    You may be receiving a survey from [employer] regarding your visit today. Please complete the survey to enable us to provide the highest quality of care to you and your family. If you cannot score us a very good on any question, please call the office to discuss how we could have made your experience a very good one. Thank you.

## 2019-05-11 LAB
CULTURE: NORMAL
CULTURE: NORMAL
Lab: NORMAL
Lab: NORMAL
SPECIMEN DESCRIPTION: NORMAL
SPECIMEN DESCRIPTION: NORMAL

## 2019-05-13 ENCOUNTER — HOSPITAL ENCOUNTER (OUTPATIENT)
Age: 74
Setting detail: SPECIMEN
Discharge: HOME | End: 2019-05-13
Payer: MEDICARE

## 2019-05-13 PROBLEM — J10.1 INFLUENZA A WITH RESPIRATORY MANIFESTATIONS: Status: RESOLVED | Noted: 2019-03-18 | Resolved: 2019-05-13

## 2019-05-13 PROBLEM — J96.02 ACUTE HYPERCAPNIC RESPIRATORY FAILURE (HCC): Status: RESOLVED | Noted: 2019-03-18 | Resolved: 2019-05-13

## 2019-05-13 LAB
ALBUMIN SERPL-MCNC: 3.3 G/DL (ref 3.5–5.2)
ALBUMIN/GLOBULIN RATIO: 1 (ref 1–2.5)
ALP BLD-CCNC: 137 U/L (ref 40–129)
ALT SERPL-CCNC: 13 U/L (ref 5–41)
ANION GAP SERPL CALCULATED.3IONS-SCNC: 10 MMOL/L (ref 9–17)
AST SERPL-CCNC: 12 U/L
BILIRUB SERPL-MCNC: 0.47 MG/DL (ref 0.3–1.2)
BUN BLDV-MCNC: 30 MG/DL (ref 8–23)
BUN/CREAT BLD: 26 (ref 9–20)
CALCIUM SERPL-MCNC: 9 MG/DL (ref 8.6–10.4)
CHLORIDE BLD-SCNC: 87 MMOL/L (ref 98–107)
CO2: 38 MMOL/L (ref 20–31)
CREAT SERPL-MCNC: 1.14 MG/DL (ref 0.7–1.2)
GFR AFRICAN AMERICAN: >60 ML/MIN
GFR NON-AFRICAN AMERICAN: >60 ML/MIN
GFR SERPL CREATININE-BSD FRML MDRD: ABNORMAL ML/MIN/{1.73_M2}
GFR SERPL CREATININE-BSD FRML MDRD: ABNORMAL ML/MIN/{1.73_M2}
GLUCOSE BLD-MCNC: 346 MG/DL (ref 70–99)
POTASSIUM SERPL-SCNC: 4.1 MMOL/L (ref 3.7–5.3)
SODIUM BLD-SCNC: 135 MMOL/L (ref 135–144)
TOTAL PROTEIN: 6.5 G/DL (ref 6.4–8.3)

## 2019-05-13 PROCEDURE — 80053 COMPREHEN METABOLIC PANEL: CPT

## 2019-05-14 ENCOUNTER — CARE COORDINATION (OUTPATIENT)
Dept: CASE MANAGEMENT | Age: 74
End: 2019-05-14

## 2019-05-14 LAB
CULTURE: ABNORMAL
DIRECT EXAM: ABNORMAL
Lab: ABNORMAL
SPECIMEN DESCRIPTION: ABNORMAL

## 2019-05-14 NOTE — CARE COORDINATION
[facility] Transitions Follow Up Call    2019    Patient: Muna Parikh  Patient : 1945   MRN: [id number]  Reason for Admission: CHF  Discharge Date: 19 RARS: Readmission Risk Score: 27         Spoke with: [facility] Transitions Subsequent and Final Call    Subsequent and Final Calls  Do you have any ongoing symptoms?:  Yes  Onset of Patient-reported symptoms:  Today  Patient-reported symptoms:  Shortness of Breath  Interventions for patient-reported symptoms:  Other  Have your medications changed?:  Yes  Patient Reports:  Zaroxolyn 2.5 MG daily 30\" prior to diuretic  Do you have any questions related to your medications?:  No  Do you currently have any active services?:  No  Are you currently active with any services?:  Home Health  Do you have any needs or concerns that I can assist you with?:  No  Identified Barriers:  None  Care Transitions Interventions  Other Interventions:          CTC spoke to [facility] for f/u transitions call. Pt had PCP appt yesterday, stated his weight today was 187.6 and BP was \"a little low this morning\". Denies dizziness, light headedness. Stated he continues to have SOB but is slowly inproving, is wearing his 02 2 LPM continuously. Stated he did not sleep well last night which is a chronic issue. Pt has f/u CXR scheduled and verified he will call PCP on  with update of daily weights.      King Klein RN BSN   Care Transitions Coordinator  [phone]     Follow Up  Future Appointments   Date Time Provider Cassie Mehta   2019  2:30 PM MD Jacki Grande [facility]   2019  2:00 PM [facility]   2019  1:10 PM MD Marlo Puri   2019  8:30 AM MD Marlo Puri RN

## 2019-05-15 ENCOUNTER — HOSPITAL ENCOUNTER (OUTPATIENT)
Dept: SLEEP CENTER | Age: 74
Discharge: HOME | End: 2019-05-15
Payer: MEDICARE

## 2019-05-15 DIAGNOSIS — G47.33 OSA (OBSTRUCTIVE SLEEP APNEA): ICD-10-CM

## 2019-05-15 DIAGNOSIS — R09.02 HYPOXIA: ICD-10-CM

## 2019-05-15 DIAGNOSIS — I50.23 ACUTE ON CHRONIC SYSTOLIC HEART FAILURE (HCC): ICD-10-CM

## 2019-05-15 DIAGNOSIS — J90 PLEURAL EFFUSION ON LEFT: ICD-10-CM

## 2019-05-15 DIAGNOSIS — R40.0 DAYTIME SLEEPINESS: ICD-10-CM

## 2019-05-15 DIAGNOSIS — R53.81 DEBILITY: ICD-10-CM

## 2019-05-15 PROCEDURE — 95811 POLYSOM 6/>YRS CPAP 4/> PARM: CPT

## 2019-05-15 ASSESSMENT — SLEEP AND FATIGUE QUESTIONNAIRES
HOW LIKELY ARE YOU TO NOD OFF OR FALL ASLEEP WHILE SITTING AND READING: 3
HOW LIKELY ARE YOU TO NOD OFF OR FALL ASLEEP WHILE SITTING AND TALKING TO SOMEONE: 3
HOW LIKELY ARE YOU TO NOD OFF OR FALL ASLEEP WHILE SITTING INACTIVE IN A PUBLIC PLACE: 3
HOW LIKELY ARE YOU TO NOD OFF OR FALL ASLEEP IN A CAR, WHILE STOPPED FOR A FEW MINUTES IN TRAFFIC: 0
HOW LIKELY ARE YOU TO NOD OFF OR FALL ASLEEP WHILE SITTING QUIETLY AFTER LUNCH WITHOUT ALCOHOL: 3
ESS TOTAL SCORE: 21
HOW LIKELY ARE YOU TO NOD OFF OR FALL ASLEEP WHEN YOU ARE A PASSENGER IN A CAR FOR AN HOUR WITHOUT A BREAK: 3
HOW LIKELY ARE YOU TO NOD OFF OR FALL ASLEEP WHILE WATCHING TV: 3
HOW LIKELY ARE YOU TO NOD OFF OR FALL ASLEEP WHILE LYING DOWN TO REST IN THE AFTERNOON WHEN CIRCUMSTANCES PERMIT: 3

## 2019-05-16 ENCOUNTER — OFFICE VISIT (OUTPATIENT)
Dept: PULMONOLOGY | Age: 74
End: 2019-05-16
Payer: MEDICARE

## 2019-05-16 ENCOUNTER — HOSPITAL ENCOUNTER (OUTPATIENT)
Age: 74
Discharge: HOME | End: 2019-05-16
Payer: OTHER GOVERNMENT

## 2019-05-16 VITALS
DIASTOLIC BLOOD PRESSURE: 56 MMHG | OXYGEN SATURATION: 89 % | RESPIRATION RATE: 24 BRPM | SYSTOLIC BLOOD PRESSURE: 93 MMHG | WEIGHT: 287.2 LBS | HEIGHT: 68 IN | HEART RATE: 88 BPM | TEMPERATURE: 98.5 F | BODY MASS INDEX: 43.53 KG/M2

## 2019-05-16 DIAGNOSIS — E66.01 MORBID OBESITY WITH BMI OF 40.0-44.9, ADULT (HCC): ICD-10-CM

## 2019-05-16 DIAGNOSIS — G47.33 OBSTRUCTIVE SLEEP APNEA: ICD-10-CM

## 2019-05-16 DIAGNOSIS — I48.20 ATRIAL FIBRILLATION, CHRONIC (HCC): ICD-10-CM

## 2019-05-16 DIAGNOSIS — J96.11 CHRONIC RESPIRATORY FAILURE WITH HYPOXIA AND HYPERCAPNIA (HCC): Primary | ICD-10-CM

## 2019-05-16 DIAGNOSIS — J44.9 CHRONIC OBSTRUCTIVE PULMONARY DISEASE, UNSPECIFIED COPD TYPE (HCC): ICD-10-CM

## 2019-05-16 DIAGNOSIS — I50.42 CHRONIC COMBINED SYSTOLIC AND DIASTOLIC CONGESTIVE HEART FAILURE (HCC): ICD-10-CM

## 2019-05-16 DIAGNOSIS — J96.12 CHRONIC RESPIRATORY FAILURE WITH HYPOXIA AND HYPERCAPNIA (HCC): Primary | ICD-10-CM

## 2019-05-16 DIAGNOSIS — J90 PLEURAL EFFUSION ON LEFT: ICD-10-CM

## 2019-05-16 DIAGNOSIS — E66.2 OBESITY HYPOVENTILATION SYNDROME (HCC): ICD-10-CM

## 2019-05-16 LAB — STATUS: NORMAL

## 2019-05-16 PROCEDURE — 4040F PNEUMOC VAC/ADMIN/RCVD: CPT | Performed by: INTERNAL MEDICINE

## 2019-05-16 PROCEDURE — G8598 ASA/ANTIPLAT THER USED: HCPCS | Performed by: INTERNAL MEDICINE

## 2019-05-16 PROCEDURE — 99215 OFFICE O/P EST HI 40 MIN: CPT | Performed by: INTERNAL MEDICINE

## 2019-05-16 PROCEDURE — G8417 CALC BMI ABV UP PARAM F/U: HCPCS | Performed by: INTERNAL MEDICINE

## 2019-05-16 PROCEDURE — 1111F DSCHRG MED/CURRENT MED MERGE: CPT | Performed by: INTERNAL MEDICINE

## 2019-05-16 PROCEDURE — 3023F SPIROM DOC REV: CPT | Performed by: INTERNAL MEDICINE

## 2019-05-16 PROCEDURE — 3017F COLORECTAL CA SCREEN DOC REV: CPT | Performed by: INTERNAL MEDICINE

## 2019-05-16 PROCEDURE — G8926 SPIRO NO PERF OR DOC: HCPCS | Performed by: INTERNAL MEDICINE

## 2019-05-16 PROCEDURE — G8428 CUR MEDS NOT DOCUMENT: HCPCS | Performed by: INTERNAL MEDICINE

## 2019-05-16 PROCEDURE — 1036F TOBACCO NON-USER: CPT | Performed by: INTERNAL MEDICINE

## 2019-05-16 PROCEDURE — 1123F ACP DISCUSS/DSCN MKR DOCD: CPT | Performed by: INTERNAL MEDICINE

## 2019-05-16 NOTE — PATIENT INSTRUCTIONS
SURVEY:    You may be receiving a survey from [employer] regarding your visit today. Please complete the survey to enable us to provide the highest quality of care to you and your family. If you cannot score us a very good on any question, please call the office to discuss how we could have made your experience a very good one. Thank you.

## 2019-05-16 NOTE — PROGRESS NOTES
OUTPATIENT PULMONARY CONSULT NOTE      Patient:  Tika Santana  MRN: [id number]    Consulting Physician: Dr. Viet Milligan  Reason for Consult: Dyspnea/hypoxic respiratory failure/pleural effusion  Primacy Care Physician: Yessica Reynolds MD    HISTORY OF PRESENT ILLNESS:   The patient is a 69 y.o. male   He is in the office for the first time  He was admitted to [facility] in late April and early May with acute on chronic hypoxic hypercapnic respiratory failure, acute on chronic CHF, left recurrent pleural effusion and had so far 3 thoracenteses done, he was seen by Dr. Raza Roque on May 6 for evaluation of left pleural effusion, his thoracentesis was consistent with exudative pleural effusion with large number of RBCs, negative for malignancy and negative for infection and AFB, he had 3 thoracentesis done but have recurrent pleural effusion. He is on home oxygen which he is using 2 L, during his admission in April he had acute hypercapnic respiratory failure with mental status changes and during hospitalization he was treated with IV diuretics with noninvasive ventilation. He had been followed up by cardiology he is on Lasix twice daily and on Zaroxolyn and he continued to have anasarca and pedal edema. He is using albuterol aerosol 3-4 times a day    He has history of prolonged course of hospitalization in March/April at [place] when he was intubated from respiratory failure, mental status changes, influenza A, CHF, type II MI, prolonged mechanical ventilation, acute renal failure requiring hemodialysis which improved, atrial fibrillation on anticoagulation had retroperitoneal bleeding requiring massive transfusion during hospitalization and ultimately improvement in mentation and was extubated and was discharged to nursing home/rehab facility and then was admitted twice to [facility] as above.     He has shortness of breath on any activity and exertion and at rest.  He continued to have bilateral pedal edema which she claimed chronic also. He denies cough wheezing or chest tightness. He has chronic 2-3. Orthopnea which he continued. He has significant sleep fragmentation, snoring waking up at night with snoring and choking, excessive daytime sleepiness, tiredness and fatigue, unrefreshing sleep, multiple naps during the daytime and doze off easily during the daytime. He had sleep study done last night and he claimed that when he was put on CPAP/BiPAP he slept very well. He has history of smoking 2 packs per day for 30 years and he stop smoking 1996 or more he was not diagnosed with COPD in the past.  Used to drive trucks, different jobs        Past Medical History:        Diagnosis Date    Abnormal nuclear stress test 03/12/2010    Stress and resting cardiolite images showed inferior wall hypoperfusion suggestive of previous myocardial infarction. Left ventricular wall motion showed inferior wall hypokinesia. EF 58%.  Arrhythmia 2007    A single episode    Atrial fibrillation ([address] 2007    Single Episode    CAD (coronary artery disease) 1996    MI    DM type 2 (diabetes mellitus, type 2) ([address] 2005    History of echocardiogram 02/67/9922    LV systolic function mildly/moderately reduced. EF 40%. EF vis Avilez's method is 40%. [place] and apical segments are hypokinetic    Hx of echocardiogram 04/05/2007    EF 62%, Increased left atrial and right ventricular diametes. Increased septal and posterior wall thickness suggest left ventricular hypertrophy.  Valves were normal.        Past Surgical History:        Procedure Laterality Date    CARDIOVERSION  05/18/2007    Successful-Dr. Tera Arroyo    CARDIOVERSION  03/13    CORONARY ANGIOPLASTY  1996       Allergies:    No Known Allergies      Home Meds:   Outpatient Encounter Medications as of 5/16/2019   Medication Sig Dispense Refill    metolazone (ZAROXOLYN) 2.5 MG tablet Take 1 tablet by mouth daily Tuesdays, Thursdays, Saturdays, TAKE 30 minutes prior to morning lasix dose 30 tablet 0    aspirin 81 MG tablet Take 1 tablet by mouth daily 30 tablet 3    albuterol (PROVENTIL) (2.5 MG/3ML) 0.083% nebulizer solution Take 3 mLs by nebulization 4 times daily 120 each 0    amiodarone (CORDARONE) 200 MG tablet Take 1 tablet by mouth daily 30 tablet 0    dabigatran (PRADAXA) 150 MG capsule Take 1 capsule by mouth 2 times daily 60 capsule 0    lactobacillus (CULTURELLE) capsule Take 2 capsules by mouth 2 times daily (with meals) 30 capsule 0    atorvastatin (LIPITOR) 40 MG tablet Take 1 tablet by mouth daily 30 tablet 0    metoprolol tartrate (LOPRESSOR) 25 MG tablet Take 0.5 tablets by mouth 2 times daily Hold for SBP<100 and/or HR<60. Otherwise give 60 tablet 0    furosemide (LASIX) 40 MG tablet Take 1.5 tablets by mouth 2 times daily (9am and 3pm) 90 tablet 0    docusate sodium (COLACE) 100 MG capsule Take 1 capsule by mouth daily      polyethylene glycol (GLYCOLAX) packet Take 17 g by mouth daily 527 g 1    acetaminophen (TYLENOL) 325 MG tablet Take 650 mg by mouth every 6 hours as needed for Pain or Fever      Cholecalciferol (VITAMIN D3) 2000 units CAPS Take 2,000 Units by mouth daily      vitamin B-12 (CYANOCOBALAMIN) 1000 MCG tablet Take 1,000 mcg by mouth daily      melatonin 3 MG TABS tablet Take 3 mg by mouth daily      insulin glargine (LANTUS) 100 UNIT/ML injection vial Inject 10 Units into the skin nightly 1 vial 3     No facility-administered encounter medications on file as of 5/16/2019. Social History:   TOBACCO:   reports that he quit smoking about 22 years ago. His smoking use included cigarettes. He has a 45.00 pack-year smoking history. He has never used smokeless tobacco.  ETOH:   reports that he does not drink alcohol. OCCUPATION:      Family History:   No family history on file.     Immunizations:    Immunization History   Administered Date(s) Administered    Influenza Vaccine, unspecified visual disturbance, coordination problems, tremor, dysphagia, weakness, numbness, syncope and tingling  BEHAVIOR/PSYCH:  negative  SLEEP: Positive for snoring, choking, gasping, periods of not breathing, excessive daytime sleepiness, falling asleep while reading, watching television, disrupted sleep, naps        Physical Exam:    Vitals: BP (!) 93/56   Pulse 88   Temp 98.5 °F (36.9 °C)   Resp 24   Ht 5' 8\" (1.727 m)   Wt 287 lb 3.2 oz (130.3 kg)   SpO2 (!) 89% Comment: o2 2lpm  BMI 43.67 kg/m²   Last 3 weights: Wt Readings from Last 3 Encounters:   05/16/19 287 lb 3.2 oz (130.3 kg)   05/13/19 286 lb (129.7 kg)   05/10/19 291 lb 9.6 oz (132.3 kg)     Body mass index is 43.67 kg/m². Physical Examination:   General appearance - alert, well appearing, and in no distress, oriented to person, place, and time, overweight, chronically ill appearing  Mental status - alert, oriented to person, place, and time  Eyes - pupils equal and reactive, extraocular eye movements intact, sclera anicteric  Ears - right ear normal, left ear normal  Nose - normal and patent, no erythema, discharge or polyps  Mouth - mucous membranes moist, pharynx normal without lesions and large tongue, small oropharynx, Mallampati 3  Neck - supple, no significant adenopathy, positive JVD  Chest - mild tachypnea present, no retractions or cyanosis, symmetrical chest movement is present, tenderness on percussion present at the left base, air entry is bilateral decreased air entry present at the left base.   Heart - irregularly irregular rhythm with rate 80, S3 present, systolic murmur 2/6 at lower left sternal border  Abdomen - soft, nontender, nondistended, no masses or organomegaly  Neurological - alert, oriented, normal speech, no focal findings or movement disorder noted}  Extremities - pedal edema +++, intact peripheral pulses  Skin - normal coloration and turgor, no rashes, no suspicious skin lesions noted       LABS:    CBC:   WBC   Date Value Ref Range Status   05/10/2019 13.6 (H) 3.5 - 11.3 k/uL Final   05/08/2019 13.6 (H) 3.5 - 11.3 k/uL Final   05/07/2019 13.2 (H) 3.5 - 11.3 k/uL Final     Hemoglobin   Date Value Ref Range Status   05/10/2019 9.0 (L) 13.0 - 17.0 g/dL Final   05/08/2019 8.9 (L) 13.0 - 17.0 g/dL Final   05/07/2019 8.8 (L) 13.0 - 17.0 g/dL Final     Platelets   Date Value Ref Range Status   05/10/2019 282 138 - 453 k/uL Final   05/08/2019 278 138 - 453 k/uL Final   05/07/2019 278 138 - 453 k/uL Final     BMP:   Sodium   Date Value Ref Range Status   05/13/2019 135 135 - 144 mmol/L Final   05/10/2019 137 135 - 144 mmol/L Final   05/08/2019 136 135 - 144 mmol/L Final     Potassium   Date Value Ref Range Status   05/13/2019 4.1 3.7 - 5.3 mmol/L Final   05/10/2019 5.4 (H) 3.7 - 5.3 mmol/L Final   05/08/2019 4.1 3.7 - 5.3 mmol/L Final     Chloride   Date Value Ref Range Status   05/13/2019 87 (L) 98 - 107 mmol/L Final   05/10/2019 91 (L) 98 - 107 mmol/L Final   05/08/2019 92 (L) 98 - 107 mmol/L Final     CO2   Date Value Ref Range Status   05/13/2019 38 (H) 20 - 31 mmol/L Final   05/10/2019 39 (H) 20 - 31 mmol/L Final   05/08/2019 35 (H) 20 - 31 mmol/L Final     BUN   Date Value Ref Range Status   05/13/2019 30 (H) 8 - 23 mg/dL Final   05/10/2019 27 (H) 8 - 23 mg/dL Final   05/08/2019 24 (H) 8 - 23 mg/dL Final     CREATININE   Date Value Ref Range Status   05/13/2019 1.14 0.70 - 1.20 mg/dL Final   05/10/2019 1.28 (H) 0.70 - 1.20 mg/dL Final   05/08/2019 1.01 0.70 - 1.20 mg/dL Final     Glucose   Date Value Ref Range Status   05/13/2019 346 (H) 70 - 99 mg/dL Final   05/10/2019 212 (H) 70 - 99 mg/dL Final   05/08/2019 228 (H) 70 - 99 mg/dL Final   11/26/2018 104 (H) 70 - 99 mg/dL Final     Comment:         DIAGNOSTIC THRESHOLDS FOR DIABETES AND IMPAIRED FASTING GLUCOSE (IFG)                                        FASTING PLASMA GLUCOSE                NORMAL                       <100     mg/dL                IFG 100-125  mg/dL                DIABETES                     >/= 126  mg/dL                GESTATIONAL DIABETES         >/= 92   mg/dL     08/31/2016 129 (H) 70 - 100 mg/dl Final     Comment:           DIAGNOSTIC THRESHOLDS FOR DIABETES AND IMPAIRED FASTING GLUCOSE (IFG)                                        FASTING PLASMA GLUCOSE                NORMAL                       <100     MG/DL                IFG                          100-125  MG/DL                DIABETES                     >/= 126  MG/DL     09/14/2015 145 (H) 70 - 100 mg/dl Final     Comment:           DIAGNOSTIC THRESHOLDS FOR DIABETES AND IMPAIRED FASTING GLUCOSE (IFG)                                        FASTING PLASMA GLUCOSE                NORMAL                       <100     MG/DL                IFG                          100-125  MG/DL                DIABETES                     >/= 126  MG/DL       Hepatic:   AST   Date Value Ref Range Status   05/13/2019 12 <40 U/L Final   05/08/2019 15 <40 U/L Final   05/07/2019 13 <40 U/L Final     ALT   Date Value Ref Range Status   05/13/2019 13 5 - 41 U/L Final   05/08/2019 16 5 - 41 U/L Final   05/07/2019 17 5 - 41 U/L Final     Total Bilirubin   Date Value Ref Range Status   05/13/2019 0.47 0.3 - 1.2 mg/dL Final   05/08/2019 0.57 0.3 - 1.2 mg/dL Final   05/07/2019 0.45 0.3 - 1.2 mg/dL Final     Alkaline Phosphatase   Date Value Ref Range Status   05/13/2019 137 (H) 40 - 129 U/L Final   05/08/2019 128 40 - 129 U/L Final   05/07/2019 128 40 - 129 U/L Final     Amylase:   Amylase   Date Value Ref Range Status   03/15/2019 47 28 - 100 U/L Final     Lipase:   Lipase   Date Value Ref Range Status   03/15/2019 47 13 - 60 U/L Final     CARDIAC ENZYMES: No results found for: CKTOTAL, CKMB, CKMBINDEX, TROPONINI  BNP: No results found for: BNP  Lipids:   Cholesterol   Date Value Ref Range Status   05/07/2019 123 <200 mg/dL Final     Comment:        Cholesterol Guidelines:      <200  Desirable   200-240 Pulmonary Functions Testing Results:    No results found for: FEV1, FVC, CSW7YCO, TLC, DLCO    CXR  05/10/19  1. Persistent, moderate left pleural effusion, with associated dense   atelectasis or infiltrate at the left base.  This appears grossly stable   compared to May 8, 2019.   2. Cardiomegaly with stable vascular congestive changes and mild interstitial   edema pattern       CT Scans  Chest and abdomen 05/06/19  Large left-sided effusion with adjacent consolidation representing   atelectasis versus pneumonia.       Trace right-sided effusion.       Mild ground-glass attenuation throughout the right lung that may relate to   infectious or inflammatory process.       Dilated main pulmonary artery that may relate to a degree of pulmonary   hypertension.       Retroperitoneal fluid collection involving the left psoas muscle that is   smaller compared to prior examination consistent with a residual/resolving   retroperitoneal hemorrhage.       Uncomplicated colonic diverticulosis.               ECHO:   Left Ventricle  Global left ventricular systolic function is mild to moderately reduced. Estimated ejection fraction is 40 % . Calculated EF via Avilez's method is 40 %. The apex and apical segments are hypokinetic. Pleural fluid studies   Ref.  Range 4/24/2019 11:45 5/8/2019 09:00   Appearance, Fluid Unknown CLOUDY CLOUDY   Basos, Fluid Latest Ref Range: 0 %  1 (H)   Color, Fluid Unknown RED MARIAN   Eos, Fluid Latest Ref Range: 0 %  1 (H)   Glucose, Fluid Latest Units: mg/dL 155 253   LD, Fluid Latest Units: U/L 1,494 413   RBC, Fluid Latest Units: /mm3 242,000 29,000   Lymphocytes, Body Fluid Latest Ref Range: 0 % 29 (H) 65 (H)   Monocyte Count, Fluid Latest Ref Range: 0 %  3 (H)   Neutrophil Count, Fluid Latest Ref Range: 0 % 19 (H) 29 (H)   pH, Fluid Unknown 8.5 8.0   Total Protein, Body Fluid Latest Units: g/dL  2.8   WBC, Fluid Latest Units: /mm3 1,840 2,267   Other Cells, Fluid Latest Ref Range: 0 % 52 (H) 1 (H)     Pleural fluid cytology 04/24 and 05/8/19  Negative for malignancy    Assessment and Plan       ICD-10-CM    1. Chronic respiratory failure with hypoxia and hypercapnia ([facility]) J96.11     J96.12    2. Chronic obstructive pulmonary disease, unspecified COPD type ([facility] J44.9    3. Obstructive sleep apnea G47.33    4. Obesity hypoventilation syndrome ([facility]) E66.2    5. Pleural effusion on left J90    6. Chronic combined systolic and diastolic congestive heart failure ([facility]) I50.42    7. Atrial fibrillation, chronic ([facility]) I48.2    8. Pulmonary hypertension  9. Biventricular failure    Assessment  He has multiple medical issues including combined systolic and diastolic CHF, likely COPD, obesity hypoventilation syndrome, obstructive sleep apnea, chronic hypoxic and hypercapnic respiratory failure, anasarca with continued volume overload and pedal edema with likely chronic kidney disease. Regarding left pleural effusion it is recurrent unfortunately 3 thoracentesis done and along with overall volume overload, his pleural effusion analysis was consistent with hemorrhagic pleural effusion and could be related to retroperitoneal hematoma, doubt that he has Dressler syndrome, as he continued to have recurrent pleural effusion requiring recurrent thoracentesis ideally should have VATS with decortication and pleurodesis but he is not a candidate for any surgery at this time. He will benefit enormously from BiPAP/CPAP therapy and would recommend to start him as soon as possible he just had sleep study done yesterday. He likely has underlying COPD which may contribute to overall picture and will restart him on LAMA/LABA in addition if able to be approved, he was to [place] to get his medicines will try if Stiolto is available otherwise  Spiriva and advised to continue with albuterol.     Plan and recommendation    Start BIPAP/ CPAP as soon as possible  Start stiolto or spiriva if available from [place] system  Albuterol as needed  Pulmonary function testing next 4-5 weeks  Chest x-ray PA and lateral view before next visit  Adjust lasix and zaroxolyn per cardiology and primary service  He will likely need recurrent thoracentesis. Supplemental O2 to continue 2 L nasal cannula. Not a candidate at this time to do pulmonary rehabilitation until further improvement    Vaccinations recommended   Up to date with vaccinations from [address] an active lifestyle     CXR reviewed  CT chest reviewed    CPAP/BIPAP at least 4 hrs qhs  Wt loss is recommended and discussed  Follow good sleep hygeine instructions  Use humidifier   Questions answered pertaining to diagnosis and management explained importance of compliance with therapy     Questions answered to pt's/family's satisfaction  RTC 6 weeks    It was my pleasure to evaluate Katelynn Ramirez today. Please call with questions. Steffanie Churchill MD             5/16/2019, 3:27 PM      Please note that this chart was generated using voice recognition Dragon dictation software. Although every effort was made to ensure the accuracy of this automated transcription, some errors in transcription may have occurred.

## 2019-05-17 ENCOUNTER — CARE COORDINATION (OUTPATIENT)
Dept: CARE COORDINATION | Age: 74
End: 2019-05-17

## 2019-05-17 NOTE — CARE COORDINATION
[facility] Transitions Follow Up Call    2019    Patient: Juancarlos Gunter  Patient : 1945   MRN: [id number]  Reason for Admission:   Discharge Date: 19 RARS: Readmission Risk Score: 30         Spoke with: Wife, Olivia Andrade. Wife states that patient is doing pretty good. Patient still gets SOB with exertion. [address] Nurse there and patient is receiving breathing treatment. He has swelling in feet and legs. Encouraged to elevate as much as possible. No weight gain. Instructed if patient has weight gain of 3# over night or 5# in a week to call doctor immediately and report. Wife expresses understanding. Patient has no needs or concerns for writer at this time. Will Follow up at later time. Care Transitions Subsequent and Final Call    Subsequent and Final Calls  Do you have any ongoing symptoms?:  Yes  Patient-reported symptoms:  Shortness of Breath  Have your medications changed?:  No  Do you have any questions related to your medications?:  No  Do you currently have any active services?:  Yes  Are you currently active with any services?:  Home Health  Do you have any needs or concerns that I can assist you with?:  No  Care Transitions Interventions  Other Interventions:             Follow Up  Future Appointments   Date Time Provider Cassie Mehta   2019  2:00 PM MD RITO Parekh [facility]   2019  1:10 PM MD Marlo Finney   2019 10:45 MD Jacki Carranza [facility]   2019  8:30 AM MD Marlo Finney LPN

## 2019-05-20 ENCOUNTER — CARE COORDINATION (OUTPATIENT)
Dept: CARE COORDINATION | Age: 74
End: 2019-05-20

## 2019-05-20 ENCOUNTER — HOSPITAL ENCOUNTER (OUTPATIENT)
Age: 74
Setting detail: SPECIMEN
Discharge: HOME | End: 2019-05-20
Payer: MEDICARE

## 2019-05-20 LAB
ALBUMIN SERPL-MCNC: 3.2 G/DL (ref 3.5–5.2)
ALBUMIN/GLOBULIN RATIO: 1 (ref 1–2.5)
ALP BLD-CCNC: 140 U/L (ref 40–129)
ALT SERPL-CCNC: 14 U/L (ref 5–41)
ANION GAP SERPL CALCULATED.3IONS-SCNC: 8 MMOL/L (ref 9–17)
AST SERPL-CCNC: 15 U/L
BILIRUB SERPL-MCNC: 0.48 MG/DL (ref 0.3–1.2)
BUN BLDV-MCNC: 28 MG/DL (ref 8–23)
BUN/CREAT BLD: 19 (ref 9–20)
CALCIUM SERPL-MCNC: 9.1 MG/DL (ref 8.6–10.4)
CHLORIDE BLD-SCNC: 85 MMOL/L (ref 98–107)
CO2: 42 MMOL/L (ref 20–31)
CREAT SERPL-MCNC: 1.49 MG/DL (ref 0.7–1.2)
GFR AFRICAN AMERICAN: 56 ML/MIN
GFR NON-AFRICAN AMERICAN: 46 ML/MIN
GFR SERPL CREATININE-BSD FRML MDRD: ABNORMAL ML/MIN/{1.73_M2}
GFR SERPL CREATININE-BSD FRML MDRD: ABNORMAL ML/MIN/{1.73_M2}
GLUCOSE BLD-MCNC: 513 MG/DL (ref 70–99)
POTASSIUM SERPL-SCNC: 4 MMOL/L (ref 3.7–5.3)
SODIUM BLD-SCNC: 135 MMOL/L (ref 135–144)
TOTAL PROTEIN: 6.5 G/DL (ref 6.4–8.3)

## 2019-05-20 PROCEDURE — 80053 COMPREHEN METABOLIC PANEL: CPT

## 2019-05-20 NOTE — CARE COORDINATION
[facility] Transitions Follow Up Call    2019    Patient: Valente Garsia  Patient : 1945   MRN: [id number]  Reason for Admission:   Discharge Date: 19 RARS: Readmission Risk Score: 30         Spoke with: Patient, henny Guy. Patient states he is doing better. SOB and swelling not worsening at this time. No weight gain, wheezing, coughing, adhering to low NA diet and fluid restriction. Patient has no needs or concerns for writer at this time. Final call. Care Transitions Subsequent and Final Call    Subsequent and Final Calls  Do you have any ongoing symptoms?:  No  Have your medications changed?:  No  Do you have any questions related to your medications?:  No  Do you currently have any active services?:  Yes  Are you currently active with any services?:  Home Health  Do you have any needs or concerns that I can assist you with?:  No  Care Transitions Interventions  Other Interventions:             Follow Up  Future Appointments   Date Time Provider Cassie Mehta   2019  2:00 PM MD RITO Chaidez [facility]   2019  1:10 PM MD Marlo Kuhn   2019 10:45 MD Jacki Mehta [facility]   2019  8:30 AM MD Marlo Kuhn LPN

## 2019-05-23 PROBLEM — R77.8 ELEVATED TROPONIN: Status: RESOLVED | Noted: 2019-04-23 | Resolved: 2019-05-23

## 2019-05-24 ENCOUNTER — TELEPHONE (OUTPATIENT)
Dept: PULMONOLOGY | Age: 74
End: 2019-05-24

## 2019-05-24 ENCOUNTER — OFFICE VISIT (OUTPATIENT)
Dept: CARDIOLOGY | Age: 74
End: 2019-05-24
Payer: MEDICARE

## 2019-05-24 VITALS
OXYGEN SATURATION: 91 % | RESPIRATION RATE: 20 BRPM | HEIGHT: 68 IN | SYSTOLIC BLOOD PRESSURE: 99 MMHG | HEART RATE: 88 BPM | BODY MASS INDEX: 43.98 KG/M2 | DIASTOLIC BLOOD PRESSURE: 64 MMHG | WEIGHT: 290.2 LBS

## 2019-05-24 DIAGNOSIS — J90 RECURRENT RIGHT PLEURAL EFFUSION: ICD-10-CM

## 2019-05-24 DIAGNOSIS — E66.01 SEVERE OBESITY (BMI >= 40) (HCC): ICD-10-CM

## 2019-05-24 DIAGNOSIS — I48.0 PAF (PAROXYSMAL ATRIAL FIBRILLATION) (HCC): ICD-10-CM

## 2019-05-24 DIAGNOSIS — J96.11 CHRONIC HYPOXEMIC RESPIRATORY FAILURE (HCC): ICD-10-CM

## 2019-05-24 DIAGNOSIS — Z79.01 CHRONIC ANTICOAGULATION: ICD-10-CM

## 2019-05-24 DIAGNOSIS — I50.32 CHRONIC DIASTOLIC CHF (CONGESTIVE HEART FAILURE), NYHA CLASS 4 (HCC): Primary | ICD-10-CM

## 2019-05-24 DIAGNOSIS — G47.33 OSA (OBSTRUCTIVE SLEEP APNEA): ICD-10-CM

## 2019-05-24 PROCEDURE — 4040F PNEUMOC VAC/ADMIN/RCVD: CPT | Performed by: INTERNAL MEDICINE

## 2019-05-24 PROCEDURE — G8417 CALC BMI ABV UP PARAM F/U: HCPCS | Performed by: INTERNAL MEDICINE

## 2019-05-24 PROCEDURE — G8598 ASA/ANTIPLAT THER USED: HCPCS | Performed by: INTERNAL MEDICINE

## 2019-05-24 PROCEDURE — 99214 OFFICE O/P EST MOD 30 MIN: CPT | Performed by: INTERNAL MEDICINE

## 2019-05-24 PROCEDURE — 1111F DSCHRG MED/CURRENT MED MERGE: CPT | Performed by: INTERNAL MEDICINE

## 2019-05-24 PROCEDURE — 1123F ACP DISCUSS/DSCN MKR DOCD: CPT | Performed by: INTERNAL MEDICINE

## 2019-05-24 PROCEDURE — G8427 DOCREV CUR MEDS BY ELIG CLIN: HCPCS | Performed by: INTERNAL MEDICINE

## 2019-05-24 PROCEDURE — 1036F TOBACCO NON-USER: CPT | Performed by: INTERNAL MEDICINE

## 2019-05-24 PROCEDURE — 3017F COLORECTAL CA SCREEN DOC REV: CPT | Performed by: INTERNAL MEDICINE

## 2019-05-24 RX ORDER — FUROSEMIDE 40 MG/1
40 TABLET ORAL 2 TIMES DAILY
Qty: 90 TABLET | Refills: 3 | Status: SHIPPED | OUTPATIENT
Start: 2019-05-24 | End: 2019-07-01

## 2019-05-24 NOTE — TELEPHONE ENCOUNTER
Patient was in Dr. Korey Ratliff office, family is questioning if the prescription was sent to [place]? I did not see any prescription in there for the [address]. Please advise.

## 2019-05-24 NOTE — PROGRESS NOTES
Sabi Holman am scribing for and in the presence of Luis Reinoso MD.    Patient: Micah Gosselin  : 1945  Date of Visit: May 24, 2019    REASON FOR VISIT / CONSULTATION: Follow-up (HFC. HX: CHF, PE, PAF, CAD, IVIS. Pt states he is doing ok. C/o; SOB better than last visit. Has his Bi-Pap which has helped him a lot. Denies: CP, Palpitiaotns, Lightheaded/dizziness. )      History of Present Illness:      Dear Bynum Najjar, MD    I had the pleasure of seeing Micah Gosselin, who is a 69 y.o. male with a remote history of angioplasty, no stent placed. Ben Kilgore also has history of atrial flutter and cardioversion back in . He has paroxysmal atrial flutter/fibrillation on rate control and anticoagulation since that time. His family history contains his father, mother and brother who had heat disease. His dad started having issues since he was 59. Also his mother and brother had heart disease in there 's. I initially saw him back on 3/18/2019, he was admitted with Influenza A infection at that time and had elevated troponin. He was sent to [facility] for further management. His illness was complicated by respiratory failure requiring intubation, type II myocardial infarction, retroperitoneal bleeding requiring blood transfusion and acute renal failure requiring dialysis temporarily. Patient was discharged to [place] [id number] home. Readmission to [facility] from 2019 to 2019 with acute on chronic combined CHF. Diuresed and underwent right-sided thoracentesis ×2. ECHO done on 2019: ejection fraction is 40 %. apex and apical segments are hypokinetic. No significant pericardial effusion is seen. Chest x-ray done on : increasing right pleural effusion. Readmission to the hospital from -2019. Underwent another right sided thoracentesis. Mr. Fei Eller is using Bipap nightly and oxygen continuously. I reviewed his pleural fluid analysis. Cytology is negative for malignancy. Seems to be a mixture of transudate effusion with inflammatory component. Lab work on 5/20/2019: BUN: 28 and Creatinine: 1.49 Potassium: 4.0    Mr. Reyna Gaston reports doing fairly well and says that his bi-pap at night time has helped a lot with him sleeping at night time. He is on continuous oxygen therapy 2L [place]. He denies any chest pain, pressure or tightness. No fever or cough. No abdominal pain, nausea or vomiting. He was seen by Dr. Stephanie Rizzo and a prescription of stiolto or spiriva  was suggested but patient did not obtain the medications yet from the [place]. I will call Dr. Dorinda Duke office and ask to send out a paper prescription to the [place]. He is scheduled to see him back in 6 weeks with repeat chest x-ray prior to next visit. PAST MEDICAL HISTORY:        Past Medical History:   Diagnosis Date    Abnormal nuclear stress test 03/12/2010    Stress and resting cardiolite images showed inferior wall hypoperfusion suggestive of previous myocardial infarction. Left ventricular wall motion showed inferior wall hypokinesia. EF 58%.  Arrhythmia 2007    A single episode    Atrial fibrillation ([facility] [address] 2007    Single Episode    CAD (coronary artery disease) 1996    MI    DM type 2 (diabetes mellitus, type 2) ([facility] [address] 2005    History of echocardiogram 14/82/8112    LV systolic function mildly/moderately reduced. EF 40%. EF vis Avilez's method is 40%. [place] and apical segments are hypokinetic    Hx of echocardiogram 04/05/2007    EF 62%, Increased left atrial and right ventricular diametes. Increased septal and posterior wall thickness suggest left ventricular hypertrophy. Valves were normal.        CURRENT ALLERGIES: Patient has no known allergies. REVIEW OF SYSTEMS: 14 systems were reviewed. Pertinent positives and negatives as above, all else negative.      Past Surgical History:   Procedure Laterality Date    CARDIOVERSION  05/18/2007    Successful-Dr. Renu Arroyo    CARDIOVERSION      CORONARY ANGIOPLASTY      Social History:  Social History     Tobacco Use    Smoking status: Former Smoker     Packs/day: 1.50     Years: 30.00     Pack years: 45.00     Types: Cigarettes     Last attempt to quit: 1996     Years since quittin.9    Smokeless tobacco: Never Used   Substance Use Topics    Alcohol use: No    Drug use: No        CURRENT MEDICATIONS:        Outpatient Medications Marked as Taking for the 19 encounter (Office Visit) with Deyvi Hayden MD   Medication Sig Dispense Refill    furosemide (LASIX) 40 MG tablet Take 1 tablet by mouth 2 times daily Indications: Pt is taking 40 mg BID (9am and 3pm) 90 tablet 3    metolazone (ZAROXOLYN) 2.5 MG tablet Take 2.5 mg by mouth daily Taking this Daily. 30 tablet 0    aspirin 81 MG tablet Take 1 tablet by mouth daily 30 tablet 3    albuterol (PROVENTIL) (2.5 MG/3ML) 0.083% nebulizer solution Take 3 mLs by nebulization 4 times daily 120 each 0    amiodarone (CORDARONE) 200 MG tablet Take 1 tablet by mouth daily 30 tablet 0    dabigatran (PRADAXA) 150 MG capsule Take 1 capsule by mouth 2 times daily 60 capsule 0    lactobacillus (CULTURELLE) capsule Take 2 capsules by mouth 2 times daily (with meals) 30 capsule 0    atorvastatin (LIPITOR) 40 MG tablet Take 1 tablet by mouth daily 30 tablet 0    metoprolol tartrate (LOPRESSOR) 25 MG tablet Take 0.5 tablets by mouth 2 times daily Hold for SBP<100 and/or HR<60.  Otherwise give 60 tablet 0    docusate sodium (COLACE) 100 MG capsule Take 1 capsule by mouth daily      polyethylene glycol (GLYCOLAX) packet Take 17 g by mouth daily 527 g 1    acetaminophen (TYLENOL) 325 MG tablet Take 650 mg by mouth every 6 hours as needed for Pain or Fever      Cholecalciferol (VITAMIN D3) 2000 units CAPS Take 2,000 Units by mouth daily      vitamin B-12 (CYANOCOBALAMIN) 1000 MCG tablet Take 1,000 mcg by mouth daily      melatonin 3 MG TABS tablet Take 3 mg by mouth 05/07/2019    TRIG 95 05/07/2019    HDL 46 05/07/2019    ALT 14 05/20/2019    AST 15 05/20/2019     05/20/2019    K 4.0 05/20/2019    CL 85 (L) 05/20/2019    CREATININE 1.49 (H) 05/20/2019    BUN 28 (H) 05/20/2019    CO2 42 (HH) 05/20/2019    INR 1.2 05/06/2019    LABA1C 8.6 (H) 03/18/2019       ASSESSMENT:      Diagnosis Orders   1. Chronic diastolic CHF (congestive heart failure), NYHA class 4 ([facility])  Basic Metabolic Panel   2. PAF (paroxysmal atrial fibrillation) ([facility] [address]     3. Chronic anticoagulation     4. IVIS (obstructive sleep apnea)     5. Chronic hypoxemic respiratory failure ([facility])     6. Recurrent right pleural effusion     7. Severe obesity (BMI >= 40) ([facility])         PLAN:        Very complicated medical history as outlined in HPI. Discharged from [facility]. Vs in April 2019 after being treated for influenza A Pneumonia, respiratory failure requiring intubation, retroperitoneal  bleeding, renal failure requiring dialysis and type II myocardial infarction. 2 admissions to [facility] because of worsening heart failure symptoms and recurrent right pleural effusion. S/P thoracentesis ×3       · Chronic combined heart failure: New York Heart Association Class: III (Asymptomatic only at rest)    Beta Blocker: Continue Metoprolol tartrate (Lopressor) 25 mg 1/2 tab bid. · ACE Inibitor/ARB: No ACEI or ARB for now because of recent acute kidney injury requiring dialysis. · Diuretics: Continue furosemide (Lasix) 40 mg 2 times daily and continue metolazone 2.5 mg once daily. Depending on his Blood work next week, I may plan to switch his lasix to Demadex. · Heart failure counseling: I told them to continue wearing lower extremity compression stockings and I advised them to try and keep their legs up whenever possible and to limit salt in their diet.   · BMP reviewed: 5/20/2019: BUN: 28 and Creatinine: 1.49 and Potassium: 4.0  · Lab Testing: I ordered a repeat BMP in one week to re-assess his kidney related to the patient's current medical conditions are: intermediate-high. The documentation recorded by the scribe, accurately and completely reflects the services I personally performed and the decisions made by me. Braxton Haji MD, F.A.C.C.  May 24, 2019

## 2019-05-28 LAB
CULTURE: NORMAL
Lab: NORMAL
SPECIMEN DESCRIPTION: NORMAL

## 2019-05-29 ENCOUNTER — HOSPITAL ENCOUNTER (OUTPATIENT)
Age: 74
Setting detail: SPECIMEN
Discharge: HOME | End: 2019-05-29
Payer: MEDICARE

## 2019-05-29 LAB
ALBUMIN SERPL-MCNC: 3.3 G/DL (ref 3.5–5.2)
ALBUMIN/GLOBULIN RATIO: 0.9 (ref 1–2.5)
ALP BLD-CCNC: 140 U/L (ref 40–129)
ALT SERPL-CCNC: 14 U/L (ref 5–41)
ANION GAP SERPL CALCULATED.3IONS-SCNC: 11 MMOL/L (ref 9–17)
AST SERPL-CCNC: 14 U/L
BILIRUB SERPL-MCNC: 0.38 MG/DL (ref 0.3–1.2)
BUN BLDV-MCNC: 24 MG/DL (ref 8–23)
BUN/CREAT BLD: 18 (ref 9–20)
CALCIUM SERPL-MCNC: 9.1 MG/DL (ref 8.6–10.4)
CHLORIDE BLD-SCNC: 87 MMOL/L (ref 98–107)
CO2: 38 MMOL/L (ref 20–31)
CREAT SERPL-MCNC: 1.32 MG/DL (ref 0.7–1.2)
GFR AFRICAN AMERICAN: >60 ML/MIN
GFR NON-AFRICAN AMERICAN: 53 ML/MIN
GFR SERPL CREATININE-BSD FRML MDRD: ABNORMAL ML/MIN/{1.73_M2}
GFR SERPL CREATININE-BSD FRML MDRD: ABNORMAL ML/MIN/{1.73_M2}
GLUCOSE BLD-MCNC: 267 MG/DL (ref 70–99)
POTASSIUM SERPL-SCNC: 3.7 MMOL/L (ref 3.7–5.3)
SODIUM BLD-SCNC: 136 MMOL/L (ref 135–144)
TOTAL PROTEIN: 6.8 G/DL (ref 6.4–8.3)

## 2019-05-29 PROCEDURE — 80053 COMPREHEN METABOLIC PANEL: CPT

## 2019-05-30 DIAGNOSIS — J44.9 CHRONIC OBSTRUCTIVE PULMONARY DISEASE, UNSPECIFIED COPD TYPE (HCC): Primary | ICD-10-CM

## 2019-06-04 ENCOUNTER — CARE COORDINATION (OUTPATIENT)
Dept: CARE COORDINATION | Age: 74
End: 2019-06-04

## 2019-06-04 NOTE — CARE COORDINATION
Ambulatory Care Coordination Note  6/4/2019  CM Risk Score: 10  Amanda Mortality Risk Score: 9    ACC: Nathan Neff, RN    Summary Note:   Date Care Coordination Episode Started: 6/4/19    Reason For Call Today: reach out to patient for care coordination needs.   -Spoke with patient today  - Patient states, \"I am great. Have been feeling a lot better\"      Home Needs  -Patient uses a cane  -Denies any falls in the past year  -3 Steps going into the house with handrail   -Patients wife handles patients medications   -Patient does not drive.   -Patients wife drives patient to appointments  -Denies needing any other home needs  -Patient has shower chair  -Feels safe getting in and out of the shower. No grab bars in place    Financial  -Denies any cost issues with medications   -      Bi-Pap  -Wearing at night time      Oxygen  -Continuous usage   -2 lpm nasal cannula  -[address] supplies oxygen         Recent appointments Reviewed (PCP/Specialist):     Dr. Yosef Maddox on 5/28/19   Per Dr. Fabby Allen:   Return in about 1 month (around 6/25/2019) for Vince Moreno MD  [address] [place] [address]  Dept: [phone]  Dept Fax: [phone]        Patient Instructions   Please follow fluid restriction. Continue with the same dosage of diuretics. Cardiology already ordered a blood test sometime in the next week or so and I will call you with those numbers.       Increase insulin to 55 units tonight and increase by 5 units for every fasting blood sugar over 150.      updated prescription for insulin and Spiriva sent to [facility] today      follow-up in 4 weeks      pulmonary already ordered an x-ray in a couple weeks and likely will need a repeat thoracentesis if still persistent effusion.     Reviewed instructions with patient:   -1,800 ml fluid restriction   -Weight today was 281.2lb (yesterday was 280.8 lb)   -Slight swelling to legs  -Compression stocking not worn every day  -Currently taking 55 units of insulin daily   -Blood sugar reading today was 97  -Checks blood sugars 2 times a day        Dr. Leonora Traylor on 5/24/19  FOLLOW UP:   I told Mr. Luis Has to call my office if he had any problems, but otherwise I asked him to Return in about 6 weeks (around 7/5/2019). However, I would be happy to see him sooner should the need arise. Reviewed instructions with patient       Dr. Villa Blair on 5/16/19  Plan and recommendation     Start BIPAP/ CPAP as soon as possible  Start stiolto or spiriva if available from [place] system  Albuterol as needed  Pulmonary function testing next 4-5 weeks  Chest x-ray PA and lateral view before next visit  Adjust lasix and zaroxolyn per cardiology and primary service  He will likely need recurrent thoracentesis. Supplemental O2 to continue 2 L nasal cannula. Not a candidate at this time to do pulmonary rehabilitation until further improvement    Reviewed instructions with patient  -Currently not taking Spiriva or Stiolto   -Patients wife called to get script for inhaler to send to [place] to get inhaler filled. Reason Patient is in Care coordination: Diabetes, CHF, recent hospital admission. Referred per palliative care nurse at [facility]. Chronic Conditions:     Diabetes  -Checks blood sugars 2 times a day  -Blood sugar reading this morning was 97  -Per patients wife, the second reading of the day is normally high 100's-low 200's.   -Patient currently taking 55 units of Lantus daily   -Will mail out Diabetes Zone sheet     Reviewed with patient/wife goal of blood sugar being 130 and under prior to meals, and 180 and under 2 hours after meals. Wife verbalizes understanding.      Lab Results   Component Value Date    LABA1C 8.6 (H) 03/18/2019     Lab Results   Component Value Date     03/18/2019     CHF  -Per patient, slight swelling to legs today  -Does have compression stockings, but only wears them every now and then CAUTION  This is a warning zone. Call your doctor, nurse coordinator or home health nurse immediately if you have these symptoms:   Weight gain of 3 or more pounds in 1-7 days   Increased cough   Swelling in your feet, ankles, legs or stomach   Increased shortness of breath with activity   The need to sleep with more pillows   A feeling of uneasiness    In the yellow zone, you should:   See your doctor for an evaluation   Talk with your doctor about adjusting your medicine   Call your doctor, nurse coordinator or home health nurse  Provider:  Phone:    RED ZONE:  MEDICAL CARE  This is the emergency zone. Call your doctor or 911 if you have:   Unrelieved shortness of breath   Shortness of breath as rest   Unrelieved chest pain   Wheezing or chest tightness at rest   The need to sleep in a chair   Weight gain or loss of more than 5 pounds in two days   Mental confusion      In the red zone, you need to:   Immediately call your doctor or 911  Doctor:      CALL to Dr. Law Gaspar  -phone call to Dr. Raquel Canchola office to see if they have Spiriva sample his wife could      CALL to [facility]   -Phone call made to [employer] to see pricing for Spiriva that was sent over today from Dr. Lenka Reyes will cost $102.78 without insurance. Call back to patient/spouse  -Informed patients wife that Spiriva prescription was sent to [facility], yet will be very expensive.   -Informed [facility] to [facility] and figure out when Pedro Luis Swedish will be sent to them  -Request a call back from wife/patient when she gets in contact with [facility]      Reviewed social needs/Home Needs if any:     · Does patient have enough food? Yes,   · Does patient have transportation to appointment/store/pharmacy, etc?Yes, wife driving  · Does patient need any help in the home? no  ·  if yes, what type of help? n/a    Reviewed Upcoming follow up appointments with   Future Appointments   Date Time Provider Cassie Mehta   6/13/2019 10:30 AM [facility] [place]   6/27/2019 10:45 AM MD Jacki [facility]   7/1/2019  1:00 PM MD Marlo Puri   7/5/2019 12:30 PM MD RITO Michelle CARDI [facility]   12/6/2019  8:30 AM MD Marlo Puri       Goals reviewed. Patient to continue to work to improve:   Goals        Care Coordination     Conditions and Symptoms      I will schedule office visits, as directed by my provider. I will keep my appointment or reschedule if I have to cancel. I will notify my provider of any barriers to my plan of care. I will follow my Zone Management tool to seek urgent or emergent care. I will notify my provider of any symptoms that indicate a worsening of my condition. Barriers: overwhelmed by complexity of regimen and lack of education  Plan for overcoming my barriers: patient to keep follow up appointments with Cardiology, Pulmonology and MD. Follow up with nurse care coordinator. Confidence: 6/10  Anticipated Goal Completion Date: 9/4/19                Education Reviewed with patient today: See Education Module. Comment: Zone sheet handouts mailed- Diabetes and CHF. Discused importanc eof daily weights. Monitoring for swelling- elevating legs. Discussed blood sugar monitoring. Reviewed all home medications. Interventions completed today:    · Patient to reach out to care coordinator at [phone] or MD office with questions. Care Coordinator Plan of Care: This nurse CC will plan to continue to encourage referral to [facility] dietician. Follow up on blood sugar readings. Follow up on Pulmonology PFT. Follow up on daily weights. Follow up on receiving Spiriva from [place]. Follow up on zone sheet that was mailed out to patient. Will reach out to patient next week.        Ambulatory Care Coordination Assessment    Care Coordination Protocol  Program Enrollment:  Complex Care  Referral from Primary Care Provider: No  Week 1 - Initial Assessment     Do you have all of your prescriptions and are they filled?:  No (Comment: [facility]. Requested local pharmacy fill until recieve from [place])  Barriers to medication adherence:  None  Are you able to afford your medications?:  Yes  How often do you have trouble taking your medications the way you have been told to take them?:  I always take them as prescribed. Do you have Home O2 Therapy?:  Yes   Oxygen Regimen:  Continuous Flow - Enter rate/FIO2:  2   Method of Delivery:  Nasal Cannula   CPAP Use:  BiPAP      Ability to seek help/take action for Emergent Urgent situations i.e. fire, crime, inclement weather or health crisis. :  Independent  Ability to ambulate to restroom:  Independent  Ability handle personal hygeine needs (bathing/dressing/grooming): Independent  Ability to manage Medications: Independent  Ability to prepare Food Preparation:  Independent  Ability to maintain home (clean home, laundry): Independent  Ability to drive and/or has transportation:  Independent  Ability to do shopping:  Independent  Ability to manage finances:   Independent  Is patient able to live independently?:  Yes     Current Housing:  Private Residence        Per the Fall Risk Screening, did the patient have 2 or more falls or 1 fall with injury in the past year?:  No     Frequent urination at night?:  No  Do you use rails/bars?:  No  Do you have a non-slip tub mat?:  No     Are you experiencing loss of meaning?:  No  Are you experiencing loss of hope and peace?:  No     Thinking about your patient's physical health needs, are there any symptoms or problems (risk indicators) you are unsure about that require further investigation?:  No identified areas of uncertainly or problems already being investigated   Are the patients physical health problems impacting on their mental well-being?:  No identified areas of concern   Are there any problems with your patients lifestyle behaviors (alcohol, drugs, diet, exercise) that are impacting on physical or mental well-being?:  No identified areas of concern   Do you have any other concerns about your patients mental well-being? How would you rate their severity and impact on the patient?:  No identified areas of concern   How would you rate their home environment in terms of safety and stability (including domestic violence, insecure housing, neighbor harassment)?:  Consistently safe, supportive, stable, no identified problems   How do daily activities impact on the patient's well-being? (include current or anticipated unemployment, work, caregiving, access to transportation or other):  No identified problems or perceived positive benefits   How would you rate their social network (family, work, friends)?:  Good participation with social networks   How would you rate their financial resources (including ability to afford all required medical care)?:  Financially secure, resources adequate, no identified problems   How wells does the patient now understand their health and well-being (symptoms, signs or risk factors) and what they need to do to manage their health?:  Reasonable to good understanding and already engages in managing health or is willing to undertake better management   How well do you think your patient can engage in healthcare discussions? (Barriers include language, deafness, aphasia, alcohol or drug problems, learning difficulties, concentration):  Clear and open communication, no identified barriers   Do other services need to be involved to help this patient?:  Other care/services not required at this time   Are current services involved with this patient well-coordinated? (Include coordination with other services you are now recommendation):   All required care/services in place and well-coordinated   Suggested Interventions and [place]:  Declined   Meals on Wheels:  Declined   Other Services or Interventions:  Bi-Pap at night, Continious 2 lpm nsal cannula of oxygen    Registered Dietician:  Declined   Transportation Services:  Declined   Zone Management Tools:  Completed         Schedule an appointment with the patient's PCP, Set up/Review an Education Plan, Set up/Review Goals              Prior to Admission medications    Medication Sig Start Date End Date Taking? Authorizing Provider   insulin glargine (LANTUS SOLOSTAR) 100 UNIT/ML injection pen Inject 55 Units into the skin nightly 5/28/19 8/26/19 Yes Lucretia Tucker MD   furosemide (LASIX) 40 MG tablet Take 1 tablet by mouth 2 times daily Indications: Pt is taking 40 mg BID (9am and 3pm) 5/24/19  Yes Lexii Acuña MD   metolazone (ZAROXOLYN) 2.5 MG tablet Take 2.5 mg by mouth daily Taking this Daily. 5/13/19  Yes Lucretia Tucker MD   aspirin 81 MG tablet Take 1 tablet by mouth daily 5/8/19  Yes Oliva Sanchez PA-C   albuterol (PROVENTIL) (2.5 MG/3ML) 0.083% nebulizer solution Take 3 mLs by nebulization 4 times daily 5/8/19  Yes Yoan Sanchez PA-C   amiodarone (CORDARONE) 200 MG tablet Take 1 tablet by mouth daily 5/8/19  Yes Oliva Sanchez PA-C   dabigatran (PRADAXA) 150 MG capsule Take 1 capsule by mouth 2 times daily 5/8/19  Yes Yoan Sanchez PA-C   lactobacillus (CULTURELLE) capsule Take 2 capsules by mouth 2 times daily (with meals) 5/8/19  Yes Oliva Sanchez PA-C   atorvastatin (LIPITOR) 40 MG tablet Take 1 tablet by mouth daily 5/8/19  Yes Yoan Sanchez PA-C   metoprolol tartrate (LOPRESSOR) 25 MG tablet Take 0.5 tablets by mouth 2 times daily Hold for SBP<100 and/or HR<60.  Otherwise give 5/8/19  Yes Yoan Sanchez PA-C   docusate sodium (COLACE) 100 MG capsule Take 1 capsule by mouth daily 5/8/19  Yes Yoan Sanchez PA-C   polyethylene glycol [facility]) packet Take 17 g by mouth daily 5/8/19 6/7/19 Yes Yoan Sanchez PA-C   acetaminophen (TYLENOL) 325 MG tablet Take 650 mg by mouth every 6 hours as needed for Pain or Fever   Yes Historical Provider, MD   Cholecalciferol (VITAMIN D3) 2000 units CAPS Take 2,000 Units by mouth daily   Yes Historical Provider, MD   vitamin B-12 (CYANOCOBALAMIN) 1000 MCG tablet Take 1,000 mcg by mouth daily   Yes Historical Provider, MD   tiotropium (SPIRIVA HANDIHALER) 18 MCG inhalation capsule Inhale 1 capsule into the lungs daily 6/4/19   Lynn Glass MD   melatonin 3 MG TABS tablet Take 3 mg by mouth daily    Historical Provider, MD

## 2019-06-07 ENCOUNTER — CARE COORDINATION (OUTPATIENT)
Dept: CARE COORDINATION | Age: 74
End: 2019-06-07

## 2019-06-07 NOTE — CARE COORDINATION
Reason For Call Today:  -Attempted to reach patient today to follow up on if patient[facility] to see when [facility] would be [facility] Spiriva to patient. Follow up on blood sugar log and daily weights.   -Unable to reach patient at this time  -Left voicemail for patient to return phone call when he was able    Future Appointments   Date Time Provider Cassie Mehta   6/13/2019 10:30 AM [facility] PULMONARY FUNCTION RO[facility] PFT [place]   6/27/2019 10:45 AM MD Jacki Santiago Pulmon [facility]   7/1/2019  1:00 PM MD Marlo Mello   7/5/2019 12:30 PM MD RITO Hernández CARDI [facility]   12/6/2019  8:30 AM MD Marlo Mello     Lab Results   Component Value Date    LABA1C 8.6 (H) 03/18/2019     Lab Results   Component Value Date     03/18/2019       Care Coordination Plan of Care: This nurse Care Coordinator will await call back from patient, and if no return call; will attempt to reach patient back again next week.

## 2019-06-10 ENCOUNTER — CARE COORDINATION (OUTPATIENT)
Dept: CARE COORDINATION | Age: 74
End: 2019-06-10

## 2019-06-10 LAB
CULTURE: NORMAL
DIRECT EXAM: NORMAL
Lab: NORMAL
SPECIMEN DESCRIPTION: NORMAL

## 2019-06-10 NOTE — CARE COORDINATION
Ambulatory Care Coordination Note  6/10/2019  CM Risk Score: 11  Amanda Mortality Risk Score: 9    ACC: Melissa Marroquin, RN    Summary Note:   Date Care Coordination Episode Started: 6/4/2019    Reason For Call Today: follow up on Spiriva coming from the [place]. Follow up on daily weights. Follow up on blood sugar log.     -Spoke with patient today  -Patient spent time outside this weekend watching his grandsons baseball game   -Denies any new needs at this time     Concerns discussed today:   Spiriva  -Patient reports he received Spiriva on Friday and began taking it Saturday  -As long as patient can continue receiving Spiriva from [place] he can afford all of his medications       Reason Patient is in Care coordination: COPD, CHF, Diabetes    Chronic Conditions:   1.) Diabetes  -Patient monitoring blood sugar 2 times daily  -Reports all readings are \"low 100's\"  -Patient did not make any changes to insulin dose. Has orders per Dr. Veda Carnes based on patient readings.  -Currently taking 55 units of Lantus daily  Lab Results   Component Value Date    LABA1C 8.6 (H) 03/18/2019     Lab Results   Component Value Date     03/18/2019       2.) CHF  -Patient continues to weigh himself daily  -Reports weight at 280.2 lb today  -Continues to follow 1,800 ml fluid restriction  -Has compression stocking, but not wearing them all the time  -Educated patient on keeping legs elevated when able to prevent further swelling to legs/ankles  -Denies any new swelling to legs/ankles today  -Denies adding additional salt to his food  -Per patient, \"I have never really been a salt lover. I can do without. \"     3.) COPD   -Patient received Spiriva Friday and began taking it Saturday  -Denies ant shortness of breath or congestion   -Patient to have PFT completed this Thursday per Dr. Deny Elizalde completed:   Diabetes Assessment    Meal Planning:  None   How often do you test your blood sugar?:  Bedtime, Meals (Comment: breakfast and supper typically )   Do you have barriers with adherence to non-pharmacologic self-management interventions? (Nutrition/Exercise/Self-Monitoring):  No   Have you ever had to go to the ED for symptoms of low blood sugar?:  No       No patient-reported symptoms   Do you have hyperglycemia symptoms?:  No   Do you have hypoglycemia symptoms?:  No   Blood Sugar Monitoring Regimen:  Before Meals   Blood Sugar Trends:  No Change      ,   Congestive Heart Failure Assessment    Are you currently restricting fluids?:  1800cc  Do you understand a low sodium diet?:  Yes  Do you understand how to read food labels?:  Yes  Do you salt your food before tasting it?:  No     No patient-reported symptoms      Symptoms:   None:  Yes         ,   COPD Assessment    Does the patient understand envrionmental exposure?:  Yes  Is the patient able to verbalize Rescue vs. Long Acting medications?:  Yes  Does the patient have a nebulizer?:  Yes  Does the patient use a space with inhaled medications?:  No     No patient-reported symptoms         Symptoms:   None:  Yes      Have you had a recent diagnosis of pneumonia either by PCP or at a hospital?:  No      and   General Assessment    Do you have any symptoms that are causing concern?:  No           Any ED visits or Hospitalizations since last Call? · No    Medications Reviewed with  today:  · Patient verbalizes that he has all medications. Patient denies any questions. · Any cost issues with medications No, receives medications through the [address] [facility] Reviewed social needs/Home Needs if any:     · Does patient have enough food? Yes  · Does patient have transportation to appointment/store/pharmacy, etc?Yes  · Does patient need any help in the home?no if yes, what type of help? n/a    Reviewed Upcoming follow up appointments with   Future Appointments   Date Time Provider Cassie Mehta   6/13/2019 10:30 AM [facility] PULMONARY FUNCTION ROOM [facility] PFT [place]   6/27/2019 10:45 AM MD Jacki Shabazz Pulmon [facility]   7/1/2019  1:00 PM Bynum Najjar, MD [facility] Myrla Aas M   7/5/2019 12:30 PM MD RITO Alvarado CARDI [facility]   12/6/2019  8:30 AM Bynum Najjar, MD [employer] Myrla Aas M   ·     Goals reviewed. Patient to continue to work to improve:   Goals      Conditions and Symptoms      I will schedule office visits, as directed by my provider. I will keep my appointment or reschedule if I have to cancel. I will notify my provider of any barriers to my plan of care. I will follow my Zone Management tool to seek urgent or emergent care. I will notify my provider of any symptoms that indicate a worsening of my condition. Barriers: overwhelmed by complexity of regimen and lack of education  Plan for overcoming my barriers: patient to keep follow up appointments with Cardiology, Pulmonology and MD. Follow up with nurse care coordinator. Confidence: 6/10  Anticipated Goal Completion Date: 9/4/19              Education Reviewed with patient today: See Education Module. Interventions completed today:    · Patient to reach out to care coordinator at [phone] or MD office with questions. Care Coordinator Plan of Care: This nurse CC will plan to reach out to patient next week. Follow up on PFT appointment. Follow up on blood sugar. Assess for any new home needs.          Care Coordination Interventions    Program Enrollment:  Complex Care  Referral from Primary Care Provider:  No  Suggested Interventions and Community Resources  Home Health Services:  Declined  Meals on Wheels:  Declined (Comment: Wife doing the cooking)  Registered Dietician:  Declined (Comment: declined need for [facility] dietician)  Transportation Support:  Declined  Zone Management Tools:  Completed (Comment: mailed Zone sheet to patient- Diabetes and CHF )  Other Services or Interventions:  Bi-Pap at night, Continious 2 lpm nsal cannula of oxygen

## 2019-06-13 ENCOUNTER — HOSPITAL ENCOUNTER (OUTPATIENT)
Dept: PULMONOLOGY | Age: 74
Discharge: HOME | End: 2019-06-13
Payer: MEDICARE

## 2019-06-13 DIAGNOSIS — J44.9 CHRONIC OBSTRUCTIVE PULMONARY DISEASE, UNSPECIFIED COPD TYPE (HCC): ICD-10-CM

## 2019-06-13 PROCEDURE — 94726 PLETHYSMOGRAPHY LUNG VOLUMES: CPT

## 2019-06-13 PROCEDURE — 6360000002 HC RX W HCPCS: Performed by: INTERNAL MEDICINE

## 2019-06-13 PROCEDURE — 94060 EVALUATION OF WHEEZING: CPT

## 2019-06-13 PROCEDURE — 94664 DEMO&/EVAL PT USE INHALER: CPT

## 2019-06-13 PROCEDURE — 94729 DIFFUSING CAPACITY: CPT

## 2019-06-13 RX ORDER — ALBUTEROL SULFATE 2.5 MG/3ML
2.5 SOLUTION RESPIRATORY (INHALATION) ONCE
Status: COMPLETED | OUTPATIENT
Start: 2019-06-13 | End: 2019-06-13

## 2019-06-13 RX ADMIN — ALBUTEROL SULFATE 2.5 MG: 2.5 SOLUTION RESPIRATORY (INHALATION) at 10:38

## 2019-06-14 NOTE — PROCEDURES
[address], [place] [address]                               PULMONARY FUNCTION    PATIENT NAME: Mrianda Higgins                        :        1945  MED REC NO:   [id number]                              ROOM:  ACCOUNT NO:   [de-identified]                           ADMIT DATE: 2019  PROVIDER:     Mendoza Shelton    DATE OF PROCEDURE:  2019    INTERPRETATION:  Spirometry shows FVC is 1.27, 31% predicted, FEV1 is  1.03, 35% predicted consistent with severe obstructive ventilatory  impairment. Postbronchodilator, there is significant improvement in  FEV1 and FVC suggestive of concomitant bronchospasticity. Lung volume  shows residual volume was 2.48, 103% predicted. Total lung capacity was  3.87, 58% predicted consistent with moderate concomitant restrictive  ventilatory impairment. Diffusion capacity is 10.02, 33% predicted,  which is likely secondary to emphysema or pulmonary vascular disease  and/or anemia. Clinical correlation is recommended. IMPRESSION:  This pulmonary function test is consistent with severe  obstructive ventilatory impairment with significant response to  bronchodilator, there is a concomitant moderate restrictive ventilatory  impairment which could be secondary to intraparenchymal lung disease. There is severe reduction in diffusion capacity, which could be  secondary to emphysema and/or interstitial lung disease or concomitant  pulmonary vascular disease, and clinical correlation is recommended.         Yocasta Dennison    D: 2019 12:09:22       T: 2019 12:18:08     [email or web address]  Job#: [id number]     Doc#: [id number]    CC:  Cory Anderson

## 2019-06-18 ENCOUNTER — TELEPHONE (OUTPATIENT)
Dept: PULMONOLOGY | Age: 74
End: 2019-06-18

## 2019-06-18 ENCOUNTER — CARE COORDINATION (OUTPATIENT)
Dept: CARE COORDINATION | Age: 74
End: 2019-06-18

## 2019-06-18 NOTE — CARE COORDINATION
Ambulatory Care Coordination Note  6/18/2019  CM Risk Score: 11  Amanda Mortality Risk Score: 9    ACC: Corwin Childers RN    Summary Note:     Reason For Call Today:   Patient returning Care coordination call, Weights, COPD, Chf, DM follow up    Concerns discussed today:   Fluctuating weights, blood sugars. Recent appointments Reviewed (PCP/Specialist): 05/28/19 Dr. Dale Prasad    Reason Patient is in Care coordination: COPD, CHF, DM, referred per Palliative Care Nurse    Chronic Conditions:     CHF (WEights Reviewed): Today 284.4  Yesterday: 284.6  Sunday: 282.4  Saturday 06/16: 282.0  Friday 06/15: 280.4  Thursday 06/14: 280.6 -Started weighing with new scale  Wednesday 06/13: 278.4 (Got new scale today-    -Long Discussion with patient regarding monitoring weights daily; and notifying MD office/Cardiology when has the weight increase of 3 pound overnight, or 5 pounds in a week. -Patient states, Maria Elena Valdivia has these weight variations frequently, and he is blaming the weight change on getting the new scale at home this week\". -Requested patient to please keep Cardiology up to date on his weights    REviewed with patient : weight monitoring    Reviewed medications:   1)Lasix 2 times daily  2)Zaroxolyn    Diabetes: Today   : . Monday: am 139  Sunday: am 137  Saturday: am 109    Reviewed with patient goal of blood sugar being 130 and under prior to meals, and 180 and under 2 hours after meals. Patient  verbalizes understanding.   -Requested patient to please check blood sugars 2 times daily, as A1C is elevated; do need to see what blood sugars are doing at other times of day. Informed patient A1C blood test measures the percentage of glycated hemoglobin cells in your blood, which is a good indicator of the average blood glucose level in your blood for the past 2-3 months. You should have your A1C checked every 3-6 months, with the goal of an A1C below 7.     By maintaining an A1C below 7, the patient has a lower risk of diabetic complications including Cardiovascular disease, nerve damage, kidney damage,  and eye damage. Patient verbalizes understanding. Reviewed with patient Diabetic medications:   -Patient taking Lantus 55 units nightly     Diet:   -patient has water limit he has to follow for CHF: 1800ml a day  -Discussed with patient working with [facility] Dietician to assist in Diabetic portions, carbohydrate counting, meal planning,   --Assist with low sodium diet  -Foods that are easier to digest with COPD  -patient is agreeable to Dietician referral.  -Referred patient today. Lab Results   Component Value Date    LABA1C 8.6 (H) 03/18/2019     Lab Results   Component Value Date     03/18/2019     COPD:   -patient questioning what his Pulmonary Function testing was?   -patient informs, \" he is taking his Spiriva as instructred 1 time daily\". -nebulizer machine use reviewed, \"using at least 2 times daily\". -patient is wearing his oxygen 2 kters per nasal canula    Phone call to Dr. Bernardo Dobbins office:   -Patient requesting this nurse CC call and request PFT results from 06/13/19  -Did speak to [facility] today in pulmonology, she will put back request to Pulmonologist  -Patient will get repeat CXR done this week, patient is aware of this, and will get completed. Assessment completed:   Diabetes Assessment    Meal Planning:  None   How often do you test your blood sugar?:  Bedtime, Meals (Comment: breakfast and supper typically )   Do you have barriers with adherence to non-pharmacologic self-management interventions?  (Nutrition/Exercise/Self-Monitoring):  No   Have you ever had to go to the ED for symptoms of low blood sugar?:  No       No patient-reported symptoms   Do you have hyperglycemia symptoms?:  No   Do you have hypoglycemia symptoms?:  No   Blood Sugar Monitoring Regimen:  Before Meals   Blood Sugar Trends:  Steady Decrease      ,   Congestive Heart Failure Assessment    Are you currently restricting fluids?:  1800cc  Do you understand a low sodium diet?:  Yes  Do you understand how to read food labels?:  Yes  Do you salt your food before tasting it?:  No     No patient-reported symptoms (Comment: patient's weight is up 4 pounds in a week, reminded of CHF [recautions, and when to notify the Doctor)      Symptoms:      Weight trend:  increasing steadily     ,   COPD Assessment    Does the patient understand envrionmental exposure?:  Yes  Is the patient able to verbalize Rescue vs. Long Acting medications?:  Yes  Does the patient have a nebulizer?:  Yes  Does the patient use a space with inhaled medications?:  No     No patient-reported symptoms         Symptoms:      Change in chronic cough?:  No/At Baseline (Comment: denies any increase in cough/congestion today)  Change in sputum?:  No/At Baseline  Have you had a recent diagnosis of pneumonia either by PCP or at a hospital?:  No      and   General Assessment    Do you have any symptoms that are causing concern?:  No           Any ED visits or Hospitalizations since last Call? · No    Medications Reviewed with  today:  · Patient verbalizes that he has all medications. · Patient denies any questions. · Any cost issues with medications No, . Zone Management tool reviewed today is applicable: Yes, COPD, CHF, DM    Reviewed social needs/Home Needs if any:     · Does patient have enough food? Yes,   · Does patient have transportation to appointment/store/pharmacy, etc?Yes,   · Does patient need any help in the home? no   · If yes, what type of help? n/a    Reviewed Upcoming follow up appointments with   Future Appointments   Date Time Provider [facility] Janine   6/25/2019  3:00 PM [address] [place] [address]   6/27/2019 10:45 AM MD Jacki Martinez [facility]   7/1/2019  1:00 PM MD Marlo Ledbetter   7/5/2019 12:30 PM MD RITO Garcias [facility]   12/6/2019  8:30 AM Beatrice Drivers, MD [employer] Fabiene Gottron Lynne ALBRECHT   ·   · :    Goals reviewed. Patient to continue to work to improve:   Goals        Care Coordination     Conditions and Symptoms      I will schedule office visits, as directed by my provider. I will keep my appointment or reschedule if I have to cancel. I will notify my provider of any barriers to my plan of care. I will follow my Zone Management tool to seek urgent or emergent care. I will notify my provider of any symptoms that indicate a worsening of my condition. Barriers: overwhelmed by complexity of regimen and lack of education  Plan for overcoming my barriers: patient to keep follow up appointments with Cardiology, Pulmonology and MD. Follow up with nurse care coordinator. Confidence: 6/10  Anticipated Goal Completion Date: 9/4/19                Education Reviewed with patient today: See Education Module. · Patient to reach out to care coordinator at [phone] or MD office with questions. · Reminded pateint of walk in care clinic availability/hours; and when to utilize the facility if MD office not available. Care Coordinator Plan of Care: This nurse CC will plan to follow up with patient next week regarding weights, breathing, Diabetes  -Reminded patient to monitor weights closely and notify PCP office of the weight increase of 3 pounds overnight, 5 pounds in a week.                  Care Coordination Interventions    Program Enrollment:  Complex Care  Referral from Primary Care Provider:  No  Suggested Interventions and Community Resources  Home Health Services:  Declined  Meals on Wheels:  Declined (Comment: Wife doing the cooking)  Registered Dietician:  Declined (Comment: declined need for [address] dietician)  Transportation Support:  Declined  Zone Management Tools:  Completed (Comment: mailed Zone sheet to patient- Diabetes and CHF )  Other Services or Interventions:  Bi-Pap at night, Continious 2 lpm nsal cannula of oxygen            Prior to Admission medications Medication Sig Start Date End Date Taking? Authorizing Provider   tiotropium (SPIRIVA HANDIHALER) 18 MCG inhalation capsule Inhale 1 capsule into the lungs daily 6/4/19   Yumiko Baez MD   insulin glargine (LANTUS SOLOSTAR) 100 UNIT/ML injection pen Inject 55 Units into the skin nightly 5/28/19 8/26/19  Yumiko Baez MD   furosemide (LASIX) 40 MG tablet Take 1 tablet by mouth 2 times daily Indications: Pt is taking 40 mg BID (9am and 3pm) 5/24/19   Serge Juan MD   metolazone (ZAROXOLYN) 2.5 MG tablet Take 2.5 mg by mouth daily Taking this Daily. 5/13/19   Yumiko Baez MD   aspirin 81 MG tablet Take 1 tablet by mouth daily 5/8/19   Brianna Sanchez PA-C   albuterol (PROVENTIL) (2.5 MG/3ML) 0.083% nebulizer solution Take 3 mLs by nebulization 4 times daily 5/8/19   Brianna Sanchez PA-C   amiodarone (CORDARONE) 200 MG tablet Take 1 tablet by mouth daily 5/8/19   Brianna Sanchez PA-C   dabigatran (PRADAXA) 150 MG capsule Take 1 capsule by mouth 2 times daily 5/8/19   Sandor Sanchez PA-C   lactobacillus (CULTURELLE) capsule Take 2 capsules by mouth 2 times daily (with meals) 5/8/19   Brianna Sanchez PA-C   atorvastatin (LIPITOR) 40 MG tablet Take 1 tablet by mouth daily 5/8/19   Brianna Sanchez PA-C   metoprolol tartrate (LOPRESSOR) 25 MG tablet Take 0.5 tablets by mouth 2 times daily Hold for SBP<100 and/or HR<60.  Otherwise give 5/8/19   Brianna Sanchez PA-C   docusate sodium (COLACE) 100 MG capsule Take 1 capsule by mouth daily 5/8/19   Brianna Sanchez PA-C   acetaminophen (TYLENOL) 325 MG tablet Take 650 mg by mouth every 6 hours as needed for Pain or Fever    Historical Provider, MD   Cholecalciferol (VITAMIN D3) 2000 units CAPS Take 2,000 Units by mouth daily    Historical Provider, MD   vitamin B-12 (CYANOCOBALAMIN) 1000 MCG tablet Take 1,000 mcg by mouth daily    Historical Provider, MD   melatonin 3 MG TABS tablet Take 3 mg by mouth daily    Historical Provider, MD

## 2019-06-18 NOTE — CARE COORDINATION
Reason For Call Today:  -Attempt to reach patient today to follow up on recent pulmonary function test. Follow up on effectiveness of Spiriva since receiving from [place]. Follow up on blood sugar log and see if still taking 55 units of Lantus.   -Unable to reach patient today  -Left voicemail message requesting a phone call back when patient is able    Future Appointments   Date Time Provider Cassie Mehta   6/27/2019 10:45 MD Jacki [facility]   7/1/2019  1:00 PM Frances Herb, MD [facility] Wade Cam M   7/5/2019 12:30 PM MD RITO Hardy [facility]   12/6/2019  8:30 AM MD Marlo Dolan [address] of Care: This nurse Care Coordinator will await call back from patient, and if no return call; will attempt to reach patient back again this week if able.

## 2019-06-20 ENCOUNTER — HOSPITAL ENCOUNTER (OUTPATIENT)
Age: 74
Discharge: HOME | End: 2019-06-22
Payer: MEDICARE

## 2019-06-20 ENCOUNTER — HOSPITAL ENCOUNTER (OUTPATIENT)
Dept: GENERAL RADIOLOGY | Age: 74
Discharge: HOME | End: 2019-06-22
Payer: MEDICARE

## 2019-06-20 DIAGNOSIS — J90 PLEURAL EFFUSION ON LEFT: ICD-10-CM

## 2019-06-20 PROCEDURE — 71046 X-RAY EXAM CHEST 2 VIEWS: CPT

## 2019-06-21 ENCOUNTER — HOSPITAL ENCOUNTER (OUTPATIENT)
Age: 74
Discharge: HOME | End: 2019-06-23
Payer: OTHER GOVERNMENT

## 2019-06-22 ENCOUNTER — HOSPITAL ENCOUNTER (OUTPATIENT)
Age: 74
Discharge: HOME | End: 2019-06-22
Payer: MEDICARE

## 2019-06-22 ENCOUNTER — HOSPITAL ENCOUNTER (OUTPATIENT)
Dept: GENERAL RADIOLOGY | Age: 74
Discharge: HOME | End: 2019-06-24
Payer: MEDICARE

## 2019-06-22 ENCOUNTER — HOSPITAL ENCOUNTER (OUTPATIENT)
Dept: INFUSION THERAPY | Age: 74
Discharge: HOME | End: 2019-06-22
Payer: MEDICARE

## 2019-06-22 VITALS
RESPIRATION RATE: 18 BRPM | OXYGEN SATURATION: 94 % | WEIGHT: 287 LBS | HEIGHT: 68 IN | HEART RATE: 80 BPM | BODY MASS INDEX: 43.5 KG/M2 | SYSTOLIC BLOOD PRESSURE: 92 MMHG | DIASTOLIC BLOOD PRESSURE: 47 MMHG

## 2019-06-22 DIAGNOSIS — J90 PLEURAL EFFUSION, LEFT: Primary | ICD-10-CM

## 2019-06-22 PROCEDURE — 2500000003 HC RX 250 WO HCPCS: Performed by: INTERNAL MEDICINE

## 2019-06-22 PROCEDURE — 71045 X-RAY EXAM CHEST 1 VIEW: CPT

## 2019-06-22 RX ORDER — LIDOCAINE HYDROCHLORIDE AND EPINEPHRINE 10; 10 MG/ML; UG/ML
INJECTION, SOLUTION INFILTRATION; PERINEURAL
Status: COMPLETED | OUTPATIENT
Start: 2019-06-22 | End: 2019-06-22

## 2019-06-22 RX ORDER — LIDOCAINE HYDROCHLORIDE AND EPINEPHRINE 10; 10 MG/ML; UG/ML
20 INJECTION, SOLUTION INFILTRATION; PERINEURAL ONCE
Status: DISCONTINUED | OUTPATIENT
Start: 2019-06-22 | End: 2019-06-23 | Stop reason: HOSPADM

## 2019-06-22 RX ADMIN — LIDOCAINE HYDROCHLORIDE AND EPINEPHRINE 6 ML: 10; 10 INJECTION, SOLUTION INFILTRATION; PERINEURAL at 09:25

## 2019-06-22 RX ADMIN — LIDOCAINE HYDROCHLORIDE AND EPINEPHRINE 3 ML: 10; 10 INJECTION, SOLUTION INFILTRATION; PERINEURAL at 09:28

## 2019-06-22 RX ADMIN — LIDOCAINE HYDROCHLORIDE AND EPINEPHRINE 2 ML: 10; 10 INJECTION, SOLUTION INFILTRATION; PERINEURAL at 09:26

## 2019-06-22 RX ADMIN — LIDOCAINE HYDROCHLORIDE AND EPINEPHRINE 7 ML: 10; 10 INJECTION, SOLUTION INFILTRATION; PERINEURAL at 09:23

## 2019-06-22 NOTE — SEDATION DOCUMENTATION
ruiz needle placed to chest x2, only able to aspirate a small amount of fluid the first time. Needle pulled and site cleansed. Gauze and tegaderm applied to site.

## 2019-06-22 NOTE — SEDATION DOCUMENTATION
Respirations easy. Denies complaint. Waiting on portable chest xray, radiology staff is aware of order.

## 2019-06-25 ENCOUNTER — HOSPITAL ENCOUNTER (OUTPATIENT)
Dept: GENERAL RADIOLOGY | Age: 74
Discharge: HOME | End: 2019-06-27
Payer: MEDICARE

## 2019-06-25 ENCOUNTER — HOSPITAL ENCOUNTER (OUTPATIENT)
Dept: ULTRASOUND IMAGING | Age: 74
Discharge: HOME | End: 2019-06-27
Payer: MEDICARE

## 2019-06-25 ENCOUNTER — CARE COORDINATION (OUTPATIENT)
Dept: CARE COORDINATION | Age: 74
End: 2019-06-25

## 2019-06-25 VITALS
HEART RATE: 83 BPM | RESPIRATION RATE: 20 BRPM | OXYGEN SATURATION: 92 % | SYSTOLIC BLOOD PRESSURE: 109 MMHG | DIASTOLIC BLOOD PRESSURE: 66 MMHG

## 2019-06-25 DIAGNOSIS — J90 PLEURAL EFFUSION, LEFT: ICD-10-CM

## 2019-06-25 DIAGNOSIS — Z98.890 STATUS POST THORACENTESIS: ICD-10-CM

## 2019-06-25 PROCEDURE — 2500000003 HC RX 250 WO HCPCS: Performed by: RADIOLOGY

## 2019-06-25 PROCEDURE — C1729 CATH, DRAINAGE: HCPCS

## 2019-06-25 PROCEDURE — 71045 X-RAY EXAM CHEST 1 VIEW: CPT

## 2019-06-25 RX ORDER — LIDOCAINE HYDROCHLORIDE 10 MG/ML
INJECTION, SOLUTION EPIDURAL; INFILTRATION; INTRACAUDAL; PERINEURAL
Status: COMPLETED | OUTPATIENT
Start: 2019-06-25 | End: 2019-06-25

## 2019-06-25 RX ADMIN — LIDOCAINE HYDROCHLORIDE 10 ML: 10 INJECTION, SOLUTION EPIDURAL; INFILTRATION; INTRACAUDAL; PERINEURAL at 14:54

## 2019-06-27 ENCOUNTER — OFFICE VISIT (OUTPATIENT)
Dept: PULMONOLOGY | Age: 74
End: 2019-06-27
Payer: MEDICARE

## 2019-06-27 VITALS
SYSTOLIC BLOOD PRESSURE: 94 MMHG | HEART RATE: 78 BPM | TEMPERATURE: 97.1 F | OXYGEN SATURATION: 94 % | HEIGHT: 68 IN | WEIGHT: 288 LBS | BODY MASS INDEX: 43.65 KG/M2 | DIASTOLIC BLOOD PRESSURE: 64 MMHG | RESPIRATION RATE: 28 BRPM

## 2019-06-27 DIAGNOSIS — J44.9 COPD, SEVERE (HCC): Primary | ICD-10-CM

## 2019-06-27 DIAGNOSIS — J96.11 CHRONIC RESPIRATORY FAILURE WITH HYPOXIA AND HYPERCAPNIA (HCC): ICD-10-CM

## 2019-06-27 DIAGNOSIS — J96.12 CHRONIC RESPIRATORY FAILURE WITH HYPOXIA AND HYPERCAPNIA (HCC): ICD-10-CM

## 2019-06-27 DIAGNOSIS — J90 PLEURAL EFFUSION ON LEFT: ICD-10-CM

## 2019-06-27 DIAGNOSIS — G47.33 OBSTRUCTIVE SLEEP APNEA: ICD-10-CM

## 2019-06-27 PROCEDURE — 3023F SPIROM DOC REV: CPT | Performed by: INTERNAL MEDICINE

## 2019-06-27 PROCEDURE — G8427 DOCREV CUR MEDS BY ELIG CLIN: HCPCS | Performed by: INTERNAL MEDICINE

## 2019-06-27 PROCEDURE — G8417 CALC BMI ABV UP PARAM F/U: HCPCS | Performed by: INTERNAL MEDICINE

## 2019-06-27 PROCEDURE — 99214 OFFICE O/P EST MOD 30 MIN: CPT | Performed by: INTERNAL MEDICINE

## 2019-06-27 PROCEDURE — G8598 ASA/ANTIPLAT THER USED: HCPCS | Performed by: INTERNAL MEDICINE

## 2019-06-27 PROCEDURE — 3017F COLORECTAL CA SCREEN DOC REV: CPT | Performed by: INTERNAL MEDICINE

## 2019-06-27 PROCEDURE — 1123F ACP DISCUSS/DSCN MKR DOCD: CPT | Performed by: INTERNAL MEDICINE

## 2019-06-27 PROCEDURE — 4040F PNEUMOC VAC/ADMIN/RCVD: CPT | Performed by: INTERNAL MEDICINE

## 2019-06-27 PROCEDURE — G8926 SPIRO NO PERF OR DOC: HCPCS | Performed by: INTERNAL MEDICINE

## 2019-06-27 PROCEDURE — 1036F TOBACCO NON-USER: CPT | Performed by: INTERNAL MEDICINE

## 2019-06-27 RX ORDER — BUDESONIDE AND FORMOTEROL FUMARATE DIHYDRATE 80; 4.5 UG/1; UG/1
2 AEROSOL RESPIRATORY (INHALATION) 2 TIMES DAILY
Qty: 1 INHALER | Refills: 11 | Status: SHIPPED | OUTPATIENT
Start: 2019-06-27

## 2019-06-27 NOTE — PROGRESS NOTES
with exudative pleural effusion with large number of RBCs, negative for malignancy and negative for infection and AFB. During his admission in April he had acute hypercapnic respiratory failure with mental status changes and during hospitalization he was treated with IV diuretics with noninvasive ventilation. Initial history/hospitalization  He has history of prolonged course of hospitalization in March/April at [facility] when he was intubated from respiratory failure, mental status changes, influenza A, CHF, type II MI, prolonged mechanical ventilation, acute renal failure requiring hemodialysis which improved, atrial fibrillation on anticoagulation had retroperitoneal bleeding requiring massive transfusion during hospitalization and ultimately improvement in mentation and was extubated and was discharged to nursing home/rehab facility and [facility]. He has history of smoking 2 packs per day for 30 years and he stop smoking 1996 or more he was not diagnosed with COPD in the past.  Used to drive trucks, different jobs        Past Medical History:        Diagnosis Date    Abnormal nuclear stress test 03/12/2010    Stress and resting cardiolite images showed inferior wall hypoperfusion suggestive of previous myocardial infarction. Left ventricular wall motion showed inferior wall hypokinesia. EF 58%.  Arrhythmia 2007    A single episode    Atrial fibrillation ([address] 2007    Single Episode    CAD (coronary artery disease) 1996    MI    DM type 2 (diabetes mellitus, type 2) ([address] 2005    History of echocardiogram 77/17/2463    LV systolic function mildly/moderately reduced. EF 40%. EF vis Avilez's method is 40%. Verdon and apical segments are hypokinetic    Hx of echocardiogram 04/05/2007    EF 62%, Increased left atrial and right ventricular diametes. Increased septal and posterior wall thickness suggest left ventricular hypertrophy.  Valves were normal.        Past Surgical History:        Procedure Laterality Date    CARDIOVERSION  05/18/2007    Successful-Dr. Leggett-Radhaalyssa CanoOlga    CARDIOVERSION  03/13    CORONARY ANGIOPLASTY  1996    THORACENTESIS  06/25/2019       Allergies:    No Known Allergies      Home Meds:   Outpatient Encounter Medications as of 6/27/2019   Medication Sig Dispense Refill    azithromycin (ZITHROMAX) 250 MG tablet Take 1 tablet by mouth See Admin Instructions for 5 days 500mg on day 1 followed by 250mg on days 2 - 5 6 tablet 0    tiotropium (SPIRIVA HANDIHALER) 18 MCG inhalation capsule Inhale 1 capsule into the lungs daily 30 capsule 0    insulin glargine (LANTUS SOLOSTAR) 100 UNIT/ML injection pen Inject 55 Units into the skin nightly (Patient taking differently: Inject 60 Units into the skin nightly ) 18 pen 3    furosemide (LASIX) 40 MG tablet Take 1 tablet by mouth 2 times daily Indications: Pt is taking 40 mg BID (9am and 3pm) 90 tablet 3    metolazone (ZAROXOLYN) 2.5 MG tablet Take 2.5 mg by mouth daily Taking this Daily. 30 tablet 0    aspirin 81 MG tablet Take 1 tablet by mouth daily 30 tablet 3    albuterol (PROVENTIL) (2.5 MG/3ML) 0.083% nebulizer solution Take 3 mLs by nebulization 4 times daily 120 each 0    amiodarone (CORDARONE) 200 MG tablet Take 1 tablet by mouth daily 30 tablet 0    dabigatran (PRADAXA) 150 MG capsule Take 1 capsule by mouth 2 times daily 60 capsule 0    lactobacillus (CULTURELLE) capsule Take 2 capsules by mouth 2 times daily (with meals) 30 capsule 0    atorvastatin (LIPITOR) 40 MG tablet Take 1 tablet by mouth daily 30 tablet 0    metoprolol tartrate (LOPRESSOR) 25 MG tablet Take 0.5 tablets by mouth 2 times daily Hold for SBP<100 and/or HR<60.  Otherwise give 60 tablet 0    docusate sodium (COLACE) 100 MG capsule Take 1 capsule by mouth daily      acetaminophen (TYLENOL) 325 MG tablet Take 650 mg by mouth every 6 hours as needed for Pain or Fever      Cholecalciferol (VITAMIN D3) 2000 units CAPS Take 2,000 Units by mouth daily      vitamin B-12 (CYANOCOBALAMIN) 1000 MCG tablet Take 1,000 mcg by mouth daily      melatonin 3 MG TABS tablet Take 3 mg by mouth daily       No facility-administered encounter medications on file as of 6/27/2019. Social History:   TOBACCO:   reports that he quit smoking about 23 years ago. His smoking use included cigarettes. He has a 45.00 pack-year smoking history. He has never used smokeless tobacco.  ETOH:   reports that he does not drink alcohol. OCCUPATION:      Family History:   History reviewed. No pertinent family history.     Immunizations:    Immunization History   Administered Date(s) Administered    Influenza Vaccine, unspecified formulation 10/06/2017    Influenza Virus Vaccine 10/30/2014, 10/22/2015    Influenza, [place], 3 Years and older, IM (Fluzone 3 yrs and older or Afluria 5 yrs and older) 10/10/2016    Influenza, [place], 3 yrs and older, IM, PF (Fluzone 3 yrs and older or Afluria 5 yrs and older) 10/11/2018    Pneumococcal Conjugate 13-valent ([id number]) 08/01/2017    Pneumococcal Polysaccharide ([id number]) 11/05/2018    Tetanus 08/27/2013    Zoster Live (Zostavax) 12/01/2017         REVIEW OF SYSTEMS:  CONSTITUTIONAL:  positive for  fatigue  negative for  fevers, chills, sweats, malaise, anorexia and weight loss  EYES:  negative for  double vision, blurred vision, dry eyes, eye discharge, visual disturbance, irritation, redness and icterus  HEENT:  negative for  nasal congestion, epistaxis, sore mouth, sore throat, hoarseness and voice change  RESPIRATORY:  positive for  dyspneanegative for  dry cough, cough with sputum, hemoptysis, chest pain, pleuritic pain and cyanosis  CARDIOVASCULAR:  positive for  dyspnea, orthopnea, PND, edema, negative for  chest pain, palpitations, exertional chest pressure/discomfort, early saiety, syncope  GASTROINTESTINAL:  negative for nausea, vomiting, change in bowel habits, diarrhea, constipation, abdominal pain, dysphagia, reflux, hematemesis and hemtochezia  GENITOURINARY: Positive for nocturia, hesitancy and decreased stream, negative for frequency, dysuria, urinary incontinence, and hematuria  HEMATOLOGIC/LYMPHATIC:  negative for easy bruising, bleeding, lymphadenopathy and petechiae  ALLERGIC/IMMUNOLOGIC:  negative for recurrent infections, urticaria, hay fever, angioedema, anaphylaxis and drug reactions  ENDOCRINE:  negative for heat intolerance, cold intolerance, tremor and change in bowel habits  MUSCULOSKELETAL:  negative for  joint swelling, stiff joints, decreased range of motion and muscle weakness  NEUROLOGICAL:  negative for headaches, dizziness, seizures, speech problems, visual disturbance, coordination problems, tremor, dysphagia, weakness, numbness, syncope and tingling  BEHAVIOR/PSYCH:  negative  SLEEP: Positive for snoring, choking, gasping, periods of not breathing, excessive daytime sleepiness, falling asleep while reading, watching television, disrupted sleep, naps        Physical Exam:    Vitals: BP 94/64 Comment: recheck  Pulse 78   Temp 97.1 °F (36.2 °C) (Tympanic)   Resp 28   Ht 5' 8\" (1.727 m)   Wt 288 lb (130.6 kg)   SpO2 94% Comment: O2 2 LPM  BMI 43.79 kg/m²   Last 3 weights: Wt Readings from Last 3 Encounters:   06/27/19 288 lb (130.6 kg)   06/22/19 287 lb (130.2 kg)   06/21/19 287 lb (130.2 kg)     Body mass index is 43.79 kg/m².     Physical Examination:   General appearance - alert, well appearing, and in no distress, oriented to person, place, and time, overweight, chronically ill appearing  Mental status - alert, oriented to person, place, and time  Eyes - pupils equal and reactive, extraocular eye movements intact, sclera anicteric  Ears - right ear normal, left ear normal  Nose - normal and patent, no erythema, discharge or polyps  Mouth - mucous membranes moist, pharynx normal without lesions and large tongue, small oropharynx, Mallampati 3  Neck - supple, no significant adenopathy, positive JVD  Chest - mild tachypnea present, no retractions or cyanosis, symmetrical chest movement is present, dullness on percussion present at the left base, air entry is bilateral decreased at the left base.   Heart - irregularly irregular rhythm with rate 86, S3 present, systolic murmur 2/6 at lower left sternal border  Abdomen - soft, nontender, nondistended, no masses or organomegaly  Neurological - alert, oriented, normal speech, no focal findings or movement disorder noted  Extremities - pedal edema +++, intact peripheral pulses  Skin - normal coloration and turgor, no rashes, no suspicious skin lesions noted       LABS:    CBC:   WBC   Date Value Ref Range Status   05/10/2019 13.6 (H) 3.5 - 11.3 k/uL Final   05/08/2019 13.6 (H) 3.5 - 11.3 k/uL Final   05/07/2019 13.2 (H) 3.5 - 11.3 k/uL Final     Hemoglobin   Date Value Ref Range Status   05/10/2019 9.0 (L) 13.0 - 17.0 g/dL Final   05/08/2019 8.9 (L) 13.0 - 17.0 g/dL Final   05/07/2019 8.8 (L) 13.0 - 17.0 g/dL Final     Platelets   Date Value Ref Range Status   05/10/2019 282 138 - 453 k/uL Final   05/08/2019 278 138 - 453 k/uL Final   05/07/2019 278 138 - 453 k/uL Final     BMP:   Sodium   Date Value Ref Range Status   05/29/2019 136 135 - 144 mmol/L Final   05/20/2019 135 135 - 144 mmol/L Final   05/13/2019 135 135 - 144 mmol/L Final     Potassium   Date Value Ref Range Status   05/29/2019 3.7 3.7 - 5.3 mmol/L Final   05/20/2019 4.0 3.7 - 5.3 mmol/L Final   05/13/2019 4.1 3.7 - 5.3 mmol/L Final     Chloride   Date Value Ref Range Status   05/29/2019 87 (L) 98 - 107 mmol/L Final   05/20/2019 85 (L) 98 - 107 mmol/L Final   05/13/2019 87 (L) 98 - 107 mmol/L Final     CO2   Date Value Ref Range Status   05/29/2019 38 (H) 20 - 31 mmol/L Final   05/20/2019 42 (HH) 20 - 31 mmol/L Final   05/13/2019 38 (H) 20 - 31 mmol/L Final     BUN   Date Value Ref Range Status   05/29/2019 24 (H) 8 - 23 mg/dL Final   05/20/2019 28 (H) 8 - 23 mg/dL Final   05/13/2019 30 (H) 8 - 23 mg/dL Final     CREATININE   Date Value Ref Range Status   05/29/2019 1.32 (H) 0.70 - 1.20 mg/dL Final   05/20/2019 1.49 (H) 0.70 - 1.20 mg/dL Final   05/13/2019 1.14 0.70 - 1.20 mg/dL Final     Glucose   Date Value Ref Range Status   05/29/2019 267 (H) 70 - 99 mg/dL Final   05/20/2019 513 (HH) 70 - 99 mg/dL Final   05/13/2019 346 (H) 70 - 99 mg/dL Final   11/26/2018 104 (H) 70 - 99 mg/dL Final     Comment:         DIAGNOSTIC THRESHOLDS FOR DIABETES AND IMPAIRED FASTING GLUCOSE (IFG)                                        FASTING PLASMA GLUCOSE                NORMAL                       <100     mg/dL                IFG                          100-125  mg/dL                DIABETES                     >/= 126  mg/dL                GESTATIONAL DIABETES         >/= 92   mg/dL     08/31/2016 129 (H) 70 - 100 mg/dl Final     Comment:           DIAGNOSTIC THRESHOLDS FOR DIABETES AND IMPAIRED FASTING GLUCOSE (IFG)                                        FASTING PLASMA GLUCOSE                NORMAL                       <100     MG/DL                IFG                          100-125  MG/DL                DIABETES                     >/= 126  MG/DL     09/14/2015 145 (H) 70 - 100 mg/dl Final     Comment:           DIAGNOSTIC THRESHOLDS FOR DIABETES AND IMPAIRED FASTING GLUCOSE (IFG)                                        FASTING PLASMA GLUCOSE                NORMAL                       <100     MG/DL                IFG                          100-125  MG/DL                DIABETES                     >/= 126  MG/DL       Hepatic:   AST   Date Value Ref Range Status   05/29/2019 14 <40 U/L Final   05/20/2019 15 <40 U/L Final   05/13/2019 12 <40 U/L Final     ALT   Date Value Ref Range Status   05/29/2019 14 5 - 41 U/L Final   05/20/2019 14 5 - 41 U/L Final   05/13/2019 13 5 - 41 U/L Final     Total Bilirubin   Date Value Ref Range Status   05/29/2019 0.38 0.3 - 1.2 mg/dL Final 05/20/2019 0.48 0.3 - 1.2 mg/dL Final   05/13/2019 0.47 0.3 - 1.2 mg/dL Final     Alkaline Phosphatase   Date Value Ref Range Status   05/29/2019 140 (H) 40 - 129 U/L Final   05/20/2019 140 (H) 40 - 129 U/L Final   05/13/2019 137 (H) 40 - 129 U/L Final     Amylase:   Amylase   Date Value Ref Range Status   03/15/2019 47 28 - 100 U/L Final     Lipase:   Lipase   Date Value Ref Range Status   03/15/2019 47 13 - 60 U/L Final     CARDIAC ENZYMES: No results found for: CKTOTAL, CKMB, CKMBINDEX, TROPONINI  BNP: No results found for: BNP  Lipids:   Cholesterol   Date Value Ref Range Status   05/07/2019 123 <200 mg/dL Final     Comment:        Cholesterol Guidelines:      <200  Desirable   200-240  Borderline      >240  Undesirable          HDL   Date Value Ref Range Status   05/07/2019 46 >40 mg/dL Final     Comment:        HDL Guidelines:    <40     Undesirable   40-59    Borderline    >59     Desirable            INR:   INR   Date Value Ref Range Status   05/06/2019 1.2 0.9 - 1.2 Final   04/23/2019 1.3 (H) 0.9 - 1.2 Final   03/29/2019 1.2  Final     Comment:           Therapeutic Range: Moderate Anticoagulant Intensity:     INR = 2.0-3.0   High Anticoagulant Intensity:     INR = 2.5-3.5             Thyroid: No results found for: TSH  Urinalysis:   Bacteria, UA   Date Value Ref Range Status   03/28/2019 NOT REPORTED None Final     Color, UA   Date Value Ref Range Status   03/28/2019 DARK YELLOW (A) YELLOW Final     pH, UA   Date Value Ref Range Status   03/28/2019 5.0 5.0 - 8.0 Final     Protein, UA   Date Value Ref Range Status   03/28/2019 1+ (A) NEGATIVE Final     RBC, UA   Date Value Ref Range Status   03/28/2019 5 TO 10 0 - 4 /HPF Final     Comment:     Reference range defined for non-centrifuged specimen.      Specific Gravity, UA   Date Value Ref Range Status   03/28/2019 1.024 1.005 - 1.030 Final     Bilirubin Urine   Date Value Ref Range Status   03/28/2019 NEGATIVE  Verified by ictotest. (A) NEGATIVE Final Left Ventricle  Global left ventricular systolic function is mild to moderately reduced. Estimated ejection fraction is 40 % . Calculated EF via Avilez's method is 40 %. The apex and apical segments are hypokinetic. Pleural fluid studies   Ref. Range 4/24/2019 11:45 5/8/2019 09:00   Appearance, Fluid Unknown CLOUDY CLOUDY   Basos, Fluid Latest Ref Range: 0 %  1 (H)   Color, Fluid Unknown RED MARIAN   Eos, Fluid Latest Ref Range: 0 %  1 (H)   Glucose, Fluid Latest Units: mg/dL 155 253   LD, Fluid Latest Units: U/L 1,494 413   RBC, Fluid Latest Units: /mm3 242,000 29,000   Lymphocytes, Body Fluid Latest Ref Range: 0 % 29 (H) 65 (H)   Monocyte Count, Fluid Latest Ref Range: 0 %  3 (H)   Neutrophil Count, Fluid Latest Ref Range: 0 % 19 (H) 29 (H)   pH, Fluid Unknown 8.5 8.0   Total Protein, Body Fluid Latest Units: g/dL  2.8   WBC, Fluid Latest Units: /mm3 1,840 2,267   Other Cells, Fluid Latest Ref Range: 0 % 52 (H) 1 (H)     Pleural fluid cytology 04/24 and 05/8/19  Negative for malignancy    Assessment and Plan       ICD-10-CM    1. COPD, severe ([facility] [address] [facility] J44.9    2. Chronic respiratory failure with hypoxia and hypercapnia (HCC) J96.11     J96.12    3. Obstructive sleep apnea G47.33    4. Pleural effusion on left J90    5. Pulmonary hypertension  6. Biventricular failure    Assessment  He has multiple medical issues including combined systolic and diastolic CHF, l severe COPD, obesity hypoventilation syndrome, obstructive sleep apnea, chronic hypoxic and hypercapnic respiratory failure, anasarca with continued volume overload and pedal edema with chronic kidney disease.   Regarding left pleural effusion it is recurrent unfortunately 4 thoracentesis done and along with overall volume overload, his pleural effusion analysis was consistent with hemorrhagic pleural effusion, as he continued to have recurrent pleural effusion requiring recurrent thoracentesis ideally should have VATS with decortication/pleural biopsy and pleurodesis but he is not a candidate for any surgery at this time. Plan and recommendation    Not a candidate for VATS/pleural biopsy and pleurodesis  May neeed pleurax cath if continued to have recurrent left pleural effusion although he is grossly volume overloaded and need optimization of diuretic, he has chronic kidney disease and he will benefit from nephrology evaluation to manage diuretics    Continue spiriva   Add Symbicort 80/4.5 2 puffs BID  Albuterol as needed  Pulmonary function testing seen  CXR reviewed from 06/25/2019    Adjust lasix and zaroxolyn per cardiology and primary service  Consider nephrology evaluation. He will likely need recurrent thoracentesis. Supplemental O2 to continue 2 L nasal cannula. Not a candidate at this time to do pulmonary rehabilitation until further improvement    Annual Flu Vaccinations recommended   Up to date with vaccinations from [facility] an active lifestyle     Continue BIPAP every night  BIPAP at least 4 hrs qhs  Wt loss is recommended and discussed  Follow good sleep hygeine instructions  Use humidifier   Questions answered pertaining to diagnosis and management explained importance of compliance with therapy     Questions answered to pt's/family's satisfaction  RTC 2 months    It was my pleasure to evaluate Delories Pore today. Please call with questions. Please note that this chart was generated using voice recognition Dragon dictation software. Although every effort was made to ensure the accuracy of this automated transcription, some errors in transcription may have occurred.     Peyton Colón MD             6/27/2019, 11:55 AM

## 2019-07-01 ENCOUNTER — CARE COORDINATION (OUTPATIENT)
Dept: CARE COORDINATION | Age: 74
End: 2019-07-01

## 2019-07-01 PROBLEM — J44.9 CHRONIC OBSTRUCTIVE PULMONARY DISEASE (HCC): Status: ACTIVE | Noted: 2019-07-01

## 2019-07-01 PROBLEM — I50.22 CHRONIC SYSTOLIC CHF (CONGESTIVE HEART FAILURE), NYHA CLASS 3 (HCC): Status: ACTIVE | Noted: 2019-04-23

## 2019-07-01 NOTE — CARE COORDINATION
Reason For Call Today:  -planned ACM follow up call to patient today.  -patient actually seen PCP today, and pulmonology next week. Care Coordination Plan of Care:    This nurse Care Coordinator will plan to follow up with patient in the next couple days, review PCP instructions, review medications, weights, breathing.   -patient scheduled with CArdiology on Friday 07/05    Future Appointments   Date Time Provider Cassie Janine   7/5/2019 12:30 PM MD WENDY WilksFIN CARDI [facility]   7/23/2019  1:20 PM MD Marlo Clemente   8/29/2019 11:45 AM MD Jacki Hampton Pulmon [facility]   12/6/2019  8:30 AM MD Marlo Clemente

## 2019-07-02 ENCOUNTER — CARE COORDINATION (OUTPATIENT)
Dept: CARE COORDINATION | Age: 74
End: 2019-07-02

## 2019-07-02 ASSESSMENT — ENCOUNTER SYMPTOMS: DYSPNEA ASSOCIATED WITH: EXERTION

## 2019-07-02 NOTE — CARE COORDINATION
(Nutrition/Exercise/Self-Monitoring):  No   Have you ever had to go to the ED for symptoms of low blood sugar?:  No       No patient-reported symptoms   Do you have hyperglycemia symptoms?:  No   Do you have hypoglycemia symptoms?:  No   Blood Sugar Monitoring Regimen:  Before Meals   Blood Sugar Trends:  No Change      ,   Congestive Heart Failure Assessment    Are you currently restricting fluids?:  1800cc  Do you understand a low sodium diet?:  Yes  Do you understand how to read food labels?:  Yes  Do you salt your food before tasting it?:  No         Symptoms:   CHF associated shortness of breath: Pos      Symptom course:  improving  Weight trend:  fluctuating minimally     ,   COPD Assessment    Does the patient understand envrionmental exposure?:  Yes  Is the patient able to verbalize Rescue vs. Long Acting medications?:  Yes  Does the patient have a nebulizer?:  Yes  Does the patient use a space with inhaled medications?:  No     No patient-reported symptoms         Symptoms:      Symptom course:  improving (Comment: per spouse, \"shortness of breath improved greatly following draining of his lungs last week)  Breathlessness:  exertion  Change in chronic cough?:  Increased  Change in sputum?:  Increased (Comment: patient following closely with pulmonology)  Sputum characteristics: Thick, White  Have you had a recent diagnosis of pneumonia either by PCP or at a hospital?:  No      and   General Assessment    Do you have any symptoms that are causing concern?:  No           Any ED visits or Hospitalizations since last Call? · No    Medications Reviewed with  today:  · Spouse verbalizes that patient has all medications. · Spouse denies any questions. She will, \"[facility] and to follow up on Bumex  · Any cost issues with medications Yes, \"if medications are not gotten through the [facility]\".     Zone Management tool reviewed today is applicable: Yes, CHF, COPD, DM  Lab Results Component Value Date    LABA1C 8.6 (H) 03/18/2019     Lab Results   Component Value Date     03/18/2019       Reviewed social needs/Home Needs if any:     · Does patient have enough food? Yes,   · Does patient have transportation to appointment/store/pharmacy, etc?Yes,   · Does patient need any help in the home? no   · If yes, what type of help? n/a    Reviewed Upcoming follow up appointments with   Future Appointments   Date Time Provider Cassie Mehta   7/5/2019 12:30 PM MD RITO Dozier [facility]   7/23/2019  1:20 PM MD Marlo Wells   8/29/2019 11:45 AM MD Jacki Santos [facility]   12/6/2019  8:30 AM MD Marlo Wells   ·   · :    Goals reviewed. Patient to continue to work to improve:   Goals        Care Coordination     Conditions and Symptoms      I will schedule office visits, as directed by my provider. I will keep my appointment or reschedule if I have to cancel. I will notify my provider of any barriers to my plan of care. I will follow my Zone Management tool to seek urgent or emergent care. I will notify my provider of any symptoms that indicate a worsening of my condition. Barriers: overwhelmed by complexity of regimen and lack of education  Plan for overcoming my barriers: patient to keep follow up appointments with Cardiology, Pulmonology and MD. Follow up with nurse care coordinator. Confidence: 6/10  Anticipated Goal Completion Date: 9/4/19                Education Reviewed with patient today: See Education Module. Interventions completed today:Stressed importance of following up on patient getting Bumex through the [place], along with his Symbicort    · Patient to reach out to care coordinator at [phone] or MD office with questions. Care Coordinator Plan of Care:     This nurse CC will plan to follow up with patient/spouse again tomorrow in follow up to patient starting Bumex, and getting

## 2019-07-02 NOTE — CARE COORDINATION
MCG/ACT AERO Inhale 2 puffs into the lungs 2 times daily 1 Inhaler 11    tiotropium (SPIRIVA HANDIHALER) 18 MCG inhalation capsule Inhale 1 capsule into the lungs daily 30 capsule 0    insulin glargine (LANTUS SOLOSTAR) 100 UNIT/ML injection pen Inject 55 Units into the skin nightly (Patient taking differently: Inject 60 Units into the skin nightly ) 18 pen 3    metolazone (ZAROXOLYN) 2.5 MG tablet Take 2.5 mg by mouth daily Taking this Daily. 30 tablet 0    aspirin 81 MG tablet Take 1 tablet by mouth daily 30 tablet 3    albuterol (PROVENTIL) (2.5 MG/3ML) 0.083% nebulizer solution Take 3 mLs by nebulization 4 times daily 120 each 0    amiodarone (CORDARONE) 200 MG tablet Take 1 tablet by mouth daily 30 tablet 0    dabigatran (PRADAXA) 150 MG capsule Take 1 capsule by mouth 2 times daily 60 capsule 0    lactobacillus (CULTURELLE) capsule Take 2 capsules by mouth 2 times daily (with meals) 30 capsule 0    atorvastatin (LIPITOR) 40 MG tablet Take 1 tablet by mouth daily 30 tablet 0    metoprolol tartrate (LOPRESSOR) 25 MG tablet Take 0.5 tablets by mouth 2 times daily Hold for SBP<100 and/or HR<60. Otherwise give 60 tablet 0    docusate sodium (COLACE) 100 MG capsule Take 1 capsule by mouth daily      acetaminophen (TYLENOL) 325 MG tablet Take 650 mg by mouth every 6 hours as needed for Pain or Fever      Cholecalciferol (VITAMIN D3) 2000 units CAPS Take 2,000 Units by mouth daily      vitamin B-12 (CYANOCOBALAMIN) 1000 MCG tablet Take 1,000 mcg by mouth daily      melatonin 3 MG TABS tablet Take 3 mg by mouth daily       No current facility-administered medications for this visit.           Visit for:  Obesity/Weight loss  Diabetes: X  Hypertension:  Hyperlipidemia:  Other:     Anthropometric Measurements:  HT: 5'8\"  Weight: 287  IBW: 152 + or - 10%  BMI: 44    Biochemical Data, Medical Tests and Procedures:    Lab Results   Component Value Date    LABA1C 8.6 (H) 03/18/2019     Lab Results   Component

## 2019-07-03 ENCOUNTER — CARE COORDINATION (OUTPATIENT)
Dept: CARE COORDINATION | Age: 74
End: 2019-07-03

## 2019-07-03 NOTE — CARE COORDINATION
Patient will be mailed nutrition information on all the above discussed. 2.  Will follow up with patient in 2-3 weeks to review nutrition information and answer questions.       Future Appointments   Date Time Provider Cassie Mehta   7/5/2019 12:30 PM MD RITO Lino [facility]   7/23/2019  1:20 PM MD Marlo Brooks   8/29/2019 11:45 AM MD Jacki Amador [facility]   12/6/2019  8:30 AM MD Marlo Brookshine Sicard

## 2019-07-05 ENCOUNTER — OFFICE VISIT (OUTPATIENT)
Dept: CARDIOLOGY | Age: 74
End: 2019-07-05
Payer: MEDICARE

## 2019-07-05 ENCOUNTER — HOSPITAL ENCOUNTER (OUTPATIENT)
Age: 74
Discharge: HOME | End: 2019-07-05
Payer: MEDICARE

## 2019-07-05 ENCOUNTER — TELEPHONE (OUTPATIENT)
Dept: CARDIOLOGY | Age: 74
End: 2019-07-05

## 2019-07-05 VITALS
DIASTOLIC BLOOD PRESSURE: 61 MMHG | BODY MASS INDEX: 44.41 KG/M2 | WEIGHT: 293 LBS | HEART RATE: 82 BPM | RESPIRATION RATE: 22 BRPM | SYSTOLIC BLOOD PRESSURE: 91 MMHG | OXYGEN SATURATION: 91 % | HEIGHT: 68 IN

## 2019-07-05 DIAGNOSIS — J96.11 CHRONIC HYPOXEMIC RESPIRATORY FAILURE (HCC): ICD-10-CM

## 2019-07-05 DIAGNOSIS — E66.01 SEVERE OBESITY (BMI >= 40) (HCC): ICD-10-CM

## 2019-07-05 DIAGNOSIS — I48.0 PAF (PAROXYSMAL ATRIAL FIBRILLATION) (HCC): ICD-10-CM

## 2019-07-05 DIAGNOSIS — I50.43 ACUTE ON CHRONIC COMBINED SYSTOLIC AND DIASTOLIC CHF, NYHA CLASS 3 (HCC): ICD-10-CM

## 2019-07-05 DIAGNOSIS — Z79.01 CHRONIC ANTICOAGULATION: ICD-10-CM

## 2019-07-05 DIAGNOSIS — G47.33 OSA (OBSTRUCTIVE SLEEP APNEA): ICD-10-CM

## 2019-07-05 DIAGNOSIS — I50.43 ACUTE ON CHRONIC COMBINED SYSTOLIC AND DIASTOLIC CHF, NYHA CLASS 3 (HCC): Primary | ICD-10-CM

## 2019-07-05 LAB
ANION GAP SERPL CALCULATED.3IONS-SCNC: 10 MMOL/L (ref 9–17)
BNP INTERPRETATION: ABNORMAL
BUN BLDV-MCNC: 32 MG/DL (ref 8–23)
BUN/CREAT BLD: 23 (ref 9–20)
CALCIUM SERPL-MCNC: 9.6 MG/DL (ref 8.6–10.4)
CHLORIDE BLD-SCNC: 88 MMOL/L (ref 98–107)
CO2: 39 MMOL/L (ref 20–31)
CREAT SERPL-MCNC: 1.41 MG/DL (ref 0.7–1.2)
GFR AFRICAN AMERICAN: 60 ML/MIN
GFR NON-AFRICAN AMERICAN: 49 ML/MIN
GFR SERPL CREATININE-BSD FRML MDRD: ABNORMAL ML/MIN/{1.73_M2}
GFR SERPL CREATININE-BSD FRML MDRD: ABNORMAL ML/MIN/{1.73_M2}
GLUCOSE BLD-MCNC: 313 MG/DL (ref 70–99)
HCT VFR BLD CALC: 32.2 % (ref 40.7–50.3)
HEMOGLOBIN: 10 G/DL (ref 13–17)
MCH RBC QN AUTO: 31.8 PG (ref 25.2–33.5)
MCHC RBC AUTO-ENTMCNC: 31.1 G/DL (ref 28.4–34.8)
MCV RBC AUTO: 102.5 FL (ref 82.6–102.9)
NRBC AUTOMATED: 0 PER 100 WBC
PDW BLD-RTO: 14.3 % (ref 11.8–14.4)
PLATELET # BLD: 273 K/UL (ref 138–453)
PMV BLD AUTO: 9.3 FL (ref 8.1–13.5)
POTASSIUM SERPL-SCNC: 3.6 MMOL/L (ref 3.7–5.3)
PRO-BNP: 9253 PG/ML
RBC # BLD: 3.14 M/UL (ref 4.21–5.77)
SODIUM BLD-SCNC: 137 MMOL/L (ref 135–144)
WBC # BLD: 14.8 K/UL (ref 3.5–11.3)

## 2019-07-05 PROCEDURE — 1036F TOBACCO NON-USER: CPT | Performed by: INTERNAL MEDICINE

## 2019-07-05 PROCEDURE — G8598 ASA/ANTIPLAT THER USED: HCPCS | Performed by: INTERNAL MEDICINE

## 2019-07-05 PROCEDURE — 36415 COLL VENOUS BLD VENIPUNCTURE: CPT

## 2019-07-05 PROCEDURE — 3017F COLORECTAL CA SCREEN DOC REV: CPT | Performed by: INTERNAL MEDICINE

## 2019-07-05 PROCEDURE — 83880 ASSAY OF NATRIURETIC PEPTIDE: CPT

## 2019-07-05 PROCEDURE — 1123F ACP DISCUSS/DSCN MKR DOCD: CPT | Performed by: INTERNAL MEDICINE

## 2019-07-05 PROCEDURE — G8427 DOCREV CUR MEDS BY ELIG CLIN: HCPCS | Performed by: INTERNAL MEDICINE

## 2019-07-05 PROCEDURE — 99214 OFFICE O/P EST MOD 30 MIN: CPT | Performed by: INTERNAL MEDICINE

## 2019-07-05 PROCEDURE — 85027 COMPLETE CBC AUTOMATED: CPT

## 2019-07-05 PROCEDURE — G8417 CALC BMI ABV UP PARAM F/U: HCPCS | Performed by: INTERNAL MEDICINE

## 2019-07-05 PROCEDURE — 80048 BASIC METABOLIC PNL TOTAL CA: CPT

## 2019-07-05 PROCEDURE — 4040F PNEUMOC VAC/ADMIN/RCVD: CPT | Performed by: INTERNAL MEDICINE

## 2019-07-05 RX ORDER — SPIRONOLACTONE 25 MG/1
25 TABLET ORAL DAILY
Qty: 90 TABLET | Refills: 3 | Status: ON HOLD | OUTPATIENT
Start: 2019-07-05 | End: 2019-07-30 | Stop reason: HOSPADM

## 2019-07-05 RX ORDER — BUMETANIDE 2 MG/1
2 TABLET ORAL DAILY
Qty: 90 TABLET | Refills: 3 | Status: ON HOLD
Start: 2019-07-05 | End: 2019-07-30 | Stop reason: SDUPTHER

## 2019-07-05 NOTE — PROGRESS NOTES
bleeding, renal failure requiring dialysis and type II myocardial infarction. 2 admissions to [facility] because of worsening heart failure symptoms and recurrent right pleural effusion. S/P thoracentesis ×4       · Acute on Chronic combined heart failure: New York Heart Association Class: III: Weight gain of 3 pounds. · Again refusing inpatient therapy. · Without blood work today, worsening kidney function and hypokalemia. · Repeat chest x-ray pending   Beta Blocker: Continue Metoprolol tartrate (Lopressor) 25 mg 1/2 tab bid. · ACE Inibitor/ARB: No ACEI or ARB for now because of recent acute kidney injury requiring dialysis. · Diuretics: continue metolazone 2.5 mg once daily. · Continue Bumex 2 mg daily. · Giving class III heart failure and low serum potassium, I will start him on Aldactone 25 mg daily. · Heart failure counseling: I told him to start wearing lower extremity compression stockings and I advised them to try and keep their legs up whenever possible and to limit salt in their diet. · Lab Testing: I ordered a repeat BMP, BNP, and CBC for now to assess his kidney function, Potassium, fluid status, and hemoglobin and hematocrit  · Imaging testing: I also ordered a Chest Xray to re-assess any fluid in his lungs. · Additional Testing: I Ordered an Echocardiogram to assess Mr. Wilkinson Bi ejection fraction and to look for significant valvular heart disease as a source of Mr. Tiffany Cee symptoms. · He will need ischemic work-up, preferably via cardiac catheterization. There is no signs of unstable CAD at this point but he has refractory heart failure. Cardiac catheterization is needed to rule out significant CAD. · I do not think he will be able to lay flat for cardiac cath now in addition he has acute on chronic kidney injury. · If his kidney function remained stable, we will arrange for cardiac catheterization probably next week.     Paroxysmal Atrial Fibrillation: Rhythm

## 2019-07-11 ENCOUNTER — HOSPITAL ENCOUNTER (OUTPATIENT)
Dept: NON INVASIVE DIAGNOSTICS | Age: 74
Discharge: HOME | End: 2019-07-11
Payer: MEDICARE

## 2019-07-11 DIAGNOSIS — I50.43 ACUTE ON CHRONIC COMBINED SYSTOLIC AND DIASTOLIC CHF, NYHA CLASS 3 (HCC): ICD-10-CM

## 2019-07-11 DIAGNOSIS — G47.33 OSA (OBSTRUCTIVE SLEEP APNEA): ICD-10-CM

## 2019-07-11 DIAGNOSIS — J96.11 CHRONIC HYPOXEMIC RESPIRATORY FAILURE (HCC): ICD-10-CM

## 2019-07-11 DIAGNOSIS — I48.0 PAF (PAROXYSMAL ATRIAL FIBRILLATION) (HCC): ICD-10-CM

## 2019-07-11 LAB
LV EF: 40 %
LVEF MODALITY: NORMAL

## 2019-07-11 PROCEDURE — 93306 TTE W/DOPPLER COMPLETE: CPT | Performed by: INTERNAL MEDICINE

## 2019-07-11 PROCEDURE — 93306 TTE W/DOPPLER COMPLETE: CPT

## 2019-07-15 ENCOUNTER — CARE COORDINATION (OUTPATIENT)
Dept: CARE COORDINATION | Age: 74
End: 2019-07-15

## 2019-07-15 ASSESSMENT — ENCOUNTER SYMPTOMS: DYSPNEA ASSOCIATED WITH: EXERTION

## 2019-07-17 ENCOUNTER — TELEPHONE (OUTPATIENT)
Dept: CARDIOLOGY | Age: 74
End: 2019-07-17

## 2019-07-17 ENCOUNTER — HOSPITAL ENCOUNTER (OUTPATIENT)
Age: 74
Discharge: HOME | End: 2019-07-17
Payer: MEDICARE

## 2019-07-17 ENCOUNTER — HOSPITAL ENCOUNTER (OUTPATIENT)
Dept: GENERAL RADIOLOGY | Age: 74
Discharge: HOME | End: 2019-07-19
Payer: MEDICARE

## 2019-07-17 ENCOUNTER — HOSPITAL ENCOUNTER (OUTPATIENT)
Age: 74
Discharge: HOME | End: 2019-07-19
Payer: MEDICARE

## 2019-07-17 ENCOUNTER — OFFICE VISIT (OUTPATIENT)
Dept: CARDIOLOGY | Age: 74
End: 2019-07-17
Payer: MEDICARE

## 2019-07-17 VITALS
HEIGHT: 68 IN | RESPIRATION RATE: 16 BRPM | DIASTOLIC BLOOD PRESSURE: 67 MMHG | HEART RATE: 98 BPM | WEIGHT: 286.2 LBS | BODY MASS INDEX: 43.38 KG/M2 | OXYGEN SATURATION: 82 % | SYSTOLIC BLOOD PRESSURE: 96 MMHG

## 2019-07-17 DIAGNOSIS — I50.43 ACUTE ON CHRONIC COMBINED SYSTOLIC AND DIASTOLIC CHF, NYHA CLASS 3 (HCC): ICD-10-CM

## 2019-07-17 DIAGNOSIS — J44.9 CHRONIC OBSTRUCTIVE PULMONARY DISEASE, UNSPECIFIED COPD TYPE (HCC): ICD-10-CM

## 2019-07-17 DIAGNOSIS — I50.43 ACUTE ON CHRONIC COMBINED SYSTOLIC AND DIASTOLIC CONGESTIVE HEART FAILURE (HCC): ICD-10-CM

## 2019-07-17 DIAGNOSIS — G47.33 OSA (OBSTRUCTIVE SLEEP APNEA): ICD-10-CM

## 2019-07-17 DIAGNOSIS — I50.43 ACUTE ON CHRONIC COMBINED SYSTOLIC AND DIASTOLIC CONGESTIVE HEART FAILURE (HCC): Primary | ICD-10-CM

## 2019-07-17 DIAGNOSIS — J96.11 CHRONIC HYPOXEMIC RESPIRATORY FAILURE (HCC): ICD-10-CM

## 2019-07-17 DIAGNOSIS — R05.9 COUGH: ICD-10-CM

## 2019-07-17 DIAGNOSIS — I48.0 PAF (PAROXYSMAL ATRIAL FIBRILLATION) (HCC): ICD-10-CM

## 2019-07-17 DIAGNOSIS — I25.10 ASHD (ARTERIOSCLEROTIC HEART DISEASE): ICD-10-CM

## 2019-07-17 LAB
ANION GAP SERPL CALCULATED.3IONS-SCNC: 12 MMOL/L (ref 9–17)
BNP INTERPRETATION: ABNORMAL
BUN BLDV-MCNC: 44 MG/DL (ref 8–23)
BUN/CREAT BLD: 26 (ref 9–20)
CALCIUM SERPL-MCNC: 9.6 MG/DL (ref 8.6–10.4)
CHLORIDE BLD-SCNC: 86 MMOL/L (ref 98–107)
CO2: 35 MMOL/L (ref 20–31)
CREAT SERPL-MCNC: 1.71 MG/DL (ref 0.7–1.2)
GFR AFRICAN AMERICAN: 48 ML/MIN
GFR NON-AFRICAN AMERICAN: 40 ML/MIN
GFR SERPL CREATININE-BSD FRML MDRD: ABNORMAL ML/MIN/{1.73_M2}
GFR SERPL CREATININE-BSD FRML MDRD: ABNORMAL ML/MIN/{1.73_M2}
GLUCOSE BLD-MCNC: 404 MG/DL (ref 70–99)
POTASSIUM SERPL-SCNC: 4.7 MMOL/L (ref 3.7–5.3)
PRO-BNP: 5431 PG/ML
SODIUM BLD-SCNC: 133 MMOL/L (ref 135–144)

## 2019-07-17 PROCEDURE — G8598 ASA/ANTIPLAT THER USED: HCPCS | Performed by: INTERNAL MEDICINE

## 2019-07-17 PROCEDURE — 83880 ASSAY OF NATRIURETIC PEPTIDE: CPT

## 2019-07-17 PROCEDURE — 1036F TOBACCO NON-USER: CPT | Performed by: INTERNAL MEDICINE

## 2019-07-17 PROCEDURE — 1123F ACP DISCUSS/DSCN MKR DOCD: CPT | Performed by: INTERNAL MEDICINE

## 2019-07-17 PROCEDURE — 36415 COLL VENOUS BLD VENIPUNCTURE: CPT

## 2019-07-17 PROCEDURE — 71046 X-RAY EXAM CHEST 2 VIEWS: CPT

## 2019-07-17 PROCEDURE — 99215 OFFICE O/P EST HI 40 MIN: CPT | Performed by: INTERNAL MEDICINE

## 2019-07-17 PROCEDURE — G8427 DOCREV CUR MEDS BY ELIG CLIN: HCPCS | Performed by: INTERNAL MEDICINE

## 2019-07-17 PROCEDURE — 4040F PNEUMOC VAC/ADMIN/RCVD: CPT | Performed by: INTERNAL MEDICINE

## 2019-07-17 PROCEDURE — 3017F COLORECTAL CA SCREEN DOC REV: CPT | Performed by: INTERNAL MEDICINE

## 2019-07-17 PROCEDURE — 80048 BASIC METABOLIC PNL TOTAL CA: CPT

## 2019-07-17 PROCEDURE — G8417 CALC BMI ABV UP PARAM F/U: HCPCS | Performed by: INTERNAL MEDICINE

## 2019-07-17 NOTE — PROGRESS NOTES
mouth 2 times daily 60 capsule 0    lactobacillus (CULTURELLE) capsule Take 2 capsules by mouth 2 times daily (with meals) 30 capsule 0    atorvastatin (LIPITOR) 40 MG tablet Take 1 tablet by mouth daily 30 tablet 0    metoprolol tartrate (LOPRESSOR) 25 MG tablet Take 0.5 tablets by mouth 2 times daily Hold for SBP<100 and/or HR<60. Otherwise give 60 tablet 0    docusate sodium (COLACE) 100 MG capsule Take 1 capsule by mouth daily      acetaminophen (TYLENOL) 325 MG tablet Take 650 mg by mouth every 6 hours as needed for Pain or Fever      Cholecalciferol (VITAMIN D3) 2000 units CAPS Take 2,000 Units by mouth daily      vitamin B-12 (CYANOCOBALAMIN) 1000 MCG tablet Take 1,000 mcg by mouth daily      melatonin 3 MG TABS tablet Take 3 mg by mouth as needed          FAMILY HISTORY: His family history contains his father, mother and brother who had heart disease. His dad started having issues since he was 61. Also his mother and brother had heart disease in there 52's. Physical Examination:     BP 96/67 (Site: Left Upper Arm, Position: Sitting, Cuff Size: Medium Adult)   Pulse 98   Resp 16   Ht 5' 8\" (1.727 m)   Wt 286 lb 3.2 oz (129.8 kg)   SpO2 (!) 82%   BMI 43.52 kg/m²  Body mass index is 43.52 kg/m². Constitutional: He is oriented to person, place, and time. He appears well-developed and well-nourished. In no acute distress. HEENT: Normocephalic and atraumatic. No JVD present. Carotid bruit is not present. No mass and no thyromegaly present. No lymphadenopathy present. Cardiovascular: Normal rate, regular rhythm, normal heart sounds. Exam reveals no gallop and no friction rubs. 2/6 systolic murmur, 2nd intercostal space on the LEFT just lateral to the sternum. Also systolic murmur at the left lower sternal border without radiation. Pulmonary/Chest: Poor air entry bilaterally with minimal bilateral basal crackles. Abdominal: Soft, non-tender.  Bowel sounds and aorta are normal. He exhibits

## 2019-07-17 NOTE — TELEPHONE ENCOUNTER
Lab called to let Dr. Chiquis Mackay that Mr. Tanna Heimlich had a critical lab of [phone] for his glucose. He was informed and wants him to continue medication and watch diet.

## 2019-07-18 ENCOUNTER — HOSPITAL ENCOUNTER (OUTPATIENT)
Dept: CARDIAC CATH/INVASIVE PROCEDURES | Age: 74
Discharge: HOME | End: 2019-07-18
Attending: FAMILY MEDICINE | Admitting: FAMILY MEDICINE
Payer: MEDICARE

## 2019-07-18 ENCOUNTER — APPOINTMENT (OUTPATIENT)
Dept: GENERAL RADIOLOGY | Age: 74
DRG: 291 | End: 2019-07-18
Attending: INTERNAL MEDICINE
Payer: MEDICARE

## 2019-07-18 ENCOUNTER — HOSPITAL ENCOUNTER (INPATIENT)
Age: 74
LOS: 12 days | Discharge: HOME HEALTH | DRG: 291 | End: 2019-07-30
Attending: INTERNAL MEDICINE | Admitting: INTERNAL MEDICINE
Payer: MEDICARE

## 2019-07-18 VITALS
SYSTOLIC BLOOD PRESSURE: 114 MMHG | DIASTOLIC BLOOD PRESSURE: 72 MMHG | HEART RATE: 96 BPM | OXYGEN SATURATION: 90 % | HEIGHT: 68 IN | WEIGHT: 281 LBS | RESPIRATION RATE: 20 BRPM | BODY MASS INDEX: 42.59 KG/M2 | TEMPERATURE: 96.9 F

## 2019-07-18 DIAGNOSIS — J90 PLEURAL EFFUSION: ICD-10-CM

## 2019-07-18 DIAGNOSIS — I27.20 MODERATE TO SEVERE PULMONARY HYPERTENSION (HCC): ICD-10-CM

## 2019-07-18 DIAGNOSIS — N18.30 CKD (CHRONIC KIDNEY DISEASE), STAGE III (HCC): ICD-10-CM

## 2019-07-18 DIAGNOSIS — J96.22 ACUTE ON CHRONIC RESPIRATORY FAILURE WITH HYPERCAPNIA (HCC): ICD-10-CM

## 2019-07-18 DIAGNOSIS — I50.33 ACUTE ON CHRONIC DIASTOLIC CHF (CONGESTIVE HEART FAILURE), NYHA CLASS 3 (HCC): Primary | ICD-10-CM

## 2019-07-18 PROBLEM — G89.18 PAIN AT SURGICAL SITE: Status: ACTIVE | Noted: 2019-07-18

## 2019-07-18 LAB
ALBUMIN SERPL-MCNC: 3.6 G/DL (ref 3.5–5.1)
ALP BLD-CCNC: 125 U/L (ref 38–126)
ALT SERPL-CCNC: 13 U/L (ref 11–66)
ANION GAP SERPL CALCULATED.3IONS-SCNC: 14 MEQ/L (ref 8–16)
AST SERPL-CCNC: 16 U/L (ref 5–40)
BASE EXCESS MIXED: 11.2 MMOL/L (ref -2–3)
BILIRUB SERPL-MCNC: 0.6 MG/DL (ref 0.3–1.2)
BUN BLDV-MCNC: 49 MG/DL (ref 7–22)
CALCIUM SERPL-MCNC: 9.5 MG/DL (ref 8.5–10.5)
CHLORIDE BLD-SCNC: 85 MEQ/L (ref 98–111)
CO2: 34 MEQ/L (ref 23–33)
COLLECTED BY:: ABNORMAL
CREAT SERPL-MCNC: 1.5 MG/DL (ref 0.4–1.2)
DEVICE: ABNORMAL
EKG ATRIAL RATE: 216 BPM
EKG Q-T INTERVAL: 342 MS
EKG QRS DURATION: 98 MS
EKG QTC CALCULATION (BAZETT): 458 MS
EKG R AXIS: -7 DEGREES
EKG T AXIS: -155 DEGREES
EKG VENTRICULAR RATE: 108 BPM
ERYTHROCYTE [DISTWIDTH] IN BLOOD BY AUTOMATED COUNT: 14.8 % (ref 11.5–14.5)
ERYTHROCYTE [DISTWIDTH] IN BLOOD BY AUTOMATED COUNT: 55.5 FL (ref 35–45)
FIO2, MIXED VENOUS: 15
GFR SERPL CREATININE-BSD FRML MDRD: 46 ML/MIN/1.73M2
GLUCOSE BLD-MCNC: 307 MG/DL (ref 70–108)
HCO3, MIXED: 39 MMOL/L (ref 23–28)
HCT VFR BLD CALC: 30.1 % (ref 42–52)
HEMOGLOBIN: 9.4 GM/DL (ref 14–18)
INR BLD: 1.52 (ref 0.85–1.13)
LACTIC ACID, SEPSIS: 1.2 MMOL/L (ref 0.5–1.9)
LACTIC ACID, SEPSIS: 1.3 MMOL/L (ref 0.5–1.9)
MCH RBC QN AUTO: 31.8 PG (ref 26–33)
MCHC RBC AUTO-ENTMCNC: 31.2 GM/DL (ref 32.2–35.5)
MCV RBC AUTO: 101.7 FL (ref 80–94)
O2 SAT, MIXED: 52 %
PCO2, MIXED VENOUS: 74 MMHG (ref 41–51)
PH, MIXED: 7.34 (ref 7.31–7.41)
PLATELET # BLD: 313 THOU/MM3 (ref 130–400)
PMV BLD AUTO: 8.9 FL (ref 9.4–12.4)
PO2 MIXED: 31 MMHG (ref 25–40)
POTASSIUM REFLEX MAGNESIUM: 4.2 MEQ/L (ref 3.5–5.2)
PRO-BNP: 7708 PG/ML (ref 0–900)
PROCALCITONIN: 0.32 NG/ML (ref 0.01–0.09)
RBC # BLD: 2.96 MILL/MM3 (ref 4.7–6.1)
SITE: ABNORMAL
SODIUM BLD-SCNC: 133 MEQ/L (ref 135–145)
TOTAL PROTEIN: 7.7 G/DL (ref 6.1–8)
TROPONIN T: 0.1 NG/ML
WBC # BLD: 15.3 THOU/MM3 (ref 4.8–10.8)

## 2019-07-18 PROCEDURE — 6370000000 HC RX 637 (ALT 250 FOR IP): Performed by: INTERNAL MEDICINE

## 2019-07-18 PROCEDURE — 6360000002 HC RX W HCPCS: Performed by: INTERNAL MEDICINE

## 2019-07-18 PROCEDURE — 83880 ASSAY OF NATRIURETIC PEPTIDE: CPT

## 2019-07-18 PROCEDURE — 80053 COMPREHEN METABOLIC PANEL: CPT

## 2019-07-18 PROCEDURE — 94760 N-INVAS EAR/PLS OXIMETRY 1: CPT

## 2019-07-18 PROCEDURE — 6360000002 HC RX W HCPCS

## 2019-07-18 PROCEDURE — 6360000004 HC RX CONTRAST MEDICATION: Performed by: FAMILY MEDICINE

## 2019-07-18 PROCEDURE — 87040 BLOOD CULTURE FOR BACTERIA: CPT

## 2019-07-18 PROCEDURE — 2709999900 HC NON-CHARGEABLE SUPPLY

## 2019-07-18 PROCEDURE — 93458 L HRT ARTERY/VENTRICLE ANGIO: CPT | Performed by: FAMILY MEDICINE

## 2019-07-18 PROCEDURE — C1894 INTRO/SHEATH, NON-LASER: HCPCS

## 2019-07-18 PROCEDURE — C1887 CATHETER, GUIDING: HCPCS

## 2019-07-18 PROCEDURE — 71045 X-RAY EXAM CHEST 1 VIEW: CPT

## 2019-07-18 PROCEDURE — 99223 1ST HOSP IP/OBS HIGH 75: CPT | Performed by: INTERNAL MEDICINE

## 2019-07-18 PROCEDURE — 84484 ASSAY OF TROPONIN QUANT: CPT

## 2019-07-18 PROCEDURE — 36600 WITHDRAWAL OF ARTERIAL BLOOD: CPT

## 2019-07-18 PROCEDURE — 2140000000 HC CCU INTERMEDIATE R&B

## 2019-07-18 PROCEDURE — 83036 HEMOGLOBIN GLYCOSYLATED A1C: CPT

## 2019-07-18 PROCEDURE — 2580000003 HC RX 258: Performed by: INTERNAL MEDICINE

## 2019-07-18 PROCEDURE — 83605 ASSAY OF LACTIC ACID: CPT

## 2019-07-18 PROCEDURE — 93005 ELECTROCARDIOGRAM TRACING: CPT | Performed by: INTERNAL MEDICINE

## 2019-07-18 PROCEDURE — 82803 BLOOD GASES ANY COMBINATION: CPT

## 2019-07-18 PROCEDURE — 36415 COLL VENOUS BLD VENIPUNCTURE: CPT

## 2019-07-18 PROCEDURE — 85027 COMPLETE CBC AUTOMATED: CPT

## 2019-07-18 PROCEDURE — 94660 CPAP INITIATION&MGMT: CPT

## 2019-07-18 PROCEDURE — C1769 GUIDE WIRE: HCPCS

## 2019-07-18 PROCEDURE — 84145 PROCALCITONIN (PCT): CPT

## 2019-07-18 PROCEDURE — 2580000003 HC RX 258: Performed by: FAMILY MEDICINE

## 2019-07-18 PROCEDURE — 85610 PROTHROMBIN TIME: CPT

## 2019-07-18 PROCEDURE — 2500000003 HC RX 250 WO HCPCS

## 2019-07-18 PROCEDURE — 2700000000 HC OXYGEN THERAPY PER DAY

## 2019-07-18 RX ORDER — NICOTINE POLACRILEX 4 MG
15 LOZENGE BUCCAL PRN
Status: DISCONTINUED | OUTPATIENT
Start: 2019-07-18 | End: 2019-07-30 | Stop reason: HOSPADM

## 2019-07-18 RX ORDER — FUROSEMIDE 10 MG/ML
80 INJECTION INTRAMUSCULAR; INTRAVENOUS 2 TIMES DAILY
Status: DISCONTINUED | OUTPATIENT
Start: 2019-07-19 | End: 2019-07-23

## 2019-07-18 RX ORDER — FUROSEMIDE 10 MG/ML
INJECTION INTRAMUSCULAR; INTRAVENOUS
Status: COMPLETED
Start: 2019-07-18 | End: 2019-07-18

## 2019-07-18 RX ORDER — SODIUM CHLORIDE 0.9 % (FLUSH) 0.9 %
10 SYRINGE (ML) INJECTION PRN
Status: DISCONTINUED | OUTPATIENT
Start: 2019-07-18 | End: 2019-07-18 | Stop reason: HOSPADM

## 2019-07-18 RX ORDER — NITROGLYCERIN 0.4 MG/1
0.4 TABLET SUBLINGUAL EVERY 5 MIN PRN
Status: DISCONTINUED | OUTPATIENT
Start: 2019-07-18 | End: 2019-07-18 | Stop reason: HOSPADM

## 2019-07-18 RX ORDER — ACETAMINOPHEN 325 MG/1
650 TABLET ORAL EVERY 6 HOURS PRN
Status: DISCONTINUED | OUTPATIENT
Start: 2019-07-18 | End: 2019-07-30 | Stop reason: HOSPADM

## 2019-07-18 RX ORDER — LANOLIN ALCOHOL/MO/W.PET/CERES
1000 CREAM (GRAM) TOPICAL DAILY
Status: DISCONTINUED | OUTPATIENT
Start: 2019-07-18 | End: 2019-07-30 | Stop reason: HOSPADM

## 2019-07-18 RX ORDER — FUROSEMIDE 10 MG/ML
80 INJECTION INTRAMUSCULAR; INTRAVENOUS ONCE
Status: COMPLETED | OUTPATIENT
Start: 2019-07-18 | End: 2019-07-18

## 2019-07-18 RX ORDER — SODIUM CHLORIDE 9 MG/ML
INJECTION, SOLUTION INTRAVENOUS CONTINUOUS
Status: DISCONTINUED | OUTPATIENT
Start: 2019-07-18 | End: 2019-07-18 | Stop reason: HOSPADM

## 2019-07-18 RX ORDER — DEXTROSE MONOHYDRATE 50 MG/ML
100 INJECTION, SOLUTION INTRAVENOUS PRN
Status: DISCONTINUED | OUTPATIENT
Start: 2019-07-18 | End: 2019-07-30 | Stop reason: HOSPADM

## 2019-07-18 RX ORDER — ASPIRIN 81 MG/1
81 TABLET, CHEWABLE ORAL DAILY
Status: DISCONTINUED | OUTPATIENT
Start: 2019-07-18 | End: 2019-07-30 | Stop reason: HOSPADM

## 2019-07-18 RX ORDER — SODIUM CHLORIDE 0.9 % (FLUSH) 0.9 %
10 SYRINGE (ML) INJECTION EVERY 12 HOURS SCHEDULED
Status: DISCONTINUED | OUTPATIENT
Start: 2019-07-18 | End: 2019-07-30 | Stop reason: HOSPADM

## 2019-07-18 RX ORDER — DOCUSATE SODIUM 100 MG/1
100 CAPSULE, LIQUID FILLED ORAL DAILY
Status: DISCONTINUED | OUTPATIENT
Start: 2019-07-18 | End: 2019-07-20

## 2019-07-18 RX ORDER — ALBUTEROL SULFATE 2.5 MG/3ML
2.5 SOLUTION RESPIRATORY (INHALATION) 4 TIMES DAILY
Status: DISCONTINUED | OUTPATIENT
Start: 2019-07-18 | End: 2019-07-19

## 2019-07-18 RX ORDER — ATORVASTATIN CALCIUM 40 MG/1
40 TABLET, FILM COATED ORAL DAILY
Status: DISCONTINUED | OUTPATIENT
Start: 2019-07-18 | End: 2019-07-30 | Stop reason: HOSPADM

## 2019-07-18 RX ORDER — DIPHENHYDRAMINE HCL 25 MG
50 CAPSULE ORAL ONCE
Status: DISCONTINUED | OUTPATIENT
Start: 2019-07-18 | End: 2019-07-18 | Stop reason: HOSPADM

## 2019-07-18 RX ORDER — AMIODARONE HYDROCHLORIDE 200 MG/1
200 TABLET ORAL DAILY
Status: DISCONTINUED | OUTPATIENT
Start: 2019-07-18 | End: 2019-07-29

## 2019-07-18 RX ORDER — SODIUM CHLORIDE 0.9 % (FLUSH) 0.9 %
10 SYRINGE (ML) INJECTION EVERY 12 HOURS SCHEDULED
Status: DISCONTINUED | OUTPATIENT
Start: 2019-07-18 | End: 2019-07-18 | Stop reason: HOSPADM

## 2019-07-18 RX ORDER — ACETAMINOPHEN 325 MG/1
650 TABLET ORAL EVERY 4 HOURS PRN
Status: DISCONTINUED | OUTPATIENT
Start: 2019-07-18 | End: 2019-07-18 | Stop reason: HOSPADM

## 2019-07-18 RX ORDER — SODIUM CHLORIDE 0.9 % (FLUSH) 0.9 %
10 SYRINGE (ML) INJECTION PRN
Status: DISCONTINUED | OUTPATIENT
Start: 2019-07-18 | End: 2019-07-30 | Stop reason: HOSPADM

## 2019-07-18 RX ORDER — BUDESONIDE AND FORMOTEROL FUMARATE DIHYDRATE 80; 4.5 UG/1; UG/1
2 AEROSOL RESPIRATORY (INHALATION) 2 TIMES DAILY
Status: DISCONTINUED | OUTPATIENT
Start: 2019-07-18 | End: 2019-07-19 | Stop reason: CLARIF

## 2019-07-18 RX ORDER — LANOLIN ALCOHOL/MO/W.PET/CERES
3 CREAM (GRAM) TOPICAL NIGHTLY PRN
Status: DISCONTINUED | OUTPATIENT
Start: 2019-07-18 | End: 2019-07-30 | Stop reason: HOSPADM

## 2019-07-18 RX ORDER — FUROSEMIDE 10 MG/ML
40 INJECTION INTRAMUSCULAR; INTRAVENOUS ONCE
Status: DISCONTINUED | OUTPATIENT
Start: 2019-07-18 | End: 2019-07-18

## 2019-07-18 RX ORDER — DEXTROSE MONOHYDRATE 25 G/50ML
12.5 INJECTION, SOLUTION INTRAVENOUS PRN
Status: DISCONTINUED | OUTPATIENT
Start: 2019-07-18 | End: 2019-07-30 | Stop reason: HOSPADM

## 2019-07-18 RX ORDER — METOLAZONE 5 MG/1
5 TABLET ORAL ONCE
Status: COMPLETED | OUTPATIENT
Start: 2019-07-18 | End: 2019-07-18

## 2019-07-18 RX ADMIN — AZITHROMYCIN MONOHYDRATE 500 MG: 500 INJECTION, POWDER, LYOPHILIZED, FOR SOLUTION INTRAVENOUS at 23:05

## 2019-07-18 RX ADMIN — CEFTRIAXONE SODIUM 1 G: 1 INJECTION, POWDER, FOR SOLUTION INTRAMUSCULAR; INTRAVENOUS at 22:16

## 2019-07-18 RX ADMIN — FUROSEMIDE 80 MG: 10 INJECTION INTRAMUSCULAR; INTRAVENOUS at 20:13

## 2019-07-18 RX ADMIN — FUROSEMIDE 80 MG: 10 INJECTION, SOLUTION INTRAMUSCULAR; INTRAVENOUS at 20:13

## 2019-07-18 RX ADMIN — INSULIN LISPRO 2 UNITS: 100 INJECTION, SOLUTION INTRAVENOUS; SUBCUTANEOUS at 23:11

## 2019-07-18 RX ADMIN — AMIODARONE HYDROCHLORIDE 200 MG: 200 TABLET ORAL at 22:30

## 2019-07-18 RX ADMIN — METOLAZONE 5 MG: 5 TABLET ORAL at 23:11

## 2019-07-18 RX ADMIN — Medication 3 MG: at 23:16

## 2019-07-18 RX ADMIN — SODIUM CHLORIDE: 9 INJECTION, SOLUTION INTRAVENOUS at 13:59

## 2019-07-18 RX ADMIN — Medication 10 ML: at 22:29

## 2019-07-18 RX ADMIN — ATORVASTATIN CALCIUM 40 MG: 40 TABLET, FILM COATED ORAL at 22:30

## 2019-07-18 RX ADMIN — Medication 1000 MCG: at 22:30

## 2019-07-18 RX ADMIN — IOPAMIDOL 60 ML: 755 INJECTION, SOLUTION INTRAVENOUS at 14:38

## 2019-07-18 RX ADMIN — METOPROLOL TARTRATE 12.5 MG: 25 TABLET ORAL at 22:30

## 2019-07-18 NOTE — PROGRESS NOTES
Call placed to [facility] to arrange admission to [facility] under hospitalist care with cardiology consult.

## 2019-07-19 ENCOUNTER — CARE COORDINATION (OUTPATIENT)
Dept: CARE COORDINATION | Age: 74
End: 2019-07-19

## 2019-07-19 ENCOUNTER — APPOINTMENT (OUTPATIENT)
Dept: GENERAL RADIOLOGY | Age: 74
DRG: 291 | End: 2019-07-19
Attending: INTERNAL MEDICINE
Payer: MEDICARE

## 2019-07-19 ENCOUNTER — APPOINTMENT (OUTPATIENT)
Dept: CT IMAGING | Age: 74
DRG: 291 | End: 2019-07-19
Attending: INTERNAL MEDICINE
Payer: MEDICARE

## 2019-07-19 PROBLEM — E66.01 MORBID OBESITY DUE TO EXCESS CALORIES (HCC): Status: ACTIVE | Noted: 2019-07-19

## 2019-07-19 LAB
ALBUMIN SERPL-MCNC: 3.2 G/DL (ref 3.5–5.1)
ALLEN TEST: POSITIVE
ALP BLD-CCNC: 116 U/L (ref 38–126)
ALT SERPL-CCNC: 12 U/L (ref 11–66)
ANION GAP SERPL CALCULATED.3IONS-SCNC: 13 MEQ/L (ref 8–16)
AST SERPL-CCNC: 14 U/L (ref 5–40)
AVERAGE GLUCOSE: 243 MG/DL (ref 70–126)
BACTERIA: ABNORMAL
BASE EXCESS (CALCULATED): 13 MMOL/L (ref -2.5–2.5)
BASOPHILS # BLD: 0.8 %
BASOPHILS ABSOLUTE: 0.1 THOU/MM3 (ref 0–0.1)
BILIRUB SERPL-MCNC: 0.6 MG/DL (ref 0.3–1.2)
BILIRUBIN URINE: NEGATIVE
BLOOD, URINE: ABNORMAL
BODY FLUID RBC: NORMAL /CUMM
BUN BLDV-MCNC: 47 MG/DL (ref 7–22)
CALCIUM SERPL-MCNC: 9.2 MG/DL (ref 8.5–10.5)
CASTS: ABNORMAL /LPF
CASTS: ABNORMAL /LPF
CHARACTER, BODY FLUID: NORMAL
CHARACTER, URINE: CLEAR
CHLORIDE BLD-SCNC: 87 MEQ/L (ref 98–111)
CHLORIDE, URINE: 81 MEQ/L
CO2: 36 MEQ/L (ref 23–33)
COLLECTED BY:: ABNORMAL
COLOR: NORMAL
COLOR: YELLOW
CREAT SERPL-MCNC: 1.8 MG/DL (ref 0.4–1.2)
CREATININE URINE: 47.8 MG/DL
CRYSTALS: ABNORMAL
DEVICE: ABNORMAL
EOSINOPHIL # BLD: 6.4 %
EOSINOPHILS ABSOLUTE: 0.9 THOU/MM3 (ref 0–0.4)
EPITHELIAL CELLS, UA: ABNORMAL /HPF
ERYTHROCYTE [DISTWIDTH] IN BLOOD BY AUTOMATED COUNT: 14.9 % (ref 11.5–14.5)
ERYTHROCYTE [DISTWIDTH] IN BLOOD BY AUTOMATED COUNT: 55.7 FL (ref 35–45)
GFR SERPL CREATININE-BSD FRML MDRD: 37 ML/MIN/1.73M2
GLUCOSE BLD-MCNC: 175 MG/DL (ref 70–108)
GLUCOSE BLD-MCNC: 190 MG/DL (ref 70–108)
GLUCOSE BLD-MCNC: 241 MG/DL (ref 70–108)
GLUCOSE BLD-MCNC: 258 MG/DL (ref 70–108)
GLUCOSE BLD-MCNC: 263 MG/DL (ref 70–108)
GLUCOSE, FLUID: 224 MG/DL
GLUCOSE, URINE: NEGATIVE MG/DL
HBA1C MFR BLD: 10.1 % (ref 4.4–6.4)
HCO3: 40 MMOL/L (ref 23–28)
HCT VFR BLD CALC: 29.7 % (ref 42–52)
HEMOGLOBIN: 9.2 GM/DL (ref 14–18)
IFIO2: 50
IMMATURE GRANS (ABS): 0.1 THOU/MM3 (ref 0–0.07)
IMMATURE GRANULOCYTES: 0.7 %
KETONES, URINE: NEGATIVE
LD, FLUID: 118 U/L
LEUKOCYTE EST, POC: ABNORMAL
LYMPHOCYTES # BLD: 6.4 %
LYMPHOCYTES ABSOLUTE: 0.9 THOU/MM3 (ref 1–4.8)
MAGNESIUM: 2.1 MG/DL (ref 1.6–2.4)
MCH RBC QN AUTO: 31.4 PG (ref 26–33)
MCHC RBC AUTO-ENTMCNC: 31 GM/DL (ref 32.2–35.5)
MCV RBC AUTO: 101.4 FL (ref 80–94)
MESOTHELIAL CELLS BODY FLUID: NORMAL
MISCELLANEOUS LAB TEST RESULT: ABNORMAL
MODE: ABNORMAL
MONOCYTES # BLD: 12.6 %
MONOCYTES ABSOLUTE: 1.7 THOU/MM3 (ref 0.4–1.3)
MONONUCLEAR CELLS BODY FLUID: 91 %
MRSA SCREEN RT-PCR: NEGATIVE
NITRITE, URINE: NEGATIVE
NUCLEATED RED BLOOD CELLS: 0 /100 WBC
O2 SATURATION: 95 %
PATHOLOGIST REVIEW: NORMAL
PCO2: 62 MMHG (ref 35–45)
PH BLOOD GAS: 7.41 (ref 7.35–7.45)
PH UA: 6 (ref 5–9)
PLATELET # BLD: 259 THOU/MM3 (ref 130–400)
PMV BLD AUTO: 9 FL (ref 9.4–12.4)
PO2: 75 MMHG (ref 71–104)
POLYMORPHONUCLEAR CELLS BODY FLUID: 9 %
POTASSIUM REFLEX MAGNESIUM: 4.1 MEQ/L (ref 3.5–5.2)
POTASSIUM SERPL-SCNC: 4.1 MEQ/L (ref 3.5–5.2)
PROT/CREAT RATIO, UR: 0.22
PROTEIN FLUID: 3.3 GM/DL
PROTEIN UA: NEGATIVE MG/DL
PROTEIN, URINE: 10.7 MG/DL
RBC # BLD: 2.93 MILL/MM3 (ref 4.7–6.1)
RBC URINE: > 200 /HPF
RENAL EPITHELIAL, UA: ABNORMAL
SEG NEUTROPHILS: 73.1 %
SEGMENTED NEUTROPHILS ABSOLUTE COUNT: 9.8 THOU/MM3 (ref 1.8–7.7)
SET PEEP: 8 MMHG
SET PRESS SUPP: 8 CMH2O
SODIUM BLD-SCNC: 136 MEQ/L (ref 135–145)
SODIUM URINE: 62 MEQ/L
SOURCE, BLOOD GAS: ABNORMAL
SPECIFIC GRAVITY UA: 1.01 (ref 1–1.03)
SPECIMEN: NORMAL
TOTAL NUCLEATED CELLS BODY FLUID: 343 /CUMM (ref 0–500)
TOTAL PROTEIN: 7 G/DL (ref 6.1–8)
TOTAL VOLUME RECEIVED BODY FLUID: 30 ML
TROPONIN T: 0.1 NG/ML
TROPONIN T: 0.12 NG/ML
UROBILINOGEN, URINE: 0.2 EU/DL (ref 0–1)
VANCOMYCIN RESISTANT ENTEROCOCCUS: NEGATIVE
WBC # BLD: 13.4 THOU/MM3 (ref 4.8–10.8)
WBC UA: ABNORMAL /HPF
YEAST: ABNORMAL

## 2019-07-19 PROCEDURE — 99223 1ST HOSP IP/OBS HIGH 75: CPT | Performed by: NUCLEAR MEDICINE

## 2019-07-19 PROCEDURE — 82570 ASSAY OF URINE CREATININE: CPT

## 2019-07-19 PROCEDURE — 87116 MYCOBACTERIA CULTURE: CPT

## 2019-07-19 PROCEDURE — 99223 1ST HOSP IP/OBS HIGH 75: CPT | Performed by: INTERNAL MEDICINE

## 2019-07-19 PROCEDURE — 6370000000 HC RX 637 (ALT 250 FOR IP): Performed by: INTERNAL MEDICINE

## 2019-07-19 PROCEDURE — 87086 URINE CULTURE/COLONY COUNT: CPT

## 2019-07-19 PROCEDURE — 71045 X-RAY EXAM CHEST 1 VIEW: CPT

## 2019-07-19 PROCEDURE — 89050 BODY FLUID CELL COUNT: CPT

## 2019-07-19 PROCEDURE — APPSS180 APP SPLIT SHARED TIME > 60 MINUTES: Performed by: NURSE PRACTITIONER

## 2019-07-19 PROCEDURE — 99233 SBSQ HOSP IP/OBS HIGH 50: CPT | Performed by: INTERNAL MEDICINE

## 2019-07-19 PROCEDURE — 86356 MONONUCLEAR CELL ANTIGEN: CPT

## 2019-07-19 PROCEDURE — 94640 AIRWAY INHALATION TREATMENT: CPT

## 2019-07-19 PROCEDURE — 32551 INSERTION OF CHEST TUBE: CPT | Performed by: INTERNAL MEDICINE

## 2019-07-19 PROCEDURE — 84484 ASSAY OF TROPONIN QUANT: CPT

## 2019-07-19 PROCEDURE — 82948 REAGENT STRIP/BLOOD GLUCOSE: CPT

## 2019-07-19 PROCEDURE — 82945 GLUCOSE OTHER FLUID: CPT

## 2019-07-19 PROCEDURE — 6360000002 HC RX W HCPCS: Performed by: INTERNAL MEDICINE

## 2019-07-19 PROCEDURE — 32551 INSERTION OF CHEST TUBE: CPT

## 2019-07-19 PROCEDURE — 80048 BASIC METABOLIC PNL TOTAL CA: CPT

## 2019-07-19 PROCEDURE — 94660 CPAP INITIATION&MGMT: CPT

## 2019-07-19 PROCEDURE — 0W9B30Z DRAINAGE OF LEFT PLEURAL CAVITY WITH DRAINAGE DEVICE, PERCUTANEOUS APPROACH: ICD-10-PCS | Performed by: INTERNAL MEDICINE

## 2019-07-19 PROCEDURE — 2709999900 HC NON-CHARGEABLE SUPPLY

## 2019-07-19 PROCEDURE — 84300 ASSAY OF URINE SODIUM: CPT

## 2019-07-19 PROCEDURE — 84157 ASSAY OF PROTEIN OTHER: CPT

## 2019-07-19 PROCEDURE — 82436 ASSAY OF URINE CHLORIDE: CPT

## 2019-07-19 PROCEDURE — 94761 N-INVAS EAR/PLS OXIMETRY MLT: CPT

## 2019-07-19 PROCEDURE — 81001 URINALYSIS AUTO W/SCOPE: CPT

## 2019-07-19 PROCEDURE — 87641 MR-STAPH DNA AMP PROBE: CPT

## 2019-07-19 PROCEDURE — 2000000000 HC ICU R&B

## 2019-07-19 PROCEDURE — 83735 ASSAY OF MAGNESIUM: CPT

## 2019-07-19 PROCEDURE — 87147 CULTURE TYPE IMMUNOLOGIC: CPT

## 2019-07-19 PROCEDURE — 80053 COMPREHEN METABOLIC PANEL: CPT

## 2019-07-19 PROCEDURE — 36415 COLL VENOUS BLD VENIPUNCTURE: CPT

## 2019-07-19 PROCEDURE — 87500 VANOMYCIN DNA AMP PROBE: CPT

## 2019-07-19 PROCEDURE — 83615 LACTATE (LD) (LDH) ENZYME: CPT

## 2019-07-19 PROCEDURE — 2580000003 HC RX 258: Performed by: INTERNAL MEDICINE

## 2019-07-19 PROCEDURE — 84156 ASSAY OF PROTEIN URINE: CPT

## 2019-07-19 PROCEDURE — 82803 BLOOD GASES ANY COMBINATION: CPT

## 2019-07-19 PROCEDURE — 88305 TISSUE EXAM BY PATHOLOGIST: CPT

## 2019-07-19 PROCEDURE — 87081 CULTURE SCREEN ONLY: CPT

## 2019-07-19 PROCEDURE — 2700000000 HC OXYGEN THERAPY PER DAY

## 2019-07-19 PROCEDURE — 99222 1ST HOSP IP/OBS MODERATE 55: CPT | Performed by: INTERNAL MEDICINE

## 2019-07-19 PROCEDURE — 36600 WITHDRAWAL OF ARTERIAL BLOOD: CPT

## 2019-07-19 PROCEDURE — 88112 CYTOPATH CELL ENHANCE TECH: CPT

## 2019-07-19 PROCEDURE — 85025 COMPLETE CBC W/AUTO DIFF WBC: CPT

## 2019-07-19 PROCEDURE — 6370000000 HC RX 637 (ALT 250 FOR IP): Performed by: NURSE PRACTITIONER

## 2019-07-19 PROCEDURE — 71250 CT THORAX DX C-: CPT

## 2019-07-19 PROCEDURE — 93010 ELECTROCARDIOGRAM REPORT: CPT | Performed by: NUCLEAR MEDICINE

## 2019-07-19 RX ORDER — IPRATROPIUM BROMIDE AND ALBUTEROL SULFATE 2.5; .5 MG/3ML; MG/3ML
1 SOLUTION RESPIRATORY (INHALATION)
Status: DISCONTINUED | OUTPATIENT
Start: 2019-07-19 | End: 2019-07-25

## 2019-07-19 RX ORDER — ALBUTEROL SULFATE 2.5 MG/3ML
2.5 SOLUTION RESPIRATORY (INHALATION) EVERY 4 HOURS PRN
Status: DISCONTINUED | OUTPATIENT
Start: 2019-07-19 | End: 2019-07-30 | Stop reason: HOSPADM

## 2019-07-19 RX ORDER — IPRATROPIUM BROMIDE AND ALBUTEROL SULFATE 2.5; .5 MG/3ML; MG/3ML
1 SOLUTION RESPIRATORY (INHALATION)
Status: DISCONTINUED | OUTPATIENT
Start: 2019-07-19 | End: 2019-07-19

## 2019-07-19 RX ORDER — METOLAZONE 5 MG/1
5 TABLET ORAL DAILY
Status: DISPENSED | OUTPATIENT
Start: 2019-07-19 | End: 2019-07-22

## 2019-07-19 RX ORDER — INSULIN GLARGINE 100 [IU]/ML
55 INJECTION, SOLUTION SUBCUTANEOUS NIGHTLY
Status: DISCONTINUED | OUTPATIENT
Start: 2019-07-19 | End: 2019-07-24

## 2019-07-19 RX ADMIN — Medication 1000 MCG: at 15:28

## 2019-07-19 RX ADMIN — Medication 10 ML: at 21:42

## 2019-07-19 RX ADMIN — ATORVASTATIN CALCIUM 40 MG: 40 TABLET, FILM COATED ORAL at 15:28

## 2019-07-19 RX ADMIN — Medication 2 PUFF: at 08:08

## 2019-07-19 RX ADMIN — Medication 10 ML: at 15:33

## 2019-07-19 RX ADMIN — TIOTROPIUM BROMIDE 18 MCG: 18 CAPSULE ORAL; RESPIRATORY (INHALATION) at 08:08

## 2019-07-19 RX ADMIN — INSULIN LISPRO 1 UNITS: 100 INJECTION, SOLUTION INTRAVENOUS; SUBCUTANEOUS at 12:45

## 2019-07-19 RX ADMIN — INSULIN LISPRO 2 UNITS: 100 INJECTION, SOLUTION INTRAVENOUS; SUBCUTANEOUS at 21:37

## 2019-07-19 RX ADMIN — IPRATROPIUM BROMIDE AND ALBUTEROL SULFATE 1 AMPULE: .5; 3 SOLUTION RESPIRATORY (INHALATION) at 12:35

## 2019-07-19 RX ADMIN — FUROSEMIDE 80 MG: 10 INJECTION, SOLUTION INTRAMUSCULAR; INTRAVENOUS at 21:22

## 2019-07-19 RX ADMIN — IPRATROPIUM BROMIDE AND ALBUTEROL SULFATE 1 AMPULE: .5; 3 SOLUTION RESPIRATORY (INHALATION) at 20:57

## 2019-07-19 RX ADMIN — ACETAMINOPHEN 650 MG: 325 TABLET ORAL at 15:22

## 2019-07-19 RX ADMIN — FUROSEMIDE 80 MG: 10 INJECTION, SOLUTION INTRAMUSCULAR; INTRAVENOUS at 11:12

## 2019-07-19 RX ADMIN — ALBUTEROL SULFATE 2.5 MG: 2.5 SOLUTION RESPIRATORY (INHALATION) at 08:00

## 2019-07-19 RX ADMIN — ACETAMINOPHEN 650 MG: 325 TABLET ORAL at 21:40

## 2019-07-19 RX ADMIN — ASPIRIN 81 MG 81 MG: 81 TABLET ORAL at 15:32

## 2019-07-19 RX ADMIN — INSULIN GLARGINE 55 UNITS: 100 INJECTION, SOLUTION SUBCUTANEOUS at 21:38

## 2019-07-19 RX ADMIN — CEFTRIAXONE SODIUM 1 G: 1 INJECTION, POWDER, FOR SOLUTION INTRAMUSCULAR; INTRAVENOUS at 21:28

## 2019-07-19 RX ADMIN — AZITHROMYCIN MONOHYDRATE 500 MG: 500 INJECTION, POWDER, LYOPHILIZED, FOR SOLUTION INTRAVENOUS at 21:59

## 2019-07-19 RX ADMIN — DOCUSATE SODIUM 100 MG: 100 CAPSULE, LIQUID FILLED ORAL at 21:40

## 2019-07-19 RX ADMIN — INSULIN LISPRO 1 UNITS: 100 INJECTION, SOLUTION INTRAVENOUS; SUBCUTANEOUS at 17:50

## 2019-07-19 RX ADMIN — AMIODARONE HYDROCHLORIDE 200 MG: 200 TABLET ORAL at 15:28

## 2019-07-19 RX ADMIN — IPRATROPIUM BROMIDE AND ALBUTEROL SULFATE 1 AMPULE: .5; 3 SOLUTION RESPIRATORY (INHALATION) at 16:02

## 2019-07-19 ASSESSMENT — PAIN DESCRIPTION - FREQUENCY: FREQUENCY: INTERMITTENT

## 2019-07-19 ASSESSMENT — PAIN DESCRIPTION - LOCATION: LOCATION: BACK;ABDOMEN

## 2019-07-19 ASSESSMENT — PAIN DESCRIPTION - PAIN TYPE: TYPE: ACUTE PAIN

## 2019-07-19 ASSESSMENT — PAIN DESCRIPTION - DESCRIPTORS: DESCRIPTORS: ACHING;DISCOMFORT

## 2019-07-19 ASSESSMENT — PAIN - FUNCTIONAL ASSESSMENT: PAIN_FUNCTIONAL_ASSESSMENT: PREVENTS OR INTERFERES SOME ACTIVE ACTIVITIES AND ADLS

## 2019-07-19 ASSESSMENT — PAIN DESCRIPTION - ONSET: ONSET: ON-GOING

## 2019-07-19 ASSESSMENT — PAIN DESCRIPTION - ORIENTATION: ORIENTATION: LEFT

## 2019-07-19 ASSESSMENT — PAIN SCALES - GENERAL
PAINLEVEL_OUTOF10: 5
PAINLEVEL_OUTOF10: 4
PAINLEVEL_OUTOF10: 0
PAINLEVEL_OUTOF10: 4

## 2019-07-19 ASSESSMENT — PAIN DESCRIPTION - PROGRESSION: CLINICAL_PROGRESSION: GRADUALLY IMPROVING

## 2019-07-19 NOTE — CONSULTS
ICU Consult Note    MRN: [id number]       Acct: [de-identified]     PCP: Destin Saucedo MD    Date of Admission: 7/18/2019    Assessment/Plan:    1. Dyspnea--liklely 2nd to #2, #3, #5, #6, #8  2. Acute on Chronic Hypoxic/Hypercapneic Respiratory failure--on Bi-pap; on 2L 02 at home  3. Acute on Chronic Systolic/Diastolic HF--echo from 4/13/5702 with EF 40% and moderate diastolic dysfx; diuresing;on BB; no ACE-I/ARB 2nd to ERNESTO; strict I/O's and daily weights  4. Hypotension--had Lasix 80 mg last PM along with Zaroxolyn; not tachycardic; lactic acid is normal; watch closely in case pressors are needed  5. Possible Pneumonia, LLL (POA)--Zithromax/Rocephin 7/18; PCT at 0.32; (+) cough that is productive; check sputum sample  6. Severe triple vessel disease involving the RCA, LAD and circumflex per cath 7/18/2019--await CTS input; on ASA, statin, BB  7. ERNESTO on CKD Stage 3  8. Large left pleural effusion, chronic--had thora done 6/25/2019 with 950 ml drained  9. PAF--amio, BB  10. DM-2, uncontrolled--Nathanielt, SSI #1; AIC at 10.1; monitor  11. Moderate TVR  12. Moderate pulmonary hypertension with an estimated right ventricular systolic pressure of 52 mmHg    Chief complaints: shortness of breath    Hospital Course: per Dr Lolita Meckel H&P dated 7/18/2019: \"73 y.o. male who presented to [facility] with from [address] [address] where he had presented with he above complain for an elective angiogram. He has hx of Left side pleural effusion (with scheduled thoracentesis), hx of diabetes mellitus II, afib on pradaxa, IVIS and COPD on BIPAP at night and NC at day time, hx of  CKD, obesity and congestive heart failure (EF of 40%). He reported having had some palpitation. Dizziness and leg edema in the last few weeks. Patient had angiogram showing 80% circumference and 98% LAD stenosis hence referred her for advanced bermeo. He arrived on with non re breather mask at 100% oxygen and saturating at 89%.  Reported being restless and dry in the mouth otherwise no confusion, palpitation or chest pain. repeat CXR showed increased left effusion and mild edema while ECG was borderline Low voltage and indeterminable rhythm. I contacted cardiology who advised continuing diuretics, BIPAP and plan for thoracentesis tomorrow. Patient to be seen by CTS for possible CABG review and plan is to proceed with high risk PCI if declined. \"    7/19: pt was on 3B and exhibited increased work of breathing and tachypnea that required Bi-pap; possible large left pleural effusion; currently only c/o shortness of breath; denies chest pain; does have a productive cough    SUPPLEMENTAL O2: O2 Flow Rate (L/min): 15 L/min        ICU PROPHYLAXIS/THERAPY:   Stress ulcer:  [] PPI Agent  [] H2RA [] Sucralfate [] Other:     DVT prophylaxis: [] Lovenox                                 [] SCDs                                 [] SQ Heparin                                 [] Encourage ambulation           [] Already on Anticoagulation  Medications:      Infusion Medications    dextrose       Scheduled Medications    ipratropium-albuterol  1 ampule Inhalation Q4H WA    sodium chloride flush  10 mL Intravenous 2 times per day    cefTRIAXone (ROCEPHIN) IV  1 g Intravenous Q24H    azithromycin  500 mg Intravenous Q24H    furosemide  80 mg Intravenous BID    amiodarone  200 mg Oral Daily    aspirin  81 mg Oral Daily    atorvastatin  40 mg Oral Daily    docusate sodium  100 mg Oral Daily    metoprolol tartrate  12.5 mg Oral BID    vitamin B-12  1,000 mcg Oral Daily    insulin lispro  0-6 Units Subcutaneous TID WC    insulin lispro  0-3 Units Subcutaneous Nightly     PRN Meds: albuterol, sodium chloride flush, acetaminophen, melatonin, glucose, dextrose, glucagon (rDNA), dextrose      Intake/Output Summary (Last 24 hours) at 7/19/2019 0950  Last data filed at 7/19/2019 0427  Gross per 24 hour   Intake 0 ml   Output 1700 ml   Net -1700 ml       Diet:  DIET LOW SODIUM 2

## 2019-07-19 NOTE — PROGRESS NOTES
1019 pt received from 3B to room 4D 07 per bed, BIPAP mask on browne cath in place, pt alert and oriented, labored breathing and tachypnea, swabs sent    1130 pt resting more comfortable and respiratory rate mid 20's     1200 family at bedside updated on condition and plan    1305 DrLaura At bedside    1313 Dr. Iza Horn up at bedside    1330 left chest tube placed per Dr. Iza Horn, pt tolerated fair    1545 Dr. Gonzalez Knee up to see pt, no new orders received    1650 pt transported to CT scan per bed with nurse    [phone] pt back to room 4D 07 from CT scan    1800 family back at bedside    1830 talked with Dr. Iza Horn about low blood pressure orders received to start Levo if MAP <65

## 2019-07-20 PROBLEM — I48.92 ATRIAL FLUTTER (HCC): Status: ACTIVE | Noted: 2019-07-20

## 2019-07-20 LAB
ALBUMIN SERPL-MCNC: 2.7 G/DL (ref 3.5–5.1)
ALP BLD-CCNC: 105 U/L (ref 38–126)
ALT SERPL-CCNC: 11 U/L (ref 11–66)
ANION GAP SERPL CALCULATED.3IONS-SCNC: 13 MEQ/L (ref 8–16)
APTT: 41.4 SECONDS (ref 22–38)
APTT: 48.1 SECONDS (ref 22–38)
AST SERPL-CCNC: 14 U/L (ref 5–40)
BASOPHILS # BLD: 0.7 %
BASOPHILS ABSOLUTE: 0.1 THOU/MM3 (ref 0–0.1)
BILIRUB SERPL-MCNC: 0.4 MG/DL (ref 0.3–1.2)
BUN BLDV-MCNC: 51 MG/DL (ref 7–22)
CALCIUM SERPL-MCNC: 9.1 MG/DL (ref 8.5–10.5)
CHLORIDE BLD-SCNC: 89 MEQ/L (ref 98–111)
CO2: 35 MEQ/L (ref 23–33)
CREAT SERPL-MCNC: 1.7 MG/DL (ref 0.4–1.2)
EOSINOPHIL # BLD: 9.7 %
EOSINOPHILS ABSOLUTE: 1.2 THOU/MM3 (ref 0–0.4)
ERYTHROCYTE [DISTWIDTH] IN BLOOD BY AUTOMATED COUNT: 14.6 % (ref 11.5–14.5)
ERYTHROCYTE [DISTWIDTH] IN BLOOD BY AUTOMATED COUNT: 14.6 % (ref 11.5–14.5)
ERYTHROCYTE [DISTWIDTH] IN BLOOD BY AUTOMATED COUNT: 53 FL (ref 35–45)
ERYTHROCYTE [DISTWIDTH] IN BLOOD BY AUTOMATED COUNT: 54.6 FL (ref 35–45)
GFR SERPL CREATININE-BSD FRML MDRD: 40 ML/MIN/1.73M2
GLUCOSE BLD-MCNC: 120 MG/DL (ref 70–108)
GLUCOSE BLD-MCNC: 150 MG/DL (ref 70–108)
GLUCOSE BLD-MCNC: 226 MG/DL (ref 70–108)
GLUCOSE BLD-MCNC: 277 MG/DL (ref 70–108)
GLUCOSE BLD-MCNC: 330 MG/DL (ref 70–108)
HCT VFR BLD CALC: 26.7 % (ref 42–52)
HCT VFR BLD CALC: 29.8 % (ref 42–52)
HEMOGLOBIN: 8.5 GM/DL (ref 14–18)
HEMOGLOBIN: 9.2 GM/DL (ref 14–18)
IMMATURE GRANS (ABS): 0.05 THOU/MM3 (ref 0–0.07)
IMMATURE GRANULOCYTES: 0.4 %
LYMPHOCYTES # BLD: 8.7 %
LYMPHOCYTES ABSOLUTE: 1.1 THOU/MM3 (ref 1–4.8)
MAGNESIUM: 2.1 MG/DL (ref 1.6–2.4)
MCH RBC QN AUTO: 31.5 PG (ref 26–33)
MCH RBC QN AUTO: 32.1 PG (ref 26–33)
MCHC RBC AUTO-ENTMCNC: 30.9 GM/DL (ref 32.2–35.5)
MCHC RBC AUTO-ENTMCNC: 31.8 GM/DL (ref 32.2–35.5)
MCV RBC AUTO: 100.8 FL (ref 80–94)
MCV RBC AUTO: 102.1 FL (ref 80–94)
MONOCYTES # BLD: 9.5 %
MONOCYTES ABSOLUTE: 1.2 THOU/MM3 (ref 0.4–1.3)
NUCLEATED RED BLOOD CELLS: 0 /100 WBC
PLATELET # BLD: 263 THOU/MM3 (ref 130–400)
PLATELET # BLD: 274 THOU/MM3 (ref 130–400)
PMV BLD AUTO: 8.8 FL (ref 9.4–12.4)
PMV BLD AUTO: 8.8 FL (ref 9.4–12.4)
POTASSIUM REFLEX MAGNESIUM: 3.2 MEQ/L (ref 3.5–5.2)
PROCALCITONIN: 0.4 NG/ML (ref 0.01–0.09)
RBC # BLD: 2.65 MILL/MM3 (ref 4.7–6.1)
RBC # BLD: 2.92 MILL/MM3 (ref 4.7–6.1)
SEG NEUTROPHILS: 71 %
SEGMENTED NEUTROPHILS ABSOLUTE COUNT: 8.9 THOU/MM3 (ref 1.8–7.7)
SODIUM BLD-SCNC: 137 MEQ/L (ref 135–145)
TOTAL PROTEIN: 6.2 G/DL (ref 6.1–8)
TROPONIN T: 0.09 NG/ML
TROPONIN T: 0.1 NG/ML
WBC # BLD: 12.5 THOU/MM3 (ref 4.8–10.8)
WBC # BLD: 12.7 THOU/MM3 (ref 4.8–10.8)

## 2019-07-20 PROCEDURE — 85027 COMPLETE CBC AUTOMATED: CPT

## 2019-07-20 PROCEDURE — 2580000003 HC RX 258: Performed by: INTERNAL MEDICINE

## 2019-07-20 PROCEDURE — 6370000000 HC RX 637 (ALT 250 FOR IP): Performed by: NURSE PRACTITIONER

## 2019-07-20 PROCEDURE — 2700000000 HC OXYGEN THERAPY PER DAY

## 2019-07-20 PROCEDURE — 6370000000 HC RX 637 (ALT 250 FOR IP): Performed by: INTERNAL MEDICINE

## 2019-07-20 PROCEDURE — 2709999900 HC NON-CHARGEABLE SUPPLY

## 2019-07-20 PROCEDURE — 99233 SBSQ HOSP IP/OBS HIGH 50: CPT | Performed by: INTERNAL MEDICINE

## 2019-07-20 PROCEDURE — 83735 ASSAY OF MAGNESIUM: CPT

## 2019-07-20 PROCEDURE — 94640 AIRWAY INHALATION TREATMENT: CPT

## 2019-07-20 PROCEDURE — 85730 THROMBOPLASTIN TIME PARTIAL: CPT

## 2019-07-20 PROCEDURE — 97163 PT EVAL HIGH COMPLEX 45 MIN: CPT

## 2019-07-20 PROCEDURE — C1729 CATH, DRAINAGE: HCPCS

## 2019-07-20 PROCEDURE — 80053 COMPREHEN METABOLIC PANEL: CPT

## 2019-07-20 PROCEDURE — 2140000000 HC CCU INTERMEDIATE R&B

## 2019-07-20 PROCEDURE — 84484 ASSAY OF TROPONIN QUANT: CPT

## 2019-07-20 PROCEDURE — 6360000002 HC RX W HCPCS: Performed by: INTERNAL MEDICINE

## 2019-07-20 PROCEDURE — 82948 REAGENT STRIP/BLOOD GLUCOSE: CPT

## 2019-07-20 PROCEDURE — 85025 COMPLETE CBC W/AUTO DIFF WBC: CPT

## 2019-07-20 PROCEDURE — 84145 PROCALCITONIN (PCT): CPT

## 2019-07-20 PROCEDURE — 6360000002 HC RX W HCPCS: Performed by: NURSE PRACTITIONER

## 2019-07-20 PROCEDURE — 36415 COLL VENOUS BLD VENIPUNCTURE: CPT

## 2019-07-20 PROCEDURE — 99233 SBSQ HOSP IP/OBS HIGH 50: CPT | Performed by: NURSE PRACTITIONER

## 2019-07-20 PROCEDURE — 99223 1ST HOSP IP/OBS HIGH 75: CPT | Performed by: THORACIC SURGERY (CARDIOTHORACIC VASCULAR SURGERY)

## 2019-07-20 PROCEDURE — 97530 THERAPEUTIC ACTIVITIES: CPT

## 2019-07-20 PROCEDURE — 99232 SBSQ HOSP IP/OBS MODERATE 35: CPT | Performed by: INTERNAL MEDICINE

## 2019-07-20 PROCEDURE — 99232 SBSQ HOSP IP/OBS MODERATE 35: CPT | Performed by: NURSE PRACTITIONER

## 2019-07-20 RX ORDER — HEPARIN SODIUM 1000 [USP'U]/ML
2000 INJECTION, SOLUTION INTRAVENOUS; SUBCUTANEOUS PRN
Status: DISCONTINUED | OUTPATIENT
Start: 2019-07-20 | End: 2019-07-20

## 2019-07-20 RX ORDER — FAMOTIDINE 20 MG/1
20 TABLET, FILM COATED ORAL DAILY
Status: DISCONTINUED | OUTPATIENT
Start: 2019-07-20 | End: 2019-07-30 | Stop reason: HOSPADM

## 2019-07-20 RX ORDER — BENZONATATE 100 MG/1
200 CAPSULE ORAL 3 TIMES DAILY PRN
Status: DISCONTINUED | OUTPATIENT
Start: 2019-07-20 | End: 2019-07-30 | Stop reason: HOSPADM

## 2019-07-20 RX ORDER — HEPARIN SODIUM 10000 [USP'U]/100ML
12.6 INJECTION, SOLUTION INTRAVENOUS CONTINUOUS
Status: DISCONTINUED | OUTPATIENT
Start: 2019-07-20 | End: 2019-07-30

## 2019-07-20 RX ORDER — POLYETHYLENE GLYCOL 3350 17 G/17G
17 POWDER, FOR SOLUTION ORAL DAILY
Status: DISCONTINUED | OUTPATIENT
Start: 2019-07-20 | End: 2019-07-30 | Stop reason: HOSPADM

## 2019-07-20 RX ORDER — HEPARIN SODIUM 1000 [USP'U]/ML
4000 INJECTION, SOLUTION INTRAVENOUS; SUBCUTANEOUS PRN
Status: DISCONTINUED | OUTPATIENT
Start: 2019-07-20 | End: 2019-07-20

## 2019-07-20 RX ADMIN — AZITHROMYCIN MONOHYDRATE 500 MG: 500 INJECTION, POWDER, LYOPHILIZED, FOR SOLUTION INTRAVENOUS at 23:08

## 2019-07-20 RX ADMIN — POLYETHYLENE GLYCOL 3350 17 G: 17 POWDER, FOR SOLUTION ORAL at 16:20

## 2019-07-20 RX ADMIN — Medication 10 ML: at 08:57

## 2019-07-20 RX ADMIN — ACETAMINOPHEN 650 MG: 325 TABLET ORAL at 11:19

## 2019-07-20 RX ADMIN — INSULIN LISPRO 4 UNITS: 100 INJECTION, SOLUTION INTRAVENOUS; SUBCUTANEOUS at 17:37

## 2019-07-20 RX ADMIN — FUROSEMIDE 80 MG: 10 INJECTION, SOLUTION INTRAMUSCULAR; INTRAVENOUS at 17:43

## 2019-07-20 RX ADMIN — FAMOTIDINE 20 MG: 20 TABLET ORAL at 16:20

## 2019-07-20 RX ADMIN — DOCUSATE SODIUM 100 MG: 100 CAPSULE, LIQUID FILLED ORAL at 08:57

## 2019-07-20 RX ADMIN — INSULIN GLARGINE 55 UNITS: 100 INJECTION, SOLUTION SUBCUTANEOUS at 20:23

## 2019-07-20 RX ADMIN — ACETAMINOPHEN 650 MG: 325 TABLET ORAL at 05:32

## 2019-07-20 RX ADMIN — PHENOL 1 SPRAY: 1.5 LIQUID ORAL at 14:01

## 2019-07-20 RX ADMIN — INSULIN LISPRO 1 UNITS: 100 INJECTION, SOLUTION INTRAVENOUS; SUBCUTANEOUS at 20:23

## 2019-07-20 RX ADMIN — Medication 1000 MCG: at 08:57

## 2019-07-20 RX ADMIN — IPRATROPIUM BROMIDE AND ALBUTEROL SULFATE 1 AMPULE: .5; 3 SOLUTION RESPIRATORY (INHALATION) at 11:52

## 2019-07-20 RX ADMIN — HEPARIN SODIUM 7.6 UNITS/KG/HR: 10000 INJECTION, SOLUTION INTRAVENOUS at 11:30

## 2019-07-20 RX ADMIN — PHENOL 1 SPRAY: 1.5 LIQUID ORAL at 16:13

## 2019-07-20 RX ADMIN — POTASSIUM BICARBONATE 40 MEQ: 391 TABLET, EFFERVESCENT ORAL at 20:20

## 2019-07-20 RX ADMIN — IPRATROPIUM BROMIDE AND ALBUTEROL SULFATE 1 AMPULE: .5; 3 SOLUTION RESPIRATORY (INHALATION) at 07:54

## 2019-07-20 RX ADMIN — FUROSEMIDE 80 MG: 10 INJECTION, SOLUTION INTRAMUSCULAR; INTRAVENOUS at 08:57

## 2019-07-20 RX ADMIN — ATORVASTATIN CALCIUM 40 MG: 40 TABLET, FILM COATED ORAL at 08:57

## 2019-07-20 RX ADMIN — Medication 3 MG: at 00:06

## 2019-07-20 RX ADMIN — ASPIRIN 81 MG 81 MG: 81 TABLET ORAL at 08:57

## 2019-07-20 RX ADMIN — CEFTRIAXONE SODIUM 1 G: 1 INJECTION, POWDER, FOR SOLUTION INTRAMUSCULAR; INTRAVENOUS at 20:35

## 2019-07-20 RX ADMIN — POTASSIUM BICARBONATE 40 MEQ: 391 TABLET, EFFERVESCENT ORAL at 12:25

## 2019-07-20 RX ADMIN — INSULIN LISPRO 3 UNITS: 100 INJECTION, SOLUTION INTRAVENOUS; SUBCUTANEOUS at 13:02

## 2019-07-20 RX ADMIN — IPRATROPIUM BROMIDE AND ALBUTEROL SULFATE 1 AMPULE: .5; 3 SOLUTION RESPIRATORY (INHALATION) at 19:06

## 2019-07-20 RX ADMIN — AMIODARONE HYDROCHLORIDE 200 MG: 200 TABLET ORAL at 08:57

## 2019-07-20 RX ADMIN — Medication 10 ML: at 17:45

## 2019-07-20 ASSESSMENT — ENCOUNTER SYMPTOMS
ABDOMINAL PAIN: 0
SHORTNESS OF BREATH: 1
EYE DISCHARGE: 0
CHEST TIGHTNESS: 1
COLOR CHANGE: 0

## 2019-07-20 ASSESSMENT — PAIN SCALES - GENERAL
PAINLEVEL_OUTOF10: 0
PAINLEVEL_OUTOF10: 0
PAINLEVEL_OUTOF10: 4
PAINLEVEL_OUTOF10: 0
PAINLEVEL_OUTOF10: 9
PAINLEVEL_OUTOF10: 0
PAINLEVEL_OUTOF10: 0

## 2019-07-20 NOTE — PROGRESS NOTES
discharge  Short term goals  Time Frame for Short term goals: bed mobility with Angela  Short term goal 1: transfer with CGA  Short term goal 2: amb >50'x1 with walker and CGA to walk safely in room  Short term goal 3: negotiate 3 steps with 2HRs and CGA to enter home safely  Long term goals  Time Frame for Long term goals : no LTGs set secondary to short ELOS       AM-PAC Inpatient Mobility without Stair Climbing Raw Score : 11  AM-PAC Inpatient without Stair Climbing T-Scale Score : 35.66  Mobility Inpatient CMS 0-100% Score: 67.36  Mobility Inpatient without Stair CMS G-Code Modifier : CL    Following session, patient left in safe position with all fall risk precautions in place.

## 2019-07-20 NOTE — CONSULTS
Cardiothoracic Surgery History & Physical       Patient:  Zainab Merrill  YOB: 1945    MRN: [id number]     Acct: [de-identified]    PCP: Leobardo Martin MD    Date of Admission: 7/18/2019    Chief Complaint:  Dyspnea    History Of Present Illness:  69 y.o. medically complex, chronically deconditioned male with severe COPD, pulmonary hypertension and cardiomyopathy admitted electively post-[facility] showing 3v CAD with critical left main stenosis, prompted by increasing dyspnea, peripheral edema and palpitations. Denies chest pain. Patient expresses strong preference to avoid surgery. Past Medical History:          Diagnosis Date    Abnormal nuclear stress test 03/12/2010    Stress and resting cardiolite images showed inferior wall hypoperfusion suggestive of previous myocardial infarction. Left ventricular wall motion showed inferior wall hypokinesia. EF 58%.  Arrhythmia 2007    A single episode    Arthritis     Atrial fibrillation ([address] 2007    Single Episode    CAD (coronary artery disease) 1996    MI    [facility] obstructive [facility]) [facility] [facility]     DM type 2 (diabetes mellitus, type 2) ([facility] [address] 2005    History of cardiac cath 07/18/2019    [facility]) Cruz/Dr. Palma/Right Radial     History of echocardiogram 51/51/2786    LV systolic function mildly/moderately reduced. EF 40%. EF vis Avilez's method is 40%. [place] and apical segments are hypokinetic    History of echocardiogram 07/11/2019    EF 40$. Mildly increased LV wall thickness. Sogmoid interventricular septum. Thinning and hypokinesis of the distal half on interventricular septum and apex. Calcification and fixed noncoronary cusp of AV causing functionally biscupid valve. Mild mitral regurg. Moderate tricuspid regurg. Moderate pulmonary HTN w/ estimated RV systolic pressure of 52.  Evidence of moderate diastolic dysfunction seen    Hx of echocardiogram 04/05/2007    EF 62%, Increased left atrial and right ventricular

## 2019-07-20 NOTE — PROGRESS NOTES
Anterolateral infarct , age undetermined  T wave abnormality, consider inferior ischemia  Abnormal ECG  No previous ECGs available  Confirmed by Garry Womack ([id number]) on 7/19/2019 8:25:16 PM    Echo:    Electronically signed by Leopoldo Alfredo(Interpreting physician) on 07/11/2019   06:40 PM  ----------------------------------------------------------------------------  FINDINGS  Left Atrium  Left atrium is normal in size. Left Ventricle  Global left ventricular systolic function appears moderately reduced with an  estimated ejection fraction of 40%. Mildly increased left ventricular wall thickness with a normal left  ventricular cavity size. The patient has a sigmoid interventricular septum. Thinning and hypokinesis of the distal half on the interventricular septum  and the apex. Right Atrium  Right atrium is normal in size. Right Ventricle  Normal right ventricular size and function. Mitral Valve  Normal mitral valve structure with mild mitral regurgitation. Aortic Valve  Calcified and fixed non-coronary cusp of the aortic valve causing  functionally bicuspid valve. No significant aortic stenosis or regurgitation. Tricuspid Valve  Moderate tricuspid regurgitation. Moderate pulmonary hypertension with an estimated right ventricular systolic  pressure of 52 mmHg. Pulmonic Valve  The pulmonic valve is normal in structure. Pericardial Effusion  No significant pericardial effusion is seen. Miscellaneous  Evidence of moderate diastolic dysfunction is seen. Normal aortic root dimension. Left Heart Cath: [place]  Await dictation     Lab Data:    Cardiac Enzymes:  No results for input(s): CKTOTAL, CKMB, CKMBINDEX, TROPONINI in the last 72 hours.     CBC:   Lab Results   Component Value Date    WBC 12.5 07/20/2019    RBC 2.65 07/20/2019    HGB 8.5 07/20/2019    HCT 26.7 07/20/2019     07/20/2019       CMP:  Lab Results   Component Value Date     07/20/2019    K 3.2 07/20/2019    CL 89 07/20/2019

## 2019-07-20 NOTE — PROGRESS NOTES
Critical Care Progress Note    Patient:  Everett Stoner      Unit/Bed:4B-03/003-A    YOB: 1945    MRN: [id number]       Acct: [de-identified]     PCP: Pushpa Guzman MD    Date of Admission: 7/18/2019    Assessment/Plan:    1. Dyspnea--liklely 2nd to #2, #3, #5, #6, #8  2. Acute on Chronic Hypoxic/Hypercapneic Respiratory failure--on 5L 02~attept to wean to baseline needs; on 2L 02 at home  3. Acute on Chronic Systolic/Diastolic HF--echo from 6/10/2149 with EF 40% and moderate diastolic dysfx; diuresing with Lasix and metolazine;on BB; no ACE-I/ARB 2nd to ERNESTO; strict I/O's and daily weights; behind 6.3L (including thora); no weight today  4. Acute on Chronic Large left pleural effusion--had thora done 6/25/2019 with 950 ml drained; had left thora done 7/19 with chest tube in place and so far 2025 ml drained out  5. Hypotension--not tachycardic; lactic acid is normal; watch closely in case pressors are needed; goal MAP >65  6. Possible Pneumonia, LLL (POA)--Zithromax/Rocephin 7/18; PCT at 0.32; (+) cough that is productive  7. Severe triple vessel disease involving the RCA, LAD and circumflex per cath 7/18/2019--CTS saw per cardiology note and not a candidate for CABG; plan is for high risk PCI later next week; GI asked to see 2nd to bleeding issues recently; on ASA, statin, BB  8. ERNESTO on CKD Stage 3--appreciate nephrology input; watching closely for LISSET  9. PAF--amio, BB; started on Heparin gtt per cardiology  10. DM-2, uncontrolled--Lantus, SSI #1; AIC at 10.1; monitor  11. Moderate TVR  12. Moderate pulmonary hypertension with an estimated right ventricular systolic pressure of 52 mmHg    Chief Complaint: shortness of breath    Hospital Course:  per Dr Charmayne Mettle H&P dated 7/18/2019: \"73 y. o. male who presented to [facility] from [facility] where he had presented with he above complain for an elective angiogram. He has hx of Left side pleural effusion (with scheduled Anticoagulation  Medications:      Infusion Medications    norepinephrine Stopped (07/19/19 2142)    dextrose       Scheduled Medications    ipratropium-albuterol  1 ampule Inhalation Q4H WA    insulin glargine  55 Units Subcutaneous Nightly    metolazone  5 mg Oral Daily    sodium chloride flush  10 mL Intravenous 2 times per day    cefTRIAXone (ROCEPHIN) IV  1 g Intravenous Q24H    azithromycin  500 mg Intravenous Q24H    furosemide  80 mg Intravenous BID    amiodarone  200 mg Oral Daily    aspirin  81 mg Oral Daily    atorvastatin  40 mg Oral Daily    docusate sodium  100 mg Oral Daily    metoprolol tartrate  12.5 mg Oral BID    vitamin B-12  1,000 mcg Oral Daily    insulin lispro  0-6 Units Subcutaneous TID WC    insulin lispro  0-3 Units Subcutaneous Nightly     PRN Meds: albuterol, sodium chloride flush, acetaminophen, melatonin, glucose, dextrose, glucagon (rDNA), dextrose      Intake/Output Summary (Last 24 hours) at 7/20/2019 0655  Last data filed at 7/19/2019 2147  Gross per 24 hour   Intake 300 ml   Output 3550 ml   Net -3250 ml       Diet:  DIET LOW SODIUM 2 GM; Exam:  BP (!) 87/57   Pulse 85   Temp 98.2 °F (36.8 °C) (Oral)   Resp 25   Wt 288 lb 12.8 oz (131 kg)   SpO2 95%   BMI 43.91 kg/m²     General appearance: No apparent distress, appears stated age and cooperative. HEENT: Pupils equal, round, and reactive to light. Conjunctivae/corneas clear. Neck: Supple, with full range of motion. No jugular venous distention. Trachea midline. Respiratory:  Normal respiratory effort. Diminished to auscultation, bilaterally without Rales/Wheezes/Rhonchi. Able to speak in complete sentences; not using abd accessory muscles to breath   Cardiovascular: irregular rate and rhythm   Abdomen: Soft, non-tender, non-distended with normal bowel sounds. Musculoskeletal: passive and active ROM x 4 extremities.  (+) edema to BLE  Skin: Skin color, texture, turgor normal.    Neurologic:

## 2019-07-20 NOTE — CONSULTS
[address] [id number]                                  CONSULTATION    PATIENT NAME: Eric Steiner                        :        1945  MED REC NO:   [id number]                           ROOM:       0007  ACCOUNT NO:   [de-identified]                           ADMIT DATE: 2019  PROVIDER:     ROBERTH Wu DATE:  2019    NEPHROLOGY CONSULTATION NOTE    REQUESTING PROVIDER:  Dr. Christy Gray:  Chronic kidney disease and diuretic  management. HISTORY OF PRESENT ILLNESS:  This is a 77-year-old male, who is not  necessarily a best historian, who is very drowsy and currently sleepy. He is on BiPap support and not answering all my questions. Very limited  information could be obtained from the patient himself. All my  information was obtained by reviewing his medical chart and speaking  with the ICU staff. The patient is a 77-year-old male who was  transferred from [facility] where he underwent elective cardiac  catheterization because of progressive worsening shortness of breath  over the last several days and underwent elective cardiac  catheterization which showed significant coronary artery disease  including LAD and left circumflex disease. He was subsequently referred  for surgical revascularization. He was transferred to [address] [place] [address] and Nephrology was asked to assist in management given he  has chronic kidney disease with baseline creatinine of around 1.5. The  patient has had previous acute kidney injuries. He has had multiple  episodes of elevated serum creatinine, but they have pretty much come  down to his baseline which again is around 1.4 to 1.5. Since admission,  the patient has been seen by Cardiology. He has also been seen by the  hospitalist team.  He was admitted last night to a step-down floor.   He  was started on IV Lasix 80 mg b.i.d.  I was consulted last night and I  spoke with the nursing staff. Given his condition, we decided to give  him sequential nephron blockade strategy involving Bumex as well as  metolazone. This morning, the patient was transferred to intensive care  unit for further evaluation and monitoring, especially given his labile  respiratory status. He underwent thoracocentesis due to hypoxic  respiratory failure by Dr. Lorne Treviño, the intensivist, earlier this  afternoon. The patient is currently on BiPap support. At the time of  my examination, the patient is on BiPap. He is comfortable. He is not  a very good historian. He is awake and following commands. Denies any  chest pain. He is somewhat short of breath. Reports some lower  extremity swelling. Also reports some recent weight gain as well as  abdominal distention. No family members are in the room at this time. PAST MEDICAL HISTORY:  Again is significant for diabetes mellitus,  chronic kidney disease stage 3, chronic systolic congestive heart  failure, coronary artery disease, atrial fibrillation, history of  previous myocardial infarction, previous smoking history. PAST SURGICAL HISTORY:  Significant for previous angioplasty,  thoracocentesis, and cardioversions. FAMILY HISTORY:  Significant for hypertension. SOCIAL HISTORY:  Used to smoke one and a half packs per day for 30  years, quit in 1996. No alcohol or illicit drug abuse. HOME MEDICATIONS:  Include Aldactone, Bumex, Symbicort, metolazone 2.5  mg daily, aspirin, amiodarone, Pradaxa, lactobacillus, Lipitor,  Lopressor 12.5 twice a day, vitamin D as well as vitamin B12. REVIEW OF SYSTEMS:  Again, very limited review of system could be  obtained from the patient himself. He is not a very good historian. He  is currently on BiPap support. He otherwise follows commands. When I  asked him, he nods his head. Denies any fever or chills recently. Reports some shortness of breath.   Reports fluid overload status. He is currently on IV Lasix  for volume overload. Continue with current management. Avoid  hypotension. If the patient's blood pressure continues to drop, then he  may need some pressor support. Avoid any ACE or ARBs. Do not give any  nonsteroidals. Avoid any Fleet phos enemas. Await surgical evaluation  for his coronary artery disease. 2.  Mild hyponatremia secondary to hypervolemic hyponatremia. This has  improved with IV diuretics, especially loop diuretics. 3.  Chronic kidney disease with underlying risk factors of hypertension  and diabetes. We will obtain urine protein-creatinine ratio and  quantify if he has any proteinuria from diabetes. We will avoid any ACE  or ARBs for now. 4.  Coronary artery disease. Await surgical evaluation for possible  coronary artery bypass graft. 5.  Pleural effusion in the setting of acute systolic and diastolic  congestive heart failure with volume overload. The patient is status  post thoracocentesis. Continue with IV Lasix and monitor urine output. No indication for dialysis at this time. 6.  History of smoking and COPD. 7.  Anemia of chronic kidney disease, but cannot rule out other  etiologies. Send stool for occult blood. Check iron studies and  monitor his hemoglobin. If his renal function worsens, then may  consider erythropoietin-stimulating agents. Plan of care reviewed with the ICU nurse. Nephrology will follow.         Marisela Reyes M.D.    D: 07/19/2019 18:23:49       T: 07/19/2019 18:33:40     [email or web address]  Job#: [id number]     Doc#: [id number]    CC:

## 2019-07-21 ENCOUNTER — APPOINTMENT (OUTPATIENT)
Dept: GENERAL RADIOLOGY | Age: 74
DRG: 291 | End: 2019-07-21
Attending: INTERNAL MEDICINE
Payer: MEDICARE

## 2019-07-21 PROBLEM — J96.22 ACUTE ON CHRONIC RESPIRATORY FAILURE WITH HYPERCAPNIA (HCC): Status: ACTIVE | Noted: 2019-05-06

## 2019-07-21 LAB
ANION GAP SERPL CALCULATED.3IONS-SCNC: 12 MEQ/L (ref 8–16)
APTT: 57.7 SECONDS (ref 22–38)
APTT: 64.2 SECONDS (ref 22–38)
APTT: 68.5 SECONDS (ref 22–38)
BASOPHILS # BLD: 1 %
BASOPHILS ABSOLUTE: 0.1 THOU/MM3 (ref 0–0.1)
BUN BLDV-MCNC: 51 MG/DL (ref 7–22)
CALCIUM SERPL-MCNC: 9.1 MG/DL (ref 8.5–10.5)
CHLORIDE BLD-SCNC: 88 MEQ/L (ref 98–111)
CO2: 35 MEQ/L (ref 23–33)
CREAT SERPL-MCNC: 1.7 MG/DL (ref 0.4–1.2)
EOSINOPHIL # BLD: 10.2 %
EOSINOPHILS ABSOLUTE: 1.4 THOU/MM3 (ref 0–0.4)
ERYTHROCYTE [DISTWIDTH] IN BLOOD BY AUTOMATED COUNT: 14.7 % (ref 11.5–14.5)
ERYTHROCYTE [DISTWIDTH] IN BLOOD BY AUTOMATED COUNT: 54 FL (ref 35–45)
GFR SERPL CREATININE-BSD FRML MDRD: 40 ML/MIN/1.73M2
GLUCOSE BLD-MCNC: 159 MG/DL (ref 70–108)
GLUCOSE BLD-MCNC: 175 MG/DL (ref 70–108)
GLUCOSE BLD-MCNC: 194 MG/DL (ref 70–108)
GLUCOSE BLD-MCNC: 207 MG/DL (ref 70–108)
GLUCOSE BLD-MCNC: 226 MG/DL (ref 70–108)
HCT VFR BLD CALC: 28.5 % (ref 42–52)
HEMOGLOBIN: 9 GM/DL (ref 14–18)
IMMATURE GRANS (ABS): 0.11 THOU/MM3 (ref 0–0.07)
IMMATURE GRANULOCYTES: 0.8 %
LYMPHOCYTES # BLD: 8.7 %
LYMPHOCYTES ABSOLUTE: 1.2 THOU/MM3 (ref 1–4.8)
MAGNESIUM: 2 MG/DL (ref 1.6–2.4)
MCH RBC QN AUTO: 31.8 PG (ref 26–33)
MCHC RBC AUTO-ENTMCNC: 31.6 GM/DL (ref 32.2–35.5)
MCV RBC AUTO: 100.7 FL (ref 80–94)
MISC. #1 REFERENCE GROUP TEST: NORMAL
MONOCYTES # BLD: 12 %
MONOCYTES ABSOLUTE: 1.7 THOU/MM3 (ref 0.4–1.3)
MRSA SCREEN: NORMAL
NUCLEATED RED BLOOD CELLS: 0 /100 WBC
PLATELET # BLD: 306 THOU/MM3 (ref 130–400)
PMV BLD AUTO: 8.8 FL (ref 9.4–12.4)
POTASSIUM SERPL-SCNC: 3.6 MEQ/L (ref 3.5–5.2)
RBC # BLD: 2.83 MILL/MM3 (ref 4.7–6.1)
SEG NEUTROPHILS: 67.3 %
SEGMENTED NEUTROPHILS ABSOLUTE COUNT: 9.5 THOU/MM3 (ref 1.8–7.7)
SODIUM BLD-SCNC: 135 MEQ/L (ref 135–145)
T4 FREE: 1.38 NG/DL (ref 0.93–1.76)
URINE CULTURE, ROUTINE: NORMAL
WBC # BLD: 14.1 THOU/MM3 (ref 4.8–10.8)

## 2019-07-21 PROCEDURE — 2580000003 HC RX 258: Performed by: INTERNAL MEDICINE

## 2019-07-21 PROCEDURE — 99232 SBSQ HOSP IP/OBS MODERATE 35: CPT | Performed by: NURSE PRACTITIONER

## 2019-07-21 PROCEDURE — 6370000000 HC RX 637 (ALT 250 FOR IP): Performed by: INTERNAL MEDICINE

## 2019-07-21 PROCEDURE — 83735 ASSAY OF MAGNESIUM: CPT

## 2019-07-21 PROCEDURE — 36415 COLL VENOUS BLD VENIPUNCTURE: CPT

## 2019-07-21 PROCEDURE — 99232 SBSQ HOSP IP/OBS MODERATE 35: CPT | Performed by: INTERNAL MEDICINE

## 2019-07-21 PROCEDURE — 85025 COMPLETE CBC W/AUTO DIFF WBC: CPT

## 2019-07-21 PROCEDURE — 6360000002 HC RX W HCPCS: Performed by: INTERNAL MEDICINE

## 2019-07-21 PROCEDURE — 84439 ASSAY OF FREE THYROXINE: CPT

## 2019-07-21 PROCEDURE — 99233 SBSQ HOSP IP/OBS HIGH 50: CPT | Performed by: INTERNAL MEDICINE

## 2019-07-21 PROCEDURE — 82948 REAGENT STRIP/BLOOD GLUCOSE: CPT

## 2019-07-21 PROCEDURE — 2709999900 HC NON-CHARGEABLE SUPPLY

## 2019-07-21 PROCEDURE — 6360000002 HC RX W HCPCS: Performed by: NURSE PRACTITIONER

## 2019-07-21 PROCEDURE — 2700000000 HC OXYGEN THERAPY PER DAY

## 2019-07-21 PROCEDURE — 80048 BASIC METABOLIC PNL TOTAL CA: CPT

## 2019-07-21 PROCEDURE — 99223 1ST HOSP IP/OBS HIGH 75: CPT | Performed by: INTERNAL MEDICINE

## 2019-07-21 PROCEDURE — 86480 TB TEST CELL IMMUN MEASURE: CPT

## 2019-07-21 PROCEDURE — 6370000000 HC RX 637 (ALT 250 FOR IP): Performed by: NURSE PRACTITIONER

## 2019-07-21 PROCEDURE — 94640 AIRWAY INHALATION TREATMENT: CPT

## 2019-07-21 PROCEDURE — 2140000000 HC CCU INTERMEDIATE R&B

## 2019-07-21 PROCEDURE — 71045 X-RAY EXAM CHEST 1 VIEW: CPT

## 2019-07-21 PROCEDURE — 85730 THROMBOPLASTIN TIME PARTIAL: CPT

## 2019-07-21 RX ORDER — METOLAZONE 5 MG/1
10 TABLET ORAL ONCE
Status: DISCONTINUED | OUTPATIENT
Start: 2019-07-22 | End: 2019-07-24

## 2019-07-21 RX ORDER — METOLAZONE 5 MG/1
5 TABLET ORAL ONCE
Status: COMPLETED | OUTPATIENT
Start: 2019-07-21 | End: 2019-07-21

## 2019-07-21 RX ORDER — METOPROLOL SUCCINATE 25 MG/1
12.5 TABLET, EXTENDED RELEASE ORAL 2 TIMES DAILY
Status: DISCONTINUED | OUTPATIENT
Start: 2019-07-21 | End: 2019-07-27

## 2019-07-21 RX ORDER — ONDANSETRON 2 MG/ML
4 INJECTION INTRAMUSCULAR; INTRAVENOUS EVERY 6 HOURS PRN
Status: DISCONTINUED | OUTPATIENT
Start: 2019-07-21 | End: 2019-07-30 | Stop reason: HOSPADM

## 2019-07-21 RX ORDER — METOLAZONE 5 MG/1
10 TABLET ORAL DAILY
Status: DISCONTINUED | OUTPATIENT
Start: 2019-07-21 | End: 2019-07-21 | Stop reason: SDUPTHER

## 2019-07-21 RX ADMIN — HEPARIN SODIUM 12.6 UNITS/KG/HR: 10000 INJECTION, SOLUTION INTRAVENOUS at 08:16

## 2019-07-21 RX ADMIN — Medication 10 ML: at 00:29

## 2019-07-21 RX ADMIN — IPRATROPIUM BROMIDE AND ALBUTEROL SULFATE 1 AMPULE: .5; 3 SOLUTION RESPIRATORY (INHALATION) at 22:01

## 2019-07-21 RX ADMIN — INSULIN LISPRO 1 UNITS: 100 INJECTION, SOLUTION INTRAVENOUS; SUBCUTANEOUS at 08:15

## 2019-07-21 RX ADMIN — CEFTRIAXONE SODIUM 1 G: 1 INJECTION, POWDER, FOR SOLUTION INTRAMUSCULAR; INTRAVENOUS at 20:26

## 2019-07-21 RX ADMIN — ASPIRIN 81 MG 81 MG: 81 TABLET ORAL at 08:15

## 2019-07-21 RX ADMIN — INSULIN GLARGINE 55 UNITS: 100 INJECTION, SOLUTION SUBCUTANEOUS at 23:24

## 2019-07-21 RX ADMIN — INSULIN LISPRO 2 UNITS: 100 INJECTION, SOLUTION INTRAVENOUS; SUBCUTANEOUS at 17:20

## 2019-07-21 RX ADMIN — FUROSEMIDE 80 MG: 10 INJECTION, SOLUTION INTRAMUSCULAR; INTRAVENOUS at 17:24

## 2019-07-21 RX ADMIN — Medication 1000 MCG: at 08:15

## 2019-07-21 RX ADMIN — IPRATROPIUM BROMIDE AND ALBUTEROL SULFATE 1 AMPULE: .5; 3 SOLUTION RESPIRATORY (INHALATION) at 12:39

## 2019-07-21 RX ADMIN — Medication 10 ML: at 20:20

## 2019-07-21 RX ADMIN — ONDANSETRON 4 MG: 2 INJECTION INTRAMUSCULAR; INTRAVENOUS at 00:29

## 2019-07-21 RX ADMIN — METOLAZONE 5 MG: 5 TABLET ORAL at 12:25

## 2019-07-21 RX ADMIN — METOLAZONE 5 MG: 5 TABLET ORAL at 08:15

## 2019-07-21 RX ADMIN — FUROSEMIDE 80 MG: 10 INJECTION, SOLUTION INTRAMUSCULAR; INTRAVENOUS at 08:15

## 2019-07-21 RX ADMIN — INSULIN LISPRO 1 UNITS: 100 INJECTION, SOLUTION INTRAVENOUS; SUBCUTANEOUS at 12:25

## 2019-07-21 RX ADMIN — INSULIN LISPRO 1 UNITS: 100 INJECTION, SOLUTION INTRAVENOUS; SUBCUTANEOUS at 23:23

## 2019-07-21 RX ADMIN — METOPROLOL SUCCINATE 12.5 MG: 25 TABLET, FILM COATED, EXTENDED RELEASE ORAL at 12:24

## 2019-07-21 RX ADMIN — AZITHROMYCIN MONOHYDRATE 500 MG: 500 INJECTION, POWDER, LYOPHILIZED, FOR SOLUTION INTRAVENOUS at 22:17

## 2019-07-21 RX ADMIN — ACETAMINOPHEN 650 MG: 325 TABLET ORAL at 23:33

## 2019-07-21 RX ADMIN — POTASSIUM BICARBONATE 40 MEQ: 391 TABLET, EFFERVESCENT ORAL at 08:15

## 2019-07-21 RX ADMIN — IPRATROPIUM BROMIDE AND ALBUTEROL SULFATE 1 AMPULE: .5; 3 SOLUTION RESPIRATORY (INHALATION) at 17:40

## 2019-07-21 RX ADMIN — FAMOTIDINE 20 MG: 20 TABLET ORAL at 08:15

## 2019-07-21 RX ADMIN — Medication 10 ML: at 08:20

## 2019-07-21 RX ADMIN — ATORVASTATIN CALCIUM 40 MG: 40 TABLET, FILM COATED ORAL at 08:15

## 2019-07-21 RX ADMIN — HEPARIN SODIUM 12.6 UNITS/KG/HR: 10000 INJECTION, SOLUTION INTRAVENOUS at 22:23

## 2019-07-21 RX ADMIN — METOPROLOL SUCCINATE 12.5 MG: 25 TABLET, FILM COATED, EXTENDED RELEASE ORAL at 20:21

## 2019-07-21 RX ADMIN — AMIODARONE HYDROCHLORIDE 200 MG: 200 TABLET ORAL at 08:15

## 2019-07-21 RX ADMIN — POLYETHYLENE GLYCOL 3350 17 G: 17 POWDER, FOR SOLUTION ORAL at 08:15

## 2019-07-21 RX ADMIN — IPRATROPIUM BROMIDE AND ALBUTEROL SULFATE 1 AMPULE: .5; 3 SOLUTION RESPIRATORY (INHALATION) at 09:15

## 2019-07-21 ASSESSMENT — PAIN DESCRIPTION - ONSET: ONSET: UNABLE TO TELL

## 2019-07-21 ASSESSMENT — PAIN DESCRIPTION - PAIN TYPE: TYPE: ACUTE PAIN

## 2019-07-21 ASSESSMENT — PAIN DESCRIPTION - LOCATION: LOCATION: WRIST

## 2019-07-21 ASSESSMENT — PAIN DESCRIPTION - ORIENTATION: ORIENTATION: LEFT

## 2019-07-21 ASSESSMENT — PAIN SCALES - GENERAL
PAINLEVEL_OUTOF10: 0
PAINLEVEL_OUTOF10: 9

## 2019-07-21 ASSESSMENT — PAIN - FUNCTIONAL ASSESSMENT: PAIN_FUNCTIONAL_ASSESSMENT: ACTIVITIES ARE NOT PREVENTED

## 2019-07-21 ASSESSMENT — PAIN DESCRIPTION - PROGRESSION: CLINICAL_PROGRESSION: NOT CHANGED

## 2019-07-21 ASSESSMENT — PAIN DESCRIPTION - DESCRIPTORS: DESCRIPTORS: ACHING

## 2019-07-21 ASSESSMENT — PAIN DESCRIPTION - FREQUENCY: FREQUENCY: CONTINUOUS

## 2019-07-21 NOTE — PROGRESS NOTES
279 lb 6.4 oz (126.7 kg)   SpO2 96%   BMI 42.48 kg/m²        TELEMETRY: AFL - variable block CVR     Physical Exam:  General Appearance: alert and oriented to person, place and time, in no acute distress  Cardiovascular: irregular rhythm, normal S1 and S2, no murmurs, rubs, clicks, or gallops, distal pulses intact,  Pulmonary/Chest: clear upper - crackles and rhonchi posterior to auscultation bilaterally- no wheezes, rales or rhonchi, normal air movement, no respiratory distress  Abdomen: soft, non-tender, non-distended, normal bowel sounds, no masses Extremities: no cyanosis, clubbing- 2+ calf down pitting edema, pulses present   Skin: warm and dry, no rash or erythema  Head: normocephalic and atraumatic  Musculoskeletal: normal range of motion, no joint swelling, deformity or tenderness  Neurological: alert, oriented, normal speech, no focal findings or movement disorder noted    Medications:    polyethylene glycol  17 g Oral Daily    famotidine  20 mg Oral Daily    ipratropium-albuterol  1 ampule Inhalation Q4H WA    insulin glargine  55 Units Subcutaneous Nightly    metolazone  5 mg Oral Daily    sodium chloride flush  10 mL Intravenous 2 times per day    cefTRIAXone (ROCEPHIN) IV  1 g Intravenous Q24H    azithromycin  500 mg Intravenous Q24H    furosemide  80 mg Intravenous BID    amiodarone  200 mg Oral Daily    aspirin  81 mg Oral Daily    atorvastatin  40 mg Oral Daily    metoprolol tartrate  12.5 mg Oral BID    vitamin B-12  1,000 mcg Oral Daily    insulin lispro  0-6 Units Subcutaneous TID WC    insulin lispro  0-3 Units Subcutaneous Nightly      heparin (porcine) 12.6 Units/kg/hr (07/21/19 0816)    norepinephrine Stopped (07/19/19 2142)    dextrose         ondansetron 4 mg Q6H PRN   phenol 1 spray Q2H PRN   benzonatate 200 mg TID PRN   magnesium hydroxide 30 mL Daily PRN   albuterol 2.5 mg Q4H PRN   sodium chloride flush 10 mL PRN   acetaminophen 650 mg Q6H PRN   melatonin 3 mg

## 2019-07-21 NOTE — PROGRESS NOTES
voice recognition technology. **       Final report electronically signed by Dr. Marcus Carrasco on 7/20/2019 2:51 AM           (See actual reports for details)  [address] Problems    Diagnosis Date Noted    Acute on chronic diastolic CHF (congestive heart failure), NYHA class 3 (HCC) [I50.33]      Priority: High    Uncontrolled type 2 diabetes mellitus with hyperglycemia (HCC) [E11.65]      Priority: High    Dilated cardiomyopathy ([address] [I42.0]      Priority: High    Moderate to severe pulmonary hypertension ([address] [I27.20]      Priority: High    Arterial hypotension [I95.9]      Priority: High    CAD, multiple vessel [I25.10]      Priority: High    Paroxysmal atrial flutter (HCC) [I48.92]      Priority: High    Atrial flutter ([address] [I48.92] 07/20/2019    Hypokalemia [E87.6]     Morbid obesity due to excess calories ([address] [E66.01] 07/19/2019    CKD (chronic kidney disease), stage III (HCC) [N18.3]     Chronic diastolic (congestive) heart failure (HCC) [I50.32]     Anemia in stage 3 chronic kidney disease (HCC) [N18.3, D63.1]     Acute on chronic systolic (congestive) heart failure (HCC) [I50.23]     Pleural effusion [J90] 05/06/2019    Acute kidney injury ([address] [N17.9]     Coronary artery disease involving native coronary artery of native heart [I25.10]     DM type 2 (diabetes mellitus, type 2) ([address] [E11.9]      Assessment and Plan   Bilateral pleural effusion, L recurrent s/p chest tube  Chronic hypoxemic/hypercapnic respiratory failure, well compensated  Acute on chronic systolic/disatolic CHF, grossly overloaded  Severe COPD on home oxygen//bipap  Morbid obesity BMI 42  Multifactorial secondary pulmonary hypertension  CAD in need of PCI vs CABG, patient and CTS favor PCI. CKD with ERNESTO.   Elevated PCT    Agree with the need for TPC for CHF associated rapidly recurrent pleural effusions refractory to diuretics, however L pleural effusion tapped dry, will

## 2019-07-22 ENCOUNTER — APPOINTMENT (OUTPATIENT)
Dept: GENERAL RADIOLOGY | Age: 74
DRG: 291 | End: 2019-07-22
Attending: INTERNAL MEDICINE
Payer: MEDICARE

## 2019-07-22 LAB
ANION GAP SERPL CALCULATED.3IONS-SCNC: 10 MEQ/L (ref 8–16)
APTT: 66.4 SECONDS (ref 22–38)
APTT: 74 SECONDS (ref 22–38)
APTT: 77.8 SECONDS (ref 22–38)
BUN BLDV-MCNC: 51 MG/DL (ref 7–22)
CALCIUM SERPL-MCNC: 9.1 MG/DL (ref 8.5–10.5)
CHLORIDE BLD-SCNC: 83 MEQ/L (ref 98–111)
CO2: 40 MEQ/L (ref 23–33)
CREAT SERPL-MCNC: 2 MG/DL (ref 0.4–1.2)
GFR SERPL CREATININE-BSD FRML MDRD: 33 ML/MIN/1.73M2
GLUCOSE BLD-MCNC: 145 MG/DL (ref 70–108)
GLUCOSE BLD-MCNC: 182 MG/DL (ref 70–108)
GLUCOSE BLD-MCNC: 202 MG/DL (ref 70–108)
GLUCOSE BLD-MCNC: 216 MG/DL (ref 70–108)
GLUCOSE BLD-MCNC: 85 MG/DL (ref 70–108)
MAGNESIUM: 2 MG/DL (ref 1.6–2.4)
POTASSIUM SERPL-SCNC: 4.4 MEQ/L (ref 3.5–5.2)
SODIUM BLD-SCNC: 133 MEQ/L (ref 135–145)

## 2019-07-22 PROCEDURE — 99232 SBSQ HOSP IP/OBS MODERATE 35: CPT | Performed by: INTERNAL MEDICINE

## 2019-07-22 PROCEDURE — 2140000000 HC CCU INTERMEDIATE R&B

## 2019-07-22 PROCEDURE — 94760 N-INVAS EAR/PLS OXIMETRY 1: CPT

## 2019-07-22 PROCEDURE — 94761 N-INVAS EAR/PLS OXIMETRY MLT: CPT

## 2019-07-22 PROCEDURE — 2709999900 HC NON-CHARGEABLE SUPPLY

## 2019-07-22 PROCEDURE — 97530 THERAPEUTIC ACTIVITIES: CPT

## 2019-07-22 PROCEDURE — 85730 THROMBOPLASTIN TIME PARTIAL: CPT

## 2019-07-22 PROCEDURE — 71045 X-RAY EXAM CHEST 1 VIEW: CPT

## 2019-07-22 PROCEDURE — 94660 CPAP INITIATION&MGMT: CPT

## 2019-07-22 PROCEDURE — 2700000000 HC OXYGEN THERAPY PER DAY

## 2019-07-22 PROCEDURE — 6360000002 HC RX W HCPCS: Performed by: NURSE PRACTITIONER

## 2019-07-22 PROCEDURE — 6360000002 HC RX W HCPCS: Performed by: INTERNAL MEDICINE

## 2019-07-22 PROCEDURE — 94640 AIRWAY INHALATION TREATMENT: CPT

## 2019-07-22 PROCEDURE — 80048 BASIC METABOLIC PNL TOTAL CA: CPT

## 2019-07-22 PROCEDURE — APPSS30 APP SPLIT SHARED TIME 16-30 MINUTES: Performed by: NURSE PRACTITIONER

## 2019-07-22 PROCEDURE — 6370000000 HC RX 637 (ALT 250 FOR IP): Performed by: INTERNAL MEDICINE

## 2019-07-22 PROCEDURE — 97110 THERAPEUTIC EXERCISES: CPT

## 2019-07-22 PROCEDURE — 99233 SBSQ HOSP IP/OBS HIGH 50: CPT | Performed by: FAMILY MEDICINE

## 2019-07-22 PROCEDURE — 99232 SBSQ HOSP IP/OBS MODERATE 35: CPT | Performed by: NURSE PRACTITIONER

## 2019-07-22 PROCEDURE — 6370000000 HC RX 637 (ALT 250 FOR IP): Performed by: NURSE PRACTITIONER

## 2019-07-22 PROCEDURE — 36415 COLL VENOUS BLD VENIPUNCTURE: CPT

## 2019-07-22 PROCEDURE — 2580000003 HC RX 258: Performed by: INTERNAL MEDICINE

## 2019-07-22 PROCEDURE — 83735 ASSAY OF MAGNESIUM: CPT

## 2019-07-22 PROCEDURE — 82948 REAGENT STRIP/BLOOD GLUCOSE: CPT

## 2019-07-22 RX ORDER — CLINDAMYCIN PHOSPHATE 600 MG/50ML
600 INJECTION INTRAVENOUS
Status: CANCELLED | OUTPATIENT
Start: 2019-07-22

## 2019-07-22 RX ORDER — MIDAZOLAM HYDROCHLORIDE 1 MG/ML
1 INJECTION INTRAMUSCULAR; INTRAVENOUS ONCE
Status: CANCELLED | OUTPATIENT
Start: 2019-07-22 | End: 2019-07-22

## 2019-07-22 RX ORDER — FENTANYL CITRATE 50 UG/ML
50 INJECTION, SOLUTION INTRAMUSCULAR; INTRAVENOUS ONCE
Status: CANCELLED | OUTPATIENT
Start: 2019-07-22 | End: 2019-07-22

## 2019-07-22 RX ORDER — SODIUM CHLORIDE 450 MG/100ML
INJECTION, SOLUTION INTRAVENOUS CONTINUOUS
Status: CANCELLED | OUTPATIENT
Start: 2019-07-22

## 2019-07-22 RX ADMIN — Medication 1000 MCG: at 10:54

## 2019-07-22 RX ADMIN — ACETAMINOPHEN 650 MG: 325 TABLET ORAL at 22:32

## 2019-07-22 RX ADMIN — INSULIN LISPRO 1 UNITS: 100 INJECTION, SOLUTION INTRAVENOUS; SUBCUTANEOUS at 11:43

## 2019-07-22 RX ADMIN — ATORVASTATIN CALCIUM 40 MG: 40 TABLET, FILM COATED ORAL at 10:54

## 2019-07-22 RX ADMIN — FUROSEMIDE 80 MG: 10 INJECTION, SOLUTION INTRAMUSCULAR; INTRAVENOUS at 17:08

## 2019-07-22 RX ADMIN — INSULIN GLARGINE 55 UNITS: 100 INJECTION, SOLUTION SUBCUTANEOUS at 21:23

## 2019-07-22 RX ADMIN — INSULIN LISPRO 1 UNITS: 100 INJECTION, SOLUTION INTRAVENOUS; SUBCUTANEOUS at 21:22

## 2019-07-22 RX ADMIN — POLYETHYLENE GLYCOL 3350 17 G: 17 POWDER, FOR SOLUTION ORAL at 10:53

## 2019-07-22 RX ADMIN — POTASSIUM BICARBONATE 40 MEQ: 391 TABLET, EFFERVESCENT ORAL at 10:53

## 2019-07-22 RX ADMIN — METOPROLOL SUCCINATE 12.5 MG: 25 TABLET, FILM COATED, EXTENDED RELEASE ORAL at 10:55

## 2019-07-22 RX ADMIN — IPRATROPIUM BROMIDE AND ALBUTEROL SULFATE 1 AMPULE: .5; 3 SOLUTION RESPIRATORY (INHALATION) at 08:58

## 2019-07-22 RX ADMIN — INSULIN LISPRO 2 UNITS: 100 INJECTION, SOLUTION INTRAVENOUS; SUBCUTANEOUS at 17:08

## 2019-07-22 RX ADMIN — IPRATROPIUM BROMIDE AND ALBUTEROL SULFATE 1 AMPULE: .5; 3 SOLUTION RESPIRATORY (INHALATION) at 12:50

## 2019-07-22 RX ADMIN — ACETAMINOPHEN 650 MG: 325 TABLET ORAL at 08:38

## 2019-07-22 RX ADMIN — ACETAMINOPHEN 650 MG: 325 TABLET ORAL at 14:47

## 2019-07-22 RX ADMIN — HEPARIN SODIUM 12.6 UNITS/KG/HR: 10000 INJECTION, SOLUTION INTRAVENOUS at 15:25

## 2019-07-22 RX ADMIN — FAMOTIDINE 20 MG: 20 TABLET ORAL at 10:55

## 2019-07-22 RX ADMIN — FUROSEMIDE 80 MG: 10 INJECTION, SOLUTION INTRAMUSCULAR; INTRAVENOUS at 10:54

## 2019-07-22 RX ADMIN — ASPIRIN 81 MG 81 MG: 81 TABLET ORAL at 10:54

## 2019-07-22 RX ADMIN — AMIODARONE HYDROCHLORIDE 200 MG: 200 TABLET ORAL at 10:54

## 2019-07-22 RX ADMIN — CEFTRIAXONE SODIUM 1 G: 1 INJECTION, POWDER, FOR SOLUTION INTRAMUSCULAR; INTRAVENOUS at 21:21

## 2019-07-22 RX ADMIN — IPRATROPIUM BROMIDE AND ALBUTEROL SULFATE 1 AMPULE: .5; 3 SOLUTION RESPIRATORY (INHALATION) at 17:00

## 2019-07-22 RX ADMIN — IPRATROPIUM BROMIDE AND ALBUTEROL SULFATE 1 AMPULE: .5; 3 SOLUTION RESPIRATORY (INHALATION) at 20:44

## 2019-07-22 ASSESSMENT — PAIN DESCRIPTION - ONSET
ONSET: ON-GOING

## 2019-07-22 ASSESSMENT — PAIN DESCRIPTION - PROGRESSION
CLINICAL_PROGRESSION: GRADUALLY IMPROVING
CLINICAL_PROGRESSION: NOT CHANGED
CLINICAL_PROGRESSION: NOT CHANGED
CLINICAL_PROGRESSION: GRADUALLY IMPROVING

## 2019-07-22 ASSESSMENT — PAIN DESCRIPTION - DESCRIPTORS
DESCRIPTORS: ACHING
DESCRIPTORS: ACHING;HEAVINESS
DESCRIPTORS: HEAVINESS
DESCRIPTORS: HEAVINESS

## 2019-07-22 ASSESSMENT — PAIN DESCRIPTION - FREQUENCY
FREQUENCY: CONTINUOUS

## 2019-07-22 ASSESSMENT — PAIN DESCRIPTION - PAIN TYPE
TYPE: ACUTE PAIN

## 2019-07-22 ASSESSMENT — PAIN SCALES - GENERAL
PAINLEVEL_OUTOF10: 5
PAINLEVEL_OUTOF10: 6
PAINLEVEL_OUTOF10: 3
PAINLEVEL_OUTOF10: 4

## 2019-07-22 ASSESSMENT — PAIN - FUNCTIONAL ASSESSMENT
PAIN_FUNCTIONAL_ASSESSMENT: PREVENTS OR INTERFERES SOME ACTIVE ACTIVITIES AND ADLS
PAIN_FUNCTIONAL_ASSESSMENT: ACTIVITIES ARE NOT PREVENTED
PAIN_FUNCTIONAL_ASSESSMENT: PREVENTS OR INTERFERES SOME ACTIVE ACTIVITIES AND ADLS
PAIN_FUNCTIONAL_ASSESSMENT: PREVENTS OR INTERFERES SOME ACTIVE ACTIVITIES AND ADLS

## 2019-07-22 ASSESSMENT — PAIN DESCRIPTION - LOCATION
LOCATION: ARM
LOCATION: ELBOW
LOCATION: ARM
LOCATION: ELBOW

## 2019-07-22 ASSESSMENT — PAIN DESCRIPTION - ORIENTATION
ORIENTATION: RIGHT;LEFT
ORIENTATION: RIGHT
ORIENTATION: RIGHT
ORIENTATION: LEFT

## 2019-07-22 NOTE — PROGRESS NOTES
creatinine to minimize risks of contrast nephropathy prior to cath  7. Pulmonary hypertension  8. Acid base status reviewed, Bicarbonate high due to compensated chronic hypercarbic respiratory failure  9.   COPD    D/W patient     Naomie Sparrow MD  [facility]

## 2019-07-22 NOTE — PROGRESS NOTES
weaning to home 2L as able. Subjective:   -- 7/22/2019  --> pt up in chair - states he is feeling better. Still some sob and on 4L O2. Denies cp. No lightheaded/dizziness. Denies abd pain, n/v.  Denies f/c.  Vanessa po. Afebrile. Getting up in chair and walking to BR. Last BM 7/21. Chest tube left chest clamped for plans for ?pleurX catheter.  -8L since admission; wt down 8 lbs. Medications:  Reviewed    Infusion Medications    heparin (porcine) 12.6 Units/kg/hr (07/22/19 0400)    dextrose       Scheduled Medications    potassium bicarb-citric acid  40 mEq Oral Daily    metolazone  10 mg Oral Once    metoprolol succinate  12.5 mg Oral BID    polyethylene glycol  17 g Oral Daily    famotidine  20 mg Oral Daily    ipratropium-albuterol  1 ampule Inhalation Q4H WA    insulin glargine  55 Units Subcutaneous Nightly    sodium chloride flush  10 mL Intravenous 2 times per day    cefTRIAXone (ROCEPHIN) IV  1 g Intravenous Q24H    furosemide  80 mg Intravenous BID    amiodarone  200 mg Oral Daily    aspirin  81 mg Oral Daily    atorvastatin  40 mg Oral Daily    vitamin B-12  1,000 mcg Oral Daily    insulin lispro  0-6 Units Subcutaneous TID WC    insulin lispro  0-3 Units Subcutaneous Nightly     PRN Meds: ondansetron, phenol, benzonatate, magnesium hydroxide, albuterol, sodium chloride flush, acetaminophen, melatonin, glucose, dextrose, glucagon (rDNA), dextrose      Intake/Output Summary (Last 24 hours) at 7/22/2019 0921  Last data filed at 7/22/2019 0829  Gross per 24 hour   Intake 1971.28 ml   Output 2625 ml   Net -653.72 ml       Diet:  DIET LOW SODIUM 2 GM; Exam:  BP (!) 92/52   Pulse 84   Temp 98 °F (36.7 °C) (Oral)   Resp 18   Wt 280 lb 8 oz (127.2 kg)   SpO2 97%   BMI 42.65 kg/m²     General appearance: No apparent distress, appears stated age and cooperative. Up in chair - no distress  HEENT: Pupils equal, round, and reactive to light. Conjunctivae/corneas clear.   Neck: voice recognition technology. **      Final report electronically signed by Dr. Chery Baker on 7/22/2019 6:53 AM      XR CHEST PORTABLE   Final Result   1. Interval decrease in lung volumes versus worsening of venous congestion and/or infiltrates. 2. Visualized left-sided chest drains without pneumothorax. Residual left effusion is not excluded. **This report has been created using voice recognition software. It may contain minor errors which are inherent in voice recognition technology. **      Final report electronically signed by Dr. Chery Baker on 7/21/2019 7:12 AM      CT CHEST WO CONTRAST   Final Result   1. Visualized left-sided chest tube with adjacent small pneumothorax. 2. Patchy bilateral airspace opacities compatible with pneumonitis. 3 small to moderate right effusion. 4. Atherosclerotic vascular disease                **This report has been created using voice recognition software. It may contain minor errors which are inherent in voice recognition technology. **      Final report electronically signed by Dr. Chery Baker on 7/20/2019 2:51 AM      XR CHEST PORTABLE   Final Result      1. Interval placement of a left-sided pigtail chest tube and near complete resolution of a left-sided pleural effusion. No pneumothorax is identified. 2. Patchy densities are noted throughout the bilateral lungs which may correspond to edema or infectious infiltrates. **This report has been created using voice recognition software. It may contain minor errors which are inherent in voice recognition technology. **      Final report electronically signed by Dr. Karol Marie on 7/19/2019 2:42 PM      XR CHEST PORTABLE   Final Result   Diffuse bilateral airspace opacities greatest in the left lung base correlate for edema or pneumonia. Left lower lobe consolidation, atelectasis or pleural effusion. Correlation and follow-up is advised.          **This report has been created using

## 2019-07-22 NOTE — PROGRESS NOTES
[address] [address]  INPATIENT PHYSICAL THERAPY  DAILY NOTE  [facility] CCU-STEPDOWN 3B - 3B-33/033-A    Time In: 0808  Time Out: 08  Timed Code Treatment Minutes: 23 Minutes  Minutes: 23          Date: 2019  Patient Name: Jackie Bernstein,  Gender:  male        MRN: [id number]  : 1945  (69 y.o.)     Referring Practitioner: Dr. Jay Ulloa  Diagnosis: pain at surgical site  Additional Pertinent Hx: admit with above diagnosis, ERNESTO, recurrent left pleural effusion, CHF, COPD     Prior Level of Function:  Lives With: Spouse  Type of Home: House  Home Layout: Two level, Able to Live on Main level with bedroom/bathroom  Home Access: Stairs to enter with rails  Entrance Stairs - Number of Steps: 3  Entrance Stairs - Rails: Both  Home Equipment: Wheelchair-manual, Standard walker, 4 wheeled walker, Cane(per pt primarily using cane PTA)    Restrictions/Precautions:  Restrictions/Precautions: General Precautions, Fall Risk    SUBJECTIVE: Patient sitting in bedside chair finished with breakfast upon arrival. Patient agreeable to therapy. Patient declined ambulation this session due to being limited by multiple lines. PAIN: 10/10: B UEs, patient able to complete therapy session. RN notified and patient requested tylenol at end of session    OBJECTIVE:    Transfers:  Sit to Stand: Minimal Assistance, with increased time for completion, to/from chair with arms  Stand to [address] [address], decreased eccentric control     Balance:  Dynamic Standing Balance: Stand By Assistance, Contact Guard Assistance, BUE support on RW    Ambulation:  Patient politely declined. Exercise:  Patient was guided in 1 set(s) 10 reps of exercise to both lower extremities. Long arc quads, Seated hip flexion, Seated heel/toe raises, Seated isometric hip adduction, Standing heel/toe raises and Standing marches. Exercises were completed for increased independence with functional mobility.     Functional Outcome Measures: Completed  AM-PAC Inpatient Mobility without Stair Climbing Raw Score : 12  AM-PAC Inpatient without Stair Climbing T-Scale Score : 37.26    ASSESSMENT:  Assessment: Session limited by multiple lines. Patient motivated for therapy. Patient limited by BUEs pain. Patient would benefit from continued skilled physical therapy to improve functional mobility. Activity Tolerance:  Patient tolerance of  treatment: fair. Equipment Recommendations:   Discharge Recommendations:  Discharge Recommendations: Continue to assess pending progress, Patient would benefit from continued therapy after discharge    Plan: Times per week: 5X GM  Times per day: Daily  Specific instructions for Next Treatment: therex and mobility    Patient Education  Patient Education: Transfers, Verbal Exercise Instruction, standing balance    Goals:  Patient goals : by discharge  Short term goals  Time Frame for Short term goals: bed mobility with Angela  Short term goal 1: transfer with CGA  Short term goal 2: amb >50'x1 with walker and CGA to walk safely in room  Short term goal 3: negotiate 3 steps with 2HRs and CGA to enter home safely  Long term goals  Time Frame for Long term goals : no LTGs set secondary to short ELOS    Following session, patient left in safe position with all fall risk precautions in place.

## 2019-07-22 NOTE — PROGRESS NOTES
chair-- he cant lie flat -   ?  Needs increased diuresis and likely SG w/ inotropic therapy  Will discuss with Dr. Debbi Rios     Pt denies chest pain - still SOB - very fluid overloaded     Objective:   BP (!) 92/52   Pulse 84   Temp 98 °F (36.7 °C) (Oral)   Resp 18   Wt 280 lb 8 oz (127.2 kg)   SpO2 97%   BMI 42.65 kg/m²        TELEMETRY: AFL - variable block CVR     Physical Exam:  General Appearance: alert and oriented to person, place and time, in no acute distress  Cardiovascular: irregular rhythm, normal S1 and S2, no murmurs, rubs, clicks, or gallops, distal pulses intact,  Pulmonary/Chest: clear upper - crackles and rhonchi posterior to auscultation bilaterally- no wheezes, rales or rhonchi, normal air movement, no respiratory distress  Abdomen: soft, non-tender, non-distended, normal bowel sounds, no masses Extremities: no cyanosis, clubbing- 2+ calf down pitting edema, pulses present   Skin: warm and dry, no rash or erythema  Head: normocephalic and atraumatic  Musculoskeletal: normal range of motion, no joint swelling, deformity or tenderness  Neurological: alert, oriented, normal speech, no focal findings or movement disorder noted    Medications:    potassium bicarb-citric acid  40 mEq Oral Daily    metolazone  10 mg Oral Once    metoprolol succinate  12.5 mg Oral BID    polyethylene glycol  17 g Oral Daily    famotidine  20 mg Oral Daily    ipratropium-albuterol  1 ampule Inhalation Q4H WA    insulin glargine  55 Units Subcutaneous Nightly    sodium chloride flush  10 mL Intravenous 2 times per day    cefTRIAXone (ROCEPHIN) IV  1 g Intravenous Q24H    furosemide  80 mg Intravenous BID    amiodarone  200 mg Oral Daily    aspirin  81 mg Oral Daily    atorvastatin  40 mg Oral Daily    vitamin B-12  1,000 mcg Oral Daily    insulin lispro  0-6 Units Subcutaneous TID WC    insulin lispro  0-3 Units Subcutaneous Nightly      heparin (porcine) 12.6 Units/kg/hr (07/22/19 0400)    dextrose ondansetron 4 mg Q6H PRN   phenol 1 spray Q2H PRN   benzonatate 200 mg TID PRN   magnesium hydroxide 30 mL Daily PRN   albuterol 2.5 mg Q4H PRN   sodium chloride flush 10 mL PRN   acetaminophen 650 mg Q6H PRN   melatonin 3 mg Nightly PRN   glucose 15 g PRN   dextrose 12.5 g PRN   glucagon (rDNA) 1 mg PRN   dextrose 100 mL/hr PRN       Diagnostics:  EK2019 19:58:39 [facility] ROUTINE RETRIEVAL  Atrial flutter with 2:1 A-V conduction  Low voltage QRS, consider pulmonary disease, pericardial effusion, or normal variant  Possible Anterolateral infarct , age undetermined  T wave abnormality, consider inferior ischemia  Abnormal ECG  No previous ECGs available  Confirmed by Kevin Ko ([id number]) on 2019 8:25:16 PM    Echo:    Electronically signed by Leopoldo Alfredo(Interpreting physician) on 2019   06:40 PM  ----------------------------------------------------------------------------  FINDINGS  Left Atrium  Left atrium is normal in size. Left Ventricle  Global left ventricular systolic function appears moderately reduced with an  estimated ejection fraction of 40%. Mildly increased left ventricular wall thickness with a normal left  ventricular cavity size. The patient has a sigmoid interventricular septum. Thinning and hypokinesis of the distal half on the interventricular septum  and the apex. Right Atrium  Right atrium is normal in size. Right Ventricle  Normal right ventricular size and function. Mitral Valve  Normal mitral valve structure with mild mitral regurgitation. Aortic Valve  Calcified and fixed non-coronary cusp of the aortic valve causing  functionally bicuspid valve. No significant aortic stenosis or regurgitation. Tricuspid Valve  Moderate tricuspid regurgitation. Moderate pulmonary hypertension with an estimated right ventricular systolic  pressure of 52 mmHg. Pulmonic Valve  The pulmonic valve is normal in structure.   Pericardial Effusion  No

## 2019-07-22 NOTE — PROGRESS NOTES
results found for: DDIMER  Lactic Acid  No results for input(s): LACTA in the last 72 hours. INR  No results for input(s): INR, PROTIME in the last 72 hours. PTT  Recent Labs     07/21/19  0911 07/21/19  1557 07/22/19  0329   APTT 64.2* 68.5* 77.8*     Glucose  Recent Labs     07/21/19  2046 07/22/19  0647 07/22/19  1130   POCGLU 226* 85 182*     UA   Recent Labs     07/19/19  1600   SPECGRAV 1.012   PHUR 6.0   COLORU YELLOW   PROTEINU NEGATIVE   BLOODU LARGE*   RBCUA > 200   WBCUA 10-15   BACTERIA NONE   NITRU NEGATIVE   BILIRUBINUR NEGATIVE   UROBILINOGEN 0.2   KETUA NEGATIVE   LABCAST NONE SEEN  NONE SEEN   . Echo   CONCLUSIONS    Summary  Global left ventricular systolic function appears moderately reduced with an  estimated ejection fraction of 40%. Mildly increased left ventricular wall thickness with a normal left  ventricular cavity size. The patient has a sigmoid interventricular septum. Thinning and hypokinesis of the distal half on the interventricular septum  and the apex. Calcified and fixed non-coronary cusp of the aortic valve causing  functionally bicuspid valve. No significant aortic stenosis or  regurgitation. Mild mitral regurgitation. Moderate tricuspid regurgitation. Moderate pulmonary hypertension with an estimated right ventricular systolic  pressure of 52 mmHg. Evidence of moderate diastolic dysfunction is seen. Signature  ----------------------------------------------------------------------------   Electronically signed by Jessica Chin(Sonographer) on 07/11/2019 01:51 PM  ----------------------------------------------------------------------------  Cultures    Procalcitonin   Lab Results   Component Value Date    PROCAL 0.40 07/20/2019    PROCAL 0.32 07/18/2019    PROCAL 0.07 03/22/2019   Blood cultures x 2: no growth; prelim  MRSA (-)  VRE (-)    Left thoracentesis done 7/19/19  AFB- so far negative  Cytology: pending    Radiology    CXR  7/22/2019   XR CHEST PORTABLE   1.

## 2019-07-23 ENCOUNTER — APPOINTMENT (OUTPATIENT)
Dept: GENERAL RADIOLOGY | Age: 74
DRG: 291 | End: 2019-07-23
Attending: INTERNAL MEDICINE
Payer: MEDICARE

## 2019-07-23 ENCOUNTER — APPOINTMENT (OUTPATIENT)
Dept: INTERVENTIONAL RADIOLOGY/VASCULAR | Age: 74
DRG: 291 | End: 2019-07-23
Attending: INTERNAL MEDICINE
Payer: MEDICARE

## 2019-07-23 LAB
ALLEN TEST: POSITIVE
ANION GAP SERPL CALCULATED.3IONS-SCNC: 12 MEQ/L (ref 8–16)
ANION GAP SERPL CALCULATED.3IONS-SCNC: 12 MEQ/L (ref 8–16)
APTT: 72.8 SECONDS (ref 22–38)
BASE EXCESS (CALCULATED): 15.7 MMOL/L (ref -2.5–2.5)
BUN BLDV-MCNC: 51 MG/DL (ref 7–22)
BUN BLDV-MCNC: 53 MG/DL (ref 7–22)
CALCIUM SERPL-MCNC: 9.1 MG/DL (ref 8.5–10.5)
CALCIUM SERPL-MCNC: 9.3 MG/DL (ref 8.5–10.5)
CHLORIDE BLD-SCNC: 83 MEQ/L (ref 98–111)
CHLORIDE BLD-SCNC: 87 MEQ/L (ref 98–111)
CO2: 39 MEQ/L (ref 23–33)
CO2: 39 MEQ/L (ref 23–33)
COLLECTED BY:: ABNORMAL
CREAT SERPL-MCNC: 1.7 MG/DL (ref 0.4–1.2)
CREAT SERPL-MCNC: 2 MG/DL (ref 0.4–1.2)
DEVICE: ABNORMAL
ERYTHROCYTE [DISTWIDTH] IN BLOOD BY AUTOMATED COUNT: 14.6 % (ref 11.5–14.5)
ERYTHROCYTE [DISTWIDTH] IN BLOOD BY AUTOMATED COUNT: 52.8 FL (ref 35–45)
GFR SERPL CREATININE-BSD FRML MDRD: 33 ML/MIN/1.73M2
GFR SERPL CREATININE-BSD FRML MDRD: 40 ML/MIN/1.73M2
GLUCOSE BLD-MCNC: 101 MG/DL (ref 70–108)
GLUCOSE BLD-MCNC: 107 MG/DL (ref 70–108)
GLUCOSE BLD-MCNC: 142 MG/DL (ref 70–108)
GLUCOSE BLD-MCNC: 149 MG/DL (ref 70–108)
GLUCOSE BLD-MCNC: 60 MG/DL (ref 70–108)
GLUCOSE BLD-MCNC: 78 MG/DL (ref 70–108)
GLUCOSE BLD-MCNC: 81 MG/DL (ref 70–108)
GLUCOSE, WHOLE BLOOD: 153 MG/DL (ref 70–108)
HCO3: 41 MMOL/L (ref 23–28)
HCT VFR BLD CALC: 26.6 % (ref 42–52)
HEMOGLOBIN: 8.4 GM/DL (ref 14–18)
IFIO2: 80
MAGNESIUM: 2.3 MG/DL (ref 1.6–2.4)
MCH RBC QN AUTO: 31.1 PG (ref 26–33)
MCHC RBC AUTO-ENTMCNC: 31.6 GM/DL (ref 32.2–35.5)
MCV RBC AUTO: 98.5 FL (ref 80–94)
MODE: ABNORMAL
O2 SATURATION: 100 %
PCO2: 52 MMHG (ref 35–45)
PH BLOOD GAS: 7.51 (ref 7.35–7.45)
PLATELET # BLD: 271 THOU/MM3 (ref 130–400)
PMV BLD AUTO: 8.7 FL (ref 9.4–12.4)
PO2: 301 MMHG (ref 71–104)
POTASSIUM SERPL-SCNC: 3.7 MEQ/L (ref 3.5–5.2)
POTASSIUM SERPL-SCNC: 4.4 MEQ/L (ref 3.5–5.2)
RBC # BLD: 2.7 MILL/MM3 (ref 4.7–6.1)
SET PEEP: 6 MMHG
SET PRESS SUPP: 18 CMH2O
SET RESPIRATORY RATE: 22 BPM
SODIUM BLD-SCNC: 134 MEQ/L (ref 135–145)
SODIUM BLD-SCNC: 138 MEQ/L (ref 135–145)
SOURCE, BLOOD GAS: ABNORMAL
WBC # BLD: 14.8 THOU/MM3 (ref 4.8–10.8)

## 2019-07-23 PROCEDURE — 2580000003 HC RX 258: Performed by: NURSE PRACTITIONER

## 2019-07-23 PROCEDURE — C1751 CATH, INF, PER/CENT/MIDLINE: HCPCS

## 2019-07-23 PROCEDURE — 99232 SBSQ HOSP IP/OBS MODERATE 35: CPT | Performed by: NURSE PRACTITIONER

## 2019-07-23 PROCEDURE — 6360000002 HC RX W HCPCS: Performed by: INTERNAL MEDICINE

## 2019-07-23 PROCEDURE — 2000000000 HC ICU R&B

## 2019-07-23 PROCEDURE — 6360000002 HC RX W HCPCS

## 2019-07-23 PROCEDURE — 80048 BASIC METABOLIC PNL TOTAL CA: CPT

## 2019-07-23 PROCEDURE — 71045 X-RAY EXAM CHEST 1 VIEW: CPT

## 2019-07-23 PROCEDURE — 3209999900 FLUORO FOR SURGICAL PROCEDURES

## 2019-07-23 PROCEDURE — 2580000003 HC RX 258: Performed by: INTERNAL MEDICINE

## 2019-07-23 PROCEDURE — 6370000000 HC RX 637 (ALT 250 FOR IP): Performed by: INTERNAL MEDICINE

## 2019-07-23 PROCEDURE — 83735 ASSAY OF MAGNESIUM: CPT

## 2019-07-23 PROCEDURE — 82948 REAGENT STRIP/BLOOD GLUCOSE: CPT

## 2019-07-23 PROCEDURE — 93005 ELECTROCARDIOGRAM TRACING: CPT | Performed by: NURSE PRACTITIONER

## 2019-07-23 PROCEDURE — 99233 SBSQ HOSP IP/OBS HIGH 50: CPT | Performed by: FAMILY MEDICINE

## 2019-07-23 PROCEDURE — 2500000003 HC RX 250 WO HCPCS: Performed by: RADIOLOGY

## 2019-07-23 PROCEDURE — APPSS180 APP SPLIT SHARED TIME > 60 MINUTES: Performed by: NURSE PRACTITIONER

## 2019-07-23 PROCEDURE — 93005 ELECTROCARDIOGRAM TRACING: CPT | Performed by: INTERNAL MEDICINE

## 2019-07-23 PROCEDURE — 32550 INSERT PLEURAL CATH: CPT | Performed by: RADIOLOGY

## 2019-07-23 PROCEDURE — C1894 INTRO/SHEATH, NON-LASER: HCPCS

## 2019-07-23 PROCEDURE — 85027 COMPLETE CBC AUTOMATED: CPT

## 2019-07-23 PROCEDURE — 2709999900 HC NON-CHARGEABLE SUPPLY

## 2019-07-23 PROCEDURE — 99291 CRITICAL CARE FIRST HOUR: CPT | Performed by: INTERNAL MEDICINE

## 2019-07-23 PROCEDURE — C1729 CATH, DRAINAGE: HCPCS

## 2019-07-23 PROCEDURE — 2500000003 HC RX 250 WO HCPCS

## 2019-07-23 PROCEDURE — 0W9B30Z DRAINAGE OF LEFT PLEURAL CAVITY WITH DRAINAGE DEVICE, PERCUTANEOUS APPROACH: ICD-10-PCS | Performed by: RADIOLOGY

## 2019-07-23 PROCEDURE — 99232 SBSQ HOSP IP/OBS MODERATE 35: CPT | Performed by: INTERNAL MEDICINE

## 2019-07-23 PROCEDURE — 2580000003 HC RX 258: Performed by: RADIOLOGY

## 2019-07-23 PROCEDURE — 82947 ASSAY GLUCOSE BLOOD QUANT: CPT

## 2019-07-23 PROCEDURE — 6360000002 HC RX W HCPCS: Performed by: NURSE PRACTITIONER

## 2019-07-23 PROCEDURE — 93503 INSERT/PLACE HEART CATHETER: CPT

## 2019-07-23 PROCEDURE — 94770 HC ETCO2 MONITOR DAILY: CPT

## 2019-07-23 PROCEDURE — 6370000000 HC RX 637 (ALT 250 FOR IP)

## 2019-07-23 PROCEDURE — 6360000002 HC RX W HCPCS: Performed by: RADIOLOGY

## 2019-07-23 PROCEDURE — 75989 ABSCESS DRAINAGE UNDER X-RAY: CPT | Performed by: RADIOLOGY

## 2019-07-23 PROCEDURE — 36415 COLL VENOUS BLD VENIPUNCTURE: CPT

## 2019-07-23 PROCEDURE — 94761 N-INVAS EAR/PLS OXIMETRY MLT: CPT

## 2019-07-23 PROCEDURE — C1769 GUIDE WIRE: HCPCS

## 2019-07-23 PROCEDURE — 2700000000 HC OXYGEN THERAPY PER DAY

## 2019-07-23 PROCEDURE — 82803 BLOOD GASES ANY COMBINATION: CPT

## 2019-07-23 PROCEDURE — 36600 WITHDRAWAL OF ARTERIAL BLOOD: CPT

## 2019-07-23 PROCEDURE — 2580000003 HC RX 258: Performed by: PHYSICIAN ASSISTANT

## 2019-07-23 PROCEDURE — 85730 THROMBOPLASTIN TIME PARTIAL: CPT

## 2019-07-23 PROCEDURE — 94640 AIRWAY INHALATION TREATMENT: CPT

## 2019-07-23 PROCEDURE — 2500000003 HC RX 250 WO HCPCS: Performed by: NURSE PRACTITIONER

## 2019-07-23 PROCEDURE — 94002 VENT MGMT INPAT INIT DAY: CPT

## 2019-07-23 PROCEDURE — 6370000000 HC RX 637 (ALT 250 FOR IP): Performed by: NURSE PRACTITIONER

## 2019-07-23 RX ORDER — FENTANYL CITRATE 50 UG/ML
INJECTION, SOLUTION INTRAMUSCULAR; INTRAVENOUS
Status: COMPLETED
Start: 2019-07-23 | End: 2019-07-23

## 2019-07-23 RX ORDER — DEXTROSE AND SODIUM CHLORIDE 5; .45 G/100ML; G/100ML
INJECTION, SOLUTION INTRAVENOUS CONTINUOUS
Status: DISCONTINUED | OUTPATIENT
Start: 2019-07-23 | End: 2019-07-27

## 2019-07-23 RX ORDER — FENTANYL CITRATE 50 UG/ML
50 INJECTION, SOLUTION INTRAMUSCULAR; INTRAVENOUS ONCE
Status: COMPLETED | OUTPATIENT
Start: 2019-07-23 | End: 2019-07-23

## 2019-07-23 RX ORDER — CLINDAMYCIN PHOSPHATE 600 MG/50ML
600 INJECTION INTRAVENOUS
Status: DISCONTINUED | OUTPATIENT
Start: 2019-07-23 | End: 2019-07-26

## 2019-07-23 RX ORDER — SODIUM CHLORIDE 450 MG/100ML
INJECTION, SOLUTION INTRAVENOUS CONTINUOUS
Status: DISCONTINUED | OUTPATIENT
Start: 2019-07-23 | End: 2019-07-23

## 2019-07-23 RX ORDER — PROPOFOL 10 MG/ML
INJECTION, EMULSION INTRAVENOUS
Status: COMPLETED
Start: 2019-07-23 | End: 2019-07-23

## 2019-07-23 RX ORDER — BUMETANIDE 0.25 MG/ML
2 INJECTION, SOLUTION INTRAMUSCULAR; INTRAVENOUS EVERY 12 HOURS
Status: DISCONTINUED | OUTPATIENT
Start: 2019-07-23 | End: 2019-07-23

## 2019-07-23 RX ORDER — PROPOFOL 10 MG/ML
20 INJECTION, EMULSION INTRAVENOUS
Status: DISCONTINUED | OUTPATIENT
Start: 2019-07-23 | End: 2019-07-25

## 2019-07-23 RX ORDER — MIDAZOLAM HYDROCHLORIDE 1 MG/ML
1 INJECTION INTRAMUSCULAR; INTRAVENOUS ONCE
Status: DISCONTINUED | OUTPATIENT
Start: 2019-07-23 | End: 2019-07-25

## 2019-07-23 RX ORDER — DOPAMINE HYDROCHLORIDE 160 MG/100ML
2.5 INJECTION, SOLUTION INTRAVENOUS CONTINUOUS
Status: DISCONTINUED | OUTPATIENT
Start: 2019-07-23 | End: 2019-07-26

## 2019-07-23 RX ORDER — ETOMIDATE 2 MG/ML
INJECTION INTRAVENOUS
Status: COMPLETED
Start: 2019-07-23 | End: 2019-07-23

## 2019-07-23 RX ADMIN — FENTANYL CITRATE 50 MCG: 50 INJECTION INTRAMUSCULAR; INTRAVENOUS at 16:15

## 2019-07-23 RX ADMIN — SODIUM CHLORIDE 50000 UNITS: 0.9 IRRIGANT IRRIGATION at 08:45

## 2019-07-23 RX ADMIN — FUROSEMIDE 80 MG: 10 INJECTION, SOLUTION INTRAMUSCULAR; INTRAVENOUS at 12:55

## 2019-07-23 RX ADMIN — IPRATROPIUM BROMIDE AND ALBUTEROL SULFATE 1 AMPULE: .5; 3 SOLUTION RESPIRATORY (INHALATION) at 13:23

## 2019-07-23 RX ADMIN — IPRATROPIUM BROMIDE AND ALBUTEROL SULFATE 1 AMPULE: .5; 3 SOLUTION RESPIRATORY (INHALATION) at 21:49

## 2019-07-23 RX ADMIN — CEFTRIAXONE SODIUM 1 G: 1 INJECTION, POWDER, FOR SOLUTION INTRAMUSCULAR; INTRAVENOUS at 22:40

## 2019-07-23 RX ADMIN — AMIODARONE HYDROCHLORIDE 200 MG: 200 TABLET ORAL at 11:16

## 2019-07-23 RX ADMIN — ETOMIDATE: 2 INJECTION INTRAVENOUS at 18:47

## 2019-07-23 RX ADMIN — PROPOFOL 20 MCG/KG/MIN: 10 INJECTION, EMULSION INTRAVENOUS at 16:29

## 2019-07-23 RX ADMIN — FENTANYL CITRATE 50 MCG: 50 INJECTION, SOLUTION INTRAMUSCULAR; INTRAVENOUS at 16:15

## 2019-07-23 RX ADMIN — BUMETANIDE 1 MG/HR: 0.25 INJECTION INTRAMUSCULAR; INTRAVENOUS at 20:38

## 2019-07-23 RX ADMIN — DOPAMINE HYDROCHLORIDE IN DEXTROSE 2.5 MCG/KG/MIN: 1.6 INJECTION, SOLUTION INTRAVENOUS at 19:54

## 2019-07-23 RX ADMIN — Medication 10 ML: at 22:41

## 2019-07-23 RX ADMIN — PROPOFOL 50 MCG/KG/MIN: 10 INJECTION, EMULSION INTRAVENOUS at 19:45

## 2019-07-23 RX ADMIN — IPRATROPIUM BROMIDE AND ALBUTEROL SULFATE 1 AMPULE: .5; 3 SOLUTION RESPIRATORY (INHALATION) at 10:17

## 2019-07-23 RX ADMIN — FENTANYL CITRATE 25 MCG: 50 INJECTION, SOLUTION INTRAMUSCULAR; INTRAVENOUS at 08:18

## 2019-07-23 RX ADMIN — INSULIN GLARGINE 55 UNITS: 100 INJECTION, SOLUTION SUBCUTANEOUS at 21:15

## 2019-07-23 RX ADMIN — Medication 10 ML: at 11:16

## 2019-07-23 RX ADMIN — PROPOFOL 50 MCG/KG/MIN: 10 INJECTION, EMULSION INTRAVENOUS at 22:40

## 2019-07-23 RX ADMIN — CHLOROTHIAZIDE SODIUM 250 MG: 500 INJECTION, POWDER, LYOPHILIZED, FOR SOLUTION INTRAVENOUS at 21:30

## 2019-07-23 RX ADMIN — DEXTROSE 50 % IN WATER (D50W) INTRAVENOUS SYRINGE 12.5 G: at 06:40

## 2019-07-23 RX ADMIN — INSULIN LISPRO 1 UNITS: 100 INJECTION, SOLUTION INTRAVENOUS; SUBCUTANEOUS at 21:16

## 2019-07-23 RX ADMIN — DEXTROSE AND SODIUM CHLORIDE: 5; 450 INJECTION, SOLUTION INTRAVENOUS at 07:02

## 2019-07-23 RX ADMIN — ASPIRIN 81 MG 81 MG: 81 TABLET ORAL at 11:16

## 2019-07-23 ASSESSMENT — PULMONARY FUNCTION TESTS
PIF_VALUE: 32
PIF_VALUE: 25

## 2019-07-23 ASSESSMENT — PAIN SCALES - GENERAL
PAINLEVEL_OUTOF10: 0

## 2019-07-23 NOTE — PROGRESS NOTES
[facility]   Nephrology progress note  7/23/2019, 2:30 PM      Pt Name:    Dedra Lopez  MRN:     [id number]     [place]:    1945  Admit Date:    7/18/2019  7:31 PM  Primary Care Physician:  Malika Newsome MD   Room number  3B-33/033-A    Chief Complaint: Nephrology following for ERNESTO/CKD    Subjective:  Patient seen and examined  No chest pain  Had left pleurx drain placed yesterday  No distress  Still clinically fluid overloaded      Objective:  24HR INTAKE/OUTPUT:      Intake/Output Summary (Last 24 hours) at 7/23/2019 1430  Last data filed at 7/23/2019 1417  Gross per 24 hour   Intake 943.21 ml   Output 2225 ml   Net -1281.79 ml     I/O last 3 completed shifts: In: 2422.4 [P.O.:1960; I.V.:462.4]  Out: 1875 [Urine:1850; Chest Tube:25]  I/O this shift:  In: 176.9 [P.O.:120; I.V.:56.9]  Out: 950 [Urine:950]  Admission weight: 288 lb 12.8 oz (131 kg)  Wt Readings from Last 3 Encounters:   07/23/19 279 lb 14.4 oz (127 kg)   07/18/19 281 lb (127.5 kg)   07/17/19 286 lb 3.2 oz (129.8 kg)     Body mass index is 42.56 kg/m².     Physical examination  VITALS:     Vitals:    07/23/19 1230   BP: 110/62   Pulse: 91   Resp: 24   Temp:    SpO2:      General Appearance:  appears comfortable, no distress  Mouth/Throat: Oral mucosa moist  Neck: No JVD  Lungs: Air entry B/L, no rales, no use of accessory muscles, poor effort  Heart:  S1, S2 heard  GI: soft, non-tender, no guarding  Extremities: B/L  LE edema      Lab Data  CBC:   Recent Labs     07/21/19  0342 07/23/19  0357   WBC 14.1* 14.8*   HGB 9.0* 8.4*   HCT 28.5* 26.6*    271     BMP:  Recent Labs     07/21/19  0342 07/22/19  0329 07/23/19  0357    133* 134*   K 3.6 4.4 4.4   CL 88* 83* 83*   CO2 35* 40* 39*   BUN 51* 51* 53*   CREATININE 1.7* 2.0* 2.0*   GLUCOSE 175* 145* 78   CALCIUM 9.1 9.1 9.3   MG 2.0 2.0 2.3     Hepatic:   No results for input(s): LABALBU, AST, ALT, ALB, BILITOT, ALKPHOS in the last 72

## 2019-07-23 NOTE — PROGRESS NOTES
further eval by cardiology for CABG vs high risk PCI. Pt was initially admitted to  and resp status worsened and tx to ICU 7/19/19.  -- Intensivist consulted ->pt on bipap initially on admission - imaging showed left pleural effusion again and Dr. Gil Moe placed 14 Russian pleural catheter/chest tube  -- cardio consulted for severe CAD per [facility] --> CTS consulted and too high risk for CABG and rec high risk PCI - awaiting cardio plans  -- renal consulted for ERNESTO on CKD and CHF/fluid overload --> Echo 7/11/19 EF 40%, +pulm HTN, +diastolic dysfxn -> IV lasix 80 mg IV bid and intermittent zaroxolyn, fluid restriction  -- pulm consulted for resp failure and recurrent pleural effusion with left chest tube placed in ICU -> changed to PleurX catheter 7/23/19  -- Bipap at night and O2 and weaning to home 2L as able. -- 7/23 transferred to ICU for [facility] placement for HD monitoring and need for aggressive diuresis for ??cath/PCI    Subjective:   -- 7/23/2019  --> pt just back from radiology - pleurX drain placed -- sleepy but arousable and appropriate. No change in breathing. BS low this am to 60 and started on D5 - improving - pt with +sx with low BS. Denies cp. No lightheaded/dizziness. Denies abd pain, n/v.  Denies f/c.  Vanessa po. Afebrile. Last BM 7/21. .  -7.5L since admission; wt down 9 lbs.         Medications:  Reviewed    Infusion Medications    dextrose 5 % and 0.45 % NaCl 100 mL/hr at 07/23/19 4696    heparin (porcine) Stopped (07/23/19 9022)    dextrose       Scheduled Medications    bacitracin in 0.9 % sodium chloride irrigation  50,000 Units Topical Once    clindamycin (CLEOCIN) IV  600 mg Intravenous 60 Min Pre-Op    midazolam  1 mg Intravenous Once    potassium bicarb-citric acid  40 mEq Oral Daily    metolazone  10 mg Oral Once    metoprolol succinate  12.5 mg Oral BID    polyethylene glycol  17 g Oral Daily    famotidine  20 mg Oral Daily    ipratropium-albuterol  1 ampule Inhalation thought content appropriate, normal insight  Capillary Refill: Brisk,< 3 seconds   Peripheral Pulses: diminished due to edema      Labs:   Recent Labs     07/20/19  1113 07/21/19  0342 07/23/19  0357   WBC 12.7* 14.1* 14.8*   HGB 9.2* 9.0* 8.4*   HCT 29.8* 28.5* 26.6*    306 271     Recent Labs     07/21/19  0342 07/22/19  0329 07/23/19  0357    133* 134*   K 3.6 4.4 4.4   CL 88* 83* 83*   CO2 35* 40* 39*   BUN 51* 51* 53*   CREATININE 1.7* 2.0* 2.0*   CALCIUM 9.1 9.1 9.3     No results for input(s): AST, ALT, BILIDIR, BILITOT, ALKPHOS in the last 72 hours. No results for input(s): INR in the last 72 hours. No results for input(s): [place] in the last 72 hours. Urinalysis:      Lab Results   Component Value Date    NITRU NEGATIVE 07/19/2019    WBCUA 10-15 07/19/2019    BACTERIA NONE 07/19/2019    RBCUA > 200 07/19/2019    BLOODU LARGE 07/19/2019    SPECGRAV 1.012 07/19/2019    GLUCOSEU TRACE 03/28/2019       Radiology:  XR CHEST PORTABLE   Final Result   1. Interval worsening interstitial edema correlate with progressive congestive heart failure. 2. Increasing left base atelectasis and effusion. **This report has been created using voice recognition software. It may contain minor errors which are inherent in voice recognition technology. **      Final report electronically signed by Dr. Anna Marie Velasquez on 7/22/2019 6:53 AM      XR CHEST PORTABLE   Final Result   1. Interval decrease in lung volumes versus worsening of venous congestion and/or infiltrates. 2. Visualized left-sided chest drains without pneumothorax. Residual left effusion is not excluded. **This report has been created using voice recognition software. It may contain minor errors which are inherent in voice recognition technology. **      Final report electronically signed by Dr. Anna Marie Velasquez on 7/21/2019 7:12 AM      CT CHEST WO CONTRAST   Final Result   1.  Visualized left-sided chest tube with adjacent small [x] Already on Anticoagulation -- heparin gtt     Disposition:    [x] Home       [] TCU       [] Rehab       [] Psych       [] SNF       [] [place]       [] Other-    Code Status: Prior    PT/OT Eval Status: following      Electronically signed by Gm Monroy MD on 7/23/2019 at 8:52 AM

## 2019-07-23 NOTE — PROGRESS NOTES
 insulin glargine  55 Units Subcutaneous Nightly    sodium chloride flush  10 mL Intravenous 2 times per day    cefTRIAXone (ROCEPHIN) IV  1 g Intravenous Q24H    furosemide  80 mg Intravenous BID    amiodarone  200 mg Oral Daily    aspirin  81 mg Oral Daily    atorvastatin  40 mg Oral Daily    vitamin B-12  1,000 mcg Oral Daily    insulin lispro  0-6 Units Subcutaneous TID WC    insulin lispro  0-3 Units Subcutaneous Nightly      dextrose 5 % and 0.45 % NaCl 100 mL/hr at 19 0702    heparin (porcine) Stopped (19 0417)    dextrose         ondansetron 4 mg Q6H PRN   phenol 1 spray Q2H PRN   benzonatate 200 mg TID PRN   magnesium hydroxide 30 mL Daily PRN   albuterol 2.5 mg Q4H PRN   sodium chloride flush 10 mL PRN   acetaminophen 650 mg Q6H PRN   melatonin 3 mg Nightly PRN   glucose 15 g PRN   dextrose 12.5 g PRN   glucagon (rDNA) 1 mg PRN   dextrose 100 mL/hr PRN       Diagnostics:  EK2019 19:58:39 [facility] ROUTINE RETRIEVAL  Atrial flutter with 2:1 A-V conduction  Low voltage QRS, consider pulmonary disease, pericardial effusion, or normal variant  Possible Anterolateral infarct , age undetermined  T wave abnormality, consider inferior ischemia  Abnormal ECG  No previous ECGs available  Confirmed by Akila Mendez ([id number]) on 2019 8:25:16 PM    Echo:    Electronically signed by Leopoldo Alfredo(Interpreting physician) on 2019   06:40 PM  ----------------------------------------------------------------------------  FINDINGS  Left Atrium  Left atrium is normal in size. Left Ventricle  Global left ventricular systolic function appears moderately reduced with an  estimated ejection fraction of 40%. Mildly increased left ventricular wall thickness with a normal left  ventricular cavity size. The patient has a sigmoid interventricular septum. Thinning and hypokinesis of the distal half on the interventricular septum  and the apex.   Right Atrium  Right

## 2019-07-23 NOTE — PROGRESS NOTES
Former Smoker     Packs/day: 1.50     Years: 30.00     Pack years: 45.00     Types: Cigarettes     Last attempt to quit: 1996     Years since quittin.1    Smokeless tobacco: Never Used   Substance and Sexual Activity    Alcohol use: No    Drug use: No    Sexual activity: Not on file   Lifestyle    Physical activity:     Days per week: Not on file     Minutes per session: Not on file    Stress: Not on file   Relationships    Social connections:     Talks on phone: Not on file     Gets together: Not on file     Attends Hindu service: Not on file     Active member of club or organization: Not on file     Attends meetings of clubs or organizations: Not on file     Relationship status: Not on file    Intimate partner violence:     Fear of current or ex partner: Not on file     Emotionally abused: Not on file     Physically abused: Not on file     Forced sexual activity: Not on file   Other Topics Concern    Not on file   Social History Narrative    Not on file     Allergies: NKDA    Medications:     dextrose 5 % and 0.45 % NaCl 100 mL/hr at 19 0702    heparin (porcine) Stopped (19 0417)    dextrose        clindamycin (CLEOCIN) IV  600 mg Intravenous 60 Min Pre-Op    midazolam  1 mg Intravenous Once    bumetanide  2 mg Intravenous Q12H    chlorothiazide (DIURIL) IVPB  250 mg Intravenous Once    potassium bicarb-citric acid  40 mEq Oral Daily    metolazone  10 mg Oral Once    metoprolol succinate  12.5 mg Oral BID    polyethylene glycol  17 g Oral Daily    famotidine  20 mg Oral Daily    ipratropium-albuterol  1 ampule Inhalation Q4H WA    insulin glargine  55 Units Subcutaneous Nightly    sodium chloride flush  10 mL Intravenous 2 times per day    cefTRIAXone (ROCEPHIN) IV  1 g Intravenous Q24H    amiodarone  200 mg Oral Daily    aspirin  81 mg Oral Daily    atorvastatin  40 mg Oral Daily    vitamin B-12  1,000 mcg Oral Daily    insulin lispro  0-6 Units

## 2019-07-23 NOTE — H&P
injection 4 mg, 4 mg, Intravenous, Q6H PRN, Alfred Lee MD, 4 mg at 07/21/19 0029    potassium bicarb-citric acid (EFFER-K) effervescent tablet 40 mEq, 40 mEq, Oral, Daily, Dee Rodriguez MD, 40 mEq at 07/22/19 1053    metolazone (ZAROXOLYN) tablet 10 mg, 10 mg, Oral, Once, Dee Rodriguez MD    metoprolol succinate (TOPROL XL) extended release tablet 12.5 mg, 12.5 mg, Oral, BID, Major Perez MD, 12.5 mg at 07/22/19 1055    heparin 25,000 units in dextrose 5% 250 mL infusion, 7.6 Units/kg/hr, Intravenous, Continuous, [facility], MATT - CNP, Stopped at 07/23/19 0417    phenol 1.4 % mouth spray 1 spray, 1 spray, Mouth/Throat, Q2H PRN, Evangelina Lerner, MATT Sigala CNP, 1 spray at 07/20/19 1613    benzonatate (TESSALON) capsule 200 mg, 200 mg, Oral, TID PRN, Major Perez MD    polyethylene glycol (GLYCOLAX) packet 17 g, 17 g, Oral, Daily, Major Perez MD, 17 g at 07/22/19 1053    magnesium hydroxide (MILK OF MAGNESIA) 400 MG/5ML suspension 30 mL, 30 mL, Oral, Daily PRN, Major Perez MD    famotidine (PEPCID) tablet 20 mg, 20 mg, Oral, Daily, Major Perez MD, 20 mg at 07/22/19 1055    albuterol (PROVENTIL) nebulizer solution 2.5 mg, 2.5 mg, Nebulization, Q4H PRN, Major Perez MD    ipratropium-albuterol (DUONEB) nebulizer solution 1 ampule, 1 ampule, Inhalation, Q4H WA, Major Perez MD, 1 ampule at 07/22/19 2044    insulin glargine (LANTUS) injection vial 55 Units, 55 Units, Subcutaneous, Nightly, Violeta Vicente, MATT - CNP, 55 Units at 07/22/19 2123    sodium chloride flush 0.9 % injection 10 mL, 10 mL, Intravenous, 2 times per day, Major Perez MD, 10 mL at 07/21/19 2020    sodium chloride flush 0.9 % injection 10 mL, 10 mL, Intravenous, PRN, Major Perez MD, 10 mL at 07/21/19 0029    cefTRIAXone (ROCEPHIN) 1 g IVPB in 50 mL D5W minibag, 1 g, Intravenous, Q24H, Major Perez MD, Stopped at 07/22/19 2200    furosemide (LASIX) injection 80 mg, 80 mg, Intravenous, BID,
NOT REPORTED 03/28/2019    RBCUA 5 TO 10 03/28/2019    SPECGRAV 1.024 03/28/2019    GLUCOSEU TRACE 03/28/2019       Intake & Output:  No intake/output data recorded. No intake/output data recorded. Radiology:     CXR: I have reviewed the CXR with the following interpretation: Left pleural effusion, Mild Pulmonary edema  EKG:  I have reviewed the EKG with the following interpretation: A? A flutter. Low voltage    XR CHEST PORTABLE   Final Result   Diffuse bilateral airspace opacities greatest in the left lung base correlate for edema or pneumonia. Left lower lobe consolidation, atelectasis or pleural effusion. Correlation and follow-up is advised. **This report has been created using voice recognition software. It may contain minor errors which are inherent in voice recognition technology. **      Final report electronically signed by Dr. Fannie Contreras on 7/18/2019 8:14 PM      IR INTERVENTIONAL RADIOLOGY PROCEDURE REQUEST    (Results Pending)        DVT prophylaxis: On pradaxa. Will hold for thoracentesis in the next two days    Code Status: Prior      PT/OT Eval Status: Ongoing    Disposition:Rehab    Active Hospital Problems    Diagnosis Date Noted    Pain at surgical site [G89.18] 07/18/2019       PLAN:    1. Coronary artery disease- 98% LAD and 80% circumference stenosis  On angiogram done on 7/16  Currently on ASA and lipitor. No acute ACS currently. Repeat troponin- elevated troponin possibly from prior angiogram with   ECG- low voltage and ? A flutter/fib  Will consult CTS to evaluate for possible CABG tomorrow  Pradaxa on hold due to CKD and anticipated procedure. Will consider LMWH if necessary  Consult cardiology to evaluate for possible high risk PCI    2. Congestive heart failure- systolic with EF 75%- on recent echo  Not decompensated currently  Pro BNP about 7788 with baseline of about 1756  GDMT- metoprolol only. No ACEI due to ERNESTO.  Hold aldactone due to Hyperkalemia on admission  Lasix

## 2019-07-23 NOTE — PROGRESS NOTES
Pulmonology will resume care once patient is moved out of ICU. Order placed to drain Left Pleurex daily and record accurate amounts. Plan of care discussed with Dr. Marshall Fernandez.      Electronically signed by Cortez Nyhan, APRN - CNP on 7/23/2019 at 10:55 AM

## 2019-07-24 ENCOUNTER — APPOINTMENT (OUTPATIENT)
Dept: GENERAL RADIOLOGY | Age: 74
DRG: 291 | End: 2019-07-24
Attending: INTERNAL MEDICINE
Payer: MEDICARE

## 2019-07-24 LAB
ANION GAP SERPL CALCULATED.3IONS-SCNC: 12 MEQ/L (ref 8–16)
ANION GAP SERPL CALCULATED.3IONS-SCNC: 14 MEQ/L (ref 8–16)
ANION GAP SERPL CALCULATED.3IONS-SCNC: 16 MEQ/L (ref 8–16)
APTT: 30.1 SECONDS (ref 22–38)
APTT: 33.3 SECONDS (ref 22–38)
APTT: 99.5 SECONDS (ref 22–38)
BASE EXCESS MIXED: 13.5 MMOL/L (ref -2–3)
BASE EXCESS MIXED: 16.8 MMOL/L (ref -2–3)
BASE EXCESS MIXED: 17 MMOL/L (ref -2–3)
BASE EXCESS MIXED: 17.7 MMOL/L (ref -2–3)
BASOPHILS # BLD: 1 %
BASOPHILS ABSOLUTE: 0.2 THOU/MM3 (ref 0–0.1)
BLOOD CULTURE, ROUTINE: NORMAL
BLOOD CULTURE, ROUTINE: NORMAL
BUN BLDV-MCNC: 48 MG/DL (ref 7–22)
BUN BLDV-MCNC: 51 MG/DL (ref 7–22)
BUN BLDV-MCNC: 51 MG/DL (ref 7–22)
CALCIUM IONIZED: 1.02 MMOL/L (ref 1.12–1.32)
CALCIUM SERPL-MCNC: 9 MG/DL (ref 8.5–10.5)
CALCIUM SERPL-MCNC: 9.1 MG/DL (ref 8.5–10.5)
CALCIUM SERPL-MCNC: 9.4 MG/DL (ref 8.5–10.5)
CHLORIDE BLD-SCNC: 85 MEQ/L (ref 98–111)
CHLORIDE BLD-SCNC: 87 MEQ/L (ref 98–111)
CHLORIDE BLD-SCNC: 87 MEQ/L (ref 98–111)
CO2: 36 MEQ/L (ref 23–33)
CO2: 37 MEQ/L (ref 23–33)
CO2: 38 MEQ/L (ref 23–33)
COLLECTED BY:: ABNORMAL
CREAT SERPL-MCNC: 1.7 MG/DL (ref 0.4–1.2)
CREAT SERPL-MCNC: 1.7 MG/DL (ref 0.4–1.2)
CREAT SERPL-MCNC: 1.9 MG/DL (ref 0.4–1.2)
DEVICE: ABNORMAL
EKG ATRIAL RATE: 230 BPM
EKG ATRIAL RATE: 234 BPM
EKG P AXIS: -103 DEGREES
EKG Q-T INTERVAL: 358 MS
EKG Q-T INTERVAL: 368 MS
EKG QRS DURATION: 108 MS
EKG QRS DURATION: 110 MS
EKG QTC CALCULATION (BAZETT): 477 MS
EKG QTC CALCULATION (BAZETT): 513 MS
EKG R AXIS: -18 DEGREES
EKG R AXIS: -7 DEGREES
EKG T AXIS: 148 DEGREES
EKG T AXIS: 81 DEGREES
EKG VENTRICULAR RATE: 107 BPM
EKG VENTRICULAR RATE: 117 BPM
EOSINOPHIL # BLD: 10.1 %
EOSINOPHILS ABSOLUTE: 1.6 THOU/MM3 (ref 0–0.4)
ERYTHROCYTE [DISTWIDTH] IN BLOOD BY AUTOMATED COUNT: 14.6 % (ref 11.5–14.5)
ERYTHROCYTE [DISTWIDTH] IN BLOOD BY AUTOMATED COUNT: 51 FL (ref 35–45)
FIO2, MIXED VENOUS: 40
FIO2, MIXED VENOUS: 75
GFR SERPL CREATININE-BSD FRML MDRD: 35 ML/MIN/1.73M2
GFR SERPL CREATININE-BSD FRML MDRD: 40 ML/MIN/1.73M2
GFR SERPL CREATININE-BSD FRML MDRD: 40 ML/MIN/1.73M2
GLUCOSE BLD-MCNC: 110 MG/DL (ref 70–108)
GLUCOSE BLD-MCNC: 68 MG/DL (ref 70–108)
GLUCOSE BLD-MCNC: 73 MG/DL (ref 70–108)
GLUCOSE, WHOLE BLOOD: 119 MG/DL (ref 70–108)
GLUCOSE, WHOLE BLOOD: 136 MG/DL (ref 70–108)
GLUCOSE, WHOLE BLOOD: 68 MG/DL (ref 70–108)
GLUCOSE, WHOLE BLOOD: 77 MG/DL (ref 70–108)
GLUCOSE, WHOLE BLOOD: 85 MG/DL (ref 70–108)
HCO3, MIXED: 38 MMOL/L (ref 23–28)
HCO3, MIXED: 39 MMOL/L (ref 23–28)
HCO3, MIXED: 41 MMOL/L (ref 23–28)
HCO3, MIXED: 43 MMOL/L (ref 23–28)
HCT VFR BLD CALC: 29.1 % (ref 42–52)
HEMOGLOBIN: 9.4 GM/DL (ref 14–18)
IMMATURE GRANS (ABS): 0.13 THOU/MM3 (ref 0–0.07)
IMMATURE GRANULOCYTES: 0.8 %
LYMPHOCYTES # BLD: 5.2 %
LYMPHOCYTES ABSOLUTE: 0.8 THOU/MM3 (ref 1–4.8)
MAGNESIUM: 2.1 MG/DL (ref 1.6–2.4)
MCH RBC QN AUTO: 31 PG (ref 26–33)
MCHC RBC AUTO-ENTMCNC: 32.3 GM/DL (ref 32.2–35.5)
MCV RBC AUTO: 96 FL (ref 80–94)
MODE: ABNORMAL
MONOCYTES # BLD: 10.7 %
MONOCYTES ABSOLUTE: 1.7 THOU/MM3 (ref 0.4–1.3)
NUCLEATED RED BLOOD CELLS: 0 /100 WBC
O2 SAT, MIXED: 58 %
O2 SAT, MIXED: 62 %
O2 SAT, MIXED: 63 %
O2 SAT, MIXED: 64 %
PCO2, MIXED VENOUS: 32 MMHG (ref 41–51)
PCO2, MIXED VENOUS: 46 MMHG (ref 41–51)
PCO2, MIXED VENOUS: 49 MMHG (ref 41–51)
PCO2, MIXED VENOUS: 60 MMHG (ref 41–51)
PH, MIXED: 7.47 (ref 7.31–7.41)
PH, MIXED: 7.5 (ref 7.31–7.41)
PH, MIXED: 7.56 (ref 7.31–7.41)
PH, MIXED: 7.7 (ref 7.31–7.41)
PIP: 25 CMH2O
PLATELET # BLD: 337 THOU/MM3 (ref 130–400)
PMV BLD AUTO: 8.9 FL (ref 9.4–12.4)
PO2 MIXED: 24 MMHG (ref 25–40)
PO2 MIXED: 29 MMHG (ref 25–40)
PO2 MIXED: 30 MMHG (ref 25–40)
PO2 MIXED: 30 MMHG (ref 25–40)
POTASSIUM REFLEX MAGNESIUM: 3.9 MEQ/L (ref 3.5–5.2)
POTASSIUM SERPL-SCNC: 3.2 MEQ/L (ref 3.5–5.2)
POTASSIUM SERPL-SCNC: 4.5 MEQ/L (ref 3.5–5.2)
QUANTI TB GOLD PLUS: NEGATIVE
QUANTI TB1 MINUS NIL: 0 IU/ML (ref 0–0.34)
QUANTI TB2 MINUS NIL: 0.01 IU/ML (ref 0–0.34)
QUANTIFERON MITOGEN MINUS NIL: 8 IU/ML
QUANTIFERON NIL: 0.01 IU/ML
RBC # BLD: 3.03 MILL/MM3 (ref 4.7–6.1)
SEG NEUTROPHILS: 72.2 %
SEGMENTED NEUTROPHILS ABSOLUTE COUNT: 11.3 THOU/MM3 (ref 1.8–7.7)
SET PEEP: 10 MMHG
SET RESPIRATORY RATE: 12 BPM
SET RESPIRATORY RATE: 22 BPM
SITE: ABNORMAL
SODIUM BLD-SCNC: 137 MEQ/L (ref 135–145)
SODIUM BLD-SCNC: 137 MEQ/L (ref 135–145)
SODIUM BLD-SCNC: 138 MEQ/L (ref 135–145)
WBC # BLD: 15.6 THOU/MM3 (ref 4.8–10.8)

## 2019-07-24 PROCEDURE — 83735 ASSAY OF MAGNESIUM: CPT

## 2019-07-24 PROCEDURE — 85730 THROMBOPLASTIN TIME PARTIAL: CPT

## 2019-07-24 PROCEDURE — 6360000002 HC RX W HCPCS: Performed by: INTERNAL MEDICINE

## 2019-07-24 PROCEDURE — 6370000000 HC RX 637 (ALT 250 FOR IP): Performed by: NURSE PRACTITIONER

## 2019-07-24 PROCEDURE — 82947 ASSAY GLUCOSE BLOOD QUANT: CPT

## 2019-07-24 PROCEDURE — 80048 BASIC METABOLIC PNL TOTAL CA: CPT

## 2019-07-24 PROCEDURE — 6370000000 HC RX 637 (ALT 250 FOR IP): Performed by: INTERNAL MEDICINE

## 2019-07-24 PROCEDURE — 94640 AIRWAY INHALATION TREATMENT: CPT

## 2019-07-24 PROCEDURE — 37799 UNLISTED PX VASCULAR SURGERY: CPT

## 2019-07-24 PROCEDURE — 2500000003 HC RX 250 WO HCPCS: Performed by: INTERNAL MEDICINE

## 2019-07-24 PROCEDURE — 2580000003 HC RX 258: Performed by: NURSE PRACTITIONER

## 2019-07-24 PROCEDURE — 82803 BLOOD GASES ANY COMBINATION: CPT

## 2019-07-24 PROCEDURE — 36415 COLL VENOUS BLD VENIPUNCTURE: CPT

## 2019-07-24 PROCEDURE — 2709999900 HC NON-CHARGEABLE SUPPLY

## 2019-07-24 PROCEDURE — 2500000003 HC RX 250 WO HCPCS: Performed by: NURSE PRACTITIONER

## 2019-07-24 PROCEDURE — 2580000003 HC RX 258: Performed by: INTERNAL MEDICINE

## 2019-07-24 PROCEDURE — 99232 SBSQ HOSP IP/OBS MODERATE 35: CPT | Performed by: NURSE PRACTITIONER

## 2019-07-24 PROCEDURE — C1729 CATH, DRAINAGE: HCPCS

## 2019-07-24 PROCEDURE — 94003 VENT MGMT INPAT SUBQ DAY: CPT

## 2019-07-24 PROCEDURE — 93010 ELECTROCARDIOGRAM REPORT: CPT | Performed by: INTERNAL MEDICINE

## 2019-07-24 PROCEDURE — APPSS180 APP SPLIT SHARED TIME > 60 MINUTES: Performed by: NURSE PRACTITIONER

## 2019-07-24 PROCEDURE — 36620 INSERTION CATHETER ARTERY: CPT | Performed by: INTERNAL MEDICINE

## 2019-07-24 PROCEDURE — 99233 SBSQ HOSP IP/OBS HIGH 50: CPT | Performed by: INTERNAL MEDICINE

## 2019-07-24 PROCEDURE — 6360000002 HC RX W HCPCS: Performed by: NURSE PRACTITIONER

## 2019-07-24 PROCEDURE — 2000000000 HC ICU R&B

## 2019-07-24 PROCEDURE — 94761 N-INVAS EAR/PLS OXIMETRY MLT: CPT

## 2019-07-24 PROCEDURE — 82330 ASSAY OF CALCIUM: CPT

## 2019-07-24 PROCEDURE — C1751 CATH, INF, PER/CENT/MIDLINE: HCPCS

## 2019-07-24 PROCEDURE — 36592 COLLECT BLOOD FROM PICC: CPT

## 2019-07-24 PROCEDURE — 71045 X-RAY EXAM CHEST 1 VIEW: CPT

## 2019-07-24 PROCEDURE — 2500000003 HC RX 250 WO HCPCS

## 2019-07-24 PROCEDURE — 94770 HC ETCO2 MONITOR DAILY: CPT

## 2019-07-24 PROCEDURE — 85025 COMPLETE CBC W/AUTO DIFF WBC: CPT

## 2019-07-24 PROCEDURE — 36620 INSERTION CATHETER ARTERY: CPT

## 2019-07-24 PROCEDURE — 2700000000 HC OXYGEN THERAPY PER DAY

## 2019-07-24 RX ORDER — HEPARIN SODIUM 1000 [USP'U]/ML
2000 INJECTION, SOLUTION INTRAVENOUS; SUBCUTANEOUS PRN
Status: DISCONTINUED | OUTPATIENT
Start: 2019-07-24 | End: 2019-07-24 | Stop reason: DRUGHIGH

## 2019-07-24 RX ORDER — DEXMEDETOMIDINE HYDROCHLORIDE 4 UG/ML
0.2 INJECTION, SOLUTION INTRAVENOUS CONTINUOUS
Status: DISCONTINUED | OUTPATIENT
Start: 2019-07-24 | End: 2019-07-24 | Stop reason: SDUPTHER

## 2019-07-24 RX ORDER — METOLAZONE 5 MG/1
10 TABLET ORAL ONCE
Status: COMPLETED | OUTPATIENT
Start: 2019-07-24 | End: 2019-07-24

## 2019-07-24 RX ORDER — KETAMINE HCL IN NACL, ISO-OSM 100MG/10ML
SYRINGE (ML) INJECTION
Status: COMPLETED
Start: 2019-07-24 | End: 2019-07-24

## 2019-07-24 RX ORDER — HEPARIN SODIUM 1000 [USP'U]/ML
4000 INJECTION, SOLUTION INTRAVENOUS; SUBCUTANEOUS PRN
Status: DISCONTINUED | OUTPATIENT
Start: 2019-07-24 | End: 2019-07-24 | Stop reason: DRUGHIGH

## 2019-07-24 RX ORDER — KETAMINE HYDROCHLORIDE 50 MG/ML
100 INJECTION, SOLUTION, CONCENTRATE INTRAMUSCULAR; INTRAVENOUS ONCE
Status: DISCONTINUED | OUTPATIENT
Start: 2019-07-24 | End: 2019-07-24 | Stop reason: CLARIF

## 2019-07-24 RX ORDER — INSULIN GLARGINE 100 [IU]/ML
27 INJECTION, SOLUTION SUBCUTANEOUS NIGHTLY
Status: DISCONTINUED | OUTPATIENT
Start: 2019-07-25 | End: 2019-07-30 | Stop reason: HOSPADM

## 2019-07-24 RX ORDER — HEPARIN SODIUM 1000 [USP'U]/ML
5000 INJECTION, SOLUTION INTRAVENOUS; SUBCUTANEOUS PRN
Status: DISCONTINUED | OUTPATIENT
Start: 2019-07-24 | End: 2019-07-30 | Stop reason: ALTCHOICE

## 2019-07-24 RX ORDER — HEPARIN SODIUM 1000 [USP'U]/ML
10000 INJECTION, SOLUTION INTRAVENOUS; SUBCUTANEOUS PRN
Status: DISCONTINUED | OUTPATIENT
Start: 2019-07-24 | End: 2019-07-30 | Stop reason: ALTCHOICE

## 2019-07-24 RX ORDER — POTASSIUM CHLORIDE 29.8 MG/ML
20 INJECTION INTRAVENOUS
Status: COMPLETED | OUTPATIENT
Start: 2019-07-24 | End: 2019-07-24

## 2019-07-24 RX ADMIN — POLYETHYLENE GLYCOL 3350 17 G: 17 POWDER, FOR SOLUTION ORAL at 08:47

## 2019-07-24 RX ADMIN — IPRATROPIUM BROMIDE AND ALBUTEROL SULFATE 1 AMPULE: .5; 3 SOLUTION RESPIRATORY (INHALATION) at 08:40

## 2019-07-24 RX ADMIN — ASPIRIN 81 MG 81 MG: 81 TABLET ORAL at 08:47

## 2019-07-24 RX ADMIN — PROPOFOL 50 MCG/KG/MIN: 10 INJECTION, EMULSION INTRAVENOUS at 01:10

## 2019-07-24 RX ADMIN — DOPAMINE HYDROCHLORIDE IN DEXTROSE 2.5 MCG/KG/MIN: 1.6 INJECTION, SOLUTION INTRAVENOUS at 11:03

## 2019-07-24 RX ADMIN — IPRATROPIUM BROMIDE AND ALBUTEROL SULFATE 1 AMPULE: .5; 3 SOLUTION RESPIRATORY (INHALATION) at 16:05

## 2019-07-24 RX ADMIN — SODIUM CHLORIDE 0.2 MCG/KG/HR: 9 INJECTION, SOLUTION INTRAVENOUS at 18:53

## 2019-07-24 RX ADMIN — FAMOTIDINE 20 MG: 20 TABLET ORAL at 08:47

## 2019-07-24 RX ADMIN — SODIUM CHLORIDE 122 MCG: 9 INJECTION, SOLUTION INTRAVENOUS at 19:02

## 2019-07-24 RX ADMIN — FENTANYL CITRATE 125 MCG/HR: 50 INJECTION, SOLUTION INTRAMUSCULAR; INTRAVENOUS at 18:25

## 2019-07-24 RX ADMIN — CEFTRIAXONE SODIUM 1 G: 1 INJECTION, POWDER, FOR SOLUTION INTRAMUSCULAR; INTRAVENOUS at 21:36

## 2019-07-24 RX ADMIN — Medication 1000 MCG: at 08:46

## 2019-07-24 RX ADMIN — POTASSIUM BICARBONATE 40 MEQ: 391 TABLET, EFFERVESCENT ORAL at 08:47

## 2019-07-24 RX ADMIN — Medication 100 MG: at 16:35

## 2019-07-24 RX ADMIN — BUMETANIDE 1 MG/HR: 0.25 INJECTION INTRAMUSCULAR; INTRAVENOUS at 09:00

## 2019-07-24 RX ADMIN — DEXTROSE 50 % IN WATER (D50W) INTRAVENOUS SYRINGE 12.5 G: at 06:20

## 2019-07-24 RX ADMIN — POTASSIUM CHLORIDE 20 MEQ: 29.8 INJECTION, SOLUTION INTRAVENOUS at 14:58

## 2019-07-24 RX ADMIN — AMIODARONE HYDROCHLORIDE 200 MG: 200 TABLET ORAL at 08:46

## 2019-07-24 RX ADMIN — HEPARIN SODIUM 10000 UNITS: 1000 INJECTION, SOLUTION INTRAVENOUS; SUBCUTANEOUS at 10:55

## 2019-07-24 RX ADMIN — Medication 2 MCG/MIN: at 03:50

## 2019-07-24 RX ADMIN — BUMETANIDE 1 MG/HR: 0.25 INJECTION INTRAMUSCULAR; INTRAVENOUS at 21:36

## 2019-07-24 RX ADMIN — FENTANYL CITRATE 125 MCG/HR: 50 INJECTION, SOLUTION INTRAMUSCULAR; INTRAVENOUS at 23:30

## 2019-07-24 RX ADMIN — HEPARIN SODIUM 10.6 UNITS/KG/HR: 10000 INJECTION, SOLUTION INTRAVENOUS at 21:57

## 2019-07-24 RX ADMIN — HEPARIN SODIUM 7.6 UNITS/KG/HR: 10000 INJECTION, SOLUTION INTRAVENOUS at 03:09

## 2019-07-24 RX ADMIN — IPRATROPIUM BROMIDE AND ALBUTEROL SULFATE 1 AMPULE: .5; 3 SOLUTION RESPIRATORY (INHALATION) at 19:49

## 2019-07-24 RX ADMIN — POTASSIUM CHLORIDE 20 MEQ: 29.8 INJECTION, SOLUTION INTRAVENOUS at 13:41

## 2019-07-24 RX ADMIN — DEXTROSE 50 % IN WATER (D50W) INTRAVENOUS SYRINGE 12.5 G: at 22:28

## 2019-07-24 RX ADMIN — IPRATROPIUM BROMIDE AND ALBUTEROL SULFATE 1 AMPULE: .5; 3 SOLUTION RESPIRATORY (INHALATION) at 12:22

## 2019-07-24 RX ADMIN — FENTANYL CITRATE 25 MCG/HR: 50 INJECTION, SOLUTION INTRAMUSCULAR; INTRAVENOUS at 15:50

## 2019-07-24 RX ADMIN — PROPOFOL 50 MCG/KG/MIN: 10 INJECTION, EMULSION INTRAVENOUS at 03:45

## 2019-07-24 RX ADMIN — METOLAZONE 10 MG: 5 TABLET ORAL at 21:41

## 2019-07-24 RX ADMIN — ATORVASTATIN CALCIUM 40 MG: 40 TABLET, FILM COATED ORAL at 08:46

## 2019-07-24 RX ADMIN — PROPOFOL 50 MCG/KG/MIN: 10 INJECTION, EMULSION INTRAVENOUS at 05:58

## 2019-07-24 RX ADMIN — PROPOFOL 30 MCG/KG/MIN: 10 INJECTION, EMULSION INTRAVENOUS at 12:46

## 2019-07-24 RX ADMIN — PROPOFOL 40 MCG/KG/MIN: 10 INJECTION, EMULSION INTRAVENOUS at 08:48

## 2019-07-24 ASSESSMENT — PULMONARY FUNCTION TESTS
PIF_VALUE: 31
PIF_VALUE: 35
PIF_VALUE: 30
PIF_VALUE: 33
PIF_VALUE: 32
PIF_VALUE: 26

## 2019-07-24 ASSESSMENT — PAIN SCALES - GENERAL
PAINLEVEL_OUTOF10: 6
PAINLEVEL_OUTOF10: 0

## 2019-07-24 NOTE — PROCEDURES
Arterial Line:    Indication: Hypotension    Complications:  None    EBL:  < 5 ml    Procedure:  After informed consent was obtained, the right cephalic approach was utilized. Arterial pulsation was identified. Patient subsequently was prepped and draped in sterile fashion utilizing chlorhexidine prep, sterile gown, sterile gloves, mask, hair net, and sterile whole-body drape. Patient received 2 cc of local anesthesia with lidocaine. Introducer needle was advanced subcutaneously until arterial flow was obtained. Utilizing modified Seldinger technique, guidewire was placed through the introducer needle. Introducer needle was withdrawn leaving the guidewire in place. Dilator was placed over the guidewire and removed. Arterial catheter was placed into the lumen. Guidewire removed. Secondary cleansing at the site was obtained with chlorhexidine. Catheter was secured to the skin utilizing 2.0 silk. Electronically signed by Carmine Florez MD.
spouse  Required items: required blood products, implants, devices, and special equipment available  Patient identity confirmed: hospital-assigned identification number  Time out: Immediately prior to procedure a \"time out\" was called to verify the correct patient, procedure, equipment, support staff and site/side marked as required. Indications: respiratory distress and  respiratory failure  Intubation method: video-assisted  Patient status: awake  Preoxygenation: nonrebreather mask  Pretreatment medications: fentanyl  Sedatives: etomidate  Paralytic: rocuronium  Laryngoscope size: Mac 4  Tube size: 8.0 mm  Tube type: cuffed  Number of attempts: 1  Cricoid pressure: yes  Cords visualized: yes  Post-procedure assessment: chest rise and CO2 detector  Breath sounds: equal  Cuff inflated: yes  ETT to lip: 23 cm  ETT to teeth: 22 cm  Tube secured with: ETT stahl  Chest x-ray interpreted by radiologist and me.   Chest x-ray findings: endotracheal tube in appropriate position  Patient tolerance: Patient tolerated the procedure well with no immediate complications          Shawna Jane MD  7/23/2019

## 2019-07-24 NOTE — PROGRESS NOTES
with diprivan lipids), 72 grams protein, 106 grams cho, 779ml free H20/24h  · Additional Calories: diprivan at 22.9ml/hour provides 605 lipid kcals  · Anthropometric Measures:  · Ht: 5' 9\" (175.3 cm)   · Current Body Wt: 269 lb (122 kg)(7/24 with +1-3 edema)  · Admission Body Wt: 288 lb (130.6 kg)(7/19)  · Usual Body Wt: 282 lb (127.9 kg)(4/23/19)  · Weight Change: fluctuations with fluid status   · Ideal Body Wt: 160 lb (72.6 kg),  · BMI Classification: BMI 35.0 - 39.9 Obese Class II(39.9)    Nutrition Interventions:   Continue NPO, Start Tube Feeding  Continued Inpatient Monitoring, Education not appropriate at this time, Coordination of Care    Nutrition Evaluation:   · Evaluation: Goals set   · Goals: TF to provide % nutrient needs while intubated   · Monitoring: Nutrition Progression, TF Intake, TF Tolerance, Skin Integrity, Weight, Pertinent Labs, Monitor Bowel Function      Electronically signed by Abby Fisher, NERISSA, LD on 7/24/19 at 3:03 PM    Contact Number: [phone]

## 2019-07-24 NOTE — PROGRESS NOTES
Critical Care Progress Note:    Assessment and Plan:          1. Acute on Chronic Hypoxic/Hypercapneic Respiratory failure. Intubated 7/24. Protective measure. 2. Acute on Chronic Systolic/Diastolic HF. Cardiorenal. --echo from 7/11/2019 with EF 40% and moderate diastolic dysfx. Initially diuresed with Lasix and metolazine. Started on Bumex drip 7/23 when transferred to ICU. on BB; no ACE-I/ARB 2nd to ERNESTO; strict I/O's and daily weights. [place] inserted at bedside under fluoroscopy 7/23. On Dopamine, did not tolerate titration due to tachycardia. 3. Acute hypotension, likely medication induced. On pressors. Consider Arterial line for accurate BPs.  4. Acute on Chronic Large left pleural effusion--had thora done 6/25/2019 with 950 ml drained; had left thora done 7/19 with chest tube in place and so far 2025 ml drained out. Plurex catheter placed 7/23 in IR. 5. Possible Pneumonia, LLL (POA)--Zithromax/Rocephin 7/18; PCT at 0.32;   6. Severe triple vessel disease involving the RCA, LAD and circumflex per cath 7/18/2019--CTS saw. Discussed with cardiology not a candidate for CABG or high risk PCI. Plans to OMT. ASA/Statin/BB. 7. ERNESTO on CKD Stage 3. Most probable due to IV contrast and diuretics. Nephrology following. Renally dose medications. Avoid nephrotoxic agents. 8. PAFL--[id number] 2. Amio, BB; Holding Pradaxa, started on Heparin gtt per cardiology. Monitor closely with hx of retroperitoneal bleed. 9. Severe COPD with chronic resp failure. On home oxygen and bipap. Duonebs. 10. DM-2, uncontrolled. A1c 10.1%. Lantus, SSI #1. monitor  11. Moderate TVR  12. Moderate pulmonary hypertension with an estimated right ventricular systolic pressure of 52 mmHg  13. Hypokalemia, secondary to diuresis. Replacing. Trend. 14. Macrocytic anemia. Hbg 9.4. 15. Morbid obesity. BMI 42.  16. Hyponatremia, mild. Hypervolemic, resolved. Monitor.      Dispo: case discussed with Dr. Casa Bennett.        CC:  SOB    HPI:    Per

## 2019-07-24 NOTE — PROGRESS NOTES
07/24/19  0614   * 134* 138 137   K 4.4 4.4 3.7 3.2*   CL 83* 83* 87* 87*   CO2 40* 39* 39* 38*   BUN 51* 53* 51* 48*   CREATININE 2.0* 2.0* 1.7* 1.7*   GLUCOSE 145* 78 149* 73   CALCIUM 9.1 9.3 9.1 9.1   MG 2.0 2.3  --  2.1     Hepatic:   No results for input(s): LABALBU, AST, ALT, ALB, BILITOT, ALKPHOS in the last 72 hours. Meds:  Infusion:    norepinephrine 6 mcg/min (07/24/19 1103)    dextrose 5 % and 0.45 % NaCl 100 mL/hr at 07/23/19 4020    propofol 30 mcg/kg/min (07/24/19 1005)    DOPamine 2.5 mcg/kg/min (07/24/19 1103)    bumetanide 0.1 mg/mL infusion 1 mg/hr (07/24/19 0900)    heparin (porcine) 12.6 Units/kg/hr (07/24/19 1056)    dextrose       Meds:    potassium (CARDIAC) replacement protocol   Other RX Placeholder    clindamycin (CLEOCIN) IV  600 mg Intravenous 60 Min Pre-Op    midazolam  1 mg Intravenous Once    potassium bicarb-citric acid  40 mEq Oral Daily    metolazone  10 mg Oral Once    metoprolol succinate  12.5 mg Oral BID    polyethylene glycol  17 g Oral Daily    famotidine  20 mg Oral Daily    ipratropium-albuterol  1 ampule Inhalation Q4H WA    insulin glargine  55 Units Subcutaneous Nightly    sodium chloride flush  10 mL Intravenous 2 times per day    cefTRIAXone (ROCEPHIN) IV  1 g Intravenous Q24H    amiodarone  200 mg Oral Daily    aspirin  81 mg Oral Daily    atorvastatin  40 mg Oral Daily    vitamin B-12  1,000 mcg Oral Daily    insulin lispro  0-6 Units Subcutaneous TID WC    insulin lispro  0-3 Units Subcutaneous Nightly     Meds prn: heparin (porcine), heparin (porcine), ondansetron, phenol, benzonatate, magnesium hydroxide, albuterol, sodium chloride flush, acetaminophen, melatonin, glucose, dextrose, glucagon (rDNA), dextrose       Impression and Plan:  1.  ERNESTO on CKd III:  due to cardiorenal syndrome but cannot rule out mild ATN from recent hypotension and also contrast nephropathy   - net 12 negative fluid balance, continue with current

## 2019-07-24 NOTE — PROGRESS NOTES
Cardiology Progress Note      Patient:  Urmila Lerner  YOB: 1945  MRN: [id number]   Acct: [de-identified]  [facility] Date:  7/18/2019  Primary Cardiologist: Dr. Pricilla Ward  Seen by Dr. Heaven Alejandro    Per prior cardiology consult note-  REASON FOR CONSULTATION:  Shortness of breath, multivessel coronary  artery disease.     REQUESTING PROVIDER:  Hospitalist Services     HISTORY OF PRESENT ILLNESS:  This is a pleasant unfortunate 59-year-old  gentleman who is not necessarily the best historian, who was relatively  drowsy on a BiPAP and not answering all the questions, seeming to be a  little bit in a drowsy or confused state. Upon reviewing the chart and  talking to the staff, it looks like the patient was transferred from Dr. Ameena Iglesias after cardiac catheterization for a bypass surgery. The patient  was initially in there for elective cardiac cath due to symptoms of  shortness of breath, underwent cardiac catheterization which showed  significant LAD and left circ disease for which he was referred for  cardiac bypass surgery versus multivessel stenting. The patient has had  chronic dyspnea, does have history of multiple risk factors for coronary  artery disease including morbid obesity, diabetes mellitus for many  years, hypertension, hyperlipidemia. The patient is not aware of any  previous cardiac history that he could give. The patient does have  pulmonary hypertension and cardiomyopathy, does have renal insufficiency  as well. His workup revealed the chest x-ray with pleural effusion. He  is on diuretics and we were consulted to assist in management.       Subjective (Events in last 24 hours):     Pt in chair   He has no specific c/o     He is SOB with talking   Hypotensive (?normal for pt) - asymptomatic     Tele [employer] CVR      7/21/2019  In chair sleeping   Diuresing well     No chest pains or dizziness noted    Tele - AFL       7/22/19  Stable creat - renal dosing diuretics BID w/ added xaroxolyn  Pt sitting up in metolazone  10 mg Oral Once    metoprolol succinate  12.5 mg Oral BID    polyethylene glycol  17 g Oral Daily    famotidine  20 mg Oral Daily    ipratropium-albuterol  1 ampule Inhalation Q4H WA    insulin glargine  55 Units Subcutaneous Nightly    sodium chloride flush  10 mL Intravenous 2 times per day    cefTRIAXone (ROCEPHIN) IV  1 g Intravenous Q24H    amiodarone  200 mg Oral Daily    aspirin  81 mg Oral Daily    atorvastatin  40 mg Oral Daily    vitamin B-12  1,000 mcg Oral Daily    insulin lispro  0-6 Units Subcutaneous TID     insulin lispro  0-3 Units Subcutaneous Nightly      norepinephrine 4 mcg/min (19)    dextrose 5 % and 0.45 % NaCl 100 mL/hr at 19    propofol 50 mcg/kg/min (19)    DOPamine 2.5 mcg/kg/min (19)    bumetanide 0.1 mg/mL infusion 1 mg/hr (19)    heparin (porcine) 7.6 Units/kg/hr (19)    dextrose         ondansetron 4 mg Q6H PRN   phenol 1 spray Q2H PRN   benzonatate 200 mg TID PRN   magnesium hydroxide 30 mL Daily PRN   albuterol 2.5 mg Q4H PRN   sodium chloride flush 10 mL PRN   acetaminophen 650 mg Q6H PRN   melatonin 3 mg Nightly PRN   glucose 15 g PRN   dextrose 12.5 g PRN   glucagon (rDNA) 1 mg PRN   dextrose 100 mL/hr PRN       Diagnostics:  EK2019 23:58:01 [facility] ROUTINE RETRIEVAL  Atrial flutter with 2:1 A-V conduction  Low voltage QRS, consider pulmonary disease, pericardial effusion, or normal variant  Cannot rule out Anterior infarct (cited on or before 2019)  Abnormal ECG  When compared with ECG of 2019 17:34,  Questionable change in initial forces of Anterior leads  Confirmed by [id number] on 2019 5:02:28 AM      Echo:    Electronically signed by Leopoldo Alfredo(Interpreting physician) on 2019   06:40 PM  ----------------------------------------------------------------------------  FINDINGS  Left Atrium  Left atrium is normal in

## 2019-07-24 NOTE — PROGRESS NOTES
1630: Dr. Anaya Lynch preparing to insert arterial line. Consent previously obtained from wife, Caroline Ramos via telephone. 1635: Dr. nAaya Lynch administered 100mg ketamine at this time. 1703: Arterial line placement successful. Patient tolerated well. R arm splinted.

## 2019-07-24 NOTE — FLOWSHEET NOTE
[facility]  PHYSICAL THERAPY MISSED TREATMENT NOTE  ACUTE CARE  [facility] ICU 4D              Missed Treatment  Pt transferred to ICU from  on  7-23-. Now on vent, hold 7-24 check 7-25. Not early mobility candidate today due to not being stable.

## 2019-07-25 ENCOUNTER — CARE COORDINATION (OUTPATIENT)
Dept: CARE COORDINATION | Age: 74
End: 2019-07-25

## 2019-07-25 LAB
ALLEN TEST: ABNORMAL
ANION GAP SERPL CALCULATED.3IONS-SCNC: 14 MEQ/L (ref 8–16)
ANION GAP SERPL CALCULATED.3IONS-SCNC: 16 MEQ/L (ref 8–16)
APTT: 50.3 SECONDS (ref 22–38)
APTT: 58.4 SECONDS (ref 22–38)
APTT: 59.6 SECONDS (ref 22–38)
BASE EXCESS (CALCULATED): 16.3 MMOL/L (ref -2.5–2.5)
BASE EXCESS MIXED: 13.9 MMOL/L (ref -2–3)
BASE EXCESS MIXED: 16.4 MMOL/L (ref -2–3)
BASE EXCESS MIXED: 18 MMOL/L (ref -2–3)
BASOPHILS # BLD: 1.1 %
BASOPHILS ABSOLUTE: 0.2 THOU/MM3 (ref 0–0.1)
BUN BLDV-MCNC: 53 MG/DL (ref 7–22)
BUN BLDV-MCNC: 54 MG/DL (ref 7–22)
CALCIUM SERPL-MCNC: 9.3 MG/DL (ref 8.5–10.5)
CALCIUM SERPL-MCNC: 9.4 MG/DL (ref 8.5–10.5)
CHLORIDE BLD-SCNC: 85 MEQ/L (ref 98–111)
CHLORIDE BLD-SCNC: 87 MEQ/L (ref 98–111)
CO2: 34 MEQ/L (ref 23–33)
CO2: 36 MEQ/L (ref 23–33)
COLLECTED BY:: ABNORMAL
CREAT SERPL-MCNC: 1.8 MG/DL (ref 0.4–1.2)
CREAT SERPL-MCNC: 1.9 MG/DL (ref 0.4–1.2)
DEVICE: ABNORMAL
DEVICE: ABNORMAL
EOSINOPHIL # BLD: 13.5 %
EOSINOPHILS ABSOLUTE: 2 THOU/MM3 (ref 0–0.4)
ERYTHROCYTE [DISTWIDTH] IN BLOOD BY AUTOMATED COUNT: 14.7 % (ref 11.5–14.5)
ERYTHROCYTE [DISTWIDTH] IN BLOOD BY AUTOMATED COUNT: 53.1 FL (ref 35–45)
FIO2, MIXED VENOUS: 4
GFR SERPL CREATININE-BSD FRML MDRD: 35 ML/MIN/1.73M2
GFR SERPL CREATININE-BSD FRML MDRD: 37 ML/MIN/1.73M2
GLUCOSE BLD-MCNC: 155 MG/DL (ref 70–108)
GLUCOSE BLD-MCNC: 188 MG/DL (ref 70–108)
GLUCOSE BLD-MCNC: 191 MG/DL (ref 70–108)
GLUCOSE BLD-MCNC: 228 MG/DL (ref 70–108)
GLUCOSE, WHOLE BLOOD: 153 MG/DL (ref 70–108)
GLUCOSE, WHOLE BLOOD: 169 MG/DL (ref 70–108)
HCO3, MIXED: 40 MMOL/L (ref 23–28)
HCO3, MIXED: 41 MMOL/L (ref 23–28)
HCO3, MIXED: 42 MMOL/L (ref 23–28)
HCO3: 37 MMOL/L (ref 23–28)
HCT VFR BLD CALC: 27.6 % (ref 42–52)
HEMOGLOBIN: 8.7 GM/DL (ref 14–18)
IFIO2: 40
IMMATURE GRANS (ABS): 0.16 THOU/MM3 (ref 0–0.07)
IMMATURE GRANULOCYTES: 1.1 %
LYMPHOCYTES # BLD: 10.9 %
LYMPHOCYTES ABSOLUTE: 1.6 THOU/MM3 (ref 1–4.8)
MCH RBC QN AUTO: 31 PG (ref 26–33)
MCHC RBC AUTO-ENTMCNC: 31.5 GM/DL (ref 32.2–35.5)
MCV RBC AUTO: 98.2 FL (ref 80–94)
MODE: ABNORMAL
MONOCYTES # BLD: 13.2 %
MONOCYTES ABSOLUTE: 2 THOU/MM3 (ref 0.4–1.3)
NUCLEATED RED BLOOD CELLS: 0 /100 WBC
O2 SAT, MIXED: 62 %
O2 SAT, MIXED: 64 %
O2 SAT, MIXED: 64 %
O2 SATURATION: 98 %
PCO2, MIXED VENOUS: 39 MMHG (ref 41–51)
PCO2, MIXED VENOUS: 57 MMHG (ref 41–51)
PCO2, MIXED VENOUS: 58 MMHG (ref 41–51)
PCO2: 31 MMHG (ref 35–45)
PH BLOOD GAS: 7.69 (ref 7.35–7.45)
PH, MIXED: 7.45 (ref 7.31–7.41)
PH, MIXED: 7.47 (ref 7.31–7.41)
PH, MIXED: 7.63 (ref 7.31–7.41)
PIP: 14 CMH2O
PLATELET # BLD: 291 THOU/MM3 (ref 130–400)
PMV BLD AUTO: 8.6 FL (ref 9.4–12.4)
PO2 MIXED: 26 MMHG (ref 25–40)
PO2 MIXED: 32 MMHG (ref 25–40)
PO2 MIXED: 33 MMHG (ref 25–40)
PO2: 78 MMHG (ref 71–104)
POTASSIUM REFLEX MAGNESIUM: 4 MEQ/L (ref 3.5–5.2)
POTASSIUM SERPL-SCNC: 4.1 MEQ/L (ref 3.5–5.2)
RBC # BLD: 2.81 MILL/MM3 (ref 4.7–6.1)
SCAN OF BLOOD SMEAR: NORMAL
SEG NEUTROPHILS: 60.2 %
SEGMENTED NEUTROPHILS ABSOLUTE COUNT: 9.1 THOU/MM3 (ref 1.8–7.7)
SET PEEP: 6 MMHG
SET PRESS SUPP: 8 CMH2O
SITE: ABNORMAL
SITE: ABNORMAL
SODIUM BLD-SCNC: 135 MEQ/L (ref 135–145)
SODIUM BLD-SCNC: 137 MEQ/L (ref 135–145)
SOURCE, BLOOD GAS: ABNORMAL
WBC # BLD: 15.1 THOU/MM3 (ref 4.8–10.8)

## 2019-07-25 PROCEDURE — 6370000000 HC RX 637 (ALT 250 FOR IP): Performed by: INTERNAL MEDICINE

## 2019-07-25 PROCEDURE — 94761 N-INVAS EAR/PLS OXIMETRY MLT: CPT

## 2019-07-25 PROCEDURE — 99232 SBSQ HOSP IP/OBS MODERATE 35: CPT | Performed by: PHYSICIAN ASSISTANT

## 2019-07-25 PROCEDURE — P9047 ALBUMIN (HUMAN), 25%, 50ML: HCPCS | Performed by: NURSE PRACTITIONER

## 2019-07-25 PROCEDURE — 2580000003 HC RX 258: Performed by: INTERNAL MEDICINE

## 2019-07-25 PROCEDURE — 6360000002 HC RX W HCPCS: Performed by: NURSE PRACTITIONER

## 2019-07-25 PROCEDURE — 2709999900 HC NON-CHARGEABLE SUPPLY

## 2019-07-25 PROCEDURE — 99233 SBSQ HOSP IP/OBS HIGH 50: CPT | Performed by: INTERNAL MEDICINE

## 2019-07-25 PROCEDURE — 6370000000 HC RX 637 (ALT 250 FOR IP): Performed by: NURSE PRACTITIONER

## 2019-07-25 PROCEDURE — 6360000002 HC RX W HCPCS: Performed by: INTERNAL MEDICINE

## 2019-07-25 PROCEDURE — 36415 COLL VENOUS BLD VENIPUNCTURE: CPT

## 2019-07-25 PROCEDURE — 82948 REAGENT STRIP/BLOOD GLUCOSE: CPT

## 2019-07-25 PROCEDURE — 94640 AIRWAY INHALATION TREATMENT: CPT

## 2019-07-25 PROCEDURE — 2500000003 HC RX 250 WO HCPCS: Performed by: INTERNAL MEDICINE

## 2019-07-25 PROCEDURE — APPSS180 APP SPLIT SHARED TIME > 60 MINUTES: Performed by: NURSE PRACTITIONER

## 2019-07-25 PROCEDURE — 37799 UNLISTED PX VASCULAR SURGERY: CPT

## 2019-07-25 PROCEDURE — 2700000000 HC OXYGEN THERAPY PER DAY

## 2019-07-25 PROCEDURE — 85025 COMPLETE CBC W/AUTO DIFF WBC: CPT

## 2019-07-25 PROCEDURE — 94003 VENT MGMT INPAT SUBQ DAY: CPT

## 2019-07-25 PROCEDURE — C1729 CATH, DRAINAGE: HCPCS

## 2019-07-25 PROCEDURE — 2000000000 HC ICU R&B

## 2019-07-25 PROCEDURE — 85730 THROMBOPLASTIN TIME PARTIAL: CPT

## 2019-07-25 PROCEDURE — 80048 BASIC METABOLIC PNL TOTAL CA: CPT

## 2019-07-25 PROCEDURE — 94770 HC ETCO2 MONITOR DAILY: CPT

## 2019-07-25 PROCEDURE — 82947 ASSAY GLUCOSE BLOOD QUANT: CPT

## 2019-07-25 PROCEDURE — 82803 BLOOD GASES ANY COMBINATION: CPT

## 2019-07-25 RX ORDER — MIDODRINE HYDROCHLORIDE 10 MG/1
10 TABLET ORAL
Status: DISCONTINUED | OUTPATIENT
Start: 2019-07-26 | End: 2019-07-29

## 2019-07-25 RX ORDER — MIDODRINE HYDROCHLORIDE 10 MG/1
10 TABLET ORAL ONCE
Status: COMPLETED | OUTPATIENT
Start: 2019-07-25 | End: 2019-07-25

## 2019-07-25 RX ORDER — ALBUMIN (HUMAN) 12.5 G/50ML
25 SOLUTION INTRAVENOUS ONCE
Status: COMPLETED | OUTPATIENT
Start: 2019-07-25 | End: 2019-07-25

## 2019-07-25 RX ADMIN — POLYETHYLENE GLYCOL 3350 17 G: 17 POWDER, FOR SOLUTION ORAL at 08:16

## 2019-07-25 RX ADMIN — ACETAZOLAMIDE 250 MG: 500 INJECTION, POWDER, LYOPHILIZED, FOR SOLUTION INTRAVENOUS at 18:15

## 2019-07-25 RX ADMIN — Medication 1000 MCG: at 08:15

## 2019-07-25 RX ADMIN — FENTANYL CITRATE 125 MCG/HR: 50 INJECTION, SOLUTION INTRAMUSCULAR; INTRAVENOUS at 04:01

## 2019-07-25 RX ADMIN — SODIUM CHLORIDE 0.5 MCG/KG/HR: 9 INJECTION, SOLUTION INTRAVENOUS at 04:22

## 2019-07-25 RX ADMIN — AMIODARONE HYDROCHLORIDE 200 MG: 200 TABLET ORAL at 08:15

## 2019-07-25 RX ADMIN — ATORVASTATIN CALCIUM 40 MG: 40 TABLET, FILM COATED ORAL at 08:16

## 2019-07-25 RX ADMIN — GLYCOPYRROLATE AND FORMOTEROL FUMARATE 2 PUFF: 9; 4.8 AEROSOL, METERED RESPIRATORY (INHALATION) at 12:24

## 2019-07-25 RX ADMIN — MIDODRINE HYDROCHLORIDE 10 MG: 10 TABLET ORAL at 22:53

## 2019-07-25 RX ADMIN — FAMOTIDINE 20 MG: 20 TABLET ORAL at 08:16

## 2019-07-25 RX ADMIN — INSULIN LISPRO 1 UNITS: 100 INJECTION, SOLUTION INTRAVENOUS; SUBCUTANEOUS at 06:14

## 2019-07-25 RX ADMIN — SODIUM CHLORIDE 0.2 MCG/KG/HR: 9 INJECTION, SOLUTION INTRAVENOUS at 14:24

## 2019-07-25 RX ADMIN — GLYCOPYRROLATE AND FORMOTEROL FUMARATE 2 PUFF: 9; 4.8 AEROSOL, METERED RESPIRATORY (INHALATION) at 19:55

## 2019-07-25 RX ADMIN — CEFTRIAXONE SODIUM 1 G: 1 INJECTION, POWDER, FOR SOLUTION INTRAMUSCULAR; INTRAVENOUS at 21:43

## 2019-07-25 RX ADMIN — IPRATROPIUM BROMIDE AND ALBUTEROL SULFATE 1 AMPULE: .5; 3 SOLUTION RESPIRATORY (INHALATION) at 08:10

## 2019-07-25 RX ADMIN — INSULIN LISPRO 1 UNITS: 100 INJECTION, SOLUTION INTRAVENOUS; SUBCUTANEOUS at 12:43

## 2019-07-25 RX ADMIN — Medication 10 ML: at 21:47

## 2019-07-25 RX ADMIN — ALBUMIN (HUMAN) 25 G: 0.25 INJECTION, SOLUTION INTRAVENOUS at 22:08

## 2019-07-25 RX ADMIN — ASPIRIN 81 MG 81 MG: 81 TABLET ORAL at 08:16

## 2019-07-25 RX ADMIN — Medication 10 ML: at 08:16

## 2019-07-25 RX ADMIN — POTASSIUM BICARBONATE 40 MEQ: 391 TABLET, EFFERVESCENT ORAL at 08:16

## 2019-07-25 RX ADMIN — HEPARIN SODIUM 16.6 UNITS/KG/HR: 10000 INJECTION, SOLUTION INTRAVENOUS at 20:14

## 2019-07-25 RX ADMIN — POTASSIUM BICARBONATE 20 MEQ: 391 TABLET, EFFERVESCENT ORAL at 00:30

## 2019-07-25 RX ADMIN — INSULIN GLARGINE 27 UNITS: 100 INJECTION, SOLUTION SUBCUTANEOUS at 21:53

## 2019-07-25 ASSESSMENT — PULMONARY FUNCTION TESTS
PIF_VALUE: 23
PIF_VALUE: 19
PIF_VALUE: 23

## 2019-07-25 ASSESSMENT — PAIN SCALES - GENERAL: PAINLEVEL_OUTOF10: 0

## 2019-07-25 NOTE — FLOWSHEET NOTE
Pt got out of sedation and had mask on. He was not talking verbally but by signs. I encouraged him in his challenges and he wanted prayer to heal. I prayed asking God to heal him.      07/25/19 1512   Encounter Summary   Services provided to: Patient   Referral/Consult From: Nat   Continue Visiting Yes  (7/25)   Complexity of Encounter Low   Length of Encounter 15 minutes   Routine   Type Follow up   Assessment Approachable;Calm   Intervention Prayer;Nurtured hope;Erin   Outcome Acceptance;Expressed gratitude; Hopeful;Receptive

## 2019-07-25 NOTE — PROGRESS NOTES
Not on file    Years of education: Not on file    Highest education level: Not on file   Occupational History    Not on file   Social Needs    Financial resource strain: Not on file    Food insecurity:     Worry: Not on file     Inability: Not on file    Transportation needs:     Medical: Not on file     Non-medical: Not on file   Tobacco Use    Smoking status: Former Smoker     Packs/day: 1.50     Years: 30.00     Pack years: 45.00     Types: Cigarettes     Last attempt to quit: 1996     Years since quittin.1    Smokeless tobacco: Never Used   Substance and Sexual Activity    Alcohol use: No    Drug use: No    Sexual activity: Not on file   Lifestyle    Physical activity:     Days per week: Not on file     Minutes per session: Not on file    Stress: Not on file   Relationships    Social connections:     Talks on phone: Not on file     Gets together: Not on file     Attends [employer] service: Not on file     Active member of club or organization: Not on file     Attends meetings of clubs or organizations: Not on file     Relationship status: Not on file    Intimate partner violence:     Fear of current or ex partner: Not on file     Emotionally abused: Not on file     Physically abused: Not on file     Forced sexual activity: Not on file   Other Topics Concern    Not on file   Social History Narrative    Not on file     Allergies: NKDA    Medications:     norepinephrine 2 mcg/min (19 0530)    dexmedetomidine 0.3 mcg/kg/hr (19 1017)    dextrose 5 % and 0.45 % NaCl 100 mL/hr at 19 0702    DOPamine 2.5 mcg/kg/min (19 1103)    bumetanide 0.1 mg/mL infusion 1 mg/hr (19 2136)    heparin (porcine) 12.6 Units/kg/hr (19 0140)    dextrose        glycopyrrolate-formoterol  2 puff Inhalation BID    potassium (CARDIAC) replacement protocol   Other RX Placeholder    insulin lispro  0-6 Units Subcutaneous 4 times per day    insulin glargine  27 Units

## 2019-07-25 NOTE — PROGRESS NOTES
07/23/19  0357  07/24/19  5369  07/24/19  2121 07/25/19  0615 07/25/19  0800   *   < > 137   < > 138 137 135   K 4.4   < > 3.2*   < > 3.9 4.1 4.0   CL 83*   < > 87*   < > 87* 87* 85*   CO2 39*   < > 38*   < > 37* 36* 34*   BUN 53*   < > 48*   < > 51* 54* 53*   CREATININE 2.0*   < > 1.7*   < > 1.9* 1.9* 1.8*   GLUCOSE 78   < > 73   < > 68* 155* 188*   CALCIUM 9.3   < > 9.1   < > 9.0 9.3 9.4   MG 2.3  --  2.1  --   --   --   --     < > = values in this interval not displayed. Hepatic:   No results for input(s): LABALBU, AST, ALT, ALB, BILITOT, ALKPHOS in the last 72 hours. Meds:  Infusion:    norepinephrine 1 mcg/min (07/25/19 1439)    dexmedetomidine 0.2 mcg/kg/hr (07/25/19 1424)    dextrose 5 % and 0.45 % NaCl 100 mL/hr at 07/23/19 0702    DOPamine 2.5 mcg/kg/min (07/24/19 1103)    bumetanide 0.1 mg/mL infusion 1 mg/hr (07/24/19 2136)    heparin (porcine) 14.6 Units/kg/hr (07/25/19 1145)    dextrose       Meds:    glycopyrrolate-formoterol  2 puff Inhalation BID    potassium (CARDIAC) replacement protocol   Other RX Placeholder    insulin lispro  0-6 Units Subcutaneous 4 times per day    insulin glargine  27 Units Subcutaneous Nightly    clindamycin (CLEOCIN) IV  600 mg Intravenous 60 Min Pre-Op    potassium bicarb-citric acid  40 mEq Oral Daily    metoprolol succinate  12.5 mg Oral BID    polyethylene glycol  17 g Oral Daily    famotidine  20 mg Oral Daily    sodium chloride flush  10 mL Intravenous 2 times per day    cefTRIAXone (ROCEPHIN) IV  1 g Intravenous Q24H    amiodarone  200 mg Oral Daily    aspirin  81 mg Oral Daily    atorvastatin  40 mg Oral Daily    vitamin B-12  1,000 mcg Oral Daily     Meds prn: heparin (porcine), heparin (porcine), ondansetron, phenol, benzonatate, magnesium hydroxide, albuterol, sodium chloride flush, acetaminophen, melatonin, glucose, dextrose, glucagon (rDNA), dextrose       Impression and Plan:  1.  ERNESTO on CKd III:  due to cardiorenal syndrome

## 2019-07-26 LAB
ALLEN TEST: ABNORMAL
ANION GAP SERPL CALCULATED.3IONS-SCNC: 15 MEQ/L (ref 8–16)
ANION GAP SERPL CALCULATED.3IONS-SCNC: 15 MEQ/L (ref 8–16)
APTT: 85.8 SECONDS (ref 22–38)
APTT: 93.4 SECONDS (ref 22–38)
APTT: 94.6 SECONDS (ref 22–38)
BASE EXCESS (CALCULATED): 12.3 MMOL/L (ref -2.5–2.5)
BUN BLDV-MCNC: 56 MG/DL (ref 7–22)
BUN BLDV-MCNC: 67 MG/DL (ref 7–22)
CALCIUM SERPL-MCNC: 9.5 MG/DL (ref 8.5–10.5)
CALCIUM SERPL-MCNC: 9.5 MG/DL (ref 8.5–10.5)
CHLORIDE BLD-SCNC: 85 MEQ/L (ref 98–111)
CHLORIDE BLD-SCNC: 86 MEQ/L (ref 98–111)
CO2: 34 MEQ/L (ref 23–33)
CO2: 35 MEQ/L (ref 23–33)
COLLECTED BY:: ABNORMAL
CREAT SERPL-MCNC: 2.2 MG/DL (ref 0.4–1.2)
CREAT SERPL-MCNC: 2.5 MG/DL (ref 0.4–1.2)
GFR SERPL CREATININE-BSD FRML MDRD: 25 ML/MIN/1.73M2
GFR SERPL CREATININE-BSD FRML MDRD: 30 ML/MIN/1.73M2
GLUCOSE BLD-MCNC: 187 MG/DL (ref 70–108)
GLUCOSE BLD-MCNC: 202 MG/DL (ref 70–108)
GLUCOSE BLD-MCNC: 237 MG/DL (ref 70–108)
GLUCOSE BLD-MCNC: 244 MG/DL (ref 70–108)
GLUCOSE BLD-MCNC: 251 MG/DL (ref 70–108)
GLUCOSE, WHOLE BLOOD: 184 MG/DL (ref 70–108)
GLUCOSE, WHOLE BLOOD: 222 MG/DL (ref 70–108)
GLUCOSE, WHOLE BLOOD: 267 MG/DL (ref 70–108)
HCO3: 34 MMOL/L (ref 23–28)
O2 SATURATION: 98 %
PCO2: 32 MMHG (ref 35–45)
PH BLOOD GAS: 7.64 (ref 7.35–7.45)
PO2: 84 MMHG (ref 71–104)
POTASSIUM REFLEX MAGNESIUM: 4.2 MEQ/L (ref 3.5–5.2)
POTASSIUM REFLEX MAGNESIUM: 4.2 MEQ/L (ref 3.5–5.2)
PRO-BNP: 7921 PG/ML (ref 0–900)
SODIUM BLD-SCNC: 135 MEQ/L (ref 135–145)
SODIUM BLD-SCNC: 135 MEQ/L (ref 135–145)
SOURCE, BLOOD GAS: ABNORMAL

## 2019-07-26 PROCEDURE — 2580000003 HC RX 258: Performed by: INTERNAL MEDICINE

## 2019-07-26 PROCEDURE — 97164 PT RE-EVAL EST PLAN CARE: CPT

## 2019-07-26 PROCEDURE — APPSS180 APP SPLIT SHARED TIME > 60 MINUTES: Performed by: NURSE PRACTITIONER

## 2019-07-26 PROCEDURE — 6370000000 HC RX 637 (ALT 250 FOR IP): Performed by: NURSE PRACTITIONER

## 2019-07-26 PROCEDURE — 2709999900 HC NON-CHARGEABLE SUPPLY

## 2019-07-26 PROCEDURE — 37799 UNLISTED PX VASCULAR SURGERY: CPT

## 2019-07-26 PROCEDURE — 94761 N-INVAS EAR/PLS OXIMETRY MLT: CPT

## 2019-07-26 PROCEDURE — 82947 ASSAY GLUCOSE BLOOD QUANT: CPT

## 2019-07-26 PROCEDURE — 2700000000 HC OXYGEN THERAPY PER DAY

## 2019-07-26 PROCEDURE — 6370000000 HC RX 637 (ALT 250 FOR IP): Performed by: INTERNAL MEDICINE

## 2019-07-26 PROCEDURE — 97530 THERAPEUTIC ACTIVITIES: CPT

## 2019-07-26 PROCEDURE — 80048 BASIC METABOLIC PNL TOTAL CA: CPT

## 2019-07-26 PROCEDURE — 99232 SBSQ HOSP IP/OBS MODERATE 35: CPT | Performed by: INTERNAL MEDICINE

## 2019-07-26 PROCEDURE — 85730 THROMBOPLASTIN TIME PARTIAL: CPT

## 2019-07-26 PROCEDURE — 6360000002 HC RX W HCPCS: Performed by: NURSE PRACTITIONER

## 2019-07-26 PROCEDURE — 6360000002 HC RX W HCPCS: Performed by: INTERNAL MEDICINE

## 2019-07-26 PROCEDURE — 97110 THERAPEUTIC EXERCISES: CPT

## 2019-07-26 PROCEDURE — 99232 SBSQ HOSP IP/OBS MODERATE 35: CPT | Performed by: PHYSICIAN ASSISTANT

## 2019-07-26 PROCEDURE — 2500000003 HC RX 250 WO HCPCS: Performed by: INTERNAL MEDICINE

## 2019-07-26 PROCEDURE — 94640 AIRWAY INHALATION TREATMENT: CPT

## 2019-07-26 PROCEDURE — 82803 BLOOD GASES ANY COMBINATION: CPT

## 2019-07-26 PROCEDURE — 83880 ASSAY OF NATRIURETIC PEPTIDE: CPT

## 2019-07-26 PROCEDURE — 2000000000 HC ICU R&B

## 2019-07-26 PROCEDURE — 82948 REAGENT STRIP/BLOOD GLUCOSE: CPT

## 2019-07-26 PROCEDURE — 36415 COLL VENOUS BLD VENIPUNCTURE: CPT

## 2019-07-26 RX ORDER — BUMETANIDE 1 MG/1
2 TABLET ORAL 2 TIMES DAILY
Status: DISCONTINUED | OUTPATIENT
Start: 2019-07-27 | End: 2019-07-28

## 2019-07-26 RX ORDER — BUMETANIDE 1 MG/1
2 TABLET ORAL 2 TIMES DAILY
Status: DISCONTINUED | OUTPATIENT
Start: 2019-07-26 | End: 2019-07-26

## 2019-07-26 RX ADMIN — INSULIN LISPRO 1 UNITS: 100 INJECTION, SOLUTION INTRAVENOUS; SUBCUTANEOUS at 05:53

## 2019-07-26 RX ADMIN — ACETAZOLAMIDE 250 MG: 500 INJECTION, POWDER, LYOPHILIZED, FOR SOLUTION INTRAVENOUS at 08:47

## 2019-07-26 RX ADMIN — POTASSIUM BICARBONATE 40 MEQ: 391 TABLET, EFFERVESCENT ORAL at 08:48

## 2019-07-26 RX ADMIN — Medication 10 ML: at 21:37

## 2019-07-26 RX ADMIN — MIDODRINE HYDROCHLORIDE 10 MG: 10 TABLET ORAL at 12:55

## 2019-07-26 RX ADMIN — MIDODRINE HYDROCHLORIDE 10 MG: 10 TABLET ORAL at 17:17

## 2019-07-26 RX ADMIN — AMIODARONE HYDROCHLORIDE 200 MG: 200 TABLET ORAL at 08:47

## 2019-07-26 RX ADMIN — INSULIN LISPRO 6 UNITS: 100 INJECTION, SOLUTION INTRAVENOUS; SUBCUTANEOUS at 18:37

## 2019-07-26 RX ADMIN — INSULIN GLARGINE 27 UNITS: 100 INJECTION, SOLUTION SUBCUTANEOUS at 21:41

## 2019-07-26 RX ADMIN — HEPARIN SODIUM 14.58 UNITS/KG/HR: 10000 INJECTION, SOLUTION INTRAVENOUS at 18:27

## 2019-07-26 RX ADMIN — ACETAZOLAMIDE 250 MG: 500 INJECTION, POWDER, LYOPHILIZED, FOR SOLUTION INTRAVENOUS at 21:23

## 2019-07-26 RX ADMIN — FAMOTIDINE 20 MG: 20 TABLET ORAL at 08:46

## 2019-07-26 RX ADMIN — INSULIN LISPRO 3 UNITS: 100 INJECTION, SOLUTION INTRAVENOUS; SUBCUTANEOUS at 21:40

## 2019-07-26 RX ADMIN — POLYETHYLENE GLYCOL 3350 17 G: 17 POWDER, FOR SOLUTION ORAL at 08:47

## 2019-07-26 RX ADMIN — MIDODRINE HYDROCHLORIDE 10 MG: 10 TABLET ORAL at 08:47

## 2019-07-26 RX ADMIN — GLYCOPYRROLATE AND FORMOTEROL FUMARATE 2 PUFF: 9; 4.8 AEROSOL, METERED RESPIRATORY (INHALATION) at 10:28

## 2019-07-26 RX ADMIN — ATORVASTATIN CALCIUM 40 MG: 40 TABLET, FILM COATED ORAL at 08:47

## 2019-07-26 RX ADMIN — HEPARIN SODIUM 16.6 UNITS/KG/HR: 10000 INJECTION, SOLUTION INTRAVENOUS at 03:24

## 2019-07-26 RX ADMIN — Medication 10 ML: at 08:48

## 2019-07-26 RX ADMIN — Medication 1000 MCG: at 08:47

## 2019-07-26 RX ADMIN — BUMETANIDE 0.5 MG/HR: 0.25 INJECTION INTRAMUSCULAR; INTRAVENOUS at 03:21

## 2019-07-26 RX ADMIN — ASPIRIN 81 MG 81 MG: 81 TABLET ORAL at 08:46

## 2019-07-26 RX ADMIN — GLYCOPYRROLATE AND FORMOTEROL FUMARATE 2 PUFF: 9; 4.8 AEROSOL, METERED RESPIRATORY (INHALATION) at 22:13

## 2019-07-26 RX ADMIN — INSULIN LISPRO 3 UNITS: 100 INJECTION, SOLUTION INTRAVENOUS; SUBCUTANEOUS at 12:56

## 2019-07-26 RX ADMIN — DOPAMINE HYDROCHLORIDE IN DEXTROSE 2.5 MCG/KG/MIN: 1.6 INJECTION, SOLUTION INTRAVENOUS at 03:22

## 2019-07-26 ASSESSMENT — PAIN SCALES - WONG BAKER: WONGBAKER_NUMERICALRESPONSE: 6

## 2019-07-26 ASSESSMENT — PAIN DESCRIPTION - DESCRIPTORS: DESCRIPTORS: PATIENT UNABLE TO DESCRIBE

## 2019-07-26 ASSESSMENT — PAIN - FUNCTIONAL ASSESSMENT: PAIN_FUNCTIONAL_ASSESSMENT: PREVENTS OR INTERFERES SOME ACTIVE ACTIVITIES AND ADLS

## 2019-07-26 ASSESSMENT — PAIN SCALES - GENERAL
PAINLEVEL_OUTOF10: 0
PAINLEVEL_OUTOF10: 0
PAINLEVEL_OUTOF10: 6

## 2019-07-26 ASSESSMENT — PAIN DESCRIPTION - ORIENTATION: ORIENTATION: LOWER

## 2019-07-26 ASSESSMENT — PAIN DESCRIPTION - FREQUENCY: FREQUENCY: INTERMITTENT

## 2019-07-26 ASSESSMENT — PAIN DESCRIPTION - LOCATION: LOCATION: BACK

## 2019-07-26 ASSESSMENT — PAIN DESCRIPTION - ONSET: ONSET: ON-GOING

## 2019-07-26 ASSESSMENT — PAIN DESCRIPTION - PROGRESSION: CLINICAL_PROGRESSION: GRADUALLY WORSENING

## 2019-07-26 ASSESSMENT — PAIN DESCRIPTION - PAIN TYPE: TYPE: CHRONIC PAIN

## 2019-07-26 NOTE — PROGRESS NOTES
[facility] ICU 4D - 4D-16/016-A    Time In: 1405  Time Out: 1445  Timed Code Treatment Minutes: 23 Minutes  Minutes: 40          Date: 2019  Patient Name: Delos Cogan,  Gender:  male        MRN: [id number]  : 1945  (69 y.o.)      Referring Practitioner: Dr Obie Villalobos  Diagnosis: pain at surgical site  Additional Pertinent Hx: admit with above diagnosis, ERNESTO, recurrent left pleural effusion, CHF, COPD. Pt had a cardiac cath   and has significant issues needing perhaps bypass. Pt was intubated on  and came from  to ICU that date . Restrictions/Precautions:  Restrictions/Precautions: General Precautions, Fall Risk  Position Activity Restriction  Other position/activity restrictions: chest tube, schwanz,browne catheter    Subjective:  Chart Reviewed: Yes  Patient assessed for rehabilitation services?: Yes  Family / Caregiver Present: Yes  Subjective: Pt in ICU, orders to resume therapy. Pt had been onhold after transfer to ICU and intubated . extubated now. General:  Overall Orientation Status: Within Functional Limits  Follows Commands: Within Functional Limits    Vision: Within Functional Limits    Hearing: Within functional limits         Pain:  Denies.           Social/Functional History:    Lives With: Spouse  Type of Home: House  Home Layout: Two level, Able to Live on Main level with bedroom/bathroom  Home Access: Stairs to enter with rails  Entrance Stairs - Number of Steps: 3  Entrance Stairs - Rails: Both  Home Equipment: [employer], Standard walker, 4 wheeled walker, Cane                   Ambulation Assistance: Independent  Transfer Assistance: Independent          Additional Comments: Pt reports using a cane or walker at home    OBJECTIVE:  Range of Motion:  Right Lower Extremity: WFL  Left Lower Extremity: WFL    Strength:  Right Lower Extremity: Impaired - 3+ to 4-/5  Left Lower Extremity: Impaired - 3+ to

## 2019-07-26 NOTE — PROGRESS NOTES
status:      Spouse name: Not on file    Number of children: Not on file    Years of education: Not on file    Highest education level: Not on file   Occupational History    Not on file   Social Needs    Financial resource strain: Not on file    Food insecurity:     Worry: Not on file     Inability: Not on file    Transportation needs:     Medical: Not on file     Non-medical: Not on file   Tobacco Use    Smoking status: Former Smoker     Packs/day: 1.50     Years: 30.00     Pack years: 45.00     Types: Cigarettes     Last attempt to quit: 1996     Years since quittin.1    Smokeless tobacco: Never Used   Substance and Sexual Activity    Alcohol use: No    Drug use: No    Sexual activity: Not on file   Lifestyle    Physical activity:     Days per week: Not on file     Minutes per session: Not on file    Stress: Not on file   Relationships    Social connections:     Talks on phone: Not on file     Gets together: Not on file     Attends Shinto service: Not on file     Active member of club or organization: Not on file     Attends meetings of clubs or organizations: Not on file     Relationship status: Not on file    Intimate partner violence:     Fear of current or ex partner: Not on file     Emotionally abused: Not on file     Physically abused: Not on file     Forced sexual activity: Not on file   Other Topics Concern    Not on file   Social History Narrative    Not on file     Allergies: NKDA    Medications:     norepinephrine Stopped (19 1050)    dexmedetomidine Stopped (19 1900)    dextrose 5 % and 0.45 % NaCl 100 mL/hr at 19 0702    DOPamine 2.5 mcg/kg/min (19 0322)    bumetanide 0.1 mg/mL infusion 0.5 mg/hr (19 0321)    heparin (porcine) 14.6 Units/kg/hr (19 0407)    dextrose        glycopyrrolate-formoterol  2 puff Inhalation BID    midodrine  10 mg Oral TID WC    potassium (CARDIAC) replacement protocol   Other RX Placeholder  insulin lispro  0-6 Units Subcutaneous 4 times per day    insulin glargine  27 Units Subcutaneous Nightly    clindamycin (CLEOCIN) IV  600 mg Intravenous 60 Min Pre-Op    potassium bicarb-citric acid  40 mEq Oral Daily    metoprolol succinate  12.5 mg Oral BID    polyethylene glycol  17 g Oral Daily    famotidine  20 mg Oral Daily    sodium chloride flush  10 mL Intravenous 2 times per day    cefTRIAXone (ROCEPHIN) IV  1 g Intravenous Q24H    amiodarone  200 mg Oral Daily    aspirin  81 mg Oral Daily    atorvastatin  40 mg Oral Daily    vitamin B-12  1,000 mcg Oral Daily       Vital Signs:   BP (!) 86/52   Pulse 112   Temp 99.5 °F (37.5 °C) (Core)   Resp 26   Ht 5' 9\" (1.753 m)   Wt 265 lb 6.9 oz (120.4 kg)   SpO2 95%   BMI 39.20 kg/m²      Intake/Output Summary (Last 24 hours) at 7/26/2019 1201  Last data filed at 7/26/2019 1004  Gross per 24 hour   Intake 1713.12 ml   Output 6680 ml   Net -4966.88 ml        General:   Chronically ill appearing. Dyspneic. HEENT:  normocephalic and atraumatic. No scleral icterus. PERR. Neck: supple. No JVD. No thyromegaly. Lungs: clear to auscultation. No retractions  Cardiac: tachy without murmur. Abdomen: soft. Obese, Nontender. Bowel sounds positive. Extremities:  No clubbing, cyanosis. 2+ calf down pitting edema- improved. ACE wraps bilaterally. Vasculature: capillary refill < 3 seconds. Palpable LE pulses bilaterally. Skin:  warm and dry. Vascular changes to legs. Psych:  Alert and oriented x3. Affect appropriate  Lymph:  No supraclavicular adenopathy. Neurologic:  No focal deficit. No seizures. Data: (All radiographs, tracings, PFTs, and imaging are personally viewed and interpreted unless otherwise noted).  , K 4.2, BUN 56, Creat 2.2   WBC 15.1, Hbg8.7, plt 291. Electronically signed by MATT Barahona CNP on 7/26/2019 at 12:01 PM   Patient seen by me. Wants to go home. Positive SOB.   Now on low flow

## 2019-07-26 NOTE — PROGRESS NOTES
K 3.2*   < > 4.1 4.0 4.2   CL 87*   < > 87* 85* 85*   CO2 38*   < > 36* 34* 35*   BUN 48*   < > 54* 53* 56*   CREATININE 1.7*   < > 1.9* 1.8* 2.2*   GLUCOSE 73   < > 155* 188* 187*   CALCIUM 9.1   < > 9.3 9.4 9.5   MG 2.1  --   --   --   --     < > = values in this interval not displayed. Hepatic:   No results for input(s): LABALBU, AST, ALT, ALB, BILITOT, ALKPHOS in the last 72 hours. Meds:  Infusion:    norepinephrine Stopped (07/26/19 1050)    dexmedetomidine Stopped (07/25/19 1900)    dextrose 5 % and 0.45 % NaCl 100 mL/hr at 07/23/19 0702    DOPamine 2.5 mcg/kg/min (07/26/19 0322)    heparin (porcine) 14.6 Units/kg/hr (07/26/19 1412)    dextrose       Meds:    insulin lispro  0-18 Units Subcutaneous TID     insulin lispro  0-9 Units Subcutaneous Nightly    [START ON 7/27/2019] bumetanide  2 mg Oral BID    acetaZOLAMIDE (DIAMOX) IVPB  250 mg Intravenous Q12H    glycopyrrolate-formoterol  2 puff Inhalation BID    midodrine  10 mg Oral TID     potassium (CARDIAC) replacement protocol   Other RX Placeholder    insulin glargine  27 Units Subcutaneous Nightly    potassium bicarb-citric acid  40 mEq Oral Daily    metoprolol succinate  12.5 mg Oral BID    polyethylene glycol  17 g Oral Daily    famotidine  20 mg Oral Daily    sodium chloride flush  10 mL Intravenous 2 times per day    amiodarone  200 mg Oral Daily    aspirin  81 mg Oral Daily    atorvastatin  40 mg Oral Daily    vitamin B-12  1,000 mcg Oral Daily     Meds prn: heparin (porcine), heparin (porcine), ondansetron, phenol, benzonatate, magnesium hydroxide, albuterol, sodium chloride flush, acetaminophen, melatonin, glucose, dextrose, glucagon (rDNA), dextrose       Impression and Plan:  1. ERNESTO on CKd III:  due to cardiorenal syndrome but cannot rule out mild ATN from recent hypotension (was on levophed) and also contrast nephropathy  And also underlying diuresis  - net 19L negative fluid balance. Clinically improved. - weights reviewed. Discussed with cardiology  - okay to change bumex drip to oral bumex  - okay to stop drip now and start oral bumex tomorrow due to met alkalosis (will give IV diamox today)    2. Hypokalemia: due to diuretics drip, stable now, continue with K 40 meq po daily for now  3. Pleural effusion:s/p thoracocentesis  4. Acid base status: ABG reviewed. Combination of met and respiratory alkalosis. Hold bumex today, another round of diamox today, start bumex tomorrow  5. Acute systolic congestive heart failure: continue with loop diuretics as above  6. CAD  7. Pulmonary hypertension  8.  COPD  D/w patient and ICU RN  D/W cardiology- Ms. Manjit Munson MD  [facility]

## 2019-07-26 NOTE — PROGRESS NOTES
2237)    heparin (porcine) 14.6 Units/kg/hr (07/26/19 0407)    dextrose         heparin (porcine) 10,000 Units PRN   heparin (porcine) 5,000 Units PRN   ondansetron 4 mg Q6H PRN   phenol 1 spray Q2H PRN   benzonatate 200 mg TID PRN   magnesium hydroxide 30 mL Daily PRN   albuterol 2.5 mg Q4H PRN   sodium chloride flush 10 mL PRN   acetaminophen 650 mg Q6H PRN   melatonin 3 mg Nightly PRN   glucose 15 g PRN   dextrose 12.5 g PRN   glucagon (rDNA) 1 mg PRN   dextrose 100 mL/hr PRN       Lab Data:    Cardiac Enzymes:  No results for input(s): CKTOTAL, CKMB, CKMBINDEX, TROPONINI in the last 72 hours.     CBC:   Lab Results   Component Value Date    WBC 15.1 07/25/2019    RBC 2.81 07/25/2019    HGB 8.7 07/25/2019    HCT 27.6 07/25/2019     07/25/2019       CMP:    Lab Results   Component Value Date     07/26/2019    K 4.2 07/26/2019    CL 85 07/26/2019    CO2 35 07/26/2019    BUN 56 07/26/2019    CREATININE 2.2 07/26/2019    GFRAA 48 07/17/2019    LABGLOM 30 07/26/2019    GLUCOSE 187 07/26/2019    GLUCOSE 104 11/26/2018    CALCIUM 9.5 07/26/2019       Hepatic Function Panel:    Lab Results   Component Value Date    ALKPHOS 105 07/20/2019    ALT 11 07/20/2019    AST 14 07/20/2019    PROT 6.2 07/20/2019    BILITOT 0.4 07/20/2019    BILIDIR 0.26 04/23/2019    IBILI 0.59 04/23/2019    LABALBU 2.7 07/20/2019       Magnesium:    Lab Results   Component Value Date    MG 2.1 07/24/2019       PT/INR:    Lab Results   Component Value Date    PROTIME 12.3 05/06/2019    INR 1.52 07/18/2019       HgBA1c:    Lab Results   Component Value Date    LABA1C 10.1 07/18/2019       FLP:    Lab Results   Component Value Date    TRIG 95 05/07/2019    HDL 46 05/07/2019    LDLCALC 64 11/26/2018    LABVLDL 29 11/26/2018       TSH:  No results found for: TSH      Assessment:    S/p elective outpt cath 7/18/19 by dr Mckeon People for worsening SOB   3V CAD - [facility] and dr Frankie Hunt seen - no candidate for CABG or PCI - OMT  Acute on chronic resp

## 2019-07-27 LAB
ANION GAP SERPL CALCULATED.3IONS-SCNC: 14 MEQ/L (ref 8–16)
APTT: 72 SECONDS (ref 22–38)
APTT: 88.3 SECONDS (ref 22–38)
BASOPHILS # BLD: 1 %
BASOPHILS ABSOLUTE: 0.2 THOU/MM3 (ref 0–0.1)
BUN BLDV-MCNC: 67 MG/DL (ref 7–22)
CALCIUM SERPL-MCNC: 9.2 MG/DL (ref 8.5–10.5)
CHLORIDE BLD-SCNC: 86 MEQ/L (ref 98–111)
CO2: 31 MEQ/L (ref 23–33)
CREAT SERPL-MCNC: 2.5 MG/DL (ref 0.4–1.2)
DIFFERENTIAL TYPE: ABNORMAL
EOSINOPHIL # BLD: 13.9 %
EOSINOPHILS ABSOLUTE: 3 THOU/MM3 (ref 0–0.4)
ERYTHROCYTE [DISTWIDTH] IN BLOOD BY AUTOMATED COUNT: 14.6 % (ref 11.5–14.5)
ERYTHROCYTE [DISTWIDTH] IN BLOOD BY AUTOMATED COUNT: 54.1 FL (ref 35–45)
GFR SERPL CREATININE-BSD FRML MDRD: 25 ML/MIN/1.73M2
GLUCOSE BLD-MCNC: 133 MG/DL (ref 70–108)
GLUCOSE BLD-MCNC: 153 MG/DL (ref 70–108)
GLUCOSE BLD-MCNC: 210 MG/DL (ref 70–108)
GLUCOSE BLD-MCNC: 260 MG/DL (ref 70–108)
HCT VFR BLD CALC: 28.6 % (ref 42–52)
HEMOGLOBIN: 8.6 GM/DL (ref 14–18)
IMMATURE GRANS (ABS): 0.62 THOU/MM3 (ref 0–0.07)
IMMATURE GRANULOCYTES: 2.9 %
LYMPHOCYTES # BLD: 9.6 %
LYMPHOCYTES ABSOLUTE: 2.1 THOU/MM3 (ref 1–4.8)
MCH RBC QN AUTO: 30.5 PG (ref 26–33)
MCHC RBC AUTO-ENTMCNC: 30.1 GM/DL (ref 32.2–35.5)
MCV RBC AUTO: 101.4 FL (ref 80–94)
MONOCYTES # BLD: 10.6 %
MONOCYTES ABSOLUTE: 2.3 THOU/MM3 (ref 0.4–1.3)
NUCLEATED RED BLOOD CELLS: 0 /100 WBC
PATHOLOGIST REVIEW: ABNORMAL
PLATELET # BLD: 284 THOU/MM3 (ref 130–400)
PLATELET ESTIMATE: ADEQUATE
PMV BLD AUTO: 8.9 FL (ref 9.4–12.4)
POTASSIUM REFLEX MAGNESIUM: 3.9 MEQ/L (ref 3.5–5.2)
PROCALCITONIN: 0.64 NG/ML (ref 0.01–0.09)
RBC # BLD: 2.82 MILL/MM3 (ref 4.7–6.1)
SCAN OF BLOOD SMEAR: NORMAL
SEG NEUTROPHILS: 62 %
SEGMENTED NEUTROPHILS ABSOLUTE COUNT: 13.3 THOU/MM3 (ref 1.8–7.7)
SODIUM BLD-SCNC: 131 MEQ/L (ref 135–145)
WBC # BLD: 21.4 THOU/MM3 (ref 4.8–10.8)

## 2019-07-27 PROCEDURE — 94640 AIRWAY INHALATION TREATMENT: CPT

## 2019-07-27 PROCEDURE — 6370000000 HC RX 637 (ALT 250 FOR IP): Performed by: NURSE PRACTITIONER

## 2019-07-27 PROCEDURE — 93005 ELECTROCARDIOGRAM TRACING: CPT | Performed by: PHYSICIAN ASSISTANT

## 2019-07-27 PROCEDURE — 6370000000 HC RX 637 (ALT 250 FOR IP): Performed by: INTERNAL MEDICINE

## 2019-07-27 PROCEDURE — 6370000000 HC RX 637 (ALT 250 FOR IP): Performed by: PHYSICIAN ASSISTANT

## 2019-07-27 PROCEDURE — 2709999900 HC NON-CHARGEABLE SUPPLY

## 2019-07-27 PROCEDURE — 2140000000 HC CCU INTERMEDIATE R&B

## 2019-07-27 PROCEDURE — C1729 CATH, DRAINAGE: HCPCS

## 2019-07-27 PROCEDURE — 85025 COMPLETE CBC W/AUTO DIFF WBC: CPT

## 2019-07-27 PROCEDURE — 2580000003 HC RX 258: Performed by: INTERNAL MEDICINE

## 2019-07-27 PROCEDURE — 99233 SBSQ HOSP IP/OBS HIGH 50: CPT | Performed by: INTERNAL MEDICINE

## 2019-07-27 PROCEDURE — 85730 THROMBOPLASTIN TIME PARTIAL: CPT

## 2019-07-27 PROCEDURE — 6360000002 HC RX W HCPCS: Performed by: NURSE PRACTITIONER

## 2019-07-27 PROCEDURE — 99232 SBSQ HOSP IP/OBS MODERATE 35: CPT | Performed by: PHYSICIAN ASSISTANT

## 2019-07-27 PROCEDURE — 80048 BASIC METABOLIC PNL TOTAL CA: CPT

## 2019-07-27 PROCEDURE — APPSS180 APP SPLIT SHARED TIME > 60 MINUTES: Performed by: NURSE PRACTITIONER

## 2019-07-27 PROCEDURE — 82948 REAGENT STRIP/BLOOD GLUCOSE: CPT

## 2019-07-27 PROCEDURE — 99232 SBSQ HOSP IP/OBS MODERATE 35: CPT | Performed by: INTERNAL MEDICINE

## 2019-07-27 PROCEDURE — 36415 COLL VENOUS BLD VENIPUNCTURE: CPT

## 2019-07-27 PROCEDURE — 84145 PROCALCITONIN (PCT): CPT

## 2019-07-27 PROCEDURE — 6360000002 HC RX W HCPCS: Performed by: INTERNAL MEDICINE

## 2019-07-27 RX ADMIN — INSULIN GLARGINE 27 UNITS: 100 INJECTION, SOLUTION SUBCUTANEOUS at 20:50

## 2019-07-27 RX ADMIN — BUMETANIDE 2 MG: 1 TABLET ORAL at 09:30

## 2019-07-27 RX ADMIN — INSULIN LISPRO 2 UNITS: 100 INJECTION, SOLUTION INTRAVENOUS; SUBCUTANEOUS at 20:50

## 2019-07-27 RX ADMIN — Medication 10 ML: at 11:05

## 2019-07-27 RX ADMIN — MIDODRINE HYDROCHLORIDE 10 MG: 10 TABLET ORAL at 11:09

## 2019-07-27 RX ADMIN — Medication 10 ML: at 20:50

## 2019-07-27 RX ADMIN — POLYETHYLENE GLYCOL 3350 17 G: 17 POWDER, FOR SOLUTION ORAL at 09:28

## 2019-07-27 RX ADMIN — FAMOTIDINE 20 MG: 20 TABLET ORAL at 09:29

## 2019-07-27 RX ADMIN — ASPIRIN 81 MG 81 MG: 81 TABLET ORAL at 09:29

## 2019-07-27 RX ADMIN — BUMETANIDE 2 MG: 1 TABLET ORAL at 20:50

## 2019-07-27 RX ADMIN — MIDODRINE HYDROCHLORIDE 10 MG: 10 TABLET ORAL at 09:30

## 2019-07-27 RX ADMIN — GLYCERIN 1 DROP: .002; .002; .01 SOLUTION/ DROPS OPHTHALMIC at 15:53

## 2019-07-27 RX ADMIN — ATORVASTATIN CALCIUM 40 MG: 40 TABLET, FILM COATED ORAL at 09:29

## 2019-07-27 RX ADMIN — INSULIN LISPRO 6 UNITS: 100 INJECTION, SOLUTION INTRAVENOUS; SUBCUTANEOUS at 14:35

## 2019-07-27 RX ADMIN — GLYCERIN 1 DROP: .002; .002; .01 SOLUTION/ DROPS OPHTHALMIC at 09:35

## 2019-07-27 RX ADMIN — ACETAMINOPHEN 650 MG: 325 TABLET ORAL at 16:48

## 2019-07-27 RX ADMIN — POTASSIUM BICARBONATE 20 MEQ: 391 TABLET, EFFERVESCENT ORAL at 07:06

## 2019-07-27 RX ADMIN — HEPARIN SODIUM 14.6 UNITS/KG/HR: 10000 INJECTION, SOLUTION INTRAVENOUS at 08:53

## 2019-07-27 RX ADMIN — HEPARIN SODIUM 14.6 UNITS/KG/HR: 10000 INJECTION, SOLUTION INTRAVENOUS at 23:17

## 2019-07-27 RX ADMIN — ACETAZOLAMIDE 250 MG: 500 INJECTION, POWDER, LYOPHILIZED, FOR SOLUTION INTRAVENOUS at 09:29

## 2019-07-27 RX ADMIN — GLYCERIN 1 DROP: .002; .002; .01 SOLUTION/ DROPS OPHTHALMIC at 03:51

## 2019-07-27 RX ADMIN — HEPARIN SODIUM 14.6 UNITS/KG/HR: 10000 INJECTION, SOLUTION INTRAVENOUS at 11:05

## 2019-07-27 RX ADMIN — MIDODRINE HYDROCHLORIDE 10 MG: 10 TABLET ORAL at 16:42

## 2019-07-27 RX ADMIN — INSULIN LISPRO 9 UNITS: 100 INJECTION, SOLUTION INTRAVENOUS; SUBCUTANEOUS at 16:43

## 2019-07-27 RX ADMIN — AMIODARONE HYDROCHLORIDE 200 MG: 200 TABLET ORAL at 09:31

## 2019-07-27 RX ADMIN — POTASSIUM BICARBONATE 40 MEQ: 391 TABLET, EFFERVESCENT ORAL at 09:38

## 2019-07-27 RX ADMIN — GLYCOPYRROLATE AND FORMOTEROL FUMARATE 2 PUFF: 9; 4.8 AEROSOL, METERED RESPIRATORY (INHALATION) at 09:43

## 2019-07-27 RX ADMIN — ACETAMINOPHEN 650 MG: 325 TABLET ORAL at 23:23

## 2019-07-27 RX ADMIN — Medication 1000 MCG: at 09:30

## 2019-07-27 ASSESSMENT — PAIN DESCRIPTION - ONSET: ONSET: ON-GOING

## 2019-07-27 ASSESSMENT — PAIN SCALES - GENERAL
PAINLEVEL_OUTOF10: 8
PAINLEVEL_OUTOF10: 2
PAINLEVEL_OUTOF10: 0
PAINLEVEL_OUTOF10: 0
PAINLEVEL_OUTOF10: 8
PAINLEVEL_OUTOF10: 0
PAINLEVEL_OUTOF10: 0

## 2019-07-27 ASSESSMENT — PAIN DESCRIPTION - DESCRIPTORS
DESCRIPTORS: ACHING
DESCRIPTORS: ACHING

## 2019-07-27 ASSESSMENT — PAIN DESCRIPTION - LOCATION
LOCATION: BACK
LOCATION: SHOULDER

## 2019-07-27 ASSESSMENT — PAIN DESCRIPTION - PAIN TYPE
TYPE: CHRONIC PAIN
TYPE: ACUTE PAIN

## 2019-07-27 ASSESSMENT — PAIN DESCRIPTION - ORIENTATION: ORIENTATION: LOWER

## 2019-07-27 ASSESSMENT — PAIN - FUNCTIONAL ASSESSMENT: PAIN_FUNCTIONAL_ASSESSMENT: ACTIVITIES ARE NOT PREVENTED

## 2019-07-27 ASSESSMENT — PAIN DESCRIPTION - FREQUENCY: FREQUENCY: INTERMITTENT

## 2019-07-27 ASSESSMENT — PAIN DESCRIPTION - PROGRESSION: CLINICAL_PROGRESSION: NOT CHANGED

## 2019-07-27 NOTE — PROGRESS NOTES
admission  LURDES/CKD - renal following  DM  HLD  COPD  IVIS    Discussed with dr Jeniffer Browne:  · Daily I/o and weights  · No BB/ace/arb due to low bp  · Cont asa/statin/bumex  · Start BB once bp will tolerate - change to lopressor 12.5 bid with parameters  · ?stop amio since code status dnr and no cardioversion planned now  · ekg today - check qtc         Electronically signed by Adrianna Coombs PA-C on 7/27/2019 at 11:30 AM

## 2019-07-27 NOTE — PROGRESS NOTES
133*   CALCIUM 9.5 9.5 9.2     Hepatic:   No results for input(s): LABALBU, AST, ALT, ALB, BILITOT, ALKPHOS in the last 72 hours. Meds:  Infusion:    dextrose 5 % and 0.45 % NaCl 100 mL/hr at 07/23/19 9134    heparin (porcine) 14.6 Units/kg/hr (07/27/19 0853)    dextrose       Meds:    insulin lispro  0-18 Units Subcutaneous TID WC    insulin lispro  0-9 Units Subcutaneous Nightly    bumetanide  2 mg Oral BID    acetaZOLAMIDE (DIAMOX) IVPB  250 mg Intravenous Q12H    glycopyrrolate-formoterol  2 puff Inhalation BID    midodrine  10 mg Oral TID WC    potassium (CARDIAC) replacement protocol   Other RX Placeholder    insulin glargine  27 Units Subcutaneous Nightly    potassium bicarb-citric acid  40 mEq Oral Daily    metoprolol succinate  12.5 mg Oral BID    polyethylene glycol  17 g Oral Daily    famotidine  20 mg Oral Daily    sodium chloride flush  10 mL Intravenous 2 times per day    amiodarone  200 mg Oral Daily    aspirin  81 mg Oral Daily    atorvastatin  40 mg Oral Daily    vitamin B-12  1,000 mcg Oral Daily     Meds prn: glycerin-hypromellose-, heparin (porcine), heparin (porcine), ondansetron, phenol, benzonatate, magnesium hydroxide, albuterol, sodium chloride flush, acetaminophen, melatonin, glucose, dextrose, glucagon (rDNA), dextrose       Impression and Plan:  1. ERNESTO on CKd III:  due to cardiorenal syndrome but cannot rule out mild ATN from recent hypotension (was on levophed) and also contrast nephropathy  And also underlying diuresis  - creat has gone up as expected/anticipated with diuresis, but clinically looking so much better  - IV diuretics stopped yesterday, start oral bumex and monitor creatinine- if goes higher then will adjust diuretics further.   - net 19.5L negative fluid balance. Clinically improved. 2. Hypokalemia: due to diuretics, stable now, continue with K 40 meq po daily for now  3. Pleural effusion:s/p thoracocentesis  4.  Acid base status: ABG

## 2019-07-28 ENCOUNTER — APPOINTMENT (OUTPATIENT)
Dept: GENERAL RADIOLOGY | Age: 74
DRG: 291 | End: 2019-07-28
Attending: INTERNAL MEDICINE
Payer: MEDICARE

## 2019-07-28 LAB
ALLEN TEST: POSITIVE
ANION GAP SERPL CALCULATED.3IONS-SCNC: 19 MEQ/L (ref 8–16)
APTT: 117 SECONDS (ref 22–38)
APTT: 71.1 SECONDS (ref 22–38)
APTT: 73.4 SECONDS (ref 22–38)
APTT: 80.5 SECONDS (ref 22–38)
BASE EXCESS (CALCULATED): 9.2 MMOL/L (ref -2.5–2.5)
BASOPHILS # BLD: 1 %
BASOPHILS ABSOLUTE: 0.2 THOU/MM3 (ref 0–0.1)
BUN BLDV-MCNC: 79 MG/DL (ref 7–22)
CALCIUM SERPL-MCNC: 9.6 MG/DL (ref 8.5–10.5)
CHLORIDE BLD-SCNC: 84 MEQ/L (ref 98–111)
CO2: 32 MEQ/L (ref 23–33)
COLLECTED BY:: ABNORMAL
CREAT SERPL-MCNC: 3 MG/DL (ref 0.4–1.2)
DEVICE: ABNORMAL
EOSINOPHIL # BLD: 14.3 %
EOSINOPHILS ABSOLUTE: 2.8 THOU/MM3 (ref 0–0.4)
ERYTHROCYTE [DISTWIDTH] IN BLOOD BY AUTOMATED COUNT: 14.6 % (ref 11.5–14.5)
ERYTHROCYTE [DISTWIDTH] IN BLOOD BY AUTOMATED COUNT: 51.8 FL (ref 35–45)
GFR SERPL CREATININE-BSD FRML MDRD: 21 ML/MIN/1.73M2
GLUCOSE BLD-MCNC: 133 MG/DL (ref 70–108)
GLUCOSE BLD-MCNC: 135 MG/DL (ref 70–108)
GLUCOSE BLD-MCNC: 225 MG/DL (ref 70–108)
GLUCOSE BLD-MCNC: 254 MG/DL (ref 70–108)
GLUCOSE BLD-MCNC: 267 MG/DL (ref 70–108)
HCO3: 32 MMOL/L (ref 23–28)
HCT VFR BLD CALC: 29.3 % (ref 42–52)
HEMOGLOBIN: 9.4 GM/DL (ref 14–18)
IFIO2: 2
IMMATURE GRANS (ABS): 0.89 THOU/MM3 (ref 0–0.07)
IMMATURE GRANULOCYTES: 4.6 %
LYMPHOCYTES # BLD: 11.9 %
LYMPHOCYTES ABSOLUTE: 2.3 THOU/MM3 (ref 1–4.8)
MCH RBC QN AUTO: 31.4 PG (ref 26–33)
MCHC RBC AUTO-ENTMCNC: 32.1 GM/DL (ref 32.2–35.5)
MCV RBC AUTO: 98 FL (ref 80–94)
MONOCYTES # BLD: 10.1 %
MONOCYTES ABSOLUTE: 2 THOU/MM3 (ref 0.4–1.3)
NUCLEATED RED BLOOD CELLS: 0 /100 WBC
O2 SATURATION: 97 %
PCO2: 38 MMHG (ref 35–45)
PH BLOOD GAS: 7.54 (ref 7.35–7.45)
PLATELET # BLD: 348 THOU/MM3 (ref 130–400)
PLATELET ESTIMATE: ADEQUATE
PMV BLD AUTO: 9 FL (ref 9.4–12.4)
PO2: 80 MMHG (ref 71–104)
POTASSIUM SERPL-SCNC: 4.1 MEQ/L (ref 3.5–5.2)
RBC # BLD: 2.99 MILL/MM3 (ref 4.7–6.1)
SCAN OF BLOOD SMEAR: NORMAL
SEG NEUTROPHILS: 58.1 %
SEGMENTED NEUTROPHILS ABSOLUTE COUNT: 11.4 THOU/MM3 (ref 1.8–7.7)
SODIUM BLD-SCNC: 135 MEQ/L (ref 135–145)
SOURCE, BLOOD GAS: ABNORMAL
WBC # BLD: 19.6 THOU/MM3 (ref 4.8–10.8)

## 2019-07-28 PROCEDURE — 94640 AIRWAY INHALATION TREATMENT: CPT

## 2019-07-28 PROCEDURE — 71045 X-RAY EXAM CHEST 1 VIEW: CPT

## 2019-07-28 PROCEDURE — 6370000000 HC RX 637 (ALT 250 FOR IP): Performed by: NURSE PRACTITIONER

## 2019-07-28 PROCEDURE — 2700000000 HC OXYGEN THERAPY PER DAY

## 2019-07-28 PROCEDURE — 6360000002 HC RX W HCPCS: Performed by: NURSE PRACTITIONER

## 2019-07-28 PROCEDURE — 2580000003 HC RX 258: Performed by: INTERNAL MEDICINE

## 2019-07-28 PROCEDURE — 6370000000 HC RX 637 (ALT 250 FOR IP): Performed by: INTERNAL MEDICINE

## 2019-07-28 PROCEDURE — 2709999900 HC NON-CHARGEABLE SUPPLY

## 2019-07-28 PROCEDURE — 99232 SBSQ HOSP IP/OBS MODERATE 35: CPT | Performed by: INTERNAL MEDICINE

## 2019-07-28 PROCEDURE — 2140000000 HC CCU INTERMEDIATE R&B

## 2019-07-28 PROCEDURE — 94760 N-INVAS EAR/PLS OXIMETRY 1: CPT

## 2019-07-28 PROCEDURE — 85730 THROMBOPLASTIN TIME PARTIAL: CPT

## 2019-07-28 PROCEDURE — 80048 BASIC METABOLIC PNL TOTAL CA: CPT

## 2019-07-28 PROCEDURE — 36415 COLL VENOUS BLD VENIPUNCTURE: CPT

## 2019-07-28 PROCEDURE — 85025 COMPLETE CBC W/AUTO DIFF WBC: CPT

## 2019-07-28 PROCEDURE — 6370000000 HC RX 637 (ALT 250 FOR IP): Performed by: PHYSICIAN ASSISTANT

## 2019-07-28 PROCEDURE — 99232 SBSQ HOSP IP/OBS MODERATE 35: CPT | Performed by: PHYSICIAN ASSISTANT

## 2019-07-28 PROCEDURE — 36600 WITHDRAWAL OF ARTERIAL BLOOD: CPT

## 2019-07-28 PROCEDURE — 51798 US URINE CAPACITY MEASURE: CPT

## 2019-07-28 PROCEDURE — 99233 SBSQ HOSP IP/OBS HIGH 50: CPT | Performed by: INTERNAL MEDICINE

## 2019-07-28 PROCEDURE — 83010 ASSAY OF HAPTOGLOBIN QUANT: CPT

## 2019-07-28 PROCEDURE — 82803 BLOOD GASES ANY COMBINATION: CPT

## 2019-07-28 PROCEDURE — 82948 REAGENT STRIP/BLOOD GLUCOSE: CPT

## 2019-07-28 RX ORDER — SODIUM CHLORIDE 9 MG/ML
INJECTION, SOLUTION INTRAVENOUS CONTINUOUS
Status: ACTIVE | OUTPATIENT
Start: 2019-07-28 | End: 2019-07-29

## 2019-07-28 RX ORDER — BUMETANIDE 1 MG/1
1 TABLET ORAL 2 TIMES DAILY
Status: DISCONTINUED | OUTPATIENT
Start: 2019-07-29 | End: 2019-07-28

## 2019-07-28 RX ORDER — IPRATROPIUM BROMIDE AND ALBUTEROL SULFATE 2.5; .5 MG/3ML; MG/3ML
1 SOLUTION RESPIRATORY (INHALATION) EVERY 4 HOURS PRN
Status: DISCONTINUED | OUTPATIENT
Start: 2019-07-28 | End: 2019-07-30 | Stop reason: HOSPADM

## 2019-07-28 RX ORDER — BUMETANIDE 1 MG/1
1 TABLET ORAL 2 TIMES DAILY
Status: DISCONTINUED | OUTPATIENT
Start: 2019-07-29 | End: 2019-07-30 | Stop reason: HOSPADM

## 2019-07-28 RX ADMIN — GLYCOPYRROLATE AND FORMOTEROL FUMARATE 2 PUFF: 9; 4.8 AEROSOL, METERED RESPIRATORY (INHALATION) at 08:31

## 2019-07-28 RX ADMIN — GLYCERIN 1 DROP: .002; .002; .01 SOLUTION/ DROPS OPHTHALMIC at 09:16

## 2019-07-28 RX ADMIN — INSULIN LISPRO 5 UNITS: 100 INJECTION, SOLUTION INTRAVENOUS; SUBCUTANEOUS at 20:30

## 2019-07-28 RX ADMIN — GLYCOPYRROLATE AND FORMOTEROL FUMARATE 2 PUFF: 9; 4.8 AEROSOL, METERED RESPIRATORY (INHALATION) at 21:10

## 2019-07-28 RX ADMIN — INSULIN LISPRO 9 UNITS: 100 INJECTION, SOLUTION INTRAVENOUS; SUBCUTANEOUS at 13:19

## 2019-07-28 RX ADMIN — METOPROLOL TARTRATE 12.5 MG: 25 TABLET ORAL at 09:16

## 2019-07-28 RX ADMIN — POTASSIUM BICARBONATE 40 MEQ: 391 TABLET, EFFERVESCENT ORAL at 09:15

## 2019-07-28 RX ADMIN — Medication 1000 MCG: at 09:15

## 2019-07-28 RX ADMIN — ATORVASTATIN CALCIUM 40 MG: 40 TABLET, FILM COATED ORAL at 13:18

## 2019-07-28 RX ADMIN — FAMOTIDINE 20 MG: 20 TABLET ORAL at 09:15

## 2019-07-28 RX ADMIN — SODIUM CHLORIDE: 9 INJECTION, SOLUTION INTRAVENOUS at 16:40

## 2019-07-28 RX ADMIN — SODIUM CHLORIDE 75 ML/HR: 9 INJECTION, SOLUTION INTRAVENOUS at 22:18

## 2019-07-28 RX ADMIN — MIDODRINE HYDROCHLORIDE 10 MG: 10 TABLET ORAL at 09:15

## 2019-07-28 RX ADMIN — GLYCERIN 1 DROP: .002; .002; .01 SOLUTION/ DROPS OPHTHALMIC at 16:40

## 2019-07-28 RX ADMIN — METOPROLOL TARTRATE 12.5 MG: 25 TABLET ORAL at 20:30

## 2019-07-28 RX ADMIN — INSULIN GLARGINE 27 UNITS: 100 INJECTION, SOLUTION SUBCUTANEOUS at 20:32

## 2019-07-28 RX ADMIN — IPRATROPIUM BROMIDE AND ALBUTEROL SULFATE 1 AMPULE: .5; 3 SOLUTION RESPIRATORY (INHALATION) at 14:29

## 2019-07-28 RX ADMIN — AMIODARONE HYDROCHLORIDE 200 MG: 200 TABLET ORAL at 13:18

## 2019-07-28 RX ADMIN — MIDODRINE HYDROCHLORIDE 10 MG: 10 TABLET ORAL at 16:44

## 2019-07-28 RX ADMIN — POLYETHYLENE GLYCOL 3350 17 G: 17 POWDER, FOR SOLUTION ORAL at 16:51

## 2019-07-28 RX ADMIN — HEPARIN SODIUM 11.6 UNITS/KG/HR: 10000 INJECTION, SOLUTION INTRAVENOUS at 15:50

## 2019-07-28 RX ADMIN — BUMETANIDE 2 MG: 1 TABLET ORAL at 09:15

## 2019-07-28 RX ADMIN — ASPIRIN 81 MG 81 MG: 81 TABLET ORAL at 09:15

## 2019-07-28 RX ADMIN — INSULIN LISPRO 6 UNITS: 100 INJECTION, SOLUTION INTRAVENOUS; SUBCUTANEOUS at 16:44

## 2019-07-28 RX ADMIN — MIDODRINE HYDROCHLORIDE 10 MG: 10 TABLET ORAL at 13:18

## 2019-07-28 ASSESSMENT — PAIN SCALES - GENERAL: PAINLEVEL_OUTOF10: 0

## 2019-07-28 NOTE — PROGRESS NOTES
polyethylene glycol  17 g Oral Daily    famotidine  20 mg Oral Daily    sodium chloride flush  10 mL Intravenous 2 times per day    amiodarone  200 mg Oral Daily    aspirin  81 mg Oral Daily    atorvastatin  40 mg Oral Daily    vitamin B-12  1,000 mcg Oral Daily      heparin (porcine) 14.6 Units/kg/hr (07/27/19 1090)    dextrose       glycerin-hypromellose-, heparin (porcine), heparin (porcine), ondansetron, phenol, benzonatate, magnesium hydroxide, albuterol, sodium chloride flush, acetaminophen, melatonin, glucose, dextrose, glucagon (rDNA), dextrose  Labs   ABG  Lab Results   Component Value Date    PH 7.64 07/26/2019    PO2 84 07/26/2019    PCO2 32 07/26/2019    HCO3 34 07/26/2019    O2SAT 98 07/26/2019     Lab Results   Component Value Date    IFIO2 40 07/25/2019    MODE NIV 07/25/2019    SETPEEP 6.0 07/25/2019     CBC  Recent Labs     07/27/19  0444 07/28/19  0648   WBC 21.4* 19.6*   RBC 2.82* 2.99*   HGB 8.6* 9.4*   HCT 28.6* 29.3*   .4* 98.0*   MCH 30.5 31.4   MCHC 30.1* 32.1*    348   MPV 8.9* 9.0*      BMP  Recent Labs     07/26/19  0330 07/26/19  1850 07/27/19  0446    135 131*   K 4.2 4.2 3.9   CL 85* 86* 86*   CO2 35* 34* 31   BUN 56* 67* 67*   CREATININE 2.2* 2.5* 2.5*   GLUCOSE 187* 244* 133*   CALCIUM 9.5 9.5 9.2     LFT  No results for input(s): AST, ALT, ALB, BILITOT, ALKPHOS, LIPASE in the last 72 hours. Invalid input(s): AMYLASE  TROP  Lab Results   Component Value Date    TROPONINT 0.091 07/20/2019    TROPONINT 0.095 07/20/2019    TROPONINT 0.101 07/19/2019     BNP  Lab Results   Component Value Date    PROBNP 7921.0 07/26/2019    PROBNP 7708.0 07/18/2019    PROBNP 5,431 07/17/2019     D-Dimer  No results found for: DDIMER  Lactic Acid  No results for input(s): LACTA in the last 72 hours. INR  No results for input(s): INR, PROTIME in the last 72 hours.   PTT  Recent Labs     07/26/19  1850 07/27/19  0444 07/27/19  1820   APTT 93.4* 72.0* 88.3*

## 2019-07-28 NOTE — PROGRESS NOTES
Cardiology Progress Note      Patient:  Darcy Lazar  YOB: 1945  MRN: [id number]   Acct: [de-identified]  Admit Date:  7/18/2019  Primary Cardiologist:dr akins  Seen by dr Robin Hernadez     Note per dr Cris Chin:  Shortness of breath, multivessel coronary  artery disease.     REQUESTING PROVIDER:  Hospitalist Services     HISTORY OF PRESENT ILLNESS:  This is a pleasant unfortunate 72-year-old  gentleman who is not necessarily the best historian, who was relatively  drowsy on a BiPAP and not answering all the questions, seeming to be a  little bit in a drowsy or confused state. Upon reviewing the chart and  talking to the staff, it looks like the patient was transferred from Dr. Amanda Kathleen after cardiac catheterization for a bypass surgery. The patient  was initially in there for elective cardiac cath due to symptoms of  shortness of breath, underwent cardiac catheterization which showed  significant LAD and left circ disease for which he was referred for  cardiac bypass surgery versus multivessel stenting. The patient has had  chronic dyspnea, does have history of multiple risk factors for coronary  artery disease including morbid obesity, diabetes mellitus for many  years, hypertension, hyperlipidemia. The patient is not aware of any  previous cardiac history that he could give. The patient does have  pulmonary hypertension and cardiomyopathy, does have renal insufficiency  as well. His workup revealed the chest x-ray with pleural effusion. He  is on diuretics and we were consulted to assist in management. \"    Subjective (Events in last 24 hours):   Pt awake and alert. NAD.   No cp or sob    I/o -19.1 L    Objective:   BP (!) 105/55   Pulse 89   Temp 98.3 °F (36.8 °C) (Oral)   Resp 20   Ht 5' 9\" (1.753 m)   Wt 241 lb 6.4 oz (109.5 kg)   SpO2 98%   BMI 35.65 kg/m²        TELEMETRY: atrial fib 80s    Physical Exam:  General Appearance: alert and oriented to person, place and time, in following  DM  HLD  COPD  IVIS  [id number]    Discussed with dr Elaine Burton:  · Daily I/o and weights  · No ace/arb due to low bp  · Cont asa/statin/BB/bumex  · ?stop amio since code status dnr and no cardioversion planned now - pt understands and agrees with plan but also states if CV needed he would agree for full code - will let dr Albertina Woods to decide  · Will see prn, call with any questions or concerns  · F/up dr Linda Leger 1 week following discharge    Discussed with dr Albertina Woods, he recommends GUSTAVO/CV and pt agreeable to procedure and changing code status to full code for the procedure  Npo after midnight  GUSTAVO/CV in am  Cont amio     Electronically signed by Gogo Cevallos PA-C on 7/28/2019 at 9:38 AM

## 2019-07-28 NOTE — PROGRESS NOTES
pulmonologist even before admission to us. Severe CAD with triple vessel disease, transferred here from [facility] complex PCI expected next week. 61 PY smoking quit 1996  Past 24 hours, ROS   -Stable on 2LPM  -OOB to chair  -Denies any increased SOB or chest pain  -Afebrile  -S/p pleurex placement on left side - attached to wall suction and atrium. All other systems reviewed  Objective    Vitals    height is 5' 9\" (1.753 m) and weight is 241 lb 6.4 oz (109.5 kg). His oral temperature is 97.7 °F (36.5 °C). His blood pressure is 92/58 (abnormal) and his pulse is 91. His respiration is 24 and oxygen saturation is 97%. O2 Flow Rate (L/min): 2 L/min  I/O    Intake/Output Summary (Last 24 hours) at 7/27/2019 2048  Last data filed at 7/27/2019 1651  Gross per 24 hour   Intake 1426.2 ml   Output 1340 ml   Net 86.2 ml     Patient Vitals for the past 96 hrs (Last 3 readings):   Weight   07/27/19 0502 241 lb 6.4 oz (109.5 kg)   07/25/19 0605 265 lb 6.9 oz (120.4 kg)   07/24/19 0703 269 lb 6.4 oz (122.2 kg)     Exam    Constitutional: Patient appears in no distress on O2 via nasal cannula. HENT:    Head: Normocephalic and atraumatic. Right Ear: External ear normal.   Left Ear: External ear normal.   Mouth/Throat: Oropharynx is clear and moist.  No oral thrush. Eyes: Pupils are equal, round, and reactive to light. Neck: Neck supple. Cardiovascular: Normal rate, regular rhythm, normal heart sounds. No murmur heard. Pulmonary/Chest: Effort normal and good breath sounds. Diminished at bases Left > Right. No stridor. No respiratory distress. No wheezes. No rales. Abdominal: Soft. Patient exhibits no distension. No tenderness. Musculoskeletal: Normal range of motion. Extremities: Patient exhibits bilateral leg edema and no tenderness. Lymphadenopathy:  No cervical adenopathy. Neurological: Patient is alert and oriented to person, place, and time. Skin: Skin is warm and dry.    Psychiatric: Patient  has a

## 2019-07-28 NOTE — PROGRESS NOTES
[facility]   Nephrology progress note  7/28/2019, 11:10 AM      Pt Name:    Mikal Serrato  MRN:     [id number]     [place]:    1945  Admit Date:    7/18/2019  7:31 PM  Primary Care Physician:  Shyanne Pearce MD   Room number  3B-35/035-A    Chief Complaint: Nephrology following for ERNESTO/CKD    Subjective:  Patient seen and examined  Comfortable  Awake, alert  Now on step down unit  +chest tube  On Iv heparin  Off bumex drip  No distress  No chest pain or shortness of breath      Objective:  24HR INTAKE/OUTPUT:      Intake/Output Summary (Last 24 hours) at 7/28/2019 1110  Last data filed at 7/28/2019 0459  Gross per 24 hour   Intake 1163.2 ml   Output 1040 ml   Net 123.2 ml     I/O last 3 completed shifts: In: 1643.2 [P.O.:1320; I.V.:323.2]  Out: 1240 [Urine:1150; Chest Tube:90]  No intake/output data recorded. Admission weight: 288 lb 12.8 oz (131 kg)  Wt Readings from Last 3 Encounters:   07/27/19 241 lb 6.4 oz (109.5 kg)   07/18/19 281 lb (127.5 kg)   07/17/19 286 lb 3.2 oz (129.8 kg)     Body mass index is 35.65 kg/m². Physical examination  Vitals:    07/28/19 1105   BP: (!) 106/56   Pulse: 83   Resp: 20   Temp: 97.7 °F (36.5 °C)   SpO2: 98%       General Appearance: awake, alert, no distress, comfortable  Neck: No jugular venous distention and appears symmetric  Lungs: Air entry diminished. No exp wheeze, no rhonchi  Heart: S1, S2 heard  GI: mild distention, without any  guarding  Extremities: improving/stable LE edema   Skin:  Skin is warm.        Lab Data  CBC:   Recent Labs     07/27/19  0444 07/28/19  0648   WBC 21.4* 19.6*   HGB 8.6* 9.4*   HCT 28.6* 29.3*    348     BMP:  Recent Labs     07/26/19  1850 07/27/19  0446 07/28/19  0648    131* 135   K 4.2 3.9 4.1   CL 86* 86* 84*   CO2 34* 31 32   BUN 67* 67* 79*   CREATININE 2.5* 2.5* 3.0*   GLUCOSE 244* 133* 133*   CALCIUM 9.5 9.2 9.6     Hepatic:   No results for input(s): LABALBU, AST, ALT, ALB, BILITOT, ALKPHOS

## 2019-07-29 ENCOUNTER — APPOINTMENT (OUTPATIENT)
Dept: GENERAL RADIOLOGY | Age: 74
DRG: 291 | End: 2019-07-29
Attending: INTERNAL MEDICINE
Payer: MEDICARE

## 2019-07-29 LAB
ANION GAP SERPL CALCULATED.3IONS-SCNC: 18 MEQ/L (ref 8–16)
APTT: 67.6 SECONDS (ref 22–38)
APTT: 70.2 SECONDS (ref 22–38)
BUN BLDV-MCNC: 79 MG/DL (ref 7–22)
CALCIUM SERPL-MCNC: 9 MG/DL (ref 8.5–10.5)
CHLORIDE BLD-SCNC: 83 MEQ/L (ref 98–111)
CO2: 31 MEQ/L (ref 23–33)
CREAT SERPL-MCNC: 3.3 MG/DL (ref 0.4–1.2)
EKG ATRIAL RATE: 214 BPM
EKG Q-T INTERVAL: 408 MS
EKG QRS DURATION: 104 MS
EKG QTC CALCULATION (BAZETT): 482 MS
EKG R AXIS: -25 DEGREES
EKG T AXIS: 103 DEGREES
EKG VENTRICULAR RATE: 84 BPM
GFR SERPL CREATININE-BSD FRML MDRD: 18 ML/MIN/1.73M2
GLUCOSE BLD-MCNC: 172 MG/DL (ref 70–108)
GLUCOSE BLD-MCNC: 186 MG/DL (ref 70–108)
GLUCOSE BLD-MCNC: 273 MG/DL (ref 70–108)
GLUCOSE BLD-MCNC: 290 MG/DL (ref 70–108)
GLUCOSE BLD-MCNC: 298 MG/DL (ref 70–108)
POTASSIUM SERPL-SCNC: 4.2 MEQ/L (ref 3.5–5.2)
SODIUM BLD-SCNC: 132 MEQ/L (ref 135–145)

## 2019-07-29 PROCEDURE — 94760 N-INVAS EAR/PLS OXIMETRY 1: CPT

## 2019-07-29 PROCEDURE — 85730 THROMBOPLASTIN TIME PARTIAL: CPT

## 2019-07-29 PROCEDURE — 2709999900 HC NON-CHARGEABLE SUPPLY

## 2019-07-29 PROCEDURE — 6370000000 HC RX 637 (ALT 250 FOR IP): Performed by: INTERNAL MEDICINE

## 2019-07-29 PROCEDURE — 99232 SBSQ HOSP IP/OBS MODERATE 35: CPT | Performed by: INTERNAL MEDICINE

## 2019-07-29 PROCEDURE — 82948 REAGENT STRIP/BLOOD GLUCOSE: CPT

## 2019-07-29 PROCEDURE — 2700000000 HC OXYGEN THERAPY PER DAY

## 2019-07-29 PROCEDURE — 97110 THERAPEUTIC EXERCISES: CPT

## 2019-07-29 PROCEDURE — 94640 AIRWAY INHALATION TREATMENT: CPT

## 2019-07-29 PROCEDURE — 36415 COLL VENOUS BLD VENIPUNCTURE: CPT

## 2019-07-29 PROCEDURE — 6360000002 HC RX W HCPCS: Performed by: NURSE PRACTITIONER

## 2019-07-29 PROCEDURE — APPSS30 APP SPLIT SHARED TIME 16-30 MINUTES: Performed by: NURSE PRACTITIONER

## 2019-07-29 PROCEDURE — 93010 ELECTROCARDIOGRAM REPORT: CPT | Performed by: INTERNAL MEDICINE

## 2019-07-29 PROCEDURE — 97530 THERAPEUTIC ACTIVITIES: CPT

## 2019-07-29 PROCEDURE — 71045 X-RAY EXAM CHEST 1 VIEW: CPT

## 2019-07-29 PROCEDURE — 2140000000 HC CCU INTERMEDIATE R&B

## 2019-07-29 PROCEDURE — 80048 BASIC METABOLIC PNL TOTAL CA: CPT

## 2019-07-29 PROCEDURE — 99232 SBSQ HOSP IP/OBS MODERATE 35: CPT | Performed by: PHYSICIAN ASSISTANT

## 2019-07-29 PROCEDURE — 97166 OT EVAL MOD COMPLEX 45 MIN: CPT

## 2019-07-29 PROCEDURE — 6370000000 HC RX 637 (ALT 250 FOR IP): Performed by: NURSE PRACTITIONER

## 2019-07-29 PROCEDURE — 99233 SBSQ HOSP IP/OBS HIGH 50: CPT | Performed by: INTERNAL MEDICINE

## 2019-07-29 RX ORDER — SODIUM CHLORIDE 9 MG/ML
INJECTION, SOLUTION INTRAVENOUS CONTINUOUS
Status: ACTIVE | OUTPATIENT
Start: 2019-07-29 | End: 2019-07-30

## 2019-07-29 RX ORDER — SODIUM CHLORIDE 9 MG/ML
INJECTION, SOLUTION INTRAVENOUS CONTINUOUS
Status: DISCONTINUED | OUTPATIENT
Start: 2019-07-29 | End: 2019-07-29

## 2019-07-29 RX ORDER — METOPROLOL SUCCINATE 25 MG/1
12.5 TABLET, EXTENDED RELEASE ORAL 2 TIMES DAILY
Status: DISCONTINUED | OUTPATIENT
Start: 2019-07-29 | End: 2019-07-30 | Stop reason: HOSPADM

## 2019-07-29 RX ADMIN — INSULIN GLARGINE 27 UNITS: 100 INJECTION, SOLUTION SUBCUTANEOUS at 22:08

## 2019-07-29 RX ADMIN — POTASSIUM BICARBONATE 40 MEQ: 391 TABLET, EFFERVESCENT ORAL at 09:07

## 2019-07-29 RX ADMIN — FAMOTIDINE 20 MG: 20 TABLET ORAL at 09:07

## 2019-07-29 RX ADMIN — GLYCOPYRROLATE AND FORMOTEROL FUMARATE 2 PUFF: 9; 4.8 AEROSOL, METERED RESPIRATORY (INHALATION) at 08:34

## 2019-07-29 RX ADMIN — HEPARIN SODIUM 11.6 UNITS/KG/HR: 10000 INJECTION, SOLUTION INTRAVENOUS at 10:39

## 2019-07-29 RX ADMIN — ASPIRIN 81 MG 81 MG: 81 TABLET ORAL at 09:07

## 2019-07-29 RX ADMIN — INSULIN LISPRO 9 UNITS: 100 INJECTION, SOLUTION INTRAVENOUS; SUBCUTANEOUS at 12:08

## 2019-07-29 RX ADMIN — MIDODRINE HYDROCHLORIDE 12.5 MG: 10 TABLET ORAL at 10:51

## 2019-07-29 RX ADMIN — POLYETHYLENE GLYCOL 3350 17 G: 17 POWDER, FOR SOLUTION ORAL at 09:09

## 2019-07-29 RX ADMIN — METOPROLOL SUCCINATE 12.5 MG: 25 TABLET, FILM COATED, EXTENDED RELEASE ORAL at 10:51

## 2019-07-29 RX ADMIN — MIDODRINE HYDROCHLORIDE 15 MG: 10 TABLET ORAL at 17:21

## 2019-07-29 RX ADMIN — GLYCOPYRROLATE AND FORMOTEROL FUMARATE 2 PUFF: 9; 4.8 AEROSOL, METERED RESPIRATORY (INHALATION) at 21:02

## 2019-07-29 RX ADMIN — INSULIN LISPRO 5 UNITS: 100 INJECTION, SOLUTION INTRAVENOUS; SUBCUTANEOUS at 22:09

## 2019-07-29 RX ADMIN — AMIODARONE HYDROCHLORIDE 200 MG: 200 TABLET ORAL at 09:07

## 2019-07-29 RX ADMIN — METOPROLOL SUCCINATE 12.5 MG: 25 TABLET, FILM COATED, EXTENDED RELEASE ORAL at 22:09

## 2019-07-29 RX ADMIN — INSULIN LISPRO 3 UNITS: 100 INJECTION, SOLUTION INTRAVENOUS; SUBCUTANEOUS at 09:08

## 2019-07-29 RX ADMIN — ATORVASTATIN CALCIUM 40 MG: 40 TABLET, FILM COATED ORAL at 09:07

## 2019-07-29 RX ADMIN — GLYCERIN 1 DROP: .002; .002; .01 SOLUTION/ DROPS OPHTHALMIC at 09:09

## 2019-07-29 RX ADMIN — INSULIN LISPRO 9 UNITS: 100 INJECTION, SOLUTION INTRAVENOUS; SUBCUTANEOUS at 17:21

## 2019-07-29 RX ADMIN — Medication 1000 MCG: at 09:07

## 2019-07-29 ASSESSMENT — PAIN SCALES - GENERAL
PAINLEVEL_OUTOF10: 0
PAINLEVEL_OUTOF10: 0

## 2019-07-29 ASSESSMENT — PAIN SCALES - WONG BAKER: WONGBAKER_NUMERICALRESPONSE: 0

## 2019-07-29 NOTE — PROGRESS NOTES
[facility]    Patient - Zainab Merrill,  Age - 69 y.o.    - 1945      Room Number - 3B-35/035-A   Consulting - Ana Estrella MD Primary Care Physician - Leobardo Martin MD   MRN -  [id number]   [facility]  Date of Admission -  2019  7:31 PM  Hospital Day - [address] Problems    Diagnosis Date Noted    Acute on chronic diastolic CHF (congestive heart failure), NYHA class 3 (HCC) [I50.33]      Priority: High    Uncontrolled type 2 diabetes mellitus with hyperglycemia ([address] [E11.65]      Priority: High    Dilated cardiomyopathy ([address] [I42.0]      Priority: High    Moderate to severe pulmonary hypertension ([address] [I27.20]      Priority: High    Arterial hypotension [I95.9]      Priority: High    CAD, multiple vessel [I25.10]      Priority: High    Paroxysmal atrial flutter ([address] [I48.92]      Priority: High    Atrial flutter ([address] [I48.92] 2019    Hypokalemia [E87.6]     Morbid obesity due to excess calories ([address] [E66.01] 2019    CKD (chronic kidney disease), stage III (HCC) [N18.3]     Chronic diastolic (congestive) heart failure (HCC) [I50.32]     Anemia in stage 3 chronic kidney disease ([address] [N18.3, D63.1]     Acute on chronic systolic (congestive) heart failure (HCC) [I50.23]     Pleural effusion [J90] 2019    Acute on chronic respiratory failure with hypercapnia (HCC) [J96.22] 2019    Acute kidney injury ([address] [N17.9]     Coronary artery disease involving native coronary artery of native heart [I25.10]     DM type 2 (diabetes mellitus, type 2) ([address] [E11.9]      Chief Complaint   Recurrent left side pleural effusion S/p Pleurex cath. HPI   Moved out ICU yesterday, had L thoracostomy tube for symptomatic pleural effusion with acute on chronic respiratory failure (Dr Arnaldo Chavez). , this effusion is recurrent and being considered for TPC by patient's pulmonologist

## 2019-07-29 NOTE — PROGRESS NOTES
[facility]  INPATIENT OCCUPATIONAL THERAPY  [facility] CCU-STEPDOWN 3B  EVALUATION    Time In: 4869  Time Out: 1295  Timed Code Treatment Minutes: 20 Minutes  Minutes: 28          Date: 2019  Patient Name: Dash Gilbert,   Gender: male      MRN: [id number]  : 1945  (69 y.o.)  Referring Practitioner: Gabriela LANG  Diagnosis: pain at surgical site  Additional Pertinent Hx: admit with above diagnosis, ERNESTO, recurrent left pleural effusion, CHF, COPD. Pt had a cardiac cath   and has significant issues needing perhaps bypass. Pt was intubated on  and came from 3B to ICU that date . Pt extubated . Restrictions/Precautions:  Restrictions/Precautions: General Precautions, Fall Risk  Position Activity Restriction  Other position/activity restrictions: 2L O2    Subjective  Chart Reviewed: Yes, Orders, Progress Notes, History and Physical  Patient assessed for rehabilitation services?: Yes  Response to previous treatment: Patient with no complaints from previous session  Family / Caregiver Present: No    Subjective: RN okayed session. Pt was sitting up in recliner upon arrival. Pt willing to participate in OT session. Pt cooperative and pleasant throughout. Comments: Pt became increasingly SOB throughout session. SPO2 monitored following functional mobility task, still at 95%. Pt very soft spoken. Significant hand tremors noted throughout session.     Pain:  Pain Assessment  Patient Currently in Pain: Denies    Social/Functional History:  Lives With: Spouse  Type of Home: House  Home Layout: Two level, Able to Live on Main level with bedroom/bathroom  Home Access: Stairs to enter with rails  Entrance Stairs - Number of Steps: 3  Entrance Stairs - Rails: Both  Home Equipment: [employer], Standard walker, 4 wheeled walker, Cane   Bathroom Shower/Tub: Shower chair with back  [employer]: Handicap height  Bathroom Equipment: (no grab bars, able to use furniture around to get up prn)  Bathroom Accessibility: Accessible  IADL Comments: Pt stated his wife helps him prn. Pt does some light cooking as it is something he enjoys. Level of assistance varies. Receives Help From: Family  ADL Assistance: Independent  Homemaking Assistance: Needs assistance  Ambulation Assistance: Independent  Transfer Assistance: Independent    Active : No     Additional Comments: Pt reports using a cane or walker at home    Cognition/Orientation:  Overall Orientation Status: Within Functional Limits  Overall Cognitive Status: WFL    ADL;s:  Feeding: Setup(Tremors increased difficulty of drinking)       Functional Mobility:  Bed mobility  Scooting: Contact guard assistance(In recliner)    Functional Mobility  Functional - Mobility Device: Rolling Walker  Activity: Other(in hallway)  Assist Level: Contact guard assistance  Functional Mobility Comments: No LOB noted, increased SOB, good safety awareness and judgement on distance pt can handle. SPO2 checked after completion of activity - 95%  Apparatus Needs: O2(2L)     Balance:  Balance  Sitting Balance: Contact guard assistance  Standing Balance: Contact guard assistance  Standing Balance  Time: approx 1 min  Activity: in prep for functional mobility  Comment: Some SOB noted, no LOB    Transfers:  Sit to stand: CGA (From recliner)  Stand to sit: CGA (to recliner)  Transfer Comments: Pt demoed some decreased safety awareness with hand placement as soon as he was lifted of chair, pt wanted to grab onto walker right away. Increased SOB noted with increased activity.        Upper Extremity Assessment:   LUE AROM : Exceptions  LUE General AROM: Shoulder flexion approx 110  RUE AROM : Exceptions  RUE General AROM: Shoulder flexion approx 110    LUE Strength  Gross LUE Strength: [facility] to [facility]  RUE Strength  Gross RUE Strength: Exceptions to [facility]   General decrease in UB strength and endurance noted, especially with increased fatigue and

## 2019-07-29 NOTE — PROGRESS NOTES
[facility]   Nephrology progress note  7/29/2019, 10:41 AM      Pt Name:    Jose J Turner  MRN:     [id number]     [place]:    1945  Admit Date:    7/18/2019  7:31 PM  Primary Care Physician:  Job Sin MD   Room number  3B-35/035-A    Chief Complaint: Nephrology following for ERNESTO/CKD    Subjective:  Patient seen and examined  Comfortable  Wants to go home  No chest pain  Creatinine rising  D/w RN  On Iv heparin  No chest pain or shortness of breath      Objective:  24HR INTAKE/OUTPUT:      Intake/Output Summary (Last 24 hours) at 7/29/2019 1041  Last data filed at 7/29/2019 0350  Gross per 24 hour   Intake 1600.85 ml   Output 700 ml   Net 900.85 ml     I/O last 3 completed shifts: In: 1720.9 [P.O.:940; I.V.:780.9]  Out: 1020 [Urine:900; Chest Tube:120]  No intake/output data recorded. Admission weight: 288 lb 12.8 oz (131 kg)  Wt Readings from Last 3 Encounters:   07/29/19 257 lb 0.4 oz (116.6 kg)   07/18/19 281 lb (127.5 kg)   07/17/19 286 lb 3.2 oz (129.8 kg)     Body mass index is 37.96 kg/m². Physical examination  RR 20, T 98.2  Vitals:    07/29/19 1032   BP: 80/60   Pulse: 100   Resp:    Temp:    SpO2:        General Appearance: awake, alert, no distress, comfortable  Neck: No jugular venous distention and appears symmetric  Lungs: Air entry diminished. No exp wheeze, no rhonchi  Heart: S1, S2 heard  GI: mild distention, without any  guarding  Extremities: improving/stable LE edema, not much clinically  Skin:  Skin is warm.        Lab Data  CBC:   Recent Labs     07/27/19  0444 07/28/19  0648   WBC 21.4* 19.6*   HGB 8.6* 9.4*   HCT 28.6* 29.3*    348     BMP:  Recent Labs     07/27/19  0446 07/28/19  0648 07/29/19  0414   * 135 132*   K 3.9 4.1 4.2   CL 86* 84* 83*   CO2 31 32 31   BUN 67* 79* 79*   CREATININE 2.5* 3.0* 3.3*   GLUCOSE 133* 133* 172*   CALCIUM 9.2 9.6 9.0     Hepatic:   No results for input(s): LABALBU, AST, ALT, ALB, BILITOT, ALKPHOS in the

## 2019-07-29 NOTE — PROGRESS NOTES
Assessment and Plan:        1. Chronic systolic heart failure- diuretics, low dose BB; bp too low for other rxs. Does he need the amio? 2. ckd-Nephrolgy following  3. Anemia- high retic count- ?hemolysis- will ck haptoglobin  4. Pt now limited x 4  5. Leukocytosis- difficult to tell if infection; monitor. He does not look toxic  6. pleurex catheter- will ask pulmonary to follow  7. Aflutter- will need to change to oral ac when invasive procedures concluded. He is scheduled for cardioversion today 7.29  8. Diabetes- sugars reasonable for status  9. Deconditioning- PT/OT  10. Soc service  11. He has significant metabolic alkalosis; think he is dry; giving gentle ns        CC:  sob  HPI:  Pt with severe 3 v dz, ckd, poor lv fx, atrial flutter, pleural effusion. Transferred for possible cabg, this has been ruled out  ROS (12 point review of systems completed. Pertinent positives noted.  Otherwise ROS is negative) :   PMH:  Per HPI  SHX:    FHX: pos for cad  Allergies: See above    Medications:     sodium chloride      heparin (porcine) 11.6 Units/kg/hr (07/28/19 7087)    dextrose        metoprolol succinate  12.5 mg Oral BID    midodrine  12.5 mg Oral TID WC    [Held by provider] bumetanide  1 mg Oral BID    insulin lispro  0-18 Units Subcutaneous TID WC    insulin lispro  0-9 Units Subcutaneous Nightly    glycopyrrolate-formoterol  2 puff Inhalation BID    potassium (CARDIAC) replacement protocol   Other RX Placeholder    insulin glargine  27 Units Subcutaneous Nightly    potassium bicarb-citric acid  40 mEq Oral Daily    polyethylene glycol  17 g Oral Daily    famotidine  20 mg Oral Daily    sodium chloride flush  10 mL Intravenous 2 times per day    amiodarone  200 mg Oral Daily    aspirin  81 mg Oral Daily    atorvastatin  40 mg Oral Daily    vitamin B-12  1,000 mcg Oral Daily       Vital Signs:   BP (!) 90/57   Pulse 80   Temp 97.9 °F (36.6 °C) (Oral)   Resp 20   Ht 5' 9\"

## 2019-07-29 NOTE — CARE COORDINATION
07/29/19 7:18 AM    Pt transferred to 3B35. Handoff report given to Mine Whalen, 3B CM.
7/22/19, 2:39 PM      Tarik Tucker day: 4  Location: [facility]33/033-A Reason for admit: Pain at surgical site [G89.18]   Procedure: 7/19 Chest tube, pigtail. Treatment Plan of Care: Hospitalist, Pulmonology, Cardiology and Nephrology following. CT clamped, planning placement of pleurX to left chest. DuoNebs q4hr WA. Continues with iv Rocephin. Lasix 80mg iv bid. Palliative Care consulted today. HF nurse consult. PCP: Pushpa Guzman MD  Readmission Risk Score: 40%  Discharge Plan: Pt lives at home with wife. Planning high risk stenting in the future.
7/25/19, 3:20 PM      Tk Sanderson day: 7  Location: [facility]16/016-A Reason for admit: Pain at surgical site [G89.18]   Procedure:   7/23 Left Pleurx cathter in IR  7/23 Intubated  7/23 [address] placed  7/25 Extubated  Treatment Plan of Care: Extubated this am to bipap 14/6 with 40% FIO2, sats 100%. Tmax 99.5. Per Cardiology, may need CV if does not convert to NSR before discharge. Intensivist, Cardiology, and Nephrology following. PT/OT. Palliative Care, Dietitian, and HF RN following. [facility]ветлана. [facility] [place] [facility]. Left Pleurx drain. Cardiac monitoring, I&O, daily weight, browne care. Bumex @ 0.5 mg/hr, precedex @ 0.3 mcg/kg/hr, dopamine @ 2.5 mcg/kg/min, heparin drip, levophed @ 1 mcg/min, IV diamox Q12H, amio, asa, lipitor, IV rocephin, IV cleocin, pepcid, inhaler, lantus, SSI Q6H, nebs, toprol xl, K+, Electrolyte replacement protocols. Cl 85, CO2 34, BUN 53, Creat 1.8, wbc 15.1, hgb 8.7. PCP: Pema Aguilar MD  Readmission Risk Score: 44%  Discharge Plan: Home with wife. Monitor as course progresses.
7/26/19, 2:46 PM      Abram Moment day: 8  Location: [facility]16/016-A Reason for admit: Pain at surgical site [G89.18]   Procedure:   7/23 Left Pleurx cathter in IR  7/23 Intubated  7/23 [address] placed  7/25 Extubated  Treatment Plan of Care: Bumex drip stopped today and changed to oral bid. Levo weaned off this am. Now limited code - no x4. Sats 97% on 5L O2 vs bipap 14/6 with 40% FIO2. Ox3, not situation. Intensivist, Cardiology, and Nephrology following. PT/OT. Palliative Care, Dietitian, and HF RN following. [facility]. [facility]. Left Pleurx drain to -20sx. Cardiac monitoring, I&O, daily weight, browne care. Dopamine @ 2.5 mcg/kg/min, heparin drip, amio, asa, lipitor, po bumex 2 mg bid, pepcid, inhaler, lantus, SSI Q6H, nebs, toprol xl, midodrine, K+, Electrolyte replacement protocols. Cl 85, CO2 35, BUN 56, Creat 2.2, pro-bnp 7921. PCP: Fiona Rushing MD  Readmission Risk Score: 44%  Discharge Plan: Home with wife. Monitor PT/OT evals since extubated. Will need minimum of HH w/pleurx drain.
7/29/19, 10:50 AM      Karen Nur day: 11  Location: [facility]35035A Reason for admit: Pain at surgical site [G89.18]   Procedure: 7/23 Left Pleurx cathter in IR  7/23 Intubated  7/23 [address] placed  7/25 Extubated  Treatment Plan of Care: Hospitalist, Nephrology and Cardiology following. IV heparin gtt continues. PleurX drain continues and is to be drained daily. O2 on 2L/NC. Abnormal labs today: BUN 79, creatinine 3.3. Sodium 132 and chloride 83. BP's running low, in the 80's. Per Nephrology, pt needs to stay until renal function is more stable. Holding diuretics and to avoid ACEI/ARB for now. Pt is refusing GUSTAVO with cardioversion. Cardiology signing off, will follow prn. PCP: Malika Newsome MD  Readmission Risk Score: 45%  Discharge Plan: Pt wants to go home. Lives at home with wife. [facility] Ple[facility].  consulted.    Electronically signed by Cami Garcia RN on 7/29/2019 at 11:11 AM
DISCHARGE BARRIERS  7/29/19, 11:45 AM    Reason for Referral: home needs  Mental Status: Answered all questions appropriately. Decision Making: Independent with decision making. Family/Social/Home Environment: Lives at home with his spouse. She is his primary care giver. Family is supportive. Current Services: None  Current Equipment:2 wheelchairs, standard and rollator walkers, 2 -4 pronged canes, bi-pap, home oxygen, shower bench, high toilet. Payment Source:Medicare and [facility]  Concerns or Barriers to Discharge: Patient is not moving very well. Collabrative List of ECF/HH were provided:HH list given. Teach Back Method used with Wilton Mora regarding care plan and discharge planning. Patient verbalized understanding of the plan of care and contribute to goal setting. Anticipated Needs/Discharge Plan: Wilton Mora plans to return home where he lives with his spouse at discharge. She is his primary care giver. He is agreeable to [facility] for nursing only. Will need a new HH order. He told KLAUS that he knows all of the exercises so he does not need PT/OT. SW called and made referral to [place] at [facility] to make the referral.  They are able to get all information needed from Epic. They have had Ramakrishnasaleem Mora in the past as a patient. Wilton Steincairn denies any other needs at this time. Electronically signed by GUILLERMINA Ge on 7/29/2019 at 11:45 AM
Morbid obesity due to excess calories (HCC)    CKD (chronic kidney disease), stage III (HCC)    Chronic diastolic (congestive) heart failure (HCC)    Anemia in stage 3 chronic kidney disease (HCC)    Hypokalemia    Atrial flutter (HCC)    Acute on chronic diastolic CHF (congestive heart failure), NYHA class 3 (HCC)    Uncontrolled type 2 diabetes mellitus with hyperglycemia (HCC)    Dilated cardiomyopathy (HCC)    Moderate to severe pulmonary hypertension ([facility]    Arterial hypotension    CAD, multiple vessel    Paroxysmal atrial flutter [facility])       Inpatient Assessment  Care Transitions Summary    Care Transitions Inpatient Review  Medication Review  Do you have all of your prescriptions and are they filled?:  No (Comment: waiting on [facility] to send Spiriva. Requested local pharmacy fill until recieve from [facility])   Barriers to Medication Adherence:  None  Are you able to afford your medications?:  Yes  How often do you have difficulty taking your medications?:  I always take them as prescribed. Housing Review  Who do you live with?:  Partner/Spouse/SO  Are you an active caregiver in your home?:  No  Social Support  Do you have a ?:  No  Do you have a [address] [place] [address]?:  No  Durable Medical Equipment  Patient DME:  Veronika Paez  Other Patient DME:  lift chair  Patient Home Equipment:  Nebulizer, Oxygen, BiPAP  Functional Review  Ability to seek help/take action for Emergent/Urgent situations i.e. fire, crime, inclement weather or health crisis. :  Independent  Ability handle personal hygiene needs (bathing/dressing/grooming): Independent  Ability to manage medications:  Needs Assistance  Ability to prepare food:  Needs Assistance  Ability to maintain home (clean home, laundry):  Needs Assistance  Ability to drive and/or has transportation:  Dependent  Ability to do shopping:  Needs Assistance  Ability to manage finances:   Independent  Is patient able to live independently?:

## 2019-07-30 VITALS
TEMPERATURE: 98.1 F | BODY MASS INDEX: 38.07 KG/M2 | DIASTOLIC BLOOD PRESSURE: 55 MMHG | SYSTOLIC BLOOD PRESSURE: 89 MMHG | WEIGHT: 257.02 LBS | HEART RATE: 73 BPM | RESPIRATION RATE: 18 BRPM | HEIGHT: 69 IN | OXYGEN SATURATION: 96 %

## 2019-07-30 LAB
ANION GAP SERPL CALCULATED.3IONS-SCNC: 15 MEQ/L (ref 8–16)
APTT: 72 SECONDS (ref 22–38)
BUN BLDV-MCNC: 75 MG/DL (ref 7–22)
CALCIUM SERPL-MCNC: 9.1 MG/DL (ref 8.5–10.5)
CHLORIDE BLD-SCNC: 87 MEQ/L (ref 98–111)
CO2: 30 MEQ/L (ref 23–33)
CREAT SERPL-MCNC: 2.6 MG/DL (ref 0.4–1.2)
GFR SERPL CREATININE-BSD FRML MDRD: 24 ML/MIN/1.73M2
GLUCOSE BLD-MCNC: 149 MG/DL (ref 70–108)
GLUCOSE BLD-MCNC: 155 MG/DL (ref 70–108)
GLUCOSE BLD-MCNC: 230 MG/DL (ref 70–108)
HAPTOGLOBIN: 454 MG/DL (ref 30–200)
POTASSIUM SERPL-SCNC: 4 MEQ/L (ref 3.5–5.2)
SODIUM BLD-SCNC: 132 MEQ/L (ref 135–145)

## 2019-07-30 PROCEDURE — 6370000000 HC RX 637 (ALT 250 FOR IP): Performed by: INTERNAL MEDICINE

## 2019-07-30 PROCEDURE — 99232 SBSQ HOSP IP/OBS MODERATE 35: CPT | Performed by: INTERNAL MEDICINE

## 2019-07-30 PROCEDURE — 94640 AIRWAY INHALATION TREATMENT: CPT

## 2019-07-30 PROCEDURE — 2709999900 HC NON-CHARGEABLE SUPPLY

## 2019-07-30 PROCEDURE — 6370000000 HC RX 637 (ALT 250 FOR IP): Performed by: NURSE PRACTITIONER

## 2019-07-30 PROCEDURE — 2580000003 HC RX 258: Performed by: INTERNAL MEDICINE

## 2019-07-30 PROCEDURE — APPSS30 APP SPLIT SHARED TIME 16-30 MINUTES: Performed by: NURSE PRACTITIONER

## 2019-07-30 PROCEDURE — 85730 THROMBOPLASTIN TIME PARTIAL: CPT

## 2019-07-30 PROCEDURE — 97116 GAIT TRAINING THERAPY: CPT

## 2019-07-30 PROCEDURE — 99239 HOSP IP/OBS DSCHRG MGMT >30: CPT | Performed by: INTERNAL MEDICINE

## 2019-07-30 PROCEDURE — 82948 REAGENT STRIP/BLOOD GLUCOSE: CPT

## 2019-07-30 PROCEDURE — C1729 CATH, DRAINAGE: HCPCS

## 2019-07-30 PROCEDURE — 97530 THERAPEUTIC ACTIVITIES: CPT

## 2019-07-30 PROCEDURE — 80048 BASIC METABOLIC PNL TOTAL CA: CPT

## 2019-07-30 PROCEDURE — 36415 COLL VENOUS BLD VENIPUNCTURE: CPT

## 2019-07-30 PROCEDURE — 97110 THERAPEUTIC EXERCISES: CPT

## 2019-07-30 RX ORDER — MIDODRINE HYDROCHLORIDE 5 MG/1
15 TABLET ORAL
Qty: 90 TABLET | Refills: 3 | Status: SHIPPED | OUTPATIENT
Start: 2019-07-30 | End: 2019-07-30

## 2019-07-30 RX ORDER — POLYETHYLENE GLYCOL 3350 17 G/17G
17 POWDER, FOR SOLUTION ORAL DAILY
Qty: 527 G | Refills: 1 | Status: SHIPPED | OUTPATIENT
Start: 2019-07-31 | End: 2019-08-30

## 2019-07-30 RX ORDER — BUMETANIDE 2 MG/1
TABLET ORAL
Qty: 90 TABLET | Refills: 3 | Status: SHIPPED | OUTPATIENT
Start: 2019-07-30

## 2019-07-30 RX ORDER — POTASSIUM CHLORIDE 750 MG/1
10 TABLET, EXTENDED RELEASE ORAL DAILY
Qty: 30 TABLET | Refills: 3 | Status: SHIPPED | OUTPATIENT
Start: 2019-07-30

## 2019-07-30 RX ORDER — MIDODRINE HYDROCHLORIDE 5 MG/1
15 TABLET ORAL
Qty: 90 TABLET | Refills: 3 | Status: SHIPPED | OUTPATIENT
Start: 2019-07-30

## 2019-07-30 RX ADMIN — FAMOTIDINE 20 MG: 20 TABLET ORAL at 08:33

## 2019-07-30 RX ADMIN — MIDODRINE HYDROCHLORIDE 15 MG: 10 TABLET ORAL at 08:34

## 2019-07-30 RX ADMIN — INSULIN LISPRO 6 UNITS: 100 INJECTION, SOLUTION INTRAVENOUS; SUBCUTANEOUS at 11:53

## 2019-07-30 RX ADMIN — Medication 10 ML: at 08:35

## 2019-07-30 RX ADMIN — APIXABAN 5 MG: 5 TABLET, FILM COATED ORAL at 14:51

## 2019-07-30 RX ADMIN — INSULIN LISPRO 3 UNITS: 100 INJECTION, SOLUTION INTRAVENOUS; SUBCUTANEOUS at 07:39

## 2019-07-30 RX ADMIN — POLYETHYLENE GLYCOL 3350 17 G: 17 POWDER, FOR SOLUTION ORAL at 08:33

## 2019-07-30 RX ADMIN — ASPIRIN 81 MG 81 MG: 81 TABLET ORAL at 08:33

## 2019-07-30 RX ADMIN — Medication 1000 MCG: at 08:34

## 2019-07-30 RX ADMIN — MIDODRINE HYDROCHLORIDE 15 MG: 10 TABLET ORAL at 11:52

## 2019-07-30 RX ADMIN — GLYCOPYRROLATE AND FORMOTEROL FUMARATE 2 PUFF: 9; 4.8 AEROSOL, METERED RESPIRATORY (INHALATION) at 10:25

## 2019-07-30 RX ADMIN — POTASSIUM BICARBONATE 40 MEQ: 391 TABLET, EFFERVESCENT ORAL at 09:16

## 2019-07-30 RX ADMIN — ATORVASTATIN CALCIUM 40 MG: 40 TABLET, FILM COATED ORAL at 08:34

## 2019-07-30 ASSESSMENT — PAIN SCALES - GENERAL
PAINLEVEL_OUTOF10: 0
PAINLEVEL_OUTOF10: 0

## 2019-07-30 ASSESSMENT — PAIN SCALES - WONG BAKER: WONGBAKER_NUMERICALRESPONSE: 0

## 2019-07-30 NOTE — PROGRESS NOTES
[facility]   Nephrology progress note  7/30/2019, 12:37 PM      Pt Name:    Darcy Lazar  MRN:     [id number]     [place]:    1945  Admit Date:    7/18/2019  7:31 PM  Primary Care Physician:  Portia Zelaya MD   Room number  3B-35/035-A    Chief Complaint: Nephrology following for ERNESTO/CKD    Subjective:  Patient seen and examined  Comfortable  Wants to go home  No chest pain  Creat improved  D/w RN  On Iv heparin      Objective:  24HR INTAKE/OUTPUT:      Intake/Output Summary (Last 24 hours) at 7/30/2019 1237  Last data filed at 7/30/2019 1100  Gross per 24 hour   Intake 2042.82 ml   Output 725 ml   Net 1317.82 ml     I/O last 3 completed shifts: In: 2522.8 [P.O.:1090; I.V.:1432.8]  Out: 625 [Urine:625]  I/O this shift:  In: 240 [P.O.:240]  Out: 100 [Urine:100]  Admission weight: 288 lb 12.8 oz (131 kg)  Wt Readings from Last 3 Encounters:   07/29/19 257 lb 0.4 oz (116.6 kg)   07/18/19 281 lb (127.5 kg)   07/17/19 286 lb 3.2 oz (129.8 kg)     Body mass index is 37.96 kg/m². Physical examination  BP 88/53, T 98.1  Vitals:    07/29/19 1032   BP: 80/60   Pulse: 100   Resp:    Temp:    SpO2:        General Appearance: awake, alert, no distress, comfortable  Neck: No jugular venous distention and appears symmetric  Lungs: Air entry diminished. No exp wheeze, no rhonchi  Heart: S1, S2 heard  GI: mild distention, without any  guarding  Extremities: improving/stable LE edema, not much clinically  Skin:  Skin is warm. Lab Data  CBC:   Recent Labs     07/28/19  0648   WBC 19.6*   HGB 9.4*   HCT 29.3*        BMP:  Recent Labs     07/28/19  0648 07/29/19  0414 07/30/19  0359    132* 132*   K 4.1 4.2 4.0   CL 84* 83* 87*   CO2 32 31 30   BUN 79* 79* 75*   CREATININE 3.0* 3.3* 2.6*   GLUCOSE 133* 172* 155*   CALCIUM 9.6 9.0 9.1     Hepatic:   No results for input(s): LABALBU, AST, ALT, ALB, BILITOT, ALKPHOS in the last 72 hours.       Meds:  Infusion:    heparin (porcine) 11.6 COPD    D/w patient and RN in room    Milton Silva MD  [facility]

## 2019-07-30 NOTE — PLAN OF CARE
Problem: Falls - Risk of:  Goal: Will remain free from falls  Description  Will remain free from falls  7/26/2019 0208 by Danni Heaton RN  Outcome: Ongoing   Without fall thus far. Falling star in place. Tineo Fall Scale completed. Problem: Pain:  Goal: Control of acute pain  Description  Control of acute pain  Outcome: Ongoing   Denies pain at this time. Problem: Musculor/Skeletal Functional Status  Goal: Highest potential functional level  7/26/2019 0208 by Danni Heaton RN  Outcome: Ongoing   Pt unable to ambulate at this time. Pt encouraged to assist with turns and care. Problem: Discharge Planning:  Goal: Discharged to appropriate level of care  Description  Discharged to appropriate level of care  7/26/2019 0208 by Danni Heaton RN  Outcome: Ongoing   Unable to transfer at this time with swan in place  Problem: Breathing Pattern - Ineffective:  Goal: Ability to achieve and maintain a regular respiratory rate will improve  Description  Ability to achieve and maintain a regular respiratory rate will improve  7/26/2019 0208 by Danni Heaton RN  Outcome: Ongoing   . Raspatory rate 30 at times. Unable to tolerate high flow heated nasale canula. Tolerating homp cpap at this time. Rate 26 at this time. SPO2 97%  Problem: OXYGENATION/RESPIRATORY FUNCTION  Goal: Patient will maintain patent airway  7/26/2019 0208 by Danni Heaton RN  Outcome: Ongoing   Coughing up white thick sputum at this time.   Able to suction with ya  Problem: Serum Glucose Level - Abnormal:  Goal: Ability to maintain appropriate glucose levels will improve  Description  Ability to maintain appropriate glucose levels will improve  7/26/2019 0208 by Danni Heaton RN  Outcome: Ongoing     Problem: Serum Glucose Level - Abnormal:  Goal: Ability to maintain appropriate glucose levels will improve  Description  Ability to maintain appropriate glucose levels will improve  7/26/2019 0208 by Danni Heaton RN  Outcome:
Problem: Impaired respiratory status  Goal: STG - patient can administer MDI's  Outcome: Ongoing  Note:   Continue with LAMA/LABA for COPD maintenance.
Problem: Pain:  Goal: Pain level will decrease  Description  Pain level will decrease  Outcome: Completed  Note:   Denies any complaints of pain     Problem: Pain:  Goal: Control of acute pain  Description  Control of acute pain  7/30/2019 1458 by Zane Mendoza RN  Outcome: Completed  Note:   Denies any complaints of pain     Problem: Pain:  Goal: Control of chronic pain  Description  Control of chronic pain  Outcome: Completed  Note:   Denies any complaints of pain     Problem: Musculor/Skeletal Functional Status  Goal: Highest potential functional level  7/30/2019 1458 by Zane Mendoza RN  Outcome: Completed  Note:   Patient ambulates with assist     Problem: Falls - Risk of:  Goal: Will remain free from falls  Description  Will remain free from falls  7/30/2019 1458 by Zane Mendoza RN  Outcome: Completed  Note:   Remains free from falls- ambulates with steady gait     Problem: Falls - Risk of:  Goal: Absence of physical injury  Description  Absence of physical injury  Outcome: Completed  Note:   Remains free from injury     Problem: Discharge Planning:  Goal: Discharged to appropriate level of care  Description  Discharged to appropriate level of care  7/30/2019 1458 by Zane Mendoza RN  Outcome: Completed  Note:   Discharged home in stable condition     Problem: Breathing Pattern - Ineffective:  Goal: Ability to achieve and maintain a regular respiratory rate will improve  Description  Ability to achieve and maintain a regular respiratory rate will improve  Outcome: Completed  Note:   Tolerating home 2L NC     Problem: Gas Exchange - Impaired:  Goal: Levels of oxygenation will improve  Description  Levels of oxygenation will improve  7/30/2019 1458 by Zane Mendoza RN  Outcome: Completed  Note:   O2 remains >92% on 2L NC     Problem: OXYGENATION/RESPIRATORY FUNCTION  Goal: Patient will maintain patent airway  Outcome: Completed  Note:   Able to maintain clear airway     Problem: Serum Glucose
Problem: Pain:  Goal: Pain level will decrease  Description  Pain level will decrease  Outcome: Ongoing  Note:   Ongoing assessment & interventions provided throughout shift. Reminded patient to report any pain, pressure, or shortness of breath to the nurse. Pain medications provided per physician's orders. The only pain patient c/o about was the \"numbness\" type pain in his right arm. He stated it had started in both arms along with weakness, but now just his right arm remained with weakness. Tylenol was effective in relieving the discomfort and by this morning he was able to move his right arm better. The chest tube did cause some discomfort if he laid on his left side, but it was gone as soon as he was off his left side. Goal: Control of acute pain  Description  Control of acute pain  Outcome: Ongoing  Note:   As described above. Goal: Control of chronic pain  Description  Control of chronic pain  Outcome: Ongoing  Note:   As described above     Problem: Musculor/Skeletal Functional Status  Goal: Highest potential functional level  Outcome: Ongoing  Note:   Patient is very compromised functionally. Due to dyspnea & has size, he is stable to walk a few steps to the recliner or back to bed, but that's all. Problem: Falls - Risk of:  Goal: Will remain free from falls  Description  Will remain free from falls  Outcome: Met This Shift  Note:   Assessment & interventions provided throughout shift. Bed locked & in low position, call light in reach, side-rails up x2, non-slip socks on when ambulating, reminded patient to use call light to call for assistance. Hourly rounding & bed/chair alarm utilized. Goal: Absence of physical injury  Description  Absence of physical injury  Outcome: Met This Shift  Note:   No apparent physical injury occurred tonight.      Problem: Discharge Planning:  Goal: Discharged to appropriate level of care  Description  Discharged to appropriate level of care  Outcome:
Problem: Pain:  Goal: Pain level will decrease  Description  Pain level will decrease  Outcome: Ongoing  Note:   Patient able to use 0-10 pain scale. Denies pain at this time. Pain complaints as documented. Agreeable to take PRN pain medications. Pain goal of 2. Goal: Control of acute pain  Description  Control of acute pain  Outcome: Ongoing  Goal: Control of chronic pain  Description  Control of chronic pain  Outcome: Ongoing  Note:   Patient complained of intermittent back pain. Patient repositioned for comfort. Problem: Musculor/Skeletal Functional Status  Goal: Highest potential functional level  Outcome: Ongoing     Problem: Falls - Risk of:  Goal: Will remain free from falls  Description  Will remain free from falls  Outcome: Ongoing  Note:   Bed in lowest position and locked. Bed alarm activated. Educated on use of call light when in need of assistance- expressed understanding. Visual checks performed and charted. Pitt catheter in place. No falls during shift at this time. Armband and falling star in place. Call light within reach. Transfers with two assist, stand pivot to chair. Goal: Absence of physical injury  Description  Absence of physical injury  Outcome: Ongoing     Problem: Discharge Planning:  Goal: Discharged to appropriate level of care  Description  Discharged to appropriate level of care  Outcome: Ongoing  Note:   Patient is from home with his wife. Discharge planning remains in progress at this time pending clinical course. Problem: Breathing Pattern - Ineffective:  Goal: Ability to achieve and maintain a regular respiratory rate will improve  Description  Ability to achieve and maintain a regular respiratory rate will improve  Outcome: Ongoing     Problem: Gas Exchange - Impaired:  Goal: Levels of oxygenation will improve  Description  Levels of oxygenation will improve  Outcome: Ongoing  Note:   Patient's SpO2 remains >92% on 6L NC.  Patient tolerating well at
- Nonviolent/Non-Self-Destructive Behavior:  Goal: Absence of restraint-related injury  Description  Absence of restraint-related injury  7/25/2019 1148 by Marcela Hagan RN  Outcome: Completed  7/25/2019 0825 by Artemio Ivy  Outcome: Met This Shift  Note:   Pt remains free from restraint-related injury this shift.
levels will improve  Outcome: Ongoing  Note:   Monitoring sugars before meals and at bedtime. Insulin coverage as needed     Problem: Risk for Impaired Skin Integrity  Goal: Tissue integrity - skin and mucous membranes  Description  Structural intactness and normal physiological function of skin and  mucous membranes. Outcome: Met This Shift     Problem: Impaired respiratory status  Goal: STG - patient can administer MDI's  7/28/2019 0837 by Armando Flynn RCP  Outcome: Ongoing  Note:   Breath sounds clear and diminished. Patient needs assistance with inhaler delivery, can not perform independently.
ordered to improve aeration. Goal: Patient will achieve/maintain normal respiratory rate/effort  Description  Respiratory rate and effort will be within normal limits for the patient  7/23/2019 0945 by Gia Heller RN  Outcome: Ongoing  Note:   Will continue to be monitored. Goal: Absence of new skin breakdown  Outcome: Ongoing  Note:   He already has an open area on right posterior thigh which is being monitored. Care plan reviewed with patient and his wife. Patient and  verbalize understanding of the plan of care and contribute to goal setting.

## 2019-07-30 NOTE — PROGRESS NOTES
Discharge instructions reviewed by this RN and Doan Eliana- Pharmacist with wife, daughter, and patient - all questions answered. Patient discharged via wheelchair to family vehicle with all belongings.

## 2019-07-30 NOTE — PROGRESS NOTES
[address] [place] [address]  Occupational Therapy  Daily Note  Time:    Time In: 3297  Time Out: 0902  Timed Code Treatment Minutes: 25 Minutes  Minutes: 25        Date: 2019  Patient Name: Cami Lincoln,   Gender: male      Room: -35/035-A  MRN: [id number]  : 1945  (69 y.o.)  Referring Practitioner: Louis ALNG  Diagnosis: pain at surgical site  Additional Pertinent Hx: admit with above diagnosis, ERNESTO, recurrent left pleural effusion, CHF, COPD. Pt had a cardiac cath   and has significant issues needing perhaps bypass. Pt was intubated on  and came from  to ICU that date . Pt extubated . Restrictions/Precautions:  Restrictions/Precautions: General Precautions, Fall Risk  Position Activity Restriction  Other position/activity restrictions: 2L O2    SUBJECTIVE: Pt seated in chair and agreeable to OT treatment. PAIN: 0/10: pt denied pain    COGNITION: decreased safety and insight noted. ADL:   Pt declined ADLs at this time    BALANCE:  Sitting Balance:  Supervision  Standing Balance: Contact Guard Assistance    TRANSFERS:  Sit to Stand:  Contact Guard Assistance. from chair with vcs for hand placemnet and safe tech as pt attemts to pull on walker to complete transfers  Stand to Sit: [employer]. to chair with vcs for hand placement and safe tech    FUNCTIONAL MOBILITY:  Assistive Device: Rolling Walker  Assist Level:  Contact Guard Assistance. Distance: Pt ambulated short distance into hallway at slow pace with cues needed for safety, especially while turning. Pt becomes SOB quickly with ambulation and requires lengthy rest break to recover. ADDITIONAL ACTIVITIES:  Educated pt on ECTs and focused on breathing technique with activities    ASSESSMENT:  Activity Tolerance:  Patient tolerance of  treatment: fair.  Pt limited by SOB      Discharge Recommendations: Home with Home health OT, Continue to assess pending progress, Patient would benefit from continued therapy after discharge, Home with assist PRN  Equipment Recommendations:    Plan: Times per week: 5x  Current Treatment Recommendations: Strengthening, Endurance Training, Self-Care / ADL, Balance Training, Functional Mobility Training, Home Management Training    Patient Education  Patient Education: safety with transfers and ambulation, role of OT, ADLs    Goals  Short term goals  Time Frame for Short term goals: by discharge  Short term goal 1: Pt will complete functional t/f with Sup and no VC for safety for increased indep with toileting routine  Short term goal 2: Pt will complete dynamic standing activity > 8 min SBA with no VC and no increased SOB for increased indep with meal prep activities  Short term goal 3: Pt will complete functional mobility to/from bathroom SBA with no SOB and no LOB for increased indep with toileting  Short term goal 4: Pt will complete ADL routine with Sup and any AE necessary with no VC for increased indep with dressing tasks  Short term goal 5: Pt will complete UB HEP with Min VC for technique for increased ease with ADL routine  Long term goals  Time Frame for Long term goals : no LTG d/t short ELOS    Following session, patient left in safe position with all fall risk precautions in place.

## 2019-07-31 ENCOUNTER — CARE COORDINATION (OUTPATIENT)
Dept: CASE MANAGEMENT | Age: 74
End: 2019-07-31

## 2019-07-31 ENCOUNTER — TELEPHONE (OUTPATIENT)
Dept: PULMONOLOGY | Age: 74
End: 2019-07-31

## 2019-07-31 ENCOUNTER — CARE COORDINATION (OUTPATIENT)
Dept: CARE COORDINATION | Age: 74
End: 2019-07-31

## 2019-07-31 ENCOUNTER — TELEPHONE (OUTPATIENT)
Dept: PHARMACY | Facility: CLINIC | Age: 74
End: 2019-07-31

## 2019-07-31 DIAGNOSIS — I50.33 ACUTE ON CHRONIC DIASTOLIC CHF (CONGESTIVE HEART FAILURE), NYHA CLASS 3 (HCC): Primary | ICD-10-CM

## 2019-07-31 PROCEDURE — 1111F DSCHRG MED/CURRENT MED MERGE: CPT

## 2019-07-31 NOTE — CARE COORDINATION
[address] Transitions Initial Follow Up Call    Call within 2 business days of discharge: Yes    Patient: Ronny Wu Patient : 1945   MRN: [id number]  Reason for Admission: CHF  Discharge Date: 19 RARS: Readmission Risk Score: 40      Last Discharge [facility]       Complaint Diagnosis Description Type Department Provider    19  Acute on chronic diastolic CHF (congestive heart failure), NYHA class 3 ([address] . .. Admission (Discharged) Azucena Chowdhury MD    19   Admission (Discharged) Remberto Byrd MD           Spoke with: Wife Allison Wasserman  Wife stated patient is tired. Denies cough, edema, and chest pain. Denied the need to review medications. Wife has medications set up. Eliquis was sent in to [place] to fill. Reviewed appts. Wife denies concerns or issues at this time. Facility: [facility]    Non-face-to-face services provided:  Obtained and reviewed discharge summary and/or continuity of care documents    Care Transitions 24 Hour Call    Do you have any ongoing symptoms?:  No  Do you have a copy of your discharge instructions?:  Yes  Do you have all of your prescriptions and are they filled?:  No (Comment[facility].  Requested local pharmacy fill until recieve from [place])  Have you been contacted by a [employer] [address]?:  No  Have you scheduled your follow up appointment?:  Yes  How are you going to get to your appointment?:  Car - family or friend to transport  Were you discharged with any Home Care or Post Acute Services:  Yes  Post Acute Services:  Home Health (Comment: [facility])  Patient DME:  Veronika Mata  Other Patient DME:  lift chair  Patient Home Equipment:  Nebulizer, Oxygen, BiPAP  Do you have support at home?:  Partner/Spouse/SO  Do you feel like you have everything you need to keep you well at home?:  No  Are you an active caregiver in your home?:  No  Care Transitions Interventions  No Identified Needs         Follow Up  Future Appointments   Date Time Provider Cassie Johnsi   8/6/2019  2:30 PM MD Marlo Jaimes Elizabeth M   8/8/2019 11:45 AM MD Jacki Doherty [facility]   8/15/2019 11:20 AM MD RITO Ewing [facility]   8/26/2019  2:40 PM Karyna Boykin MD [facility] [place] [email or web address]   12/6/2019  8:30 AM MD Marlo Jaimes RN

## 2019-07-31 NOTE — TELEPHONE ENCOUNTER
CLINICAL PHARMACY NOTE  Post-Discharge Transitions of Care (JOSE MARTIN)    Non-face-to-face services provided:  Assessment and support for treatment adherence and medication management-Medication reconciliation completed 7/31/19    Subjective/Objective:  Stephen Walsh is a 69 y.o. male. Patient was discharged from [facility] on 7/30/19 with a diagnosis of acute on chronic diastolic CHF. Patient outreach to review discharge medications and provide medication review and management. Spoke with wife, Sofiya Savage, as okayed by patient. No Known Allergies    Discharge Medications (as per discharging medication list found AVS): There are NEW medications for you.  START taking them after you leave the hospital  Medication Sig Notes    polyethylene glycol (GLYCOLAX) packet Take 17 g by mouth daily Has this and is taking    potassium chloride (KLOR-CON M) 10 MEQ extended release tablet Take 1 tablet by mouth daily Has and is taking    apixaban (ELIQUIS) 5 MG TABS tablet Take 1 tablet by mouth 2 times daily Has and is taking  Got a month supply from [facility] for free- will get next fill and meds through [place]    midodrine (PROAMATINE) 5 MG tablet Take 3 tablets by mouth 3 times daily (with meals) Has and is taking     You told us you were taking these medications at home, but the amount or how often you take this medication has CHANGED  Medication Sig Notes    insulin glargine (LANTUS SOLOSTAR) 100 UNIT/ML injection pen Inject 27 Units into the skin every morning (before breakfast)     bumetanide (BUMEX) 2 MG tablet ONE TAB EVERY Monday AND FRIDAY      These are medications you told us you were taking at home, CONTINUE taking them after you leave the hospital   Medication Sig Notes    budesonide-formoterol (SYMBICORT) 80-4.5 MCG/ACT AERO Inhale 2 puffs into the lungs 2 times daily     tiotropium (SPIRIVA HANDIHALER) 18 MCG inhalation capsule Inhale 1 capsule into the lungs daily     aspirin 81 MG tablet Take 1 tablet by mouth

## 2019-07-31 NOTE — DISCHARGE SUMMARY
(diabetes mellitus, type 2) ([facility] [address] [E11.9]        The patient was seen and examined on day of discharge and this discharge summary is in conjunction with any daily progress note from day of discharge. Hospital Course:   Chantelle Murphy is a 69 y.o. male admitted to [address] [place] [address] on 7/18/2019 for evaluation of cad. The pt has a hx of copd, recurrent left pleural effusion, atrial flutter; he was found to have 3 vessel dz on cath at [facility] transferred here for further management. He has left ventricular systolic dysfunction and low bps. When I saw him on rounds he was extremely sob. Dr Rangel Hubbard was called and elected to take him to ICU. He performed thoracentesis there with improvement in his dyspnea. He was seen by multiple consultants. Nephrology followed him for his ckd. He was followed also by Pulmonology and his chest tube was converted to a pleurex prior to discharge. He was seen by Dr Ajay Haddad and felt to be a significant risk for cabg. PTCA was discussed but not carried out. .     Cardioversion was considered for his atrial flutter but as it had not had a sustained effect on 2 prior occasions the pt declined. The pt realizes his precarious situation. He could not be treated with standard chf meds due to his marginal situration. One might consider discussing Hospice with him. He will follow up with Dr Luis Jansen. Arrangements were made for Home Health.              Exam:     Vitals:  Vitals:    07/30/19 0828 07/30/19 1113 07/30/19 1113 07/30/19 1424   BP: (!) 81/49 (!) 88/54 (!) 88/53 (!) 89/55   Pulse: 76 79  73   Resp: 20 18     Temp: 98 °F (36.7 °C) 98.1 °F (36.7 °C)     TempSrc: Oral Oral     SpO2: 96% 96%     Weight:       Height:         Weight: Weight: 257 lb 0.4 oz (116.6 kg)     24 hour intake/output:    Intake/Output Summary (Last 24 hours) at 7/31/2019 0814  Last data filed at 7/30/2019 1341  Gross per 24 hour   Intake 1007 ml   Output 100 ml   Net 907 ml         General voice recognition technology. **      Final report electronically signed by Dr. Iban Floyd on 7/29/2019 1:36 PM      XR CHEST PORTABLE   Final Result   1. Mild cardiomegaly. Tiny pleural effusion left side. 2. Mild pneumonia/pulmonary edema left midlung and both lung bases, worse on the left. Slight improvement from earlier. **This report has been created using voice recognition software. It may contain minor errors which are inherent in voice recognition technology. **      Final report electronically signed by Dr. Tara Schuster on 7/28/2019 1:54 PM      XR CHEST PORTABLE   Final Result      Significantly improved infiltrates throughout the right lung. Worsening pneumonia or pulmonary edema in the mid and lower left lung. No definite left pleural effusion or pneumothorax. Left chest tube remains at the base of the left hemithorax. **This report has been created using voice recognition software. It may contain minor errors which are inherent in voice recognition technology. **      Final report electronically signed by Dr. Elan Hua on 7/28/2019 6:13 AM      XR CHEST PORTABLE   Final Result      Right jugular Nenana-Anamika catheter tip in the mid right pulmonary artery region. ET tube tip 4.4 cm above the lovely in satisfactory position. Worsening alveolar interstitial infiltrates in the right lung, stable and less severe in the left lung. **This report has been created using voice recognition software. It may contain minor errors which are inherent in voice recognition technology. **      Final report electronically signed by Dr. Elan Hua on 7/24/2019 1:48 AM      XR CHEST PORTABLE   Final Result   Right-sided Nenana-Anamika catheter in the right hilum. Recommend slight retraction if clinically warranted. Patchy airspace opacities bilaterally suggesting edema.       Findings called to Abhijit Tarango RN at 6:56 PM on the day of exam      **This report has been created using voice recognition effusion left side versus pleural thickening. 2. Increased markings both mid and lower lung fields, slightly worse on the right, consistent with pneumonia/pulmonary edema. These findings have worsened since yesterday. Final report electronically signed by Dr. Guajardo Payment on 7/23/2019 9:22 AM      IR INSERT TUNNELED PLEURA CATH W CUFF   Final Result   Status post successful tunneled Pleurx chest drainage catheter insertion. **This report has been created using voice recognition software. It may contain minor errors which are inherent in voice recognition technology. **      Final report electronically signed by Dr. Guajardo Payment on 7/23/2019 9:20 AM      XR CHEST PORTABLE   Final Result   1. Interval worsening interstitial edema correlate with progressive congestive heart failure. 2. Increasing left base atelectasis and effusion. **This report has been created using voice recognition software. It may contain minor errors which are inherent in voice recognition technology. **      Final report electronically signed by Dr. Jose Carlos Mccallum on 7/22/2019 6:53 AM      XR CHEST PORTABLE   Final Result   1. Interval decrease in lung volumes versus worsening of venous congestion and/or infiltrates. 2. Visualized left-sided chest drains without pneumothorax. Residual left effusion is not excluded. **This report has been created using voice recognition software. It may contain minor errors which are inherent in voice recognition technology. **      Final report electronically signed by Dr. Jose Carlos Mccallum on 7/21/2019 7:12 AM      CT CHEST WO CONTRAST   Final Result   1. Visualized left-sided chest tube with adjacent small pneumothorax. 2. Patchy bilateral airspace opacities compatible with pneumonitis. 3 small to moderate right effusion. 4. Atherosclerotic vascular disease                **This report has been created using voice recognition software.  It may contain minor errors which are

## 2019-08-05 ENCOUNTER — CARE COORDINATION (OUTPATIENT)
Dept: CASE MANAGEMENT | Age: 74
End: 2019-08-05

## 2019-08-26 ENCOUNTER — CARE COORDINATION (OUTPATIENT)
Dept: CARE COORDINATION | Age: 74
End: 2019-08-26

## 2019-09-02 LAB — AFB CULTURE & SMEAR: NORMAL

## 2021-06-08 NOTE — PROGRESS NOTES
Occupational Therapy  Facility/Department: [facility] AT THE [facility] MED SURG  Daily Treatment Note  NAME: Muna Parikh  : 1945  MRN: [id number]    Date of Service: 2019    Discharge Recommendations:  ECF with OT, 24 hour supervision or assist, Continue to assess pending progress       Assessment      Activity Tolerance  Activity Tolerance: Patient Tolerated treatment well  Safety Devices  Safety Devices in place: Yes  Type of devices: Left in bed;Call light within reach         Patient Diagnosis(es): The primary encounter diagnosis was Congestive heart failure, unspecified HF chronicity, unspecified heart failure type ([address] Diagnoses of Acute on chronic respiratory acidosis, Pleural effusion, Hypoxia, and Respiratory insufficiency were also pertinent to this visit. has a past medical history of Abnormal nuclear stress test, Arrhythmia, Atrial fibrillation ([address] CAD (coronary artery disease), DM type 2 (diabetes mellitus, type 2) ([facility] [address] and Hx of echocardiogram.   has a past surgical history that includes Coronary angioplasty (); Cardioversion (2007); and Cardioversion (). Restrictions  Restrictions/Precautions  Restrictions/Precautions: General Precautions, Fall Risk  Subjective   General  Chart Reviewed: Yes  Patient assessed for rehabilitation services?: Yes  Response to previous treatment: Patient with no complaints from previous session  Family / Caregiver Present: Yes(wife)  Referring Practitioner: Chasidy Ramsay PA-C  Diagnosis: CHF exacerbation  Subjective  Subjective: Pt reporting that he had just gotten back in be on therapist arrival.  General Comment  Comments: Pt agreeable to treatment while in bed. Vital Signs  Patient Currently in Pain: Denies      Balance  Sitting Balance: Minimal assistance  Standing Balance: Moderate assistance  Standing Balance  Time: 1 minute  Activity: repositioning to lay down in bed  Bed mobility  Supine to Sit: Moderate assistance  Scooting:  Moderate assistance(when scooting to EOB)  Comment: Pt completed bed with HOB elevated and with use of side rails. Required 2 hand assist from therapist to fully sit up. Assist to scoot to EOB once sitting up. Transfers  Sit to stand: Maximum assistance  Stand to sit: Maximum assistance  Transfer Comments: MaxA to complete sit to stand from EOB with use of gait belt and FWW in front of pt. One VC to push up from bed rather than pull on walker. Took 2 side steps to right side with modA, using FWW. Type of ROM/Therapeutic Exercise  Type of ROM/Therapeutic Exercise: AAROM; Free weights  Comment: Pt completed BUE exercises to increase UB strength for increased indpendence with functional transfers and ADL. Pt completed 3 exercises x 2 sets of 15 reps each, while in bed with HOB elevated. Pt completed 1 set of exercises with the 2# dumbbell and one set with the 1# dumbbell. Once sitting EOB, pt completed 2 sets of s exercises x 10 reps each using 1# dumbbell. Koki for sitting balance. AAROM of RUE completed at shoulder level d/t to pt complaints of decreased AROM. Pt tolerated well, reported no pain. Plan   Plan  Times per week: 7  Times per day: Daily  Current Treatment Recommendations: Strengthening, Endurance Training, Patient/Caregiver Education & Training, Self-Care / ADL, Functional Mobility Training, Other (comment), Safety Education & Training  Plan Comment: Energy Conservation, ther-act, ther-ex, and self-care tasks. Goals  Short term goals  Time Frame for Short term goals: 10 visits  Short term goal 1: Patient to perform functional transfers with mod A of 2 with use of gaitbelt and LRAD. Short term goal 2: Patient to tolerate 10 minutes ther-ex/ther-act to improve strength/endurance for ADLs with no greater than 4 restbreaks. Short term goal 3: Patient to complete ADL grooming tasks wtih min A.   Short term goal 4: Patient to state 2 energy conservation techniques following education in order to improve daily endurance.         Therapy Time   Individual Concurrent Group Co-treatment   Time In 2364         Time Out 1431         Minutes 2[address] [facility], [address] [place] [address], OTR/L None

## 2024-01-30 NOTE — PROGRESS NOTES
Assisted x2 with walker from bed to chair patient tolerated well repositioned in bed to right side Posterior Drawer negative  Shaylee's negative, Thallasy  negative,   PF grind test negative, Apprehension test negative,  Patellar J sign  negative    Xrays:   Well aligned prosthesis with no signs of loosening.    MRI:   Not performed  Radiographic findings reviewed with patient    Impression:  Encounter Diagnosis   Name Primary?    S/P total knee arthroplasty, left Yes       Plan:   Natural history and expected course discussed. Questions answered.  Educational materials distributed.  Rest, ice, compression, and elevation (RICE) therapy.  Continue with activities as tolerated.  I discussed daily exercise with the patient to get him stronger.  I will see him back in July.